# Patient Record
Sex: FEMALE | Race: WHITE | NOT HISPANIC OR LATINO | Employment: OTHER | URBAN - METROPOLITAN AREA
[De-identification: names, ages, dates, MRNs, and addresses within clinical notes are randomized per-mention and may not be internally consistent; named-entity substitution may affect disease eponyms.]

---

## 2017-01-02 ENCOUNTER — TELEPHONE (OUTPATIENT)
Dept: INTERNAL MEDICINE | Facility: CLINIC | Age: 82
End: 2017-01-02

## 2017-01-02 NOTE — TELEPHONE ENCOUNTER
Jamilah Rodriguez is a 85 year old female who calls with ongoing pain .     NURSING ASSESSMENT:  Description:  The pain in hip and back is worsening .   Onset/duration:  12/28/2016  Precip. factors:  none  Associated symptoms:  PT did have vomiting once she started Tramadol . She feels on edge today .  Improves/worsens symptoms:  Advil not resolving  Pain scale (0-10)   8/10  LMP/preg/breast feeding:  na  Last exam/Treatment:  12/28/2016  Allergies:   Allergies   Allergen Reactions     Atenolol Hives     Beta Adrenergic Blockers      Brimonidine      Cephalexin Hives     Germall Plus Itching     Imidazolidinyl Urea found in many topical creams and house hold products     Latex      Itching     Morphine GI Disturbance     Penicillins Hives     Sulfa Drugs GI Disturbance     Tylenol GI Disturbance     Vicodin [Hydrocodone-Acetaminophen]      Fentanyl Nausea       MEDICATIONS:   Taking medication(s) as prescribed? No  Taking over the counter medication(s?) Yes  Any medication side effects? GI  upset    Any barriers to taking medication(s) as prescribed?  No  Medication(s) improving/managing symptoms?  No  Medication reconciliation completed: Yes      NURSING PLAN: Routed to provider Yes    RECOMMENDED DISPOSITION:  To ED, another person to drive - if she cannot tolerate the pain that is what she wants to do   Will comply with recommendation: Yes  If further questions/concerns or if symptoms do not improve, worsen or new symptoms develop, call your PCP or [facility] as soon as possible.      Guideline used:  Telephone Triage Protocols for Nurses, Fifth Edition, Mine Wright RN

## 2017-01-02 NOTE — TELEPHONE ENCOUNTER
We should see her if her pain is worse, I should see her. I can see her today or tomorrow.    OK to place into any MD only or open spot.

## 2017-01-04 ENCOUNTER — TRANSFERRED RECORDS (OUTPATIENT)
Dept: HEALTH INFORMATION MANAGEMENT | Facility: CLINIC | Age: 82
End: 2017-01-04

## 2017-03-03 ENCOUNTER — TRANSFERRED RECORDS (OUTPATIENT)
Dept: HEALTH INFORMATION MANAGEMENT | Facility: CLINIC | Age: 82
End: 2017-03-03

## 2017-03-20 ENCOUNTER — TELEPHONE (OUTPATIENT)
Dept: OTHER | Facility: CLINIC | Age: 82
End: 2017-03-20

## 2017-03-20 NOTE — TELEPHONE ENCOUNTER
Pt called stating that she has right ankle edema for months and kenzie brown discoloration starting below right knee to the ankle for a while now. Pt denied pain with ambulation or numbness. Pt stated she has tingling sometimes. Pt stated that her orthopedic surgeon prescribed her compression stockings but she hasn't been using it. Pt was advised to go to her PCP. Pt agreed.     Inna Mustafa RN, BSN.

## 2017-05-05 DIAGNOSIS — J44.1 COPD EXACERBATION (H): ICD-10-CM

## 2017-05-05 NOTE — TELEPHONE ENCOUNTER
ventolin 108(90base)        Last Written Prescription Date: 12/19/16  Last Fill Quantity: 1inh, # refills: 0    Last Office Visit with [facility] or [facility] prescribing provider:  12/28/16   Future Office Visit:   0    Date of Last Asthma Action Plan Letter:   There are no preventive care reminders to display for this patient.   Asthma Control Test: No flowsheet data found.    Date of Last Spirometry Test:   No results found for this or any previous visit.

## 2017-05-08 RX ORDER — ALBUTEROL SULFATE 90 UG/1
2 AEROSOL, METERED RESPIRATORY (INHALATION) 4 TIMES DAILY PRN
Qty: 1 INHALER | Refills: 6 | Status: SHIPPED | OUTPATIENT
Start: 2017-05-08 | End: 2018-02-20

## 2017-05-18 ENCOUNTER — TRANSFERRED RECORDS (OUTPATIENT)
Dept: HEALTH INFORMATION MANAGEMENT | Facility: CLINIC | Age: 82
End: 2017-05-18

## 2017-05-23 ENCOUNTER — OFFICE VISIT (OUTPATIENT)
Dept: INTERNAL MEDICINE | Facility: CLINIC | Age: 82
End: 2017-05-23

## 2017-05-23 VITALS
DIASTOLIC BLOOD PRESSURE: 84 MMHG | WEIGHT: 132.6 LBS | TEMPERATURE: 98 F | SYSTOLIC BLOOD PRESSURE: 134 MMHG | HEART RATE: 82 BPM | BODY MASS INDEX: 23.5 KG/M2 | HEIGHT: 63 IN | OXYGEN SATURATION: 98 %

## 2017-05-23 DIAGNOSIS — Z53.9 ERRONEOUS ENCOUNTER--DISREGARD: Primary | ICD-10-CM

## 2017-05-23 NOTE — MR AVS SNAPSHOT
"              After Visit Summary   5/23/2017    Jamilah Rodriguez    MRN: [id number]           Patient Information     Date Of Birth          4/5/1931        Visit Information        Provider Department      5/23/2017 9:20 AM Bhupendra Chong MD [facility]        Today's Diagnoses     ERRONEOUS ENCOUNTER--DISREGARD    -  1       Follow-ups after your visit        Your next 10 appointments already scheduled     May 31, 2017  9:20 AM CDT   Office Visit with Bhupendra Chong MD   [facility] ([facility]    [address] [id number] [phone]           Bring a current list of meds and any records pertaining to this visit.  For Physicals, please bring immunization records and any forms needing to be filled out.  Please arrive 10 minutes early to complete paperwork.              Who to contact     If you have questions or need follow up information about today's clinic visit or your schedule please contact [facility] directly at [phone].  Normal or non-critical lab and imaging results will be communicated to you by MyChart, letter or phone within 4 business days after the clinic has received the results. If you do not hear from us within 7 days, please contact the clinic through [employer] or phone. If you have a critical or abnormal lab result, we will notify you by phone as soon as possible.  Submit refill requests through [employer] or call your pharmacy and they will forward the refill request to us. Please allow 3 business days for your refill to be completed.          Additional Information About Your Visit        MyChart Information     [employer] lets you send messages to your doctor, view your test results, renew your prescriptions, schedule appointments and more. To sign up, go to [email or web address] . Click on \"Log in\" on the left side of the screen, which will take you " "to the Welcome page. Then click on \"Sign up Now\" on the right side of the page.     You will be asked to enter the access code listed below, as well as some personal information. Please follow the directions to create your username and password.     Your access code is: [email or web address]  Expires: 2017  8:10 PM     Your access code will  in 90 days. If you need help or a new code, please call your [facility] clinic or [phone].        Care EveryWhere ID     This is your Care EveryWhere ID. This could be used by other organizations to access your [facility] medical records  [id number]        Your Vitals Were     Pulse Temperature Height Pulse Oximetry BMI (Body Mass Index)       82 98  F (36.7  C) (Oral) 5' 3\" (1.6 m) 98% 23.49 kg/m2        Blood Pressure from Last 3 Encounters:   17 134/84   16 134/76   16 100/60    Weight from Last 3 Encounters:   17 132 lb 9.6 oz (60.1 kg)   16 132 lb 14.4 oz (60.3 kg)   16 137 lb 11.2 oz (62.5 kg)              Today, you had the following     No orders found for display       Primary Care Provider Office Phone # Fax #    Bhupendra Chong [phone]       [facility] [address] [facility] [address]        Thank you!     Thank you for choosing [facility]  for your care. Our goal is always to provide you with excellent care. Hearing back from our patients is one way we can continue to improve our services. Please take a few minutes to complete the written survey that you may receive in the mail after your visit with us. Thank you!             Your Updated Medication List - Protect others around you: Learn how to safely use, store and throw away your medicines at www.disposemymeds.org.          This list is accurate as of: 17 11:59 PM.  Always use your most recent med list.                   Brand Name Dispense Instructions for use    albuterol 108 (90 BASE) MCG/ACT " Inhaler    albuterol    1 Inhaler    Inhale 2 puffs into the lungs 4 times daily as needed for shortness of breath / dyspnea or wheezing       ascorbic acid 500 MG tablet    VITAMIN C    100 tablet    Take 1 tablet by mouth daily.       bimatoprost 0.01 % Soln    LUMIGAN     Place 1 drop into both eyes At Bedtime.       calcium + D 600-200 MG-UNIT Tabs   Generic drug:  calcium carbonate-vitamin D     100 tablet    Take 1 tablet by mouth every 12 hours.       carboxymethylcellulose 0.5 % Soln ophthalmic solution    REFRESH PLUS     Place 1 drop into both eyes every 2 hours as needed.       dorzolamide 2 % ophthalmic solution    TRUSOPT     Place 1 drop into both eyes 2 times daily       FLAX SEEDS PO          fluticasone 50 MCG/ACT spray    FLONASE    49 mL    Spray 1-2 sprays into both nostrils daily as needed for rhinitis or allergies USE ONCE PER DAY DURING ALLERGY SEASON(S)       GLUCOSAMINE SULFATE PO      Take 500 mg by mouth daily       losartan 50 MG tablet    COZAAR    90 tablet    Take 1 tablet (50 mg) by mouth daily       traMADol 50 MG tablet    ULTRAM    30 tablet    Take 1 tablet (50 mg) by mouth every 6 hours as needed for moderate pain or severe pain maximum 4 tablet(s) per day       vitamin D 2000 UNITS tablet      Take 1 tablet by mouth daily.

## 2017-05-23 NOTE — PROGRESS NOTES
"  SUBJECTIVE:                                                    Jamilah Rodriguez is a 86 year old female who presents to clinic today for the following health issues:    States allergy to tramadol - vomiting    Interested in taking turmeric - would like to discuss    Concern - Edema     Onset: 2 months    Description:   Swelling in ankels    Intensity: moderate    Progression of Symptoms:  Intermittent, but constant since 5/1/17    Accompanying Signs & Symptoms:  Denies redness and warmth.   nut states foot is \"white\"       Previous history of similar problem:   none    Precipitating factors:   Worsened by:  In the morning when trying to put shoes on    Alleviating factors:  Improved by:        Therapies Tried and outcome: pt has tried compression stockings but cannot get them on        **patient left before I could see her.  i was running 45 minutes late and her ride (daughter) had to leave to go to work  "

## 2017-05-23 NOTE — NURSING NOTE
"Chief Complaint   Patient presents with     Edema     in ankles X 2 months       Initial /84  Pulse 82  Temp 98  F (36.7  C) (Oral)  Ht 5' 3\" (1.6 m)  Wt 132 lb 9.6 oz (60.1 kg)  SpO2 98%  BMI 23.49 kg/m2 Estimated body mass index is 23.49 kg/(m^2) as calculated from the following:    Height as of this encounter: 5' 3\" (1.6 m).    Weight as of this encounter: 132 lb 9.6 oz (60.1 kg).  Medication Reconciliation: complete   Nidia Urias CMA    Pt unable to wait any longer, pt left 10:05am. Nidia Urias CMA    "

## 2017-05-31 ENCOUNTER — OFFICE VISIT (OUTPATIENT)
Dept: INTERNAL MEDICINE | Facility: CLINIC | Age: 82
End: 2017-05-31
Payer: MEDICARE

## 2017-05-31 ENCOUNTER — OFFICE VISIT (OUTPATIENT)
Dept: URGENT CARE | Facility: URGENT CARE | Age: 82
End: 2017-05-31
Payer: MEDICARE

## 2017-05-31 VITALS
SYSTOLIC BLOOD PRESSURE: 125 MMHG | HEART RATE: 68 BPM | HEIGHT: 63 IN | TEMPERATURE: 98.3 F | WEIGHT: 131.2 LBS | OXYGEN SATURATION: 99 % | BODY MASS INDEX: 23.25 KG/M2 | DIASTOLIC BLOOD PRESSURE: 72 MMHG

## 2017-05-31 VITALS
BODY MASS INDEX: 23.21 KG/M2 | HEART RATE: 72 BPM | OXYGEN SATURATION: 99 % | WEIGHT: 131 LBS | SYSTOLIC BLOOD PRESSURE: 130 MMHG | TEMPERATURE: 97.7 F | DIASTOLIC BLOOD PRESSURE: 72 MMHG

## 2017-05-31 DIAGNOSIS — J44.9 CHRONIC OBSTRUCTIVE PULMONARY DISEASE, UNSPECIFIED COPD TYPE (H): ICD-10-CM

## 2017-05-31 DIAGNOSIS — R60.0 EDEMA OF RIGHT LOWER EXTREMITY: Primary | ICD-10-CM

## 2017-05-31 DIAGNOSIS — I10 BENIGN ESSENTIAL HYPERTENSION: ICD-10-CM

## 2017-05-31 DIAGNOSIS — I71.40 ABDOMINAL AORTIC ANEURYSM (AAA) WITHOUT RUPTURE (H): ICD-10-CM

## 2017-05-31 DIAGNOSIS — N94.89 VAGINAL BURNING: Primary | ICD-10-CM

## 2017-05-31 LAB
MICRO REPORT STATUS: NORMAL
SPECIMEN SOURCE: NORMAL
WET PREP SPEC: NORMAL

## 2017-05-31 PROCEDURE — 87210 SMEAR WET MOUNT SALINE/INK: CPT | Performed by: PHYSICIAN ASSISTANT

## 2017-05-31 PROCEDURE — 99213 OFFICE O/P EST LOW 20 MIN: CPT | Performed by: PHYSICIAN ASSISTANT

## 2017-05-31 PROCEDURE — 99214 OFFICE O/P EST MOD 30 MIN: CPT | Performed by: INTERNAL MEDICINE

## 2017-05-31 PROCEDURE — 87529 HSV DNA AMP PROBE: CPT | Mod: 91 | Performed by: PHYSICIAN ASSISTANT

## 2017-05-31 PROCEDURE — 87529 HSV DNA AMP PROBE: CPT | Performed by: PHYSICIAN ASSISTANT

## 2017-05-31 RX ORDER — VALACYCLOVIR HYDROCHLORIDE 1 G/1
TABLET, FILM COATED ORAL
Qty: 21 TABLET | Refills: 0 | Status: SHIPPED | OUTPATIENT
Start: 2017-05-31 | End: 2017-06-08

## 2017-05-31 RX ORDER — CALCIUM ACETATE MONOHYDRATE AND ALUMINUM SULFATE TETRADECAHYDRATE 952; 1347 MG/2299MG; MG/2299MG
POWDER, FOR SOLUTION TOPICAL
Qty: 14 EACH | Refills: 0 | Status: SHIPPED | OUTPATIENT
Start: 2017-05-31 | End: 2017-06-08

## 2017-05-31 NOTE — PROGRESS NOTES
SUBJECTIVE:                                                    Jamilah Rodriguez is a 86 year old female who presents to clinic today for the following health issues:      Concern - edema-R ankle     Onset: 2-3 months    Description:   Swelling in R ankle and top of R foot    Intensity: mild    Progression of Symptoms:  same    Accompanying Signs & Symptoms:  Numbness and tingling in the whole R leg-denies any pain.        Previous history of similar problem:   no    Precipitating factors:   Worsened by:     Alleviating factors:  Improved by:  Elevation and foot exercises      Therapies Tried and outcome: Elevation and foot exercises     2.    Blood presure remains well controlled at home  Readings outside clinic are within normal limits.  Reviewed last 6 BP readings in chart:  BP Readings from Last 6 Encounters:   06/08/17 124/80   05/31/17 130/72   05/31/17 125/72   05/23/17 134/84   12/28/16 134/76   12/21/16 100/60     He has not experienced any significant side effects from medicaiotns for hypertension.    NO active cardiac complaints or symptoms with exercise.     3.  COPD remains stable.  Has not had any recent breathing troubles beyond usual baseline.  Has not any acute respiratory events.  Remains with intermittent cough, mild shortness of breath with overexertion as per usual.  Using medication as directed with reported side effects.         Problem list and histories reviewed & adjusted, as indicated.  Additional history: as documented        Reviewed and updated as needed this visit by clinical staff  Tobacco  Allergies       Reviewed and updated as needed this visit by Provider           Past Medical History:  ---------------------------  Past Medical History:   Diagnosis Date     AAA (abdominal aortic aneurysm) (H)     Followed with yearly ultrasound     Cervical cancer (H) 1969     Chronic dermatitis     extensive allergy testing revealed allergy/sensitivity to carba mix ( rubber) and  Imidazolidinyl Urea (common in beauty products)     Glaucoma      Glaucoma      History of COPD     very mild abnormal spirometry in 2007 (in [place]), has not been active since quitting smoking in 2009     HTN (hypertension)      Macular degeneration      Osteoporosis 8/3/11    femoral T score -3.4 & -3.7     PAD (peripheral artery disease) (H)      Vertebral compression fracture (H) 5/17/11    L1 compression fracture, presumed osteoporotic compression fracture       Past Surgical History:  ---------------------------  Past Surgical History:   Procedure Laterality Date     CATARACT IOL, RT/LT  2/8/12    left eye cataract removal     HYSTERECTOMY TOTAL ABDOMINAL  1999    Fibroids     OPEN REDUCTION INTERNAL FIXATION HIP NAILING  7/2/2012    Procedure: OPEN REDUCTION INTERNAL FIXATION HIP NAILING;  Intramedullary Fixation Right Intertrochanteric Hip Fracture;  Surgeon: Chalino Covarrubias MD;  Location:  OR     OPEN REDUCTION INTERNAL FIXATION TIBIAL PLATEAU  3/31/2013    Procedure: OPEN REDUCTION INTERNAL FIXATION TIBIAL PLATEAU;  RIGHT LATERAL TIBIAL PLATEAU FRACTURE - SYNTHES Medial Meniscus Repair, Lateral Compartment Release;  Surgeon: Alfred Cardenas MD;  Location: [facility] OR     OPEN REDUCTION INTERNAL FIXATION WRIST  1988    left wrist ORIF, hardware removed onemonth later       Current Medications:  ---------------------------  Current Outpatient Prescriptions   Medication Sig Dispense Refill     albuterol (ALBUTEROL) 108 (90 BASE) MCG/ACT Inhaler Inhale 2 puffs into the lungs 4 times daily as needed for shortness of breath / dyspnea or wheezing 1 Inhaler 6     losartan (COZAAR) 50 MG tablet Take 1 tablet (50 mg) by mouth daily 90 tablet 3     GLUCOSAMINE SULFATE PO Take 500 mg by mouth daily       Flaxseed, Linseed, (FLAX SEEDS PO)        dorzolamide (TRUSOPT) 2 % ophthalmic solution Place 1 drop into both eyes 2 times daily       Cholecalciferol (VITAMIN D) 2000 UNITS tablet Take 1 tablet by mouth daily.        bimatoprost (LUMIGAN) 0.01 % SOLN Place 1 drop into both eyes At Bedtime.         carboxymethylcellulose (REFRESH PLUS) 0.5 % SOLN Place 1 drop into both eyes every 2 hours as needed.         Calcium Carbonate-Vitamin D (CALCIUM + D) 600-200 MG-UNIT per tablet Take 1 tablet by mouth every 12 hours. 100 tablet 12     ascorbic acid (VITAMIN C) 500 MG tablet Take 1 tablet by mouth daily. 100 tablet 12     traMADol (ULTRAM) 50 MG tablet Take 1 tablet (50 mg) by mouth every 6 hours as needed for moderate pain or severe pain maximum 4 tablet(s) per day (Patient not taking: Reported on 5/31/2017) 30 tablet 0     fluticasone (FLONASE) 50 MCG/ACT nasal spray Spray 1-2 sprays into both nostrils daily as needed for rhinitis or allergies USE ONCE PER DAY DURING ALLERGY SEASON(S) (Patient not taking: Reported on 5/31/2017) 49 mL 1       Allergies:  -------------  Allergies   Allergen Reactions     Atenolol Hives     Beta Adrenergic Blockers      Brimonidine      Cephalexin Hives     Germall Plus Itching     Imidazolidinyl Urea found in many topical creams and house hold products     Latex      Itching     Morphine GI Disturbance     Penicillins Hives     Sulfa Drugs GI Disturbance     Tylenol GI Disturbance     Vicodin [Hydrocodone-Acetaminophen]      Fentanyl Nausea       Social History:  -------------------  Social History     Social History     Marital status:      Spouse name: N/A     Number of children: 2     Years of education: N/A     Occupational History     Not on file.     Social History Main Topics     Smoking status: Former Smoker     Quit date: 7/1/2009     Smokeless tobacco: Never Used      Comment: former 1/2-1 ppd since age [age]     Alcohol use No     Drug use: No     Sexual activity: No     Other Topics Concern     Not on file     Social History Narrative       Family Medical History:  ------------------------------  Family History   Problem Relation Age of Onset     Circulatory Daughter 53     mengioma  "    ALIX.AMOY. Father      DIABETES Sister      Breast Cancer Maternal Aunt 60     Breast Cancer Other          ROS:  REVIEW OF SYSTEMS:    RESP: negative for cough, dyspnea, wheezing, hemoptysis  CV: negative for chest pain, palpitations, PND, MCCULLOUGH, orthopnea; reports no changes in their ability to perform physical activity (from cardiovascular standpoint)  GI: negative for dysphagia, N/V, pain, melena, diarrhea and constipation  NEURO: negative for numbness/tingling, paralysis, incoordination, or focal weakness     OBJECTIVE:                                                    BP 90/58  Pulse 68  Temp 98.3  F (36.8  C) (Oral)  Ht 5' 3\" (1.6 m)  Wt 131 lb 3.2 oz (59.5 kg)  SpO2 99%  BMI 23.24 kg/m2     GENERAL alert and no distress  EYES:  Normal sclera,conjunctiva, EOMI  HENT: oral and posterior pharynx without lesions or erythema, facies symmetric  NECK: Neck supple. No LAD, without thyroidmegaly or JVD., Carotids without bruits.  RESP: Clear to ausculation bilaterally without wheezes or crackles. Normal BS in all fields.  CV: RRR normal S1S2 without murmurs, rubs or gallops. PMI normal  LYMPH: no cervical lymph adenopathy appreciated  MS: extremities- no gross deformities of the visible extremities noted, no edema  PSYCH: Alert and oriented times 3; speech- coherent  SKIN:  No obvious significant skin lesions on visible portions of face   EXT:  1+ lower extremity edema in lower right leg and ankle/foot area with mild pitting. Homans sign negative.  Some mild stasis dermatitis changes present.   Pulses in DP and PT pulses slighlty diminished on R vs L lower leg.   Left loer leg has trace lower extremity edema around ankles.        ASSESSMENT/PLAN:                                                      (I10) Benign essential hypertension  Comment: This condition is currently controlled on the current medical regimen.  Continue current therapy.   Discussed hypertension in detail including JNC VIII guidelines for " blood pressure goals.  Discussed indication for treatment and treatment options.  Discussed the importance for aggressive management of HTN to prevent vascular complications later.  Recommended lower fat, lower carbohydrate, and lower sodium (<2000 mg)diet.  Discussed required intervals for follow up on HTN, lab studies, and the need to aggresive management of other cardiac disease risk factors.  Recommened pt. follow their blood pressures outside the clinic to ensure that BPs are remaining within guidelines, and to contact me if the readings are not within guidelines so we can adjust treatment as needed.   Plan: losartan (COZAAR) 50 MG tablet            (R60.0) Edema of right lower extremity  (primary encounter diagnosis)  Comment: probable combination of reasons including dietary intake of sodiu, less walking.    Plan:          (I71.4) Abdominal aortic aneurysm (AAA) without rupture (H)  Comment: followed by Vascular clinic, reviewed last note, due for follow up visit this summer.   Plan:     (J44.9) Chronic obstructive pulmonary disease, unspecified COPD type (H)  Comment: This condition is currently controlled on the current medical regimen.  Continue current therapy.   Discussed general issues of COPD, including pathophysiology, ways it will affect the pt., when to seek help, reviewed the typical medications (how they are taken, how they help)   Plan:        See Patient Instructions    BEBETO LANGSTON M.D., MD  [facility]

## 2017-05-31 NOTE — NURSING NOTE
"Chief Complaint   Patient presents with     Vaginal Problem     vaginal burning, soreness, oder, discharge 3xdays        Initial /72 (BP Location: Left arm, Patient Position: Chair, Cuff Size: Adult Regular)  Pulse 72  Temp 97.7  F (36.5  C) (Oral)  Wt 131 lb (59.4 kg)  SpO2 99%  BMI 23.21 kg/m2 Estimated body mass index is 23.21 kg/(m^2) as calculated from the following:    Height as of an earlier encounter on 5/31/17: 5' 3\" (1.6 m).    Weight as of this encounter: 131 lb (59.4 kg).  Medication Reconciliation: complete    "

## 2017-05-31 NOTE — PROGRESS NOTES
SUBJECTIVE:  Jamilah Rodriguez is a 86 year old female who presents with   Vaginal irritation, burning and pain on right side for the past 3 days.  No fevers.  No abdominal pain.  No injury.    Onset of symptoms as above.     Pain:as above.     No LMP recorded. Patient is postmenopausal.        Past Medical History:   Diagnosis Date     AAA (abdominal aortic aneurysm) (H)     Followed with yearly ultrasound     Cervical cancer (H) 1969     Chronic dermatitis     extensive allergy testing revealed allergy/sensitivity to carba mix ( rubber) and Imidazolidinyl Urea (common in beauty products)     Glaucoma      Glaucoma      History of COPD     very mild abnormal spirometry in 2007 (in [place]), has not been active since quitting smoking in 2009     HTN (hypertension)      Macular degeneration      Osteoporosis 8/3/11    femoral T score -3.4 & -3.7     PAD (peripheral artery disease) (H)      Vertebral compression fracture (H) 5/17/11    L1 compression fracture, presumed osteoporotic compression fracture     Current Outpatient Prescriptions   Medication Sig Dispense Refill     valACYclovir (VALTREX) 1000 mg tablet For Shingles: Take 1 tablet by mouth three times daily for 7 days 21 tablet 0     aluminum sulfate-calcium acetate (DOMEBORO) Packet Dissolve one packet in a pint of luke warm to cool water.  Apply as a compress to area for 20 minutes up to QID. 14 each 0     albuterol (ALBUTEROL) 108 (90 BASE) MCG/ACT Inhaler Inhale 2 puffs into the lungs 4 times daily as needed for shortness of breath / dyspnea or wheezing 1 Inhaler 6     losartan (COZAAR) 50 MG tablet Take 1 tablet (50 mg) by mouth daily 90 tablet 3     GLUCOSAMINE SULFATE PO Take 500 mg by mouth daily       Calcium Carbonate-Vitamin D (CALCIUM + D) 600-200 MG-UNIT per tablet Take 1 tablet by mouth every 12 hours. 100 tablet 12     ascorbic acid (VITAMIN C) 500 MG tablet Take 1 tablet by mouth daily. 100 tablet 12     traMADol (ULTRAM) 50 MG tablet Take 1 tablet  (50 mg) by mouth every 6 hours as needed for moderate pain or severe pain maximum 4 tablet(s) per day (Patient not taking: Reported on 5/31/2017) 30 tablet 0     fluticasone (FLONASE) 50 MCG/ACT nasal spray Spray 1-2 sprays into both nostrils daily as needed for rhinitis or allergies USE ONCE PER DAY DURING ALLERGY SEASON(S) (Patient not taking: Reported on 5/31/2017) 49 mL 1     Flaxseed, Linseed, (FLAX SEEDS PO)        dorzolamide (TRUSOPT) 2 % ophthalmic solution Place 1 drop into both eyes 2 times daily       Cholecalciferol (VITAMIN D) 2000 UNITS tablet Take 1 tablet by mouth daily.       bimatoprost (LUMIGAN) 0.01 % SOLN Place 1 drop into both eyes At Bedtime.         carboxymethylcellulose (REFRESH PLUS) 0.5 % SOLN Place 1 drop into both eyes every 2 hours as needed.         Social History     Social History     Marital status:      Spouse name: N/A     Number of children: 2     Years of education: N/A     Occupational History     Not on file.     Social History Main Topics     Smoking status: Former Smoker     Quit date: 7/1/2009     Smokeless tobacco: Never Used      Comment: former 1/2-1 ppd since age [age]     Alcohol use No     Drug use: No     Sexual activity: No     Other Topics Concern     Not on file     Social History Narrative       ROS:   CONSTITUTIONAL:NEGATIVE for fever, chills, change in weight  INTEGUMENTARY/SKIN: as per HPI  : as per HPI    OBJECTIVE:  /72 (BP Location: Left arm, Patient Position: Chair, Cuff Size: Adult Regular)  Pulse 72  Temp 97.7  F (36.5  C) (Oral)  Wt 131 lb (59.4 kg)  SpO2 99%  BMI 23.21 kg/m2  : shallow ulcers on right labia majora and minora.  Scant vaginal discharge, clear.    GENERAL APPEARANCE: healthy, alert and no distress  CV: regular rates and rhythm, normal S1 S2, no murmur noted  ABDOMEN:  soft, nontender, no HSM or masses and bowel sounds normal  SKIN: no other rashes    (N94.9) Vaginal burning  (primary encounter diagnosis)  Comment:  seems consistent with shingles vs hsv  Plan: Wet prep, HSV 1 and 2 DNA by PCR, valACYclovir         (VALTREX) 1000 mg tablet, aluminum         sulfate-calcium acetate (DOMEBORO) Packet        Tylenol prn.  Patient declines other pain medication.     F/U with PCP should symptoms persist or worsen.    Patient expresses understanding and agreement with the assessment and plan as above.

## 2017-05-31 NOTE — MR AVS SNAPSHOT
"              After Visit Summary   5/31/2017    Jamilah Rodriguez    MRN: [id number]           Patient Information     Date Of Birth          4/5/1931        Visit Information        Provider Department      5/31/2017 9:20 AM Bhupendra Chong MD [facility]        Today's Diagnoses     Edema of right lower extremity    -  1    Benign essential hypertension        Abdominal aortic aneurysm (AAA) without rupture (H)        Chronic obstructive pulmonary disease, unspecified COPD type (H)           Follow-ups after your visit        Who to contact     If you have questions or need follow up information about today's clinic visit or your schedule please contact [facility] directly at [phone].  Normal or non-critical lab and imaging results will be communicated to you by MyChart, letter or phone within 4 business days after the clinic has received the results. If you do not hear from us within 7 days, please contact the clinic through MyChart or phone. If you have a critical or abnormal lab result, we will notify you by phone as soon as possible.  Submit refill requests through Artabase or call your pharmacy and they will forward the refill request to us. Please allow 3 business days for your refill to be completed.          Additional Information About Your Visit        MyChart Information     Artabase lets you send messages to your doctor, view your test results, renew your prescriptions, schedule appointments and more. To sign up, go to [email or web address] . Click on \"Log in\" on the left side of the screen, which will take you to the Welcome page. Then click on \"Sign up Now\" on the right side of the page.     You will be asked to enter the access code listed below, as well as some personal information. Please follow the directions to create your username and password.     Your access code is: [email or web address]  Expires: 8/28/2017  8:10 PM     Your access code " "will  in 90 days. If you need help or a new code, please call your [facility] clinic or [phone].        Care EveryWhere ID     This is your Care EveryWhere ID. This could be used by other organizations to access your [facility] medical records  [id number]        Your Vitals Were     Pulse Temperature Height Pulse Oximetry BMI (Body Mass Index)       68 98.3  F (36.8  C) (Oral) 5' 3\" (1.6 m) 99% 23.24 kg/m2        Blood Pressure from Last 3 Encounters:   17 124/80   17 130/72   17 125/72    Weight from Last 3 Encounters:   17 130 lb (59 kg)   17 131 lb (59.4 kg)   17 131 lb 3.2 oz (59.5 kg)              Today, you had the following     No orders found for display         Today's Medication Changes          These changes are accurate as of: 17 11:59 PM.  If you have any questions, ask your nurse or doctor.               Start taking these medicines.        Dose/Directions    aluminum sulfate-calcium acetate Packet   Commonly known as:  DOMEBORO   Used for:  Vaginal burning   Started by:  Mine Rodriguez PA-C        Dissolve one packet in a pint of luke warm to cool water.  Apply as a compress to area for 20 minutes up to QID.   Quantity:  14 each   Refills:  0       valACYclovir 1000 mg tablet   Commonly known as:  VALTREX   Used for:  Vaginal burning   Started by:  Mine Rodriguez PA-C        For Shingles: Take 1 tablet by mouth three times daily for 7 days   Quantity:  21 tablet   Refills:  0            Where to get your medicines      These medications were sent to [facility] [address] [place] [address] - [address] AT [facility] [place]  [address], [facility] [address]     Phone:  [phone]     aluminum sulfate-calcium acetate Packet    losartan 50 MG tablet    valACYclovir 1000 mg tablet                Primary Care Provider Office Phone # Fax #    Bhpuendra Chong [phone] " [phone]       [facility] [address]  [facility] [address]        Thank you!     Thank you for choosing [facility]  for your care. Our goal is always to provide you with excellent care. Hearing back from our patients is one way we can continue to improve our services. Please take a few minutes to complete the written survey that you may receive in the mail after your visit with us. Thank you!             Your Updated Medication List - Protect others around you: Learn how to safely use, store and throw away your medicines at www.disposemymeds.org.          This list is accurate as of: 5/31/17 11:59 PM.  Always use your most recent med list.                   Brand Name Dispense Instructions for use    albuterol 108 (90 BASE) MCG/ACT Inhaler    albuterol    1 Inhaler    Inhale 2 puffs into the lungs 4 times daily as needed for shortness of breath / dyspnea or wheezing       aluminum sulfate-calcium acetate Packet    DOMEBORO    14 each    Dissolve one packet in a pint of luke warm to cool water.  Apply as a compress to area for 20 minutes up to QID.       ascorbic acid 500 MG tablet    VITAMIN C    100 tablet    Take 1 tablet by mouth daily.       bimatoprost 0.01 % Soln    LUMIGAN     Place 1 drop into both eyes At Bedtime.       calcium + D 600-200 MG-UNIT Tabs   Generic drug:  calcium carbonate-vitamin D     100 tablet    Take 1 tablet by mouth every 12 hours.       carboxymethylcellulose 0.5 % Soln ophthalmic solution    REFRESH PLUS     Place 1 drop into both eyes every 2 hours as needed.       dorzolamide 2 % ophthalmic solution    TRUSOPT     Place 1 drop into both eyes 2 times daily       FLAX SEEDS PO          GLUCOSAMINE SULFATE PO      Take 500 mg by mouth daily       losartan 50 MG tablet    COZAAR    90 tablet    Take 1 tablet (50 mg) by mouth daily       valACYclovir 1000 mg tablet    VALTREX    21 tablet    For Shingles: Take 1 tablet by mouth three times  daily for 7 days       vitamin D 2000 UNITS tablet      Take 1 tablet by mouth daily.

## 2017-05-31 NOTE — MR AVS SNAPSHOT
"              After Visit Summary   5/31/2017    Jamilah Rodriguez    MRN: [id number]           Patient Information     Date Of Birth          4/5/1931        Visit Information        Provider Department      5/31/2017 11:00 AM Mine Rodriguez PA-C [facility]        Today's Diagnoses     Vaginal burning    -  1       Follow-ups after your visit        Your next 10 appointments already scheduled     Jun 07, 2017 11:00 AM CDT   Office Visit with JAMIN Rolle CNM   [facility]    [address] [address] [phone]           Bring a current list of meds and any records pertaining to this visit.  For Physicals, please bring immunization records and any forms needing to be filled out.  Please arrive 10 minutes early to complete paperwork.              Who to contact     If you have questions or need follow up information about today's clinic visit or your schedule please contact [facility] directly at [phone].  Normal or non-critical lab and imaging results will be communicated to you by Tailored Republichart, letter or phone within 4 business days after the clinic has received the results. If you do not hear from us within 7 days, please contact the clinic through Dealisedt or phone. If you have a critical or abnormal lab result, we will notify you by phone as soon as possible.  Submit refill requests through [employer] or call your pharmacy and they will forward the refill request to us. Please allow 3 business days for your refill to be completed.          Additional Information About Your Visit        MyChart Information     [employer] lets you send messages to your doctor, view your test results, renew your prescriptions, schedule appointments and more. To sign up, go to [email or web address] . Click on \"Log in\" on the left side of the screen, which will take " "you to the Welcome page. Then click on \"Sign up Now\" on the right side of the page.     You will be asked to enter the access code listed below, as well as some personal information. Please follow the directions to create your username and password.     Your access code is: [email or web address]  Expires: 2017  8:10 PM     Your access code will  in 90 days. If you need help or a new code, please call your [facility] clinic or [phone].        Care EveryWhere ID     This is your Care EveryWhere ID. This could be used by other organizations to access your [facility] medical records  [id number]        Your Vitals Were     Pulse Temperature Pulse Oximetry BMI (Body Mass Index)          72 97.7  F (36.5  C) (Oral) 99% 23.21 kg/m2         Blood Pressure from Last 3 Encounters:   17 130/72   17 125/72   17 134/84    Weight from Last 3 Encounters:   17 131 lb (59.4 kg)   17 131 lb 3.2 oz (59.5 kg)   17 132 lb 9.6 oz (60.1 kg)              We Performed the Following     HSV 1 and 2 DNA by PCR     Wet prep          Today's Medication Changes          These changes are accurate as of: 17  3:58 PM.  If you have any questions, ask your nurse or doctor.               Start taking these medicines.        Dose/Directions    aluminum sulfate-calcium acetate Packet   Commonly known as:  DOMEBORO   Used for:  Vaginal burning   Started by:  Mine Rodriguez PA-C        Dissolve one packet in a pint of luke warm to cool water.  Apply as a compress to area for 20 minutes up to QID.   Quantity:  14 each   Refills:  0       valACYclovir 1000 mg tablet   Commonly known as:  VALTREX   Used for:  Vaginal burning   Started by:  Mine Rodriguez PA-C        For Shingles: Take 1 tablet by mouth three times daily for 7 days   Quantity:  21 tablet   Refills:  0            Where to get your medicines      These medications were sent to [facility] [address] [place] [address] - " [address] AT [facility] [place]  [address], [facility] [address]     Phone:  [phone]     aluminum sulfate-calcium acetate Packet    valACYclovir 1000 mg tablet                Primary Care Provider Office Phone # Fax #    Bhupendra Abdulaziz Chong [phone]       [facility] [address]  [facility] [address]        Thank you!     Thank you for choosing [facility]  for your care. Our goal is always to provide you with excellent care. Hearing back from our patients is one way we can continue to improve our services. Please take a few minutes to complete the written survey that you may receive in the mail after your visit with us. Thank you!             Your Updated Medication List - Protect others around you: Learn how to safely use, store and throw away your medicines at www.disposemymeds.org.          This list is accurate as of: 5/31/17  3:58 PM.  Always use your most recent med list.                   Brand Name Dispense Instructions for use    albuterol 108 (90 BASE) MCG/ACT Inhaler    albuterol    1 Inhaler    Inhale 2 puffs into the lungs 4 times daily as needed for shortness of breath / dyspnea or wheezing       aluminum sulfate-calcium acetate Packet    DOMEBORO    14 each    Dissolve one packet in a pint of luke warm to cool water.  Apply as a compress to area for 20 minutes up to QID.       ascorbic acid 500 MG tablet    VITAMIN C    100 tablet    Take 1 tablet by mouth daily.       bimatoprost 0.01 % Soln    LUMIGAN     Place 1 drop into both eyes At Bedtime.       calcium + D 600-200 MG-UNIT Tabs   Generic drug:  calcium carbonate-vitamin D     100 tablet    Take 1 tablet by mouth every 12 hours.       carboxymethylcellulose 0.5 % Soln ophthalmic solution    REFRESH PLUS     Place 1 drop into both eyes every 2 hours as needed.       dorzolamide 2 % ophthalmic solution    TRUSOPT     Place 1 drop into  both eyes 2 times daily       FLAX SEEDS PO          fluticasone 50 MCG/ACT spray    FLONASE    49 mL    Spray 1-2 sprays into both nostrils daily as needed for rhinitis or allergies USE ONCE PER DAY DURING ALLERGY SEASON(S)       GLUCOSAMINE SULFATE PO      Take 500 mg by mouth daily       losartan 50 MG tablet    COZAAR    90 tablet    Take 1 tablet (50 mg) by mouth daily       traMADol 50 MG tablet    ULTRAM    30 tablet    Take 1 tablet (50 mg) by mouth every 6 hours as needed for moderate pain or severe pain maximum 4 tablet(s) per day       valACYclovir 1000 mg tablet    VALTREX    21 tablet    For Shingles: Take 1 tablet by mouth three times daily for 7 days       vitamin D 2000 UNITS tablet      Take 1 tablet by mouth daily.

## 2017-05-31 NOTE — NURSING NOTE
"Chief Complaint   Patient presents with     Edema     R ankle       Initial BP 90/58  Pulse 68  Temp 98.3  F (36.8  C) (Oral)  Ht 5' 3\" (1.6 m)  Wt 131 lb 3.2 oz (59.5 kg)  SpO2 99%  BMI 23.24 kg/m2 Estimated body mass index is 23.24 kg/(m^2) as calculated from the following:    Height as of this encounter: 5' 3\" (1.6 m).    Weight as of this encounter: 131 lb 3.2 oz (59.5 kg).  Medication Reconciliation: complete   Cristel Lacey MA   "

## 2017-06-01 LAB
HSV1 DNA SPEC QL NAA+PROBE: NEGATIVE
HSV2 DNA SPEC QL NAA+PROBE: NORMAL
SPECIMEN SOURCE: NORMAL

## 2017-06-07 ENCOUNTER — TELEPHONE (OUTPATIENT)
Dept: OTHER | Facility: CLINIC | Age: 82
End: 2017-06-07

## 2017-06-07 NOTE — TELEPHONE ENCOUNTER
"Pt has history of 3.8cm AAA, LOV was 5/25/16 with .  Recommendation at that time was to f/u in 2 years.    Pt called [facility] triage line 3/20/17:  Pt called stating that she has right ankle edema for months and kenzie brown discoloration starting below right knee to the ankle for a while now. Pt denied pain with ambulation or numbness. Pt stated she has tingling sometimes. Pt stated that her orthopedic surgeon prescribed her compression stockings but she hasn't been using it. Pt was advised to go to her PCP. Pt agreed.     Pt saw PCP 5/31/17 and the note is note complete.      Pt called [facility]  today and states that her PCP advised her to have her AAA US in addition to \"some other ultrasound\" of her legs.  What is PCP recommendation?    Kyung Andrews, DARYAN, RN    "

## 2017-06-08 ENCOUNTER — OFFICE VISIT (OUTPATIENT)
Dept: OBGYN | Facility: CLINIC | Age: 82
End: 2017-06-08
Payer: MEDICARE

## 2017-06-08 VITALS
BODY MASS INDEX: 23.04 KG/M2 | SYSTOLIC BLOOD PRESSURE: 124 MMHG | WEIGHT: 130 LBS | DIASTOLIC BLOOD PRESSURE: 80 MMHG | HEIGHT: 63 IN

## 2017-06-08 DIAGNOSIS — N76.2 ACUTE VULVITIS: ICD-10-CM

## 2017-06-08 DIAGNOSIS — B02.9 HERPES ZOSTER WITHOUT COMPLICATION: Primary | ICD-10-CM

## 2017-06-08 PROCEDURE — 99202 OFFICE O/P NEW SF 15 MIN: CPT | Performed by: OBSTETRICS & GYNECOLOGY

## 2017-06-08 ASSESSMENT — ANXIETY QUESTIONNAIRES
IF YOU CHECKED OFF ANY PROBLEMS ON THIS QUESTIONNAIRE, HOW DIFFICULT HAVE THESE PROBLEMS MADE IT FOR YOU TO DO YOUR WORK, TAKE CARE OF THINGS AT HOME, OR GET ALONG WITH OTHER PEOPLE: NOT DIFFICULT AT ALL
7. FEELING AFRAID AS IF SOMETHING AWFUL MIGHT HAPPEN: NOT AT ALL
3. WORRYING TOO MUCH ABOUT DIFFERENT THINGS: NOT AT ALL
1. FEELING NERVOUS, ANXIOUS, OR ON EDGE: NOT AT ALL
2. NOT BEING ABLE TO STOP OR CONTROL WORRYING: NOT AT ALL
GAD7 TOTAL SCORE: 0
5. BEING SO RESTLESS THAT IT IS HARD TO SIT STILL: NOT AT ALL
6. BECOMING EASILY ANNOYED OR IRRITABLE: NOT AT ALL

## 2017-06-08 ASSESSMENT — PATIENT HEALTH QUESTIONNAIRE - PHQ9: 5. POOR APPETITE OR OVEREATING: NOT AT ALL

## 2017-06-08 NOTE — MR AVS SNAPSHOT
"              After Visit Summary   2017    Jamilah Rodriguez    MRN: [id number]           Patient Information     Date Of Birth          1931        Visit Information        Provider Department      2017 9:45 AM Wali Stack MD [facility]        Today's Diagnoses     Herpes zoster without complication    -  1    Acute vulvitis           Follow-ups after your visit        Who to contact     If you have questions or need follow up information about today's clinic visit or your schedule please contact [facility] directly at [phone].  Normal or non-critical lab and imaging results will be communicated to you by cWyzehart, letter or phone within 4 business days after the clinic has received the results. If you do not hear from us within 7 days, please contact the clinic through cWyzehart or phone. If you have a critical or abnormal lab result, we will notify you by phone as soon as possible.  Submit refill requests through RadiumOne or call your pharmacy and they will forward the refill request to us. Please allow 3 business days for your refill to be completed.          Additional Information About Your Visit        MyChart Information     Radium[employer] lets you send messages to your doctor, view your test results, renew your prescriptions, schedule appointments and more. To sign up, go to [email or web address] . Click on \"Log in\" on the left side of the screen, which will take you to the Welcome page. Then click on \"Sign up Now\" on the right side of the page.     You will be asked to enter the access code listed below, as well as some personal information. Please follow the directions to create your username and password.     Your access code is: [email or web address]  Expires: 2017  8:10 PM     Your access code will  in 90 days. If you need help or a new code, please call your [facility] or [phone].        Care EveryWhere ID     This is your Care " "EveryWhere ID. This could be used by other organizations to access your [facility] medical records  [id number]        Your Vitals Were     Height BMI (Body Mass Index)                5' 3\" (1.6 m) 23.03 kg/m2           Blood Pressure from Last 3 Encounters:   06/08/17 124/80   05/31/17 130/72   05/31/17 125/72    Weight from Last 3 Encounters:   06/08/17 130 lb (59 kg)   05/31/17 131 lb (59.4 kg)   05/31/17 131 lb 3.2 oz (59.5 kg)              Today, you had the following     No orders found for display       Primary Care Provider Office Phone # Fax #    Bhupendra Chong [phone]       [facility] [address] [facility] [address]        Thank you!     Thank you for choosing [facility]  for your care. Our goal is always to provide you with excellent care. Hearing back from our patients is one way we can continue to improve our services. Please take a few minutes to complete the written survey that you may receive in the mail after your visit with us. Thank you!             Your Updated Medication List - Protect others around you: Learn how to safely use, store and throw away your medicines at www.disposemymeds.org.          This list is accurate as of: 6/8/17 10:28 AM.  Always use your most recent med list.                   Brand Name Dispense Instructions for use    albuterol 108 (90 BASE) MCG/ACT Inhaler    albuterol    1 Inhaler    Inhale 2 puffs into the lungs 4 times daily as needed for shortness of breath / dyspnea or wheezing       ascorbic acid 500 MG tablet    VITAMIN C    100 tablet    Take 1 tablet by mouth daily.       bimatoprost 0.01 % Soln    LUMIGAN     Place 1 drop into both eyes At Bedtime.       calcium + D 600-200 MG-UNIT Tabs   Generic drug:  calcium carbonate-vitamin D     100 tablet    Take 1 tablet by mouth every 12 hours.       carboxymethylcellulose 0.5 % Soln ophthalmic solution    REFRESH PLUS     Place 1 drop into both eyes " every 2 hours as needed.       dorzolamide 2 % ophthalmic solution    TRUSOPT     Place 1 drop into both eyes 2 times daily       FLAX SEEDS PO          GLUCOSAMINE SULFATE PO      Take 500 mg by mouth daily       losartan 50 MG tablet    COZAAR    90 tablet    Take 1 tablet (50 mg) by mouth daily       vitamin D 2000 UNITS tablet      Take 1 tablet by mouth daily.

## 2017-06-08 NOTE — PROGRESS NOTES
SUBJECTIVE:                                                   Jamilah Rodriguez is a 86 year old female who presents to clinic today for the following health issue(s):  Patient presents with:  RECHECK: Possible vaginal shingles- referred to f/u on it      HPI:  Patient is referred by [facility] for consultation regarding a recent outbreak of herpes zoster in the vaginal vulvar area.  Approximately a week ago the patient was seen in urgent care.  She had an acute onset of pain with swallowing and ulcerations in the labial vaginal area.  Testing was negative for HSV.  The findings were consistent with shingles.  She was started on Valtrex and tub soaks.  Her symptoms have improved.  She is not having significant pain.  She is taking 1 Advil a day.      No LMP recorded. Patient is postmenopausal..   Patient is not sexually active, No obstetric history on file..  Using not sexually active for contraception.    reports that she quit smoking about 7 years ago. She has never used smokeless tobacco.    STD testing offered?  Declined    Health maintenance updated:  yes    Today's PHQ-2 Score:   PHQ-2 ( 1999 Pfizer) 5/31/2017   Q1: Little interest or pleasure in doing things 0   Q2: Feeling down, depressed or hopeless 0   PHQ-2 Score 0     Today's PHQ-9 Score:   PHQ-9 SCORE 6/8/2017   Total Score 0     Today's BISI-7 Score:   BISI-7 SCORE 6/8/2017   Total Score -   Total Score 0       Problem list and histories reviewed & adjusted, as indicated.  Additional history: as documented.    Patient Active Problem List   Diagnosis     AAA (abdominal aortic aneurysm) (H)     Macular degeneration     Advanced directives, counseling/discussion     Osteoporosis     Vertebral compression fracture (H)     CARDIOVASCULAR SCREENING; LDL GOAL LESS THAN 160     Benign essential hypertension     Hip fx: s/p IM- Kushal 7/4/12     S/P cataract surgery: Recently 2012.     Constipation     Edema     Knee fracture, right     Other postprocedural  status(V45.89)     Glaucoma     Chronic dermatitis     Seasonal allergic rhinitis, unspecified allergic rhinitis trigger     Chronic obstructive pulmonary disease, unspecified COPD type (H)     Past Surgical History:   Procedure Laterality Date     CATARACT IOL, RT/LT  2/8/12    left eye cataract removal     HYSTERECTOMY TOTAL ABDOMINAL  1999    Fibroids     OPEN REDUCTION INTERNAL FIXATION HIP NAILING  7/2/2012    Procedure: OPEN REDUCTION INTERNAL FIXATION HIP NAILING;  Intramedullary Fixation Right Intertrochanteric Hip Fracture;  Surgeon: Chalino Covarrubias MD;  Location: [facility] OR     OPEN REDUCTION INTERNAL FIXATION TIBIAL PLATEAU  3/31/2013    Procedure: OPEN REDUCTION INTERNAL FIXATION TIBIAL PLATEAU;  RIGHT LATERAL TIBIAL PLATEAU FRACTURE - SYNTHES Medial Meniscus Repair, Lateral Compartment Release;  Surgeon: Alfred Cardenas MD;  Location: [facility] OR     OPEN REDUCTION INTERNAL FIXATION WRIST  1988    left wrist ORIF, hardware removed onemonth later      Social History   Substance Use Topics     Smoking status: Former Smoker     Quit date: 7/1/2009     Smokeless tobacco: Never Used      Comment: former 1/2-1 ppd since age 20-22     Alcohol use No      Problem (# of Occurrences) Relation (Name,Age of Onset)    Breast Cancer (2) Other (paternal cousin), Maternal Aunt (60)    C.A.D. (1) Father    Circulatory (1) Daughter (53): mengioma    DIABETES (1) Sister            Current Outpatient Prescriptions   Medication Sig     losartan (COZAAR) 50 MG tablet Take 1 tablet (50 mg) by mouth daily     GLUCOSAMINE SULFATE PO Take 500 mg by mouth daily     Flaxseed, Linseed, (FLAX SEEDS PO)      dorzolamide (TRUSOPT) 2 % ophthalmic solution Place 1 drop into both eyes 2 times daily     Cholecalciferol (VITAMIN D) 2000 UNITS tablet Take 1 tablet by mouth daily.     bimatoprost (LUMIGAN) 0.01 % SOLN Place 1 drop into both eyes At Bedtime.       carboxymethylcellulose (REFRESH PLUS) 0.5 % SOLN Place 1 drop into both eyes every 2  "hours as needed.       Calcium Carbonate-Vitamin D (CALCIUM + D) 600-200 MG-UNIT per tablet Take 1 tablet by mouth every 12 hours.     ascorbic acid (VITAMIN C) 500 MG tablet Take 1 tablet by mouth daily.     albuterol (ALBUTEROL) 108 (90 BASE) MCG/ACT Inhaler Inhale 2 puffs into the lungs 4 times daily as needed for shortness of breath / dyspnea or wheezing (Patient not taking: Reported on 6/8/2017)     No current facility-administered medications for this visit.      Allergies   Allergen Reactions     Atenolol Hives     Beta Adrenergic Blockers      Brimonidine      Cephalexin Hives     Germall Plus Itching     Imidazolidinyl Urea found in many topical creams and house hold products     Latex      Itching     Morphine GI Disturbance     Penicillins Hives     Sulfa Drugs GI Disturbance     Tramadol Nausea and Vomiting     Tylenol GI Disturbance     Vicodin [Hydrocodone-Acetaminophen]      Fentanyl Nausea       ROS:  12 point review of systems negative other than symptoms noted below.  Respiratory: Shortness of Breath    OBJECTIVE:     /80  Ht 5' 3\" (1.6 m)  Wt 130 lb (59 kg)  BMI 23.03 kg/m2  Body mass index is 23.03 kg/(m^2).    Exam:  Constitutional:  Appearance: Well nourished, well developed alert, in no acute distress  Lymphatic: Lymph Nodes:  No other lymphadenopathy present  Skin:General Inspection:  Areas of discoloration, areas of dermatitis  Neurologic/Psychiatric:  Mental Status:  Oriented X3   Pelvic Exam:  External Genitalia:     Marked erythema and swelling of the left labia majora and clitoris.  No open ulceratins  Perineum:     Perineum within normal limits, no evidence of trauma, no rashes or skin lesions present  Anus:     Anus within normal limits, no hemorrhoids present  Inguinal Lymph Nodes:     No lymphadenopathy present  Pubic Hair:     Normal pubic hair distribution for age      In-Clinic Test Results:  No results found for this or any previous visit (from the past 24 " hour(s)).    ASSESSMENT/PLAN:                                                        ICD-10-CM    1. Herpes zoster without complication B02.9     vuvlar involvement   2. Acute vulvitis N76.2            Plan: At this point the patient's continuing to improve.  She has no open ulcerations.  She's completed a course of Valtrex.  Her symptoms should continue to improve over the next couple weeks.  If there is change recommended to return to clinic for follow-up.    Wali Stack MD  [facility] [place]

## 2017-06-09 ASSESSMENT — PATIENT HEALTH QUESTIONNAIRE - PHQ9: SUM OF ALL RESPONSES TO PHQ QUESTIONS 1-9: 0

## 2017-06-09 ASSESSMENT — ANXIETY QUESTIONNAIRES: GAD7 TOTAL SCORE: 0

## 2017-06-11 RX ORDER — LOSARTAN POTASSIUM 50 MG/1
50 TABLET ORAL DAILY
Qty: 90 TABLET | Refills: 1 | Status: SHIPPED | OUTPATIENT
Start: 2017-06-11 | End: 2017-11-02

## 2017-06-13 NOTE — TELEPHONE ENCOUNTER
If the vascular clinic says no follow up needed until next year, then go with that.    I must have misread something.

## 2017-06-13 NOTE — TELEPHONE ENCOUNTER
Kyung alexis, please advise of her note below as  recommended f/u in 2 years but most recent OV note says:    (I71.4) Abdominal aortic aneurysm (AAA) without rupture (H)  Comment: followed by Vascular clinic, reviewed last note, due for follow up visit this summer.     Please also see questions regarding tests below.

## 2017-06-20 ENCOUNTER — TELEPHONE (OUTPATIENT)
Dept: DERMATOLOGY | Facility: CLINIC | Age: 82
End: 2017-06-20

## 2017-06-20 NOTE — TELEPHONE ENCOUNTER
Pt has appt 7/11/17 w/ Lexi and would like to know if she can be worked in sooner.  States rash is flaring again.  Please advise.

## 2017-06-20 NOTE — TELEPHONE ENCOUNTER
Yes she can start NBUVB - it is likely the same thing. I'm sorry to hear shes flaring up again - it's likely NOTHING that she is doing wrong.     Can always offer a short, low dose of steroids but I know she prefers to avoid pills.

## 2017-06-20 NOTE — TELEPHONE ENCOUNTER
Call to pt. Pt states she is experiencing flare up. She said she still doing all skin care and tried to use fluocinonide cream as well for flare up but almost out. Pt rash has been under control until recently. I let pt know that per last note Lexi suggested to either cont light therapy or F/U with [facility]. Pt scheduled appt for light tx this Thursday but will see Lexi prior to going in just to make sure this is what needs to be done again.

## 2017-06-23 ENCOUNTER — OFFICE VISIT (OUTPATIENT)
Dept: DERMATOLOGY | Facility: CLINIC | Age: 82
End: 2017-06-23
Payer: MEDICARE

## 2017-06-23 DIAGNOSIS — L30.9 CHRONIC DERMATITIS: Primary | ICD-10-CM

## 2017-06-23 PROCEDURE — 96910 PHOTCHMTX TAR&UVB/PTRLTM&UVB: CPT | Performed by: PHYSICIAN ASSISTANT

## 2017-06-23 NOTE — MR AVS SNAPSHOT
"              After Visit Summary   6/23/2017    Jamilah Rodriguez    MRN: [id number]           Patient Information     Date Of Birth          4/5/1931        Visit Information        Provider Department      6/23/2017 9:00 AM Lexi Kwan PA-C [facility]        Today's Diagnoses     Chronic dermatitis    -  1       Follow-ups after your visit        Your next 10 appointments already scheduled     Jul 11, 2017  8:30 AM CDT   Return Visit with Lexi Kwan PA-C   [facility] ([facility])    [address] [id number] [phone]              Who to contact     If you have questions or need follow up information about today's clinic visit or your schedule please contact [facility] directly at [phone].  Normal or non-critical lab and imaging results will be communicated to you by MyChart, letter or phone within 4 business days after the clinic has received the results. If you do not hear from us within 7 days, please contact the clinic through MyChart or phone. If you have a critical or abnormal lab result, we will notify you by phone as soon as possible.  Submit refill requests through [employer] or call your pharmacy and they will forward the refill request to us. Please allow 3 business days for your refill to be completed.          Additional Information About Your Visit        MyChart Information     [employer] lets you send messages to your doctor, view your test results, renew your prescriptions, schedule appointments and more. To sign up, go to [email or web address] . Click on \"Log in\" on the left side of the screen, which will take you to the Welcome page. Then click on \"Sign up Now\" on the right side of the page.     You will be asked to enter the access code listed below, as well as some personal information. Please follow the directions to create your username and " password.     Your access code is: [email or web address]  Expires: 2017  8:10 PM     Your access code will  in 90 days. If you need help or a new code, please call your [place] clinic or [phone].        Care EveryWhere ID     This is your Care EveryWhere ID. This could be used by other organizations to access your [place] medical records  [id number]         Blood Pressure from Last 3 Encounters:   17 124/80   17 130/72   17 125/72    Weight from Last 3 Encounters:   17 59 kg (130 lb)   17 59.4 kg (131 lb)   17 59.5 kg (131 lb 3.2 oz)              We Performed the Following     PHOTOCHEMOTHERAPY WITH UV-B        Primary Care Provider Office Phone # Fax #    Bhupendra Chong [phone]       [facility] [address] [facility] [address]        Equal Access to Services     DRE FORREST AH: Hadii aad ku hadasho Soomaali, waaxda luqadaha, qaybta kaalmada adeegyada, waxay idiin hayaan zandereg kharash vidal . So [facility] [phone].    ATENCIÓN: Si habla español, tiene a smith disposición servicios gratuitos de asistencia lingüística. Llame al [phone].    We comply with applicable federal civil rights laws and [place] laws. We do not discriminate on the basis of race, color, national origin, age, disability sex, sexual orientation or gender identity.            Thank you!     Thank you for choosing [facility]  for your care. Our goal is always to provide you with excellent care. Hearing back from our patients is one way we can continue to improve our services. Please take a few minutes to complete the written survey that you may receive in the mail after your visit with us. Thank you!             Your Updated Medication List - Protect others around you: Learn how to safely use, store and throw away your medicines at www.disposemymeds.org.          This list is accurate as of: 17  9:22 AM.  Always use your most  recent med list.                   Brand Name Dispense Instructions for use Diagnosis    albuterol 108 (90 BASE) MCG/ACT Inhaler    albuterol    1 Inhaler    Inhale 2 puffs into the lungs 4 times daily as needed for shortness of breath / dyspnea or wheezing    COPD exacerbation (H)       ascorbic acid 500 MG tablet    VITAMIN C    100 tablet    Take 1 tablet by mouth daily.        bimatoprost 0.01 % Soln    LUMIGAN     Place 1 drop into both eyes At Bedtime.        calcium + D 600-200 MG-UNIT Tabs   Generic drug:  calcium carbonate-vitamin D     100 tablet    Take 1 tablet by mouth every 12 hours.        carboxymethylcellulose 0.5 % Soln ophthalmic solution    REFRESH PLUS     Place 1 drop into both eyes every 2 hours as needed.        dorzolamide 2 % ophthalmic solution    TRUSOPT     Place 1 drop into both eyes 2 times daily        FLAX SEEDS PO           GLUCOSAMINE SULFATE PO      Take 500 mg by mouth daily        losartan 50 MG tablet    COZAAR    90 tablet    Take 1 tablet (50 mg) by mouth daily    Benign essential hypertension       vitamin D 2000 UNITS tablet      Take 1 tablet by mouth daily.    Osteoporosis

## 2017-06-23 NOTE — PROGRESS NOTES
Pt here today for NBUVB for chronic dermatitis - was improved for about 9 months now flaring again     -Treatment # 1    -250 mJ  -31 seconds seconds  -Zinc oxide applied to lips, nipples  -Mineral oil to affected areas

## 2017-06-27 ENCOUNTER — OFFICE VISIT (OUTPATIENT)
Dept: DERMATOLOGY | Facility: CLINIC | Age: 82
End: 2017-06-27
Payer: MEDICARE

## 2017-06-27 DIAGNOSIS — L30.9 CHRONIC DERMATITIS: Primary | ICD-10-CM

## 2017-06-27 PROCEDURE — 96910 PHOTCHMTX TAR&UVB/PTRLTM&UVB: CPT | Performed by: PHYSICIAN ASSISTANT

## 2017-06-27 NOTE — MR AVS SNAPSHOT
"              After Visit Summary   6/27/2017    Jamilah Rodriguez    MRN: [id number]           Patient Information     Date Of Birth          4/5/1931        Visit Information        Provider Department      6/27/2017 9:00 AM Lexi Kwan PA-C [facility]        Today's Diagnoses     Chronic dermatitis    -  1       Follow-ups after your visit        Your next 10 appointments already scheduled     Jul 11, 2017  8:30 AM CDT   Return Visit with Lexi Kwan PA-C   [facility]    [address] [phone]              Who to contact     If you have questions or need follow up information about today's clinic visit or your schedule please contact [facility] directly at [phone].  Normal or non-critical lab and imaging results will be communicated to you by MyChart, letter or phone within 4 business days after the clinic has received the results. If you do not hear from us within 7 days, please contact the clinic through MyChart or phone. If you have a critical or abnormal lab result, we will notify you by phone as soon as possible.  Submit refill requests through "[employer]" or call your pharmacy and they will forward the refill request to us. Please allow 3 business days for your refill to be completed.          Additional Information About Your Visit        MyChart Information     "[employer]" lets you send messages to your doctor, view your test results, renew your prescriptions, schedule appointments and more. To sign up, go to [email or web address]/"[email or web address]" . Click on \"Log in\" on the left side of the screen, which will take you to the Welcome page. Then click on \"Sign up Now\" on the right side of the page.     You will be asked to enter the access code listed below, as well as some personal information. Please follow the directions to create your username and " password.     Your access code is: [email or web address]  Expires: 2017  8:10 PM     Your access code will  in 90 days. If you need help or a new code, please call your [place] clinic or [phone].        Care EveryWhere ID     This is your Care EveryWhere ID. This could be used by other organizations to access your [place] medical records  [id number]         Blood Pressure from Last 3 Encounters:   17 124/80   17 130/72   17 125/72    Weight from Last 3 Encounters:   17 59 kg (130 lb)   17 59.4 kg (131 lb)   17 59.5 kg (131 lb 3.2 oz)              We Performed the Following     PHOTOCHEMOTHERAPY WITH UV-B        Primary Care Provider Office Phone # Fax #    Bhupendra Chong [phone]       [facility] [address] [facility] [address]        Equal Access to Services     DRE FORREST AH: Hadii aad ku hadasho Soomaali, waaxda luqadaha, qaybta kaalmada adeegyada, waxay idiin hayaan zandereg kharash vidal . So [facility] [phone].    ATENCIÓN: Si habla español, tiene a smith disposición servicios gratuitos de asistencia lingüística. Llame al [phone].    We comply with applicable federal civil rights laws and [place] laws. We do not discriminate on the basis of race, color, national origin, age, disability sex, sexual orientation or gender identity.            Thank you!     Thank you for choosing [facility]  for your care. Our goal is always to provide you with excellent care. Hearing back from our patients is one way we can continue to improve our services. Please take a few minutes to complete the written survey that you may receive in the mail after your visit with us. Thank you!             Your Updated Medication List - Protect others around you: Learn how to safely use, store and throw away your medicines at www.disposemymeds.org.          This list is accurate as of: 17 10:11 AM.  Always use your most  recent med list.                   Brand Name Dispense Instructions for use Diagnosis    albuterol 108 (90 BASE) MCG/ACT Inhaler    albuterol    1 Inhaler    Inhale 2 puffs into the lungs 4 times daily as needed for shortness of breath / dyspnea or wheezing    COPD exacerbation (H)       ascorbic acid 500 MG tablet    VITAMIN C    100 tablet    Take 1 tablet by mouth daily.        bimatoprost 0.01 % Soln    LUMIGAN     Place 1 drop into both eyes At Bedtime.        calcium + D 600-200 MG-UNIT Tabs   Generic drug:  calcium carbonate-vitamin D     100 tablet    Take 1 tablet by mouth every 12 hours.        carboxymethylcellulose 0.5 % Soln ophthalmic solution    REFRESH PLUS     Place 1 drop into both eyes every 2 hours as needed.        dorzolamide 2 % ophthalmic solution    TRUSOPT     Place 1 drop into both eyes 2 times daily        FLAX SEEDS PO           GLUCOSAMINE SULFATE PO      Take 500 mg by mouth daily        losartan 50 MG tablet    COZAAR    90 tablet    Take 1 tablet (50 mg) by mouth daily    Benign essential hypertension       vitamin D 2000 UNITS tablet      Take 1 tablet by mouth daily.    Osteoporosis

## 2017-06-27 NOTE — PROGRESS NOTES
Pt here today for NBUVB for chronic dermatitis - was improved for about 9 months now flaring again     -Treatment # 2    -259 mJ  -32 seconds seconds  -Zinc oxide applied to lips, nipples  -Mineral oil to affected areas

## 2017-06-29 ENCOUNTER — TRANSFERRED RECORDS (OUTPATIENT)
Dept: HEALTH INFORMATION MANAGEMENT | Facility: CLINIC | Age: 82
End: 2017-06-29

## 2017-07-07 ENCOUNTER — OFFICE VISIT (OUTPATIENT)
Dept: DERMATOLOGY | Facility: CLINIC | Age: 82
End: 2017-07-07
Payer: MEDICARE

## 2017-07-07 DIAGNOSIS — L30.9 CHRONIC DERMATITIS: Primary | ICD-10-CM

## 2017-07-07 PROCEDURE — 96910 PHOTCHMTX TAR&UVB/PTRLTM&UVB: CPT | Performed by: PHYSICIAN ASSISTANT

## 2017-07-07 NOTE — MR AVS SNAPSHOT
"              After Visit Summary   7/7/2017    Jamilah Rodriguez    MRN: [id number]           Patient Information     Date Of Birth          4/5/1931        Visit Information        Provider Department      7/7/2017 9:30 AM Lexi Kwan PA-C [facility]        Today's Diagnoses     Chronic dermatitis    -  1       Follow-ups after your visit        Your next 10 appointments already scheduled     Jul 11, 2017  9:30 AM CDT   PROCEDURE with OX DERM PROC ROOM   [facility]    [address] [id number] [phone]            Jul 14, 2017  9:30 AM CDT   PROCEDURE with OX DERM PROC ROOM   [facility]    [address] [place] [id number] [phone]              Who to contact     If you have questions or need follow up information about today's clinic visit or your schedule please contact [facility] directly at [phone].  Normal or non-critical lab and imaging results will be communicated to you by [employer], letter or phone within 4 business days after the clinic has received the results. If you do not hear from us within 7 days, please contact the clinic through [employer] or phone. If you have a critical or abnormal lab result, we will notify you by phone as soon as possible.  Submit refill requests through [employer] or call your pharmacy and they will forward the refill request to us. Please allow 3 business days for your refill to be completed.          Additional Information About Your Visit        [employer] Information     [employer] lets you send messages to your doctor, view your test results, renew your prescriptions, schedule appointments and more. To sign up, go to [email or web address] . Click on \"Log in\" on the left side of the screen, which will take you to the Welcome page. Then click on \"Sign " "up Now\" on the right side of the page.     You will be asked to enter the access code listed below, as well as some personal information. Please follow the directions to create your username and password.     Your access code is: [email or web address]  Expires: 2017  8:10 PM     Your access code will  in 90 days. If you need help or a new code, please call your [place] clinic or [phone].        Care EveryWhere ID     This is your Care EveryWhere ID. This could be used by other organizations to access your [place] medical records  [id number]         Blood Pressure from Last 3 Encounters:   17 124/80   17 130/72   17 125/72    Weight from Last 3 Encounters:   17 59 kg (130 lb)   17 59.4 kg (131 lb)   17 59.5 kg (131 lb 3.2 oz)              We Performed the Following     PHOTOCHEMOTHERAPY WITH UV-B        Primary Care Provider Office Phone # Fax #    Bhupendra Chong [phone]       [facility] [address] [facility] [address]        Equal Access to Services     DRE FORREST : Hadii aad ku hadasho Soomaali, waaxda luqadaha, qaybta kaalmada adeegyada, kimberly elder. So [facility] [phone].    ATENCIÓN: Si habla español, tiene a smtih disposición servicios gratuitos de asistencia lingüística. Llame al [phone].    We comply with applicable federal civil rights laws and [place] laws. We do not discriminate on the basis of race, color, national origin, age, disability sex, sexual orientation or gender identity.            Thank you!     Thank you for choosing [facility]  for your care. Our goal is always to provide you with excellent care. Hearing back from our patients is one way we can continue to improve our services. Please take a few minutes to complete the written survey that you may receive in the mail after your visit with us. Thank you!             Your Updated Medication List " - Protect others around you: Learn how to safely use, store and throw away your medicines at www.disposemymeds.org.          This list is accurate as of: 7/7/17 10:03 AM.  Always use your most recent med list.                   Brand Name Dispense Instructions for use Diagnosis    albuterol 108 (90 BASE) MCG/ACT Inhaler    albuterol    1 Inhaler    Inhale 2 puffs into the lungs 4 times daily as needed for shortness of breath / dyspnea or wheezing    COPD exacerbation (H)       ascorbic acid 500 MG tablet    VITAMIN C    100 tablet    Take 1 tablet by mouth daily.        bimatoprost 0.01 % Soln    LUMIGAN     Place 1 drop into both eyes At Bedtime.        calcium + D 600-200 MG-UNIT Tabs   Generic drug:  calcium carbonate-vitamin D     100 tablet    Take 1 tablet by mouth every 12 hours.        carboxymethylcellulose 0.5 % Soln ophthalmic solution    REFRESH PLUS     Place 1 drop into both eyes every 2 hours as needed.        dorzolamide 2 % ophthalmic solution    TRUSOPT     Place 1 drop into both eyes 2 times daily        FLAX SEEDS PO           GLUCOSAMINE SULFATE PO      Take 500 mg by mouth daily        losartan 50 MG tablet    COZAAR    90 tablet    Take 1 tablet (50 mg) by mouth daily    Benign essential hypertension       vitamin D 2000 UNITS tablet      Take 1 tablet by mouth daily.    Osteoporosis

## 2017-07-07 NOTE — PROGRESS NOTES
Pt here today for NBUVB for chronic dermatitis - was improved for about 9 months now flaring again     -Treatment # 3    -267 mJ  -33 seconds seconds  -Zinc oxide applied to lips, nipples  -Mineral oil to affected areas

## 2017-07-14 ENCOUNTER — OFFICE VISIT (OUTPATIENT)
Dept: DERMATOLOGY | Facility: CLINIC | Age: 82
End: 2017-07-14
Payer: MEDICARE

## 2017-07-14 DIAGNOSIS — L30.9 ACUTE DERMATITIS: ICD-10-CM

## 2017-07-14 DIAGNOSIS — L30.9 CHRONIC DERMATITIS: Primary | ICD-10-CM

## 2017-07-14 PROCEDURE — 96910 PHOTCHMTX TAR&UVB/PTRLTM&UVB: CPT | Performed by: PHYSICIAN ASSISTANT

## 2017-07-14 RX ORDER — TRIAMCINOLONE ACETONIDE 1 MG/G
CREAM TOPICAL
Qty: 454 G | Refills: 11 | Status: SHIPPED | OUTPATIENT
Start: 2017-07-14 | End: 2018-04-15

## 2017-07-14 NOTE — PROGRESS NOTES
Pt here today for NBUVB for chronic dermatitis - was improved for about 9 months now flaring again       -Refill TAC    -Treatment # 4    -300 mJ  -37 seconds seconds  -Zinc oxide applied to lips, nipples  -Mineral oil to affected areas

## 2017-07-14 NOTE — MR AVS SNAPSHOT
"              After Visit Summary   7/14/2017    Jamilah Rodriguez    MRN: [id number]           Patient Information     Date Of Birth          4/5/1931        Visit Information        Provider Department      7/14/2017 9:30 AM Lexi Kwan PA-C [facility]        Today's Diagnoses     Chronic dermatitis    -  1    Acute dermatitis           Follow-ups after your visit        Your next 10 appointments already scheduled     Jul 18, 2017  9:30 AM CDT   PROCEDURE with OX DERM PROC ROOM   [facility] ([facility]    [address] [phone]              Who to contact     If you have questions or need follow up information about today's clinic visit or your schedule please contact [facility] directly at [phone].  Normal or non-critical lab and imaging results will be communicated to you by MyChart, letter or phone within 4 business days after the clinic has received the results. If you do not hear from us within 7 days, please contact the clinic through MyChart or phone. If you have a critical or abnormal lab result, we will notify you by phone as soon as possible.  Submit refill requests through [employer] or call your pharmacy and they will forward the refill request to us. Please allow 3 business days for your refill to be completed.          Additional Information About Your Visit        MyChart Information     [employer] lets you send messages to your doctor, view your test results, renew your prescriptions, schedule appointments and more. To sign up, go to [email or web address] . Click on \"Log in\" on the left side of the screen, which will take you to the Welcome page. Then click on \"Sign up Now\" on the right side of the page.     You will be asked to enter the access code listed below, as well as some personal information. Please follow the directions to create your " username and password.     Your access code is: [email or web address]  Expires: 2017  8:10 PM     Your access code will  in 90 days. If you need help or a new code, please call your [place] clinic or [phone].        Care EveryWhere ID     This is your Care EveryWhere ID. This could be used by other organizations to access your [place] medical records  [id number]         Blood Pressure from Last 3 Encounters:   17 124/80   17 130/72   17 125/72    Weight from Last 3 Encounters:   17 59 kg (130 lb)   17 59.4 kg (131 lb)   17 59.5 kg (131 lb 3.2 oz)              We Performed the Following     PHOTOCHEMOTHERAPY WITH UV-B          Today's Medication Changes          These changes are accurate as of: 17 11:48 AM.  If you have any questions, ask your nurse or doctor.               Start taking these medicines.        Dose/Directions    triamcinolone 0.1 % cream   Commonly known as:  KENALOG   Used for:  Acute dermatitis        Apply to AA QD-BID x 1-2 weeks then PRN Only   Quantity:  454 g   Refills:  11            Where to get your medicines      These medications were sent to [facility] [address] [place] [address] [address] AT [facility] [place]  [address], [facility] [address]     Phone:  [phone]     triamcinolone 0.1 % cream                Primary Care Provider Office Phone # Fax #    Bhupendra Chong [phone]       [facility] [address] [facility] [address]        Equal Access to Services     DRE FORREST AH: Hadii sherly Schumacher, waaxda luqadaha, qaybta kaalmada chidi, kimberly elder. So [facility] [phone].    ATENCIÓN: Si habla español, tiene a smith disposición servicios gratuitos de asistencia lingüística. Llame al [phone].    We comply with applicable federal civil rights laws and [place] laws. We do not discriminate on the  basis of race, color, national origin, age, disability sex, sexual orientation or gender identity.            Thank you!     Thank you for choosing [facility]  for your care. Our goal is always to provide you with excellent care. Hearing back from our patients is one way we can continue to improve our services. Please take a few minutes to complete the written survey that you may receive in the mail after your visit with us. Thank you!             Your Updated Medication List - Protect others around you: Learn how to safely use, store and throw away your medicines at www.disposemymeds.org.          This list is accurate as of: 7/14/17 11:48 AM.  Always use your most recent med list.                   Brand Name Dispense Instructions for use Diagnosis    albuterol 108 (90 BASE) MCG/ACT Inhaler    albuterol    1 Inhaler    Inhale 2 puffs into the lungs 4 times daily as needed for shortness of breath / dyspnea or wheezing    COPD exacerbation (H)       ascorbic acid 500 MG tablet    VITAMIN C    100 tablet    Take 1 tablet by mouth daily.        bimatoprost 0.01 % Soln    LUMIGAN     Place 1 drop into both eyes At Bedtime.        calcium + D 600-200 MG-UNIT Tabs   Generic drug:  calcium carbonate-vitamin D     100 tablet    Take 1 tablet by mouth every 12 hours.        carboxymethylcellulose 0.5 % Soln ophthalmic solution    REFRESH PLUS     Place 1 drop into both eyes every 2 hours as needed.        dorzolamide 2 % ophthalmic solution    TRUSOPT     Place 1 drop into both eyes 2 times daily        FLAX SEEDS PO           GLUCOSAMINE SULFATE PO      Take 500 mg by mouth daily        losartan 50 MG tablet    COZAAR    90 tablet    Take 1 tablet (50 mg) by mouth daily    Benign essential hypertension       triamcinolone 0.1 % cream    KENALOG    454 g    Apply to AA QD-BID x 1-2 weeks then PRN Only    Acute dermatitis       vitamin D 2000 UNITS tablet      Take 1 tablet by mouth daily.     Osteoporosis

## 2017-07-18 ENCOUNTER — OFFICE VISIT (OUTPATIENT)
Dept: DERMATOLOGY | Facility: CLINIC | Age: 82
End: 2017-07-18
Payer: MEDICARE

## 2017-07-18 DIAGNOSIS — L30.9 CHRONIC DERMATITIS: Primary | ICD-10-CM

## 2017-07-18 DIAGNOSIS — L29.9 ITCHING: ICD-10-CM

## 2017-07-18 PROCEDURE — 96910 PHOTCHMTX TAR&UVB/PTRLTM&UVB: CPT | Performed by: PHYSICIAN ASSISTANT

## 2017-07-18 NOTE — MR AVS SNAPSHOT
"              After Visit Summary   7/18/2017    Jamilah Rodriguez    MRN: [id number]           Patient Information     Date Of Birth          4/5/1931        Visit Information        Provider Department      7/18/2017 9:30 AM Lexi Kwan PA-C [facility]        Today's Diagnoses     Chronic dermatitis    -  1    Itching           Follow-ups after your visit        Your next 10 appointments already scheduled     Jul 25, 2017  9:30 AM CDT   PROCEDURE with OX DERM PROC ROOM   [facility] ([facility])    [address] [phone]              Who to contact     If you have questions or need follow up information about today's clinic visit or your schedule please contact [facility] directly at [phone].  Normal or non-critical lab and imaging results will be communicated to you by MyChart, letter or phone within 4 business days after the clinic has received the results. If you do not hear from us within 7 days, please contact the clinic through MyChart or phone. If you have a critical or abnormal lab result, we will notify you by phone as soon as possible.  Submit refill requests through [employer] or call your pharmacy and they will forward the refill request to us. Please allow 3 business days for your refill to be completed.          Additional Information About Your Visit        MyChart Information     [employer] lets you send messages to your doctor, view your test results, renew your prescriptions, schedule appointments and more. To sign up, go to [email or web address] . Click on \"Log in\" on the left side of the screen, which will take you to the Welcome page. Then click on \"Sign up Now\" on the right side of the page.     You will be asked to enter the access code listed below, as well as some personal information. Please follow the directions to create your username and " password.     Your access code is: [email or web address]  Expires: 2017  8:10 PM     Your access code will  in 90 days. If you need help or a new code, please call your [place] clinic or [phone].        Care EveryWhere ID     This is your Care EveryWhere ID. This could be used by other organizations to access your [place] medical records  [id number]         Blood Pressure from Last 3 Encounters:   17 124/80   17 130/72   17 125/72    Weight from Last 3 Encounters:   17 59 kg (130 lb)   17 59.4 kg (131 lb)   17 59.5 kg (131 lb 3.2 oz)              We Performed the Following     PHOTOCHEMOTHERAPY WITH UV-B        Primary Care Provider Office Phone # Fax #    Bhupendra Chong [phone]       [facility] [address] [facility] [address]        Equal Access to Services     DRE FORREST AH: Hadii aad ku hadasho Soomaali, waaxda luqadaha, qaybta kaalmada adeegyada, waxay idiin hayaan zandereg kharash vidal . So [facility] [phone].    ATENCIÓN: Si habla español, tiene a smith disposición servicios gratuitos de asistencia lingüística. Llame al [phone].    We comply with applicable federal civil rights laws and [place] laws. We do not discriminate on the basis of race, color, national origin, age, disability sex, sexual orientation or gender identity.            Thank you!     Thank you for choosing [facility]  for your care. Our goal is always to provide you with excellent care. Hearing back from our patients is one way we can continue to improve our services. Please take a few minutes to complete the written survey that you may receive in the mail after your visit with us. Thank you!             Your Updated Medication List - Protect others around you: Learn how to safely use, store and throw away your medicines at www.disposemymeds.org.          This list is accurate as of: 17 10:50 AM.  Always use your most  recent med list.                   Brand Name Dispense Instructions for use Diagnosis    albuterol 108 (90 BASE) MCG/ACT Inhaler    albuterol    1 Inhaler    Inhale 2 puffs into the lungs 4 times daily as needed for shortness of breath / dyspnea or wheezing    COPD exacerbation (H)       ascorbic acid 500 MG tablet    VITAMIN C    100 tablet    Take 1 tablet by mouth daily.        bimatoprost 0.01 % Soln    LUMIGAN     Place 1 drop into both eyes At Bedtime.        calcium + D 600-200 MG-UNIT Tabs   Generic drug:  calcium carbonate-vitamin D     100 tablet    Take 1 tablet by mouth every 12 hours.        carboxymethylcellulose 0.5 % Soln ophthalmic solution    REFRESH PLUS     Place 1 drop into both eyes every 2 hours as needed.        dorzolamide 2 % ophthalmic solution    TRUSOPT     Place 1 drop into both eyes 2 times daily        FLAX SEEDS PO           GLUCOSAMINE SULFATE PO      Take 500 mg by mouth daily        losartan 50 MG tablet    COZAAR    90 tablet    Take 1 tablet (50 mg) by mouth daily    Benign essential hypertension       triamcinolone 0.1 % cream    KENALOG    454 g    Apply to AA QD-BID x 1-2 weeks then PRN Only    Acute dermatitis       vitamin D 2000 UNITS tablet      Take 1 tablet by mouth daily.    Osteoporosis

## 2017-07-18 NOTE — PROGRESS NOTES
Pt here today for NBUVB for chronic dermatitis - was improved for about 9 months now flaring again. Is now starting to see some improvement.       -Had burning last time, will decrease mJ  -Treatment # 5    -250 mJ  -32 seconds seconds  -Zinc oxide applied to lips, nipples  -Mineral oil to affected areas

## 2017-07-25 ENCOUNTER — OFFICE VISIT (OUTPATIENT)
Dept: DERMATOLOGY | Facility: CLINIC | Age: 82
End: 2017-07-25
Payer: MEDICARE

## 2017-07-25 DIAGNOSIS — L30.9 CHRONIC DERMATITIS: Primary | ICD-10-CM

## 2017-07-25 PROCEDURE — 96910 PHOTCHMTX TAR&UVB/PTRLTM&UVB: CPT | Performed by: PHYSICIAN ASSISTANT

## 2017-07-25 NOTE — MR AVS SNAPSHOT
"              After Visit Summary   7/25/2017    Jamilah Rodriguez    MRN: [id number]           Patient Information     Date Of Birth          4/5/1931        Visit Information        Provider Department      7/25/2017 9:30 AM Lexi Kwan PA-C [facility]        Today's Diagnoses     Chronic dermatitis    -  1       Follow-ups after your visit        Your next 10 appointments already scheduled     Aug 01, 2017  9:30 AM CDT   PROCEDURE with OX DERM PROC ROOM   [facility]    [address] [phone]              Who to contact     If you have questions or need follow up information about today's clinic visit or your schedule please contact [facility] directly at [phone].  Normal or non-critical lab and imaging results will be communicated to you by MyChart, letter or phone within 4 business days after the clinic has received the results. If you do not hear from us within 7 days, please contact the clinic through MyChart or phone. If you have a critical or abnormal lab result, we will notify you by phone as soon as possible.  Submit refill requests through [employer] or call your pharmacy and they will forward the refill request to us. Please allow 3 business days for your refill to be completed.          Additional Information About Your Visit        MyChart Information     [employer] lets you send messages to your doctor, view your test results, renew your prescriptions, schedule appointments and more. To sign up, go to [email or web address] . Click on \"Log in\" on the left side of the screen, which will take you to the Welcome page. Then click on \"Sign up Now\" on the right side of the page.     You will be asked to enter the access code listed below, as well as some personal information. Please follow the directions to create your username and password.   "   Your access code is: [email or web address]  Expires: 2017  8:10 PM     Your access code will  in 90 days. If you need help or a new code, please call your [place] clinic or [phone].        Care EveryWhere ID     This is your Care EveryWhere ID. This could be used by other organizations to access your [place] medical records  [id number]         Blood Pressure from Last 3 Encounters:   17 124/80   17 130/72   17 125/72    Weight from Last 3 Encounters:   17 59 kg (130 lb)   17 59.4 kg (131 lb)   17 59.5 kg (131 lb 3.2 oz)              We Performed the Following     PHOTOCHEMOTHERAPY WITH UV-B        Primary Care Provider Office Phone # Fax #    Bhupendar Chong [phone]       [facility] [address] [facility] [address]        Equal Access to Services     DRE FORREST : Hadii aad ku hadasho Soomaali, waaxda luqadaha, qaybta kaalmada adeegyada, waxay idiin hayaan adeeg kwabenaaraashley laandreea . So [facility] [phone].    ATENCIÓN: Si habla español, tiene a smith disposición servicios gratuitos de asistencia lingüística. Llame al [phone].    We comply with applicable federal civil rights laws and [place] laws. We do not discriminate on the basis of race, color, national origin, age, disability sex, sexual orientation or gender identity.            Thank you!     Thank you for choosing [facility]  for your care. Our goal is always to provide you with excellent care. Hearing back from our patients is one way we can continue to improve our services. Please take a few minutes to complete the written survey that you may receive in the mail after your visit with us. Thank you!             Your Updated Medication List - Protect others around you: Learn how to safely use, store and throw away your medicines at www.disposemymeds.org.          This list is accurate as of: 17 12:19 PM.  Always use your most recent med  list.                   Brand Name Dispense Instructions for use Diagnosis    albuterol 108 (90 BASE) MCG/ACT Inhaler    albuterol    1 Inhaler    Inhale 2 puffs into the lungs 4 times daily as needed for shortness of breath / dyspnea or wheezing    COPD exacerbation (H)       ascorbic acid 500 MG tablet    VITAMIN C    100 tablet    Take 1 tablet by mouth daily.        bimatoprost 0.01 % Soln    LUMIGAN     Place 1 drop into both eyes At Bedtime.        calcium + D 600-200 MG-UNIT Tabs   Generic drug:  calcium carbonate-vitamin D     100 tablet    Take 1 tablet by mouth every 12 hours.        carboxymethylcellulose 0.5 % Soln ophthalmic solution    REFRESH PLUS     Place 1 drop into both eyes every 2 hours as needed.        dorzolamide 2 % ophthalmic solution    TRUSOPT     Place 1 drop into both eyes 2 times daily        FLAX SEEDS PO           GLUCOSAMINE SULFATE PO      Take 500 mg by mouth daily        losartan 50 MG tablet    COZAAR    90 tablet    Take 1 tablet (50 mg) by mouth daily    Benign essential hypertension       triamcinolone 0.1 % cream    KENALOG    454 g    Apply to AA QD-BID x 1-2 weeks then PRN Only    Acute dermatitis       vitamin D 2000 UNITS tablet      Take 1 tablet by mouth daily.    Osteoporosis

## 2017-07-25 NOTE — PROGRESS NOTES
Pt here today for NBUVB for chronic dermatitis - continues to improve     -Had burning last time, will decrease mJ  -Treatment # 7 -376 mJ  -34 seconds seconds  -Zinc oxide applied to lips, nipples  -Mineral oil to affected areas

## 2017-08-01 ENCOUNTER — OFFICE VISIT (OUTPATIENT)
Dept: DERMATOLOGY | Facility: CLINIC | Age: 82
End: 2017-08-01
Payer: MEDICARE

## 2017-08-01 DIAGNOSIS — L30.9 CHRONIC DERMATITIS: Primary | ICD-10-CM

## 2017-08-01 PROCEDURE — 96910 PHOTCHMTX TAR&UVB/PTRLTM&UVB: CPT | Performed by: PHYSICIAN ASSISTANT

## 2017-08-01 NOTE — MR AVS SNAPSHOT
"              After Visit Summary   8/1/2017    Jamilah Rodriguez    MRN: [id number]           Patient Information     Date Of Birth          4/5/1931        Visit Information        Provider Department      8/1/2017 9:30 AM Lexi Kwan PA-C [facility]        Today's Diagnoses     Chronic dermatitis    -  1       Follow-ups after your visit        Your next 10 appointments already scheduled     Aug 08, 2017  9:30 AM CDT   PROCEDURE with OX DERM PROC ROOM   [facility]    [address] [place] [phone]              Who to contact     If you have questions or need follow up information about today's clinic visit or your schedule please contact [facility] directly at [phone].  Normal or non-critical lab and imaging results will be communicated to you by MyChart, letter or phone within 4 business days after the clinic has received the results. If you do not hear from us within 7 days, please contact the clinic through MyChart or phone. If you have a critical or abnormal lab result, we will notify you by phone as soon as possible.  Submit refill requests through [employer] or call your pharmacy and they will forward the refill request to us. Please allow 3 business days for your refill to be completed.          Additional Information About Your Visit        MyChart Information     [employer] lets you send messages to your doctor, view your test results, renew your prescriptions, schedule appointments and more. To sign up, go to [email or web address] . Click on \"Log in\" on the left side of the screen, which will take you to the Welcome page. Then click on \"Sign up Now\" on the right side of the page.     You will be asked to enter the access code listed below, as well as some personal information. Please follow the directions to create your username and password.     Your " access code is: [email or web address]  Expires: 2017  8:10 PM     Your access code will  in 90 days. If you need help or a new code, please call your [place] clinic or [phone].        Care EveryWhere ID     This is your Care EveryWhere ID. This could be used by other organizations to access your [place] medical records  [id number]         Blood Pressure from Last 3 Encounters:   17 124/80   17 130/72   17 125/72    Weight from Last 3 Encounters:   17 59 kg (130 lb)   17 59.4 kg (131 lb)   17 59.5 kg (131 lb 3.2 oz)              We Performed the Following     PHOTOCHEMOTHERAPY WITH UV-B        Primary Care Provider Office Phone # Fax #    Bhupendra Chong [phone]       [facility] [address] [facility] [address]        Equal Access to Services     DRE FORREST : Hadii aad ku hadasho Soomaali, waaxda luqadaha, qaybta kaalmada adeegyada, waxay idiin hayaan adeeg kharaashley la'shamika . So [facility] [phone].    ATENCIÓN: Si habla español, tiene a smith disposición servicios gratuitos de asistencia lingüística. Llame al [phone].    We comply with applicable federal civil rights laws and [place] laws. We do not discriminate on the basis of race, color, national origin, age, disability sex, sexual orientation or gender identity.            Thank you!     Thank you for choosing [facility]  for your care. Our goal is always to provide you with excellent care. Hearing back from our patients is one way we can continue to improve our services. Please take a few minutes to complete the written survey that you may receive in the mail after your visit with us. Thank you!             Your Updated Medication List - Protect others around you: Learn how to safely use, store and throw away your medicines at www.disposemymeds.org.          This list is accurate as of: 17 12:46 PM.  Always use your most recent med list.                    Brand Name Dispense Instructions for use Diagnosis    albuterol 108 (90 BASE) MCG/ACT Inhaler    albuterol    1 Inhaler    Inhale 2 puffs into the lungs 4 times daily as needed for shortness of breath / dyspnea or wheezing    COPD exacerbation (H)       ascorbic acid 500 MG tablet    VITAMIN C    100 tablet    Take 1 tablet by mouth daily.        bimatoprost 0.01 % Soln    LUMIGAN     Place 1 drop into both eyes At Bedtime.        calcium + D 600-200 MG-UNIT Tabs   Generic drug:  calcium carbonate-vitamin D     100 tablet    Take 1 tablet by mouth every 12 hours.        carboxymethylcellulose 0.5 % Soln ophthalmic solution    REFRESH PLUS     Place 1 drop into both eyes every 2 hours as needed.        dorzolamide 2 % ophthalmic solution    TRUSOPT     Place 1 drop into both eyes 2 times daily        FLAX SEEDS PO           GLUCOSAMINE SULFATE PO      Take 500 mg by mouth daily        losartan 50 MG tablet    COZAAR    90 tablet    Take 1 tablet (50 mg) by mouth daily    Benign essential hypertension       triamcinolone 0.1 % cream    KENALOG    454 g    Apply to AA QD-BID x 1-2 weeks then PRN Only    Acute dermatitis       vitamin D 2000 UNITS tablet      Take 1 tablet by mouth daily.    Osteoporosis

## 2017-08-01 NOTE — PROGRESS NOTES
Pt here today for NBUVB for chronic dermatitis - continues to improve     -Treatment # 7    -715 mJ  -37 seconds seconds  -Zinc oxide applied to lips, nipples  -Mineral oil to affected areas

## 2017-08-08 ENCOUNTER — OFFICE VISIT (OUTPATIENT)
Dept: DERMATOLOGY | Facility: CLINIC | Age: 82
End: 2017-08-08
Payer: MEDICARE

## 2017-08-08 DIAGNOSIS — L30.9 CHRONIC DERMATITIS: Primary | ICD-10-CM

## 2017-08-08 PROCEDURE — 96910 PHOTCHMTX TAR&UVB/PTRLTM&UVB: CPT | Performed by: PHYSICIAN ASSISTANT

## 2017-08-08 NOTE — MR AVS SNAPSHOT
"              After Visit Summary   8/8/2017    Jamilah Rodriguez    MRN: [id number]           Patient Information     Date Of Birth          4/5/1931        Visit Information        Provider Department      8/8/2017 9:30 AM Lexi Kwan PA-C [facility]        Today's Diagnoses     Chronic dermatitis    -  1       Follow-ups after your visit        Your next 10 appointments already scheduled     Aug 17, 2017  9:30 AM CDT   PROCEDURE with OX DERM PROC ROOM   [facility] ([facility])    [address] [id number] [phone]              Who to contact     If you have questions or need follow up information about today's clinic visit or your schedule please contact [facility] directly at [phone].  Normal or non-critical lab and imaging results will be communicated to you by MyChart, letter or phone within 4 business days after the clinic has received the results. If you do not hear from us within 7 days, please contact the clinic through MyChart or phone. If you have a critical or abnormal lab result, we will notify you by phone as soon as possible.  Submit refill requests through Bouf or call your pharmacy and they will forward the refill request to us. Please allow 3 business days for your refill to be completed.          Additional Information About Your Visit        MyChart Information     Bouf lets you send messages to your doctor, view your test results, renew your prescriptions, schedule appointments and more. To sign up, go to [email or web address] . Click on \"Log in\" on the left side of the screen, which will take you to the Welcome page. Then click on \"Sign up Now\" on the right side of the page.     You will be asked to enter the access code listed below, as well as some personal information. Please follow the directions to create your username and password.     Your " access code is: [email or web address]  Expires: 2017  8:10 PM     Your access code will  in 90 days. If you need help or a new code, please call your [place] clinic or [phone].        Care EveryWhere ID     This is your Care EveryWhere ID. This could be used by other organizations to access your [place] medical records  [id number]         Blood Pressure from Last 3 Encounters:   17 124/80   17 130/72   17 125/72    Weight from Last 3 Encounters:   17 59 kg (130 lb)   17 59.4 kg (131 lb)   17 59.5 kg (131 lb 3.2 oz)              We Performed the Following     PHOTOCHEMOTHERAPY WITH UV-B        Primary Care Provider Office Phone # Fax #    Bhupendra Chong [phone]       [facility] [address] [facility] [address]        Equal Access to Services     DRE FORREST : Hadii aad ku hadasho Soomaali, waaxda luqadaha, qaybta kaalmada adeegyada, waxay idiin hayaan adeeg kharaashley la'shamika . So [facility] [phone].    ATENCIÓN: Si habla español, tiene a smith disposición servicios gratuitos de asistencia lingüística. Llame al [phone].    We comply with applicable federal civil rights laws and [place] laws. We do not discriminate on the basis of race, color, national origin, age, disability sex, sexual orientation or gender identity.            Thank you!     Thank you for choosing [facility]  for your care. Our goal is always to provide you with excellent care. Hearing back from our patients is one way we can continue to improve our services. Please take a few minutes to complete the written survey that you may receive in the mail after your visit with us. Thank you!             Your Updated Medication List - Protect others around you: Learn how to safely use, store and throw away your medicines at www.disposemymeds.org.          This list is accurate as of: 17 11:35 AM.  Always use your most recent med list.                    Brand Name Dispense Instructions for use Diagnosis    albuterol 108 (90 BASE) MCG/ACT Inhaler    albuterol    1 Inhaler    Inhale 2 puffs into the lungs 4 times daily as needed for shortness of breath / dyspnea or wheezing    COPD exacerbation (H)       ascorbic acid 500 MG tablet    VITAMIN C    100 tablet    Take 1 tablet by mouth daily.        bimatoprost 0.01 % Soln    LUMIGAN     Place 1 drop into both eyes At Bedtime.        calcium + D 600-200 MG-UNIT Tabs   Generic drug:  calcium carbonate-vitamin D     100 tablet    Take 1 tablet by mouth every 12 hours.        carboxymethylcellulose 0.5 % Soln ophthalmic solution    REFRESH PLUS     Place 1 drop into both eyes every 2 hours as needed.        dorzolamide 2 % ophthalmic solution    TRUSOPT     Place 1 drop into both eyes 2 times daily        FLAX SEEDS PO           GLUCOSAMINE SULFATE PO      Take 500 mg by mouth daily        losartan 50 MG tablet    COZAAR    90 tablet    Take 1 tablet (50 mg) by mouth daily    Benign essential hypertension       triamcinolone 0.1 % cream    KENALOG    454 g    Apply to AA QD-BID x 1-2 weeks then PRN Only    Acute dermatitis       vitamin D 2000 UNITS tablet      Take 1 tablet by mouth daily.    Osteoporosis

## 2017-08-08 NOTE — PROGRESS NOTES
Pt here today for NBUVB for chronic dermatitis - continues to improve     -Treatment # 8    -375 mJ  -45 seconds seconds  -Zinc oxide applied to lips, nipples  -Mineral oil to affected areas

## 2017-08-17 ENCOUNTER — OFFICE VISIT (OUTPATIENT)
Dept: DERMATOLOGY | Facility: CLINIC | Age: 82
End: 2017-08-17
Payer: MEDICARE

## 2017-08-17 DIAGNOSIS — L30.9 DERMATITIS: Primary | ICD-10-CM

## 2017-08-17 PROCEDURE — 96910 PHOTCHMTX TAR&UVB/PTRLTM&UVB: CPT | Performed by: DERMATOLOGY

## 2017-08-17 NOTE — PROGRESS NOTES
Pt here today for NBUVB for chronic dermatitis - continues to improve      -Treatment # 9     -375 mJ  -41 seconds seconds  -Zinc oxide applied to lips, nipples  -Mineral oil to affected areas

## 2017-08-17 NOTE — MR AVS SNAPSHOT
"              After Visit Summary   8/17/2017    Jamilah Rodriguez    MRN: [id number]           Patient Information     Date Of Birth          4/5/1931        Visit Information        Provider Department      8/17/2017 9:30 AM Dixon Silva MD [facility]        Today's Diagnoses     Dermatitis    -  1       Follow-ups after your visit        Your next 10 appointments already scheduled     Aug 22, 2017  9:30 AM CDT   PROCEDURE with OX DERM PROC ROOM   [facility] ([facility])    [address] [place] [id number] [phone]              Who to contact     If you have questions or need follow up information about today's clinic visit or your schedule please contact [facility] directly at [phone].  Normal or non-critical lab and imaging results will be communicated to you by MyChart, letter or phone within 4 business days after the clinic has received the results. If you do not hear from us within 7 days, please contact the clinic through MyChart or phone. If you have a critical or abnormal lab result, we will notify you by phone as soon as possible.  Submit refill requests through Astrostar or call your pharmacy and they will forward the refill request to us. Please allow 3 business days for your refill to be completed.          Additional Information About Your Visit        MyChart Information     Astrostar lets you send messages to your doctor, view your test results, renew your prescriptions, schedule appointments and more. To sign up, go to [email or web address] . Click on \"Log in\" on the left side of the screen, which will take you to the Welcome page. Then click on \"Sign up Now\" on the right side of the page.     You will be asked to enter the access code listed below, as well as some personal information. Please follow the directions to create your username and password.     Your " access code is: [email or web address]  Expires: 2017  8:10 PM     Your access code will  in 90 days. If you need help or a new code, please call your [place] clinic or [phone].        Care EveryWhere ID     This is your Care EveryWhere ID. This could be used by other organizations to access your [place] medical records  [id number]         Blood Pressure from Last 3 Encounters:   17 124/80   17 130/72   17 125/72    Weight from Last 3 Encounters:   17 59 kg (130 lb)   17 59.4 kg (131 lb)   17 59.5 kg (131 lb 3.2 oz)              We Performed the Following     PHOTOCHEMOTHERAPY WITH UV-B        Primary Care Provider Office Phone # Fax #    Bhupendra Chong [phone]       [address] [place] [address]        Equal Access to Services     JEANNETTE FORREST : Hadii aad ku hadasho Soomaali, waaxda luqadaha, qaybta kaalmada adeegyada, waxay idiin hayaan frieda drake . So [facility] [phone].    ATENCIÓN: Si habla español, tiene a smith disposición servicios gratuitos de asistencia lingüística. Llame al [phone].    We comply with applicable federal civil rights laws and [place] laws. We do not discriminate on the basis of race, color, national origin, age, disability sex, sexual orientation or gender identity.            Thank you!     Thank you for choosing [facility]  for your care. Our goal is always to provide you with excellent care. Hearing back from our patients is one way we can continue to improve our services. Please take a few minutes to complete the written survey that you may receive in the mail after your visit with us. Thank you!             Your Updated Medication List - Protect others around you: Learn how to safely use, store and throw away your medicines at www.disposemymeds.org.          This list is accurate as of: 17 10:08 AM.  Always use your most recent med list.                   Brand  Name Dispense Instructions for use Diagnosis    albuterol 108 (90 BASE) MCG/ACT Inhaler    albuterol    1 Inhaler    Inhale 2 puffs into the lungs 4 times daily as needed for shortness of breath / dyspnea or wheezing    COPD exacerbation (H)       ascorbic acid 500 MG tablet    VITAMIN C    100 tablet    Take 1 tablet by mouth daily.        bimatoprost 0.01 % Soln    LUMIGAN     Place 1 drop into both eyes At Bedtime.        calcium + D 600-200 MG-UNIT Tabs   Generic drug:  calcium carbonate-vitamin D     100 tablet    Take 1 tablet by mouth every 12 hours.        carboxymethylcellulose 0.5 % Soln ophthalmic solution    REFRESH PLUS     Place 1 drop into both eyes every 2 hours as needed.        dorzolamide 2 % ophthalmic solution    TRUSOPT     Place 1 drop into both eyes 2 times daily        FLAX SEEDS PO           GLUCOSAMINE SULFATE PO      Take 500 mg by mouth daily        losartan 50 MG tablet    COZAAR    90 tablet    Take 1 tablet (50 mg) by mouth daily    Benign essential hypertension       triamcinolone 0.1 % cream    KENALOG    454 g    Apply to AA QD-BID x 1-2 weeks then PRN Only    Acute dermatitis       vitamin D 2000 UNITS tablet      Take 1 tablet by mouth daily.    Osteoporosis

## 2017-08-21 ENCOUNTER — TELEPHONE (OUTPATIENT)
Dept: DERMATOLOGY | Facility: CLINIC | Age: 82
End: 2017-08-21

## 2017-08-21 DIAGNOSIS — L29.9 SCALP ITCH: ICD-10-CM

## 2017-08-22 ENCOUNTER — OFFICE VISIT (OUTPATIENT)
Dept: DERMATOLOGY | Facility: CLINIC | Age: 82
End: 2017-08-22
Payer: MEDICARE

## 2017-08-22 DIAGNOSIS — L30.9 CHRONIC DERMATITIS: Primary | ICD-10-CM

## 2017-08-22 PROCEDURE — 96910 PHOTCHMTX TAR&UVB/PTRLTM&UVB: CPT | Performed by: PHYSICIAN ASSISTANT

## 2017-08-22 RX ORDER — FLUOCINONIDE TOPICAL SOLUTION USP, 0.05% 0.5 MG/ML
SOLUTION TOPICAL
Qty: 60 ML | Refills: 3 | Status: SHIPPED | OUTPATIENT
Start: 2017-08-22 | End: 2018-04-15

## 2017-08-22 RX ORDER — FLUOCINONIDE TOPICAL SOLUTION USP, 0.05% 0.5 MG/ML
SOLUTION TOPICAL
Qty: 60 ML | Refills: 3 | Status: SHIPPED | OUTPATIENT
Start: 2017-08-22 | End: 2017-08-22

## 2017-08-22 NOTE — PROGRESS NOTES
Pt here today for NBUVB for chronic dermatitis - continues to improve      -Treatment # 10     -400 mJ  -45 seconds seconds  -Zinc oxide applied to lips, nipples  -Mineral oil to affected areas

## 2017-08-22 NOTE — MR AVS SNAPSHOT
"              After Visit Summary   2017    Jamilah Rodriguez    MRN: [id number]           Patient Information     Date Of Birth          1931        Visit Information        Provider Department      2017 9:30 AM Lexi Kwan PA-C [facility]        Today's Diagnoses     Chronic dermatitis    -  1       Follow-ups after your visit        Who to contact     If you have questions or need follow up information about today's clinic visit or your schedule please contact [facility] directly at [phone].  Normal or non-critical lab and imaging results will be communicated to you by AI Patentshart, letter or phone within 4 business days after the clinic has received the results. If you do not hear from us within 7 days, please contact the clinic through AI Patentshart or phone. If you have a critical or abnormal lab result, we will notify you by phone as soon as possible.  Submit refill requests through Performance Technology or call your pharmacy and they will forward the refill request to us. Please allow 3 business days for your refill to be completed.          Additional Information About Your Visit        MyChart Information     Performance Technology lets you send messages to your doctor, view your test results, renew your prescriptions, schedule appointments and more. To sign up, go to [email or web address] . Click on \"Log in\" on the left side of the screen, which will take you to the Welcome page. Then click on \"Sign up Now\" on the right side of the page.     You will be asked to enter the access code listed below, as well as some personal information. Please follow the directions to create your username and password.     Your access code is: [email or web address]  Expires: 2017  8:10 PM     Your access code will  in 90 days. If you need help or a new code, please call your [facility] or [phone].        Care EveryWhere ID     This is your Care EveryWhere ID. This could be " used by other organizations to access your [place] medical records  [id number]         Blood Pressure from Last 3 Encounters:   06/08/17 124/80   05/31/17 130/72   05/31/17 125/72    Weight from Last 3 Encounters:   06/08/17 59 kg (130 lb)   05/31/17 59.4 kg (131 lb)   05/31/17 59.5 kg (131 lb 3.2 oz)              We Performed the Following     PHOTOCHEMOTHERAPY WITH UV-B        Primary Care Provider Office Phone # Fax #    Bhupendra Chong [phone]       [address] [facility] [address]        Equal Access to Services     DRE FORREST : Hadii aad ku hadasho Somu, waaxda luqadaha, qaybta kaalmada adeerinyada, kimberly elder. So [facility] [place] [facility] [phone].    ATENCIÓN: Si habla español, tiene a smith disposición servicios gratuitos de asistencia lingüística. Llame al [phone].    We comply with applicable federal civil rights laws and [place] laws. We do not discriminate on the basis of race, color, national origin, age, disability sex, sexual orientation or gender identity.            Thank you!     Thank you for choosing [facility]  for your care. Our goal is always to provide you with excellent care. Hearing back from our patients is one way we can continue to improve our services. Please take a few minutes to complete the written survey that you may receive in the mail after your visit with us. Thank you!             Your Updated Medication List - Protect others around you: Learn how to safely use, store and throw away your medicines at www.disposemymeds.org.          This list is accurate as of: 8/22/17 10:15 AM.  Always use your most recent med list.                   Brand Name Dispense Instructions for use Diagnosis    albuterol 108 (90 BASE) MCG/ACT Inhaler    albuterol    1 Inhaler    Inhale 2 puffs into the lungs 4 times daily as needed for shortness of breath / dyspnea or wheezing    COPD exacerbation (H)       ascorbic acid  500 MG tablet    VITAMIN C    100 tablet    Take 1 tablet by mouth daily.        bimatoprost 0.01 % Soln    LUMIGAN     Place 1 drop into both eyes At Bedtime.        calcium + D 600-200 MG-UNIT Tabs   Generic drug:  calcium carbonate-vitamin D     100 tablet    Take 1 tablet by mouth every 12 hours.        carboxymethylcellulose 0.5 % Soln ophthalmic solution    REFRESH PLUS     Place 1 drop into both eyes every 2 hours as needed.        dorzolamide 2 % ophthalmic solution    TRUSOPT     Place 1 drop into both eyes 2 times daily        FLAX SEEDS PO           GLUCOSAMINE SULFATE PO      Take 500 mg by mouth daily        losartan 50 MG tablet    COZAAR    90 tablet    Take 1 tablet (50 mg) by mouth daily    Benign essential hypertension       triamcinolone 0.1 % cream    KENALOG    454 g    Apply to AA QD-BID x 1-2 weeks then PRN Only    Acute dermatitis       vitamin D 2000 UNITS tablet      Take 1 tablet by mouth daily.    Osteoporosis

## 2017-08-29 ENCOUNTER — OFFICE VISIT (OUTPATIENT)
Dept: DERMATOLOGY | Facility: CLINIC | Age: 82
End: 2017-08-29
Payer: MEDICARE

## 2017-08-29 DIAGNOSIS — L30.9 CHRONIC DERMATITIS: Primary | ICD-10-CM

## 2017-08-29 PROCEDURE — 96910 PHOTCHMTX TAR&UVB/PTRLTM&UVB: CPT | Performed by: PHYSICIAN ASSISTANT

## 2017-08-29 RX ORDER — HYDROXYZINE HYDROCHLORIDE 25 MG/1
TABLET, FILM COATED ORAL
Qty: 20 TABLET | Refills: 1 | Status: SHIPPED | OUTPATIENT
Start: 2017-08-29 | End: 2018-01-17

## 2017-08-29 NOTE — PROGRESS NOTES
Pt here today for NBUVB for chronic dermatitis - continues to improve. Gets worse with anxiety - discussed trial of hydroxyzine and she will think about this. Rx sent to the pharmacy.      -Treatment # 48 -043 mJ  -46 seconds seconds  -Zinc oxide applied to lips, nipples  -Mineral oil to affected areas

## 2017-08-29 NOTE — MR AVS SNAPSHOT
"              After Visit Summary   8/29/2017    Jamilah Rodriguez    MRN: [id number]           Patient Information     Date Of Birth          4/5/1931        Visit Information        Provider Department      8/29/2017 9:30 AM Lexi Kwan PA-C [facility]        Today's Diagnoses     Chronic dermatitis    -  1       Follow-ups after your visit        Your next 10 appointments already scheduled     Sep 05, 2017  9:30 AM CDT   PROCEDURE with OX DERM PROC ROOM   [facility] ([facility])    [address] [id number] [phone]              Who to contact     If you have questions or need follow up information about today's clinic visit or your schedule please contact [facility] directly at [phone].  Normal or non-critical lab and imaging results will be communicated to you by MyChart, letter or phone within 4 business days after the clinic has received the results. If you do not hear from us within 7 days, please contact the clinic through MyChart or phone. If you have a critical or abnormal lab result, we will notify you by phone as soon as possible.  Submit refill requests through ShopIgniter or call your pharmacy and they will forward the refill request to us. Please allow 3 business days for your refill to be completed.          Additional Information About Your Visit        MyChart Information     ShopIgniter lets you send messages to your doctor, view your test results, renew your prescriptions, schedule appointments and more. To sign up, go to [email or web address] . Click on \"Log in\" on the left side of the screen, which will take you to the Welcome page. Then click on \"Sign up Now\" on the right side of the page.     You will be asked to enter the access code listed below, as well as some personal information. Please follow the directions to create your username and password.   "   Your access code is: [email or web address]  Expires: 2017 12:45 PM     Your access code will  in 90 days. If you need help or a new code, please call your [place] clinic or [phone].        Care EveryWhere ID     This is your Care EveryWhere ID. This could be used by other organizations to access your [place] medical records  [id number]         Blood Pressure from Last 3 Encounters:   17 124/80   17 130/72   17 125/72    Weight from Last 3 Encounters:   17 59 kg (130 lb)   17 59.4 kg (131 lb)   17 59.5 kg (131 lb 3.2 oz)              We Performed the Following     PHOTOCHEMOTHERAPY WITH UV-B          Today's Medication Changes          These changes are accurate as of: 17 12:45 PM.  If you have any questions, ask your nurse or doctor.               Start taking these medicines.        Dose/Directions    hydrOXYzine 25 MG tablet   Commonly known as:  ATARAX   Used for:  Chronic dermatitis        1-2 tabs PO QHS for itching   Quantity:  20 tablet   Refills:  1            Where to get your medicines      These medications were sent to [facility] [address] [place] [address] [address] AT [facility] [address]  [address], [facility] [address]     Phone:  [phone]     hydrOXYzine 25 MG tablet                Primary Care Provider Office Phone # Fax #    Bhupendra Chong [phone]       [address] [facility] [address]        Equal Access to Services     JEANNETTE FORREST AH: Hadii sherly chaves hadasho Somu, waaxda luqadaha, qaybta kaalmada chidi, kimberly elder. So [facility] [phone].    ATENCIÓN: Si habla español, tiene a smith disposición servicios gratuitos de asistencia lingüística. Llame al [phone].    We comply with applicable federal civil rights laws and [place] laws. We do not discriminate on the basis of race, color, national origin, age, disability sex,  sexual orientation or gender identity.            Thank you!     Thank you for choosing [facility]  for your care. Our goal is always to provide you with excellent care. Hearing back from our patients is one way we can continue to improve our services. Please take a few minutes to complete the written survey that you may receive in the mail after your visit with us. Thank you!             Your Updated Medication List - Protect others around you: Learn how to safely use, store and throw away your medicines at www.disposemymeds.org.          This list is accurate as of: 8/29/17 12:45 PM.  Always use your most recent med list.                   Brand Name Dispense Instructions for use Diagnosis    albuterol 108 (90 BASE) MCG/ACT Inhaler    PROAIR HFA    1 Inhaler    Inhale 2 puffs into the lungs 4 times daily as needed for shortness of breath / dyspnea or wheezing    COPD exacerbation (H)       ascorbic acid 500 MG tablet    VITAMIN C    100 tablet    Take 1 tablet by mouth daily.        bimatoprost 0.01 % Soln    LUMIGAN     Place 1 drop into both eyes At Bedtime.        calcium + D 600-200 MG-UNIT Tabs   Generic drug:  calcium carbonate-vitamin D     100 tablet    Take 1 tablet by mouth every 12 hours.        carboxymethylcellulose 0.5 % Soln ophthalmic solution    REFRESH PLUS     Place 1 drop into both eyes every 2 hours as needed.        dorzolamide 2 % ophthalmic solution    TRUSOPT     Place 1 drop into both eyes 2 times daily        FLAX SEEDS PO           fluocinonide 0.05 % solution    LIDEX    60 mL    Apply to scalp BID PRN    Scalp itch       GLUCOSAMINE SULFATE PO      Take 500 mg by mouth daily        hydrOXYzine 25 MG tablet    ATARAX    20 tablet    1-2 tabs PO QHS for itching    Chronic dermatitis       losartan 50 MG tablet    COZAAR    90 tablet    Take 1 tablet (50 mg) by mouth daily    Benign essential hypertension       triamcinolone 0.1 % cream    KENALOG    454 g     Apply to AA QD-BID x 1-2 weeks then PRN Only    Acute dermatitis       vitamin D 2000 UNITS tablet      Take 1 tablet by mouth daily.    Osteoporosis

## 2017-09-01 ENCOUNTER — TELEPHONE (OUTPATIENT)
Dept: DERMATOLOGY | Facility: CLINIC | Age: 82
End: 2017-09-01

## 2017-09-01 DIAGNOSIS — L50.9 URTICARIA: ICD-10-CM

## 2017-09-01 DIAGNOSIS — Z51.81 THERAPEUTIC DRUG MONITORING: Primary | ICD-10-CM

## 2017-09-05 ENCOUNTER — OFFICE VISIT (OUTPATIENT)
Dept: DERMATOLOGY | Facility: CLINIC | Age: 82
End: 2017-09-05
Payer: MEDICARE

## 2017-09-05 DIAGNOSIS — L30.9 CHRONIC DERMATITIS: Primary | ICD-10-CM

## 2017-09-05 PROCEDURE — 96910 PHOTCHMTX TAR&UVB/PTRLTM&UVB: CPT | Performed by: PHYSICIAN ASSISTANT

## 2017-09-05 NOTE — MR AVS SNAPSHOT
"              After Visit Summary   9/5/2017    Jamilah Rodriguez    MRN: [id number]           Patient Information     Date Of Birth          4/5/1931        Visit Information        Provider Department      9/5/2017 9:30 AM Lexi Kwan PA-C [facility]        Today's Diagnoses     Chronic dermatitis    -  1       Follow-ups after your visit        Your next 10 appointments already scheduled     Sep 12, 2017  9:30 AM CDT   PROCEDURE with OX DERM PROC ROOM   [facility] ([facility])    [address] [id number] [phone]              Who to contact     If you have questions or need follow up information about today's clinic visit or your schedule please contact [facility] directly at [phone].  Normal or non-critical lab and imaging results will be communicated to you by MyChart, letter or phone within 4 business days after the clinic has received the results. If you do not hear from us within 7 days, please contact the clinic through MyChart or phone. If you have a critical or abnormal lab result, we will notify you by phone as soon as possible.  Submit refill requests through [employer] or call your pharmacy and they will forward the refill request to us. Please allow 3 business days for your refill to be completed.          Additional Information About Your Visit        MyChart Information     [employer] lets you send messages to your doctor, view your test results, renew your prescriptions, schedule appointments and more. To sign up, go to [email or web address] . Click on \"Log in\" on the left side of the screen, which will take you to the Welcome page. Then click on \"Sign up Now\" on the right side of the page.     You will be asked to enter the access code listed below, as well as some personal information. Please follow the directions to create your username and password.     Your " access code is: [email or web address]  Expires: 2017 12:45 PM     Your access code will  in 90 days. If you need help or a new code, please call your [place] clinic or [phone].        Care EveryWhere ID     This is your Care EveryWhere ID. This could be used by other organizations to access your [place] medical records  [id number]         Blood Pressure from Last 3 Encounters:   17 124/80   17 130/72   17 125/72    Weight from Last 3 Encounters:   17 59 kg (130 lb)   17 59.4 kg (131 lb)   17 59.5 kg (131 lb 3.2 oz)              We Performed the Following     PHOTOCHEMOTHERAPY WITH UV-B        Primary Care Provider Office Phone # Fax #    Bhupendra Chong [phone]       [address] [place] [address]        Equal Access to Services     JEANNETTE FORREST : Hadii aad ku hadasho Soomaali, waaxda luqadaha, qaybta kaalmada adeegyada, waxay idiin hayginon frieda drake . So [facility] [phone].    ATENCIÓN: Si habla español, tiene a smith disposición servicios gratuitos de asistencia lingüística. Llame al [phone].    We comply with applicable federal civil rights laws and [place] laws. We do not discriminate on the basis of race, color, national origin, age, disability sex, sexual orientation or gender identity.            Thank you!     Thank you for choosing [facility]  for your care. Our goal is always to provide you with excellent care. Hearing back from our patients is one way we can continue to improve our services. Please take a few minutes to complete the written survey that you may receive in the mail after your visit with us. Thank you!             Your Updated Medication List - Protect others around you: Learn how to safely use, store and throw away your medicines at www.disposemymeds.org.          This list is accurate as of: 17 11:58 AM.  Always use your most recent med list.                   Brand  Name Dispense Instructions for use Diagnosis    albuterol 108 (90 BASE) MCG/ACT Inhaler    PROAIR HFA    1 Inhaler    Inhale 2 puffs into the lungs 4 times daily as needed for shortness of breath / dyspnea or wheezing    COPD exacerbation (H)       ascorbic acid 500 MG tablet    VITAMIN C    100 tablet    Take 1 tablet by mouth daily.        bimatoprost 0.01 % Soln    LUMIGAN     Place 1 drop into both eyes At Bedtime.        calcium + D 600-200 MG-UNIT Tabs   Generic drug:  calcium carbonate-vitamin D     100 tablet    Take 1 tablet by mouth every 12 hours.        carboxymethylcellulose 0.5 % Soln ophthalmic solution    REFRESH PLUS     Place 1 drop into both eyes every 2 hours as needed.        dorzolamide 2 % ophthalmic solution    TRUSOPT     Place 1 drop into both eyes 2 times daily        FLAX SEEDS PO           fluocinonide 0.05 % solution    LIDEX    60 mL    Apply to scalp BID PRN    Scalp itch       GLUCOSAMINE SULFATE PO      Take 500 mg by mouth daily        hydrOXYzine 25 MG tablet    ATARAX    20 tablet    1-2 tabs PO QHS for itching    Chronic dermatitis       losartan 50 MG tablet    COZAAR    90 tablet    Take 1 tablet (50 mg) by mouth daily    Benign essential hypertension       triamcinolone 0.1 % cream    KENALOG    454 g    Apply to AA QD-BID x 1-2 weeks then PRN Only    Acute dermatitis       vitamin D 2000 UNITS tablet      Take 1 tablet by mouth daily.    Osteoporosis

## 2017-09-05 NOTE — PROGRESS NOTES
Pt here today for NBUVB for chronic dermatitis - continues to improve. Gets worse with anxiety - discussed trial of hydroxyzine and she will think about this. Rx sent to the pharmacy.      -Treatment # 24 -128 mJ  -51 seconds seconds  -Zinc oxide applied to lips, nipples  -Mineral oil to affected areas

## 2017-09-12 ENCOUNTER — OFFICE VISIT (OUTPATIENT)
Dept: DERMATOLOGY | Facility: CLINIC | Age: 82
End: 2017-09-12
Payer: MEDICARE

## 2017-09-12 DIAGNOSIS — L30.9 CHRONIC DERMATITIS: ICD-10-CM

## 2017-09-12 DIAGNOSIS — D48.5 NEOPLASM OF UNCERTAIN BEHAVIOR OF SKIN: Primary | ICD-10-CM

## 2017-09-12 PROCEDURE — 11100 HC BIOPSY SKIN/SUBQ/MUC MEM, SINGLE LESION: CPT | Mod: 51 | Performed by: PHYSICIAN ASSISTANT

## 2017-09-12 PROCEDURE — 88305 TISSUE EXAM BY PATHOLOGIST: CPT | Mod: 26 | Performed by: PHYSICIAN ASSISTANT

## 2017-09-12 PROCEDURE — 96910 PHOTCHMTX TAR&UVB/PTRLTM&UVB: CPT | Performed by: PHYSICIAN ASSISTANT

## 2017-09-12 PROCEDURE — 00000159 ZZHCL STATISTIC H-SEND OUTS PREP: Performed by: PHYSICIAN ASSISTANT

## 2017-09-12 PROCEDURE — 88305 TISSUE EXAM BY PATHOLOGIST: CPT | Performed by: PHYSICIAN ASSISTANT

## 2017-09-12 NOTE — PATIENT INSTRUCTIONS
Wound Care Instructions     FOR SUPERFICIAL WOUNDS     [facility] [phone]                 AFTER 24 HOURS YOU SHOULD REMOVE THE BANDAGE AND BEGIN DAILY DRESSING CHANGES AS FOLLOWS:     1) Remove Dressing.     2) Clean and dry the area with tap water using a Q-tip or sterile gauze pad.     3) Apply Vaseline, Aquaphor, Polysporin ointment or Bacitracin ointment over entire wound.  Do NOT use Neosporin ointment.     4) Cover the wound with a band-aid, or a sterile non-stick gauze pad and micropore paper tape      REPEAT THESE INSTRUCTIONS AT LEAST ONCE A DAY UNTIL THE WOUND HAS COMPLETELY HEALED.    It is an old wives tale that a wound heals better when it is exposed to air and allowed to dry out. The wound will heal faster with a better cosmetic result if it is kept moist with ointment and covered with a bandage.    **Do not let the wound dry out.**      Supplies Needed:      *Cotton tipped applicators (Q-tips)    *Polysporin Ointment or Bacitracin Ointment (NOT NEOSPORIN)    *Band-aids or non-stick gauze pads and micropore paper tape.      PATIENT INFORMATION:    During the healing process you will notice a number of changes. All wounds develop a small halo of redness surrounding the wound.  This means healing is occurring. Severe itching with extensive redness usually indicates sensitivity to the ointment or bandage tape used to dress the wound.  You should call our office if this develops.      Swelling  and/or discoloration around your surgical site is common, particularly when performed around the eye.    All wounds normally drain.  The larger the wound the more drainage there will be.  After 7-10 days, you will notice the wound beginning to shrink and new skin will begin to grow.  The wound is healed when you can see skin has formed over the entire area.  A healed wound has a healthy, shiny look to the surface and is red to dark pink in color to normalize.  Wounds may take approximately 4-6  weeks to heal.  Larger wounds may take 6-8 weeks.  After the wound is healed you may discontinue dressing changes.    You may experience a sensation of tightness as your wound heals. This is normal and will gradually subside.    Your healed wound may be sensitive to temperature changes. This sensitivity improves with time, but if you re having a lot of discomfort, try to avoid temperature extremes.    Patients frequently experience itching after their wound appears to have healed because of the continue healing under the skin.  Plain Vaseline will help relieve the itching.        POSSIBLE COMPLICATIONS    BLEEDIN. Leave the bandage in place.  2. Use tightly rolled up gauze or a cloth to apply direct pressure over the bandage for 30  minutes.  3. Reapply pressure for an additional 30 minutes if necessary  4. Use additional gauze and tape to maintain pressure once the bleeding has stopped.

## 2017-09-12 NOTE — MR AVS SNAPSHOT
After Visit Summary   9/12/2017    Jamilah Rodriguez    MRN: [id number]           Patient Information     Date Of Birth          4/5/1931        Visit Information        Provider Department      9/12/2017 9:30 AM Lexi Kwan PA-C [facility]        Today's Diagnoses     Neoplasm of uncertain behavior of skin    -  1      Care Instructions      Wound Care Instructions     FOR SUPERFICIAL WOUNDS     [facility] [phone]                 AFTER 24 HOURS YOU SHOULD REMOVE THE BANDAGE AND BEGIN DAILY DRESSING CHANGES AS FOLLOWS:     1) Remove Dressing.     2) Clean and dry the area with tap water using a Q-tip or sterile gauze pad.     3) Apply Vaseline, Aquaphor, Polysporin ointment or Bacitracin ointment over entire wound.  Do NOT use Neosporin ointment.     4) Cover the wound with a band-aid, or a sterile non-stick gauze pad and micropore paper tape      REPEAT THESE INSTRUCTIONS AT LEAST ONCE A DAY UNTIL THE WOUND HAS COMPLETELY HEALED.    It is an old wives tale that a wound heals better when it is exposed to air and allowed to dry out. The wound will heal faster with a better cosmetic result if it is kept moist with ointment and covered with a bandage.    **Do not let the wound dry out.**      Supplies Needed:      *Cotton tipped applicators (Q-tips)    *Polysporin Ointment or Bacitracin Ointment (NOT NEOSPORIN)    *Band-aids or non-stick gauze pads and micropore paper tape.      PATIENT INFORMATION:    During the healing process you will notice a number of changes. All wounds develop a small halo of redness surrounding the wound.  This means healing is occurring. Severe itching with extensive redness usually indicates sensitivity to the ointment or bandage tape used to dress the wound.  You should call our office if this develops.      Swelling  and/or discoloration around your surgical site is common, particularly when performed around the eye.    All  wounds normally drain.  The larger the wound the more drainage there will be.  After 7-10 days, you will notice the wound beginning to shrink and new skin will begin to grow.  The wound is healed when you can see skin has formed over the entire area.  A healed wound has a healthy, shiny look to the surface and is red to dark pink in color to normalize.  Wounds may take approximately 4-6 weeks to heal.  Larger wounds may take 6-8 weeks.  After the wound is healed you may discontinue dressing changes.    You may experience a sensation of tightness as your wound heals. This is normal and will gradually subside.    Your healed wound may be sensitive to temperature changes. This sensitivity improves with time, but if you re having a lot of discomfort, try to avoid temperature extremes.    Patients frequently experience itching after their wound appears to have healed because of the continue healing under the skin.  Plain Vaseline will help relieve the itching.        POSSIBLE COMPLICATIONS    BLEEDIN. Leave the bandage in place.  2. Use tightly rolled up gauze or a cloth to apply direct pressure over the bandage for 30  minutes.  3. Reapply pressure for an additional 30 minutes if necessary  4. Use additional gauze and tape to maintain pressure once the bleeding has stopped.            Follow-ups after your visit        Who to contact     If you have questions or need follow up information about today's clinic visit or your schedule please contact [facility] directly at [phone].  Normal or non-critical lab and imaging results will be communicated to you by MyChart, letter or phone within 4 business days after the clinic has received the results. If you do not hear from us within 7 days, please contact the clinic through MyChart or phone. If you have a critical or abnormal lab result, we will notify you by phone as soon as possible.  Submit refill requests through [employer] or call your  "pharmacy and they will forward the refill request to us. Please allow 3 business days for your refill to be completed.          Additional Information About Your Visit        MyChart Information     [employer] lets you send messages to your doctor, view your test results, renew your prescriptions, schedule appointments and more. To sign up, go to [email or web address] . Click on \"Log in\" on the left side of the screen, which will take you to the Welcome page. Then click on \"Sign up Now\" on the right side of the page.     You will be asked to enter the access code listed below, as well as some personal information. Please follow the directions to create your username and password.     Your access code is: [email or web address]  Expires: 2017 12:45 PM     Your access code will  in 90 days. If you need help or a new code, please call your [place] clinic or [phone].        Care EveryWhere ID     This is your Care EveryWhere ID. This could be used by other organizations to access your [place] medical records  [id number]         Blood Pressure from Last 3 Encounters:   17 124/80   17 130/72   17 125/72    Weight from Last 3 Encounters:   17 59 kg (130 lb)   17 59.4 kg (131 lb)   17 59.5 kg (131 lb 3.2 oz)              We Performed the Following     BIOPSY SKIN/SUBQ/MUC MEM, SINGLE LESION     Surgical pathology exam        Primary Care Provider Office Phone # Fax #    Bhupendra Chong [phone]       [address] [place] [address]        Equal Access to Services     DRE FORREST : Maty Schumacher, joann gray, qakimberly cowan. So [facility] [phone].    ATENCIÓN: Si habla español, tiene a smith disposición servicios gratuitos de asistencia lingüística. Jada al [phone].    We comply with applicable federal civil rights laws and [place] laws. We do not discriminate on the " basis of race, color, national origin, age, disability sex, sexual orientation or gender identity.            Thank you!     Thank you for choosing [facility]  for your care. Our goal is always to provide you with excellent care. Hearing back from our patients is one way we can continue to improve our services. Please take a few minutes to complete the written survey that you may receive in the mail after your visit with us. Thank you!             Your Updated Medication List - Protect others around you: Learn how to safely use, store and throw away your medicines at www.disposemymeds.org.          This list is accurate as of: 9/12/17  9:49 AM.  Always use your most recent med list.                   Brand Name Dispense Instructions for use Diagnosis    albuterol 108 (90 BASE) MCG/ACT Inhaler    PROAIR HFA    1 Inhaler    Inhale 2 puffs into the lungs 4 times daily as needed for shortness of breath / dyspnea or wheezing    COPD exacerbation (H)       ascorbic acid 500 MG tablet    VITAMIN C    100 tablet    Take 1 tablet by mouth daily.        bimatoprost 0.01 % Soln    LUMIGAN     Place 1 drop into both eyes At Bedtime.        calcium + D 600-200 MG-UNIT Tabs   Generic drug:  calcium carbonate-vitamin D     100 tablet    Take 1 tablet by mouth every 12 hours.        carboxymethylcellulose 0.5 % Soln ophthalmic solution    REFRESH PLUS     Place 1 drop into both eyes every 2 hours as needed.        dorzolamide 2 % ophthalmic solution    TRUSOPT     Place 1 drop into both eyes 2 times daily        FLAX SEEDS PO           fluocinonide 0.05 % solution    LIDEX    60 mL    Apply to scalp BID PRN    Scalp itch       GLUCOSAMINE SULFATE PO      Take 500 mg by mouth daily        hydrOXYzine 25 MG tablet    ATARAX    20 tablet    1-2 tabs PO QHS for itching    Chronic dermatitis       losartan 50 MG tablet    COZAAR    90 tablet    Take 1 tablet (50 mg) by mouth daily    Benign essential  hypertension       triamcinolone 0.1 % cream    KENALOG    454 g    Apply to AA QD-BID x 1-2 weeks then PRN Only    Acute dermatitis       vitamin D 2000 UNITS tablet      Take 1 tablet by mouth daily.    Osteoporosis

## 2017-09-12 NOTE — PROGRESS NOTES
Pt here today for NBUVB for chronic dermatitis -flaring.      -Treatment # 56 -756 mJ  -51 seconds seconds  -Zinc oxide applied to lips, nipples  -Mineral oil to affected areas       Refractory to treatment, will re-biopsy today to determine spong derm vs contact derm vs other and send to dermatopathology     --Shave bx in typical fashion from lower back.  Area cleaned with betadyne and anesthetized with 1% lidocaine with epi .  Dermablade used to remove the lesion and sent to pathology. Bleeding was cauterized. Pt tolerated procedure well.

## 2017-09-14 ENCOUNTER — HOSPITAL PATHOLOGY (OUTPATIENT)
Dept: OTHER | Facility: CLINIC | Age: 82
End: 2017-09-14

## 2017-09-20 LAB — COPATH REPORT: NORMAL

## 2017-09-21 LAB — COPATH REPORT: NORMAL

## 2017-09-22 RX ORDER — DAPSONE 25 MG/1
TABLET ORAL
Qty: 60 TABLET | Refills: 1 | Status: SHIPPED | OUTPATIENT
Start: 2017-09-22 | End: 2017-11-06

## 2017-09-22 NOTE — TELEPHONE ENCOUNTER
That is ok about the hydroxyzine.    Lab orders placed and order for Dapsone sent to pharmacy. Please notify pt.

## 2017-09-26 ENCOUNTER — OFFICE VISIT (OUTPATIENT)
Dept: DERMATOLOGY | Facility: CLINIC | Age: 82
End: 2017-09-26
Payer: MEDICARE

## 2017-09-26 DIAGNOSIS — Z12.83 SCREENING EXAM FOR SKIN CANCER: ICD-10-CM

## 2017-09-26 DIAGNOSIS — L50.9 URTICARIA: Primary | ICD-10-CM

## 2017-09-26 DIAGNOSIS — L30.9 CHRONIC DERMATITIS: ICD-10-CM

## 2017-09-26 DIAGNOSIS — L85.0 ACQUIRED ICHTHYOSIS: ICD-10-CM

## 2017-09-26 LAB
ALBUMIN SERPL-MCNC: 3.6 G/DL (ref 3.4–5)
ALP SERPL-CCNC: 114 U/L (ref 40–150)
ALT SERPL W P-5'-P-CCNC: 30 U/L (ref 0–50)
ANION GAP SERPL CALCULATED.3IONS-SCNC: 5 MMOL/L (ref 3–14)
AST SERPL W P-5'-P-CCNC: 20 U/L (ref 0–45)
BILIRUB SERPL-MCNC: 0.5 MG/DL (ref 0.2–1.3)
BUN SERPL-MCNC: 13 MG/DL (ref 7–30)
CALCIUM SERPL-MCNC: 9.1 MG/DL (ref 8.5–10.1)
CHLORIDE SERPL-SCNC: 101 MMOL/L (ref 94–109)
CO2 SERPL-SCNC: 28 MMOL/L (ref 20–32)
CREAT SERPL-MCNC: 0.89 MG/DL (ref 0.52–1.04)
ERYTHROCYTE [DISTWIDTH] IN BLOOD BY AUTOMATED COUNT: 12.5 % (ref 10–15)
GFR SERPL CREATININE-BSD FRML MDRD: 60 ML/MIN/1.7M2
GLUCOSE SERPL-MCNC: 93 MG/DL (ref 70–99)
HCT VFR BLD AUTO: 38.6 % (ref 35–47)
HGB BLD-MCNC: 12.4 G/DL (ref 11.7–15.7)
MCH RBC QN AUTO: 30.5 PG (ref 26.5–33)
MCHC RBC AUTO-ENTMCNC: 32.1 G/DL (ref 31.5–36.5)
MCV RBC AUTO: 95 FL (ref 78–100)
PLATELET # BLD AUTO: 304 10E9/L (ref 150–450)
POTASSIUM SERPL-SCNC: 4.6 MMOL/L (ref 3.4–5.3)
PROT SERPL-MCNC: 7.4 G/DL (ref 6.8–8.8)
RBC # BLD AUTO: 4.07 10E12/L (ref 3.8–5.2)
SODIUM SERPL-SCNC: 134 MMOL/L (ref 133–144)
TSH SERPL DL<=0.005 MIU/L-ACNC: 3.29 MU/L (ref 0.4–4)
WBC # BLD AUTO: 10.9 10E9/L (ref 4–11)

## 2017-09-26 PROCEDURE — 86235 NUCLEAR ANTIGEN ANTIBODY: CPT | Performed by: PHYSICIAN ASSISTANT

## 2017-09-26 PROCEDURE — 80053 COMPREHEN METABOLIC PANEL: CPT | Performed by: PHYSICIAN ASSISTANT

## 2017-09-26 PROCEDURE — 86038 ANTINUCLEAR ANTIBODIES: CPT | Performed by: PHYSICIAN ASSISTANT

## 2017-09-26 PROCEDURE — 84443 ASSAY THYROID STIM HORMONE: CPT | Performed by: PHYSICIAN ASSISTANT

## 2017-09-26 PROCEDURE — 99213 OFFICE O/P EST LOW 20 MIN: CPT | Performed by: PHYSICIAN ASSISTANT

## 2017-09-26 PROCEDURE — 36415 COLL VENOUS BLD VENIPUNCTURE: CPT | Performed by: PHYSICIAN ASSISTANT

## 2017-09-26 PROCEDURE — 86039 ANTINUCLEAR ANTIBODIES (ANA): CPT | Performed by: PHYSICIAN ASSISTANT

## 2017-09-26 PROCEDURE — 85027 COMPLETE CBC AUTOMATED: CPT | Performed by: PHYSICIAN ASSISTANT

## 2017-09-26 NOTE — MR AVS SNAPSHOT
"              After Visit Summary   2017    Jamilah Rodriguez    MRN: [id number]           Patient Information     Date Of Birth          1931        Visit Information        Provider Department      2017 9:30 AM Lexi Kwan PA-C [facility]        Today's Diagnoses     Urticaria    -  1    Chronic dermatitis        Screening exam for skin cancer        Acquired ichthyosis            Follow-ups after your visit        Who to contact     If you have questions or need follow up information about today's clinic visit or your schedule please contact [facility] directly at [phone].  Normal or non-critical lab and imaging results will be communicated to you by [email or web address], letter or phone within 4 business days after the clinic has received the results. If you do not hear from us within 7 days, please contact the clinic through [email or web address] or phone. If you have a critical or abnormal lab result, we will notify you by phone as soon as possible.  Submit refill requests through [employer] or call your pharmacy and they will forward the refill request to us. Please allow 3 business days for your refill to be completed.          Additional Information About Your Visit        MyChart Information     [employer] lets you send messages to your doctor, view your test results, renew your prescriptions, schedule appointments and more. To sign up, go to [email or web address] . Click on \"Log in\" on the left side of the screen, which will take you to the Welcome page. Then click on \"Sign up Now\" on the right side of the page.     You will be asked to enter the access code listed below, as well as some personal information. Please follow the directions to create your username and password.     Your access code is: [email or web address]  Expires: 2017 12:45 PM     Your access code will  in 90 days. If you need help or a new code, please call your [facility] or " [phone].        Care EveryWhere ID     This is your Care EveryWhere ID. This could be used by other organizations to access your [place] medical records  [id number]         Blood Pressure from Last 3 Encounters:   06/08/17 124/80   05/31/17 130/72   05/31/17 125/72    Weight from Last 3 Encounters:   06/08/17 59 kg (130 lb)   05/31/17 59.4 kg (131 lb)   05/31/17 59.5 kg (131 lb 3.2 oz)              We Performed the Following     Anti Nuclear Karon IgG by IFA with Reflex     CBC with platelets     Comprehensive metabolic panel     MARGO antibody panel     TSH with free T4 reflex        Primary Care Provider Office Phone # Fax #    Bhupendra Chong [phone]       [address] [place] [address]        Equal Access to Services     DRE FORREST : Hadii aad ku hadasho Soomaali, waaxda luqadaha, qaybta kaalmada adeegyada, kimberly drake . So [facility] [phone].    ATENCIÓN: Si habla español, tiene a smith disposición servicios gratuitos de asistencia lingüística. [facility] [phone].    We comply with applicable federal civil rights laws and [place] laws. We do not discriminate on the basis of race, color, national origin, age, disability sex, sexual orientation or gender identity.            Thank you!     Thank you for choosing [facility]  for your care. Our goal is always to provide you with excellent care. Hearing back from our patients is one way we can continue to improve our services. Please take a few minutes to complete the written survey that you may receive in the mail after your visit with us. Thank you!             Your Updated Medication List - Protect others around you: Learn how to safely use, store and throw away your medicines at www.disposemymeds.org.          This list is accurate as of: 9/26/17 12:25 PM.  Always use your most recent med list.                   Brand Name Dispense Instructions for use Diagnosis     albuterol 108 (90 BASE) MCG/ACT Inhaler    PROAIR HFA    1 Inhaler    Inhale 2 puffs into the lungs 4 times daily as needed for shortness of breath / dyspnea or wheezing    COPD exacerbation (H)       ascorbic acid 500 MG tablet    VITAMIN C    100 tablet    Take 1 tablet by mouth daily.        bimatoprost 0.01 % Soln    LUMIGAN     Place 1 drop into both eyes At Bedtime.        calcium + D 600-200 MG-UNIT Tabs   Generic drug:  calcium carbonate-vitamin D     100 tablet    Take 1 tablet by mouth every 12 hours.        carboxymethylcellulose 0.5 % Soln ophthalmic solution    REFRESH PLUS     Place 1 drop into both eyes every 2 hours as needed.        dapsone 25 MG tablet     60 tablet    1 tab PO daily    Urticaria       dorzolamide 2 % ophthalmic solution    TRUSOPT     Place 1 drop into both eyes 2 times daily        FLAX SEEDS PO           fluocinonide 0.05 % solution    LIDEX    60 mL    Apply to scalp BID PRN    Scalp itch       GLUCOSAMINE SULFATE PO      Take 500 mg by mouth daily        hydrOXYzine 25 MG tablet    ATARAX    20 tablet    1-2 tabs PO QHS for itching    Chronic dermatitis       losartan 50 MG tablet    COZAAR    90 tablet    Take 1 tablet (50 mg) by mouth daily    Benign essential hypertension       triamcinolone 0.1 % cream    KENALOG    454 g    Apply to AA QD-BID x 1-2 weeks then PRN Only    Acute dermatitis       vitamin D 2000 UNITS tablet      Take 1 tablet by mouth daily.    Osteoporosis

## 2017-09-26 NOTE — PROGRESS NOTES
HPI:   Jamilah Rodriguez is a 86 year old female who presents for recheck of persistent itchy rash; possible urticaria or urticarial phase of bullous pemphigoid   chief complaint  Location: back, arms, chest   Condition present for:  Months - had similar eruption last year.   Previous treatments include: NBUVB - worked well for her last year but has been flared for the past several months and NBUVB not helping    Review Of Systems  Eyes: negative  Ears/Nose/Throat: negative  Respiratory: No shortness of breath, dyspnea on exertion, cough, or hemoptysis  Cardiovascular: negative  Gastrointestinal: negative  Genitourinary: negative  Musculoskeletal: negative  Neurologic: negative  Psychiatric: negative        PHYSICAL EXAM:      Skin exam performed as follows: Type 2 skin. Mood appropriate  Alert and Oriented X 3. Well developed, well nourished in no distress.  General appearance: Normal  Head including face: Normal  Eyes: conjunctiva and lids: Normal  Mouth: Lips, teeth, gums: Normal  Neck: Normal  Chest-breast/axillae: Normal  Back: Normal  Spleen and liver: Normal  Cardiovascular: Exam of peripheral vascular system by observation for swelling, varicosities, edema: Normal  Genitalia: groin, buttocks: Normal  Extremities: digits/nails (clubbing): Normal  Eccrine and Apocrine glands: Normal  Right upper extremity: Normal  Left upper extremity: Normal  Right lower extremity: Normal  Left lower extremity: Normal  Skin: Scalp and body hair: See below    1. Excoriations on chest, back, arms    ASSESSMENT/PLAN:     1. Urticaria vs urticarial phase of bullous pemphigoid - advised. DIF was not performed at [facility]; offered this but she declines this today. She and daughter wondering about other possible auto-immune diseases and would like blood work done to check for this. Discussed with Dr Silva who recommends Dapsone - she is amenable to trying this.   --Start Dapsone 25 mg daily  --Check CBC, CMP, TSH, DONY, MARGO  panel  --Declines biopsy for DIF - can consider this in the future if refractory.         Follow-up: has appt in 1 week  CC:   Scribed By: Lexi Kwan MS, PADAVID

## 2017-09-27 LAB
ENA RNP IGG SER IA-ACNC: <0.2 AI (ref 0–0.9)
ENA SCL70 IGG SER IA-ACNC: <0.2 AI (ref 0–0.9)
ENA SM IGG SER-ACNC: <0.2 AI (ref 0–0.9)
ENA SS-A IGG SER IA-ACNC: <0.2 AI (ref 0–0.9)
ENA SS-B IGG SER IA-ACNC: <0.2 AI (ref 0–0.9)

## 2017-09-28 LAB
ANA PAT SER IF-IMP: ABNORMAL
ANA SER QL IF: POSITIVE
ANA TITR SER IF: >1280 {TITER}

## 2017-10-03 ENCOUNTER — OFFICE VISIT (OUTPATIENT)
Dept: DERMATOLOGY | Facility: CLINIC | Age: 82
End: 2017-10-03
Payer: MEDICARE

## 2017-10-03 VITALS — OXYGEN SATURATION: 98 % | SYSTOLIC BLOOD PRESSURE: 151 MMHG | DIASTOLIC BLOOD PRESSURE: 81 MMHG | HEART RATE: 82 BPM

## 2017-10-03 DIAGNOSIS — F41.9 ANXIETY: ICD-10-CM

## 2017-10-03 DIAGNOSIS — L12.0 BULLOUS PEMPHIGOID (H): Primary | ICD-10-CM

## 2017-10-03 PROCEDURE — 99213 OFFICE O/P EST LOW 20 MIN: CPT | Performed by: PHYSICIAN ASSISTANT

## 2017-10-03 RX ORDER — ALPRAZOLAM 0.25 MG
TABLET ORAL
Qty: 10 TABLET | Refills: 0 | Status: SHIPPED | OUTPATIENT
Start: 2017-10-03 | End: 2018-01-17

## 2017-10-03 RX ORDER — PREDNISONE 10 MG/1
TABLET ORAL
Qty: 60 TABLET | Refills: 0 | Status: SHIPPED | OUTPATIENT
Start: 2017-10-03 | End: 2017-11-14

## 2017-10-03 NOTE — PROGRESS NOTES
HPI:   Jamilah Rodriguez is a 86 year old female who presents for recheck of persistent itchy rash; possible urticaria or urticarial phase of bullous pemphigoid. Started Dapsone   chief complaint  Location: back, arms, chest   Condition present for:  Months - had similar eruption last year.   Previous treatments include: NBUVB - worked well for her last year but has been flared for the past several months and NBUVB not helping    Review Of Systems  Eyes: negative  Ears/Nose/Throat: negative  Respiratory: No shortness of breath, dyspnea on exertion, cough, or hemoptysis  Cardiovascular: negative  Gastrointestinal: negative  Genitourinary: negative  Musculoskeletal: negative  Neurologic: negative  Psychiatric: negative        PHYSICAL EXAM:      Skin exam performed as follows: Type 2 skin. Mood appropriate  Alert and Oriented X 3. Well developed, well nourished in no distress.  General appearance: Normal  Head including face: Normal  Eyes: conjunctiva and lids: Normal  Mouth: Lips, teeth, gums: Normal  Neck: Normal  Chest-breast/axillae: Normal  Back: Normal  Spleen and liver: Normal  Cardiovascular: Exam of peripheral vascular system by observation for swelling, varicosities, edema: Normal  Genitalia: groin, buttocks: Normal  Extremities: digits/nails (clubbing): Normal  Eccrine and Apocrine glands: Normal  Right upper extremity: Normal  Left upper extremity: Normal  Right lower extremity: Normal  Left lower extremity: Normal  Skin: Scalp and body hair: See below    1. Excoriations on chest, back, arms    ASSESSMENT/PLAN:     1. Urticaria vs urticarial phase of bullous pemphigoid - advised. Declines second biopsy for DIF. No better so far but has only been on Dapsone for 4 days. Still very itchy. Recommend adding on prednisone and she is amenable to this. Having significant anxiety associated with the rash. Discussed trial of xanax - she does not like to take pills but will consider it. Dicussed that benzodiazapine  are habit forming and to only take as needed. Advised that she will be drowsy from taking the pills and to only do so when she is planning on staying at home.    --Continue Dapsone 25 mg daily  --Start prednisone 30 mg x 10 days then 20 mg x 10 days then 10 mg x 10 days then stop  --0.25 mg of Xanax PRN anxiety  --Declines biopsy for DIF - can consider this in the future if refractory.   --Can stop daily Zyrtec - she does not think this has been helping  --Continue topicals PRN        Follow-up: recheck in 3-4 weeks - will consider raising dose of Dapsone and do blood work  CC:   Scribed By: Lexi Kwan, MS, PA-C

## 2017-10-03 NOTE — NURSING NOTE
"Chief Complaint   Patient presents with     Derm Problem     recheck rash       Initial /84  Pulse 85  SpO2 98% Estimated body mass index is 23.03 kg/(m^2) as calculated from the following:    Height as of 6/8/17: 1.6 m (5' 3\").    Weight as of 6/8/17: 59 kg (130 lb).  Medication Reconciliation: complete    "

## 2017-10-03 NOTE — MR AVS SNAPSHOT
"              After Visit Summary   10/3/2017    Jamilah Rodriguez    MRN: [id number]           Patient Information     Date Of Birth          4/5/1931        Visit Information        Provider Department      10/3/2017 9:30 AM Lexi Kwan PA-C [facility]        Today's Diagnoses     Bullous pemphigoid    -  1    Anxiety           Follow-ups after your visit        Your next 10 appointments already scheduled     Nov 02, 2017  8:45 AM CDT   PROCEDURE with Dixon Silva MD   [facility]    [address] [id number] [phone]              Who to contact     If you have questions or need follow up information about today's clinic visit or your schedule please contact [facility] directly at [phone].  Normal or non-critical lab and imaging results will be communicated to you by MyChart, letter or phone within 4 business days after the clinic has received the results. If you do not hear from us within 7 days, please contact the clinic through MyChart or phone. If you have a critical or abnormal lab result, we will notify you by phone as soon as possible.  Submit refill requests through [employer] or call your pharmacy and they will forward the refill request to us. Please allow 3 business days for your refill to be completed.          Additional Information About Your Visit        MyChart Information     [employer] lets you send messages to your doctor, view your test results, renew your prescriptions, schedule appointments and more. To sign up, go to [email or web address] . Click on \"Log in\" on the left side of the screen, which will take you to the Welcome page. Then click on \"Sign up Now\" on the right side of the page.     You will be asked to enter the access code listed below, as well as some personal information. Please follow the directions to create your " username and password.     Your access code is: [email or web address]  Expires: 2017 12:45 PM     Your access code will  in 90 days. If you need help or a new code, please call your [facility] or [phone].        Care EveryWhere ID     This is your Care EveryWhere ID. This could be used by other organizations to access your [facility] medical records  [id number]        Your Vitals Were     Pulse Pulse Oximetry                82 98%           Blood Pressure from Last 3 Encounters:   10/03/17 151/81   17 124/80   17 130/72    Weight from Last 3 Encounters:   17 59 kg (130 lb)   17 59.4 kg (131 lb)   17 59.5 kg (131 lb 3.2 oz)              Today, you had the following     No orders found for display         Today's Medication Changes          These changes are accurate as of: 10/3/17 12:46 PM.  If you have any questions, ask your nurse or doctor.               Start taking these medicines.        Dose/Directions    ALPRAZolam 0.25 MG tablet   Commonly known as:  XANAX   Used for:  Anxiety   Started by:  Lexi Kwan PA-C        1 tablet every 8 as needed for anxiety   Quantity:  10 tablet   Refills:  0       predniSONE 10 MG tablet   Commonly known as:  DELTASONE   Used for:  Bullous pemphigoid   Started by:  Lexi Kwan PA-C        3 tabs x 10 days then 2 tabs x 10 days then 1 tab x 10 days   Quantity:  60 tablet   Refills:  0            Where to get your medicines      These medications were sent to [facility] [id number] [place] [address] [address] AT [facility]  [address], [facility] [address]     Phone:  [phone]     predniSONE 10 MG tablet         Some of these will need a paper prescription and others can be bought over the counter.  Ask your nurse if you have questions.     Bring a paper prescription for each of these medications     ALPRAZolam 0.25 MG tablet                Primary Care Provider  Office Phone # Fax #    Bhupendra Abdulaziz Chong [phone]       [address] [facility] [address]        Equal Access to Services     DRE FORREST : Maty chaves juliette Schumacher, waaileenda luqadaha, qacolleenta kaalmada chidi, kimberly murphy ladeidreayaz elder. So [facility] [phone].    ATENCIÓN: Si habla español, tiene a smith disposición servicios gratuitos de asistencia lingüística. Llame al [phone].    We comply with applicable federal civil rights laws and [place] laws. We do not discriminate on the basis of race, color, national origin, age, disability, sex, sexual orientation, or gender identity.            Thank you!     Thank you for choosing [facility]  for your care. Our goal is always to provide you with excellent care. Hearing back from our patients is one way we can continue to improve our services. Please take a few minutes to complete the written survey that you may receive in the mail after your visit with us. Thank you!             Your Updated Medication List - Protect others around you: Learn how to safely use, store and throw away your medicines at www.disposemymeds.org.          This list is accurate as of: 10/3/17 12:46 PM.  Always use your most recent med list.                   Brand Name Dispense Instructions for use Diagnosis    albuterol 108 (90 BASE) MCG/ACT Inhaler    PROAIR HFA    1 Inhaler    Inhale 2 puffs into the lungs 4 times daily as needed for shortness of breath / dyspnea or wheezing    COPD exacerbation (H)       ALPRAZolam 0.25 MG tablet    XANAX    10 tablet    1 tablet every 8 as needed for anxiety    Anxiety       ascorbic acid 500 MG tablet    VITAMIN C    100 tablet    Take 1 tablet by mouth daily.        bimatoprost 0.01 % Soln    LUMIGAN     Place 1 drop into both eyes At Bedtime.        calcium + D 600-200 MG-UNIT Tabs   Generic drug:  calcium carbonate-vitamin D     100 tablet    Take 1 tablet by mouth every 12  hours.        carboxymethylcellulose 0.5 % Soln ophthalmic solution    REFRESH PLUS     Place 1 drop into both eyes every 2 hours as needed.        dapsone 25 MG tablet     60 tablet    1 tab PO daily    Urticaria       dorzolamide 2 % ophthalmic solution    TRUSOPT     Place 1 drop into both eyes 2 times daily        FLAX SEEDS PO           fluocinonide 0.05 % solution    LIDEX    60 mL    Apply to scalp BID PRN    Scalp itch       GLUCOSAMINE SULFATE PO      Take 500 mg by mouth daily        hydrOXYzine 25 MG tablet    ATARAX    20 tablet    1-2 tabs PO QHS for itching    Chronic dermatitis       losartan 50 MG tablet    COZAAR    90 tablet    Take 1 tablet (50 mg) by mouth daily    Benign essential hypertension       predniSONE 10 MG tablet    DELTASONE    60 tablet    3 tabs x 10 days then 2 tabs x 10 days then 1 tab x 10 days    Bullous pemphigoid       triamcinolone 0.1 % cream    KENALOG    454 g    Apply to AA QD-BID x 1-2 weeks then PRN Only    Acute dermatitis       vitamin D 2000 UNITS tablet      Take 1 tablet by mouth daily.    Osteoporosis

## 2017-10-05 ENCOUNTER — OFFICE VISIT (OUTPATIENT)
Dept: INTERNAL MEDICINE | Facility: CLINIC | Age: 82
End: 2017-10-05
Payer: MEDICARE

## 2017-10-05 ENCOUNTER — TELEPHONE (OUTPATIENT)
Dept: NURSING | Facility: CLINIC | Age: 82
End: 2017-10-05

## 2017-10-05 VITALS
OXYGEN SATURATION: 98 % | WEIGHT: 128.5 LBS | BODY MASS INDEX: 22.77 KG/M2 | SYSTOLIC BLOOD PRESSURE: 142 MMHG | HEART RATE: 81 BPM | HEIGHT: 63 IN | TEMPERATURE: 98.3 F | DIASTOLIC BLOOD PRESSURE: 78 MMHG

## 2017-10-05 DIAGNOSIS — M79.89 LEFT LEG SWELLING: ICD-10-CM

## 2017-10-05 DIAGNOSIS — I10 BENIGN ESSENTIAL HYPERTENSION: Primary | ICD-10-CM

## 2017-10-05 PROCEDURE — 99214 OFFICE O/P EST MOD 30 MIN: CPT | Performed by: INTERNAL MEDICINE

## 2017-10-05 RX ORDER — LOSARTAN POTASSIUM 100 MG/1
100 TABLET ORAL DAILY
Qty: 30 TABLET | Refills: 0 | Status: SHIPPED | OUTPATIENT
Start: 2017-10-05 | End: 2017-10-05

## 2017-10-05 NOTE — PROGRESS NOTES
"  SUBJECTIVE:                                                      HPI: Jamilah Rodriguez is a pleasant 86 year old female who presents with elevated blood pressure readings:    History of high blood pressure previously well controlled on losartan 50 mg daily.    Was recently started on dapsone by dermatology for urticaria or suspect urticarial phase of bullous pemphigoid.    Was also prescribed prednisone by dermatology, which she has not started yet.    Since starting the dapsone patient has noted elevated blood pressure readings (140s-150s/80s) and is concerned.    Asymptomatic from a blood pressure perspective:  - no headaches  - no double or blurry vision  - no chest pain or palpitations  - no shortness of breath  - no lightheadedness    Of note, she has also noticed left leg swelling over the last month:  - no known injury or trauma  - swelling is stable in size - does not diminish at night or increase over the course of the day  - no associated redness, warmth, or discomfort    PMH significant for peripheral arterial disease, but no known peripheral vascular disease.    Very recent labs (CMP and CBC) were within normal limits.     The medication, allergy, and problem lists have been reviewed and updated as appropriate.       OBJECTIVE:                                                      /78  Pulse 81  Temp 98.3  F (36.8  C) (Oral)  Ht 5' 3\" (1.6 m)  Wt 128 lb 8 oz (58.3 kg)  SpO2 98%  BMI 22.76 kg/m2  Constitutional: well-appearing  Respiratory: normal respiratory effort; clear to auscultation bilaterally  Cardiovascular: regular rate and rhythm; mild pitting edema from mid tibia through dorsum of left foot   Gastrointestinal: soft, non-tender, non-distended, and bowel sounds present; no organomegaly or masses   Psych: normal judgment and insight; normal mood and affect; recent and remote memory intact      ASSESSMENT/PLAN:                                                      (I10) Benign " essential hypertension  (primary encounter diagnosis)  Comment:    - borderline on losartan 50 mg daily.   - anticipate that the addition of oral steroids will further increase her blood pressure.   - benefits of dapsone and oral steroids are noted and should be continued if possible.  Plan:    - INCREASE losartan to 100 mg daily - new prescription sent to pharmacy.   - continue to monitor blood pressures.   - if blood pressures continue to be elevated, please contact M.D.   - may be able to decrease losartan back to 50 mg after completing dapsone and oral steroids.    (M79.89) Left leg swelling  Comment:    - etiology unclear - recent labs unrevealing.   - normal exam otherwise.  Plan:    - left lower extremity venous ultrasound ordered - patient to schedule.   - recommendations to follow.    The instructions on the AVS were discussed and explained to the patient. Patient expressed understanding of instructions.    (Chart documentation was completed, in part, with [employer]oter[employer] voice-recognition software. Even though reviewed, some grammatical, spelling, and word errors may remain.)    Sharon Hurtado MD   [address] [address]  T: [phone], F: [phone]

## 2017-10-05 NOTE — NURSING NOTE
"Chief Complaint   Patient presents with     Hypertension       Initial /78  Pulse 81  Temp 98.3  F (36.8  C) (Oral)  Ht 5' 3\" (1.6 m)  Wt 128 lb 8 oz (58.3 kg)  SpO2 98%  BMI 22.76 kg/m2 Estimated body mass index is 22.76 kg/(m^2) as calculated from the following:    Height as of this encounter: 5' 3\" (1.6 m).    Weight as of this encounter: 128 lb 8 oz (58.3 kg).  Medication Reconciliation: complete   Cristel Lacey MA   "

## 2017-10-05 NOTE — TELEPHONE ENCOUNTER
"Jamilah Rodriguez is a 86 year old female who calls with blood pressure issues.    NURSING ASSESSMENT:  Description:  Patient says she was prescribed dapsone for hives from dermatology. BP at clinic after 4 days of taking it was 150/80 something (looks like 150/84 per chart review). Says this is \"quite an increase from normal\" and is concerned.  Onset/duration:  Has not checked BP since last checked in clinic  Precip. factors:  New medication- dapsone  Associated symptoms:  Feels hyper in the afternoon. Says is supposed to start prednisone today but concerned it will affect BP so wants to see doctor first. Still itchy and uncomfortable in regards to her hives, wants to see new dermatologist. No trouble breathing besides \"maybe a little SOB\" but has COPD and does not feel it has worsened from her baseline. Denies chest pain. Was also prescribed Xanax but does not want to try that, does not really like taking medication or the idea of taking something that brings her up then something else to bring her down.  Last exam/Treatment:  10/3/17 in derm  Allergies:   Allergies   Allergen Reactions     Atenolol Hives     Beta Adrenergic Blockers      Brimonidine      Cephalexin Hives     Germall Plus Itching     Imidazolidinyl Urea found in many topical creams and house hold products     Latex      Itching     Morphine GI Disturbance     Penicillins Hives     Sulfa Drugs GI Disturbance     Tramadol Nausea and Vomiting     Tylenol GI Disturbance     Vicodin [Hydrocodone-Acetaminophen]      Fentanyl Nausea       MEDICATIONS:   Taking medication(s) as prescribed? Has not started prednisone yet. Says was supposed to start today but will check with Dr. Hurtado today.  Any medication side effects? Feels BP issues are from med  Any barriers to taking medication(s) as prescribed?  Concerns of SE    NURSING PLAN:patient already has appt. At 9:45 AM for this with Dr. Hurtado. Advised to keep appointment.    RECOMMENDED DISPOSITION:  See in " 24 hours - already has appointment for 9:45 AM this morning  Will comply with recommendation: Yes  If further questions/concerns or if symptoms do not improve, worsen or new symptoms develop, call your PCP or [facility] as soon as possible.      Guideline used:  Telephone Triage Protocols for Nurses, Fifth Edition, Mine Jorge RN

## 2017-10-05 NOTE — MR AVS SNAPSHOT
After Visit Summary   10/5/2017    Jamilah Rodriguez    MRN: [id number]           Patient Information     Date Of Birth          4/5/1931        Visit Information        Provider Department      10/5/2017 9:45 AM Sharon Hurtado MD [facility]        Today's Diagnoses     Left leg swelling    -  1    Benign essential hypertension          Care Instructions    Recommend continuing Dapsone and Prednisone as directed by dermatology.    Recommend increasing losartan to 100mg (from 50mg).    Once off of Dapsone and Prednisone, continue to monitor blood pressure to see if you can cope back down to losartan 50mg daily.    ---    Please schedule a left leg ultrasound on the way out.           Follow-ups after your visit        Your next 10 appointments already scheduled     Nov 02, 2017  8:45 AM CDT   PROCEDURE with Dixon Silva MD   [facility]    [address] [place] [address] [id number] [phone]              Future tests that were ordered for you today     Open Future Orders        Priority Expected Expires Ordered    US Lower Extremity Venous Duplex Left Routine  10/5/2018 10/5/2017            Who to contact     If you have questions or need follow up information about today's clinic visit or your schedule please contact [facility] directly at [phone].  Normal or non-critical lab and imaging results will be communicated to you by MyChart, letter or phone within 4 business days after the clinic has received the results. If you do not hear from us within 7 days, please contact the clinic through MyChart or phone. If you have a critical or abnormal lab result, we will notify you by phone as soon as possible.  Submit refill requests through [employer] or call your pharmacy and they will forward the refill request to us. Please allow 3 business days for your refill to be  "completed.          Additional Information About Your Visit        [employer] Information     [employer] lets you send messages to your doctor, view your test results, renew your prescriptions, schedule appointments and more. To sign up, go to [email or web address] . Click on \"Log in\" on the left side of the screen, which will take you to the Welcome page. Then click on \"Sign up Now\" on the right side of the page.     You will be asked to enter the access code listed below, as well as some personal information. Please follow the directions to create your username and password.     Your access code is: [email or web address]  Expires: 2017 12:45 PM     Your access code will  in 90 days. If you need help or a new code, please call your [facility] clinic or [phone].        Care EveryWhere ID     This is your Care EveryWhere ID. This could be used by other organizations to access your [facility] medical records  [id number]        Your Vitals Were     Pulse Temperature Height Pulse Oximetry BMI (Body Mass Index)       81 98.3  F (36.8  C) (Oral) 5' 3\" (1.6 m) 98% 22.76 kg/m2        Blood Pressure from Last 3 Encounters:   10/05/17 142/78   10/03/17 151/81   17 124/80    Weight from Last 3 Encounters:   10/05/17 128 lb 8 oz (58.3 kg)   17 130 lb (59 kg)   17 131 lb (59.4 kg)                 Today's Medication Changes          These changes are accurate as of: 10/5/17 10:12 AM.  If you have any questions, ask your nurse or doctor.               These medicines have changed or have updated prescriptions.        Dose/Directions    * losartan 50 MG tablet   Commonly known as:  COZAAR   This may have changed:  Another medication with the same name was added. Make sure you understand how and when to take each.   Used for:  Benign essential hypertension   Changed by:  Bhupendra Chong MD        Dose:  50 mg   Take 1 tablet (50 mg) by mouth daily   Quantity:  90 tablet   Refills:  1       * losartan 100 " MG tablet   Commonly known as:  COZAAR   This may have changed:  You were already taking a medication with the same name, and this prescription was added. Make sure you understand how and when to take each.   Used for:  Benign essential hypertension   Changed by:  Sharon Hurtado MD        Dose:  100 mg   Take 1 tablet (100 mg) by mouth daily   Quantity:  30 tablet   Refills:  0       * Notice:  This list has 2 medication(s) that are the same as other medications prescribed for you. Read the directions carefully, and ask your doctor or other care provider to review them with you.         Where to get your medicines      These medications were sent to [facility] [id number] [place] [address] [address] AT [facility]  [address], [facility] [address]     Phone:  [phone]     losartan 100 MG tablet                Primary Care Provider Office Phone # Fax #    Bhupendra Chong [phone]       [address] [facility] [address]        Equal Access to Services     JEANNETTE FORREST : Hadii aad ku hadasho Soomaali, waaxda luqadaha, qaybta kaalmada adeegyada, waxay idiin hayginon frieda drake . So [facility] [phone].    ATENCIÓN: Si habla español, tiene a smith disposición servicios gratuitos de asistencia lingüística. Llame al [phone].    We comply with applicable federal civil rights laws and [place] laws. We do not discriminate on the basis of race, color, national origin, age, disability, sex, sexual orientation, or gender identity.            Thank you!     Thank you for choosing [facility]  for your care. Our goal is always to provide you with excellent care. Hearing back from our patients is one way we can continue to improve our services. Please take a few minutes to complete the written survey that you may receive in the mail after your visit with us. Thank you!             Your Updated Medication  List - Protect others around you: Learn how to safely use, store and throw away your medicines at www.disposemymeds.org.          This list is accurate as of: 10/5/17 10:12 AM.  Always use your most recent med list.                   Brand Name Dispense Instructions for use Diagnosis    albuterol 108 (90 BASE) MCG/ACT Inhaler    PROAIR HFA    1 Inhaler    Inhale 2 puffs into the lungs 4 times daily as needed for shortness of breath / dyspnea or wheezing    COPD exacerbation (H)       ALPRAZolam 0.25 MG tablet    XANAX    10 tablet    1 tablet every 8 as needed for anxiety    Anxiety       ascorbic acid 500 MG tablet    VITAMIN C    100 tablet    Take 1 tablet by mouth daily.        bimatoprost 0.01 % Soln    LUMIGAN     Place 1 drop into both eyes At Bedtime.        calcium + D 600-200 MG-UNIT Tabs   Generic drug:  calcium carbonate-vitamin D     100 tablet    Take 1 tablet by mouth every 12 hours.        carboxymethylcellulose 0.5 % Soln ophthalmic solution    REFRESH PLUS     Place 1 drop into both eyes every 2 hours as needed.        dapsone 25 MG tablet     60 tablet    1 tab PO daily    Urticaria       dorzolamide 2 % ophthalmic solution    TRUSOPT     Place 1 drop into both eyes 2 times daily        FLAX SEEDS PO           fluocinonide 0.05 % solution    LIDEX    60 mL    Apply to scalp BID PRN    Scalp itch       GLUCOSAMINE SULFATE PO      Take 500 mg by mouth daily        hydrOXYzine 25 MG tablet    ATARAX    20 tablet    1-2 tabs PO QHS for itching    Chronic dermatitis       * losartan 50 MG tablet    COZAAR    90 tablet    Take 1 tablet (50 mg) by mouth daily    Benign essential hypertension       * losartan 100 MG tablet    COZAAR    30 tablet    Take 1 tablet (100 mg) by mouth daily    Benign essential hypertension       predniSONE 10 MG tablet    DELTASONE    60 tablet    3 tabs x 10 days then 2 tabs x 10 days then 1 tab x 10 days    Bullous pemphigoid       triamcinolone 0.1 % cream    KENALOG    454  g    Apply to AA QD-BID x 1-2 weeks then PRN Only    Acute dermatitis       vitamin D 2000 UNITS tablet      Take 1 tablet by mouth daily.    Osteoporosis       * Notice:  This list has 2 medication(s) that are the same as other medications prescribed for you. Read the directions carefully, and ask your doctor or other care provider to review them with you.

## 2017-10-05 NOTE — PATIENT INSTRUCTIONS
Recommend continuing Dapsone and Prednisone as directed by dermatology.    Recommend increasing losartan to 100mg (from 50mg).    Once off of Dapsone and Prednisone, continue to monitor blood pressure to see if you can cope back down to losartan 50mg daily.    ---    Please schedule a left leg ultrasound on the way out.

## 2017-10-06 ENCOUNTER — RADIANT APPOINTMENT (OUTPATIENT)
Dept: ULTRASOUND IMAGING | Facility: CLINIC | Age: 82
End: 2017-10-06
Attending: INTERNAL MEDICINE
Payer: MEDICARE

## 2017-10-06 DIAGNOSIS — M79.89 LEFT LEG SWELLING: ICD-10-CM

## 2017-10-06 PROCEDURE — 93971 EXTREMITY STUDY: CPT | Mod: LT

## 2017-10-18 ENCOUNTER — ALLIED HEALTH/NURSE VISIT (OUTPATIENT)
Dept: INTERNAL MEDICINE | Facility: CLINIC | Age: 82
End: 2017-10-18
Payer: MEDICARE

## 2017-10-18 VITALS — SYSTOLIC BLOOD PRESSURE: 138 MMHG | DIASTOLIC BLOOD PRESSURE: 70 MMHG

## 2017-10-18 DIAGNOSIS — I10 BENIGN ESSENTIAL HYPERTENSION: Primary | ICD-10-CM

## 2017-10-18 PROCEDURE — 99207 ZZC NO CHARGE NURSE ONLY: CPT | Performed by: INTERNAL MEDICINE

## 2017-10-18 NOTE — MR AVS SNAPSHOT
After Visit Summary   10/18/2017    Jamilah Rodriguez    MRN: [id number]           Patient Information     Date Of Birth          4/5/1931        Visit Information        Provider Department      10/18/2017 10:23 AM Bhupendra Chong MD [facility]        Today's Diagnoses     Benign essential hypertension    -  1       Follow-ups after your visit        Your next 10 appointments already scheduled     Nov 02, 2017  8:45 AM CDT   PROCEDURE with Dixon Silva MD   [facility] ([facility]    [address] [address] [phone]              Who to contact     If you have questions or need follow up information about today's clinic visit or your schedule please contact [facility] directly at [phone].  Normal or non-critical lab and imaging results will be communicated to you by MyChart, letter or phone within 4 business days after the clinic has received the results. If you do not hear from us within 7 days, please contact the clinic through MyChart or phone. If you have a critical or abnormal lab result, we will notify you by phone as soon as possible.  Submit refill requests through [employer] or call your pharmacy and they will forward the refill request to us. Please allow 3 business days for your refill to be completed.          Additional Information About Your Visit        MyChart Information     [employer] gives you secure access to your electronic health record. If you see a primary care provider, you can also send messages to your care team and make appointments. If you have questions, please call your primary care clinic.  If you do not have a primary care provider, please call [phone] and they will assist you.        Care EveryWhere ID     This is your Care EveryWhere ID. This could be used by other organizations to access your [facility]  records  [id number]         Blood Pressure from Last 3 Encounters:   10/18/17 138/70   10/05/17 142/78   10/03/17 151/81    Weight from Last 3 Encounters:   10/05/17 128 lb 8 oz (58.3 kg)   06/08/17 130 lb (59 kg)   05/31/17 131 lb (59.4 kg)              Today, you had the following     No orders found for display       Primary Care Provider Office Phone # Fax #    Bhupendra Chong [phone]       [address] [facility] [address]        Equal Access to Services     [facility]: Hadii aad ku hadasho Soomaali, waaxda luqadaha, qaybta kaalmada adeegyada, kimberly collins hayshamika drake . So [facility] [phone].    ATENCIÓN: Si habla español, tiene a smith disposición servicios gratuitos de asistencia lingüística. [facility] [phone].    We comply with applicable federal civil rights laws and [place] laws. We do not discriminate on the basis of race, color, national origin, age, disability, sex, sexual orientation, or gender identity.            Thank you!     Thank you for choosing [facility]  for your care. Our goal is always to provide you with excellent care. Hearing back from our patients is one way we can continue to improve our services. Please take a few minutes to complete the written survey that you may receive in the mail after your visit with us. Thank you!             Your Updated Medication List - Protect others around you: Learn how to safely use, store and throw away your medicines at www.disposemymeds.org.          This list is accurate as of: 10/18/17 10:25 AM.  Always use your most recent med list.                   Brand Name Dispense Instructions for use Diagnosis    albuterol 108 (90 BASE) MCG/ACT Inhaler    PROAIR HFA    1 Inhaler    Inhale 2 puffs into the lungs 4 times daily as needed for shortness of breath / dyspnea or wheezing    COPD exacerbation (H)       ALPRAZolam 0.25 MG tablet    XANAX    10 tablet    1 tablet every 8 as  needed for anxiety    Anxiety       ascorbic acid 500 MG tablet    VITAMIN C    100 tablet    Take 1 tablet by mouth daily.        bimatoprost 0.01 % Soln    LUMIGAN     Place 1 drop into both eyes At Bedtime.        calcium + D 600-200 MG-UNIT Tabs   Generic drug:  calcium carbonate-vitamin D     100 tablet    Take 1 tablet by mouth every 12 hours.        carboxymethylcellulose 0.5 % Soln ophthalmic solution    REFRESH PLUS     Place 1 drop into both eyes every 2 hours as needed.        dapsone 25 MG tablet     60 tablet    1 tab PO daily    Urticaria       dorzolamide 2 % ophthalmic solution    TRUSOPT     Place 1 drop into both eyes 2 times daily        FLAX SEEDS PO           fluocinonide 0.05 % solution    LIDEX    60 mL    Apply to scalp BID PRN    Scalp itch       GLUCOSAMINE SULFATE PO      Take 500 mg by mouth daily        hydrOXYzine 25 MG tablet    ATARAX    20 tablet    1-2 tabs PO QHS for itching    Chronic dermatitis       * losartan 50 MG tablet    COZAAR    90 tablet    Take 1 tablet (50 mg) by mouth daily    Benign essential hypertension       * losartan 100 MG tablet    COZAAR    90 tablet    Take 1 tablet (100 mg) by mouth daily    Benign essential hypertension       predniSONE 10 MG tablet    DELTASONE    60 tablet    3 tabs x 10 days then 2 tabs x 10 days then 1 tab x 10 days    Bullous pemphigoid       triamcinolone 0.1 % cream    KENALOG    454 g    Apply to AA QD-BID x 1-2 weeks then PRN Only    Acute dermatitis       vitamin D 2000 UNITS tablet      Take 1 tablet by mouth daily.    Osteoporosis       * Notice:  This list has 2 medication(s) that are the same as other medications prescribed for you. Read the directions carefully, and ask your doctor or other care provider to review them with you.

## 2017-10-18 NOTE — NURSING NOTE
Jamilah Rodriguez is enrolled/participating in the retail pharmacy Blood Pressure Goals Achievement Program (BPGAP).  Jamilah Rodriguez was evaluated at [facility] on October 18, 2017 at which time her blood pressure was:    BP Readings from Last 3 Encounters:   10/18/17 138/70   10/05/17 142/78   10/03/17 151/81     Reviewed lifestyle modifications for blood pressure control and reduction: including making healthy food choices, managing weight, getting regular exercise, smoking cessation, reducing alcohol consumption, monitoring blood pressure regularly.     Jamilah Rodriguez is not experiencing symptoms.    Follow-Up: BP is at goal of < 140/90mmHg (patient 18+ years of age with or without diabetes).  Recommended follow-up at pharmacy in 6 months.     Recommendation to Provider: none    Jamilah Rodriguez was evaluated for enrollment into the PGEN study today.    Patient eligible for enrollment:  Unknown  Patient interested in enrollment:  Unknown    Completed by:  Phuong Nava RPh   [facility]   [phone]

## 2017-11-02 ENCOUNTER — TELEPHONE (OUTPATIENT)
Dept: DERMATOLOGY | Facility: CLINIC | Age: 82
End: 2017-11-02

## 2017-11-02 ENCOUNTER — TELEPHONE (OUTPATIENT)
Dept: INTERNAL MEDICINE | Facility: CLINIC | Age: 82
End: 2017-11-02

## 2017-11-02 ENCOUNTER — OFFICE VISIT (OUTPATIENT)
Dept: DERMATOLOGY | Facility: CLINIC | Age: 82
End: 2017-11-02
Payer: MEDICARE

## 2017-11-02 VITALS — DIASTOLIC BLOOD PRESSURE: 61 MMHG | SYSTOLIC BLOOD PRESSURE: 106 MMHG | HEART RATE: 94 BPM | OXYGEN SATURATION: 96 %

## 2017-11-02 DIAGNOSIS — L50.9 URTICARIA: ICD-10-CM

## 2017-11-02 DIAGNOSIS — I10 BENIGN ESSENTIAL HYPERTENSION: ICD-10-CM

## 2017-11-02 DIAGNOSIS — L50.9 URTICARIA: Primary | ICD-10-CM

## 2017-11-02 LAB
ALBUMIN SERPL-MCNC: 3.5 G/DL (ref 3.4–5)
ALP SERPL-CCNC: 92 U/L (ref 40–150)
ALT SERPL W P-5'-P-CCNC: 24 U/L (ref 0–50)
ANION GAP SERPL CALCULATED.3IONS-SCNC: 9 MMOL/L (ref 3–14)
AST SERPL W P-5'-P-CCNC: 16 U/L (ref 0–45)
BILIRUB SERPL-MCNC: 1 MG/DL (ref 0.2–1.3)
BUN SERPL-MCNC: 19 MG/DL (ref 7–30)
CALCIUM SERPL-MCNC: 9.2 MG/DL (ref 8.5–10.1)
CHLORIDE SERPL-SCNC: 99 MMOL/L (ref 94–109)
CO2 SERPL-SCNC: 24 MMOL/L (ref 20–32)
CREAT SERPL-MCNC: 0.92 MG/DL (ref 0.52–1.04)
ERYTHROCYTE [DISTWIDTH] IN BLOOD BY AUTOMATED COUNT: 13.4 % (ref 10–15)
GFR SERPL CREATININE-BSD FRML MDRD: 57 ML/MIN/1.7M2
GLUCOSE SERPL-MCNC: 120 MG/DL (ref 70–99)
HCT VFR BLD AUTO: 35.8 % (ref 35–47)
HGB BLD-MCNC: 11.4 G/DL (ref 11.7–15.7)
MCH RBC QN AUTO: 30.9 PG (ref 26.5–33)
MCHC RBC AUTO-ENTMCNC: 31.8 G/DL (ref 31.5–36.5)
MCV RBC AUTO: 97 FL (ref 78–100)
PLATELET # BLD AUTO: 239 10E9/L (ref 150–450)
POTASSIUM SERPL-SCNC: 4.5 MMOL/L (ref 3.4–5.3)
PROT SERPL-MCNC: 6.8 G/DL (ref 6.8–8.8)
RBC # BLD AUTO: 3.69 10E12/L (ref 3.8–5.2)
SODIUM SERPL-SCNC: 132 MMOL/L (ref 133–144)
WBC # BLD AUTO: 9 10E9/L (ref 4–11)

## 2017-11-02 PROCEDURE — 36415 COLL VENOUS BLD VENIPUNCTURE: CPT | Performed by: DERMATOLOGY

## 2017-11-02 PROCEDURE — 80053 COMPREHEN METABOLIC PANEL: CPT | Performed by: DERMATOLOGY

## 2017-11-02 PROCEDURE — 85027 COMPLETE CBC AUTOMATED: CPT | Performed by: DERMATOLOGY

## 2017-11-02 PROCEDURE — 99213 OFFICE O/P EST LOW 20 MIN: CPT | Mod: 25 | Performed by: DERMATOLOGY

## 2017-11-02 PROCEDURE — 11100 HC BIOPSY SKIN/SUBQ/MUC MEM, SINGLE LESION: CPT | Performed by: DERMATOLOGY

## 2017-11-02 RX ORDER — LOSARTAN POTASSIUM 50 MG/1
50 TABLET ORAL DAILY
Qty: 90 TABLET | Refills: 1 | Status: SHIPPED | OUTPATIENT
Start: 2017-11-02 | End: 2018-05-20

## 2017-11-02 RX ORDER — FOLIC ACID 1 MG/1
1 TABLET ORAL DAILY
Qty: 30 TABLET | Refills: 3 | Status: SHIPPED | OUTPATIENT
Start: 2017-11-02 | End: 2017-11-03

## 2017-11-02 NOTE — NURSING NOTE
"Chief Complaint   Patient presents with     Hives     around neck line and med check        Initial There were no vitals taken for this visit. Estimated body mass index is 22.76 kg/(m^2) as calculated from the following:    Height as of 10/5/17: 1.6 m (5' 3\").    Weight as of 10/5/17: 58.3 kg (128 lb 8 oz).  Medication Reconciliation: complete  "

## 2017-11-02 NOTE — PROGRESS NOTES
Jamilah Rodriguez is a 86 year old year old female patient here today for f/u urticaria v urticarial BP.  No DIF.   . She was placed on pred taper which didn't help much, placed on dapsone which has not helped.  She notes itching and blistering on chest.  Associated symptoms: none.  Patient has no other skin complaints today.  Remainder of the HPI, Meds, PMH, Allergies, FH, and SH was reviewed in chart.      Past Medical History:   Diagnosis Date     AAA (abdominal aortic aneurysm) (H)     Followed with yearly ultrasound     Cervical cancer (H) 1969     Chronic dermatitis     extensive allergy testing revealed allergy/sensitivity to carba mix ( rubber) and Imidazolidinyl Urea (common in beauty products)     Glaucoma      Glaucoma      History of COPD     very mild abnormal spirometry in 2007 (in [place]), has not been active since quitting smoking in 2009     HTN (hypertension)      Macular degeneration      Osteoporosis 8/3/11    femoral T score -3.4 & -3.7     PAD (peripheral artery disease) (H)      Vertebral compression fracture (H) 5/17/11    L1 compression fracture, presumed osteoporotic compression fracture       Past Surgical History:   Procedure Laterality Date     CATARACT IOL, RT/LT  2/8/12    left eye cataract removal     HYSTERECTOMY TOTAL ABDOMINAL  1999    Fibroids     OPEN REDUCTION INTERNAL FIXATION HIP NAILING  7/2/2012    Procedure: OPEN REDUCTION INTERNAL FIXATION HIP NAILING;  Intramedullary Fixation Right Intertrochanteric Hip Fracture;  Surgeon: Chalino Covarrubias MD;  Location:  OR     OPEN REDUCTION INTERNAL FIXATION TIBIAL PLATEAU  3/31/2013    Procedure: OPEN REDUCTION INTERNAL FIXATION TIBIAL PLATEAU;  RIGHT LATERAL TIBIAL PLATEAU FRACTURE - SYNTHES Medial Meniscus Repair, Lateral Compartment Release;  Surgeon: Alfred Cardenas MD;  Location:  OR     OPEN REDUCTION INTERNAL FIXATION WRIST  1988    left wrist ORIF, hardware removed onemonth later        Family History   Problem Relation Age  of Onset     Breast Cancer Other      Circulatory Daughter 53     mengioma     C.A.D. Father      DIABETES Sister      Breast Cancer Maternal Aunt 60       Social History     Social History     Marital status:      Spouse name: N/A     Number of children: 2     Years of education: N/A     Occupational History     Not on file.     Social History Main Topics     Smoking status: Former Smoker     Quit date: 7/1/2009     Smokeless tobacco: Never Used      Comment: former 1/2-1 ppd since age [age]     Alcohol use No     Drug use: No     Sexual activity: No     Other Topics Concern     Not on file     Social History Narrative       Outpatient Encounter Prescriptions as of 11/2/2017   Medication Sig Dispense Refill     losartan (COZAAR) 100 MG tablet Take 1 tablet (100 mg) by mouth daily 90 tablet 0     predniSONE (DELTASONE) 10 MG tablet 3 tabs x 10 days then 2 tabs x 10 days then 1 tab x 10 days 60 tablet 0     dapsone 25 MG tablet 1 tab PO daily 60 tablet 1     hydrOXYzine (ATARAX) 25 MG tablet 1-2 tabs PO QHS for itching 20 tablet 1     fluocinonide (LIDEX) 0.05 % solution Apply to scalp BID PRN 60 mL 3     triamcinolone (KENALOG) 0.1 % cream Apply to AA QD-BID x 1-2 weeks then PRN Only 454 g 11     losartan (COZAAR) 50 MG tablet Take 1 tablet (50 mg) by mouth daily 90 tablet 1     albuterol (ALBUTEROL) 108 (90 BASE) MCG/ACT Inhaler Inhale 2 puffs into the lungs 4 times daily as needed for shortness of breath / dyspnea or wheezing 1 Inhaler 6     GLUCOSAMINE SULFATE PO Take 500 mg by mouth daily       Flaxseed, Linseed, (FLAX SEEDS PO)        dorzolamide (TRUSOPT) 2 % ophthalmic solution Place 1 drop into both eyes 2 times daily       Cholecalciferol (VITAMIN D) 2000 UNITS tablet Take 1 tablet by mouth daily.       bimatoprost (LUMIGAN) 0.01 % SOLN Place 1 drop into both eyes At Bedtime.         carboxymethylcellulose (REFRESH PLUS) 0.5 % SOLN Place 1 drop into both eyes every 2 hours as needed.         Calcium  Carbonate-Vitamin D (CALCIUM + D) 600-200 MG-UNIT per tablet Take 1 tablet by mouth every 12 hours. 100 tablet 12     ascorbic acid (VITAMIN C) 500 MG tablet Take 1 tablet by mouth daily. 100 tablet 12     ALPRAZolam (XANAX) 0.25 MG tablet 1 tablet every 8 as needed for anxiety (Patient not taking: Reported on 11/2/2017) 10 tablet 0     No facility-administered encounter medications on file as of 11/2/2017.              Review Of Systems  Skin: As above  Eyes: negative  Ears/Nose/Throat: negative  Respiratory: No shortness of breath, dyspnea on exertion, cough, or hemoptysis  Cardiovascular: negative  Gastrointestinal: negative  Genitourinary: negative  Musculoskeletal: negative  Neurologic: negative  Psychiatric: negative  Hematologic/Lymphatic/Immunologic: negative  Endocrine: negative      O:   NAD, WDWN, Alert & Oriented, Mood & Affect wnl, Vitals stable   Here today alone   /61 (BP Location: Right arm, Cuff Size: Adult Regular)  Pulse 94  SpO2 96%   General appearance normal   Vitals stable   Alert, oriented and in no acute distress     Urticarial plaques on trunka nd ext with excoriations        The remainder of expanded problem focused exam was unremarkable; the following areas were examined:  scalp/hair, conjunctiva/lids, face, neck, lips, ab, , chest, digits/nails, RUE, LUE.      Eyes: Conjunctivae/lids:Normal     ENT: Lips, buccal mucosa, tongue: normal    MSK:Normal    Cardiovascular: peripheral edema none    Pulm: Breathing Normal    Lymph Nodes: No Head and Neck Lymphadenopathy     Neuro/Psych: Orientation:Normal; Mood/Affect:Normal      A/P:  1/.urticaria v bp  TANGENTIAL BIOPSY SENT OUT:  After consent, anesthesia with LEC and prep, tangential excision performed and specimen sent out for permanent section histology.  No complications and routine wound care. Patient told to call our office in 1-2 weeks for result.      DIF today L arm   MTX discussed with patient   Stop dapsone  Methotrexate is  a medication used in low doses to treat inflammatory skin conditions such as psoriasis and eczema/dermatitis. It is also prescribed for rheumatoid arthritis, psoriatic arthritis, and increasingly, other inflammatory and autoimmune disorders (off-label). In much higher doses, it is used as a chemotherapy agent for leukemia and some other forms of cancer.    For responding skin diseases, methotrexate usually shows some benefit within 6 to 8 weeks. Maximum effects are generally achieved within 5 to 6 months, depending on dose escalation.    Side effects of methotrexate  Side effects can occur at any time during treatment with methotrexate, but are most common in the first few weeks. Folic acid supplements, is thought to reduce some of the side effects of methotrexate.     If the side effects described below or other problems trouble you, or should you develop any signs of infection or unusual bleeding, notify your doctor promptly and before your next dose of methotrexate is due.      The most common side effects of methotrexate are loss of appetite, nausea and diarrhea, and affect about one in 12 patients. These side effects are usually temporary, but changes in dose and/or supplemental folic acid tablets may be helpful.    An overdose of methotrexate or deficiency of the vitamin folic acid may result in anemia , reduced white cell count, risking serious infections, and low platelet count, resulting in bruising and bleeding.    Methotrexate is stored by the liver. Transaminase liver enzyme levels may rise for a few days after treatment but they quickly return to normal.     Long term therapy may be associated with scarring (fibrosis or cirrhosis) of the liver. This is more commonly due to other reasons such as fatty liver, diabetes, hyperlipidemia, and obesity (ie metabolic syndrome), but can also develop from viral hepatitis and alcohol.    Methotrexate can rarely cause a lung reaction similar to pneumonia called  acute pneumonitis or interstitial pneumonia.      Today  -  Some baseline laboratory tests will be checked  - A prescription for folic acid will be sent to pharmacy for you to start.  (This is a vitamin that can help reduce the side effects of methotrexate)  -  A test dose of the medication to the pharmacy   -  Take all of the pills in the methotrexate prescription on the same day  -  Recheck your labs at any [facility] 7 days from the date you take the test dose  -  Once your blood work is complete, our office we will call and let you know if you can continue methotrexate     Return to clinic 4 weeks

## 2017-11-02 NOTE — PATIENT INSTRUCTIONS
Stop both meds and start new med and vitamin. Labs today and 1 week after starting new med. Follow up in 4 weeks.        Wound Care Instructions     FOR SUPERFICIAL WOUNDS     [facility] [phone]    [facility] [phone]                       AFTER 24 HOURS YOU SHOULD REMOVE THE BANDAGE AND BEGIN DAILY DRESSING CHANGES AS FOLLOWS:     1) Remove Dressing.     2) Clean and dry the area with tap water using a Q-tip or sterile gauze pad.     3) Apply Vaseline, Aquaphor, Polysporin ointment or Bacitracin ointment over entire wound.  Do NOT use Neosporin ointment.     4) Cover the wound with a band-aid, or a sterile non-stick gauze pad and micropore paper tape      REPEAT THESE INSTRUCTIONS AT LEAST ONCE A DAY UNTIL THE WOUND HAS COMPLETELY HEALED.    It is an old wives tale that a wound heals better when it is exposed to air and allowed to dry out. The wound will heal faster with a better cosmetic result if it is kept moist with ointment and covered with a bandage.    **Do not let the wound dry out.**      Supplies Needed:      *Cotton tipped applicators (Q-tips)    *Polysporin Ointment or Bacitracin Ointment (NOT NEOSPORIN)    *Band-aids or non-stick gauze pads and micropore paper tape.      PATIENT INFORMATION:    During the healing process you will notice a number of changes. All wounds develop a small halo of redness surrounding the wound.  This means healing is occurring. Severe itching with extensive redness usually indicates sensitivity to the ointment or bandage tape used to dress the wound.  You should call our office if this develops.      Swelling  and/or discoloration around your surgical site is common, particularly when performed around the eye.    All wounds normally drain.  The larger the wound the more drainage there will be.  After 7-10 days, you will notice the wound beginning to shrink and new skin will begin to grow.  The wound is healed when you can see skin has  formed over the entire area.  A healed wound has a healthy, shiny look to the surface and is red to dark pink in color to normalize.  Wounds may take approximately 4-6 weeks to heal.  Larger wounds may take 6-8 weeks.  After the wound is healed you may discontinue dressing changes.    You may experience a sensation of tightness as your wound heals. This is normal and will gradually subside.    Your healed wound may be sensitive to temperature changes. This sensitivity improves with time, but if you re having a lot of discomfort, try to avoid temperature extremes.    Patients frequently experience itching after their wound appears to have healed because of the continue healing under the skin.  Plain Vaseline will help relieve the itching.        POSSIBLE COMPLICATIONS    BLEEDIN. Leave the bandage in place.  2. Use tightly rolled up gauze or a cloth to apply direct pressure over the bandage for 30  minutes.  3. Reapply pressure for an additional 30 minutes if necessary  4. Use additional gauze and tape to maintain pressure once the bleeding has stopped.

## 2017-11-02 NOTE — LETTER
11/2/2017         RE: Jamilah Rodriguez  [address]  [address]  [place] [facility] [place] [facility] [address]        Dear Colleague,    Thank you for referring your patient, Jamilah Rodriguez, to the [facility]. Please see a copy of my visit note below.    Jamilah Rodriguez is a 86 year old year old female patient here today for f/u urticaria v urticarial BP.  No DIF.   . She was placed on pred taper which didn't help much, placed on dapsone which has not helped.  She notes itching and blistering on chest.  Associated symptoms: none.  Patient has no other skin complaints today.  Remainder of the HPI, Meds, PMH, Allergies, FH, and SH was reviewed in chart.      Past Medical History:   Diagnosis Date     AAA (abdominal aortic aneurysm) (H)     Followed with yearly ultrasound     Cervical cancer (H) 1969     Chronic dermatitis     extensive allergy testing revealed allergy/sensitivity to carba mix ( rubber) and Imidazolidinyl Urea (common in beauty products)     Glaucoma      Glaucoma      History of COPD     very mild abnormal spirometry in 2007 (in [place]), has not been active since quitting smoking in 2009     HTN (hypertension)      Macular degeneration      Osteoporosis 8/3/11    femoral T score -3.4 & -3.7     PAD (peripheral artery disease) (H)      Vertebral compression fracture (H) 5/17/11    L1 compression fracture, presumed osteoporotic compression fracture       Past Surgical History:   Procedure Laterality Date     CATARACT IOL, RT/LT  2/8/12    left eye cataract removal     HYSTERECTOMY TOTAL ABDOMINAL  1999    Fibroids     OPEN REDUCTION INTERNAL FIXATION HIP NAILING  7/2/2012    Procedure: OPEN REDUCTION INTERNAL FIXATION HIP NAILING;  Intramedullary Fixation Right Intertrochanteric Hip Fracture;  Surgeon: Chalino Covarrubias MD;  Location:  OR     OPEN REDUCTION INTERNAL FIXATION TIBIAL PLATEAU  3/31/2013    Procedure: OPEN REDUCTION INTERNAL FIXATION TIBIAL PLATEAU;  RIGHT LATERAL TIBIAL  PLATEAU FRACTURE - SYNTHES Medial Meniscus Repair, Lateral Compartment Release;  Surgeon: Alfred Cardenas MD;  Location: [facility] OR     OPEN REDUCTION INTERNAL FIXATION WRIST  1988    left wrist ORIF, hardware removed onemonth later        Family History   Problem Relation Age of Onset     Breast Cancer Other      Circulatory Daughter 53     mengioma     C.A.D. Father      DIABETES Sister      Breast Cancer Maternal Aunt 60       Social History     Social History     Marital status:      Spouse name: N/A     Number of children: 2     Years of education: N/A     Occupational History     Not on file.     Social History Main Topics     Smoking status: Former Smoker     Quit date: 7/1/2009     Smokeless tobacco: Never Used      Comment: former 1/2-1 ppd since age [age]     Alcohol use No     Drug use: No     Sexual activity: No     Other Topics Concern     Not on file     Social History Narrative       Outpatient Encounter Prescriptions as of 11/2/2017   Medication Sig Dispense Refill     losartan (COZAAR) 100 MG tablet Take 1 tablet (100 mg) by mouth daily 90 tablet 0     predniSONE (DELTASONE) 10 MG tablet 3 tabs x 10 days then 2 tabs x 10 days then 1 tab x 10 days 60 tablet 0     dapsone 25 MG tablet 1 tab PO daily 60 tablet 1     hydrOXYzine (ATARAX) 25 MG tablet 1-2 tabs PO QHS for itching 20 tablet 1     fluocinonide (LIDEX) 0.05 % solution Apply to scalp BID PRN 60 mL 3     triamcinolone (KENALOG) 0.1 % cream Apply to AA QD-BID x 1-2 weeks then PRN Only 454 g 11     losartan (COZAAR) 50 MG tablet Take 1 tablet (50 mg) by mouth daily 90 tablet 1     albuterol (ALBUTEROL) 108 (90 BASE) MCG/ACT Inhaler Inhale 2 puffs into the lungs 4 times daily as needed for shortness of breath / dyspnea or wheezing 1 Inhaler 6     GLUCOSAMINE SULFATE PO Take 500 mg by mouth daily       Flaxseed, Linseed, (FLAX SEEDS PO)        dorzolamide (TRUSOPT) 2 % ophthalmic solution Place 1 drop into both eyes 2 times daily        Cholecalciferol (VITAMIN D) 2000 UNITS tablet Take 1 tablet by mouth daily.       bimatoprost (LUMIGAN) 0.01 % SOLN Place 1 drop into both eyes At Bedtime.         carboxymethylcellulose (REFRESH PLUS) 0.5 % SOLN Place 1 drop into both eyes every 2 hours as needed.         Calcium Carbonate-Vitamin D (CALCIUM + D) 600-200 MG-UNIT per tablet Take 1 tablet by mouth every 12 hours. 100 tablet 12     ascorbic acid (VITAMIN C) 500 MG tablet Take 1 tablet by mouth daily. 100 tablet 12     ALPRAZolam (XANAX) 0.25 MG tablet 1 tablet every 8 as needed for anxiety (Patient not taking: Reported on 11/2/2017) 10 tablet 0     No facility-administered encounter medications on file as of 11/2/2017.              Review Of Systems  Skin: As above  Eyes: negative  Ears/Nose/Throat: negative  Respiratory: No shortness of breath, dyspnea on exertion, cough, or hemoptysis  Cardiovascular: negative  Gastrointestinal: negative  Genitourinary: negative  Musculoskeletal: negative  Neurologic: negative  Psychiatric: negative  Hematologic/Lymphatic/Immunologic: negative  Endocrine: negative      O:   NAD, WDWN, Alert & Oriented, Mood & Affect wnl, Vitals stable   Here today alone   /61 (BP Location: Right arm, Cuff Size: Adult Regular)  Pulse 94  SpO2 96%   General appearance normal   Vitals stable   Alert, oriented and in no acute distress     Urticarial plaques on trunka nd ext with excoriations        The remainder of expanded problem focused exam was unremarkable; the following areas were examined:  scalp/hair, conjunctiva/lids, face, neck, lips, ab, , chest, digits/nails, RUE, LUE.      Eyes: Conjunctivae/lids:Normal     ENT: Lips, buccal mucosa, tongue: normal    MSK:Normal    Cardiovascular: peripheral edema none    Pulm: Breathing Normal    Lymph Nodes: No Head and Neck Lymphadenopathy     Neuro/Psych: Orientation:Normal; Mood/Affect:Normal      A/P:  1/.urticaria v bp  TANGENTIAL BIOPSY SENT OUT:  After consent, anesthesia  with LEC and prep, tangential excision performed and specimen sent out for permanent section histology.  No complications and routine wound care. Patient told to call our office in 1-2 weeks for result.      DIF today L arm   MTX discussed with patient   Stop dapsone  Methotrexate is a medication used in low doses to treat inflammatory skin conditions such as psoriasis and eczema/dermatitis. It is also prescribed for rheumatoid arthritis, psoriatic arthritis, and increasingly, other inflammatory and autoimmune disorders (off-label). In much higher doses, it is used as a chemotherapy agent for leukemia and some other forms of cancer.    For responding skin diseases, methotrexate usually shows some benefit within 6 to 8 weeks. Maximum effects are generally achieved within 5 to 6 months, depending on dose escalation.    Side effects of methotrexate  Side effects can occur at any time during treatment with methotrexate, but are most common in the first few weeks. Folic acid supplements, is thought to reduce some of the side effects of methotrexate.     If the side effects described below or other problems trouble you, or should you develop any signs of infection or unusual bleeding, notify your doctor promptly and before your next dose of methotrexate is due.      The most common side effects of methotrexate are loss of appetite, nausea and diarrhea, and affect about one in 12 patients. These side effects are usually temporary, but changes in dose and/or supplemental folic acid tablets may be helpful.    An overdose of methotrexate or deficiency of the vitamin folic acid may result in anemia , reduced white cell count, risking serious infections, and low platelet count, resulting in bruising and bleeding.    Methotrexate is stored by the liver. Transaminase liver enzyme levels may rise for a few days after treatment but they quickly return to normal.     Long term therapy may be associated with scarring (fibrosis or  cirrhosis) of the liver. This is more commonly due to other reasons such as fatty liver, diabetes, hyperlipidemia, and obesity (ie metabolic syndrome), but can also develop from viral hepatitis and alcohol.    Methotrexate can rarely cause a lung reaction similar to pneumonia called acute pneumonitis or interstitial pneumonia.      Today  -  Some baseline laboratory tests will be checked  - A prescription for folic acid will be sent to pharmacy for you to start.  (This is a vitamin that can help reduce the side effects of methotrexate)  -  A test dose of the medication to the pharmacy   -  Take all of the pills in the methotrexate prescription on the same day  -  Recheck your labs at any [facility] 7 days from the date you take the test dose  -  Once your blood work is complete, our office we will call and let you know if you can continue methotrexate     Return to clinic 4 weeks      Again, thank you for allowing me to participate in the care of your patient.        Sincerely,        Dixon Silva MD

## 2017-11-02 NOTE — MR AVS SNAPSHOT
After Visit Summary   11/2/2017    Jamilah Rodriguez    MRN: [id number]           Patient Information     Date Of Birth          4/5/1931        Visit Information        Provider Department      11/2/2017 8:45 AM Dixon Silva MD [facility]        Today's Diagnoses     Urticaria    -  1      Care Instructions    Stop both meds and start new med and vitamin. Labs today and 1 week after starting new med. Follow up in 4 weeks.        Wound Care Instructions     FOR SUPERFICIAL WOUNDS     [facility] [phone]    [facility] [phone]                       AFTER 24 HOURS YOU SHOULD REMOVE THE BANDAGE AND BEGIN DAILY DRESSING CHANGES AS FOLLOWS:     1) Remove Dressing.     2) Clean and dry the area with tap water using a Q-tip or sterile gauze pad.     3) Apply Vaseline, Aquaphor, Polysporin ointment or Bacitracin ointment over entire wound.  Do NOT use Neosporin ointment.     4) Cover the wound with a band-aid, or a sterile non-stick gauze pad and micropore paper tape      REPEAT THESE INSTRUCTIONS AT LEAST ONCE A DAY UNTIL THE WOUND HAS COMPLETELY HEALED.    It is an old wives tale that a wound heals better when it is exposed to air and allowed to dry out. The wound will heal faster with a better cosmetic result if it is kept moist with ointment and covered with a bandage.    **Do not let the wound dry out.**      Supplies Needed:      *Cotton tipped applicators (Q-tips)    *Polysporin Ointment or Bacitracin Ointment (NOT NEOSPORIN)    *Band-aids or non-stick gauze pads and micropore paper tape.      PATIENT INFORMATION:    During the healing process you will notice a number of changes. All wounds develop a small halo of redness surrounding the wound.  This means healing is occurring. Severe itching with extensive redness usually indicates sensitivity to the ointment or bandage tape used to dress the wound.  You should call our office if  this develops.      Swelling  and/or discoloration around your surgical site is common, particularly when performed around the eye.    All wounds normally drain.  The larger the wound the more drainage there will be.  After 7-10 days, you will notice the wound beginning to shrink and new skin will begin to grow.  The wound is healed when you can see skin has formed over the entire area.  A healed wound has a healthy, shiny look to the surface and is red to dark pink in color to normalize.  Wounds may take approximately 4-6 weeks to heal.  Larger wounds may take 6-8 weeks.  After the wound is healed you may discontinue dressing changes.    You may experience a sensation of tightness as your wound heals. This is normal and will gradually subside.    Your healed wound may be sensitive to temperature changes. This sensitivity improves with time, but if you re having a lot of discomfort, try to avoid temperature extremes.    Patients frequently experience itching after their wound appears to have healed because of the continue healing under the skin.  Plain Vaseline will help relieve the itching.        POSSIBLE COMPLICATIONS    BLEEDIN. Leave the bandage in place.  2. Use tightly rolled up gauze or a cloth to apply direct pressure over the bandage for 30  minutes.  3. Reapply pressure for an additional 30 minutes if necessary  4. Use additional gauze and tape to maintain pressure once the bleeding has stopped.            Follow-ups after your visit        Future tests that were ordered for you today     Open Future Orders        Priority Expected Expires Ordered    CBC with platelets Routine 2017    Comprehensive metabolic panel Routine 2017            Who to contact     If you have questions or need follow up information about today's clinic visit or your schedule please contact [facility] directly at [phone].  Normal or  non-critical lab and imaging results will be communicated to you by MyChart, letter or phone within 4 business days after the clinic has received the results. If you do not hear from us within 7 days, please contact the clinic through [facility]t or phone. If you have a critical or abnormal lab result, we will notify you by phone as soon as possible.  Submit refill requests through [employer] or call your pharmacy and they will forward the refill request to us. Please allow 3 business days for your refill to be completed.          Additional Information About Your Visit        [employer] Information     [employer] gives you secure access to your electronic health record. If you see a primary care provider, you can also send messages to your care team and make appointments. If you have questions, please call your primary care clinic.  If you do not have a primary care provider, please call [phone] and they will assist you.        Care EveryWhere ID     This is your Care EveryWhere ID. This could be used by other organizations to access your [facility] medical records  [id number]        Your Vitals Were     Pulse Pulse Oximetry                94 96%           Blood Pressure from Last 3 Encounters:   11/02/17 106/61   10/18/17 138/70   10/05/17 142/78    Weight from Last 3 Encounters:   10/05/17 58.3 kg (128 lb 8 oz)   06/08/17 59 kg (130 lb)   05/31/17 59.4 kg (131 lb)              We Performed the Following     BIOPSY SKIN/SUBQ/MUC MEM, SINGLE LESION     CBC with platelets     Comprehensive metabolic panel          Today's Medication Changes          These changes are accurate as of: 11/2/17  9:29 AM.  If you have any questions, ask your nurse or doctor.               Start taking these medicines.        Dose/Directions    folic acid 1 MG tablet   Commonly known as:  FOLVITE   Used for:  Urticaria   Started by:  Dixon Silva MD        Dose:  1 mg   Take 1 tablet (1 mg) by mouth daily   Quantity:  30 tablet    Refills:  3       methotrexate 2.5 MG tablet CHEMO   Used for:  Urticaria   Started by:  Dixon Silva MD        Dose:  10 mg   Take 4 tablets (10 mg) by mouth once a week   Quantity:  4 tablet   Refills:  0            Where to get your medicines      These medications were sent to [facility] [address] [place], [place] - [address] AT [facility] [address]  [address], [facility] [address]     Phone:  [phone]     folic acid 1 MG tablet    methotrexate 2.5 MG tablet CHEMO                Primary Care Provider Office Phone # Fax #    Bhupendra Chong [phone]       [address]  [facility] [address]        Equal Access to Services     DRE FORREST : Hadii sherly chaves hadmariuszo Somu, waaxda luqadaha, qaybta kaalmada adeegyada, kimberly elder. So [facility] [phone].    ATENCIÓN: Si habla español, tiene a smith disposición servicios gratuitos de asistencia lingüística. [facility] [phone].    We comply with applicable federal civil rights laws and [place] laws. We do not discriminate on the basis of race, color, national origin, age, disability, sex, sexual orientation, or gender identity.            Thank you!     Thank you for choosing [facility]  for your care. Our goal is always to provide you with excellent care. Hearing back from our patients is one way we can continue to improve our services. Please take a few minutes to complete the written survey that you may receive in the mail after your visit with us. Thank you!             Your Updated Medication List - Protect others around you: Learn how to safely use, store and throw away your medicines at www.disposemymeds.org.          This list is accurate as of: 11/2/17  9:29 AM.  Always use your most recent med list.                   Brand Name Dispense Instructions for use Diagnosis    albuterol 108 (90 BASE) MCG/ACT Inhaler     PROAIR HFA    1 Inhaler    Inhale 2 puffs into the lungs 4 times daily as needed for shortness of breath / dyspnea or wheezing    COPD exacerbation (H)       ALPRAZolam 0.25 MG tablet    XANAX    10 tablet    1 tablet every 8 as needed for anxiety    Anxiety       ascorbic acid 500 MG tablet    VITAMIN C    100 tablet    Take 1 tablet by mouth daily.        bimatoprost 0.01 % Soln    LUMIGAN     Place 1 drop into both eyes At Bedtime.        calcium + D 600-200 MG-UNIT Tabs   Generic drug:  calcium carbonate-vitamin D     100 tablet    Take 1 tablet by mouth every 12 hours.        carboxymethylcellulose 0.5 % Soln ophthalmic solution    REFRESH PLUS     Place 1 drop into both eyes every 2 hours as needed.        dapsone 25 MG tablet     60 tablet    1 tab PO daily    Urticaria       dorzolamide 2 % ophthalmic solution    TRUSOPT     Place 1 drop into both eyes 2 times daily        FLAX SEEDS PO           fluocinonide 0.05 % solution    LIDEX    60 mL    Apply to scalp BID PRN    Scalp itch       folic acid 1 MG tablet    FOLVITE    30 tablet    Take 1 tablet (1 mg) by mouth daily    Urticaria       GLUCOSAMINE SULFATE PO      Take 500 mg by mouth daily        hydrOXYzine 25 MG tablet    ATARAX    20 tablet    1-2 tabs PO QHS for itching    Chronic dermatitis       * losartan 50 MG tablet    COZAAR    90 tablet    Take 1 tablet (50 mg) by mouth daily    Benign essential hypertension       * losartan 100 MG tablet    COZAAR    90 tablet    Take 1 tablet (100 mg) by mouth daily    Benign essential hypertension       methotrexate 2.5 MG tablet CHEMO     4 tablet    Take 4 tablets (10 mg) by mouth once a week    Urticaria       predniSONE 10 MG tablet    DELTASONE    60 tablet    3 tabs x 10 days then 2 tabs x 10 days then 1 tab x 10 days    Bullous pemphigoid       triamcinolone 0.1 % cream    KENALOG    454 g    Apply to AA QD-BID x 1-2 weeks then PRN Only    Acute dermatitis       vitamin D 2000 UNITS tablet      Take  1 tablet by mouth daily.    Osteoporosis       * Notice:  This list has 2 medication(s) that are the same as other medications prescribed for you. Read the directions carefully, and ask your doctor or other care provider to review them with you.

## 2017-11-02 NOTE — TELEPHONE ENCOUNTER
Pt came to  after her Derm visit today for BP issues.    Was started on 10/6 on Dapsone and prednisone by derm and since her BP went up, Dr Hurtado increased her Losartan from 50 mg to 100 mg.    Today at derm her Dapsone and prednisone were completed and stopped, /61. Pt wants to know about what dose of Losartan to stay on.    Discussed with Dr Hurtado, pt can go back on to Losartan 50 mg daily.    Pt advised.

## 2017-11-03 ENCOUNTER — TELEPHONE (OUTPATIENT)
Dept: DERMATOLOGY | Facility: CLINIC | Age: 82
End: 2017-11-03

## 2017-11-03 DIAGNOSIS — L29.9 ITCHING: ICD-10-CM

## 2017-11-03 DIAGNOSIS — L50.9 URTICARIA: Primary | ICD-10-CM

## 2017-11-03 RX ORDER — FOLIC ACID 1 MG/1
1 TABLET ORAL DAILY
Qty: 90 TABLET | Refills: 0 | Status: SHIPPED | OUTPATIENT
Start: 2017-11-03 | End: 2018-01-17

## 2017-11-03 NOTE — TELEPHONE ENCOUNTER
Called patient-she is not happy with the side effects that are possible- shortness of breath, itching-  Advised that these may or may not happen   patient wants to discuss this with Dr. Silva- also the patient thought the  prescription was 1 tablet every week-    advised that the prescription states to take 4 tablets one time a week.  Patient says that cannot be correct because she only received 4 tablets with no refills and she doesn't have another appointment until November 30th     please discuss these issues with this patient      Ok to leave a detailed message if patient does not answer- she has an eye appointment on Monday- patient prefers to speak with Dr. Silva- advised that he is off until Monday and usually has a full schedule- advised that she may need another appointment to discuss concerns regarding medications- patient wants Dr. Silva to call her back      Awilda DUCKWORTH RN  [facility]  [phone]

## 2017-11-06 ENCOUNTER — TRANSFERRED RECORDS (OUTPATIENT)
Dept: HEALTH INFORMATION MANAGEMENT | Facility: CLINIC | Age: 82
End: 2017-11-06

## 2017-11-06 NOTE — TELEPHONE ENCOUNTER
I called the patient.     The DIF results are still pending.    She is very short of breath .  I discussed with patient going to Urgent Care / PCP to get this shortness of breath checked.      She would like another opinion regarding itching.  I placed referral to [facility] derm referral.     Please call and see if we can get her an appointment with [facility] derm.      She does not want to start Methotrexate.  Cont topical TAC daily.      If we cannot get appt please have her follow up with me

## 2017-11-06 NOTE — TELEPHONE ENCOUNTER
Called [facility] Derm- spoke with Amber- she will expedite the appointment .    Awilda DUCKWORTHRN  [facility]  [phone]

## 2017-11-08 ENCOUNTER — TELEPHONE (OUTPATIENT)
Dept: DERMATOLOGY | Facility: CLINIC | Age: 82
End: 2017-11-08

## 2017-11-14 ENCOUNTER — OFFICE VISIT (OUTPATIENT)
Dept: INTERNAL MEDICINE | Facility: CLINIC | Age: 82
End: 2017-11-14
Payer: MEDICARE

## 2017-11-14 VITALS
BODY MASS INDEX: 22.78 KG/M2 | SYSTOLIC BLOOD PRESSURE: 134 MMHG | WEIGHT: 128.6 LBS | DIASTOLIC BLOOD PRESSURE: 74 MMHG | OXYGEN SATURATION: 99 % | HEART RATE: 90 BPM | TEMPERATURE: 97.9 F

## 2017-11-14 DIAGNOSIS — J44.9 CHRONIC OBSTRUCTIVE PULMONARY DISEASE, UNSPECIFIED COPD TYPE (H): Primary | ICD-10-CM

## 2017-11-14 DIAGNOSIS — Z23 NEED FOR VACCINATION: ICD-10-CM

## 2017-11-14 PROCEDURE — 99214 OFFICE O/P EST MOD 30 MIN: CPT | Performed by: INTERNAL MEDICINE

## 2017-11-14 NOTE — PROGRESS NOTES
SUBJECTIVE:   Jamilah Rodriguez is a 86 year old female who presents to clinic today for the following health issues:      SOB      Duration: 1-2 months    Description (location/character/radiation): c/o increasing in SOB over the past 1-2 months, sx are worsening especially with exertion    Intensity:  moderate, severe    Accompanying signs and symptoms: denies any fever, chest pain, swelling, and fatigue    History (similar episodes/previous evaluation): hx of copd    Precipitating or alleviating factors: worse with exertion    Therapies tried and outcome: None    Takes albuterol MDI with near complete resolution.               Problem list and histories reviewed & adjusted, as indicated.  Additional history: as documented        Reviewed and updated as needed this visit by clinical staffAllergies       Reviewed and updated as needed this visit by Provider           Past Medical History:  ---------------------------  Past Medical History:   Diagnosis Date     AAA (abdominal aortic aneurysm) (H)     Followed with yearly ultrasound     Cervical cancer (H) 1969     Chronic dermatitis     extensive allergy testing revealed allergy/sensitivity to carba mix ( rubber) and Imidazolidinyl Urea (common in beauty products)     Glaucoma      Glaucoma      History of COPD     very mild abnormal spirometry in 2007 (in [place]), has not been active since quitting smoking in 2009     HTN (hypertension)      Macular degeneration      Osteoporosis 8/3/11    femoral T score -3.4 & -3.7     PAD (peripheral artery disease) (H)      Vertebral compression fracture (H) 5/17/11    L1 compression fracture, presumed osteoporotic compression fracture       Past Surgical History:  ---------------------------  Past Surgical History:   Procedure Laterality Date     CATARACT IOL, RT/LT  2/8/12    left eye cataract removal     HYSTERECTOMY TOTAL ABDOMINAL  1999    Fibroids     OPEN REDUCTION INTERNAL FIXATION HIP NAILING  7/2/2012    Procedure:  OPEN REDUCTION INTERNAL FIXATION HIP NAILING;  Intramedullary Fixation Right Intertrochanteric Hip Fracture;  Surgeon: Chalino Covarrubias MD;  Location: [facility] OR     OPEN REDUCTION INTERNAL FIXATION TIBIAL PLATEAU  3/31/2013    Procedure: OPEN REDUCTION INTERNAL FIXATION TIBIAL PLATEAU;  RIGHT LATERAL TIBIAL PLATEAU FRACTURE - SYNTHES Medial Meniscus Repair, Lateral Compartment Release;  Surgeon: Alfred Cardenas MD;  Location: [facility] OR     OPEN REDUCTION INTERNAL FIXATION WRIST  1988    left wrist ORIF, hardware removed onemonth later       Current Medications:  ---------------------------  Current Outpatient Prescriptions   Medication Sig Dispense Refill     losartan (COZAAR) 50 MG tablet Take 1 tablet (50 mg) by mouth daily 90 tablet 1     fluocinonide (LIDEX) 0.05 % solution Apply to scalp BID PRN 60 mL 3     triamcinolone (KENALOG) 0.1 % cream Apply to AA QD-BID x 1-2 weeks then PRN Only 454 g 11     albuterol (ALBUTEROL) 108 (90 BASE) MCG/ACT Inhaler Inhale 2 puffs into the lungs 4 times daily as needed for shortness of breath / dyspnea or wheezing 1 Inhaler 6     GLUCOSAMINE SULFATE PO Take 500 mg by mouth daily       Flaxseed, Linseed, (FLAX SEEDS PO)        dorzolamide (TRUSOPT) 2 % ophthalmic solution Place 1 drop into both eyes 2 times daily       Cholecalciferol (VITAMIN D) 2000 UNITS tablet Take 1 tablet by mouth daily.       bimatoprost (LUMIGAN) 0.01 % SOLN Place 1 drop into both eyes At Bedtime.         carboxymethylcellulose (REFRESH PLUS) 0.5 % SOLN Place 1 drop into both eyes every 2 hours as needed.         Calcium Carbonate-Vitamin D (CALCIUM + D) 600-200 MG-UNIT per tablet Take 1 tablet by mouth every 12 hours. 100 tablet 12     ascorbic acid (VITAMIN C) 500 MG tablet Take 1 tablet by mouth daily. 100 tablet 12     methotrexate 2.5 MG tablet CHEMO   0     folic acid (FOLVITE) 1 MG tablet Take 1 tablet (1 mg) by mouth daily (Patient not taking: Reported on 11/14/2017) 90 tablet 0     ALPRAZolam (XANAX)  0.25 MG tablet 1 tablet every 8 as needed for anxiety (Patient not taking: Reported on 11/2/2017) 10 tablet 0     hydrOXYzine (ATARAX) 25 MG tablet 1-2 tabs PO QHS for itching (Patient not taking: Reported on 11/14/2017) 20 tablet 1       Allergies:  -------------  Allergies   Allergen Reactions     Atenolol Hives     Beta Adrenergic Blockers      Brimonidine      Cephalexin Hives     Germall Plus Itching     Imidazolidinyl Urea found in many topical creams and house hold products     Latex      Itching     Morphine GI Disturbance     Penicillins Hives     Sulfa Drugs GI Disturbance     Tramadol Nausea and Vomiting     Tylenol GI Disturbance     Vicodin [Hydrocodone-Acetaminophen]      Fentanyl Nausea       Social History:  -------------------  Social History     Social History     Marital status:      Spouse name: N/A     Number of children: 2     Years of education: N/A     Occupational History     Not on file.     Social History Main Topics     Smoking status: Former Smoker     Quit date: 7/1/2009     Smokeless tobacco: Never Used      Comment: former 1/2-1 ppd since age 20-22     Alcohol use No     Drug use: No     Sexual activity: No     Other Topics Concern     Not on file     Social History Narrative       Family Medical History:  ------------------------------  Family History   Problem Relation Age of Onset     Breast Cancer Other      Circulatory Daughter 53     mengioma     C.A.D. Father      DIABETES Sister      Breast Cancer Maternal Aunt 60         ROS:  REVIEW OF SYSTEMS:    RESP: positive for cough, dyspnea, wheezing; negative for hemoptysis   CV: negative for chest pain, palpitations, PND, M[place], orthopnea  GI: negative for dysphagia, N/V, pain, melena, diarrhea and constipation  NEURO: negative for numbness/tingling, paralysis, incoordination, or focal weakness     OBJECTIVE:                                                    /74  Pulse 90  Temp 97.9  F (36.6  C) (Oral)  Wt 128 lb 9.6  oz (58.3 kg)  SpO2 99%  BMI 22.78 kg/m2     GENERAL alert and no distress  EYES:  Normal sclera,conjunctiva, EOMI  HENT: oral and posterior pharynx without lesions or erythema, facies symmetric  NECK: Neck supple. No LAD, without thyroidmegaly or JVD., Carotids without bruits.  RESP: breath sounds quiet but clear, diminished respiratory excursion, prolonged expiratory phase,  no rhonci, no wheezes, no rales   CV: RRR normal S1S2 without murmurs, rubs or gallops. PMI normal  LYMPH: no cervical lymph adenopathy appreciated  MS: extremities- no gross deformities of the visible extremities noted, no edema  PSYCH: Alert and oriented times 3; speech- coherent  SKIN:  No obvious significant skin lesions on visible portions of face          ASSESSMENT/PLAN:                                                      (J44.9) Chronic obstructive pulmonary disease, unspecified COPD type (H)  (primary encounter diagnosis)  Comment: advised combivene.t   Would cosnider once daily spiriva if her isnruance would cover.   Would ideally like to have done spirometry, but her daughter was pressed for time and they could not stay in the clinic.   She probably also has some restrictive component as well due to shrinking thoracic cavity from osteoporosis.   Discussed general issues of COPD, including pathophysiology, ways it will affect the pt., when to seek help, reviewed the typical medications (how they are taken, how they help)   Plan: Ipratropium-Albuterol (COMBIVENT RESPIMAT)          MCG/ACT inhaler            (Z23) Need for vaccination  Comment:   Plan: CANCELED: VACCINE ADMINISTRATION, INITIAL,         CANCELED: PNEUMOCOCCAL CONJ VACCINE 13 VALENT         IM               See Patient Instructions    BEBETO LANGSTON M.D., MD  [facility]

## 2017-11-14 NOTE — NURSING NOTE
"Chief Complaint   Patient presents with     Breathing Problem     increasign SOB X 1-2 months       Initial /72  Pulse 90  Temp 97.9  F (36.6  C) (Oral)  Wt 128 lb 9.6 oz (58.3 kg)  SpO2 99%  BMI 22.78 kg/m2 Estimated body mass index is 22.78 kg/(m^2) as calculated from the following:    Height as of 10/5/17: 5' 3\" (1.6 m).    Weight as of this encounter: 128 lb 9.6 oz (58.3 kg).  Medication Reconciliation: complete   Nidia Urias CMA    Screening Questionnaire for Adult Immunization    Are you sick today?   Don't Know   Do you have allergies to medications, food, a vaccine component or latex?   Yes   Have you ever had a serious reaction after receiving a vaccination?   No   Do you have a long-term health problem with heart disease, lung disease, asthma, kidney disease, metabolic disease (e.g. diabetes), anemia, or other blood disorder?   Yes   Do you have cancer, leukemia, HIV/AIDS, or any other immune system problem?   No   In the past 3 months, have you taken medications that affect  your immune system, such as prednisone, other steroids, or anticancer drugs; drugs for the treatment of rheumatoid arthritis, Crohn s disease, or psoriasis; or have you had radiation treatments?   Yes   Have you had a seizure, or a brain or other nervous system problem?   No   During the past year, have you received a transfusion of blood or blood     products, or been given immune (gamma) globulin or antiviral drug?   No   For women: Are you pregnant or is there a chance you could become        pregnant during the next month?   No   Have you received any vaccinations in the past 4 weeks?   Flu shot     Immunization questionnaire was positive for at least one answer.  Notified jorge luis.        Per orders of Dr. Chong, injection of prevnar given by Nidia Urias. Patient instructed to remain in clinic for 15 minutes afterwards, and to report any adverse reaction to me immediately.       Screening performed by Nidia JACOBO" Adonay on 11/14/2017 at 9:30 AM.

## 2017-11-14 NOTE — MR AVS SNAPSHOT
After Visit Summary   11/14/2017    Jamilah Rodriguez    MRN: [id number]           Patient Information     Date Of Birth          4/5/1931        Visit Information        Provider Department      11/14/2017 9:00 AM Bhupendra Chong MD [facility]        Today's Diagnoses     Chronic obstructive pulmonary disease, unspecified COPD type (H)    -  1    Need for vaccination          Care Instructions    Continue all medications at the same doses.  Contact your usual pharmacy if you need refills.     Return to see us if your breathing gest any worse, we will need to perform breathing tests (spirometry).           Follow-ups after your visit        Your next 10 appointments already scheduled     Nov 30, 2017  9:30 AM CST   Return Visit with Dixon Silva MD   [facility] ([facility])    [address] [place] [id number] [phone]              Who to contact     If you have questions or need follow up information about today's clinic visit or your schedule please contact [facility] directly at [phone].  Normal or non-critical lab and imaging results will be communicated to you by [employer], letter or phone within 4 business days after the clinic has received the results. If you do not hear from us within 7 days, please contact the clinic through [employer] or phone. If you have a critical or abnormal lab result, we will notify you by phone as soon as possible.  Submit refill requests through [employer] or call your pharmacy and they will forward the refill request to us. Please allow 3 business days for your refill to be completed.          Additional Information About Your Visit        MyChart Information     [employer] gives you secure access to your electronic health record. If you see a primary care provider, you can also send messages to your care team and make appointments. If  you have questions, please call your primary care clinic.  If you do not have a primary care provider, please call [phone] and they will assist you.        Care EveryWhere ID     This is your Care EveryWhere ID. This could be used by other organizations to access your [facility] medical records  [id number]        Your Vitals Were     Pulse Temperature Pulse Oximetry BMI (Body Mass Index)          90 97.9  F (36.6  C) (Oral) 99% 22.78 kg/m2         Blood Pressure from Last 3 Encounters:   11/14/17 134/74   11/02/17 106/61   10/18/17 138/70    Weight from Last 3 Encounters:   11/14/17 128 lb 9.6 oz (58.3 kg)   10/05/17 128 lb 8 oz (58.3 kg)   06/08/17 130 lb (59 kg)              Today, you had the following     No orders found for display         Today's Medication Changes          These changes are accurate as of: 11/14/17 11:59 PM.  If you have any questions, ask your nurse or doctor.               Start taking these medicines.        Dose/Directions    Ipratropium-Albuterol  MCG/ACT inhaler   Commonly known as:  COMBIVENT RESPIMAT   Used for:  Chronic obstructive pulmonary disease, unspecified COPD type (H)        Dose:  1 puff   Inhale 1 puff into the lungs every 4 hours as needed for shortness of breath / dyspnea or wheezing Not to exceed 6 doses per day.   Quantity:  1 Inhaler   Refills:  11            Where to get your medicines      These medications were sent to [facility] [address] [place] [address] [address] AT [facility] [place]  [address], [facility] [address]     Phone:  [phone]     Ipratropium-Albuterol  MCG/ACT inhaler                Primary Care Provider Office Phone # Fax #    Bhupendra Chong [phone]       [address] [facility] [address]        Equal Access to Services     DRE FORREST AH: Maty Schumacher, joann luqsangita, qaybkimberly davis  laandreea elder. So [facility] [phone].    ATENCIÓN: Si ivettla true, tiene a smith disposición servicios gratuitos de asistencia lingüística. Jada rice [phone].    We comply with applicable federal civil rights laws and [place] laws. We do not discriminate on the basis of race, color, national origin, age, disability, sex, sexual orientation, or gender identity.            Thank you!     Thank you for choosing [facility]  for your care. Our goal is always to provide you with excellent care. Hearing back from our patients is one way we can continue to improve our services. Please take a few minutes to complete the written survey that you may receive in the mail after your visit with us. Thank you!             Your Updated Medication List - Protect others around you: Learn how to safely use, store and throw away your medicines at www.disposemymeds.org.          This list is accurate as of: 11/14/17 11:59 PM.  Always use your most recent med list.                   Brand Name Dispense Instructions for use Diagnosis    albuterol 108 (90 BASE) MCG/ACT Inhaler    PROAIR HFA    1 Inhaler    Inhale 2 puffs into the lungs 4 times daily as needed for shortness of breath / dyspnea or wheezing    COPD exacerbation (H)       ALPRAZolam 0.25 MG tablet    XANAX    10 tablet    1 tablet every 8 as needed for anxiety    Anxiety       ascorbic acid 500 MG tablet    VITAMIN C    100 tablet    Take 1 tablet by mouth daily.        bimatoprost 0.01 % Soln    LUMIGAN     Place 1 drop into both eyes At Bedtime.        calcium + D 600-200 MG-UNIT Tabs   Generic drug:  calcium carbonate-vitamin D     100 tablet    Take 1 tablet by mouth every 12 hours.        carboxymethylcellulose 0.5 % Soln ophthalmic solution    REFRESH PLUS     Place 1 drop into both eyes every 2 hours as needed.        dorzolamide 2 % ophthalmic solution    TRUSOPT     Place 1 drop into both eyes 2 times daily        FLAX SEEDS PO            fluocinonide 0.05 % solution    LIDEX    60 mL    Apply to scalp BID PRN    Scalp itch       folic acid 1 MG tablet    FOLVITE    90 tablet    Take 1 tablet (1 mg) by mouth daily    Urticaria       GLUCOSAMINE SULFATE PO      Take 500 mg by mouth daily        hydrOXYzine 25 MG tablet    ATARAX    20 tablet    1-2 tabs PO QHS for itching    Chronic dermatitis       Ipratropium-Albuterol  MCG/ACT inhaler    COMBIVENT RESPIMAT    1 Inhaler    Inhale 1 puff into the lungs every 4 hours as needed for shortness of breath / dyspnea or wheezing Not to exceed 6 doses per day.    Chronic obstructive pulmonary disease, unspecified COPD type (H)       losartan 50 MG tablet    COZAAR    90 tablet    Take 1 tablet (50 mg) by mouth daily    Benign essential hypertension       methotrexate 2.5 MG tablet CHEMO           triamcinolone 0.1 % cream    KENALOG    454 g    Apply to AA QD-BID x 1-2 weeks then PRN Only    Acute dermatitis       vitamin D 2000 UNITS tablet      Take 1 tablet by mouth daily.    Osteoporosis

## 2017-11-27 NOTE — PATIENT INSTRUCTIONS
Continue all medications at the same doses.  Contact your usual pharmacy if you need refills.     Return to see us if your breathing gest any worse, we will need to perform breathing tests (spirometry).

## 2017-11-30 ENCOUNTER — OFFICE VISIT (OUTPATIENT)
Dept: DERMATOLOGY | Facility: CLINIC | Age: 82
End: 2017-11-30
Payer: MEDICARE

## 2017-11-30 VITALS — SYSTOLIC BLOOD PRESSURE: 129 MMHG | OXYGEN SATURATION: 99 % | HEART RATE: 81 BPM | DIASTOLIC BLOOD PRESSURE: 78 MMHG

## 2017-11-30 DIAGNOSIS — L29.9 ITCHING: Primary | ICD-10-CM

## 2017-11-30 PROCEDURE — 99213 OFFICE O/P EST LOW 20 MIN: CPT | Performed by: DERMATOLOGY

## 2017-11-30 NOTE — LETTER
11/30/2017         RE: Jamilah Rodriguez  [address]  [address]  [facility] [address]        Dear Colleague,    Thank you for referring your patient, Jamilah Rodriguez, to the [facility]. Please see a copy of my visit note below.    Jamilah Rodriguez is a 86 year old year old female patient here today for f/u itching. Was doing great.  [facility]  Didn't want to do light or mtx.  She was not itchy at all.  She has to get eye surgery and then got itchy again, but now things are better.  Associated symptoms: none.  Patient has no other skin complaints today.  Remainder of the HPI, Meds, PMH, Allergies, FH, and SH was reviewed in chart.      Past Medical History:   Diagnosis Date     AAA (abdominal aortic aneurysm) (H)     Followed with yearly ultrasound     Cervical cancer (H) 1969     Chronic dermatitis     extensive allergy testing revealed allergy/sensitivity to carba mix ( rubber) and Imidazolidinyl Urea (common in beauty products)     Glaucoma      Glaucoma      History of COPD     very mild abnormal spirometry in 2007 (in [place]), has not been active since quitting smoking in 2009     HTN (hypertension)      Macular degeneration      Osteoporosis 8/3/11    femoral T score -3.4 & -3.7     PAD (peripheral artery disease) (H)      Vertebral compression fracture (H) 5/17/11    L1 compression fracture, presumed osteoporotic compression fracture       Past Surgical History:   Procedure Laterality Date     CATARACT IOL, RT/LT  2/8/12    left eye cataract removal     HYSTERECTOMY TOTAL ABDOMINAL  1999    Fibroids     OPEN REDUCTION INTERNAL FIXATION HIP NAILING  7/2/2012    Procedure: OPEN REDUCTION INTERNAL FIXATION HIP NAILING;  Intramedullary Fixation Right Intertrochanteric Hip Fracture;  Surgeon: Chalino Covarrubias MD;  Location:  OR     OPEN REDUCTION INTERNAL FIXATION TIBIAL PLATEAU  3/31/2013    Procedure: OPEN REDUCTION INTERNAL FIXATION TIBIAL PLATEAU;   RIGHT LATERAL TIBIAL PLATEAU FRACTURE - SYNTHES Medial Meniscus Repair, Lateral Compartment Release;  Surgeon: Alfred Cardenas MD;  Location: [facility] OR     OPEN REDUCTION INTERNAL FIXATION WRIST  1988    left wrist ORIF, hardware removed onemonth later        Family History   Problem Relation Age of Onset     Breast Cancer Other      Circulatory Daughter 53     mengioma     C.A.D. Father      DIABETES Sister      Breast Cancer Maternal Aunt 60       Social History     Social History     Marital status:      Spouse name: N/A     Number of children: 2     Years of education: N/A     Occupational History     Not on file.     Social History Main Topics     Smoking status: Former Smoker     Quit date: 7/1/2009     Smokeless tobacco: Never Used      Comment: former 1/2-1 ppd since age [age]     Alcohol use No     Drug use: No     Sexual activity: No     Other Topics Concern     Not on file     Social History Narrative       Outpatient Encounter Prescriptions as of 11/30/2017   Medication Sig Dispense Refill     Ipratropium-Albuterol (COMBIVENT RESPIMAT)  MCG/ACT inhaler Inhale 1 puff into the lungs every 4 hours as needed for shortness of breath / dyspnea or wheezing Not to exceed 6 doses per day. 1 Inhaler 11     losartan (COZAAR) 50 MG tablet Take 1 tablet (50 mg) by mouth daily 90 tablet 1     fluocinonide (LIDEX) 0.05 % solution Apply to scalp BID PRN 60 mL 3     triamcinolone (KENALOG) 0.1 % cream Apply to AA QD-BID x 1-2 weeks then PRN Only 454 g 11     albuterol (ALBUTEROL) 108 (90 BASE) MCG/ACT Inhaler Inhale 2 puffs into the lungs 4 times daily as needed for shortness of breath / dyspnea or wheezing 1 Inhaler 6     GLUCOSAMINE SULFATE PO Take 500 mg by mouth daily       Flaxseed, Linseed, (FLAX SEEDS PO)        dorzolamide (TRUSOPT) 2 % ophthalmic solution Place 1 drop into both eyes 2 times daily       Cholecalciferol (VITAMIN D) 2000 UNITS tablet Take 1 tablet by mouth daily.       bimatoprost  (LUMIGAN) 0.01 % SOLN Place 1 drop into both eyes At Bedtime.         carboxymethylcellulose (REFRESH PLUS) 0.5 % SOLN Place 1 drop into both eyes every 2 hours as needed.         Calcium Carbonate-Vitamin D (CALCIUM + D) 600-200 MG-UNIT per tablet Take 1 tablet by mouth every 12 hours. 100 tablet 12     ascorbic acid (VITAMIN C) 500 MG tablet Take 1 tablet by mouth daily. 100 tablet 12     methotrexate 2.5 MG tablet CHEMO   0     folic acid (FOLVITE) 1 MG tablet Take 1 tablet (1 mg) by mouth daily (Patient not taking: Reported on 11/14/2017) 90 tablet 0     ALPRAZolam (XANAX) 0.25 MG tablet 1 tablet every 8 as needed for anxiety (Patient not taking: Reported on 11/2/2017) 10 tablet 0     hydrOXYzine (ATARAX) 25 MG tablet 1-2 tabs PO QHS for itching (Patient not taking: Reported on 11/14/2017) 20 tablet 1     No facility-administered encounter medications on file as of 11/30/2017.              Review Of Systems  Skin: As above  Eyes: negative  Ears/Nose/Throat: negative  Respiratory: No shortness of breath, dyspnea on exertion, cough, or hemoptysis  Cardiovascular: negative  Gastrointestinal: negative  Genitourinary: negative  Musculoskeletal: negative  Neurologic: negative  Psychiatric: negative  Hematologic/Lymphatic/Immunologic: negative  Endocrine: negative      O:   NAD, WDWN, Alert & Oriented, Mood & Affect wnl, Vitals stable   Here today with daughter    /78  Pulse 81  SpO2 99%   General appearance normal   Vitals stable   Alert, oriented and in no acute distress     Skin clear      The remainder of expanded problem focused exam was unremarkable; the following areas were examined:  scalp/hair, conjunctiva/lids, face, neck, lips      Eyes: Conjunctivae/lids:Normal     ENT: Lips, buccal mucosa, tongue: normal    MSK:Normal    Cardiovascular: peripheral edema none    Pulm: Breathing Normal    Neuro/Psych: Orientation:Normal; Mood/Affect:Normal      A/P:  1. Itching  No rash today  Elavil discussed with  patient   She declines  Skin care discussed with patient   Cont antihistamines  Return to clinic prn  Skin care regimen reviewed with patient: Eliminate harsh soaps, i.e. Dial, zest, irsih spring; Mild soaps such as Cetaphil or Dove sensitive skin, avoid hot or cold showers, aggressive use of emollients including vanicream, cetaphil or cerave discussed with patient.        Again, thank you for allowing me to participate in the care of your patient.        Sincerely,        Dixon Silva MD

## 2017-11-30 NOTE — PROGRESS NOTES
Jamilah Rodriguez is a 86 year old year old female patient here today for f/u itching. Was doing great.  Decided [facility] not [facility] to [facility]  Didn't want to do light or mtx.  She was not itchy at all.  She has to get eye surgery and then got itchy again, but now things are better.  Associated symptoms: none.  Patient has no other skin complaints today.  Remainder of the HPI, Meds, PMH, Allergies, FH, and SH was reviewed in chart.      Past Medical History:   Diagnosis Date     AAA (abdominal aortic aneurysm) (H)     Followed with yearly ultrasound     Cervical cancer (H) 1969     Chronic dermatitis     extensive allergy testing revealed allergy/sensitivity to carba mix ( rubber) and Imidazolidinyl Urea (common in beauty products)     Glaucoma      Glaucoma      History of COPD     very mild abnormal spirometry in 2007 (in [place]), has not been active since quitting smoking in 2009     HTN (hypertension)      Macular degeneration      Osteoporosis 8/3/11    femoral T score -3.4 & -3.7     PAD (peripheral artery disease) (H)      Vertebral compression fracture (H) 5/17/11    L1 compression fracture, presumed osteoporotic compression fracture       Past Surgical History:   Procedure Laterality Date     CATARACT IOL, RT/LT  2/8/12    left eye cataract removal     HYSTERECTOMY TOTAL ABDOMINAL  1999    Fibroids     OPEN REDUCTION INTERNAL FIXATION HIP NAILING  7/2/2012    Procedure: OPEN REDUCTION INTERNAL FIXATION HIP NAILING;  Intramedullary Fixation Right Intertrochanteric Hip Fracture;  Surgeon: Chalino Covarrubias MD;  Location:  OR     OPEN REDUCTION INTERNAL FIXATION TIBIAL PLATEAU  3/31/2013    Procedure: OPEN REDUCTION INTERNAL FIXATION TIBIAL PLATEAU;  RIGHT LATERAL TIBIAL PLATEAU FRACTURE - SYNTHES Medial Meniscus Repair, Lateral Compartment Release;  Surgeon: Alfred Cardenas MD;  Location:  OR     OPEN REDUCTION INTERNAL FIXATION WRIST  1988    left wrist ORIF, hardware removed onemonth later        Family History    Problem Relation Age of Onset     Breast Cancer Other      Circulatory Daughter 53     mengioma     C.A.D. Father      DIABETES Sister      Breast Cancer Maternal Aunt 60       Social History     Social History     Marital status:      Spouse name: N/A     Number of children: 2     Years of education: N/A     Occupational History     Not on file.     Social History Main Topics     Smoking status: Former Smoker     Quit date: 7/1/2009     Smokeless tobacco: Never Used      Comment: former 1/2-1 ppd since age [age]     Alcohol use No     Drug use: No     Sexual activity: No     Other Topics Concern     Not on file     Social History Narrative       Outpatient Encounter Prescriptions as of 11/30/2017   Medication Sig Dispense Refill     Ipratropium-Albuterol (COMBIVENT RESPIMAT)  MCG/ACT inhaler Inhale 1 puff into the lungs every 4 hours as needed for shortness of breath / dyspnea or wheezing Not to exceed 6 doses per day. 1 Inhaler 11     losartan (COZAAR) 50 MG tablet Take 1 tablet (50 mg) by mouth daily 90 tablet 1     fluocinonide (LIDEX) 0.05 % solution Apply to scalp BID PRN 60 mL 3     triamcinolone (KENALOG) 0.1 % cream Apply to AA QD-BID x 1-2 weeks then PRN Only 454 g 11     albuterol (ALBUTEROL) 108 (90 BASE) MCG/ACT Inhaler Inhale 2 puffs into the lungs 4 times daily as needed for shortness of breath / dyspnea or wheezing 1 Inhaler 6     GLUCOSAMINE SULFATE PO Take 500 mg by mouth daily       Flaxseed, Linseed, (FLAX SEEDS PO)        dorzolamide (TRUSOPT) 2 % ophthalmic solution Place 1 drop into both eyes 2 times daily       Cholecalciferol (VITAMIN D) 2000 UNITS tablet Take 1 tablet by mouth daily.       bimatoprost (LUMIGAN) 0.01 % SOLN Place 1 drop into both eyes At Bedtime.         carboxymethylcellulose (REFRESH PLUS) 0.5 % SOLN Place 1 drop into both eyes every 2 hours as needed.         Calcium Carbonate-Vitamin D (CALCIUM + D) 600-200 MG-UNIT per tablet Take 1 tablet by mouth every  12 hours. 100 tablet 12     ascorbic acid (VITAMIN C) 500 MG tablet Take 1 tablet by mouth daily. 100 tablet 12     methotrexate 2.5 MG tablet CHEMO   0     folic acid (FOLVITE) 1 MG tablet Take 1 tablet (1 mg) by mouth daily (Patient not taking: Reported on 11/14/2017) 90 tablet 0     ALPRAZolam (XANAX) 0.25 MG tablet 1 tablet every 8 as needed for anxiety (Patient not taking: Reported on 11/2/2017) 10 tablet 0     hydrOXYzine (ATARAX) 25 MG tablet 1-2 tabs PO QHS for itching (Patient not taking: Reported on 11/14/2017) 20 tablet 1     No facility-administered encounter medications on file as of 11/30/2017.              Review Of Systems  Skin: As above  Eyes: negative  Ears/Nose/Throat: negative  Respiratory: No shortness of breath, dyspnea on exertion, cough, or hemoptysis  Cardiovascular: negative  Gastrointestinal: negative  Genitourinary: negative  Musculoskeletal: negative  Neurologic: negative  Psychiatric: negative  Hematologic/Lymphatic/Immunologic: negative  Endocrine: negative      O:   NAD, WDWN, Alert & Oriented, Mood & Affect wnl, Vitals stable   Here today with daughter    /78  Pulse 81  SpO2 99%   General appearance normal   Vitals stable   Alert, oriented and in no acute distress     Skin clear      The remainder of expanded problem focused exam was unremarkable; the following areas were examined:  scalp/hair, conjunctiva/lids, face, neck, lips      Eyes: Conjunctivae/lids:Normal     ENT: Lips, buccal mucosa, tongue: normal    MSK:Normal    Cardiovascular: peripheral edema none    Pulm: Breathing Normal    Neuro/Psych: Orientation:Normal; Mood/Affect:Normal      A/P:  1. Itching  No rash today  Elavil discussed with patient   She declines  Skin care discussed with patient   Cont antihistamines  Return to clinic prn  Skin care regimen reviewed with patient: Eliminate harsh soaps, i.e. Dial, zest, irsih spring; Mild soaps such as Cetaphil or Dove sensitive skin, avoid hot or cold showers,  aggressive use of emollients including vanicream, cetaphil or cerave discussed with patient.

## 2017-11-30 NOTE — NURSING NOTE
"Chief Complaint   Patient presents with     Derm Problem     urticaria        Initial /78  Pulse 81  SpO2 99% Estimated body mass index is 22.78 kg/(m^2) as calculated from the following:    Height as of 10/5/17: 1.6 m (5' 3\").    Weight as of 11/14/17: 58.3 kg (128 lb 9.6 oz).  Medication Reconciliation: complete    "

## 2017-11-30 NOTE — MR AVS SNAPSHOT
After Visit Summary   11/30/2017    Jamilah Rodriguez    MRN: [id number]           Patient Information     Date Of Birth          4/5/1931        Visit Information        Provider Department      11/30/2017 9:30 AM Dixon Silva MD [facility]        Today's Diagnoses     Itching    -  1       Follow-ups after your visit        Who to contact     If you have questions or need follow up information about today's clinic visit or your schedule please contact [facility] directly at [phone].  Normal or non-critical lab and imaging results will be communicated to you by Discoverlyhart, letter or phone within 4 business days after the clinic has received the results. If you do not hear from us within 7 days, please contact the clinic through Buddyt or phone. If you have a critical or abnormal lab result, we will notify you by phone as soon as possible.  Submit refill requests through "[employer] or call your pharmacy and they will forward the refill request to us. Please allow 3 business days for your refill to be completed.          Additional Information About Your Visit        MyChart Information     "[employer] gives you secure access to your electronic health record. If you see a primary care provider, you can also send messages to your care team and make appointments. If you have questions, please call your primary care clinic.  If you do not have a primary care provider, please call [phone] and they will assist you.        Care EveryWhere ID     This is your Care EveryWhere ID. This could be used by other organizations to access your [place] medical records  [id number]        Your Vitals Were     Pulse Pulse Oximetry                81 99%           Blood Pressure from Last 3 Encounters:   11/30/17 129/78   11/14/17 134/74   11/02/17 106/61    Weight from Last 3 Encounters:   11/14/17 58.3 kg (128 lb 9.6 oz)   10/05/17 58.3 kg (128 lb 8 oz)    06/08/17 59 kg (130 lb)              Today, you had the following     No orders found for display       Primary Care Provider Office Phone # Fax #    Bhupendra Chong [phone]       [address] [facility] [address]        Equal Access to Services     DRE FORREST : Hadii aad ku hadasho Soomaali, waaxda luqadaha, qaybta kaalmada adeegyada, waxay idiin hayginon adeerin murphy laandreea . So [facility] [phone].    ATENCIÓN: Si habla español, tiene a smith disposición servicios gratuitos de asistencia lingüística. Shahidame al [phone].    We comply with applicable federal civil rights laws and [place] laws. We do not discriminate on the basis of race, color, national origin, age, disability, sex, sexual orientation, or gender identity.            Thank you!     Thank you for choosing [facility]  for your care. Our goal is always to provide you with excellent care. Hearing back from our patients is one way we can continue to improve our services. Please take a few minutes to complete the written survey that you may receive in the mail after your visit with us. Thank you!             Your Updated Medication List - Protect others around you: Learn how to safely use, store and throw away your medicines at www.disposemymeds.org.          This list is accurate as of: 11/30/17 10:34 AM.  Always use your most recent med list.                   Brand Name Dispense Instructions for use Diagnosis    albuterol 108 (90 BASE) MCG/ACT Inhaler    PROAIR HFA    1 Inhaler    Inhale 2 puffs into the lungs 4 times daily as needed for shortness of breath / dyspnea or wheezing    COPD exacerbation (H)       ALPRAZolam 0.25 MG tablet    XANAX    10 tablet    1 tablet every 8 as needed for anxiety    Anxiety       ascorbic acid 500 MG tablet    VITAMIN C    100 tablet    Take 1 tablet by mouth daily.        bimatoprost 0.01 % Soln    LUMIGAN     Place 1 drop into both eyes At Bedtime.         calcium + D 600-200 MG-UNIT Tabs   Generic drug:  calcium carbonate-vitamin D     100 tablet    Take 1 tablet by mouth every 12 hours.        carboxymethylcellulose 0.5 % Soln ophthalmic solution    REFRESH PLUS     Place 1 drop into both eyes every 2 hours as needed.        dorzolamide 2 % ophthalmic solution    TRUSOPT     Place 1 drop into both eyes 2 times daily        FLAX SEEDS PO           fluocinonide 0.05 % solution    LIDEX    60 mL    Apply to scalp BID PRN    Scalp itch       folic acid 1 MG tablet    FOLVITE    90 tablet    Take 1 tablet (1 mg) by mouth daily    Urticaria       GLUCOSAMINE SULFATE PO      Take 500 mg by mouth daily        hydrOXYzine 25 MG tablet    ATARAX    20 tablet    1-2 tabs PO QHS for itching    Chronic dermatitis       Ipratropium-Albuterol  MCG/ACT inhaler    COMBIVENT RESPIMAT    1 Inhaler    Inhale 1 puff into the lungs every 4 hours as needed for shortness of breath / dyspnea or wheezing Not to exceed 6 doses per day.    Chronic obstructive pulmonary disease, unspecified COPD type (H)       losartan 50 MG tablet    COZAAR    90 tablet    Take 1 tablet (50 mg) by mouth daily    Benign essential hypertension       methotrexate 2.5 MG tablet CHEMO           triamcinolone 0.1 % cream    KENALOG    454 g    Apply to AA QD-BID x 1-2 weeks then PRN Only    Acute dermatitis       vitamin D 2000 UNITS tablet      Take 1 tablet by mouth daily.    Osteoporosis

## 2017-12-18 ENCOUNTER — OFFICE VISIT (OUTPATIENT)
Dept: URGENT CARE | Facility: URGENT CARE | Age: 82
End: 2017-12-18
Payer: MEDICARE

## 2017-12-18 ENCOUNTER — RADIANT APPOINTMENT (OUTPATIENT)
Dept: GENERAL RADIOLOGY | Facility: CLINIC | Age: 82
End: 2017-12-18
Attending: PHYSICIAN ASSISTANT
Payer: MEDICARE

## 2017-12-18 VITALS
BODY MASS INDEX: 22.39 KG/M2 | HEART RATE: 89 BPM | OXYGEN SATURATION: 98 % | SYSTOLIC BLOOD PRESSURE: 106 MMHG | RESPIRATION RATE: 20 BRPM | TEMPERATURE: 97.8 F | WEIGHT: 126.4 LBS | DIASTOLIC BLOOD PRESSURE: 62 MMHG

## 2017-12-18 DIAGNOSIS — R09.81 NASAL CONGESTION: ICD-10-CM

## 2017-12-18 DIAGNOSIS — J06.9 UPPER RESPIRATORY TRACT INFECTION, UNSPECIFIED TYPE: ICD-10-CM

## 2017-12-18 DIAGNOSIS — R06.09 EXERTIONAL DYSPNEA: Primary | ICD-10-CM

## 2017-12-18 DIAGNOSIS — J44.1 COPD EXACERBATION (H): ICD-10-CM

## 2017-12-18 DIAGNOSIS — R06.09 EXERTIONAL DYSPNEA: ICD-10-CM

## 2017-12-18 LAB
ALBUMIN SERPL-MCNC: 3.3 G/DL (ref 3.4–5)
ALP SERPL-CCNC: 95 U/L (ref 40–150)
ALT SERPL W P-5'-P-CCNC: 17 U/L (ref 0–50)
ANION GAP SERPL CALCULATED.3IONS-SCNC: 7 MMOL/L (ref 3–14)
AST SERPL W P-5'-P-CCNC: 9 U/L (ref 0–45)
BASOPHILS # BLD AUTO: 0.1 10E9/L (ref 0–0.2)
BASOPHILS NFR BLD AUTO: 0.4 %
BILIRUB SERPL-MCNC: 0.6 MG/DL (ref 0.2–1.3)
BUN SERPL-MCNC: 17 MG/DL (ref 7–30)
CALCIUM SERPL-MCNC: 8.8 MG/DL (ref 8.5–10.1)
CHLORIDE SERPL-SCNC: 101 MMOL/L (ref 94–109)
CO2 SERPL-SCNC: 26 MMOL/L (ref 20–32)
CREAT SERPL-MCNC: 0.85 MG/DL (ref 0.52–1.04)
DIFFERENTIAL METHOD BLD: ABNORMAL
EOSINOPHIL # BLD AUTO: 0.7 10E9/L (ref 0–0.7)
EOSINOPHIL NFR BLD AUTO: 5.6 %
ERYTHROCYTE [DISTWIDTH] IN BLOOD BY AUTOMATED COUNT: 12.6 % (ref 10–15)
GFR SERPL CREATININE-BSD FRML MDRD: 63 ML/MIN/1.7M2
GLUCOSE SERPL-MCNC: 106 MG/DL (ref 70–99)
HCT VFR BLD AUTO: 36.3 % (ref 35–47)
HGB BLD-MCNC: 11.8 G/DL (ref 11.7–15.7)
LYMPHOCYTES # BLD AUTO: 0.6 10E9/L (ref 0.8–5.3)
LYMPHOCYTES NFR BLD AUTO: 4.7 %
MCH RBC QN AUTO: 31.1 PG (ref 26.5–33)
MCHC RBC AUTO-ENTMCNC: 32.5 G/DL (ref 31.5–36.5)
MCV RBC AUTO: 96 FL (ref 78–100)
MONOCYTES # BLD AUTO: 1 10E9/L (ref 0–1.3)
MONOCYTES NFR BLD AUTO: 8.7 %
NEUTROPHILS # BLD AUTO: 9.5 10E9/L (ref 1.6–8.3)
NEUTROPHILS NFR BLD AUTO: 80.6 %
PLATELET # BLD AUTO: 271 10E9/L (ref 150–450)
POTASSIUM SERPL-SCNC: 4.5 MMOL/L (ref 3.4–5.3)
PROT SERPL-MCNC: 6.8 G/DL (ref 6.8–8.8)
RBC # BLD AUTO: 3.8 10E12/L (ref 3.8–5.2)
SODIUM SERPL-SCNC: 134 MMOL/L (ref 133–144)
TROPONIN I SERPL-MCNC: <0.015 UG/L (ref 0–0.04)
WBC # BLD AUTO: 11.8 10E9/L (ref 4–11)

## 2017-12-18 PROCEDURE — 84484 ASSAY OF TROPONIN QUANT: CPT | Performed by: PHYSICIAN ASSISTANT

## 2017-12-18 PROCEDURE — 71020 XR CHEST 2 VW: CPT

## 2017-12-18 PROCEDURE — 36415 COLL VENOUS BLD VENIPUNCTURE: CPT | Performed by: PHYSICIAN ASSISTANT

## 2017-12-18 PROCEDURE — 85025 COMPLETE CBC W/AUTO DIFF WBC: CPT | Performed by: PHYSICIAN ASSISTANT

## 2017-12-18 PROCEDURE — 93000 ELECTROCARDIOGRAM COMPLETE: CPT | Performed by: PHYSICIAN ASSISTANT

## 2017-12-18 PROCEDURE — 99215 OFFICE O/P EST HI 40 MIN: CPT | Mod: 25 | Performed by: PHYSICIAN ASSISTANT

## 2017-12-18 PROCEDURE — 80053 COMPREHEN METABOLIC PANEL: CPT | Performed by: PHYSICIAN ASSISTANT

## 2017-12-18 PROCEDURE — 94640 AIRWAY INHALATION TREATMENT: CPT | Performed by: PHYSICIAN ASSISTANT

## 2017-12-18 RX ORDER — IPRATROPIUM BROMIDE AND ALBUTEROL SULFATE 2.5; .5 MG/3ML; MG/3ML
1 SOLUTION RESPIRATORY (INHALATION) EVERY 4 HOURS PRN
Qty: 90 VIAL | Refills: 3 | Status: SHIPPED | OUTPATIENT
Start: 2017-12-18 | End: 2017-12-19

## 2017-12-18 RX ORDER — METHYLPREDNISOLONE 4 MG
TABLET, DOSE PACK ORAL
Qty: 21 TABLET | Refills: 0 | Status: SHIPPED | OUTPATIENT
Start: 2017-12-18 | End: 2018-02-20

## 2017-12-18 RX ORDER — IPRATROPIUM BROMIDE AND ALBUTEROL SULFATE 2.5; .5 MG/3ML; MG/3ML
1 SOLUTION RESPIRATORY (INHALATION) EVERY 6 HOURS PRN
Qty: 3 ML | Refills: 0
Start: 2017-12-18 | End: 2018-04-15

## 2017-12-18 RX ORDER — AZITHROMYCIN 250 MG/1
TABLET, FILM COATED ORAL
Qty: 6 TABLET | Refills: 0 | Status: SHIPPED | OUTPATIENT
Start: 2017-12-18 | End: 2018-01-17

## 2017-12-18 NOTE — MR AVS SNAPSHOT
After Visit Summary   12/18/2017    Jamilah Rodriguez    MRN: [id number]           Patient Information     Date Of Birth          4/5/1931        Visit Information        Provider Department      12/18/2017 10:20 AM Norberto Curran PA-C [facility]        Today's Diagnoses     Exertional dyspnea    -  1    Upper respiratory tract infection, unspecified type        Nasal congestion        COPD exacerbation (H)           Follow-ups after your visit        Who to contact     If you have questions or need follow up information about today's clinic visit or your schedule please contact [facility] directly at [phone].  Normal or non-critical lab and imaging results will be communicated to you by MyChart, letter or phone within 4 business days after the clinic has received the results. If you do not hear from us within 7 days, please contact the clinic through YuMehart or phone. If you have a critical or abnormal lab result, we will notify you by phone as soon as possible.  Submit refill requests through [employer] or call your pharmacy and they will forward the refill request to us. Please allow 3 business days for your refill to be completed.          Additional Information About Your Visit        MyChart Information     [employer] gives you secure access to your electronic health record. If you see a primary care provider, you can also send messages to your care team and make appointments. If you have questions, please call your primary care clinic.  If you do not have a primary care provider, please call [phone] and they will assist you.        Care EveryWhere ID     This is your Care EveryWhere ID. This could be used by other organizations to access your [place] medical records  [id number]        Your Vitals Were     Pulse Temperature Respirations Pulse Oximetry BMI (Body Mass Index)       89 97.8  F (36.6  C) (Oral) 20 98% 22.39 kg/m2         Blood Pressure from Last 3 Encounters:   12/18/17 106/62   11/30/17 129/78   11/14/17 134/74    Weight from Last 3 Encounters:   12/18/17 126 lb 6.4 oz (57.3 kg)   11/14/17 128 lb 9.6 oz (58.3 kg)   10/05/17 128 lb 8 oz (58.3 kg)              We Performed the Following     ALBUTEROL/IPRATROPIUM 3ML NEB     CBC with platelets differential     Comprehensive metabolic panel     EKG 12-lead complete w/read - Clinics     INHALATION/NEBULIZER TREATMENT, INITIAL     INHALATION/NEBULIZER TREATMENT, INITIAL     Troponin I          Today's Medication Changes          These changes are accurate as of: 12/18/17  3:00 PM.  If you have any questions, ask your nurse or doctor.               Start taking these medicines.        Dose/Directions    azithromycin 250 MG tablet   Commonly known as:  ZITHROMAX   Used for:  Upper respiratory tract infection, unspecified type, COPD exacerbation (H)   Started by:  Norberto Curran PA-C        2 tabs po qd day 1, then 1 tab po qd days 2-5   Quantity:  6 tablet   Refills:  0       methylPREDNISolone 4 MG tablet   Commonly known as:  MEDROL DOSEPAK   Used for:  Exertional dyspnea, Upper respiratory tract infection, unspecified type, COPD exacerbation (H)   Started by:  Norberto Curran PA-C        Follow package instructions   Quantity:  21 tablet   Refills:  0         These medicines have changed or have updated prescriptions.        Dose/Directions    * Ipratropium-Albuterol  MCG/ACT inhaler   Commonly known as:  COMBIVENT RESPIMAT   This may have changed:  Another medication with the same name was added. Make sure you understand how and when to take each.   Used for:  Chronic obstructive pulmonary disease, unspecified COPD type (H)   Changed by:  Bhupendra Chong MD        Dose:  1 puff   Inhale 1 puff into the lungs every 4 hours as needed for shortness of breath / dyspnea or wheezing Not to exceed 6 doses per day.   Quantity:  1 Inhaler   Refills:  11       * ipratropium  - albuterol 0.5 mg/2.5 mg/3 mL 0.5-2.5 (3) MG/3ML neb solution   Commonly known as:  DUONEB   This may have changed:  You were already taking a medication with the same name, and this prescription was added. Make sure you understand how and when to take each.   Used for:  Upper respiratory tract infection, unspecified type, COPD exacerbation (H)   Changed by:  Norberto Curran PA-C        Dose:  1 vial   Take 1 vial (3 mLs) by nebulization every 6 hours as needed for shortness of breath / dyspnea or wheezing   Quantity:  3 mL   Refills:  0       * ipratropium - albuterol 0.5 mg/2.5 mg/3 mL 0.5-2.5 (3) MG/3ML neb solution   Commonly known as:  DUONEB   This may have changed:  You were already taking a medication with the same name, and this prescription was added. Make sure you understand how and when to take each.   Used for:  Upper respiratory tract infection, unspecified type, COPD exacerbation (H)   Changed by:  Norberto Curran PA-C        Dose:  1 vial   Take 1 vial (3 mLs) by nebulization every 4 hours as needed for shortness of breath / dyspnea or wheezing   Quantity:  90 vial   Refills:  3       * Notice:  This list has 3 medication(s) that are the same as other medications prescribed for you. Read the directions carefully, and ask your doctor or other care provider to review them with you.         Where to get your medicines      These medications were sent to [facility] [address] [place] [address] [address] AT [facility] [place]  [address], [facility] [address]     Phone:  [phone]     azithromycin 250 MG tablet    ipratropium - albuterol 0.5 mg/2.5 mg/3 mL 0.5-2.5 (3) MG/3ML neb solution    methylPREDNISolone 4 MG tablet         Some of these will need a paper prescription and others can be bought over the counter.  Ask your nurse if you have questions.     You don't need a prescription for these medications     ipratropium - albuterol 0.5 mg/2.5 mg/3  mL 0.5-2.5 (3) MG/3ML neb solution                Primary Care Provider Office Phone # Fax #    Bhupendra Chong [phone]       [address] [facility] [address]        Equal Access to Services     DRE FORREST : Hadii aad ku hadmariuszo Soomaali, waaxda luqadaha, qaybta kaalmada adeegyada, waxxochilt idiin hayginon adeerin murphy ladeidreayaz elder. So [place] [phone].    ATENCIÓN: Si habla español, tiene a smith disposición servicios gratuitos de asistencia lingüística. Llame al [phone].    We comply with applicable federal civil rights laws and [place] laws. We do not discriminate on the basis of race, color, national origin, age, disability, sex, sexual orientation, or gender identity.            Thank you!     Thank you for choosing [facility]  for your care. Our goal is always to provide you with excellent care. Hearing back from our patients is one way we can continue to improve our services. Please take a few minutes to complete the written survey that you may receive in the mail after your visit with us. Thank you!             Your Updated Medication List - Protect others around you: Learn how to safely use, store and throw away your medicines at www.disposemymeds.org.          This list is accurate as of: 12/18/17  3:00 PM.  Always use your most recent med list.                   Brand Name Dispense Instructions for use Diagnosis    albuterol 108 (90 BASE) MCG/ACT Inhaler    PROAIR HFA    1 Inhaler    Inhale 2 puffs into the lungs 4 times daily as needed for shortness of breath / dyspnea or wheezing    COPD exacerbation (H)       ALPRAZolam 0.25 MG tablet    XANAX    10 tablet    1 tablet every 8 as needed for anxiety    Anxiety       ascorbic acid 500 MG tablet    VITAMIN C    100 tablet    Take 1 tablet by mouth daily.        azithromycin 250 MG tablet    ZITHROMAX    6 tablet    2 tabs po qd day 1, then 1 tab po qd days 2-5    Upper respiratory tract infection,  unspecified type, COPD exacerbation (H)       bimatoprost 0.01 % Soln    LUMIGAN     Place 1 drop into both eyes At Bedtime.        calcium + D 600-200 MG-UNIT Tabs   Generic drug:  calcium carbonate-vitamin D     100 tablet    Take 1 tablet by mouth every 12 hours.        carboxymethylcellulose 0.5 % Soln ophthalmic solution    REFRESH PLUS     Place 1 drop into both eyes every 2 hours as needed.        dorzolamide 2 % ophthalmic solution    TRUSOPT     Place 1 drop into both eyes 2 times daily        FLAX SEEDS PO           fluocinonide 0.05 % solution    LIDEX    60 mL    Apply to scalp BID PRN    Scalp itch       folic acid 1 MG tablet    FOLVITE    90 tablet    Take 1 tablet (1 mg) by mouth daily    Urticaria       GLUCOSAMINE SULFATE PO      Take 500 mg by mouth daily        hydrOXYzine 25 MG tablet    ATARAX    20 tablet    1-2 tabs PO QHS for itching    Chronic dermatitis       * Ipratropium-Albuterol  MCG/ACT inhaler    COMBIVENT RESPIMAT    1 Inhaler    Inhale 1 puff into the lungs every 4 hours as needed for shortness of breath / dyspnea or wheezing Not to exceed 6 doses per day.    Chronic obstructive pulmonary disease, unspecified COPD type (H)       * ipratropium - albuterol 0.5 mg/2.5 mg/3 mL 0.5-2.5 (3) MG/3ML neb solution    DUONEB    3 mL    Take 1 vial (3 mLs) by nebulization every 6 hours as needed for shortness of breath / dyspnea or wheezing    Upper respiratory tract infection, unspecified type, COPD exacerbation (H)       * ipratropium - albuterol 0.5 mg/2.5 mg/3 mL 0.5-2.5 (3) MG/3ML neb solution    DUONEB    90 vial    Take 1 vial (3 mLs) by nebulization every 4 hours as needed for shortness of breath / dyspnea or wheezing    Upper respiratory tract infection, unspecified type, COPD exacerbation (H)       losartan 50 MG tablet    COZAAR    90 tablet    Take 1 tablet (50 mg) by mouth daily    Benign essential hypertension       methotrexate 2.5 MG tablet CHEMO            methylPREDNISolone 4 MG tablet    MEDROL DOSEPAK    21 tablet    Follow package instructions    Exertional dyspnea, Upper respiratory tract infection, unspecified type, COPD exacerbation (H)       triamcinolone 0.1 % cream    KENALOG    454 g    Apply to AA QD-BID x 1-2 weeks then PRN Only    Acute dermatitis       vitamin D 2000 UNITS tablet      Take 1 tablet by mouth daily.    Osteoporosis       * Notice:  This list has 3 medication(s) that are the same as other medications prescribed for you. Read the directions carefully, and ask your doctor or other care provider to review them with you.

## 2017-12-18 NOTE — NURSING NOTE
"Chief Complaint   Patient presents with     URI     larangitis x 4 days, cough, SOB, chest congestion        Initial /62  Pulse 89  Temp 97.8  F (36.6  C) (Oral)  Resp 20  Wt 126 lb 6.4 oz (57.3 kg)  SpO2 98%  BMI 22.39 kg/m2 Estimated body mass index is 22.39 kg/(m^2) as calculated from the following:    Height as of 10/5/17: 5' 3\" (1.6 m).    Weight as of this encounter: 126 lb 6.4 oz (57.3 kg).  Medication Reconciliation: complete    "

## 2017-12-18 NOTE — PROGRESS NOTES
SUBJECTIVE:   Jamilah Rodriguez is a 86 year old female presenting with a chief complaint of wheezing, SOB with exertion for a few wks.  Positive for coughing and hoarse voice, congestion for the last couple wks.  Onset of symptoms was 2 wks  ago.  Course of illness is worsening.    Severity moderate  Current and Associated symptoms: hoarse voice and congestion, coughing  Treatment measures tried include OTC medications.  Predisposing factors include recent illness, coughing, congestion.    Past Medical History:   Diagnosis Date     AAA (abdominal aortic aneurysm) (H)     Followed with yearly ultrasound     Cervical cancer (H) 1969     Chronic dermatitis     extensive allergy testing revealed allergy/sensitivity to carba mix ( rubber) and Imidazolidinyl Urea (common in beauty products)     Glaucoma      Glaucoma      History of COPD     very mild abnormal spirometry in 2007 (in [place]), has not been active since quitting smoking in 2009     HTN (hypertension)      Macular degeneration      Osteoporosis 8/3/11    femoral T score -3.4 & -3.7     PAD (peripheral artery disease) (H)      Vertebral compression fracture (H) 5/17/11    L1 compression fracture, presumed osteoporotic compression fracture     FAM HX  HTN  Diabetes  OA  COPD  CVD  Allergies    Allergies   Allergen Reactions     Atenolol Hives     Beta Adrenergic Blockers      Brimonidine      Cephalexin Hives     Germall Plus Itching     Imidazolidinyl Urea found in many topical creams and house hold products     Latex      Itching     Morphine GI Disturbance     No Clinical Screening - See Comments Itching     Carba Mix; IMIDAZOLIDINYL UREA - Allergic to this ingredient, which is found in many common topical moisturizers and household products.     Penicillins Hives     Sulfa Drugs GI Disturbance     Tramadol Nausea and Vomiting     Tylenol GI Disturbance     Vicodin [Hydrocodone-Acetaminophen]      Fentanyl Nausea         Social History   Substance Use Topics      Smoking status: Former Smoker     Quit date: 7/1/2009     Smokeless tobacco: Never Used      Comment: former 1/2-1 ppd since age 20-22     Alcohol use No       ROS:  CONSTITUTIONAL:NEGATIVE for fever, chills, change in weight  INTEGUMENTARY/SKIN: NEGATIVE for worrisome rashes, moles or lesions  EYES: NEGATIVE for vision changes or irritation  ENT/MOUTH: POSITIVE for throat pain, congestion, hoarse voice  RESP:postiive for chest congestion, coughing.  Positive for SOB with exertion  CV: NEGATIVE for chest pain, palpitations or peripheral edema  GI: NEGATIVE for nausea, abdominal pain, heartburn, or change in bowel habits  MUSCULOSKELETAL: NEGATIVE for significant arthralgias or myalgia  NEURO: NEGATIVE for weakness, dizziness or paresthesias    OBJECTIVE  :/62  Pulse 89  Temp 97.8  F (36.6  C) (Oral)  Resp 20  Wt 126 lb 6.4 oz (57.3 kg)  SpO2 98%  BMI 22.39 kg/m2  GENERAL APPEARANCE: healthy, alert and no distress  EYES: EOMI,  PERRL, conjunctiva clear  HENT: TM's normal bilaterally and nasal turbinates erythematous, swollen  NECK: supple, nontender, no lymphadenopathy  RESP: expiratory wheezes generalized   CV: regular rates and rhythm, normal S1 S2, no murmur noted  ABDOMEN:  soft, nontender, no HSM or masses and bowel sounds normal  Extremities: no peripheral edema or tenderness, peripheral pulses normal  MS: extremities normal- no gross deformities noted, no erythema, FROM noted in all extremities  NEURO: Normal strength and tone, sensory exam grossly normal,  normal speech and mentation  SKIN: no suspicious lesions or rashes    Chest xray Negative for acute findings, read by Norberto BAUGH at time of visit.    Results for orders placed or performed in visit on 12/18/17   Troponin I   Result Value Ref Range    Troponin I ES <0.015 0.000 - 0.045 ug/L   CBC with platelets differential   Result Value Ref Range    WBC 11.8 (H) 4.0 - 11.0 10e9/L    RBC Count 3.80 3.8 - 5.2 10e12/L    Hemoglobin  11.8 11.7 - 15.7 g/dL    Hematocrit 36.3 35.0 - 47.0 %    MCV 96 78 - 100 fl    MCH 31.1 26.5 - 33.0 pg    MCHC 32.5 31.5 - 36.5 g/dL    RDW 12.6 10.0 - 15.0 %    Platelet Count 271 150 - 450 10e9/L    Diff Method Automated Method     % Neutrophils 80.6 %    % Lymphocytes 4.7 %    % Monocytes 8.7 %    % Eosinophils 5.6 %    % Basophils 0.4 %    Absolute Neutrophil 9.5 (H) 1.6 - 8.3 10e9/L    Absolute Lymphocytes 0.6 (L) 0.8 - 5.3 10e9/L    Absolute Monocytes 1.0 0.0 - 1.3 10e9/L    Absolute Eosinophils 0.7 0.0 - 0.7 10e9/L    Absolute Basophils 0.1 0.0 - 0.2 10e9/L   Comprehensive metabolic panel   Result Value Ref Range    Sodium 134 133 - 144 mmol/L    Potassium 4.5 3.4 - 5.3 mmol/L    Chloride 101 94 - 109 mmol/L    Carbon Dioxide 26 20 - 32 mmol/L    Anion Gap 7 3 - 14 mmol/L    Glucose 106 (H) 70 - 99 mg/dL    Urea Nitrogen 17 7 - 30 mg/dL    Creatinine 0.85 0.52 - 1.04 mg/dL    GFR Estimate 63 >60 mL/min/1.7m2    GFR Estimate If Black 76 >60 mL/min/1.7m2    Calcium 8.8 8.5 - 10.1 mg/dL    Bilirubin Total 0.6 0.2 - 1.3 mg/dL    Albumin 3.3 (L) 3.4 - 5.0 g/dL    Protein Total 6.8 6.8 - 8.8 g/dL    Alkaline Phosphatase 95 40 - 150 U/L    ALT 17 0 - 50 U/L    AST 9 0 - 45 U/L       EKG Positive for occasional PACs, no st changes      Duo neb treatment given in clinic, improved symptoms      ASSESSMENT/PLAN      ICD-10-CM    1. Exertional dyspnea R06.09 Troponin I     EKG 12-lead complete w/read - Clinics     XR Chest 2 Views     methylPREDNISolone (MEDROL DOSEPAK) 4 MG tablet   2. Upper respiratory tract infection, unspecified type J06.9 XR Chest 2 Views     CBC with platelets differential     Comprehensive metabolic panel     ipratropium - albuterol 0.5 mg/2.5 mg/3 mL (DUONEB) 0.5-2.5 (3) MG/3ML neb solution     azithromycin (ZITHROMAX) 250 MG tablet     methylPREDNISolone (MEDROL DOSEPAK) 4 MG tablet     ipratropium - albuterol 0.5 mg/2.5 mg/3 mL (DUONEB) 0.5-2.5 (3) MG/3ML neb solution     INHALATION/NEBULIZER  TREATMENT, INITIAL     ALBUTEROL/IPRATROPIUM 3ML NEB   3. Nasal congestion R09.81 CBC with platelets differential     Comprehensive metabolic panel   4. COPD exacerbation (H) J44.1 INHALATION/NEBULIZER TREATMENT, INITIAL     ipratropium - albuterol 0.5 mg/2.5 mg/3 mL (DUONEB) 0.5-2.5 (3) MG/3ML neb solution     azithromycin (ZITHROMAX) 250 MG tablet     methylPREDNISolone (MEDROL DOSEPAK) 4 MG tablet     ipratropium - albuterol 0.5 mg/2.5 mg/3 mL (DUONEB) 0.5-2.5 (3) MG/3ML neb solution     Duoneb helped with chest tightness and wheezing  Continue nebs at home  Chest xray rule out cardiomegaly and pneumonia  Troponin to rule out MI / cardiac damage  CBC and CMP due to SOB with exertion  zpak for COPD exacerbation  Medrol for COPD exacerbation  Follow up with PCP for recheck of symptoms  Go to the ED if symptoms worsen   See orders in Epic

## 2017-12-19 RX ORDER — IPRATROPIUM BROMIDE AND ALBUTEROL SULFATE 2.5; .5 MG/3ML; MG/3ML
1 SOLUTION RESPIRATORY (INHALATION) EVERY 6 HOURS PRN
Qty: 360 VIAL | Refills: 1 | Status: SHIPPED | OUTPATIENT
Start: 2017-12-19 | End: 2018-02-20

## 2017-12-19 RX ORDER — IPRATROPIUM BROMIDE AND ALBUTEROL SULFATE 2.5; .5 MG/3ML; MG/3ML
SOLUTION RESPIRATORY (INHALATION)
Qty: 1620 ML | Refills: 3 | OUTPATIENT
Start: 2017-12-19

## 2018-01-01 ENCOUNTER — TELEPHONE (OUTPATIENT)
Dept: CARDIOLOGY | Facility: CLINIC | Age: 83
End: 2018-01-01

## 2018-01-01 ENCOUNTER — DOCUMENTATION ONLY (OUTPATIENT)
Dept: CARDIOLOGY | Facility: CLINIC | Age: 83
End: 2018-01-01

## 2018-01-01 ENCOUNTER — HOSPITAL ENCOUNTER (OUTPATIENT)
Dept: CARDIAC REHAB | Facility: CLINIC | Age: 83
End: 2018-07-05
Attending: INTERNAL MEDICINE
Payer: MEDICARE

## 2018-01-01 ENCOUNTER — TELEPHONE (OUTPATIENT)
Dept: INTERNAL MEDICINE | Facility: CLINIC | Age: 83
End: 2018-01-01

## 2018-01-01 ENCOUNTER — PRE VISIT (OUTPATIENT)
Dept: CARDIOLOGY | Facility: CLINIC | Age: 83
End: 2018-01-01

## 2018-01-01 ENCOUNTER — HOSPITAL ENCOUNTER (OUTPATIENT)
Dept: CARDIAC REHAB | Facility: CLINIC | Age: 83
End: 2018-07-16
Attending: INTERNAL MEDICINE
Payer: MEDICARE

## 2018-01-01 ENCOUNTER — APPOINTMENT (OUTPATIENT)
Dept: CARDIOLOGY | Facility: CLINIC | Age: 83
End: 2018-01-01
Attending: INTERNAL MEDICINE
Payer: MEDICARE

## 2018-01-01 ENCOUNTER — HOSPITAL ENCOUNTER (OUTPATIENT)
Facility: CLINIC | Age: 83
Discharge: HOME | End: 2018-10-12
Attending: INTERNAL MEDICINE | Admitting: INTERNAL MEDICINE
Payer: MEDICARE

## 2018-01-01 ENCOUNTER — HOSPITAL ENCOUNTER (OUTPATIENT)
Dept: CARDIAC REHAB | Facility: CLINIC | Age: 83
End: 2018-07-02
Attending: INTERNAL MEDICINE
Payer: MEDICARE

## 2018-01-01 ENCOUNTER — OFFICE VISIT (OUTPATIENT)
Dept: INTERNAL MEDICINE | Facility: CLINIC | Age: 83
End: 2018-01-01
Payer: MEDICARE

## 2018-01-01 ENCOUNTER — HOSPITAL ENCOUNTER (OUTPATIENT)
Dept: CARDIAC REHAB | Facility: CLINIC | Age: 83
End: 2018-07-13
Attending: INTERNAL MEDICINE
Payer: MEDICARE

## 2018-01-01 ENCOUNTER — HOSPITAL ENCOUNTER (OUTPATIENT)
Dept: CARDIAC REHAB | Facility: CLINIC | Age: 83
End: 2018-06-18
Attending: INTERNAL MEDICINE
Payer: MEDICARE

## 2018-01-01 ENCOUNTER — RADIANT APPOINTMENT (OUTPATIENT)
Dept: GENERAL RADIOLOGY | Facility: CLINIC | Age: 83
End: 2018-01-01
Attending: INTERNAL MEDICINE
Payer: MEDICARE

## 2018-01-01 ENCOUNTER — HOSPITAL ENCOUNTER (OUTPATIENT)
Dept: CARDIOLOGY | Facility: CLINIC | Age: 83
Discharge: HOME | End: 2018-10-03
Attending: INTERNAL MEDICINE | Admitting: INTERNAL MEDICINE
Payer: MEDICARE

## 2018-01-01 ENCOUNTER — TRANSFERRED RECORDS (OUTPATIENT)
Dept: HEALTH INFORMATION MANAGEMENT | Facility: CLINIC | Age: 83
End: 2018-01-01

## 2018-01-01 ENCOUNTER — OFFICE VISIT (OUTPATIENT)
Dept: CARDIOLOGY | Facility: CLINIC | Age: 83
End: 2018-01-01
Payer: MEDICARE

## 2018-01-01 ENCOUNTER — HOSPITAL ENCOUNTER (OUTPATIENT)
Dept: CARDIOLOGY | Facility: CLINIC | Age: 83
Discharge: HOME | End: 2018-10-10
Attending: INTERNAL MEDICINE | Admitting: INTERNAL MEDICINE
Payer: MEDICARE

## 2018-01-01 ENCOUNTER — HOSPITAL ENCOUNTER (OUTPATIENT)
Dept: CARDIAC REHAB | Facility: CLINIC | Age: 83
End: 2018-06-26
Attending: INTERNAL MEDICINE
Payer: MEDICARE

## 2018-01-01 VITALS
SYSTOLIC BLOOD PRESSURE: 95 MMHG | TEMPERATURE: 97.5 F | DIASTOLIC BLOOD PRESSURE: 55 MMHG | WEIGHT: 118.1 LBS | HEART RATE: 80 BPM | HEIGHT: 65 IN | OXYGEN SATURATION: 98 % | BODY MASS INDEX: 19.68 KG/M2 | RESPIRATION RATE: 16 BRPM

## 2018-01-01 VITALS
OXYGEN SATURATION: 98 % | SYSTOLIC BLOOD PRESSURE: 92 MMHG | HEART RATE: 66 BPM | RESPIRATION RATE: 18 BRPM | WEIGHT: 119.9 LBS | BODY MASS INDEX: 19.95 KG/M2 | TEMPERATURE: 97.5 F | DIASTOLIC BLOOD PRESSURE: 64 MMHG

## 2018-01-01 VITALS
TEMPERATURE: 97.7 F | DIASTOLIC BLOOD PRESSURE: 64 MMHG | WEIGHT: 121.9 LBS | RESPIRATION RATE: 16 BRPM | SYSTOLIC BLOOD PRESSURE: 100 MMHG | HEART RATE: 64 BPM | OXYGEN SATURATION: 92 % | BODY MASS INDEX: 20.29 KG/M2

## 2018-01-01 VITALS
RESPIRATION RATE: 24 BRPM | DIASTOLIC BLOOD PRESSURE: 74 MMHG | BODY MASS INDEX: 20.63 KG/M2 | WEIGHT: 124 LBS | HEART RATE: 86 BPM | TEMPERATURE: 98 F | SYSTOLIC BLOOD PRESSURE: 110 MMHG | OXYGEN SATURATION: 98 %

## 2018-01-01 VITALS
DIASTOLIC BLOOD PRESSURE: 86 MMHG | BODY MASS INDEX: 19.99 KG/M2 | OXYGEN SATURATION: 98 % | SYSTOLIC BLOOD PRESSURE: 132 MMHG | HEART RATE: 82 BPM | HEIGHT: 65 IN | WEIGHT: 120 LBS

## 2018-01-01 VITALS
HEIGHT: 65 IN | HEART RATE: 86 BPM | SYSTOLIC BLOOD PRESSURE: 121 MMHG | BODY MASS INDEX: 19.99 KG/M2 | DIASTOLIC BLOOD PRESSURE: 76 MMHG | WEIGHT: 120 LBS

## 2018-01-01 DIAGNOSIS — R07.89 CHEST HEAVINESS: ICD-10-CM

## 2018-01-01 DIAGNOSIS — R10.12 LUQ ABDOMINAL PAIN: ICD-10-CM

## 2018-01-01 DIAGNOSIS — Z23 NEED FOR PROPHYLACTIC VACCINATION AND INOCULATION AGAINST INFLUENZA: ICD-10-CM

## 2018-01-01 DIAGNOSIS — J44.9 CHRONIC OBSTRUCTIVE PULMONARY DISEASE, UNSPECIFIED COPD TYPE (H): ICD-10-CM

## 2018-01-01 DIAGNOSIS — I10 BENIGN ESSENTIAL HYPERTENSION: ICD-10-CM

## 2018-01-01 DIAGNOSIS — I42.9 CARDIOMYOPATHY, UNSPECIFIED TYPE (H): Primary | ICD-10-CM

## 2018-01-01 DIAGNOSIS — I42.9 SECONDARY CARDIOMYOPATHY (H): Primary | ICD-10-CM

## 2018-01-01 DIAGNOSIS — R10.12 ABDOMINAL DISCOMFORT IN LEFT UPPER QUADRANT: ICD-10-CM

## 2018-01-01 DIAGNOSIS — Z23 NEED FOR VACCINATION: ICD-10-CM

## 2018-01-01 DIAGNOSIS — I42.9 SECONDARY CARDIOMYOPATHY (H): ICD-10-CM

## 2018-01-01 DIAGNOSIS — R06.09 DYSPNEA ON EXERTION: Primary | ICD-10-CM

## 2018-01-01 DIAGNOSIS — I25.2 STATUS POST MYOCARDIAL INFARCTION OF INFERIOR WALL: ICD-10-CM

## 2018-01-01 DIAGNOSIS — R10.13 EPIGASTRIC DISCOMFORT: Primary | ICD-10-CM

## 2018-01-01 DIAGNOSIS — I44.7 LBBB (LEFT BUNDLE BRANCH BLOCK): ICD-10-CM

## 2018-01-01 DIAGNOSIS — R07.89 CHEST HEAVINESS: Primary | ICD-10-CM

## 2018-01-01 LAB
ALBUMIN SERPL-MCNC: 3.8 G/DL (ref 3.4–5)
ALP SERPL-CCNC: 131 U/L (ref 40–150)
ALT SERPL W P-5'-P-CCNC: 21 U/L (ref 0–50)
ANION GAP SERPL CALCULATED.3IONS-SCNC: 4 MMOL/L (ref 3–14)
ANION GAP SERPL CALCULATED.3IONS-SCNC: 4 MMOL/L (ref 3–14)
APTT PPP: 31 SEC (ref 22–37)
AST SERPL W P-5'-P-CCNC: 12 U/L (ref 0–45)
BILIRUB SERPL-MCNC: 0.6 MG/DL (ref 0.2–1.3)
BUN SERPL-MCNC: 13 MG/DL (ref 7–30)
BUN SERPL-MCNC: 14 MG/DL (ref 7–30)
CALCIUM SERPL-MCNC: 8.7 MG/DL (ref 8.5–10.1)
CALCIUM SERPL-MCNC: 8.8 MG/DL (ref 8.5–10.1)
CHLORIDE SERPL-SCNC: 100 MMOL/L (ref 94–109)
CHLORIDE SERPL-SCNC: 104 MMOL/L (ref 94–109)
CHOLEST SERPL-MCNC: 141 MG/DL
CO2 SERPL-SCNC: 26 MMOL/L (ref 20–32)
CO2 SERPL-SCNC: 28 MMOL/L (ref 20–32)
CREAT BLD-MCNC: 0.8 MG/DL (ref 0.52–1.04)
CREAT SERPL-MCNC: 0.74 MG/DL (ref 0.52–1.04)
CREAT SERPL-MCNC: 0.81 MG/DL (ref 0.52–1.04)
ERYTHROCYTE [DISTWIDTH] IN BLOOD BY AUTOMATED COUNT: 13.3 % (ref 10–15)
ERYTHROCYTE [DISTWIDTH] IN BLOOD BY AUTOMATED COUNT: 14.9 % (ref 10–15)
GFR SERPL CREATININE-BSD FRML MDRD: 66 ML/MIN/1.7M2
GFR SERPL CREATININE-BSD FRML MDRD: 68 ML/MIN/1.7M2
GFR SERPL CREATININE-BSD FRML MDRD: 74 ML/MIN/1.7M2
GLUCOSE SERPL-MCNC: 89 MG/DL (ref 70–99)
GLUCOSE SERPL-MCNC: 92 MG/DL (ref 70–99)
HCT VFR BLD AUTO: 38.5 % (ref 35–47)
HCT VFR BLD AUTO: 39.1 % (ref 35–47)
HDLC SERPL-MCNC: 70 MG/DL
HGB BLD-MCNC: 12.7 G/DL (ref 11.7–15.7)
HGB BLD-MCNC: 13 G/DL (ref 11.7–15.7)
INR PPP: 1.13 (ref 0.86–1.14)
INTERPRETATION ECG - MUSE: NORMAL
LDLC SERPL CALC-MCNC: 59 MG/DL
MCH RBC QN AUTO: 29.3 PG (ref 26.5–33)
MCH RBC QN AUTO: 29.3 PG (ref 26.5–33)
MCHC RBC AUTO-ENTMCNC: 32.5 G/DL (ref 31.5–36.5)
MCHC RBC AUTO-ENTMCNC: 33.8 G/DL (ref 31.5–36.5)
MCV RBC AUTO: 87 FL (ref 78–100)
MCV RBC AUTO: 90 FL (ref 78–100)
NONHDLC SERPL-MCNC: 71 MG/DL
PLATELET # BLD AUTO: 246 10E9/L (ref 150–450)
PLATELET # BLD AUTO: 275 10E9/L (ref 150–450)
POTASSIUM SERPL-SCNC: 4.1 MMOL/L (ref 3.4–5.3)
POTASSIUM SERPL-SCNC: 4.8 MMOL/L (ref 3.4–5.3)
PROT SERPL-MCNC: 7.4 G/DL (ref 6.8–8.8)
RBC # BLD AUTO: 4.33 10E12/L (ref 3.8–5.2)
RBC # BLD AUTO: 4.44 10E12/L (ref 3.8–5.2)
SODIUM SERPL-SCNC: 132 MMOL/L (ref 133–144)
SODIUM SERPL-SCNC: 134 MMOL/L (ref 133–144)
TRIGL SERPL-MCNC: 62 MG/DL
TROPONIN I SERPL-MCNC: 0.04 UG/L (ref 0–0.04)
WBC # BLD AUTO: 4.6 10E9/L (ref 4–11)
WBC # BLD AUTO: 8.2 10E9/L (ref 4–11)

## 2018-01-01 PROCEDURE — 93458 L HRT ARTERY/VENTRICLE ANGIO: CPT

## 2018-01-01 PROCEDURE — G0424 PULMONARY REHAB W EXER: HCPCS

## 2018-01-01 PROCEDURE — 75561 CARDIAC MRI FOR MORPH W/DYE: CPT | Mod: 26 | Performed by: INTERNAL MEDICINE

## 2018-01-01 PROCEDURE — 84484 ASSAY OF TROPONIN QUANT: CPT | Performed by: INTERNAL MEDICINE

## 2018-01-01 PROCEDURE — 85027 COMPLETE CBC AUTOMATED: CPT | Performed by: INTERNAL MEDICINE

## 2018-01-01 PROCEDURE — 85610 PROTHROMBIN TIME: CPT | Performed by: INTERNAL MEDICINE

## 2018-01-01 PROCEDURE — 25000128 H RX IP 250 OP 636: Performed by: INTERNAL MEDICINE

## 2018-01-01 PROCEDURE — 40000065 ZZH STATISTIC EKG NON-CHARGEABLE

## 2018-01-01 PROCEDURE — 40000244 ZZH STATISTIC VISIT PULM REHAB: Performed by: REHABILITATION PRACTITIONER

## 2018-01-01 PROCEDURE — 80048 BASIC METABOLIC PNL TOTAL CA: CPT | Performed by: INTERNAL MEDICINE

## 2018-01-01 PROCEDURE — 93306 TTE W/DOPPLER COMPLETE: CPT | Mod: 26 | Performed by: INTERNAL MEDICINE

## 2018-01-01 PROCEDURE — 99214 OFFICE O/P EST MOD 30 MIN: CPT | Mod: 25 | Performed by: INTERNAL MEDICINE

## 2018-01-01 PROCEDURE — 25500064 ZZH RX 255 OP 636: Performed by: INTERNAL MEDICINE

## 2018-01-01 PROCEDURE — 99213 OFFICE O/P EST LOW 20 MIN: CPT | Performed by: INTERNAL MEDICINE

## 2018-01-01 PROCEDURE — 40000244 ZZH STATISTIC VISIT PULM REHAB

## 2018-01-01 PROCEDURE — 27210856 ZZH ACCESS HEART CATH CR2

## 2018-01-01 PROCEDURE — G0009 ADMIN PNEUMOCOCCAL VACCINE: HCPCS | Performed by: INTERNAL MEDICINE

## 2018-01-01 PROCEDURE — 93458 L HRT ARTERY/VENTRICLE ANGIO: CPT | Mod: 26 | Performed by: INTERNAL MEDICINE

## 2018-01-01 PROCEDURE — 27210946 ZZH KIT HC TOTES DISP CR8

## 2018-01-01 PROCEDURE — 27210910 ZZH NEEDLE CR6

## 2018-01-01 PROCEDURE — 75561 CARDIAC MRI FOR MORPH W/DYE: CPT

## 2018-01-01 PROCEDURE — 36415 COLL VENOUS BLD VENIPUNCTURE: CPT | Performed by: INTERNAL MEDICINE

## 2018-01-01 PROCEDURE — 27210795 ZZH PAD DEFIB QUICK CR4

## 2018-01-01 PROCEDURE — 25000125 ZZHC RX 250: Performed by: INTERNAL MEDICINE

## 2018-01-01 PROCEDURE — 40000235 ZZH STATISTIC TELEMETRY

## 2018-01-01 PROCEDURE — 99153 MOD SED SAME PHYS/QHP EA: CPT

## 2018-01-01 PROCEDURE — 99152 MOD SED SAME PHYS/QHP 5/>YRS: CPT | Performed by: INTERNAL MEDICINE

## 2018-01-01 PROCEDURE — G0424 PULMONARY REHAB W EXER: HCPCS | Performed by: REHABILITATION PRACTITIONER

## 2018-01-01 PROCEDURE — 40000852 ZZH STATISTIC HEART CATH LAB OR EP LAB

## 2018-01-01 PROCEDURE — 93010 ELECTROCARDIOGRAM REPORT: CPT | Performed by: INTERNAL MEDICINE

## 2018-01-01 PROCEDURE — 93005 ELECTROCARDIOGRAM TRACING: CPT

## 2018-01-01 PROCEDURE — 90662 IIV NO PRSV INCREASED AG IM: CPT | Performed by: INTERNAL MEDICINE

## 2018-01-01 PROCEDURE — 71046 X-RAY EXAM CHEST 2 VIEWS: CPT | Mod: FY

## 2018-01-01 PROCEDURE — 99204 OFFICE O/P NEW MOD 45 MIN: CPT | Performed by: INTERNAL MEDICINE

## 2018-01-01 PROCEDURE — 36591 DRAW BLOOD OFF VENOUS DEVICE: CPT

## 2018-01-01 PROCEDURE — 80053 COMPREHEN METABOLIC PANEL: CPT | Performed by: INTERNAL MEDICINE

## 2018-01-01 PROCEDURE — 80061 LIPID PANEL: CPT | Performed by: INTERNAL MEDICINE

## 2018-01-01 PROCEDURE — 90670 PCV13 VACCINE IM: CPT | Performed by: INTERNAL MEDICINE

## 2018-01-01 PROCEDURE — 85730 THROMBOPLASTIN TIME PARTIAL: CPT | Performed by: INTERNAL MEDICINE

## 2018-01-01 PROCEDURE — 82565 ASSAY OF CREATININE: CPT

## 2018-01-01 PROCEDURE — A9585 GADOBUTROL INJECTION: HCPCS | Performed by: INTERNAL MEDICINE

## 2018-01-01 PROCEDURE — 93306 TTE W/DOPPLER COMPLETE: CPT

## 2018-01-01 PROCEDURE — C1893 INTRO/SHEATH, FIXED,NON-PEEL: HCPCS

## 2018-01-01 PROCEDURE — C1769 GUIDE WIRE: HCPCS

## 2018-01-01 PROCEDURE — 27210787 ZZH MANIFOLD CR2

## 2018-01-01 PROCEDURE — 74019 RADEX ABDOMEN 2 VIEWS: CPT | Mod: FY

## 2018-01-01 PROCEDURE — 99152 MOD SED SAME PHYS/QHP 5/>YRS: CPT

## 2018-01-01 PROCEDURE — 93000 ELECTROCARDIOGRAM COMPLETE: CPT | Performed by: INTERNAL MEDICINE

## 2018-01-01 PROCEDURE — 27211089 ZZH KIT ACIST INJECTOR CR3

## 2018-01-01 PROCEDURE — G0008 ADMIN INFLUENZA VIRUS VAC: HCPCS | Performed by: INTERNAL MEDICINE

## 2018-01-01 RX ORDER — FENTANYL CITRATE 50 UG/ML
25-50 INJECTION, SOLUTION INTRAMUSCULAR; INTRAVENOUS
Status: DISCONTINUED | OUTPATIENT
Start: 2018-01-01 | End: 2018-01-01 | Stop reason: HOSPADM

## 2018-01-01 RX ORDER — FLUMAZENIL 0.1 MG/ML
0.2 INJECTION, SOLUTION INTRAVENOUS
Status: DISCONTINUED | OUTPATIENT
Start: 2018-01-01 | End: 2018-01-01 | Stop reason: HOSPADM

## 2018-01-01 RX ORDER — DEXTROSE MONOHYDRATE 25 G/50ML
12.5-5 INJECTION, SOLUTION INTRAVENOUS EVERY 30 MIN PRN
Status: DISCONTINUED | OUTPATIENT
Start: 2018-01-01 | End: 2018-01-01 | Stop reason: HOSPADM

## 2018-01-01 RX ORDER — SPIRONOLACTONE 25 MG/1
12.5 TABLET ORAL DAILY
Qty: 90 TABLET | Refills: 1 | Status: ON HOLD | COMMUNITY
Start: 2018-01-01 | End: 2019-01-01

## 2018-01-01 RX ORDER — NALOXONE HYDROCHLORIDE 0.4 MG/ML
.1-.4 INJECTION, SOLUTION INTRAMUSCULAR; INTRAVENOUS; SUBCUTANEOUS
Status: DISCONTINUED | OUTPATIENT
Start: 2018-01-01 | End: 2018-01-01 | Stop reason: HOSPADM

## 2018-01-01 RX ORDER — LIDOCAINE 40 MG/G
CREAM TOPICAL
Status: DISCONTINUED | OUTPATIENT
Start: 2018-01-01 | End: 2018-01-01 | Stop reason: HOSPADM

## 2018-01-01 RX ORDER — ENALAPRILAT 1.25 MG/ML
1.25-2.5 INJECTION INTRAVENOUS
Status: DISCONTINUED | OUTPATIENT
Start: 2018-01-01 | End: 2018-01-01 | Stop reason: HOSPADM

## 2018-01-01 RX ORDER — ATROPINE SULFATE 0.1 MG/ML
.5-1 INJECTION INTRAVENOUS
Status: DISCONTINUED | OUTPATIENT
Start: 2018-01-01 | End: 2018-01-01 | Stop reason: HOSPADM

## 2018-01-01 RX ORDER — NALOXONE HYDROCHLORIDE 0.4 MG/ML
0.4 INJECTION, SOLUTION INTRAMUSCULAR; INTRAVENOUS; SUBCUTANEOUS EVERY 5 MIN PRN
Status: DISCONTINUED | OUTPATIENT
Start: 2018-01-01 | End: 2018-01-01 | Stop reason: HOSPADM

## 2018-01-01 RX ORDER — VERAPAMIL HYDROCHLORIDE 2.5 MG/ML
1-2.5 INJECTION, SOLUTION INTRAVENOUS
Status: COMPLETED | OUTPATIENT
Start: 2018-01-01 | End: 2018-01-01

## 2018-01-01 RX ORDER — ASPIRIN 81 MG/1
81 TABLET ORAL DAILY
Status: DISCONTINUED | OUTPATIENT
Start: 2018-01-01 | End: 2018-01-01 | Stop reason: HOSPADM

## 2018-01-01 RX ORDER — DIPHENHYDRAMINE HYDROCHLORIDE 50 MG/ML
25-50 INJECTION INTRAMUSCULAR; INTRAVENOUS
Status: DISCONTINUED | OUTPATIENT
Start: 2018-01-01 | End: 2018-01-01 | Stop reason: HOSPADM

## 2018-01-01 RX ORDER — ATORVASTATIN CALCIUM 40 MG/1
40 TABLET, FILM COATED ORAL DAILY
Qty: 90 TABLET | Refills: 3 | Status: ON HOLD | OUTPATIENT
Start: 2018-01-01 | End: 2018-01-01

## 2018-01-01 RX ORDER — ONDANSETRON 2 MG/ML
4 INJECTION INTRAMUSCULAR; INTRAVENOUS EVERY 4 HOURS PRN
Status: DISCONTINUED | OUTPATIENT
Start: 2018-01-01 | End: 2018-01-01 | Stop reason: HOSPADM

## 2018-01-01 RX ORDER — SODIUM CHLORIDE 9 MG/ML
INJECTION, SOLUTION INTRAVENOUS CONTINUOUS
Status: DISCONTINUED | OUTPATIENT
Start: 2018-01-01 | End: 2018-01-01 | Stop reason: HOSPADM

## 2018-01-01 RX ORDER — PHENYLEPHRINE HCL IN 0.9% NACL 1 MG/10 ML
20-100 SYRINGE (ML) INTRAVENOUS
Status: DISCONTINUED | OUTPATIENT
Start: 2018-01-01 | End: 2018-01-01 | Stop reason: HOSPADM

## 2018-01-01 RX ORDER — MORPHINE SULFATE 2 MG/ML
1-2 INJECTION, SOLUTION INTRAMUSCULAR; INTRAVENOUS EVERY 5 MIN PRN
Status: DISCONTINUED | OUTPATIENT
Start: 2018-01-01 | End: 2018-01-01 | Stop reason: HOSPADM

## 2018-01-01 RX ORDER — DOBUTAMINE HYDROCHLORIDE 200 MG/100ML
2-20 INJECTION INTRAVENOUS CONTINUOUS PRN
Status: DISCONTINUED | OUTPATIENT
Start: 2018-01-01 | End: 2018-01-01 | Stop reason: HOSPADM

## 2018-01-01 RX ORDER — ATROPINE SULFATE 0.1 MG/ML
0.5 INJECTION INTRAVENOUS EVERY 5 MIN PRN
Status: DISCONTINUED | OUTPATIENT
Start: 2018-01-01 | End: 2018-01-01 | Stop reason: HOSPADM

## 2018-01-01 RX ORDER — CLOPIDOGREL BISULFATE 75 MG/1
75 TABLET ORAL
Status: DISCONTINUED | OUTPATIENT
Start: 2018-01-01 | End: 2018-01-01 | Stop reason: HOSPADM

## 2018-01-01 RX ORDER — ADENOSINE 3 MG/ML
12-12000 INJECTION, SOLUTION INTRAVENOUS
Status: DISCONTINUED | OUTPATIENT
Start: 2018-01-01 | End: 2018-01-01 | Stop reason: HOSPADM

## 2018-01-01 RX ORDER — HEPARIN SODIUM 1000 [USP'U]/ML
1000-10000 INJECTION, SOLUTION INTRAVENOUS; SUBCUTANEOUS EVERY 5 MIN PRN
Status: DISCONTINUED | OUTPATIENT
Start: 2018-01-01 | End: 2018-01-01 | Stop reason: HOSPADM

## 2018-01-01 RX ORDER — SODIUM NITROPRUSSIDE 25 MG/ML
100-200 INJECTION INTRAVENOUS
Status: DISCONTINUED | OUTPATIENT
Start: 2018-01-01 | End: 2018-01-01 | Stop reason: HOSPADM

## 2018-01-01 RX ORDER — NITROGLYCERIN 5 MG/ML
100-500 VIAL (ML) INTRAVENOUS
Status: COMPLETED | OUTPATIENT
Start: 2018-01-01 | End: 2018-01-01

## 2018-01-01 RX ORDER — ASPIRIN 81 MG/1
81 TABLET ORAL DAILY
Status: CANCELLED | OUTPATIENT
Start: 2018-01-01

## 2018-01-01 RX ORDER — NITROGLYCERIN 5 MG/ML
100-200 VIAL (ML) INTRAVENOUS
Status: DISCONTINUED | OUTPATIENT
Start: 2018-01-01 | End: 2018-01-01 | Stop reason: HOSPADM

## 2018-01-01 RX ORDER — POTASSIUM CHLORIDE 29.8 MG/ML
20 INJECTION INTRAVENOUS
Status: DISCONTINUED | OUTPATIENT
Start: 2018-01-01 | End: 2018-01-01 | Stop reason: HOSPADM

## 2018-01-01 RX ORDER — PRASUGREL 10 MG/1
10-60 TABLET, FILM COATED ORAL
Status: DISCONTINUED | OUTPATIENT
Start: 2018-01-01 | End: 2018-01-01 | Stop reason: HOSPADM

## 2018-01-01 RX ORDER — ALBUTEROL SULFATE 90 UG/1
AEROSOL, METERED RESPIRATORY (INHALATION)
Qty: 18 G | Refills: 0 | Status: SHIPPED | OUTPATIENT
Start: 2018-01-01 | End: 2018-01-01

## 2018-01-01 RX ORDER — NICARDIPINE HYDROCHLORIDE 2.5 MG/ML
100 INJECTION INTRAVENOUS
Status: DISCONTINUED | OUTPATIENT
Start: 2018-01-01 | End: 2018-01-01 | Stop reason: HOSPADM

## 2018-01-01 RX ORDER — NITROGLYCERIN 20 MG/100ML
.07-2 INJECTION INTRAVENOUS CONTINUOUS PRN
Status: DISCONTINUED | OUTPATIENT
Start: 2018-01-01 | End: 2018-01-01 | Stop reason: HOSPADM

## 2018-01-01 RX ORDER — HYDRALAZINE HYDROCHLORIDE 20 MG/ML
10-20 INJECTION INTRAMUSCULAR; INTRAVENOUS
Status: DISCONTINUED | OUTPATIENT
Start: 2018-01-01 | End: 2018-01-01 | Stop reason: HOSPADM

## 2018-01-01 RX ORDER — HYDROCODONE BITARTRATE AND ACETAMINOPHEN 5; 325 MG/1; MG/1
1-2 TABLET ORAL EVERY 4 HOURS PRN
Status: DISCONTINUED | OUTPATIENT
Start: 2018-01-01 | End: 2018-01-01 | Stop reason: HOSPADM

## 2018-01-01 RX ORDER — POTASSIUM CHLORIDE 7.45 MG/ML
10 INJECTION INTRAVENOUS
Status: DISCONTINUED | OUTPATIENT
Start: 2018-01-01 | End: 2018-01-01 | Stop reason: HOSPADM

## 2018-01-01 RX ORDER — SODIUM CHLORIDE 9 MG/ML
INJECTION, SOLUTION INTRAVENOUS CONTINUOUS
Status: CANCELLED | OUTPATIENT
Start: 2018-01-01

## 2018-01-01 RX ORDER — POTASSIUM CHLORIDE 1500 MG/1
20 TABLET, EXTENDED RELEASE ORAL
Status: CANCELLED | OUTPATIENT
Start: 2018-01-01

## 2018-01-01 RX ORDER — EPINEPHRINE 1 MG/ML
0.3 INJECTION, SOLUTION, CONCENTRATE INTRAVENOUS
Status: DISCONTINUED | OUTPATIENT
Start: 2018-01-01 | End: 2018-01-01 | Stop reason: HOSPADM

## 2018-01-01 RX ORDER — BUPIVACAINE HYDROCHLORIDE 2.5 MG/ML
1-10 INJECTION, SOLUTION EPIDURAL; INFILTRATION; INTRACAUDAL
Status: DISCONTINUED | OUTPATIENT
Start: 2018-01-01 | End: 2018-01-01 | Stop reason: HOSPADM

## 2018-01-01 RX ORDER — LOSARTAN POTASSIUM 50 MG/1
50 TABLET ORAL DAILY
Qty: 90 TABLET | Refills: 3 | Status: SHIPPED | OUTPATIENT
Start: 2018-01-01 | End: 2019-01-01

## 2018-01-01 RX ORDER — ASPIRIN 81 MG/1
81-324 TABLET, CHEWABLE ORAL
Status: DISCONTINUED | OUTPATIENT
Start: 2018-01-01 | End: 2018-01-01 | Stop reason: HOSPADM

## 2018-01-01 RX ORDER — GADOBUTROL 604.72 MG/ML
5-65 INJECTION INTRAVENOUS ONCE
Status: COMPLETED | OUTPATIENT
Start: 2018-01-01 | End: 2018-01-01

## 2018-01-01 RX ORDER — ASPIRIN 325 MG
325 TABLET ORAL
Status: DISCONTINUED | OUTPATIENT
Start: 2018-01-01 | End: 2018-01-01 | Stop reason: HOSPADM

## 2018-01-01 RX ORDER — METHYLPREDNISOLONE SODIUM SUCCINATE 125 MG/2ML
125 INJECTION, POWDER, LYOPHILIZED, FOR SOLUTION INTRAMUSCULAR; INTRAVENOUS
Status: DISCONTINUED | OUTPATIENT
Start: 2018-01-01 | End: 2018-01-01 | Stop reason: HOSPADM

## 2018-01-01 RX ORDER — LORAZEPAM 2 MG/ML
.5-2 INJECTION INTRAMUSCULAR EVERY 4 HOURS PRN
Status: DISCONTINUED | OUTPATIENT
Start: 2018-01-01 | End: 2018-01-01 | Stop reason: HOSPADM

## 2018-01-01 RX ORDER — ACETAMINOPHEN 325 MG/1
325-650 TABLET ORAL EVERY 4 HOURS PRN
Status: DISCONTINUED | OUTPATIENT
Start: 2018-01-01 | End: 2018-01-01 | Stop reason: HOSPADM

## 2018-01-01 RX ORDER — PROTAMINE SULFATE 10 MG/ML
1-5 INJECTION, SOLUTION INTRAVENOUS
Status: DISCONTINUED | OUTPATIENT
Start: 2018-01-01 | End: 2018-01-01 | Stop reason: HOSPADM

## 2018-01-01 RX ORDER — LIDOCAINE 40 MG/G
CREAM TOPICAL
Status: CANCELLED | OUTPATIENT
Start: 2018-01-01

## 2018-01-01 RX ORDER — LIDOCAINE HYDROCHLORIDE 10 MG/ML
30 INJECTION, SOLUTION EPIDURAL; INFILTRATION; INTRACAUDAL; PERINEURAL
Status: DISCONTINUED | OUTPATIENT
Start: 2018-01-01 | End: 2018-01-01 | Stop reason: HOSPADM

## 2018-01-01 RX ORDER — DOPAMINE HYDROCHLORIDE 160 MG/100ML
2-20 INJECTION, SOLUTION INTRAVENOUS CONTINUOUS PRN
Status: DISCONTINUED | OUTPATIENT
Start: 2018-01-01 | End: 2018-01-01 | Stop reason: HOSPADM

## 2018-01-01 RX ORDER — POTASSIUM CHLORIDE 1500 MG/1
20 TABLET, EXTENDED RELEASE ORAL
Status: DISCONTINUED | OUTPATIENT
Start: 2018-01-01 | End: 2018-01-01 | Stop reason: HOSPADM

## 2018-01-01 RX ORDER — NALOXONE HYDROCHLORIDE 0.4 MG/ML
.2-.4 INJECTION, SOLUTION INTRAMUSCULAR; INTRAVENOUS; SUBCUTANEOUS
Status: DISCONTINUED | OUTPATIENT
Start: 2018-01-01 | End: 2018-01-01 | Stop reason: HOSPADM

## 2018-01-01 RX ORDER — NITROGLYCERIN 0.4 MG/1
0.4 TABLET SUBLINGUAL EVERY 5 MIN PRN
Status: DISCONTINUED | OUTPATIENT
Start: 2018-01-01 | End: 2018-01-01 | Stop reason: HOSPADM

## 2018-01-01 RX ORDER — FUROSEMIDE 10 MG/ML
20-100 INJECTION INTRAMUSCULAR; INTRAVENOUS
Status: DISCONTINUED | OUTPATIENT
Start: 2018-01-01 | End: 2018-01-01 | Stop reason: HOSPADM

## 2018-01-01 RX ORDER — SPIRONOLACTONE 25 MG/1
12.5 TABLET ORAL DAILY
Qty: 90 TABLET | Refills: 1 | Status: SHIPPED | OUTPATIENT
Start: 2018-01-01 | End: 2018-01-01

## 2018-01-01 RX ORDER — METOPROLOL TARTRATE 1 MG/ML
5 INJECTION, SOLUTION INTRAVENOUS EVERY 5 MIN PRN
Status: DISCONTINUED | OUTPATIENT
Start: 2018-01-01 | End: 2018-01-01 | Stop reason: HOSPADM

## 2018-01-01 RX ORDER — CLOPIDOGREL 300 MG/1
300-600 TABLET, FILM COATED ORAL
Status: DISCONTINUED | OUTPATIENT
Start: 2018-01-01 | End: 2018-01-01 | Stop reason: HOSPADM

## 2018-01-01 RX ORDER — LIDOCAINE HYDROCHLORIDE 10 MG/ML
1-10 INJECTION, SOLUTION EPIDURAL; INFILTRATION; INTRACAUDAL; PERINEURAL
Status: COMPLETED | OUTPATIENT
Start: 2018-01-01 | End: 2018-01-01

## 2018-01-01 RX ORDER — NIFEDIPINE 10 MG/1
10 CAPSULE ORAL
Status: DISCONTINUED | OUTPATIENT
Start: 2018-01-01 | End: 2018-01-01 | Stop reason: HOSPADM

## 2018-01-01 RX ORDER — IOPAMIDOL 755 MG/ML
50 INJECTION, SOLUTION INTRAVASCULAR ONCE
Status: COMPLETED | OUTPATIENT
Start: 2018-01-01 | End: 2018-01-01

## 2018-01-01 RX ORDER — PROTAMINE SULFATE 10 MG/ML
25-100 INJECTION, SOLUTION INTRAVENOUS EVERY 5 MIN PRN
Status: DISCONTINUED | OUTPATIENT
Start: 2018-01-01 | End: 2018-01-01 | Stop reason: HOSPADM

## 2018-01-01 RX ADMIN — NITROGLYCERIN 200 MCG: 5 INJECTION, SOLUTION INTRAVENOUS at 09:55

## 2018-01-01 RX ADMIN — MIDAZOLAM 1 MG: 1 INJECTION INTRAMUSCULAR; INTRAVENOUS at 10:00

## 2018-01-01 RX ADMIN — HEPARIN SODIUM 5000 UNITS: 1000 INJECTION, SOLUTION INTRAVENOUS; SUBCUTANEOUS at 10:03

## 2018-01-01 RX ADMIN — HUMAN ALBUMIN MICROSPHERES AND PERFLUTREN 5 ML: 10; .22 INJECTION, SOLUTION INTRAVENOUS at 10:45

## 2018-01-01 RX ADMIN — SODIUM CHLORIDE: 9 INJECTION, SOLUTION INTRAVENOUS at 09:13

## 2018-01-01 RX ADMIN — GADOBUTROL 7.5 ML: 604.72 INJECTION INTRAVENOUS at 14:13

## 2018-01-01 RX ADMIN — MIDAZOLAM 1 MG: 1 INJECTION INTRAMUSCULAR; INTRAVENOUS at 09:45

## 2018-01-01 RX ADMIN — IOPAMIDOL 50 ML: 755 INJECTION, SOLUTION INTRAVASCULAR at 10:30

## 2018-01-01 RX ADMIN — VERAPAMIL HYDROCHLORIDE 2.5 MG: 2.5 INJECTION, SOLUTION INTRAVENOUS at 09:55

## 2018-01-01 RX ADMIN — LIDOCAINE HYDROCHLORIDE 1 ML: 10 INJECTION, SOLUTION EPIDURAL; INFILTRATION; INTRACAUDAL; PERINEURAL at 09:52

## 2018-01-17 ENCOUNTER — OFFICE VISIT (OUTPATIENT)
Dept: INTERNAL MEDICINE | Facility: CLINIC | Age: 83
End: 2018-01-17
Payer: MEDICARE

## 2018-01-17 VITALS
OXYGEN SATURATION: 98 % | BODY MASS INDEX: 22.08 KG/M2 | HEIGHT: 63 IN | DIASTOLIC BLOOD PRESSURE: 80 MMHG | TEMPERATURE: 98 F | HEART RATE: 96 BPM | WEIGHT: 124.6 LBS | SYSTOLIC BLOOD PRESSURE: 120 MMHG

## 2018-01-17 DIAGNOSIS — J44.9 CHRONIC OBSTRUCTIVE PULMONARY DISEASE, UNSPECIFIED COPD TYPE (H): ICD-10-CM

## 2018-01-17 DIAGNOSIS — J06.9 UPPER RESPIRATORY TRACT INFECTION, UNSPECIFIED TYPE: Primary | ICD-10-CM

## 2018-01-17 PROCEDURE — 99213 OFFICE O/P EST LOW 20 MIN: CPT | Performed by: INTERNAL MEDICINE

## 2018-01-17 NOTE — PROGRESS NOTES
"  SUBJECTIVE:                                                      HPI: Jamilah Rodriguez is a pleasant 86 year old female who presents for  follow-up:    Accompanied by daughter.    --     Presented to  ~1 month ago with wheezing, SOB, coughing, and sinus congestion.    PMH significant for COPD - not on controller medications.     Prescribed course of azithromycin, Medrol dose pack, and Duonebs.     ---    Patient reports resolution of all symptoms.     No shortness of breath, wheezing, coughing, or sinus symptoms.    No fevers or chills.    Energy is back to baseline.     Daughter reports that mother is easily winded, but patient says that she is not bothered by this. Uses Duonebs ~2x/day.    Discussed starting a controller medication (like Spiriva, Advair, Symbicort, Breo, or Dulera), but patient declines.     Also discussed patient seeing a pulmonologist for further evaluation and treatment. She also declines.    She says she feels well.     ---    The medication, allergy, and problem lists have been reviewed and updated as appropriate.       OBJECTIVE:                                                      /80 (BP Location: Left arm, Patient Position: Chair, Cuff Size: Adult Regular)  Pulse 96  Temp 98  F (36.7  C) (Oral)  Ht 5' 3\" (1.6 m)  Wt 124 lb 9.6 oz (56.5 kg)  SpO2 98%  BMI 22.07 kg/m2  Constitutional: well-appearing  Head, Ears, Nose, and Throat: normocephalic, atraumatic; normal external auditory canal and pinna; tympanic membranes visualized and normal; nasal septum straight; nasal mucosa pink; no oropharyngeal lesions or ulcers; dentition normal; no tonsillar exudates  Respiratory: normal respiratory effort; clear to auscultation bilaterally - no wheezes, rhonchi, or crackles  Cardiovascular: regular rate and rhythm; no edema      ASSESSMENT/PLAN:                                                      (J06.9) Upper respiratory tract infection, unspecified type  (primary encounter " diagnosis)  Comment: symptoms resolved.  Plan: no intervention needed.     (J44.9) Chronic obstructive pulmonary disease, unspecified COPD type (H)  Comment: manages with bid Duonebs; declines controller medications and pulmonology referral.   Plan: continue to monitor - if symptoms become bothersome or Duoneb use increases, patient to contact MD.     The instructions on the AVS were discussed and explained to the patient. Patient expressed understanding of instructions.    (Chart documentation was completed, in part, with R[employer] voice-recognition software. Even though reviewed, some grammatical, spelling, and word errors may remain.)    Sharon Hurtado MD   [address] [address]  T: [phone], F: [phone]

## 2018-01-17 NOTE — MR AVS SNAPSHOT
"              After Visit Summary   1/17/2018    Jamilah Rodriguez    MRN: [id number]           Patient Information     Date Of Birth          4/5/1931        Visit Information        Provider Department      1/17/2018 9:30 AM Sharon Hurtado MD [facility]        Today's Diagnoses     Upper respiratory tract infection, unspecified type    -  1    Chronic obstructive pulmonary disease, unspecified COPD type (H)           Follow-ups after your visit        Who to contact     If you have questions or need follow up information about today's clinic visit or your schedule please contact [facility] directly at [phone].  Normal or non-critical lab and imaging results will be communicated to you by MyChart, letter or phone within 4 business days after the clinic has received the results. If you do not hear from us within 7 days, please contact the clinic through [employer] or phone. If you have a critical or abnormal lab result, we will notify you by phone as soon as possible.  Submit refill requests through [employer] or call your pharmacy and they will forward the refill request to us. Please allow 3 business days for your refill to be completed.          Additional Information About Your Visit        MyChart Information     HelpMeNow gives you secure access to your electronic health record. If you see a primary care provider, you can also send messages to your care team and make appointments. If you have questions, please call your primary care clinic.  If you do not have a primary care provider, please call [phone] and they will assist you.        Care EveryWhere ID     This is your Care EveryWhere ID. This could be used by other organizations to access your [place] medical records  [id number]        Your Vitals Were     Pulse Temperature Height Pulse Oximetry BMI (Body Mass Index)       96 98  F (36.7  C) (Oral) 5' 3\" (1.6 m) 98% 22.07 kg/m2        Blood " Pressure from Last 3 Encounters:   01/17/18 120/80   12/18/17 106/62   11/30/17 129/78    Weight from Last 3 Encounters:   01/17/18 124 lb 9.6 oz (56.5 kg)   12/18/17 126 lb 6.4 oz (57.3 kg)   11/14/17 128 lb 9.6 oz (58.3 kg)              Today, you had the following     No orders found for display       Primary Care Provider Office Phone # Fax #    Bhupendra Chong [phone]       [address] [facility] [address]        Equal Access to Services     [facility]: Hadii aad ku hadasho Soomaali, waaxda luqadaha, qaybta kaalmada adeegyada, kimberly drake . So [facility] [phone].    ATENCIÓN: Si habla español, tiene a smith disposición servicios gratuitos de asistencia lingüística. Llame al [phone].    We comply with applicable federal civil rights laws and [place] laws. We do not discriminate on the basis of race, color, national origin, age, disability, sex, sexual orientation, or gender identity.            Thank you!     Thank you for choosing [facility]  for your care. Our goal is always to provide you with excellent care. Hearing back from our patients is one way we can continue to improve our services. Please take a few minutes to complete the written survey that you may receive in the mail after your visit with us. Thank you!             Your Updated Medication List - Protect others around you: Learn how to safely use, store and throw away your medicines at www.disposemymeds.org.          This list is accurate as of: 1/17/18  6:43 PM.  Always use your most recent med list.                   Brand Name Dispense Instructions for use Diagnosis    albuterol 108 (90 BASE) MCG/ACT Inhaler    PROAIR HFA    1 Inhaler    Inhale 2 puffs into the lungs 4 times daily as needed for shortness of breath / dyspnea or wheezing    COPD exacerbation (H)       ascorbic acid 500 MG tablet    VITAMIN C    100 tablet    Take 1 tablet by mouth daily.         bimatoprost 0.01 % Soln    LUMIGAN     Place 1 drop into both eyes At Bedtime.        calcium + D 600-200 MG-UNIT Tabs   Generic drug:  calcium carbonate-vitamin D     100 tablet    Take 1 tablet by mouth every 12 hours.        carboxymethylcellulose 0.5 % Soln ophthalmic solution    REFRESH PLUS     Place 1 drop into both eyes every 2 hours as needed.        dorzolamide 2 % ophthalmic solution    TRUSOPT     Place 1 drop into both eyes 2 times daily        FLAX SEEDS PO           fluocinonide 0.05 % solution    LIDEX    60 mL    Apply to scalp BID PRN    Scalp itch       GLUCOSAMINE SULFATE PO      Take 500 mg by mouth daily        * Ipratropium-Albuterol  MCG/ACT inhaler    COMBIVENT RESPIMAT    1 Inhaler    Inhale 1 puff into the lungs every 4 hours as needed for shortness of breath / dyspnea or wheezing Not to exceed 6 doses per day.    Chronic obstructive pulmonary disease, unspecified COPD type (H)       * ipratropium - albuterol 0.5 mg/2.5 mg/3 mL 0.5-2.5 (3) MG/3ML neb solution    DUONEB    3 mL    Take 1 vial (3 mLs) by nebulization every 6 hours as needed for shortness of breath / dyspnea or wheezing    Upper respiratory tract infection, unspecified type, COPD exacerbation (H)       * ipratropium - albuterol 0.5 mg/2.5 mg/3 mL 0.5-2.5 (3) MG/3ML neb solution    DUONEB    360 vial    Take 1 vial (3 mLs) by nebulization every 6 hours as needed for shortness of breath / dyspnea or wheezing    Upper respiratory tract infection, unspecified type, COPD exacerbation (H)       losartan 50 MG tablet    COZAAR    90 tablet    Take 1 tablet (50 mg) by mouth daily    Benign essential hypertension       methylPREDNISolone 4 MG tablet    MEDROL DOSEPAK    21 tablet    Follow package instructions    Exertional dyspnea, Upper respiratory tract infection, unspecified type, COPD exacerbation (H)       triamcinolone 0.1 % cream    KENALOG    454 g    Apply to AA QD-BID x 1-2 weeks then PRN Only    Acute dermatitis        vitamin D 2000 UNITS tablet      Take 1 tablet by mouth daily.    Osteoporosis       * Notice:  This list has 3 medication(s) that are the same as other medications prescribed for you. Read the directions carefully, and ask your doctor or other care provider to review them with you.

## 2018-02-12 ENCOUNTER — TRANSFERRED RECORDS (OUTPATIENT)
Dept: HEALTH INFORMATION MANAGEMENT | Facility: CLINIC | Age: 83
End: 2018-02-12

## 2018-02-19 ENCOUNTER — TELEPHONE (OUTPATIENT)
Dept: INTERNAL MEDICINE | Facility: CLINIC | Age: 83
End: 2018-02-19

## 2018-02-19 NOTE — TELEPHONE ENCOUNTER
Jamilah Rodriguez is a 86 year old female who calls with symptoms of SOB and wheezing,.    NURSING ASSESSMENT:  Description:  Pt is calling with symptoms of SOB and wheezing. She was last seen in clinic by Dr. Hurtado on 1/17/18 for a f/u appt from Dec for an URI and COPD exacerbation.   Pt is calling now asking for a referral to see a pulmonologist, and med check to see if there is anything else she can take that will help her symptoms.  She says that at nighttime is when she gets SOB and has some wheezing. Has been going on 2-3 weeks  Looking for a referral to see a pulmonologist.  She uses her DuoNeb 3x a day. Once in the morning, then around 3pm, and before bed around 8:30pm.   She also has her albuterol inhaler, which she uses once during the day (at lunchtime), and then will use the inhaler when she gets up during the night time. She says at night she will wake up to go to the bathroom, and that is when she gets short of breath and needs to use her inhaler. Once she is back in bed she feels better.   She says that symptoms are pretty good during the day depending on activites, but the inhaler and neb treatments are effective  Sometimes will has chest tightness, at nighttime and early morning. No dizziness, no lightheadedness, no headaches, no coughing.  Pt says she is unable to come in today for appt. But her daughter is able to drive her in tomorrow.  Appt scheduled for tomorrow with Dr. Willoughby at 9am.   Pt does understand that if symptoms worsen, then she needs to go to the ED to be seen sooner.       RECOMMENDED DISPOSITION:  See in 4 hours, another person to drive. Advised pt that she should be seen today in clinic by provider, or to go to . Pt says she cannot make it in today, and she wants to schedule appt with provider for tomorrow. Per pt request, appt scheduled for tomorrow morning at 9am with Dr. Willoughby.  Will comply with recommendation: No- Barriers to comply with plan of care pt does not have  transportation to be seen today.  If further questions/concerns or if symptoms do not improve, worsen or new symptoms develop, call your PCP or [facility] as soon as possible.      Guideline used:  Telephone Triage Protocols for Nurses, Fifth Edition, Mine Garcia RN

## 2018-02-20 ENCOUNTER — OFFICE VISIT (OUTPATIENT)
Dept: INTERNAL MEDICINE | Facility: CLINIC | Age: 83
End: 2018-02-20
Payer: MEDICARE

## 2018-02-20 VITALS
WEIGHT: 123.4 LBS | SYSTOLIC BLOOD PRESSURE: 102 MMHG | TEMPERATURE: 98 F | BODY MASS INDEX: 21.86 KG/M2 | DIASTOLIC BLOOD PRESSURE: 64 MMHG | OXYGEN SATURATION: 98 % | HEART RATE: 87 BPM

## 2018-02-20 DIAGNOSIS — R06.02 SOB (SHORTNESS OF BREATH): ICD-10-CM

## 2018-02-20 DIAGNOSIS — J44.9 CHRONIC OBSTRUCTIVE PULMONARY DISEASE, UNSPECIFIED COPD TYPE (H): Primary | ICD-10-CM

## 2018-02-20 LAB
FEF 25/75: NORMAL
FEV-1: NORMAL
FEV1/FVC: NORMAL
FVC: NORMAL

## 2018-02-20 PROCEDURE — 99214 OFFICE O/P EST MOD 30 MIN: CPT | Mod: 25 | Performed by: INTERNAL MEDICINE

## 2018-02-20 PROCEDURE — 94010 BREATHING CAPACITY TEST: CPT | Performed by: INTERNAL MEDICINE

## 2018-02-20 RX ORDER — ALBUTEROL SULFATE 90 UG/1
2 AEROSOL, METERED RESPIRATORY (INHALATION) 4 TIMES DAILY PRN
Qty: 1 INHALER | Refills: 5 | Status: SHIPPED | OUTPATIENT
Start: 2018-02-20 | End: 2018-04-15

## 2018-02-20 RX ORDER — PREDNISONE 20 MG/1
40 TABLET ORAL DAILY
Qty: 10 TABLET | Refills: 0 | Status: SHIPPED | OUTPATIENT
Start: 2018-02-20 | End: 2019-01-01

## 2018-02-20 NOTE — PROGRESS NOTES
SUBJECTIVE:   Jamilah Rodriguez is a 86 year old female who presents to clinic today for the following health issues:    Additional concerns: Referral to pulmonary clinic    Patient is here with her daughter today after calling in yesterday complaining of chronic shortness of breath and wheezing which is felt related to COPD.  States her symptoms seem to have worsened since last summer and only minimal amounts of exertion causes significant dyspnea.  Her daughter wonders if something else is going other than COPD and wants her to be seen by pulmonologist.  In reviewing meds she seems to take duo nebs  Frequently throughout the day and also Ventolin on as needed basis.    She is prescribed other meds most recently Combivent Respimat by Dr. Chong but she did not pick this up due to the cost.  Does not appear she has been on any other medications for her COPD.  No prior PFTs are available for review    COPD Follow-Up    Symptoms are currently: much worse. Increased wheezing.    Current fatigue or dyspnea with ambulation: stable     Shortness of breath: slightly worsened    Increased or change in Cough/Sputum: No    Fever(s): No    Baseline ambulation without stopping to rest:  1-2 rooms. Able to walk up 0 flights of stairs without stopping to rest.    Any ER/UC or hospital admissions since your last visit? No     History   Smoking Status     Former Smoker     Quit date: 7/1/2009   Smokeless Tobacco     Never Used     Comment: former 1/2-1 ppd since age 20-22       Problem list and histories reviewed & adjusted, as indicated.  Additional history: as documented    Labs reviewed in EPIC    Reviewed and updated as needed this visit by clinical staff       Reviewed and updated as needed this visit by Provider         ROS:  Constitutional, HEENT, cardiovascular, pulmonary, gi and gu systems are negative, except as otherwise noted.    OBJECTIVE:                                                    /64 (BP Location:  Right arm, Patient Position: Sitting, Cuff Size: Adult Regular)  Pulse 87  Temp 98  F (36.7  C) (Tympanic)  Wt 123 lb 6.4 oz (56 kg)  SpO2 98%  BMI 21.86 kg/m2  Body mass index is 21.86 kg/(m^2).  GENERAL APPEARANCE: alert, no distress, fatigued and thin  HENT: nose and mouth without ulcers or lesions and normal cephalic/atraumatic  NECK: no adenopathy, no asymmetry, masses, or scars and thyroid normal to palpation  RESP: fair BS, diffuse wheezing noted bilat. No crackles  CV: regular rates and rhythm and normal S1 S2, no S3 or S4    Diagnostic test results:  PFTs mild obstruction     ASSESSMENT/PLAN:                                                    1. Chronic obstructive pulmonary disease, unspecified COPD type (H)  - PFTs confirm obstruction though symptoms seem out of proportion to results  She has no clue on which inhaler/neb to use. Seems to be under impression that her rescue inhalers are actually scheduled.  - recommend adding LABA/LAMA + inhaled steroid. She will do 1 only.  Given formulary add anoro ellipta. Ok to see pulm  F/u PCP. If sx do not improve consider cardiac eval     - albuterol (PROAIR HFA) 108 (90 BASE) MCG/ACT Inhaler; Inhale 2 puffs into the lungs 4 times daily as needed for shortness of breath / dyspnea or wheezing  Dispense: 1 Inhaler; Refill: 5  - PULMONARY MEDICINE REFERRAL  - umeclidinium-vilanterol (ANORO ELLIPTA) 62.5-25 MCG/INH oral inhaler; Inhale 1 puff into the lungs daily  Dispense: 1 Inhaler; Refill: 11  - predniSONE (DELTASONE) 20 MG tablet; Take 2 tablets (40 mg) by mouth daily for 5 days  Dispense: 10 tablet; Refill: 0    2. SOB (shortness of breath)  See above  - Spirometry, Breathing Capacity: Normal Order, Clinic Performed    Madi Willoughby MD  [facility]

## 2018-02-20 NOTE — NURSING NOTE
"Chief Complaint   Patient presents with     COPD     f/u       Initial /64 (BP Location: Right arm, Patient Position: Sitting, Cuff Size: Adult Regular)  Pulse 87  Temp 98  F (36.7  C) (Tympanic)  Wt 123 lb 6.4 oz (56 kg)  SpO2 98%  BMI 21.86 kg/m2 Estimated body mass index is 21.86 kg/(m^2) as calculated from the following:    Height as of 1/17/18: 5' 3\" (1.6 m).    Weight as of this encounter: 123 lb 6.4 oz (56 kg).  Medication Reconciliation: complete     Princess YARELI Casarez CMA      "

## 2018-02-20 NOTE — MR AVS SNAPSHOT
After Visit Summary   2/20/2018    Jamilah Rodriguez    MRN: [id number]           Patient Information     Date Of Birth          4/5/1931        Visit Information        Provider Department      2/20/2018 9:00 AM Madi Willoughby MD [facility]        Today's Diagnoses     Chronic obstructive pulmonary disease, unspecified COPD type (H)    -  1    SOB (shortness of breath)        Need for prophylactic vaccination against Streptococcus pneumoniae (pneumococcus)           Follow-ups after your visit        Additional Services     PULMONARY MEDICINE REFERRAL       Your provider has referred you to:   [facility]: Lung Disease [facility] Pulmonary [facility] - [place] [phone]   [email or web address]  N: [place] [facility] [phone]   [email or web address]    Please be aware that coverage of these services is subject to the terms and limitations of your health insurance plan.  Call member services at your health plan with any benefit or coverage questions.      Please bring the following with you to your appointment:    (1) Any X-Rays, CTs or MRIs which have been performed.  Contact the facility where they were done to arrange for  prior to your scheduled appointment.    (2) List of current medications   (3) This referral request   (4) Any documents/labs given to you for this referral                  Who to contact     If you have questions or need follow up information about today's clinic visit or your schedule please contact [facility] directly at [phone].  Normal or non-critical lab and imaging results will be communicated to you by MyChart, letter or phone within 4 business days after the clinic has received the results. If you do not hear from us within 7 days, please contact the clinic through MyChart or phone. If you have a critical or abnormal lab result, we will notify you by  phone as soon as possible.  Submit refill requests through [employer] or call your pharmacy and they will forward the refill request to us. Please allow 3 business days for your refill to be completed.          Additional Information About Your Visit        [employer] Information     [employer] gives you secure access to your electronic health record. If you see a primary care provider, you can also send messages to your care team and make appointments. If you have questions, please call your primary care clinic.  If you do not have a primary care provider, please call [phone] and they will assist you.        Care EveryWhere ID     This is your Care EveryWhere ID. This could be used by other organizations to access your [place] medical records  [id number]        Your Vitals Were     Pulse Temperature Pulse Oximetry BMI (Body Mass Index)          87 98  F (36.7  C) (Tympanic) 98% 21.86 kg/m2         Blood Pressure from Last 3 Encounters:   02/20/18 102/64   01/17/18 120/80   12/18/17 106/62    Weight from Last 3 Encounters:   02/20/18 123 lb 6.4 oz (56 kg)   01/17/18 124 lb 9.6 oz (56.5 kg)   12/18/17 126 lb 6.4 oz (57.3 kg)              We Performed the Following     PULMONARY MEDICINE REFERRAL     Spirometry, Breathing Capacity: Normal Order, Clinic Performed          Today's Medication Changes          These changes are accurate as of 2/20/18 10:14 AM.  If you have any questions, ask your nurse or doctor.               These medicines have changed or have updated prescriptions.        Dose/Directions    ipratropium - albuterol 0.5 mg/2.5 mg/3 mL 0.5-2.5 (3) MG/3ML neb solution   Commonly known as:  DUONEB   This may have changed:  Another medication with the same name was removed. Continue taking this medication, and follow the directions you see here.   Used for:  Upper respiratory tract infection, unspecified type, COPD exacerbation (H)   Changed by:  Madi Willoughby MD        Dose:  1 vial   Take 1 vial (3  mLs) by nebulization every 6 hours as needed for shortness of breath / dyspnea or wheezing   Quantity:  3 mL   Refills:  0         Stop taking these medicines if you haven't already. Please contact your care team if you have questions.     methylPREDNISolone 4 MG tablet   Commonly known as:  MEDROL DOSEPAK   Stopped by:  Madi Willoughby MD                Where to get your medicines      These medications [id number] [address] [facility]  [address] [address] [facility] [address]     Phone:  [phone]     albuterol 108 (90 BASE) MCG/ACT Inhaler                Primary Care Provider Office Phone # Fax #    Sharon Hurtado [phone]       [address] [facility] [address]        Equal Access to Services     [facility] : Hadii sherly chaves hadasho Soomaali, waaxda luqadaha, qaybta kaalmada adeegyada, kimberly drake . So [facility] [phone].    ATENCIÓN: Si habla español, tiene a smith disposición servicios gratuitos de asistencia lingüística. Llame al [phone].    We comply with applicable federal civil rights laws and [place] laws. We do not discriminate on the basis of race, color, national origin, age, disability, sex, sexual orientation, or gender identity.            Thank you!     Thank you for choosing [facility]  for your care. Our goal is always to provide you with excellent care. Hearing back from our patients is one way we can continue to improve our services. Please take a few minutes to complete the written survey that you may receive in the mail after your visit with us. Thank you!             Your Updated Medication List - Protect others around you: Learn how to safely use, store and throw away your medicines at www.disposemymeds.org.          This list is accurate as of 2/20/18 10:14 AM.  Always use your most recent med list.                   Brand Name Dispense Instructions for  use Diagnosis    albuterol 108 (90 BASE) MCG/ACT Inhaler    PROAIR HFA    1 Inhaler    Inhale 2 puffs into the lungs 4 times daily as needed for shortness of breath / dyspnea or wheezing    Need for prophylactic vaccination against Streptococcus pneumoniae (pneumococcus), Chronic obstructive pulmonary disease, unspecified COPD type (H)       ascorbic acid 500 MG tablet    VITAMIN C    100 tablet    Take 1 tablet by mouth daily.        bimatoprost 0.01 % Soln    LUMIGAN     Place 1 drop into both eyes At Bedtime.        calcium + D 600-200 MG-UNIT Tabs   Generic drug:  calcium carbonate-vitamin D     100 tablet    Take 1 tablet by mouth every 12 hours.        carboxymethylcellulose 0.5 % Soln ophthalmic solution    REFRESH PLUS     Place 1 drop into both eyes every 2 hours as needed.        dorzolamide 2 % ophthalmic solution    TRUSOPT     Place 1 drop into both eyes 2 times daily        FLAX SEEDS PO           fluocinonide 0.05 % solution    LIDEX    60 mL    Apply to scalp BID PRN    Scalp itch       GLUCOSAMINE SULFATE PO      Take 500 mg by mouth daily        ipratropium - albuterol 0.5 mg/2.5 mg/3 mL 0.5-2.5 (3) MG/3ML neb solution    DUONEB    3 mL    Take 1 vial (3 mLs) by nebulization every 6 hours as needed for shortness of breath / dyspnea or wheezing    Upper respiratory tract infection, unspecified type, COPD exacerbation (H)       losartan 50 MG tablet    COZAAR    90 tablet    Take 1 tablet (50 mg) by mouth daily    Benign essential hypertension       triamcinolone 0.1 % cream    KENALOG    454 g    Apply to AA QD-BID x 1-2 weeks then PRN Only    Acute dermatitis       vitamin D 2000 UNITS tablet      Take 1 tablet by mouth daily.    Osteoporosis

## 2018-02-21 ENCOUNTER — TELEPHONE (OUTPATIENT)
Dept: INTERNAL MEDICINE | Facility: CLINIC | Age: 83
End: 2018-02-21

## 2018-02-21 DIAGNOSIS — J44.9 CHRONIC OBSTRUCTIVE PULMONARY DISEASE, UNSPECIFIED COPD TYPE (H): ICD-10-CM

## 2018-02-21 NOTE — TELEPHONE ENCOUNTER
Patients daughter called and she stated that the mother is out of her albuterol (PROAIR HFA) 108 (90 BASE) MCG/ACT Inhaler and the pharmacy will not fill this until Saturday and she says that her mother cannot go that long without the medication so she is wanting the doctor to approve the early fill, if so please call the pharmacy @ [phone]

## 2018-02-22 RX ORDER — ALBUTEROL SULFATE 90 UG/1
2 AEROSOL, METERED RESPIRATORY (INHALATION) EVERY 6 HOURS PRN
Qty: 1 INHALER | Refills: 0 | Status: SHIPPED | OUTPATIENT
Start: 2018-02-22 | End: 2018-03-21

## 2018-02-22 NOTE — TELEPHONE ENCOUNTER
That request really makes no sense. Those are the same med.   When I saw this pt she had little insight into the differences in her different inhalers. The fact she is requesting both makes me think she thinks they are different.  We may want to just inform her that whether it is proair or ventolin they are both albuterol and interchangable.  Do not use both as if they are different types of inhalers.

## 2018-02-22 NOTE — TELEPHONE ENCOUNTER
Called the pharmacy and they stated that pt called and is wanting both the ProAir AND Ventolin. The ventolin was filled on 2/11 but she wants it early so they will probably need a PA. Can you rx the Ventolin?

## 2018-03-02 ENCOUNTER — TRANSFERRED RECORDS (OUTPATIENT)
Dept: HEALTH INFORMATION MANAGEMENT | Facility: CLINIC | Age: 83
End: 2018-03-02

## 2018-03-16 ENCOUNTER — OFFICE VISIT (OUTPATIENT)
Dept: URGENT CARE | Facility: URGENT CARE | Age: 83
End: 2018-03-16
Payer: MEDICARE

## 2018-03-16 VITALS
SYSTOLIC BLOOD PRESSURE: 132 MMHG | WEIGHT: 123.3 LBS | HEART RATE: 99 BPM | DIASTOLIC BLOOD PRESSURE: 82 MMHG | TEMPERATURE: 97.3 F | RESPIRATION RATE: 16 BRPM | BODY MASS INDEX: 21.84 KG/M2

## 2018-03-16 DIAGNOSIS — L03.116 CELLULITIS OF LEFT LOWER EXTREMITY: Primary | ICD-10-CM

## 2018-03-16 PROCEDURE — 99214 OFFICE O/P EST MOD 30 MIN: CPT | Performed by: FAMILY MEDICINE

## 2018-03-16 RX ORDER — MUPIROCIN CALCIUM 20 MG/G
CREAM TOPICAL 3 TIMES DAILY
Qty: 30 G | Refills: 1 | Status: SHIPPED | OUTPATIENT
Start: 2018-03-16 | End: 2018-04-06

## 2018-03-16 NOTE — MR AVS SNAPSHOT
After Visit Summary   3/16/2018    Jamilah Rodriguez    MRN: [id number]           Patient Information     Date Of Birth          4/5/1931        Visit Information        Provider Department      3/16/2018 1:10 PM Chris Tavarez MD [facility]        Today's Diagnoses     Cellulitis of left lower extremity    -  1       Follow-ups after your visit        Your next 10 appointments already scheduled     Apr 06, 2018 10:15 AM CDT   SHORT with Sharon Hurtado MD   [facility] ([facility])    [address] [id number] [phone]            Apr 09, 2018 10:00 AM CDT   Pulmonary Eval with  Pulmonary Rehab 2   [facility] ([facility])    [address] [facility] [address]  [facility] [id number] [phone]            May 02, 2018  9:15 AM CDT   (Arrive by 9:00 AM)   US ABDOMINAL AORTIC IMAGING with SHVUS2   [facility] ([facility])    [address]  [place] [address]   [phone]           Please bring a list of your medicines (including vitamins, minerals and over-the-counter drugs). Also, tell your doctor about any allergies you may have. Wear comfortable clothes and leave your valuables at home.  Adults: No eating or drinking for 8 hours before the exam. You may take medicine with a small sip of water.  Children: - Children 6+ years: No food or drink for 6 hours before exam. - Children 1-5 years: No food or drink for 4 hours before exam. - Infants, breast-fed: may have breast milk up to 2 hours before exam. - Infants, formula: may have bottle until 4 hours before exam.  Please call the Imaging Department at your exam site with any questions.            May 02, 2018 10:00 AM CDT   Return Visit with Lv Boogie MD   [facility] ([facility])    [address]  [place] [id number] [phone]              Who to contact     If you have questions or need follow up information about today's clinic visit or your schedule please contact [facility] directly at [phone].  Normal or non-critical lab and imaging results will be communicated to you by ServerEngineshart, letter or phone within 4 business days after the clinic has received the results. If you do not hear from us within 7 days, please contact the clinic through ServerEngineshart or phone. If you have a critical or abnormal lab result, we will notify you by phone as soon as possible.  Submit refill requests through [employer] or call your pharmacy and they will forward the refill request to us. Please allow 3 business days for your refill to be completed.          Additional Information About Your Visit        ServerEnginesharPicklify Information     [employer] gives you secure access to your electronic health record. If you see a primary care provider, you can also send messages to your care team and make appointments. If you have questions, please call your primary care clinic.  If you do not have a primary care provider, please call [phone] and they will assist you.        Care EveryWhere ID     This is your Care EveryWhere ID. This could be used by other organizations to access your [place] medical records  [id number]        Your Vitals Were     Pulse Temperature Respirations BMI (Body Mass Index)          99 97.3  F (36.3  C) (Oral) 16 21.84 kg/m2         Blood Pressure from Last 3 Encounters:   04/02/18 130/88   03/23/18 128/66   03/21/18 120/68    Weight from Last 3 Encounters:   04/02/18 124 lb (56.2 kg)   03/23/18 124 lb (56.2 kg)   03/21/18 122 lb 6.4 oz (55.5 kg)              Today, you had the following     No orders found for display         Today's Medication Changes          These changes are accurate as of 3/16/18 11:59 PM.  If you have any  questions, ask your nurse or doctor.               Start taking these medicines.        Dose/Directions    mupirocin 2 % cream   Commonly known as:  BACTROBAN   Used for:  Cellulitis of left lower extremity   Started by:  Chris Tavarez MD        Apply topically 3 times daily   Quantity:  30 g   Refills:  1            Where to get your medicines      Some of these will need a paper prescription and others can be bought over the counter.  Ask your nurse if you have questions.     Bring a paper prescription for each of these medications     mupirocin 2 % cream                Primary Care Provider Office Phone # Fax #    hSaron Hurtado [phone]       [address] [facility] [address]        Equal Access to Services     JEANNETTE FORREST : Hadii sherly chaves hadasho Soomaali, waaxda luqadaha, qaybta kaalmada adeegyada, kimberly drake . So [facility] [phone].    ATENCIÓN: Si habla español, tiene a smith disposición servicios gratuitos de asistencia lingüística. Llame al [phone].    We comply with applicable federal civil rights laws and [place] laws. We do not discriminate on the basis of race, color, national origin, age, disability, sex, sexual orientation, or gender identity.            Thank you!     Thank you for choosing [facility]  for your care. Our goal is always to provide you with excellent care. Hearing back from our patients is one way we can continue to improve our services. Please take a few minutes to complete the written survey that you may receive in the mail after your visit with us. Thank you!             Your Updated Medication List - Protect others around you: Learn how to safely use, store and throw away your medicines at www.disposemymeds.org.          This list is accurate as of 3/16/18 11:59 PM.  Always use your most recent med list.                   Brand Name Dispense Instructions for use Diagnosis    albuterol 108 (90  BASE) MCG/ACT Inhaler    PROAIR HFA    1 Inhaler    Inhale 2 puffs into the lungs 4 times daily as needed for shortness of breath / dyspnea or wheezing    Chronic obstructive pulmonary disease, unspecified COPD type (H)       ascorbic acid 500 MG tablet    VITAMIN C    100 tablet    Take 1 tablet by mouth daily.        bimatoprost 0.01 % Soln    LUMIGAN     Place 1 drop into both eyes At Bedtime.        calcium + D 600-200 MG-UNIT Tabs   Generic drug:  calcium carbonate-vitamin D     100 tablet    Take 1 tablet by mouth every 12 hours.        carboxymethylcellulose 0.5 % Soln ophthalmic solution    REFRESH PLUS     Place 1 drop into both eyes every 2 hours as needed.        dorzolamide 2 % ophthalmic solution    TRUSOPT     Place 1 drop into both eyes 2 times daily        FLAX SEEDS PO           fluocinonide 0.05 % solution    LIDEX    60 mL    Apply to scalp BID PRN    Scalp itch       GLUCOSAMINE SULFATE PO      Take 500 mg by mouth daily        ipratropium - albuterol 0.5 mg/2.5 mg/3 mL 0.5-2.5 (3) MG/3ML neb solution    DUONEB    3 mL    Take 1 vial (3 mLs) by nebulization every 6 hours as needed for shortness of breath / dyspnea or wheezing    Upper respiratory tract infection, unspecified type, COPD exacerbation (H)       losartan 50 MG tablet    COZAAR    90 tablet    Take 1 tablet (50 mg) by mouth daily    Benign essential hypertension       mupirocin 2 % cream    BACTROBAN    30 g    Apply topically 3 times daily    Cellulitis of left lower extremity       triamcinolone 0.1 % cream    KENALOG    454 g    Apply to AA QD-BID x 1-2 weeks then PRN Only    Acute dermatitis       umeclidinium-vilanterol 62.5-25 MCG/INH oral inhaler    ANORO ELLIPTA    1 Inhaler    Inhale 1 puff into the lungs daily    Chronic obstructive pulmonary disease, unspecified COPD type (H)       vitamin D 2000 UNITS tablet      Take 1 tablet by mouth daily.    Osteoporosis

## 2018-03-16 NOTE — PROGRESS NOTES
SUBJECTIVE:   Jamilah Rodriguez is a 86 year old female presenting with a chief complaint of   Chief Complaint   Patient presents with     Urgent Care     Pt states  sore on rt leg, burns, 1x month        She is an established patient of [facility].    Onset of rash was 1 week(s) ago.   Course of illness is worsening.  Severity moderate  Current and Associated symptoms: burning   Location of the rash: lower leg.  Previous history of a similar rash? Yes  Recent exposure history: none known  Denies exposure to: none known  Associated symptoms include: nothing.  Treatment measures tried include: none        Review of Systems   Constitutional: Positive for fatigue.   HENT: Negative.    Eyes: Negative.    Respiratory: Negative.    Cardiovascular: Negative.    Gastrointestinal: Negative.    Endocrine: Negative.    Genitourinary: Negative.    Musculoskeletal:        Strength appropriate for her age, complaining of lower extremities   Skin:        Redness , elevation some areas of weeping skin no bleeding.    Allergic/Immunologic: Negative.    Neurological: Negative.        Past Medical History:   Diagnosis Date     AAA (abdominal aortic aneurysm) (H)     Followed with yearly ultrasound     Cervical cancer (H) 1969     Chronic dermatitis     extensive allergy testing revealed allergy/sensitivity to carba mix ( rubber) and Imidazolidinyl Urea (common in beauty products)     Glaucoma      Glaucoma      History of COPD     very mild abnormal spirometry in 2007 (in [place]), has not been active since quitting smoking in 2009     HTN (hypertension)      Macular degeneration      Osteoporosis 8/3/11    femoral T score -3.4 & -3.7     PAD (peripheral artery disease) (H)      Vertebral compression fracture (H) 5/17/11    L1 compression fracture, presumed osteoporotic compression fracture     Family History   Problem Relation Age of Onset     Breast Cancer Other      Circulatory Daughter 53     mengioma     C.A.D. Father      DIABETES  Sister      Breast Cancer Maternal Aunt 60     Current Outpatient Prescriptions   Medication Sig Dispense Refill     mupirocin (BACTROBAN) 2 % cream Apply topically 3 times daily 30 g 1     albuterol (PROAIR HFA/PROVENTIL HFA/VENTOLIN HFA) 108 (90 BASE) MCG/ACT Inhaler Inhale 2 puffs into the lungs every 6 hours as needed for shortness of breath / dyspnea or wheezing 1 Inhaler 0     albuterol (PROAIR HFA) 108 (90 BASE) MCG/ACT Inhaler Inhale 2 puffs into the lungs 4 times daily as needed for shortness of breath / dyspnea or wheezing 1 Inhaler 5     umeclidinium-vilanterol (ANORO ELLIPTA) 62.5-25 MCG/INH oral inhaler Inhale 1 puff into the lungs daily 1 Inhaler 11     ipratropium - albuterol 0.5 mg/2.5 mg/3 mL (DUONEB) 0.5-2.5 (3) MG/3ML neb solution Take 1 vial (3 mLs) by nebulization every 6 hours as needed for shortness of breath / dyspnea or wheezing 3 mL 0     losartan (COZAAR) 50 MG tablet Take 1 tablet (50 mg) by mouth daily 90 tablet 1     fluocinonide (LIDEX) 0.05 % solution Apply to scalp BID PRN 60 mL 3     triamcinolone (KENALOG) 0.1 % cream Apply to AA QD-BID x 1-2 weeks then PRN Only 454 g 11     GLUCOSAMINE SULFATE PO Take 500 mg by mouth daily       Flaxseed, Linseed, (FLAX SEEDS PO)        dorzolamide (TRUSOPT) 2 % ophthalmic solution Place 1 drop into both eyes 2 times daily       Cholecalciferol (VITAMIN D) 2000 UNITS tablet Take 1 tablet by mouth daily.       bimatoprost (LUMIGAN) 0.01 % SOLN Place 1 drop into both eyes At Bedtime.         carboxymethylcellulose (REFRESH PLUS) 0.5 % SOLN Place 1 drop into both eyes every 2 hours as needed.         Calcium Carbonate-Vitamin D (CALCIUM + D) 600-200 MG-UNIT per tablet Take 1 tablet by mouth every 12 hours. 100 tablet 12     ascorbic acid (VITAMIN C) 500 MG tablet Take 1 tablet by mouth daily. 100 tablet 12     Social History   Substance Use Topics     Smoking status: Former Smoker     Quit date: 7/1/2009     Smokeless tobacco: Never Used       Comment: former 1/2-1 ppd since age 20-22     Alcohol use No       OBJECTIVE  /82  Pulse 99  Temp 97.3  F (36.3  C) (Oral)  Resp 16  Wt 123 lb 4.8 oz (55.9 kg)  BMI 21.84 kg/m2    Physical Exam    Labs:  No results found for this or any previous visit (from the past 24 hour(s)).    X-Ray was not done.    ASSESSMENT:      ICD-10-CM    1. Cellulitis of left lower extremity L03.116 mupirocin (BACTROBAN) 2 % cream   This appears to be most likely cellulitis and lower extremity.  In addition the patient does not believe that this is cellulitic in nature there is some pain but still there is areas of breakdown of the skin could have contributed to bacterial infection.    Very similar appearance to other etiologies however given the current circumstances I will treated as an infection .    2.  Elderly female having trouble doing her ADLs.    Discussion with daughter in regards to his current problem and the need for follow-up with her primary care.    Medical Decision Making:    Differential Diagnosis:  Atopic dermatitis  Candidiasis  Cellulitis  Contact dermatitis  Dermatitis  Drug eruption  Eczema  Histamine reaction  Inflammation  Purpura2  Rash  Stasis dermatitis    Serious Comorbid Conditions:  Adult:  Advanced age    PLAN:    Rash: The area is defined to the lower extremity I think we can certainly try a topical antibiotic cream such as Bactroban.    Instructions were given to the nurse on proper care.  Patient's family was present and also given instructions on home care for the wound    Followup:    If not improving or if condition worsens, follow up with your Primary Care Provider    There are no Patient Instructions on file for this visit.

## 2018-03-21 ENCOUNTER — OFFICE VISIT (OUTPATIENT)
Dept: INTERNAL MEDICINE | Facility: CLINIC | Age: 83
End: 2018-03-21
Payer: MEDICARE

## 2018-03-21 VITALS
RESPIRATION RATE: 16 BRPM | BODY MASS INDEX: 21.68 KG/M2 | WEIGHT: 122.4 LBS | TEMPERATURE: 98 F | HEART RATE: 80 BPM | SYSTOLIC BLOOD PRESSURE: 120 MMHG | DIASTOLIC BLOOD PRESSURE: 68 MMHG

## 2018-03-21 DIAGNOSIS — L98.491 SUPERFICIAL ULCER OF SKIN (H): Primary | ICD-10-CM

## 2018-03-21 PROCEDURE — 99213 OFFICE O/P EST LOW 20 MIN: CPT | Performed by: PHYSICIAN ASSISTANT

## 2018-03-21 ASSESSMENT — PAIN SCALES - GENERAL: PAINLEVEL: MODERATE PAIN (5)

## 2018-03-21 NOTE — PROGRESS NOTES
SUBJECTIVE:   Jamilah Rodriguez is a 86 year old female who presents to clinic today for the following health issues:      ED/UC Followup:    [place]  [facility]  Date of visit: 3/16/18  Reason for visit: Cellulitis of Left Lower Extremity  Current Status: Patient states that it smarts, burns and hurts sometimes and she would like to have it looked at to see how it's doing.        -patient was seen in urgent care on 3/16/2018 and diagnosed with cellulitis of lower extremity and given mupirocin topical ointment for treatment  - patient notes improvement since urgent care  - symptoms that prompted urgent care visit were open skin and a puslike substance  - patient has been doing dressing changes 3 times a day and washing the area 3 times a day  - pt notes pain periodically over the area of the open wound   - pt denies fever and drainage since being seen in UC        Problem list and histories reviewed & adjusted, as indicated.  Additional history: as documented    Reviewed and updated as needed this visit by clinical staff  Tobacco  Allergies  Meds  Problems       Reviewed and updated as needed this visit by Provider  Meds  Problems           OBJECTIVE:     /68  Pulse 80  Temp 98  F (36.7  C) (Oral)  Resp 16  Wt 122 lb 6.4 oz (55.5 kg)  SpO2 (P) 97%  Breastfeeding? No  BMI 21.68 kg/m2  Body mass index is 21.68 kg/(m^2).  GENERAL: healthy, alert and no distress  Right lower extremity:  On anterior shin there is a superficial ulcer that is about a 1 inch in size, the greater portion of her anterior calf foot has general discoloration- purple to reddish tint (non-erythematous)-, there is some mild bruising on the anterior shin as well, there is no swelling, warmth or discharge, tenderness noted by ulceration, but otherwise no tenderness to calf or with dorsal flexion, pulses are faint, but intact.    Diagnostic Test Results:  none     ASSESSMENT/PLAN:     1. Superficial ulcer of skin (H)  - general wound  care is the mainstay of treatment   - I do not suspect continued infection  - reviewed referral to wound care, will hold off for now and consider if wound is not healing properly   - reviewed needing to wash the area only once daily and reviewed dressing changes   - reviewed DC mupirocin at this time   - reviewed signs and symptoms that should prompt urgent follow up   - otherwise follow up if wound is not improving   - pt agrees to above plan and all questions are answered     Lucinda Cho PA-C  [facility]

## 2018-03-21 NOTE — MR AVS SNAPSHOT
After Visit Summary   3/21/2018    Jamilah Rodriguez    MRN: [id number]           Patient Information     Date Of Birth          4/5/1931        Visit Information        Provider Department      3/21/2018 10:00 AM Lucinda Cho PA-C [facility]        Care Instructions    Follow up if symptoms worsen or fail to respond     Signs to look for:   - increasing pain, swelling, redness, purulent drainage           Follow-ups after your visit        Your next 10 appointments already scheduled     Apr 09, 2018 10:00 AM CDT   Pulmonary Eval with Rh Pulmonary Rehab 2   [facility] ([facility])    [address] [facility] [address]  [facility] [address] [phone]              Who to contact     If you have questions or need follow up information about today's clinic visit or your schedule please contact [facility] directly at [phone].  Normal or non-critical lab and imaging results will be communicated to you by MyChart, letter or phone within 4 business days after the clinic has received the results. If you do not hear from us within 7 days, please contact the clinic through [employer] or phone. If you have a critical or abnormal lab result, we will notify you by phone as soon as possible.  Submit refill requests through [employer] or call your pharmacy and they will forward the refill request to us. Please allow 3 business days for your refill to be completed.          Additional Information About Your Visit        MyChart Information     [employer] gives you secure access to your electronic health record. If you see a primary care provider, you can also send messages to your care team and make appointments. If you have questions, please call your primary care clinic.  If you do not have a primary care provider, please call [phone] and they will assist you.        Care EveryWhere ID     This is your Care EveryWhere ID.  This could be used by other organizations to access your [place] medical records  [id number]        Your Vitals Were     Pulse Temperature Respirations Breastfeeding? BMI (Body Mass Index)       80 98  F (36.7  C) (Oral) 16 No 21.68 kg/m2        Blood Pressure from Last 3 Encounters:   03/21/18 120/68   03/16/18 132/82   02/20/18 102/64    Weight from Last 3 Encounters:   03/21/18 122 lb 6.4 oz (55.5 kg)   03/16/18 123 lb 4.8 oz (55.9 kg)   02/20/18 123 lb 6.4 oz (56 kg)              Today, you had the following     No orders found for display       Primary Care Provider Office Phone # Fax #    Sharon Hurtado [phone]       [address] [facility] [address]        Equal Access to Services     JEANNETTE UMMC Holmes CountyVLADIMIR : Hadii sherly chaves hadasho Somu, waaxda luqadaha, qaybta kaalmada adeegyada, kimberly collins hayshamika drake . So [facility] [phone].    ATENCIÓN: Si habla español, tiene a smith disposición servicios gratuitos de asistencia lingüística. Llame al [phone].    We comply with applicable federal civil rights laws and [place] laws. We do not discriminate on the basis of race, color, national origin, age, disability, sex, sexual orientation, or gender identity.            Thank you!     Thank you for choosing [facility]  for your care. Our goal is always to provide you with excellent care. Hearing back from our patients is one way we can continue to improve our services. Please take a few minutes to complete the written survey that you may receive in the mail after your visit with us. Thank you!             Your Updated Medication List - Protect others around you: Learn how to safely use, store and throw away your medicines at www.disposemymeds.org.          This list is accurate as of 3/21/18 10:37 AM.  Always use your most recent med list.                   Brand Name Dispense Instructions for use Diagnosis    albuterol 108 (90 BASE) MCG/ACT Inhaler     PROAIR HFA    1 Inhaler    Inhale 2 puffs into the lungs 4 times daily as needed for shortness of breath / dyspnea or wheezing    Chronic obstructive pulmonary disease, unspecified COPD type (H)       ascorbic acid 500 MG tablet    VITAMIN C    100 tablet    Take 1 tablet by mouth daily.        bimatoprost 0.01 % Soln    LUMIGAN     Place 1 drop into both eyes At Bedtime.        calcium + D 600-200 MG-UNIT Tabs   Generic drug:  calcium carbonate-vitamin D     100 tablet    Take 1 tablet by mouth every 12 hours.        carboxymethylcellulose 0.5 % Soln ophthalmic solution    REFRESH PLUS     Place 1 drop into both eyes every 2 hours as needed.        dorzolamide 2 % ophthalmic solution    TRUSOPT     Place 1 drop into both eyes 2 times daily        FLAX SEEDS PO           fluocinonide 0.05 % solution    LIDEX    60 mL    Apply to scalp BID PRN    Scalp itch       GLUCOSAMINE SULFATE PO      Take 500 mg by mouth daily        ipratropium - albuterol 0.5 mg/2.5 mg/3 mL 0.5-2.5 (3) MG/3ML neb solution    DUONEB    3 mL    Take 1 vial (3 mLs) by nebulization every 6 hours as needed for shortness of breath / dyspnea or wheezing    Upper respiratory tract infection, unspecified type, COPD exacerbation (H)       losartan 50 MG tablet    COZAAR    90 tablet    Take 1 tablet (50 mg) by mouth daily    Benign essential hypertension       mupirocin 2 % cream    BACTROBAN    30 g    Apply topically 3 times daily    Cellulitis of left lower extremity       triamcinolone 0.1 % cream    KENALOG    454 g    Apply to AA QD-BID x 1-2 weeks then PRN Only    Acute dermatitis       umeclidinium-vilanterol 62.5-25 MCG/INH oral inhaler    ANORO ELLIPTA    1 Inhaler    Inhale 1 puff into the lungs daily    Chronic obstructive pulmonary disease, unspecified COPD type (H)       vitamin D 2000 UNITS tablet      Take 1 tablet by mouth daily.    Osteoporosis

## 2018-03-21 NOTE — PATIENT INSTRUCTIONS
Follow up if symptoms worsen or fail to respond     Signs to look for:   - increasing pain, swelling, redness, purulent drainage

## 2018-03-23 ENCOUNTER — TELEPHONE (OUTPATIENT)
Dept: INTERNAL MEDICINE | Facility: CLINIC | Age: 83
End: 2018-03-23

## 2018-03-23 ENCOUNTER — OFFICE VISIT (OUTPATIENT)
Dept: INTERNAL MEDICINE | Facility: CLINIC | Age: 83
End: 2018-03-23
Payer: MEDICARE

## 2018-03-23 VITALS
RESPIRATION RATE: 16 BRPM | TEMPERATURE: 98.1 F | SYSTOLIC BLOOD PRESSURE: 128 MMHG | BODY MASS INDEX: 21.97 KG/M2 | HEART RATE: 87 BPM | OXYGEN SATURATION: 95 % | WEIGHT: 124 LBS | DIASTOLIC BLOOD PRESSURE: 66 MMHG

## 2018-03-23 DIAGNOSIS — S81.801D OPEN WOUND OF RIGHT LOWER EXTREMITY, SUBSEQUENT ENCOUNTER: ICD-10-CM

## 2018-03-23 DIAGNOSIS — L03.115 CELLULITIS OF RIGHT LOWER LEG: Primary | ICD-10-CM

## 2018-03-23 PROCEDURE — 99214 OFFICE O/P EST MOD 30 MIN: CPT | Performed by: INTERNAL MEDICINE

## 2018-03-23 RX ORDER — CLINDAMYCIN HCL 300 MG
300 CAPSULE ORAL 3 TIMES DAILY
Qty: 21 CAPSULE | Refills: 0 | Status: SHIPPED | OUTPATIENT
Start: 2018-03-23 | End: 2019-01-01

## 2018-03-23 RX ORDER — CEPHALEXIN 500 MG/1
500 CAPSULE ORAL 3 TIMES DAILY
Qty: 21 CAPSULE | Refills: 0 | Status: SHIPPED | OUTPATIENT
Start: 2018-03-23 | End: 2018-03-23

## 2018-03-23 NOTE — PROGRESS NOTES
SUBJECTIVE:   Jamilah Rodriguez is a 86 year old female who presents to clinic today for the following health issues:      Concern - open sores on leg  Onset: last week    Description:   Patient has a sore on her leg that has seemed to be getting worse    Intensity: mild, moderate    Progression of Symptoms:  worsening    Accompanying Signs & Symptoms:  pain    Previous history of similar problem:   Had a broken vessel a couple months ago and it just hasn't seemed to heal     Precipitating factors:   Worsened by: none    Alleviating factors:  Improved by: none    Therapies Tried and outcome: ointment      Problem list and histories reviewed & adjusted, as indicated.  Additional history: as documented    Labs reviewed in EPIC    Reviewed and updated as needed this visit by clinical staff  Allergies  Meds       Reviewed and updated as needed this visit by Provider         ROS:  Constitutional, HEENT, cardiovascular, pulmonary, gi and gu systems are negative, except as otherwise noted.    OBJECTIVE:                                                    /66  Pulse 87  Temp 98.1  F (36.7  C) (Oral)  Resp 16  Wt 124 lb (56.2 kg)  SpO2 95%  BMI 21.97 kg/m2  Body mass index is 21.97 kg/(m^2).  GENERAL APPEARANCE: thin, elderly female   MS: no significant edema bialt LE  SKIN: RLE wound with some mucus like d/c. Quite tender.  Surrounding area inferiorly down to ankle erythematous , slight warmth and tender.     Diagnostic test results:  none      ASSESSMENT/PLAN:                                                    1. Cellulitis of right lower leg  2. Open wound of right lower extremity, subsequent encounter  The wound itself doesn't look that bad but there is a significant amt of erythema inferiorly to it , quite pronounced nato compared to the other leg. Given the tenderness there I think she has some cellulitis.  Given her multiple allergies - clinda may be out best choice for empiric coverage  - clindamycin  (CLEOCIN) 300 MG capsule; Take 1 capsule (300 mg) by mouth 3 times daily for 7 days  Dispense: 21 capsule; Refill: 0  Continue using vasoline or bacitracin ointment on the wound w/nonadh dressing. Change daily    Madi Willoughby MD  [facility]

## 2018-03-23 NOTE — MR AVS SNAPSHOT
After Visit Summary   3/23/2018    Jamilah Rodriguez    MRN: [id number]           Patient Information     Date Of Birth          4/5/1931        Visit Information        Provider Department      3/23/2018 1:00 PM Madi Willoughby MD [facility]        Today's Diagnoses     Cellulitis of right lower leg    -  1    Open wound of right lower extremity, subsequent encounter           Follow-ups after your visit        Your next 10 appointments already scheduled     Apr 09, 2018 10:00 AM CDT   Pulmonary Eval with Rh Pulmonary Rehab 2   [facility]    [address]  [facility] [address] [phone]              Who to contact     If you have questions or need follow up information about today's clinic visit or your schedule please contact [facility] directly at [phone].  Normal or non-critical lab and imaging results will be communicated to you by MyChart, letter or phone within 4 business days after the clinic has received the results. If you do not hear from us within 7 days, please contact the clinic through MyChart or phone. If you have a critical or abnormal lab result, we will notify you by phone as soon as possible.  Submit refill requests through [employer] or call your pharmacy and they will forward the refill request to us. Please allow 3 business days for your refill to be completed.          Additional Information About Your Visit        MyChart Information     [employer] gives you secure access to your electronic health record. If you see a primary care provider, you can also send messages to your care team and make appointments. If you have questions, please call your primary care clinic.  If you do not have a primary care provider, please call [phone] and they will assist you.        Care EveryWhere ID     This is your Care EveryWhere ID. This could be used by other  organizations to access your [place] medical records  [id number]        Your Vitals Were     Pulse Temperature Respirations Pulse Oximetry BMI (Body Mass Index)       87 98.1  F (36.7  C) (Oral) 16 95% 21.97 kg/m2        Blood Pressure from Last 3 Encounters:   03/23/18 128/66   03/21/18 120/68   03/16/18 132/82    Weight from Last 3 Encounters:   03/23/18 124 lb (56.2 kg)   03/21/18 122 lb 6.4 oz (55.5 kg)   03/16/18 123 lb 4.8 oz (55.9 kg)              Today, you had the following     No orders found for display         Today's Medication Changes          These changes are accurate as of 3/23/18  1:36 PM.  If you have any questions, ask your nurse or doctor.               Start taking these medicines.        Dose/Directions    cephALEXin 500 MG capsule   Commonly known as:  KEFLEX   Used for:  Cellulitis of right lower leg, Open wound of right lower extremity, subsequent encounter   Started by:  Madi Willoughby MD        Dose:  500 mg   Take 1 capsule (500 mg) by mouth 3 times daily for 7 days   Quantity:  21 capsule   Refills:  0            Where to get your medicines      These medications were sent to [facility] [place] [id number]     Phone:  [phone]     cephALEXin 500 MG capsule                Primary Care Provider Office Phone # Fax #    Sharon Hurtado [phone]       [address] [place] [address]        Equal Access to Services     DRE FORREST AH: Hadii sherly ku hadasho Soomaali, waaxda luqadaha, qaybta kaalmada adeegyada, waxay karina elder. So [facility] [phone].    ATENCIÓN: Si habla español, tiene a smith disposición servicios gratuitos de asistencia lingüística. Llame al [phone].    We comply with applicable federal civil rights laws and [place] laws. We do not discriminate on the basis of race, color, national origin, age, disability, sex, sexual orientation, or  gender identity.            Thank you!     Thank you for choosing [facility]  for your care. Our goal is always to provide you with excellent care. Hearing back from our patients is one way we can continue to improve our services. Please take a few minutes to complete the written survey that you may receive in the mail after your visit with us. Thank you!             Your Updated Medication List - Protect others around you: Learn how to safely use, store and throw away your medicines at www.disposemymeds.org.          This list is accurate as of 3/23/18  1:36 PM.  Always use your most recent med list.                   Brand Name Dispense Instructions for use Diagnosis    albuterol 108 (90 BASE) MCG/ACT Inhaler    PROAIR HFA    1 Inhaler    Inhale 2 puffs into the lungs 4 times daily as needed for shortness of breath / dyspnea or wheezing    Chronic obstructive pulmonary disease, unspecified COPD type (H)       ascorbic acid 500 MG tablet    VITAMIN C    100 tablet    Take 1 tablet by mouth daily.        bimatoprost 0.01 % Soln    LUMIGAN     Place 1 drop into both eyes At Bedtime.        calcium + D 600-200 MG-UNIT Tabs   Generic drug:  calcium carbonate-vitamin D     100 tablet    Take 1 tablet by mouth every 12 hours.        carboxymethylcellulose 0.5 % Soln ophthalmic solution    REFRESH PLUS     Place 1 drop into both eyes every 2 hours as needed.        cephALEXin 500 MG capsule    KEFLEX    21 capsule    Take 1 capsule (500 mg) by mouth 3 times daily for 7 days    Cellulitis of right lower leg, Open wound of right lower extremity, subsequent encounter       dorzolamide 2 % ophthalmic solution    TRUSOPT     Place 1 drop into both eyes 2 times daily        FLAX SEEDS PO           fluocinonide 0.05 % solution    LIDEX    60 mL    Apply to scalp BID PRN    Scalp itch       GLUCOSAMINE SULFATE PO      Take 500 mg by mouth daily        ipratropium - albuterol 0.5 mg/2.5 mg/3 mL 0.5-2.5 (3)  MG/3ML neb solution    DUONEB    3 mL    Take 1 vial (3 mLs) by nebulization every 6 hours as needed for shortness of breath / dyspnea or wheezing    Upper respiratory tract infection, unspecified type, COPD exacerbation (H)       losartan 50 MG tablet    COZAAR    90 tablet    Take 1 tablet (50 mg) by mouth daily    Benign essential hypertension       mupirocin 2 % cream    BACTROBAN    30 g    Apply topically 3 times daily    Cellulitis of left lower extremity       triamcinolone 0.1 % cream    KENALOG    454 g    Apply to AA QD-BID x 1-2 weeks then PRN Only    Acute dermatitis       umeclidinium-vilanterol 62.5-25 MCG/INH oral inhaler    ANORO ELLIPTA    1 Inhaler    Inhale 1 puff into the lungs daily    Chronic obstructive pulmonary disease, unspecified COPD type (H)       vitamin D 2000 UNITS tablet      Take 1 tablet by mouth daily.    Osteoporosis

## 2018-04-02 ENCOUNTER — HOSPITAL ENCOUNTER (EMERGENCY)
Facility: CLINIC | Age: 83
Discharge: HOME | End: 2018-04-02
Attending: EMERGENCY MEDICINE | Admitting: EMERGENCY MEDICINE
Payer: MEDICARE

## 2018-04-02 ENCOUNTER — APPOINTMENT (OUTPATIENT)
Dept: ULTRASOUND IMAGING | Facility: CLINIC | Age: 83
End: 2018-04-02
Attending: EMERGENCY MEDICINE
Payer: MEDICARE

## 2018-04-02 VITALS
HEART RATE: 90 BPM | OXYGEN SATURATION: 94 % | BODY MASS INDEX: 21.97 KG/M2 | WEIGHT: 124 LBS | SYSTOLIC BLOOD PRESSURE: 130 MMHG | RESPIRATION RATE: 18 BRPM | TEMPERATURE: 98 F | DIASTOLIC BLOOD PRESSURE: 88 MMHG

## 2018-04-02 DIAGNOSIS — I83.009 CHRONIC CUTANEOUS VENOUS STASIS ULCER (H): ICD-10-CM

## 2018-04-02 DIAGNOSIS — L97.909 CHRONIC CUTANEOUS VENOUS STASIS ULCER (H): ICD-10-CM

## 2018-04-02 DIAGNOSIS — S81.801A OPEN WOUND OF RIGHT LOWER EXTREMITY, INITIAL ENCOUNTER: ICD-10-CM

## 2018-04-02 PROCEDURE — 93971 EXTREMITY STUDY: CPT | Mod: RT

## 2018-04-02 PROCEDURE — 99284 EMERGENCY DEPT VISIT MOD MDM: CPT | Mod: 25

## 2018-04-02 ASSESSMENT — ENCOUNTER SYMPTOMS
NAUSEA: 0
COUGH: 0
NUMBNESS: 0
CHILLS: 0
SHORTNESS OF BREATH: 0
WEAKNESS: 0
VOMITING: 0
FEVER: 0
WOUND: 1

## 2018-04-02 NOTE — ED AVS SNAPSHOT
Emergency Department    [address] [facility] [address]    Phone:  [phone]    Fax:  [phone]                                       Jamilah Rodriguez   MRN: [id number]    Department:   Emergency Department   Date of Visit:  4/2/2018           Patient Information     Date Of Birth          4/5/1931        Your diagnoses for this visit were:     Open wound of right lower extremity, initial encounter     Chronic cutaneous venous stasis ulcer (H)        You were seen by Medhat Ceballos MD.      Follow-up Information     Schedule an appointment as soon as possible for a visit with Sharon Hurtado MD.    Specialty:  Internal Medicine    Contact information:    [address] [facility] [address] [phone]          Follow up with  Emergency Department.    Specialty:  EMERGENCY MEDICINE    Why:  If symptoms worsen    Contact information:    [address] [address] [phone]      Discharge References/Attachments     VENOUS LEG ULCER (ENGLISH)    CHRONIC VENOUS INSUFFICIENCY: TREATING ULCERS (ENGLISH)      Your next 10 appointments already scheduled     Apr 09, 2018 10:00 AM CDT   Pulmonary Eval with Rh Pulmonary Rehab 2   [facility] ([facility])    [address]  [facility] [address] [phone]            May 02, 2018  9:15 AM CDT   (Arrive by 9:00 AM)   US ABDOMINAL AORTIC IMAGING with VUS2   [facility] ([facility])    [address]  [facility] [address]   [phone]           Please bring a list of your medicines (including vitamins, minerals and over-the-counter drugs). Also, tell your doctor about any allergies you may have. Wear comfortable clothes and leave your valuables at home.  Adults: No eating or drinking for 8 hours before the exam. You may take medicine with a small sip of water.  Children: - Children 6+  years: No food or drink for 6 hours before exam. - Children 1-5 years: No food or drink for 4 hours before exam. - Infants, breast-fed: may have breast milk up to 2 hours before exam. - Infants, formula: may have bottle until 4 hours before exam.  Please call the Imaging Department at your exam site with any questions.            May 02, 2018 10:00 AM CDT   Return Visit with Lv Boogie MD   [facility] ([facility])    [address]  [facility] [id number] [phone]              24 Hour Appointment Hotline       To make an appointment at any [place] clinic, call [phone] ([phone]). If you don't have a family doctor or clinic, we will help you find one. [place] clinics are conveniently located to serve the needs of you and your family.             Review of your medicines      Our records show that you are taking the medicines listed below. If these are incorrect, please call your family doctor or clinic.        Dose / Directions Last dose taken    albuterol 108 (90 BASE) MCG/ACT Inhaler   Commonly known as:  PROAIR HFA   Dose:  2 puff   Quantity:  1 Inhaler        Inhale 2 puffs into the lungs 4 times daily as needed for shortness of breath / dyspnea or wheezing   Refills:  5        ascorbic acid 500 MG tablet   Commonly known as:  VITAMIN C   Dose:  500 mg   Quantity:  100 tablet        Take 1 tablet by mouth daily.   Refills:  12        bimatoprost 0.01 % Soln   Commonly known as:  LUMIGAN   Dose:  1 drop        Place 1 drop into both eyes At Bedtime.   Refills:  0        calcium + D 600-200 MG-UNIT Tabs   Dose:  1 tablet   Quantity:  100 tablet   Generic drug:  calcium carbonate-vitamin D        Take 1 tablet by mouth every 12 hours.   Refills:  12        carboxymethylcellulose 0.5 % Soln ophthalmic solution   Commonly known as:  REFRESH PLUS   Dose:  1 drop        Place 1 drop into both eyes every 2 hours  as needed.   Refills:  0        dorzolamide 2 % ophthalmic solution   Commonly known as:  TRUSOPT   Dose:  1 drop        Place 1 drop into both eyes 2 times daily   Refills:  0        FLAX SEEDS PO        Refills:  0        fluocinonide 0.05 % solution   Commonly known as:  LIDEX   Quantity:  60 mL        Apply to scalp BID PRN   Refills:  3        GLUCOSAMINE SULFATE PO   Dose:  500 mg        Take 500 mg by mouth daily   Refills:  0        ipratropium - albuterol 0.5 mg/2.5 mg/3 mL 0.5-2.5 (3) MG/3ML neb solution   Commonly known as:  DUONEB   Dose:  1 vial   Quantity:  3 mL        Take 1 vial (3 mLs) by nebulization every 6 hours as needed for shortness of breath / dyspnea or wheezing   Refills:  0        losartan 50 MG tablet   Commonly known as:  COZAAR   Dose:  50 mg   Quantity:  90 tablet        Take 1 tablet (50 mg) by mouth daily   Refills:  1        mupirocin 2 % cream   Commonly known as:  BACTROBAN   Quantity:  30 g        Apply topically 3 times daily   Refills:  1        triamcinolone 0.1 % cream   Commonly known as:  KENALOG   Quantity:  454 g        Apply to AA QD-BID x 1-2 weeks then PRN Only   Refills:  11        umeclidinium-vilanterol 62.5-25 MCG/INH oral inhaler   Commonly known as:  ANORO ELLIPTA   Dose:  1 puff   Quantity:  1 Inhaler        Inhale 1 puff into the lungs daily   Refills:  11        vitamin D 2000 UNITS tablet   Dose:  1 tablet        Take 1 tablet by mouth daily.   Refills:  0                Procedures and tests performed during your visit     US Lower Extremity Venous Duplex Right      Orders Needing Specimen Collection     None      Pending Results     Date and Time Order Name Status Description    4/2/2018 1335 US Lower Extremity Venous Duplex Right Preliminary             Pending Culture Results     No orders found from 3/31/2018 to 4/3/2018.            Pending Results Instructions     If you had any lab results that were not finalized at the time of your Discharge, you can  call the ED Lab Result RN at [phone]. You will be contacted by this team for any positive Lab results or changes in treatment. The nurses are available 7 days a week from 10A to 6:30P.  You can leave a message 24 hours per day and they will return your call.        Test Results From Your Hospital Stay        4/2/2018  2:09 PM      Narrative     ULTRASOUND RIGHT LOWER EXTREMITY VENOUS DUPLEX April 2, 2018 2:01 PM    HISTORY: Swelling, deep vein thrombosis.     TECHNIQUE: Venous Doppler US.?Color flow and spectral Doppler with  waveform analysis performed.    FINDINGS: Calf edema. No evidence of lower extremity deep venous  thrombosis.         Impression     IMPRESSION: No deep vein thrombosis identified.                Clinical Quality Measure: Blood Pressure Screening     Your blood pressure was checked while you were in the emergency department today. The last reading we obtained was  BP: 131/82 . Please read the guidelines below about what these numbers mean and what you should do about them.  If your systolic blood pressure (the top number) is less than 120 and your diastolic blood pressure (the bottom number) is less than 80, then your blood pressure is normal. There is nothing more that you need to do about it.  If your systolic blood pressure (the top number) is 120-139 or your diastolic blood pressure (the bottom number) is 80-89, your blood pressure may be higher than it should be. You should have your blood pressure rechecked within a year by a primary care provider.  If your systolic blood pressure (the top number) is 140 or greater or your diastolic blood pressure (the bottom number) is 90 or greater, you may have high blood pressure. High blood pressure is treatable, but if left untreated over time it can put you at risk for heart attack, stroke, or kidney failure. You should have your blood pressure rechecked by a primary care provider within the next 4 weeks.  If your provider in the emergency  department today gave you specific instructions to follow-up with your doctor or provider even sooner than that, you should follow that instruction and not wait for up to 4 weeks for your follow-up visit.        Thank you for choosing [facility]       Thank you for choosing [facility] for your care. Our goal is always to provide you with excellent care. Hearing back from our patients is one way we can continue to improve our services. Please take a few minutes to complete the written survey that you may receive in the mail after you visit with us. Thank you!        [employer]) Information     [employer] gives you secure access to your electronic health record. If you see a primary care provider, you can also send messages to your care team and make appointments. If you have questions, please call your primary care clinic.  If you do not have a primary care provider, please call [phone] and they will assist you.        Care EveryWhere ID     This is your Care EveryWhere ID. This could be used by other organizations to access your [facility] medical records  [id number]        Equal Access to Services     DRE FORREST AH: Maty Schumacher, wajaz gray, qacolleenta kaalrosalie murillo, kimberly elder. So [facility] [phone].    ATENCIÓN: Si habla español, tiene a smith disposición servicios gratuitos de asistencia lingüística. Llame al [phone].    We comply with applicable federal civil rights laws and [place] laws. We do not discriminate on the basis of race, color, national origin, age, disability, sex, sexual orientation, or gender identity.            After Visit Summary       This is your record. Keep this with you and show to your community pharmacist(s) and doctor(s) at your next visit.

## 2018-04-02 NOTE — ED PROVIDER NOTES
History     Chief Complaint:  Wound Check    HPI   Jamilah Rodriguez is a 86 year old female with a history of peripheral vascular disease and COPD who presents for a wound check. The patient reports she first noticed a small wound and redness to her right lower extremity 2 weeks ago. She was seen in clinic on 3/16 and started on Bactroban cream. She returned to clinic on 3/23 with ongoing redness and was started on a 7 day course of Keflex for cellulitis. The patient completed her antibiotics on Saturday, 2 days ago. She reports she still has some redness and slight discomfort in her right leg and was concerned for ongoing cellulitis, prompting her visit to the ED for evaluation. The patient reports she is otherwise doing well. She denies any fevers, chills, nausea, vomiting, or other infectious symptoms. She has baseline shortness of breath from COPD but denies any new shortness of breath, or chest pain. She has not had an ultrasound of her right leg yet.     Allergies:  Atenolol  Beta adrenergic blockers  Brimonidine  Cephalexin  Germall plus  Latex  Morphine  Penicillins  Sulfa  Tramadol  Tylenol  Vicodin  Fentanyl      Medications:    albuterol (PROAIR HFA) 108 (90 BASE) MCG/ACT Inhaler  umeclidinium-vilanterol (ANORO ELLIPTA) 62.5-25 MCG/INH oral inhaler  ipratropium - albuterol 0.5 mg/2.5 mg/3 mL (DUONEB) 0.5-2.5 (3) MG/3ML neb solution  losartan (COZAAR) 50 MG tablet  GLUCOSAMINE SULFATE PO  Calcium Carbonate-Vitamin D (CALCIUM + D) 600-200 MG-UNIT per tablet  ascorbic acid (VITAMIN C) 500 MG tablet     Past Medical History:    COPD  Abdominal aortic aneurysm  Cervical cancer  Chronic dermatitis  Glaucoma  Hypertension  Macular degeneration  Peripheral artery disease  Osteoporosis  Vertebral compression fracture    Past Surgical History:    Cataract  Hysterectomy  ORIF right hip  ORIF wrist  ORIF tibial plateau, 2013    Family History:    Meningioma  CAD  Diabetes    Social History:  Smoking status: Former  smoker, quit 2009  Alcohol use: No  Marital Status:   [4]     Review of Systems   Constitutional: Negative for chills and fever.   Respiratory: Negative for cough and shortness of breath.    Cardiovascular: Positive for leg swelling.   Gastrointestinal: Negative for nausea and vomiting.   Skin: Positive for wound.   Neurological: Negative for weakness and numbness.   All other systems reviewed and are negative.      Physical Exam   Patient Vitals for the past 24 hrs:   BP Temp Temp src Pulse Heart Rate Resp SpO2 Weight   04/02/18 1431 130/88 - - 90 - - - -   04/02/18 1224 131/82 98  F (36.7  C) Oral 93 93 18 94 % 56.2 kg (124 lb)       Physical Exam  Nursing note and vitals reviewed.  Constitutional:  Oriented to person, place, and time. Cooperative.   HENT:   Nose:    Nose normal.   Mouth/Throat:   Mucous membranes are normal.   Eyes:    Conjunctivae normal and EOM are normal.      Pupils are equal, round, and reactive to light.   Neck:    Trachea normal.   Cardiovascular:  Normal rate, regular rhythm, normal heart sounds and normal pulses. No murmur heard. 2+ DP pulse on the right. 1+ PT pulse on the right.   Pulmonary/Chest:  Effort normal and breath sounds normal.   Abdominal:   Soft. Normal appearance and bowel sounds are normal.      There is no tenderness.      There is no rebound and no CVA tenderness.   Musculoskeletal:  Extremities atraumatic x 4.   Lymphadenopathy:  No cervical adenopathy.   Neurological:   Alert and oriented to person, place, and time. Normal strength.      No cranial nerve deficit or sensory deficit. GCS eye subscore is 4. GCS verbal subscore is 5. GCS motor subscore is 6.   Skin:    Small open wound to the right lower leg with some chronic venous stasis     changes to the right lower leg, but no warmth or significant swelling or    tenderness to palpation.   Psychiatric:   Normal mood and affect.    Emergency Department Course   Imaging:  Radiographic findings were communicated  with the patient who voiced understanding of the findings.    US Lower Extremity Venous Duplex right  No deep vein thrombosis identified.  As read by Radiology.    Emergency Department Course:  Past medical records, nursing notes, and vitals reviewed.  1329: I performed an exam of the patient and obtained history, as documented above.    I rechecked the patient.  Findings and plan explained to the Patient. Patient discharged home with instructions regarding supportive care, medications, and reasons to return. The importance of close follow-up was reviewed.     Impression & Plan      Medical Decision Making:  This is an 86-year-old female came in concerned about an ongoing cellulitis in her right lower leg.  She does not appear to have recurrent cellulitis though, but rather a chronic wound and venous stasis changes to the lower extremity.  I felt it was reasonable to obtain an ultrasound to rule out a DVT, and that is unremarkable.  She has good peripheral pulses, and therefore I think that it is unlikely that she would have any significant vascular compromise in the leg.  I reassured her that this does not appear to be currently infected.  I recommended continuing with Vaseline dressings.  I also recommended following up with her primary physician as soon as possible and discussing a referral to the wound clinic, and I am providing them the contact information for that clinic.  She should return with any concerns or worsening symptoms.    Diagnosis:    ICD-10-CM   1. Open wound of right lower extremity, initial encounter S81.801A   2. Chronic cutaneous venous stasis ulcer (H) I83.009     Disposition: Discharged to home    Yisel Sumner  4/2/2018    EMERGENCY DEPARTMENT    I, Yisel Sumner, am serving as a scribe at 1:29 PM on 4/2/2018 to document services personally performed by Medhat Ceballos MD based on my observations and the provider's statements to me.        Medhat Ceballos MD  04/02/18 4582

## 2018-04-02 NOTE — ED AVS SNAPSHOT
Emergency Department    [address] [facility] [address]    Phone:  [phone]    Fax:  [phone]                                       Jamilah Rodriguez   MRN: [id number]    Department:   Emergency Department   Date of Visit:  4/2/2018           After Visit Summary Signature Page     I have received my discharge instructions, and my questions have been answered. I have discussed any challenges I see with this plan with the nurse or doctor.    ..........................................................................................................................................  Patient/Patient Representative Signature      ..........................................................................................................................................  Patient Representative Print Name and Relationship to Patient    ..................................................               ................................................  Date                                            Time    ..........................................................................................................................................  Reviewed by Signature/Title    ...................................................              ..............................................  Date                                                            Time

## 2018-04-04 ASSESSMENT — ENCOUNTER SYMPTOMS
EYES NEGATIVE: 1
RESPIRATORY NEGATIVE: 1
CARDIOVASCULAR NEGATIVE: 1
ENDOCRINE NEGATIVE: 1
ALLERGIC/IMMUNOLOGIC NEGATIVE: 1
FATIGUE: 1
NEUROLOGICAL NEGATIVE: 1
GASTROINTESTINAL NEGATIVE: 1

## 2018-04-06 ENCOUNTER — OFFICE VISIT (OUTPATIENT)
Dept: INTERNAL MEDICINE | Facility: CLINIC | Age: 83
End: 2018-04-06
Payer: MEDICARE

## 2018-04-06 VITALS
HEART RATE: 72 BPM | WEIGHT: 121.5 LBS | DIASTOLIC BLOOD PRESSURE: 80 MMHG | TEMPERATURE: 97.8 F | BODY MASS INDEX: 21.53 KG/M2 | SYSTOLIC BLOOD PRESSURE: 130 MMHG | RESPIRATION RATE: 20 BRPM | HEIGHT: 63 IN

## 2018-04-06 DIAGNOSIS — S81.801D OPEN WOUND OF RIGHT LOWER EXTREMITY, SUBSEQUENT ENCOUNTER: Primary | ICD-10-CM

## 2018-04-06 PROCEDURE — 99213 OFFICE O/P EST LOW 20 MIN: CPT | Performed by: INTERNAL MEDICINE

## 2018-04-06 ASSESSMENT — PAIN SCALES - GENERAL: PAINLEVEL: NO PAIN (0)

## 2018-04-06 NOTE — PROGRESS NOTES
"  SUBJECTIVE:                                                      HPI: Jamilah Rodriguez is a pleasant 87 year old female who presents for ER follow-up:    Accompanied by daughter.    Patient was recently seen in the ER for an open wound on her right shin.  Prior to her ER visit she had been prescribed Bactroban cream and a course of Keflex for possible cellulitis.    Exam and ER generally unremarkable and notable for palpable DP pulses (patient with adequate perfusion to area). RLE venous duplex negative for DVT.    Patient instructed to keep wound covered with Vaseline and gauze until healed and to consider formal wound care evaluation and treatment.    Patient has been changing her dressings at home once daily without difficulty.    Feels well overall. No fevers or chills. Occasional burning sensation at site of open wound, but mild and brief.    The medication, allergy, and problem lists have been reviewed and updated as appropriate.       OBJECTIVE:                                                      /80 (BP Location: Left arm, Patient Position: Chair, Cuff Size: Adult Regular)  Pulse 72  Temp 97.8  F (36.6  C) (Oral)  Resp 20  Ht 5' 3\" (1.6 m)  Wt 121 lb 8 oz (55.1 kg)  BMI 21.52 kg/m2  Constitutional: well-appearing  Integumentary: open wound right shin with pink granulation tissue;  no surrounding redness; no bleeding or discharge; mildly tender to palpation around wound      ASSESSMENT/PLAN:                                                      (W99.632Q) Open wound of right lower extremity, subsequent encounter  (primary encounter diagnosis)  Comment: healing appropriately, though this will take time (likely 2-3 months) to fully heal.  Plan:    - continue daily cleaning, Vaseline application, followed by gauze application until wound has closed/healed over.   - if wound worsens or changes, patient to contact MD.   - patient declines referral to wound care at this time.    The instructions on " the AVS were discussed and explained to the patient. Patient expressed understanding of instructions.    (Chart documentation was completed, in part, with [employer] voice-recognition software. Even though reviewed, some grammatical, spelling, and word errors may remain.)    Sharon Hurtado MD   [address] [address]  T: [phone], F: [phone]

## 2018-04-06 NOTE — MR AVS SNAPSHOT
After Visit Summary   4/6/2018    Jamilah Rodriguez    MRN: [id number]           Patient Information     Date Of Birth          4/5/1931        Visit Information        Provider Department      4/6/2018 10:15 AM Sharon Hurtado MD [facility]        Today's Diagnoses     Open wound of right lower extremity, subsequent encounter    -  1       Follow-ups after your visit        Your next 10 appointments already scheduled     Apr 09, 2018 10:00 AM CDT   Pulmonary Eval with [facility]   [facility])    [address], [address]  [facility] [address]   [phone]            May 02, 2018  9:15 AM CDT   (Arrive by 9:00 AM)   US ABDOMINAL AORTIC IMAGING with SH[facility]2   [facility] ([facility])    [address]  [place] [address]   [phone]           Please bring a list of your medicines (including vitamins, minerals and over-the-counter drugs). Also, tell your doctor about any allergies you may have. Wear comfortable clothes and leave your valuables at home.  Adults: No eating or drinking for 8 hours before the exam. You may take medicine with a small sip of water.  Children: - Children 6+ years: No food or drink for 6 hours before exam. - Children 1-5 years: No food or drink for 4 hours before exam. - Infants, breast-fed: may have breast milk up to 2 hours before exam. - Infants, formula: may have bottle until 4 hours before exam.  Please call the Imaging Department at your exam site with any questions.            May 02, 2018 10:00 AM CDT   Return Visit with Lv Boogie MD   [facility] ([facility])    [address]  [facility] [id number] [phone]              Who to contact     If you have questions or need follow up information about today's  "clinic visit or your schedule please contact [facility] directly at [phone].  Normal or non-critical lab and imaging results will be communicated to you by MyChart, letter or phone within 4 business days after the clinic has received the results. If you do not hear from us within 7 days, please contact the clinic through [employer] or phone. If you have a critical or abnormal lab result, we will notify you by phone as soon as possible.  Submit refill requests through [employer]) or call your pharmacy and they will forward the refill request to us. Please allow 3 business days for your refill to be completed.          Additional Information About Your Visit        [employer] Information     [employer] gives you secure access to your electronic health record. If you see a primary care provider, you can also send messages to your care team and make appointments. If you have questions, please call your primary care clinic.  If you do not have a primary care provider, please call [phone] and they will assist you.        Care EveryWhere ID     This is your Care EveryWhere ID. This could be used by other organizations to access your [place] medical records  [id number]        Your Vitals Were     Pulse Temperature Respirations Height BMI (Body Mass Index)       72 97.8  F (36.6  C) (Oral) 20 5' 3\" (1.6 m) 21.52 kg/m2        Blood Pressure from Last 3 Encounters:   04/06/18 130/80   04/02/18 130/88   03/23/18 128/66    Weight from Last 3 Encounters:   04/06/18 121 lb 8 oz (55.1 kg)   04/02/18 124 lb (56.2 kg)   03/23/18 124 lb (56.2 kg)              Today, you had the following     No orders found for display       Primary Care Provider Office Phone # Fax #    Sharon Hurtado [phone]       [address] [facility] [address]        Equal Access to Services     DRE FORREST AH: Maty canoo Somu, waaxda luqadaha, qaybta kaalmabharathi murillo, kimberly malave " kwabenajesiashley healyAndrewshamika ah. So [facility] [phone].    ATENCIÓN: Si linda biswas, tiene a smith disposición servicios gratuitos de asistencia lingüística. Jada rice [phone].    We comply with applicable federal civil rights laws and [place] laws. We do not discriminate on the basis of race, color, national origin, age, disability, sex, sexual orientation, or gender identity.            Thank you!     Thank you for choosing [facility]  for your care. Our goal is always to provide you with excellent care. Hearing back from our patients is one way we can continue to improve our services. Please take a few minutes to complete the written survey that you may receive in the mail after your visit with us. Thank you!             Your Updated Medication List - Protect others around you: Learn how to safely use, store and throw away your medicines at www.disposemymeds.org.          This list is accurate as of 4/6/18 12:33 PM.  Always use your most recent med list.                   Brand Name Dispense Instructions for use Diagnosis    albuterol 108 (90 BASE) MCG/ACT Inhaler    PROAIR HFA    1 Inhaler    Inhale 2 puffs into the lungs 4 times daily as needed for shortness of breath / dyspnea or wheezing    Chronic obstructive pulmonary disease, unspecified COPD type (H)       ascorbic acid 500 MG tablet    VITAMIN C    100 tablet    Take 1 tablet by mouth daily.        bimatoprost 0.01 % Soln    LUMIGAN     Place 1 drop into both eyes At Bedtime.        calcium + D 600-200 MG-UNIT Tabs   Generic drug:  calcium carbonate-vitamin D     100 tablet    Take 1 tablet by mouth every 12 hours.        carboxymethylcellulose 0.5 % Soln ophthalmic solution    REFRESH PLUS     Place 1 drop into both eyes every 2 hours as needed.        dorzolamide 2 % ophthalmic solution    TRUSOPT     Place 1 drop into both eyes 2 times daily        FLAX SEEDS PO           fluocinonide 0.05 % solution    LIDEX    60 mL    Apply to scalp  BID PRN    Scalp itch       GLUCOSAMINE SULFATE PO      Take 500 mg by mouth daily        ipratropium - albuterol 0.5 mg/2.5 mg/3 mL 0.5-2.5 (3) MG/3ML neb solution    DUONEB    3 mL    Take 1 vial (3 mLs) by nebulization every 6 hours as needed for shortness of breath / dyspnea or wheezing    Upper respiratory tract infection, unspecified type, COPD exacerbation (H)       losartan 50 MG tablet    COZAAR    90 tablet    Take 1 tablet (50 mg) by mouth daily    Benign essential hypertension       triamcinolone 0.1 % cream    KENALOG    454 g    Apply to AA QD-BID x 1-2 weeks then PRN Only    Acute dermatitis       umeclidinium-vilanterol 62.5-25 MCG/INH oral inhaler    ANORO ELLIPTA    1 Inhaler    Inhale 1 puff into the lungs daily    Chronic obstructive pulmonary disease, unspecified COPD type (H)       vitamin D 2000 UNITS tablet      Take 1 tablet by mouth daily.    Osteoporosis

## 2018-04-09 ENCOUNTER — HOSPITAL ENCOUNTER (OUTPATIENT)
Dept: CARDIAC REHAB | Facility: CLINIC | Age: 83
End: 2018-04-09
Attending: INTERNAL MEDICINE
Payer: MEDICARE

## 2018-04-09 PROCEDURE — 40000244 ZZH STATISTIC VISIT PULM REHAB

## 2018-04-09 PROCEDURE — G0424 PULMONARY REHAB W EXER: HCPCS

## 2018-04-15 ENCOUNTER — ANESTHESIA EVENT (OUTPATIENT)
Dept: SURGERY | Facility: CLINIC | Age: 83
DRG: 357 | End: 2018-04-15
Payer: MEDICARE

## 2018-04-15 ENCOUNTER — HOSPITAL ENCOUNTER (INPATIENT)
Facility: CLINIC | Age: 83
LOS: 7 days | Discharge: NURSING, REHAB OR LONG-TERM CARE | DRG: 357 | End: 2018-04-22
Attending: EMERGENCY MEDICINE | Admitting: SURGERY
Payer: MEDICARE

## 2018-04-15 ENCOUNTER — ANESTHESIA (OUTPATIENT)
Dept: SURGERY | Facility: CLINIC | Age: 83
DRG: 357 | End: 2018-04-15
Payer: MEDICARE

## 2018-04-15 ENCOUNTER — APPOINTMENT (OUTPATIENT)
Dept: CT IMAGING | Facility: CLINIC | Age: 83
DRG: 357 | End: 2018-04-15
Attending: EMERGENCY MEDICINE
Payer: MEDICARE

## 2018-04-15 DIAGNOSIS — G89.18 ACUTE POST-OPERATIVE PAIN: Primary | ICD-10-CM

## 2018-04-15 DIAGNOSIS — K63.1 PERFORATED BOWEL (H): ICD-10-CM

## 2018-04-15 DIAGNOSIS — K56.609 SBO (SMALL BOWEL OBSTRUCTION) (H): ICD-10-CM

## 2018-04-15 PROBLEM — R10.9 ABDOMINAL PAIN: Status: ACTIVE | Noted: 2018-04-15

## 2018-04-15 LAB
ALBUMIN SERPL-MCNC: 4 G/DL (ref 3.4–5)
ALP SERPL-CCNC: 122 U/L (ref 40–150)
ALT SERPL W P-5'-P-CCNC: 17 U/L (ref 0–50)
ANION GAP SERPL CALCULATED.3IONS-SCNC: 12 MMOL/L (ref 3–14)
AST SERPL W P-5'-P-CCNC: 19 U/L (ref 0–45)
BASOPHILS # BLD AUTO: 0 10E9/L (ref 0–0.2)
BASOPHILS NFR BLD AUTO: 0.2 %
BILIRUB SERPL-MCNC: 0.9 MG/DL (ref 0.2–1.3)
BUN SERPL-MCNC: 22 MG/DL (ref 7–30)
CALCIUM SERPL-MCNC: 9.4 MG/DL (ref 8.5–10.1)
CHLORIDE SERPL-SCNC: 96 MMOL/L (ref 94–109)
CO2 SERPL-SCNC: 26 MMOL/L (ref 20–32)
CREAT SERPL-MCNC: 0.97 MG/DL (ref 0.52–1.04)
DIFFERENTIAL METHOD BLD: ABNORMAL
EOSINOPHIL # BLD AUTO: 0 10E9/L (ref 0–0.7)
EOSINOPHIL NFR BLD AUTO: 0.2 %
ERYTHROCYTE [DISTWIDTH] IN BLOOD BY AUTOMATED COUNT: 12.8 % (ref 10–15)
GFR SERPL CREATININE-BSD FRML MDRD: 54 ML/MIN/1.7M2
GLUCOSE SERPL-MCNC: 133 MG/DL (ref 70–99)
HCT VFR BLD AUTO: 40.4 % (ref 35–47)
HGB BLD-MCNC: 13.6 G/DL (ref 11.7–15.7)
IMM GRANULOCYTES # BLD: 0 10E9/L (ref 0–0.4)
IMM GRANULOCYTES NFR BLD: 0.2 %
LIPASE SERPL-CCNC: 289 U/L (ref 73–393)
LYMPHOCYTES # BLD AUTO: 0.8 10E9/L (ref 0.8–5.3)
LYMPHOCYTES NFR BLD AUTO: 6.2 %
MCH RBC QN AUTO: 29.2 PG (ref 26.5–33)
MCHC RBC AUTO-ENTMCNC: 33.7 G/DL (ref 31.5–36.5)
MCV RBC AUTO: 87 FL (ref 78–100)
MONOCYTES # BLD AUTO: 1.2 10E9/L (ref 0–1.3)
MONOCYTES NFR BLD AUTO: 9.3 %
NEUTROPHILS # BLD AUTO: 10.9 10E9/L (ref 1.6–8.3)
NEUTROPHILS NFR BLD AUTO: 83.9 %
NRBC # BLD AUTO: 0 10*3/UL
NRBC BLD AUTO-RTO: 0 /100
PLATELET # BLD AUTO: 377 10E9/L (ref 150–450)
POTASSIUM SERPL-SCNC: 3.6 MMOL/L (ref 3.4–5.3)
PROT SERPL-MCNC: 7.9 G/DL (ref 6.8–8.8)
RADIOLOGIST FLAGS: ABNORMAL
RBC # BLD AUTO: 4.65 10E12/L (ref 3.8–5.2)
SODIUM SERPL-SCNC: 134 MMOL/L (ref 133–144)
WBC # BLD AUTO: 13 10E9/L (ref 4–11)

## 2018-04-15 PROCEDURE — 83690 ASSAY OF LIPASE: CPT | Performed by: EMERGENCY MEDICINE

## 2018-04-15 PROCEDURE — 96361 HYDRATE IV INFUSION ADD-ON: CPT

## 2018-04-15 PROCEDURE — 25000128 H RX IP 250 OP 636: Performed by: EMERGENCY MEDICINE

## 2018-04-15 PROCEDURE — 25000125 ZZHC RX 250: Performed by: NURSE ANESTHETIST, CERTIFIED REGISTERED

## 2018-04-15 PROCEDURE — 36000093 ZZH SURGERY LEVEL 4 1ST 30 MIN: Performed by: SURGERY

## 2018-04-15 PROCEDURE — 71000012 ZZH RECOVERY PHASE 1 LEVEL 1 FIRST HR: Performed by: SURGERY

## 2018-04-15 PROCEDURE — 40000170 ZZH STATISTIC PRE-PROCEDURE ASSESSMENT II: Performed by: SURGERY

## 2018-04-15 PROCEDURE — 96365 THER/PROPH/DIAG IV INF INIT: CPT

## 2018-04-15 PROCEDURE — 37000009 ZZH ANESTHESIA TECHNICAL FEE, EACH ADDTL 15 MIN: Performed by: SURGERY

## 2018-04-15 PROCEDURE — 27210995 ZZH RX 272: Performed by: SURGERY

## 2018-04-15 PROCEDURE — 44180 LAP ENTEROLYSIS: CPT | Performed by: SURGERY

## 2018-04-15 PROCEDURE — 99221 1ST HOSP IP/OBS SF/LOW 40: CPT | Mod: 57 | Performed by: SURGERY

## 2018-04-15 PROCEDURE — 25000125 ZZHC RX 250: Performed by: EMERGENCY MEDICINE

## 2018-04-15 PROCEDURE — 0WJP4ZZ INSPECTION OF GASTROINTESTINAL TRACT, PERCUTANEOUS ENDOSCOPIC APPROACH: ICD-10-PCS | Performed by: SURGERY

## 2018-04-15 PROCEDURE — 25000128 H RX IP 250 OP 636: Performed by: NURSE ANESTHETIST, CERTIFIED REGISTERED

## 2018-04-15 PROCEDURE — 25000128 H RX IP 250 OP 636: Performed by: ANESTHESIOLOGY

## 2018-04-15 PROCEDURE — 25000125 ZZHC RX 250: Performed by: PHYSICIAN ASSISTANT

## 2018-04-15 PROCEDURE — A9270 NON-COVERED ITEM OR SERVICE: HCPCS | Mod: GY | Performed by: NURSE ANESTHETIST, CERTIFIED REGISTERED

## 2018-04-15 PROCEDURE — 96375 TX/PRO/DX INJ NEW DRUG ADDON: CPT

## 2018-04-15 PROCEDURE — P9041 ALBUMIN (HUMAN),5%, 50ML: HCPCS | Performed by: ANESTHESIOLOGY

## 2018-04-15 PROCEDURE — 80053 COMPREHEN METABOLIC PANEL: CPT | Performed by: EMERGENCY MEDICINE

## 2018-04-15 PROCEDURE — 25000132 ZZH RX MED GY IP 250 OP 250 PS 637: Mod: GY | Performed by: NURSE ANESTHETIST, CERTIFIED REGISTERED

## 2018-04-15 PROCEDURE — 25000566 ZZH SEVOFLURANE, EA 15 MIN: Performed by: SURGERY

## 2018-04-15 PROCEDURE — 0W9G00Z DRAINAGE OF PERITONEAL CAVITY WITH DRAINAGE DEVICE, OPEN APPROACH: ICD-10-PCS | Performed by: SURGERY

## 2018-04-15 PROCEDURE — 12000007 ZZH R&B INTERMEDIATE

## 2018-04-15 PROCEDURE — 25000125 ZZHC RX 250: Performed by: ANESTHESIOLOGY

## 2018-04-15 PROCEDURE — 37000008 ZZH ANESTHESIA TECHNICAL FEE, 1ST 30 MIN: Performed by: SURGERY

## 2018-04-15 PROCEDURE — 71000013 ZZH RECOVERY PHASE 1 LEVEL 1 EA ADDTL HR: Performed by: SURGERY

## 2018-04-15 PROCEDURE — 99285 EMERGENCY DEPT VISIT HI MDM: CPT | Mod: 25

## 2018-04-15 PROCEDURE — P9041 ALBUMIN (HUMAN),5%, 50ML: HCPCS | Performed by: NURSE ANESTHETIST, CERTIFIED REGISTERED

## 2018-04-15 PROCEDURE — 25000128 H RX IP 250 OP 636: Performed by: PHYSICIAN ASSISTANT

## 2018-04-15 PROCEDURE — 27210794 ZZH OR GENERAL SUPPLY STERILE: Performed by: SURGERY

## 2018-04-15 PROCEDURE — 85025 COMPLETE CBC W/AUTO DIFF WBC: CPT | Performed by: EMERGENCY MEDICINE

## 2018-04-15 PROCEDURE — 3E1M38Z IRRIGATION OF PERITONEAL CAVITY USING IRRIGATING SUBSTANCE, PERCUTANEOUS APPROACH: ICD-10-PCS | Performed by: SURGERY

## 2018-04-15 PROCEDURE — 44180 LAP ENTEROLYSIS: CPT | Mod: AS | Performed by: PHYSICIAN ASSISTANT

## 2018-04-15 PROCEDURE — 74177 CT ABD & PELVIS W/CONTRAST: CPT

## 2018-04-15 PROCEDURE — 25000125 ZZHC RX 250: Performed by: SURGERY

## 2018-04-15 PROCEDURE — 36000063 ZZH SURGERY LEVEL 4 EA 15 ADDTL MIN: Performed by: SURGERY

## 2018-04-15 PROCEDURE — 25800025 ZZH RX 258: Performed by: SURGERY

## 2018-04-15 RX ORDER — ONDANSETRON 2 MG/ML
INJECTION INTRAMUSCULAR; INTRAVENOUS PRN
Status: DISCONTINUED | OUTPATIENT
Start: 2018-04-15 | End: 2018-04-15

## 2018-04-15 RX ORDER — PROCHLORPERAZINE MALEATE 5 MG
5 TABLET ORAL EVERY 6 HOURS PRN
Status: DISCONTINUED | OUTPATIENT
Start: 2018-04-15 | End: 2018-04-22 | Stop reason: HOSPADM

## 2018-04-15 RX ORDER — IOPAMIDOL 755 MG/ML
61 INJECTION, SOLUTION INTRAVASCULAR ONCE
Status: COMPLETED | OUTPATIENT
Start: 2018-04-15 | End: 2018-04-15

## 2018-04-15 RX ORDER — MAGNESIUM HYDROXIDE 1200 MG/15ML
LIQUID ORAL PRN
Status: DISCONTINUED | OUTPATIENT
Start: 2018-04-15 | End: 2018-04-15 | Stop reason: HOSPADM

## 2018-04-15 RX ORDER — DEXAMETHASONE SODIUM PHOSPHATE 4 MG/ML
INJECTION, SOLUTION INTRA-ARTICULAR; INTRALESIONAL; INTRAMUSCULAR; INTRAVENOUS; SOFT TISSUE PRN
Status: DISCONTINUED | OUTPATIENT
Start: 2018-04-15 | End: 2018-04-15

## 2018-04-15 RX ORDER — LEVOFLOXACIN 5 MG/ML
500 INJECTION, SOLUTION INTRAVENOUS EVERY 24 HOURS
Status: DISCONTINUED | OUTPATIENT
Start: 2018-04-16 | End: 2018-04-16

## 2018-04-15 RX ORDER — CIPROFLOXACIN 2 MG/ML
400 INJECTION, SOLUTION INTRAVENOUS ONCE
Status: COMPLETED | OUTPATIENT
Start: 2018-04-15 | End: 2018-04-15

## 2018-04-15 RX ORDER — ALBUTEROL SULFATE 0.83 MG/ML
2.5 SOLUTION RESPIRATORY (INHALATION) EVERY 4 HOURS PRN
Status: DISCONTINUED | OUTPATIENT
Start: 2018-04-15 | End: 2018-04-15 | Stop reason: HOSPADM

## 2018-04-15 RX ORDER — DIPHENHYDRAMINE HYDROCHLORIDE 50 MG/ML
12.5 INJECTION INTRAMUSCULAR; INTRAVENOUS EVERY 6 HOURS PRN
Status: DISCONTINUED | OUTPATIENT
Start: 2018-04-15 | End: 2018-04-22 | Stop reason: HOSPADM

## 2018-04-15 RX ORDER — SODIUM CHLORIDE 9 MG/ML
INJECTION, SOLUTION INTRAVENOUS CONTINUOUS
Status: DISCONTINUED | OUTPATIENT
Start: 2018-04-15 | End: 2018-04-22 | Stop reason: HOSPADM

## 2018-04-15 RX ORDER — ALBUMIN, HUMAN INJ 5% 5 %
250 SOLUTION INTRAVENOUS ONCE
Status: COMPLETED | OUTPATIENT
Start: 2018-04-15 | End: 2018-04-15

## 2018-04-15 RX ORDER — NALOXONE HYDROCHLORIDE 0.4 MG/ML
.1-.4 INJECTION, SOLUTION INTRAMUSCULAR; INTRAVENOUS; SUBCUTANEOUS
Status: DISCONTINUED | OUTPATIENT
Start: 2018-04-15 | End: 2018-04-15

## 2018-04-15 RX ORDER — ONDANSETRON 4 MG/1
4 TABLET, ORALLY DISINTEGRATING ORAL EVERY 30 MIN PRN
Status: DISCONTINUED | OUTPATIENT
Start: 2018-04-15 | End: 2018-04-15 | Stop reason: HOSPADM

## 2018-04-15 RX ORDER — VECURONIUM BROMIDE 1 MG/ML
INJECTION, POWDER, LYOPHILIZED, FOR SOLUTION INTRAVENOUS PRN
Status: DISCONTINUED | OUTPATIENT
Start: 2018-04-15 | End: 2018-04-15

## 2018-04-15 RX ORDER — ALBUMIN, HUMAN INJ 5% 5 %
SOLUTION INTRAVENOUS CONTINUOUS PRN
Status: DISCONTINUED | OUTPATIENT
Start: 2018-04-15 | End: 2018-04-15

## 2018-04-15 RX ORDER — SODIUM CHLORIDE, SODIUM LACTATE, POTASSIUM CHLORIDE, CALCIUM CHLORIDE 600; 310; 30; 20 MG/100ML; MG/100ML; MG/100ML; MG/100ML
INJECTION, SOLUTION INTRAVENOUS CONTINUOUS
Status: DISCONTINUED | OUTPATIENT
Start: 2018-04-15 | End: 2018-04-15 | Stop reason: HOSPADM

## 2018-04-15 RX ORDER — PROPOFOL 10 MG/ML
INJECTION, EMULSION INTRAVENOUS PRN
Status: DISCONTINUED | OUTPATIENT
Start: 2018-04-15 | End: 2018-04-15

## 2018-04-15 RX ORDER — LIDOCAINE HYDROCHLORIDE 10 MG/ML
INJECTION, SOLUTION INFILTRATION; PERINEURAL PRN
Status: DISCONTINUED | OUTPATIENT
Start: 2018-04-15 | End: 2018-04-15 | Stop reason: HOSPADM

## 2018-04-15 RX ORDER — ONDANSETRON 2 MG/ML
4 INJECTION INTRAMUSCULAR; INTRAVENOUS EVERY 6 HOURS PRN
Status: DISCONTINUED | OUTPATIENT
Start: 2018-04-15 | End: 2018-04-22 | Stop reason: HOSPADM

## 2018-04-15 RX ORDER — LIDOCAINE 40 MG/G
CREAM TOPICAL
Status: DISCONTINUED | OUTPATIENT
Start: 2018-04-15 | End: 2018-04-22 | Stop reason: HOSPADM

## 2018-04-15 RX ORDER — SODIUM CHLORIDE 9 MG/ML
100 INJECTION, SOLUTION INTRAVENOUS ONCE
Status: DISCONTINUED | OUTPATIENT
Start: 2018-04-15 | End: 2018-04-15

## 2018-04-15 RX ORDER — DIPHENHYDRAMINE HYDROCHLORIDE 50 MG/ML
INJECTION INTRAMUSCULAR; INTRAVENOUS PRN
Status: DISCONTINUED | OUTPATIENT
Start: 2018-04-15 | End: 2018-04-15

## 2018-04-15 RX ORDER — ONDANSETRON 2 MG/ML
4 INJECTION INTRAMUSCULAR; INTRAVENOUS ONCE
Status: COMPLETED | OUTPATIENT
Start: 2018-04-15 | End: 2018-04-15

## 2018-04-15 RX ORDER — ALBUTEROL SULFATE 90 UG/1
AEROSOL, METERED RESPIRATORY (INHALATION) PRN
Status: DISCONTINUED | OUTPATIENT
Start: 2018-04-15 | End: 2018-04-15

## 2018-04-15 RX ORDER — LIDOCAINE HYDROCHLORIDE 20 MG/ML
INJECTION, SOLUTION INFILTRATION; PERINEURAL PRN
Status: DISCONTINUED | OUTPATIENT
Start: 2018-04-15 | End: 2018-04-15

## 2018-04-15 RX ORDER — HYDROMORPHONE HYDROCHLORIDE 1 MG/ML
0.2 INJECTION, SOLUTION INTRAMUSCULAR; INTRAVENOUS; SUBCUTANEOUS
Status: DISCONTINUED | OUTPATIENT
Start: 2018-04-15 | End: 2018-04-22 | Stop reason: HOSPADM

## 2018-04-15 RX ORDER — ONDANSETRON 2 MG/ML
4 INJECTION INTRAMUSCULAR; INTRAVENOUS EVERY 30 MIN PRN
Status: DISCONTINUED | OUTPATIENT
Start: 2018-04-15 | End: 2018-04-15 | Stop reason: HOSPADM

## 2018-04-15 RX ORDER — SODIUM CHLORIDE, SODIUM LACTATE, POTASSIUM CHLORIDE, CALCIUM CHLORIDE 600; 310; 30; 20 MG/100ML; MG/100ML; MG/100ML; MG/100ML
INJECTION, SOLUTION INTRAVENOUS CONTINUOUS PRN
Status: DISCONTINUED | OUTPATIENT
Start: 2018-04-15 | End: 2018-04-15

## 2018-04-15 RX ORDER — NEOSTIGMINE METHYLSULFATE 1 MG/ML
VIAL (ML) INJECTION PRN
Status: DISCONTINUED | OUTPATIENT
Start: 2018-04-15 | End: 2018-04-15

## 2018-04-15 RX ORDER — ONDANSETRON 4 MG/1
4 TABLET, ORALLY DISINTEGRATING ORAL EVERY 6 HOURS PRN
Status: DISCONTINUED | OUTPATIENT
Start: 2018-04-15 | End: 2018-04-22 | Stop reason: HOSPADM

## 2018-04-15 RX ORDER — SODIUM CHLORIDE 9 MG/ML
INJECTION, SOLUTION INTRAVENOUS CONTINUOUS PRN
Status: DISCONTINUED | OUTPATIENT
Start: 2018-04-15 | End: 2018-04-15

## 2018-04-15 RX ORDER — HYDRALAZINE HYDROCHLORIDE 20 MG/ML
2.5-5 INJECTION INTRAMUSCULAR; INTRAVENOUS EVERY 10 MIN PRN
Status: DISCONTINUED | OUTPATIENT
Start: 2018-04-15 | End: 2018-04-15 | Stop reason: HOSPADM

## 2018-04-15 RX ORDER — HYDROMORPHONE HYDROCHLORIDE 1 MG/ML
.3-.5 INJECTION, SOLUTION INTRAMUSCULAR; INTRAVENOUS; SUBCUTANEOUS EVERY 5 MIN PRN
Status: DISCONTINUED | OUTPATIENT
Start: 2018-04-15 | End: 2018-04-15 | Stop reason: HOSPADM

## 2018-04-15 RX ORDER — DIPHENHYDRAMINE HCL 12.5MG/5ML
12.5 LIQUID (ML) ORAL EVERY 6 HOURS PRN
Status: DISCONTINUED | OUTPATIENT
Start: 2018-04-15 | End: 2018-04-22 | Stop reason: HOSPADM

## 2018-04-15 RX ORDER — SODIUM CHLORIDE 9 MG/ML
INJECTION, SOLUTION INTRAVENOUS ONCE
Status: COMPLETED | OUTPATIENT
Start: 2018-04-15 | End: 2018-04-15

## 2018-04-15 RX ORDER — METOPROLOL TARTRATE 1 MG/ML
5 INJECTION, SOLUTION INTRAVENOUS EVERY 6 HOURS
Status: DISCONTINUED | OUTPATIENT
Start: 2018-04-16 | End: 2018-04-20

## 2018-04-15 RX ORDER — NALOXONE HYDROCHLORIDE 0.4 MG/ML
.1-.4 INJECTION, SOLUTION INTRAMUSCULAR; INTRAVENOUS; SUBCUTANEOUS
Status: DISCONTINUED | OUTPATIENT
Start: 2018-04-15 | End: 2018-04-22 | Stop reason: HOSPADM

## 2018-04-15 RX ORDER — GLYCOPYRROLATE 0.2 MG/ML
INJECTION, SOLUTION INTRAMUSCULAR; INTRAVENOUS PRN
Status: DISCONTINUED | OUTPATIENT
Start: 2018-04-15 | End: 2018-04-15

## 2018-04-15 RX ADMIN — PHENYLEPHRINE HYDROCHLORIDE 150 MCG: 10 INJECTION, SOLUTION INTRAMUSCULAR; INTRAVENOUS; SUBCUTANEOUS at 19:26

## 2018-04-15 RX ADMIN — PHENYLEPHRINE HYDROCHLORIDE 100 MCG: 10 INJECTION, SOLUTION INTRAMUSCULAR; INTRAVENOUS; SUBCUTANEOUS at 17:17

## 2018-04-15 RX ADMIN — PROPOFOL 50 MG: 10 INJECTION, EMULSION INTRAVENOUS at 17:07

## 2018-04-15 RX ADMIN — PHENYLEPHRINE HYDROCHLORIDE 100 MCG: 10 INJECTION, SOLUTION INTRAMUSCULAR; INTRAVENOUS; SUBCUTANEOUS at 19:42

## 2018-04-15 RX ADMIN — PHENYLEPHRINE HYDROCHLORIDE 150 MCG: 10 INJECTION, SOLUTION INTRAMUSCULAR; INTRAVENOUS; SUBCUTANEOUS at 18:52

## 2018-04-15 RX ADMIN — SODIUM CHLORIDE 1000 ML: 9 INJECTION, SOLUTION INTRAVENOUS at 14:02

## 2018-04-15 RX ADMIN — HYDROMORPHONE HYDROCHLORIDE 0.5 MG: 1 INJECTION, SOLUTION INTRAMUSCULAR; INTRAVENOUS; SUBCUTANEOUS at 17:49

## 2018-04-15 RX ADMIN — IOPAMIDOL 61 ML: 755 INJECTION, SOLUTION INTRAVENOUS at 14:58

## 2018-04-15 RX ADMIN — PHENYLEPHRINE HYDROCHLORIDE 50 MCG: 10 INJECTION, SOLUTION INTRAMUSCULAR; INTRAVENOUS; SUBCUTANEOUS at 17:08

## 2018-04-15 RX ADMIN — PHENYLEPHRINE HYDROCHLORIDE 150 MCG: 10 INJECTION, SOLUTION INTRAMUSCULAR; INTRAVENOUS; SUBCUTANEOUS at 19:12

## 2018-04-15 RX ADMIN — DIPHENHYDRAMINE HYDROCHLORIDE 12.5 MG: 50 INJECTION, SOLUTION INTRAMUSCULAR; INTRAVENOUS at 19:33

## 2018-04-15 RX ADMIN — ALBUMIN (HUMAN): 12.5 SOLUTION INTRAVENOUS at 19:25

## 2018-04-15 RX ADMIN — DEXAMETHASONE SODIUM PHOSPHATE 4 MG: 4 INJECTION, SOLUTION INTRA-ARTICULAR; INTRALESIONAL; INTRAMUSCULAR; INTRAVENOUS; SOFT TISSUE at 17:41

## 2018-04-15 RX ADMIN — ALBUTEROL SULFATE 4 PUFF: 90 AEROSOL, METERED RESPIRATORY (INHALATION) at 19:19

## 2018-04-15 RX ADMIN — GLYCOPYRROLATE 0.5 MG: 0.2 INJECTION, SOLUTION INTRAMUSCULAR; INTRAVENOUS at 19:36

## 2018-04-15 RX ADMIN — DEXMEDETOMIDINE HYDROCHLORIDE 12 MCG: 100 INJECTION, SOLUTION INTRAVENOUS at 19:07

## 2018-04-15 RX ADMIN — PHENYLEPHRINE HYDROCHLORIDE 50 MCG: 10 INJECTION, SOLUTION INTRAMUSCULAR; INTRAVENOUS; SUBCUTANEOUS at 18:50

## 2018-04-15 RX ADMIN — ROCURONIUM BROMIDE 30 MG: 10 INJECTION INTRAVENOUS at 17:20

## 2018-04-15 RX ADMIN — SODIUM CHLORIDE, POTASSIUM CHLORIDE, SODIUM LACTATE AND CALCIUM CHLORIDE: 600; 310; 30; 20 INJECTION, SOLUTION INTRAVENOUS at 16:50

## 2018-04-15 RX ADMIN — ONDANSETRON 4 MG: 2 INJECTION INTRAMUSCULAR; INTRAVENOUS at 14:05

## 2018-04-15 RX ADMIN — ROCURONIUM BROMIDE 5 MG: 10 INJECTION INTRAVENOUS at 17:07

## 2018-04-15 RX ADMIN — SODIUM CHLORIDE: 9 INJECTION, SOLUTION INTRAVENOUS at 23:07

## 2018-04-15 RX ADMIN — VECURONIUM BROMIDE 1 MG: 1 INJECTION, POWDER, LYOPHILIZED, FOR SOLUTION INTRAVENOUS at 18:33

## 2018-04-15 RX ADMIN — BUPIVACAINE HYDROCHLORIDE 30 ML GIVEN: 2.5 INJECTION, SOLUTION EPIDURAL; INFILTRATION; INTRACAUDAL at 19:58

## 2018-04-15 RX ADMIN — DEXMEDETOMIDINE HYDROCHLORIDE 20 MCG: 100 INJECTION, SOLUTION INTRAVENOUS at 17:04

## 2018-04-15 RX ADMIN — METRONIDAZOLE 500 MG: 500 INJECTION, SOLUTION INTRAVENOUS at 15:52

## 2018-04-15 RX ADMIN — LIDOCAINE HYDROCHLORIDE 60 MG: 20 INJECTION, SOLUTION INFILTRATION; PERINEURAL at 17:07

## 2018-04-15 RX ADMIN — ONDANSETRON 4 MG: 2 INJECTION INTRAMUSCULAR; INTRAVENOUS at 19:13

## 2018-04-15 RX ADMIN — DEXMEDETOMIDINE HYDROCHLORIDE 12 MCG: 100 INJECTION, SOLUTION INTRAVENOUS at 18:10

## 2018-04-15 RX ADMIN — SUCCINYLCHOLINE CHLORIDE 80 MG: 20 INJECTION, SOLUTION INTRAMUSCULAR; INTRAVENOUS; PARENTERAL at 17:08

## 2018-04-15 RX ADMIN — PHENYLEPHRINE HYDROCHLORIDE 100 MCG: 10 INJECTION, SOLUTION INTRAMUSCULAR; INTRAVENOUS; SUBCUTANEOUS at 20:02

## 2018-04-15 RX ADMIN — HYDROMORPHONE HYDROCHLORIDE 0.5 MG: 1 INJECTION, SOLUTION INTRAMUSCULAR; INTRAVENOUS; SUBCUTANEOUS at 17:02

## 2018-04-15 RX ADMIN — PROPOFOL 20 MG: 10 INJECTION, EMULSION INTRAVENOUS at 17:18

## 2018-04-15 RX ADMIN — PHENYLEPHRINE HYDROCHLORIDE 150 MCG: 10 INJECTION, SOLUTION INTRAMUSCULAR; INTRAVENOUS; SUBCUTANEOUS at 19:20

## 2018-04-15 RX ADMIN — CIPROFLOXACIN 400 MG: 2 INJECTION INTRAVENOUS at 17:15

## 2018-04-15 RX ADMIN — NEOSTIGMINE METHYLSULFATE 3.5 MG: 1 INJECTION, SOLUTION INTRAVENOUS at 19:36

## 2018-04-15 RX ADMIN — SODIUM CHLORIDE, POTASSIUM CHLORIDE, SODIUM LACTATE AND CALCIUM CHLORIDE: 600; 310; 30; 20 INJECTION, SOLUTION INTRAVENOUS at 19:52

## 2018-04-15 RX ADMIN — ALBUMIN HUMAN 250 ML: 0.05 INJECTION, SOLUTION INTRAVENOUS at 21:40

## 2018-04-15 RX ADMIN — PROPOFOL 50 MG: 10 INJECTION, EMULSION INTRAVENOUS at 17:08

## 2018-04-15 RX ADMIN — SODIUM CHLORIDE, POTASSIUM CHLORIDE, SODIUM LACTATE AND CALCIUM CHLORIDE: 600; 310; 30; 20 INJECTION, SOLUTION INTRAVENOUS at 18:17

## 2018-04-15 RX ADMIN — ROCURONIUM BROMIDE 15 MG: 10 INJECTION INTRAVENOUS at 17:45

## 2018-04-15 RX ADMIN — SODIUM CHLORIDE 60 ML: 9 INJECTION, SOLUTION INTRAVENOUS at 14:58

## 2018-04-15 RX ADMIN — METRONIDAZOLE 500 MG: 500 INJECTION, SOLUTION INTRAVENOUS at 23:42

## 2018-04-15 RX ADMIN — SODIUM CHLORIDE: 9 INJECTION, SOLUTION INTRAVENOUS at 17:17

## 2018-04-15 RX ADMIN — BUPIVACAINE HYDROCHLORIDE 30 ML GIVEN: 2.5 INJECTION, SOLUTION EPIDURAL; INFILTRATION; INTRACAUDAL at 20:03

## 2018-04-15 RX ADMIN — SODIUM CHLORIDE: 9 INJECTION, SOLUTION INTRAVENOUS at 15:24

## 2018-04-15 ASSESSMENT — COPD QUESTIONNAIRES
COPD: 1
CAT_SEVERITY: MODERATE

## 2018-04-15 ASSESSMENT — ENCOUNTER SYMPTOMS
ABDOMINAL DISTENTION: 1
ABDOMINAL PAIN: 1
WOUND: 1
CONSTIPATION: 1
FEVER: 0
DYSRHYTHMIAS: 0
SEIZURES: 0
CARDIOVASCULAR NEGATIVE: 1
NAUSEA: 1
RESPIRATORY NEGATIVE: 1
BACK PAIN: 1
VOMITING: 1
APPETITE CHANGE: 0
CHILLS: 0
DIARRHEA: 0

## 2018-04-15 ASSESSMENT — LIFESTYLE VARIABLES: TOBACCO_USE: 0

## 2018-04-15 NOTE — PHARMACY-ADMISSION MEDICATION HISTORY
Admission medication history interview status for the 4/15/2018  admission is complete. See EPIC admission navigator for prior to admission medications     Medication history source reliability:Good    Actions taken by pharmacist (provider contacted, etc):None     Additional medication history information not noted on PTA med list :None    Medication reconciliation/reorder completed by provider prior to medication history? No    Time spent in this activity: 20 minutes    I interviewed her as well as compared with the med list that she has.     Prior to Admission medications    Medication Sig Last Dose Taking? Auth Provider   Multiple Vitamins-Minerals (PRESERVISION AREDS 2 PO) Take 1 tablet by mouth 2 times daily 4/14/2018 at pm Yes Unknown, Entered By History   umeclidinium-vilanterol (ANORO ELLIPTA) 62.5-25 MCG/INH oral inhaler Inhale 1 puff into the lungs daily 4/15/2018 at am Yes Madi Willoughby MD   losartan (COZAAR) 50 MG tablet Take 1 tablet (50 mg) by mouth daily 4/15/2018 at am Yes Sharon Hurtado MD   GLUCOSAMINE SULFATE PO Take 500 mg by mouth daily 4/15/2018 at am Yes Reported, Patient   Flaxseed, Linseed, (FLAX SEEDS PO) Take 1 Tablespoonful by mouth daily (with breakfast) With grapefruit juice 4/15/2018 at am Yes Reported, Patient   dorzolamide (TRUSOPT) 2 % ophthalmic solution Place 1 drop into both eyes 2 times daily 4/15/2018 at am Yes Reported, Patient   Cholecalciferol (VITAMIN D) 2000 UNITS tablet Take 1 tablet by mouth daily. 4/14/2018 at am Yes Bhupendra Chong MD   bimatoprost (LUMIGAN) 0.01 % SOLN Place 1 drop into both eyes At Bedtime.   4/14/2018 at hs Yes Reported, Patient   Calcium Carbonate-Vitamin D (CALCIUM + D) 600-200 MG-UNIT per tablet Take 1 tablet by mouth every 12 hours. 4/14/2018 at pm Yes Bhupendra Chong MD   ascorbic acid (VITAMIN C) 500 MG tablet Take 1 tablet by mouth daily. 4/14/2018 at am Yes Sanjay Warren MD

## 2018-04-15 NOTE — IP AVS SNAPSHOT
"          [place] [address] [facility]IES: [phone]                                              INTERAGENCY TRANSFER FORM - LAB / IMAGING / EKG / EMG RESULTS   4/15/2018                    Hospital Admission Date: 4/15/2018  VANITA SHERIFF   : 1931  Sex: Female        Attending Provider: Jm Craig MD     Allergies:  Atenolol, Beta Adrenergic Blockers, Brimonidine, Cephalexin, Germall Plus, Latex, Morphine, No Clinical Screening - See Comments, Penicillins, Sulfa Drugs, Tramadol, Tylenol, Vicodin [Hydrocodone-acetaminophen], Fentanyl    Infection:  None   Service:  EMERGENCY ME    Ht:  1.651 m (5' 5\")   Wt:  60.5 kg (133 lb 4.8 oz)   Admission Wt:  54.9 kg (121 lb)    BMI:  22.18 kg/m 2   BSA:  1.67 m 2            Patient PCP Information     Provider PCP Type    Sharon Hurtado MD General         Lab Results - 3 Days      Platelet count [[id number]]  Resulted: 18 0747, Result status: Final result    Ordering provider: Zeb Byers PA-C  18 0000 Resulting lab: [facility]    Specimen Information    Type Source Collected On   Blood  18 0725          Components       Value Reference Range Flag Lab   Platelet Count 241 150 - 450 10e9/L  FrStHsLb            Potassium [[id number]]  Resulted: 18 0526, Result status: Final result    Ordering provider: Jm Craig MD  18 2302 Resulting lab: [facility]    Specimen Information    Type Source Collected On   Blood  18 0505          Components       Value Reference Range Flag Lab   Potassium 3.7 3.4 - 5.3 mmol/L  FrStHsLb            Potassium [[id number]] (Abnormal)  Resulted: 18 2241, Result status: Final result    Ordering provider: Jm Craig MD  18 1904 Resulting lab: [facility]    Specimen Information    Type Source Collected On   Blood  18 2145          Components       Value Reference Range Flag Lab   Potassium 3.0 3.4 - 5.3 " mmol/L L FrStHsLb            Basic metabolic panel [[id number]] (Abnormal)  Resulted: 04/19/18 0855, Result status: Final result    Ordering provider: Zeb Byers PA-C  04/19/18 0000 Resulting lab: [facility]    Specimen Information    Type Source Collected On   Blood  04/19/18 0805          Components       Value Reference Range Flag Lab   Sodium 140 133 - 144 mmol/L  FrStHsLb   Potassium 2.8 3.4 - 5.3 mmol/L L FrStHsLb   Chloride 106 94 - 109 mmol/L  FrStHsLb   Carbon Dioxide 25 20 - 32 mmol/L  FrStHsLb   Anion Gap 9 3 - 14 mmol/L  FrStHsLb   Glucose 73 70 - 99 mg/dL  FrStHsLb   Urea Nitrogen 12 7 - 30 mg/dL  FrStHsLb   Creatinine 0.68 0.52 - 1.04 mg/dL  FrStHsLb   GFR Estimate 82 >60 mL/min/1.7m2  FrStHsLb   Comment:  Non  GFR Calc   GFR Estimate If Black >90 >60 mL/min/1.7m2  FrStHsLb   Comment:  African American GFR Calc   Calcium 7.9 8.5 - 10.1 mg/dL L FrStHsLb            CBC with platelets differential [[id number]] (Abnormal)  Resulted: 04/19/18 0838, Result status: Final result    Ordering provider: Zeb Byers PA-C  04/19/18 0000 Resulting lab: [facility]    Specimen Information    Type Source Collected On   Blood  04/19/18 0805          Components       Value Reference Range Flag Lab   WBC 8.0 4.0 - 11.0 10e9/L  FrStHsLb   RBC Count 3.88 3.8 - 5.2 10e12/L  FrStHsLb   Hemoglobin 11.3 11.7 - 15.7 g/dL L FrStHsLb   Hematocrit 34.9 35.0 - 47.0 % L FrStHsLb   MCV 90 78 - 100 fl  FrStHsLb   MCH 29.1 26.5 - 33.0 pg  FrStHsLb   MCHC 32.4 31.5 - 36.5 g/dL  FrStHsLb   RDW 13.2 10.0 - 15.0 %  FrStHsLb   Platelet Count 257 150 - 450 10e9/L  FrStHsLb   Diff Method Automated Method   FrStHsLb   % Neutrophils 83.1 %  FrStHsLb   % Lymphocytes 7.5 %  FrStHsLb   % Monocytes 7.2 %  FrStHsLb   % Eosinophils 1.5 %  FrStHsLb   % Basophils 0.5 %  FrStHsLb   % Immature Granulocytes 0.2 %  FrStHsLb   Nucleated RBCs 0 0 /100  FrStHsLb   Absolute Neutrophil 6.7 1.6 - 8.3  "10e9/L  FrStHsLb   Absolute Lymphocytes 0.6 0.8 - 5.3 10e9/L L FrStHsLb   Absolute Monocytes 0.6 0.0 - 1.3 10e9/L  FrStHsLb   Absolute Eosinophils 0.1 0.0 - 0.7 10e9/L  FrStHsLb   Absolute Basophils 0.0 0.0 - 0.2 10e9/L  FrStHsLb   Abs Immature Granulocytes 0.0 0 - 0.4 10e9/L  FrStHsLb   Absolute Nucleated RBC 0.0   FrStHsLb            Testing Performed By     Lab - Abbreviation Name Director Address Valid Date Range    14 - FrStHsLb [facility] Unknown [address] [place] [address] 05/08/15 1057 - Present            Unresulted Labs (24h ago through future)    Start       Ordered    04/18/18 0600  Platelet count  (enoxaparin (LOVENOX) (Weight  kg with CrCl greater than 30 mL/min is prechecked))  EVERY THREE DAYS,   Routine     Comments:  Repeat every 3 days while on VTE prophylaxis. If no result is listed, this lab has not been done the past 365 days. LATEST LAB RESULT: Platelet Count (10e9/L)       Date                     Value                 04/15/2018               377              ----------      04/15/18 2244    Unscheduled  Potassium  (Potassium Replacement - \"Standard\" - For K levels less than 3.4 mmol/L - UU,UR,UA,RH,SH,PH,WY )  CONDITIONAL (SPECIFY),   Routine     Comments:  Obtain Potassium Level for these conditions:  *IF no potassium result within 24 hours before initiation of order set, draw potassium level with next lab collect.    *2 HOURS AFTER last IV potassium replacement dose and 4 hours after an oral replacement dose.  *Next morning after potassium dose.     Repeat Potassium Replacement if necessary.    04/19/18 0943         Imaging Results - 3 Days      US Lower Extremity Venous Duplex Left [[id number]]  Resulted: 04/20/18 1801, Result status: Final result    Ordering provider: Jm Craig MD  04/20/18 1046 Resulted by: Danial Silverman MD    Performed: 04/20/18 1107 - 04/20/18 1132 Resulting lab: RADIOLOGY RESULTS    Narrative:       VENOUS ULTRASOUND LEFT " LOWER EXTREMITY  4/20/2018 11:32 AM     HISTORY: 87-year-old with left leg swelling, request made for  evaluation of DVT.    COMPARISON: October 6, 2017.    TECHNIQUE: Color Doppler and spectral waveform analysis performed  throughout the deep veins of the left lower extremity.    FINDINGS: The left common femoral, proximal greater saphenous,  femoral, and popliteal veins demonstrate normal blood flow,  compression, and augmentation. Posterior tibial and peroneal veins are  compressible. Contralateral right common femoral vein is patent.      Impression:       IMPRESSION: Negative for deep venous thrombosis in the left lower  extremity.    SAMANTA MILLER MD      Testing Performed By     Lab - Abbreviation Name Director Address Valid Date Range    104 - Rad Rslts RADIOLOGY RESULTS Unknown Unknown 02/16/05 1553 - Present            Encounter-Level Documents:     There are no encounter-level documents.      Order-Level Documents:     There are no order-level documents.

## 2018-04-15 NOTE — IP AVS SNAPSHOT
Jamilah Rodriguez #[id number] (CSN: [id number])  (87 year old F)  (Adm: 04/15/18)     [id number]               [place] [address] [facility]: [phone]            Patient Demographics     Patient Name Sex          Age SSN Address Phone    Jamilah Rodriguez Female 1931 (87 year old) [id number] [address]  [address]  [facility] [address] [phone] (Home)      Emergency Contact(s)     Name Relation Home Work Mobile    Lulu Rodriguez Daughter [phone] none [phone]    Néstor Rodriguez  Son [phone] none none      Admission Information     Attending Provider Admitting Provider Admission Type Admission Date/Time    Jm Craig MD Laguna, Luis E, MD Emergency 04/15/18  1322    Discharge Date Hospital Service Auth/Cert Status Service Area     Emergency Medicine [facility]    Unit Room/Bed Admission Status       [facility] /333 Admission (Confirmed)       Admission     Complaint    Abdominal pain      Hospital Account     Name Acct ID Class Status Primary Coverage    Jamilah Rodriguez [id number] Inpatient Open MEDICARE - RR MEDICARE             Guarantor Account (for Hospital Account #[id number])     Name Relation to Pt Service Area Active? Acct Type    Jamilah Rodriguez Self FCS Yes Personal/Family    Address Phone          [address]  [address]  [place] [address] [phone](H)              Coverage Information (for Hospital Account #[id number])     1. MEDICARE/RR MEDICARE      F/O Payor/Plan Precert #    MEDICARE/RR MEDICARE      Subscriber Subscriber #    Jamilah Rodriguez [id number]    Address Phone    [address] [address]  [place] [address] [phone]          2. [place]     F/[place] Payor/Plan Precert #    [place]     Subscriber Subscriber #    Jamilah Rodriguez [id number]    Address Phone    [address] [address]  [place] [address] [phone]                                     "                  INTERAGENCY TRANSFER FORM - PHYSICIAN ORDERS   4/15/2018                       [place] [address] [facility]: [phone]            Attending Provider: Jm Craig MD     Allergies:  Atenolol, Beta Adrenergic Blockers, Brimonidine, Cephalexin, Germall Plus, Latex, Morphine, No Clinical Screening - See Comments, Penicillins, Sulfa Drugs, Tramadol, Tylenol, Vicodin [Hydrocodone-acetaminophen], Fentanyl    Infection:  None   Service:  EMERGENCY ME    Ht:  1.651 m (5' 5\")   Wt:  60.5 kg (133 lb 4.8 oz)   Admission Wt:  54.9 kg (121 lb)    BMI:  22.18 kg/m 2   BSA:  1.67 m 2            ED Clinical Impression     Diagnosis Description Comment Added By Time Added    Perforated bowel (H) [K63.1] Perforated bowel (H) [K63.1]  Tootie Redman 4/15/2018  3:39 PM    SBO (small bowel obstruction) [K56.609] SBO (small bowel obstruction) [K56.609]  Vitaliy Roca DO 4/15/2018  3:41 PM      Hospital Problems as of 4/22/2018              Priority Class Noted POA    Abdominal pain Medium  4/15/2018 Yes      Non-Hospital Problems as of 4/22/2018              Priority Class Noted    Chronic obstructive pulmonary disease, unspecified COPD type (H) Medium  1/21/2007    AAA (abdominal aortic aneurysm) (H) Medium  Unknown    Macular degeneration Medium  Unknown    Vertebral compression fracture (H) Medium  5/17/2011    Advanced directives, counseling/discussion High  7/28/2011    Osteoporosis Medium  8/3/2011    CARDIOVASCULAR SCREENING; LDL GOAL LESS THAN 160 Medium  10/10/2011    Benign essential hypertension Medium  10/10/2011    Hip fx: s/p IM- Kushal 7/4/12 Medium  7/2/2012    S/P cataract surgery: Recently 2012. Medium  7/5/2012    Constipation Medium  7/5/2012    Edema Medium  7/25/2012    Knee fracture, right Medium  3/30/2013    Other postprocedural status(V45.89) Medium  7/12/2013    Glaucoma Medium  Unknown    Chronic dermatitis Medium  Unknown    Seasonal allergic " rhinitis, unspecified allergic rhinitis trigger Medium  10/9/2016      Code Status History     Date Active Date Inactive Code Status Order ID Comments User Context    3/31/2013  6:16 PM 4/3/2013  6:33 PM Full Code [id number]  Rufino Kiser, SHELBIE Inpatient    3/30/2013  9:51 PM 3/31/2013  6:16 PM DNR [id number]  Zainab Quinn RN Inpatient    7/2/2012  1:48 PM 7/5/2012  3:25 PM DNR [id number]  Mayank Morales MD Inpatient    12/7/2011  5:26 PM 7/2/2012  1:48 PM DNR [id number] See POLST  OK to intubate for a limited time. Mayra Beth Outpatient    9/14/2011  1:57 PM 12/7/2011  5:26 PM DNR/DNI [id number]  Mayra Beth Outpatient      Current Code Status     Date Active Code Status Order ID Comments User Context       4/15/2018 10:44 PM Full Code [id number]  Zeb Byers, TASHIA Inpatient          Medication Review      START taking        Dose / Directions Comments    levofloxacin 500 MG tablet   Commonly known as:  LEVAQUIN   Indication:  Infection Within the Abdomen   Used for:  Perforated bowel (H)        Dose:  500 mg   Take 1 tablet (500 mg) by mouth every 24 hours   Quantity:  2 tablet   Refills:  0        metroNIDAZOLE 250 MG tablet   Commonly known as:  FLAGYL   Indication:  Infection Within the Abdomen   Used for:  Perforated bowel (H)        Dose:  250 mg   Take 1 tablet (250 mg) by mouth every 8 hours   Quantity:  6 tablet   Refills:  0        traMADol 50 MG tablet   Commonly known as:  ULTRAM   Used for:  Acute post-operative pain        Dose:  50 mg   Take 1 tablet (50 mg) by mouth every 6 hours as needed for moderate pain   Quantity:  20 tablet   Refills:  0          CONTINUE these medications which have NOT CHANGED        Dose / Directions Comments    albuterol 108 (90 Base) MCG/ACT Inhaler   Commonly known as:  PROAIR HFA/PROVENTIL HFA/VENTOLIN HFA        Inhale into the lungs as needed for shortness of breath / dyspnea or wheezing   Refills:  0        ascorbic acid 500 MG tablet   Commonly known  as:  VITAMIN C        Dose:  500 mg   Take 1 tablet by mouth daily.   Quantity:  100 tablet   Refills:  12        bimatoprost 0.01 % Soln   Commonly known as:  LUMIGAN        Dose:  1 drop   Place 1 drop into both eyes At Bedtime.   Refills:  0        BLINK TEARS OP        Apply to eye as needed   Refills:  0        calcium + D 600-200 MG-UNIT Tabs   Generic drug:  calcium carbonate-vitamin D        Dose:  1 tablet   Take 1 tablet by mouth every 12 hours.   Quantity:  100 tablet   Refills:  12        dorzolamide 2 % ophthalmic solution   Commonly known as:  TRUSOPT        Dose:  1 drop   Place 1 drop into both eyes 2 times daily   Refills:  0        losartan 50 MG tablet   Commonly known as:  COZAAR   Used for:  Benign essential hypertension        Dose:  50 mg   Take 1 tablet (50 mg) by mouth daily   Quantity:  90 tablet   Refills:  1        PRESERVISION AREDS 2 PO        Dose:  1 tablet   Take 1 tablet by mouth 2 times daily   Refills:  0        umeclidinium-vilanterol 62.5-25 MCG/INH oral inhaler   Commonly known as:  ANORO ELLIPTA   Used for:  Chronic obstructive pulmonary disease, unspecified COPD type (H)        Dose:  1 puff   Inhale 1 puff into the lungs daily   Quantity:  1 Inhaler   Refills:  11        vitamin D 2000 units tablet   Used for:  Osteoporosis        Dose:  1 tablet   Take 1 tablet by mouth daily.   Refills:  0          STOP taking     FLAX SEEDS PO           GLUCOSAMINE SULFATE PO                   After Care     Activity       Avoid heavy lifting (over 20 lbs) and strenuous physical activity for 3 weeks.  Walking is encouraged.       Diet       May resume regular balanced diet as tolerated.       Discharge Instructions       It is ok to shower, but avoid submersing the incision for 2 weeks from surgery.  You may wear the abdominal binder for comfort.  Walk several times a day.               Further instructions from your care team       [place]  [address] [place]  [address]  [phone]    Follow-Up Appointment Instructions     Follow-up and recommended labs and tests        Follow up with Dr. Craig at [facility] in about 2 weeks.  Call [phone] to schedule an appointment.             Your next 10 appointments already scheduled     May 02, 2018  9:15 AM CDT   (Arrive by 9:00 AM)   US ABDOMINAL AORTIC IMAGING with SHVUS2   [facility] Ul[facility]ound ([facility])    [address]  [place] [address]   [phone]           Please bring a list of your medicines (including vitamins, minerals and over-the-counter drugs). Also, tell your doctor about any allergies you may have. Wear comfortable clothes and leave your valuables at home.  Adults: No eating or drinking for 8 hours before the exam. You may take medicine with a small sip of water.  Children: - Children 6+ years: No food or drink for 6 hours before exam. - Children 1-5 years: No food or drink for 4 hours before exam. - Infants, breast-fed: may have breast milk up to 2 hours before exam. - Infants, formula: may have bottle until 4 hours before exam.  Please call the Imaging Department at your exam site with any questions.            May 02, 2018 10:00 AM CDT   Return Visit with Lv Boogie MD   [facility] ([facility])    [address]  [place] [id number] [phone]              Statement of Approval     Ordered          04/22/18 0735  I have reviewed and agree with all the recommendations and orders detailed in this document.  EFFECTIVE NOW     Approved and electronically signed by:  Yasmeen Ruelas PA-C           04/20/18 1005  I have reviewed and agree with all the recommendations and orders detailed in this document.  EFFECTIVE NOW     Approved and electronically signed by:  Zeb Byers PA-C                                                  "INTERAGENCY TRANSFER FORM - NURSING   4/15/2018                       [place] [address] [facility]: [phone]            Attending Provider: Jm Craig MD     Allergies:  Atenolol, Beta Adrenergic Blockers, Brimonidine, Cephalexin, Germall Plus, Latex, Morphine, No Clinical Screening - See Comments, Penicillins, Sulfa Drugs, Tramadol, Tylenol, Vicodin [Hydrocodone-acetaminophen], Fentanyl    Infection:  None   Service:  EMERGENCY ME    Ht:  1.651 m (5' 5\")   Wt:  60.5 kg (133 lb 4.8 oz)   Admission Wt:  54.9 kg (121 lb)    BMI:  22.18 kg/m 2   BSA:  1.67 m 2            Advance Directives        Scanned docmt in ACP Activity?           Yes, scanned ACP docmt        Immunizations     Name Date      Influenza (High Dose) 3 valent vaccine 10/23/17     Influenza (IIV3) PF 10/01/14     Influenza (IIV3) PF 09/23/13     Influenza (IIV3) PF 08/01/12     Influenza (IIV3) PF 10/04/11     Influenza (IIV3) PF 10/13/10     Pneumococcal 23 valent 01/01/06     TD (ADULT, 7+) 07/28/11       ASSESSMENT     Discharge Profile Flowsheet     EXPECTED DISCHARGE     Patient's communication style  spoken language (English or Bilingual) 04/15/18 1322    Expected Discharge Date  04/22/18 04/22/18 1206   FINAL RESOURCES      DISCHARGE NEEDS ASSESSMENT     Resources List  Assisted Living 04/22/18 1206    Concerns To Be Addressed  discharge planning concerns 04/01/13 1140   Other Resources  -- ([id number]) 04/22/18 1206    Patient/family verbalizes understanding of discharge plan recommendations?  Yes 04/20/18 1351   SKIN      Equipment Currently Used at Home  grab bar;walker, rolling;raised toilet 04/21/18 1511   Inspection of bony prominences  Procedural focused assessment (identify areas inspected) 04/22/18 0320    Transportation Available  family or friend will provide 04/22/18 1206   Inspection under devices  Full 04/19/18 2151    # of Referrals Placed by CTS  Post Acute Facilities 04/22/18 1206   Skin WDL  ex " "04/22/18 0847    Equipment Used at Home  bath bench;grab bar;raised toilet 04/01/13 1541   Skin Temperature  warm 04/21/18 0849    GASTROINTESTINAL (ADULT,PEDIATRIC,OB)     Skin Moisture  dry 04/21/18 0849    GI WDL  ex 04/22/18 0847   Skin Integrity  incision(s) 04/22/18 0847    Abdominal Appearance  distended (soft) 04/22/18 0847   Skin Color/Characteristics  pale 04/21/18 0849    All Quadrants Bowel Sounds  audible and normoactive 04/22/18 0318   Skin Elasticity  quick return to original state 04/21/18 0849    Last Bowel Movement  04/21/18 (she states) 04/22/18 0847   SAFETY      GI Signs/Symptoms  abdominal discomfort 04/21/18 0849   Safety WDL  WDL 04/22/18 0847    Passing flatus  yes 04/21/18 1205   Safety Factors  bed in low position 04/22/18 0847    COMMUNICATION ASSESSMENT     All Alarms  alarm(s) activated and audible 04/22/18 0847                 Assessment WDL (Within Defined Limits) Definitions           Safety WDL     Effective: 09/28/15    Row Information: <b>WDL Definition:</b> Bed in low position, wheels locked; call light in reach; upper side rails up x 2; ID band on<br> <font color=\"gray\"><i>Item=AS safety wdl>>List=AS safety wdl>>Version=F14</i></font>      Skin WDL     Effective: 09/28/15    Row Information: <b>WDL Definition:</b> Warm; dry; intact; elastic; without discoloration; pressure points without redness<br> <font color=\"gray\"><i>Item=AS skin wdl>>List=AS skin wdl>>Version=F14</i></font>      Vitals     Vital Signs Flowsheet     VITAL SIGNS     ANALGESIA SIDE EFFECTS MONITORING      Temp  97.4  F (36.3  C) 04/22/18 1134   Side Effects Monitoring: Respiratory Quality  R 04/22/18 0840    Temp src  Oral 04/22/18 1134   Side Effects Monitoring: Respiratory Depth  N 04/22/18 0840    Resp  18 04/22/18 1134   Side Effects Monitoring: Sedation Level  1 04/22/18 0840    Pulse  88 04/21/18 1124   HEIGHT AND WEIGHT      Heart Rate  94 04/22/18 1134   Height  1.651 m (5' 5\") 04/15/18 1327    " Pulse/Heart Rate Source  Monitor 04/21/18 2313   Height Method  Stated 04/15/18 1327    BP  130/77 04/22/18 1134   Weight  60.5 kg (133 lb 4.8 oz) 04/22/18 0800    BP Location  Left arm 04/21/18 2313   Weight Method  Standing scale 04/22/18 0800    Patient Position  Lying 04/15/18 2209   BSA (Calculated - sq m)  1.59 04/15/18 1327    OXYGEN THERAPY     BMI (Calculated)  20.18 04/15/18 1327    SpO2  96 % 04/22/18 1134   POSITIONING      O2 Device  None (Room air) 04/22/18 1134   Body Position  supine, head elevated 04/22/18 0800    Oxygen Delivery  2 LPM 04/19/18 0220   Head of Bed (HOB)  HOB at 30-45 degrees 04/22/18 0847    RESPIRATORY MONITORING     Positioning/Transfer Devices  pillows;in use 04/22/18 0800    Respiratory Monitoring (EtCO2)  30 mmHg 04/16/18 0451   ECG      Integrated Pulmonary Index (IPI)  7 04/16/18 0451   ECG Rhythm  Sinus rhythm;First degree AV block;Right bundle branch block 04/15/18 2209    PAIN/COMFORT     JOHN COMA SCALE      Patient Currently in Pain  denies 04/22/18 0840   Best Eye Response  4-->(E4) spontaneous 04/20/18 0010    Preferred Pain Scale  number (Numeric Rating Pain Scale) 04/19/18 2111   Best Motor Response  6-->(M6) obeys commands 04/20/18 0010    Patient's Stated Pain Goal  4 04/15/18 1327   Best Verbal Response  5-->(V5) oriented 04/20/18 0010    0-10 Pain Scale  2 04/21/18 0819   [place] Coma Scale Score  15 04/20/18 0010    Pain Location  Abdomen 04/21/18 0819   DAILY CARE      Pain Descriptors  Discomfort 04/21/18 0819   Activity Management  ambulated in aguilera 04/22/18 0938    Pain Intervention(s)  Declines 04/22/18 0840   Activity Assistance Provided  assistance, 1 person 04/22/18 0938    Response to Interventions  Absence of nonverbal indicators of pain 04/22/18 0730   Assistive Device Utilized  walker;gait belt 04/22/18 0800            Patient Lines/Drains/Airways Status    Active LINES/DRAINS/AIRWAYS     Name: Placement date: Placement time: Site: Days: Last  dressing change:    Wound 04/16/18 Anterior;Right Leg 04/16/18   0000   Leg   6     Wound 04/17/18 Right Elbow Skin tear 04/17/18   2215   Elbow   4     Incision/Surgical Site 04/15/18 Abdomen 04/15/18   1922    6             Patient Lines/Drains/Airways Status    Active PICC/CVC     None            Intake/Output Detail Report     Date Intake         Output       Net    Shift P.O. I.V. NG/GT IV Piggyback Colloid Total Urine Emesis/NG output Drains Blood Total       Noc 04/20/18 2300 - 04/21/18 0659 240 -- -- -- -- 240 300 -- -- -- 300 -60    Day 04/21/18 0700 - 04/21/18 1459 -- -- -- -- -- -- 350 -- -- -- 350 -350    Angie 04/21/18 1500 - 04/21/18 2259 -- -- -- -- -- -- 100 -- -- -- 100 -100    Noc 04/21/18 2300 - 04/22/18 0659 -- -- -- -- -- -- 125 -- -- -- 125 -125    Day 04/22/18 0700 - 04/22/18 1459 -- -- -- -- -- -- -- -- -- -- -- 0      Last Void/BM       Most Recent Value    Urine Occurrence 1 at 04/22/2018 0938    Stool Occurrence 1 at 04/22/2018 0938      Case Management/Discharge Planning     Case Management/Discharge Planning Flowsheet     REFERRAL INFORMATION     EXPECTED DISCHARGE      Did the Initial Social Work Assessment result in a Social Work Case?  Yes 04/20/18 1351   Expected Discharge Date  04/22/18 04/22/18 1206    Admission Type  inpatient 04/20/18 1351   ASSESSMENT/CONCERNS TO BE ADDRESSED      # of Referrals Placed by CTS  Post Acute Facilities 04/22/18 1206   Concerns To Be Addressed  discharge planning concerns 04/01/13 1140    Reason For Consult  discharge planning;facility placement 04/20/18 1351   DISCHARGE PLANNING      Record Reviewed  clinical discipline documentation;history and physical;medical record;patient profile;plan of care 04/20/18 1351   Patient/family verbalizes understanding of discharge plan recommendations?  Yes 04/20/18 1351    CTS Assigned to Case  AR Adams 04/20/18 1351   Transportation Available  family or friend will provide 04/22/18 1206    LIVING  ENVIRONMENT     Skilled Nursing Facility  [place] 04/03/13 1122    Lives With  alone 04/21/18 1511   Skilled Nursing Facility Phone Number  [phone] 04/03/13 1122    Living Arrangements  apartment 04/21/18 1511   Equipment Used at Home  bath bench;grab bar;raised toilet 04/01/13 1541    Quality Of Family Relationships  supportive;involved 04/20/18 1351   FINAL RESOURCES      Able to Return to Prior Living Arrangements  no (TCU prior to home) 04/20/18 1351   Equipment Currently Used at Home  grab bar;walker, rolling;raised toilet 04/21/18 1511    HOME SAFETY     Resources List  Assisted Living 04/22/18 1206    Patient Feels Safe Living in Home?  yes 04/20/18 1351   Other Resources  -- ([id number]) 04/22/18 1206    ASSESSMENT OF FAMILY/SOCIAL SUPPORT     ABUSE RISK SCREEN      Marital Status   04/20/18 1351   QUESTION TO PATIENT:  Has a member of your family or a partner(now or in the past) intimidated, hurt, manipulated, or controlled you in any way?  patient declined to answer or is unable to answer 04/15/18 1341    Who is your support system?  Children 04/20/18 1351   QUESTION TO PATIENT: Do you feel safe going back to the place where you are living?  patient declined to answer or is unable to answer 04/15/18 1341    Description of Support System  Supportive;Involved 04/20/18 1351   OBSERVATION: Is there reason to believe there has been maltreatment of a vulnerable adult (ie. Physical/Sexual/Emotional abuse, self neglect, lack of adequate food, shelter, medical care, or financial exploitation)?  no 04/15/18 1341    Support Assessment  Adequate family and caregiver support 04/20/18 1351   OTHER      COPING/STRESS     Are you depressed or being treated for depression?  No 04/15/18 2301    Major Change/Loss/Stressor  hospitalization 04/15/18 2302   HOMICIDE RISK      Patient Personal Strengths  expressive of needs;motivated;positive attitude;strong support system 04/01/13 1140   Feels Like Hurting  Others  no 04/15/18 1615                  [place] [address] SURGICAL [facility]ITIES: [phone]            Medication Administration Report for Jamilah Rodriguez as of 04/22/18 1208   Legend:    Given Hold Not Given Due Canceled Entry Other Actions    Time Time (Time) Time  Time-Action       Inactive    Active    Linked        Medications 04/16/18 04/17/18 04/18/18 04/19/18 04/20/18 04/21/18 04/22/18    albuterol (PROAIR HFA/PROVENTIL HFA/VENTOLIN HFA) Inhaler 1-2 puff  Dose: 1-2 puff  Freq: DAILY PRN Route: IN  PRN Reasons: wheezing,shortness of breath / dyspnea  Start: 04/16/18 1340   Admin Instructions: Pt own supply    Admin. Amount: 1-2 puff  Last Admin: 04/21/18 1418  Dispense Loc:  Main Pharmacy          1418 (1 puff)-Given            artificial saliva (BIOTENE MT) spray 1 spray  Dose: 1 spray  Freq: EVERY 6 HOURS PRN Route: MT  PRN Reason: dry mouth  Start: 04/16/18 1354   Admin. Amount: 1 spray  Last Admin: 04/16/18 1620  Dispense Loc:  Main Pharmacy  Volume: 44.3 mL     1620 (1 spray)-Given                 bacitracin ointment  Freq: 3 TIMES DAILY PRN Route: Top  PRN Reason: other  PRN Comment: topical dermatitis  Start: 04/18/18 1111   Admin Instructions: Apply to affected area    Last Admin: 04/18/18 1352  Dispense Loc: [facility]       1352 ( )-Given               benzocaine-menthol (CHLORASEPTIC) 6-10 MG lozenge 1 lozenge  Dose: 1 lozenge  Freq: EVERY 1 HOUR PRN Route: BU  PRN Reason: sore throat  PRN Comment: dry/sore throat without fever  Start: 04/16/18 1353   Admin. Amount: 1 lozenge  Last Admin: 04/18/18 1345  Dispense Loc: [facility]     1445 (1 lozenge)-Given        0852 (1 lozenge)-Given       1438 (1 lozenge)-Given        1345 (1 lozenge)-Given               bimatoprost (LUMIGAN) 0.01 % ophthalmic drops 1 drop  Dose: 1 drop  Freq: AT BEDTIME Route: Both Eyes  Start: 04/16/18 2200   Admin Instructions: Pt own supply    Admin. Amount: 1 drop  Last Admin: 04/21/18 2114  Dispense Loc: [facility]  Main Pharmacy  Volume: 2.5 mL     2108 (1 drop)-Given        2056 (1 drop)-Given               2246 (1 drop)-Given        2101 (1 drop)-Given        2120 (1 drop)-Given        2114 (1 drop)-Given        [ ] 2200           diphenhydrAMINE (BENADRYL) solution 12.5 mg  Dose: 12.5 mg  Freq: EVERY 6 HOURS PRN Route: PO  PRN Reason: itching  Start: 04/15/18 2244   Admin Instructions: Caution to be used when administering multiple CNS depressing meds within a short time frame.    Admin. Amount: 12.5 mg = 5 mL Conc: 2.5 mg/mL  Dispense Loc:  [facility]  Volume: 10 mL  POC: Post-procedure              Or  diphenhydrAMINE (BENADRYL) injection 12.5 mg  Dose: 12.5 mg  Freq: EVERY 6 HOURS PRN Route: IV  PRN Reason: itching  PRN Comment: Only give if patient unable to take PO.  Start: 04/15/18 2244   Admin Instructions: Caution to be used when administering multiple CNS depressing meds within a short time frame.  For ordered doses up to 50 mg, give IV Push undiluted. Give each 25mg over a minimum of 1 minute. Extend in non-emergency    Admin. Amount: 12.5 mg = 0.25 mL Conc: 50 mg/mL  Dispense Loc:  [facility]  Infused Over: 1-2 Minutes  Volume: 1 mL  POC: Post-procedure               dorzolamide (TRUSOPT) 2 % ophthalmic solution 1 drop  Dose: 1 drop  Freq: 2 TIMES DAILY Route: Both Eyes  Start: 04/16/18 2100   Admin Instructions: Pt own supply    Admin. Amount: 1 drop  Last Admin: 04/22/18 0833  Dispense Loc:  Main Pharmacy  Volume: 10 mL     2032 (1 drop)-Given        0832 (1 drop)-Given       2056 (1 drop)-Given        0810 (1 drop)-Given       2114 (1 drop)-Given        0809 (1 drop)-Given       2101 (1 drop)-Given        0819 (1 drop)-Given       2028 (1 drop)-Given        0812 (1 drop)-Given       2057 (1 drop)-Given        0833 (1 drop)-Given       [ ] 2100           enoxaparin (LOVENOX) injection 40 mg  Dose: 40 mg  Freq: EVERY 24 HOURS Route: SC  Start: 04/16/18 0900   Admin Instructions: Check to make sure start  "date/time is 12-24 hours post op unless documented complication, AND no sooner than 22 hours post op if spinal anesthesia used.   Continue until discharge to home. HOLD if platelet count falls below 50% of baseline or less than 100,000/ L and notify provider.    Admin. Amount: 40 mg = 0.4 mL Conc: 40 mg/0.4 mL  Last Admin: 04/22/18 0837  Dispense Loc:  [facility]  Volume: 0.4 mL  POC: Post-procedure     0918 (40 mg)-Given        0845 (40 mg)-Given        0803 (40 mg)-Given        0922 (40 mg)-Given        0822 (40 mg)-Given        0812 (40 mg)-Given        0837 (40 mg)-Given           HYDROmorphone (PF) (DILAUDID) injection 0.2 mg  Dose: 0.2 mg  Freq: EVERY 2 HOURS PRN Route: IV  PRN Reason: other  PRN Comment: pain control or improvement in physical function.  Hold dose for analgesic side effects.  Start: 04/15/18 2244   Admin Instructions: Notify provider to assess for uncontrolled pain or analgesic side effects. Hold while on IV PCA or with regular IV opioid dosing.  For ordered doses up to 4 mg give IV Push undiluted. Administer each 2mg over 2-5 minutes.    Admin. Amount: 0.2 mg  Dispense Loc:  ADS 33B  POC: Post-procedure               levofloxacin (LEVAQUIN) tablet 500 mg  Dose: 500 mg  Freq: EVERY 24 HOURS Route: PO  Indications of Use: INTRA-ABDOMINAL INFECTION  Start: 04/20/18 2100   Admin Instructions: Administer at least 2 hrs before or 4 hrs after aluminum, calcium, iron, zinc or magnesium containing products.    Admin. Amount: 1 tablet (1 × 500 mg tablet)  Last Admin: 04/21/18 2114  Dispense Loc:  ADS 33B         2037 (500 mg)-Given        2114 (500 mg)-Given        [ ] 2100           lidocaine (LMX4) cream  Freq: EVERY 1 HOUR PRN Route: Top  PRN Reason: pain  PRN Comment: with VAD insertion or accessing implanted port.  Start: 04/15/18 2244   Admin Instructions: Do NOT give if patient has a history of allergy to any local anesthetic or any \"lisseth\" product.   Apply 30 minutes prior to VAD insertion " "or port access.  MAX Dose:  2.5 g (  of 5 g tube)    Dispense Loc: [facility] 33 TOWER  POC: Post-procedure               lidocaine 1 % 1 mL  Dose: 1 mL  Freq: EVERY 1 HOUR PRN Route: OTHER  PRN Comment: mild pain with VAD insertion or accessing implanted port  Start: 04/15/18 2244   Admin Instructions: Do NOT give if patient has a history of allergy to any local anesthetic or any \"lisseth\" product. MAX dose 1 mL subcutaneous OR intradermal in divided doses.    Admin. Amount: 1 mL  Dispense Loc: [facility] 33B  Volume: 20 mL  POC: Post-procedure               losartan (COZAAR) tablet 50 mg  Dose: 50 mg  Freq: DAILY Route: PO  Start: 04/20/18 1045   Admin. Amount: 1 tablet (1 × 50 mg tablet)  Last Admin: 04/22/18 0836  Dispense Loc: [facility] 33B         1214 (50 mg)-Given        0812 (50 mg)-Given        0836 (50 mg)-Given           metroNIDAZOLE (FLAGYL) tablet 250 mg  Dose: 250 mg  Freq: EVERY 8 HOURS SCHEDULED Route: PO  Indications of Use: INTRA-ABDOMINAL INFECTION  Start: 04/20/18 1400   Admin. Amount: 1 tablet (1 × 250 mg tablet)  Last Admin: 04/22/18 0645  Dispense Loc: [facility] 33B         1440 (250 mg)-Given       2212 (250 mg)-Given        0620 (250 mg)-Given       1428 (250 mg)-Given       2304 (250 mg)-Given        0645 (250 mg)-Given       [ ] 1400       [ ] 2200           naloxone (NARCAN) injection 0.1-0.4 mg  Dose: 0.1-0.4 mg  Freq: EVERY 2 MIN PRN Route: IV  PRN Reason: opioid reversal  Start: 04/15/18 2244   Admin Instructions: For respiratory rate LESS than or EQUAL to 8.  Partial reversal dose:  0.1 mg titrated q 2 minutes for Analgesia Side Effects Monitoring Sedation Level of 3 (frequently drowsy, arousable, drifts to sleep during conversation).Full reversal dose:  0.4 mg bolus for Analgesia Side Effects Monitoring Sedation Level of 4 (somnolent, minimal or no response to stimulation).  For ordered doses up to 2mg give IVP. Give each 0.4mg over 15 seconds in emergency situations. For non-emergent situations " further dilute in 9mL of NS to facilitate titration of response.    Admin. Amount: 0.1-0.4 mg = 0.25-1 mL Conc: 0.4 mg/mL  Dispense Loc:  ADS 33B  Volume: 1 mL  POC: Post-procedure               ondansetron (ZOFRAN-ODT) ODT tab 4 mg  Dose: 4 mg  Freq: EVERY 6 HOURS PRN Route: PO  PRN Reasons: nausea,vomiting  Start: 04/15/18 2244   Admin Instructions: This is Step 1 of nausea and vomiting management.  If nausea not resolved in 15 minutes, go to Step 2 prochlorperazine (COMPAZINE). Do not push through foil backing. Peel back foil and gently remove. Place on tongue immediately. Administration with liquid unnecessary  With dry hands, peel back foil backing and gently remove tablet; do not push oral disintegrating tablet through foil backing; administer immediately on tongue and oral disintegrating tablet dissolves in seconds; then swallow with saliva; liquid not required.    Admin. Amount: 1 tablet (1 × 4 mg tablet)  Dispense Loc:  ADS 33B  POC: Post-procedure                     Or  ondansetron (ZOFRAN) injection 4 mg  Dose: 4 mg  Freq: EVERY 6 HOURS PRN Route: IV  PRN Reasons: nausea,vomiting  Start: 04/15/18 2244   Admin Instructions: This is Step 1 of nausea and vomiting management.  If nausea not resolved in 15 minutes, go to Step 2 prochlorperazine (COMPAZINE).  Irritant. For ordered doses up to 4 mg, give IV Push undiluted over 2-5 minutes.    Admin. Amount: 4 mg = 2 mL Conc: 4 mg/2 mL  Last Admin: 04/19/18 1026  Dispense Loc:  [facility] 33B  Infused Over: 2-5 Minutes  Volume: 2 mL  POC: Post-procedure        1026 (4 mg)-Given              phenol (CHLORASEPTIC) 1.4 % spray 1 mL  Dose: 1 spray  Freq: EVERY 1 HOUR PRN Route: MT  PRN Reason: sore throat  Start: 04/16/18 1551   Admin. Amount: 1 mL = 1 spray  Dispense Loc:  ADS 33 TOWER  Volume: 177 mL               polyethylene glycol 400 (BLINK TEARS) 0.25 % ophthalmic solution SOLN 1 drop  Dose: 1 drop  Freq: 3 TIMES DAILY PRN Route: OP  PRN Reason: dry  eyes  Start: 04/16/18 1341   Admin Instructions: Pt own supply    Last Admin: 04/18/18 0800  Dispense Loc:  Main Pharmacy  Volume: 15 mL       0800 (1 drop)-Given               potassium chloride (KLOR-CON) Packet 20-40 mEq  Dose: 20-40 mEq  Freq: EVERY 2 HOURS PRN Route: ORAL OR FEED  PRN Reason: potassium supplementation  Start: 04/19/18 0942   Admin Instructions: Use if unable to tolerate tablets.  If Serum K+ 3.0-3.3, dose = 60 mEq po total dose (40 mEq x1 followed in 2 hours by 20 mEq x1). Recheck K+ level 4 hours after dose and the next AM.  If Serum K+ 2.5-2.9, dose = 80 mEq po total dose (40 mEq Q2H x2). Recheck K+ level 4 hours after dose and the next AM.  If Serum K+ less than 2.5, See IV order.  Dissolve packet contents in 4-8 ounces of cold water or juice.    Admin. Amount: 20-40 mEq  Last Admin: 04/20/18 0100  Dispense Loc:  ADS 33B        2256 (20 mEq)-Given        0100 (20 mEq)-Given             potassium chloride 10 mEq in 100 mL intermittent infusion with 10 mg lidocaine  Dose: 10 mEq  Freq: EVERY 1 HOUR PRN Route: IV  PRN Reason: potassium supplementation  Start: 04/19/18 0942   Admin Instructions: Infuse via PERIPHERAL LINE. Use potassium with lidocaine for pain with peripheral administration.  If Serum K+ 3.0-3.3, dose = 10 mEq/hr x4 doses (40 mEq IV total dose). Recheck K+ level 2 hours after dose and the next AM.  If Serum K+ less than 3.0, dose = 10 mEq/hr x6 doses (60 mEq IV total dose). Recheck K+ level 2 hours after dose and the next AM.    Admin. Amount: 10 mEq = 100 mL Conc: 10 mEq/100 mL  Last Admin: 04/19/18 1813  Dispense Loc: Contact Rx for dose  Infused Over: 1 Hours  Volume: 100 mL        1153 (10 mEq)-New Bag [C]       1302 (10 mEq)-New Bag       1419 (10 mEq)-New Bag       1525 (10 mEq)-New Bag       1710 (10 mEq)-New Bag       1813 (10 mEq)-New Bag              potassium chloride 20 mEq in 50 mL intermittent infusion  Dose: 20 mEq  Freq: EVERY 1 HOUR PRN Route: IV  PRN  Reason: potassium supplementation  Start: 04/19/18 0942   Admin Instructions: Infuse via CENTRAL LINE Only. May need EKG if less than 65 kg or on TPN - Max rate is 0.3 mEq/kg/hr for patients not on EKG monitoring.   If Serum K+ 3.0-3.3, dose = 20 mEq/hr x2 doses (40 mEq IV total dose). Recheck K+ level 2 hours after dose and the next AM.  If Serum K+ less than 3.0, dose = 20 mEq/hr x3 doses (60 mEq IV total dose). Recheck K+ level 2 hours after dose and the next AM.    Admin. Amount: 20 mEq = 50 mL Conc: 20 mEq/50 mL  Dispense Loc:  ADS 33 TOWER  Infused Over: 90 Minutes  Volume: 50 mL               potassium chloride SA (K-DUR/KLOR-CON M) CR tablet 20-40 mEq  Dose: 20-40 mEq  Freq: EVERY 2 HOURS PRN Route: PO  PRN Reason: potassium supplementation  Start: 04/19/18 0942   Admin Instructions: Use if able to take PO.   If Serum K+ 3.0-3.3, dose = 60 mEq po total dose (40 mEq x1 followed in 2 hours by 20 mEq x1). Recheck K+ level 4 hours after dose and the next AM.  If Serum K+ 2.5-2.9, dose = 80 mEq po total dose (40 mEq Q2H x2). Recheck K+ level 4 hours after dose and the next AM.  If Serum K+ less than 2.5, See IV order.  DO NOT CRUSH    Admin. Amount: 1-2 tablet (1-2 × 20 mEq tablet)  Last Admin: 04/19/18 2258  Dispense Loc:  ADS 33B        2258 (20 mEq)-Given              prochlorperazine (COMPAZINE) injection 5 mg  Dose: 5 mg  Freq: EVERY 6 HOURS PRN Route: IV  PRN Reasons: nausea,vomiting  Start: 04/15/18 2244   Admin Instructions: This is Step 2 of nausea and vomiting management.   If nausea not resolved in 15 minutes, give metoclopramide (REGLAN) if ordered (step 3 of nausea and vomiting management)  For ordered doses up to 10 mg, give IV Push undiluted. Each 5mg over 1 minute.    Admin. Amount: 5 mg = 1 mL Conc: 5 mg/mL  Dispense Loc: [facility] 33B  Infused Over: 1-2 Minutes  Volume: 1 mL  POC: Post-procedure              Or  prochlorperazine (COMPAZINE) tablet 5 mg  Dose: 5 mg  Freq: EVERY 6 HOURS PRN  Route: PO  PRN Reasons: nausea,vomiting  Start: 04/15/18 2244   Admin Instructions: This is Step 2 of nausea and vomiting management.   If nausea not resolved in 15 minutes, give metoclopramide (REGLAN) if ordered (step 3 of nausea and vomiting management)    Admin. Amount: 1 tablet (1 × 5 mg tablet)  Dispense Loc:  ADS 33B  POC: Post-procedure               sodium chloride (PF) 0.9% PF flush 3 mL  Dose: 3 mL  Freq: EVERY 8 HOURS Route: IK  Start: 04/15/18 2245   Admin Instructions: And Q1H PRN, to lock peripheral IV dormant line.    Admin. Amount: 3 mL  Last Admin: 04/21/18 1430  Dispense Loc: [facility]  Volume: 3 mL  POC: Post-procedure     0552 (3 mL)-Given       1448 (3 mL)-Given       (2236)-Not Given        (0803)-Not Given       1421 (3 mL)-Given       (2224)-Not Given        (0610)-Not Given       1351 (3 mL)-Given       2247 (3 mL)-Given        (0711)-Not Given       (1351)-Not Given       (2226)-Not Given        (0610)-Not Given       1440 (3 mL)-Given       2120 (3 mL)-Given               0621 (3 mL)-Given       1430 (3 mL)-Given       (2304)-Not Given        (0640)-Not Given       [ ] 1445       [ ] 2245           sodium chloride (PF) 0.9% PF flush 3 mL  Dose: 3 mL  Freq: EVERY 1 HOUR PRN Route: IK  PRN Reason: line flush  PRN Comment: for peripheral IV flush post IV meds  Start: 04/15/18 2244   Admin. Amount: 3 mL  Dispense Loc: [facility]  Volume: 3 mL  POC: Post-procedure               sodium chloride 0.9% infusion  Rate: 50 mL/hr   Freq: CONTINUOUS Route: IV  Start: 04/15/18 2245   Last Admin: 04/19/18 2111  Dispense Loc: [facility]  Volume: 1,000 mL  POC: Post-procedure     1448 ( )-New Bag        0846 ( )-New Bag       2157 ( )-New Bag        1526 ( )-New Bag        0809 ( )-Rate/Dose Change       0904 ( )-New Bag       2111 ( )-Rate/Dose Verify              traMADol (ULTRAM) tablet 50 mg  Dose: 50 mg  Freq: EVERY 6 HOURS PRN Route: PO  PRN Reason: moderate pain  Start: 04/19/18  0819   Admin. Amount: 1 tablet (1 × 50 mg tablet)  Dispense Loc:  ADS 33B               umeclidinium-vilanterol (ANORO ELLIPTA) 62.5-25 MCG/INH oral inhaler 1 puff  Dose: 1 puff  Freq: DAILY Route: IN  Start: 04/16/18 0000   Admin Instructions: Pt own supply    Admin. Amount: 1 puff  Last Admin: 04/22/18 0832  Dispense Loc:  Main Pharmacy            0900 (1 puff)-Given [C]        0833 (1 puff)-Given        0802 (1 puff)-Given        0808 (1 puff)-Given        0820 (1 puff)-Given        0812 (1 puff)-Given        0832 (1 puff)-Given          Discontinued Medications  Medications 04/16/18 04/17/18 04/18/18 04/19/18 04/20/18 04/21/18 04/22/18         Dose: 250 mg  Freq: EVERY 24 HOURS Route: IV  Indications of Use: INTRA-ABDOMINAL INFECTION  Last Dose: 250 mg (04/20/18 0004)  Start: 04/17/18 0000   End: 04/20/18 0805   Admin Instructions: Dose adjusted per renal dosing policy.  Estimated CrCl = 30-50 mL/min.  Irritant. Administer at a rate of no greater than 100 mL/hr    Admin. Amount: 250 mg = 50 mL Conc: 250 mg/50 mL  Last Admin: 04/20/18 0004  Dispense Loc: [facility]  Infused Over: 60 Minutes      0112 (250 mg)-New Bag       2344 (250 mg)-New Bag         0038 (250 mg)-New Bag        0004 (250 mg)-New Bag       0805-Med Discontinued           Dose: 5 mg  Freq: EVERY 6 HOURS Route: IV  Start: 04/16/18 0800   End: 04/20/18 1038   Admin Instructions: HOLD IF Heart Rate less than 75 beats per minute or Systolic Blood Pressure less than 140 mmHg, bronchospasm, on inotropic continuous infusions or being paced .  Start POD 1.    ADMINISTRATION: By slow IV push over at least 2 minutes or give by piggyback over 15-20 minutes if patient not appropriate for IV push administration. ASSESSMENT: Blood pressure and heart rate BEFORE and 10 minutes AFTER each dose x first 2 doses.  For ordered doses up to 15 mg, give IV Push undiluted over 5-10 minutes.    Admin. Amount: 5 mg = 5 mL Conc: 1 mg/mL  Last Admin: 04/20/18  0823  Dispense Loc:  ADS 33B  Infused Over: 5-10 Minutes  Volume: 5 mL  POC: Post-procedure     (0749)-Not Given       (1513)-Not Given       2109 (5 mg)-Given [C]        (0349)-Not Given       0845 (5 mg)-Given       1421 (5 mg)-Given       2057 (5 mg)-Given        (0225)-Not Given       (0810)-Not Given       1346 (5 mg)-Given       (2048)-Not Given        0231 (5 mg)-Given [C]       0921 (5 mg)-Given       1557 (5 mg)-Given       2101 (5 mg)-Given        0328 (5 mg)-Given       0823 (5 mg)-Given       1038-Med Discontinued           Dose: 500 mg  Freq: EVERY 6 HOURS Route: IV  Indications of Use: INTRA-ABDOMINAL INFECTION  Last Dose: 500 mg (04/20/18 0105)  Start: 04/15/18 2300   End: 04/20/18 0805   Admin. Amount: 500 mg = 100 mL Conc: 5 mg/mL  Last Admin: 04/20/18 0617  Dispense Loc:  ADS 33 TOWER  Infused Over: 60 Minutes  Volume: 100 mL     0549 (500 mg)-New Bag       1204 (500 mg)-New Bag       1619 (500 mg)-New Bag        0000 (500 mg)-New Bag       0602 (500 mg)-New Bag       1218 (500 mg)-New Bag       1712 (500 mg)-New Bag       2238 (500 mg)-New Bag        0511 (500 mg)-New Bag       1216 (500 mg)-New Bag       1813 (500 mg)-New Bag        0231 (500 mg)-New Bag       0921 (500 mg)-New Bag       1906 (500 mg)-New Bag [C]        0105 (500 mg)-New Bag       0617 (500 mg)-New Bag       0805-Med Discontinued                  INTERAGENCY TRANSFER FORM - NOTES (H&P, Discharge Summary, Consults, Procedures, Therapies)   4/15/2018                       [place] [id number] SURGICAL SPECIALITIES: [phone]            History & Physicals     No notes of this type exist for this encounter.      Discharge Summaries     No notes of this type exist for this encounter.         Consult Notes      Consults by Jm Craig MD at 4/15/2018  8:06 PM     Author:  Jm Craig MD Service:  Surgery Author Type:  Physician    Filed:  4/15/2018  8:06 PM Date of Service:  4/15/2018  8:06 PM Creation Time:  4/15/2018   7:54 PM    Status:  Signed :  Jm Craig MD (Physician)         General Surgery Consultation     Jamilah Rodriguez MRN# [id number]   YOB: 1931 Age: 87 year old   Date of Admission: 4/15/2018     Reason for consult: I was asked by Dr. Roca to evaluate this patient for free intraabdominal air.           Assessment and Plan:   Free intraabdominal air and small bowel obstruction like clinical and radiographic picture.     To the Operating Room for abdominal exploration  Discussed with patient and daughter the rationale for the procedure; the risk, benefits, hopeful outcomes and possible complications were explained, they understand and she will like to proceed.  ABX and fluid has been provided.          Chief Complaint:   Abdominal pain and free intraabdominal air.    History is obtained from the patient, electronic health record and emergency department physician         History of Present Illness:   This patient is a 87 year old female who presents with nausea, vomiting and abdominal pain since yesterday afternoon.  Had BM yesterday and had been passing flatus, pain that began yesterday progressively got worse, upon ED evaluation she was found to have dilated small bowel and free intraabdominal air.            Past Medical History:[LL1.1]     Past Medical History:   Diagnosis Date     AAA (abdominal aortic aneurysm) (H)     Followed with yearly ultrasound     Cervical cancer (H) 1969     Chronic dermatitis     extensive allergy testing revealed allergy/sensitivity to carba mix ( rubber) and Imidazolidinyl Urea (common in beauty products)     Glaucoma      Glaucoma      History of COPD     very mild abnormal spirometry in 2007 (in [place]), has not been active since quitting smoking in 2009     HTN (hypertension)      Macular degeneration      Osteoporosis 8/3/11    femoral T score -3.4 & -3.7     PAD (peripheral artery disease) (H)      Vertebral compression fracture (H) 5/17/11    L1  compression fracture, presumed osteoporotic compression fracture[LL1.2]             Past Surgical History:[LL1.1]     Past Surgical History:   Procedure Laterality Date     CATARACT IOL, RT/LT  2/8/12    left eye cataract removal     HYSTERECTOMY TOTAL ABDOMINAL  1999    Fibroids     OPEN REDUCTION INTERNAL FIXATION HIP NAILING  7/2/2012    Procedure: OPEN REDUCTION INTERNAL FIXATION HIP NAILING;  Intramedullary Fixation Right Intertrochanteric Hip Fracture;  Surgeon: Chalino Covarrubias MD;  Location: [facility] OR     OPEN REDUCTION INTERNAL FIXATION TIBIAL PLATEAU  3/31/2013    Procedure: OPEN REDUCTION INTERNAL FIXATION TIBIAL PLATEAU;  RIGHT LATERAL TIBIAL PLATEAU FRACTURE - SYNTHES Medial Meniscus Repair, Lateral Compartment Release;  Surgeon: Alfred Cardenas MD;  Location: [facility] OR     OPEN REDUCTION INTERNAL FIXATION WRIST  1988    left wrist ORIF, hardware removed onemonth later[LL1.2]                Social History:   I have reviewed this patient's social history          Family History:   I have reviewed this patient's family history          Immunizations:[LL1.1]     Immunization History   Administered Date(s) Administered     Influenza (High Dose) 3 valent vaccine 10/23/2017     Influenza (IIV3) PF 10/13/2010, 10/04/2011, 08/01/2012, 09/23/2013, 10/01/2014     Pneumococcal 23 valent 01/01/2006     TD (ADULT, 7+) 07/28/2011[LL1.2]             Allergies:[LL1.1]     Allergies   Allergen Reactions     Atenolol Hives     Beta Adrenergic Blockers      Brimonidine      Cephalexin Hives     Germall Plus Itching     Imidazolidinyl Urea found in many topical creams and house hold products     Latex      Itching     Morphine GI Disturbance     No Clinical Screening - See Comments Itching     Carba Mix; IMIDAZOLIDINYL UREA - Allergic to this ingredient, which is found in many common topical moisturizers and household products.     Penicillins Hives     Sulfa Drugs GI Disturbance     Tramadol Nausea and Vomiting     Tylenol GI  Disturbance     Vicodin [Hydrocodone-Acetaminophen]      Fentanyl Nausea[LL1.2]             Medications:[LL1.1]     Prescriptions Prior to Admission   Medication Sig Dispense Refill Last Dose     Multiple Vitamins-Minerals (PRESERVISION AREDS 2 PO) Take 1 tablet by mouth 2 times daily   4/14/2018 at pm     umeclidinium-vilanterol (ANORO ELLIPTA) 62.5-25 MCG/INH oral inhaler Inhale 1 puff into the lungs daily 1 Inhaler 11 4/15/2018 at am     losartan (COZAAR) 50 MG tablet Take 1 tablet (50 mg) by mouth daily 90 tablet 1 4/15/2018 at am     GLUCOSAMINE SULFATE PO Take 500 mg by mouth daily   4/15/2018 at am     Flaxseed, Linseed, (FLAX SEEDS PO) Take 1 Tablespoonful by mouth daily (with breakfast) With grapefruit juice   4/15/2018 at am     dorzolamide (TRUSOPT) 2 % ophthalmic solution Place 1 drop into both eyes 2 times daily   4/15/2018 at am     Cholecalciferol (VITAMIN D) 2000 UNITS tablet Take 1 tablet by mouth daily.   4/14/2018 at am     bimatoprost (LUMIGAN) 0.01 % SOLN Place 1 drop into both eyes At Bedtime.     4/14/2018 at hs     Calcium Carbonate-Vitamin D (CALCIUM + D) 600-200 MG-UNIT per tablet Take 1 tablet by mouth every 12 hours. 100 tablet 12 4/14/2018 at pm     ascorbic acid (VITAMIN C) 500 MG tablet Take 1 tablet by mouth daily. 100 tablet 12 4/14/2018 at am[LL1.2]             Review of Systems:   The 5 point Review of Systems is negative other than noted in the HPI           Physical Exam:   Vitals were reviewed  Constitutional:   awake, alert, cooperative, moderate distress, appears stated age and thin     Eyes:   sclera clear     Neck:   supple, symmetrical, trachea midline     Lungs:   Mild respiratory distress, good air exchange and no retractions     Abdomen:   scars noted large midline scar, soft, distended, rebound tenderness present, involuntary guarding present and hernia present in the right groin reducible     Musculoskeletal:   tone is normal     Skin:   Thin but otherwise normal skin  "color, texture, turgor             Data:   All laboratory and imaging data in the past 24 hours reviewed[LL1.1]           Revision History        User Key Date/Time User Provider Type Action    > LL1.2 4/15/2018  8:06 PM Jm Craig MD Physician Sign     LL1.1 4/15/2018  7:54 PM Jm Craig MD Physician                      Progress Notes - Physician (Notes for yesterday and today)      Progress Notes by Yasmeen Ruelas PA-C at 4/22/2018  7:23 AM     Author:  Yasmeen Ruelas PA-C Service:  Surgery Author Type:  Physician Assistant - C    Filed:  4/22/2018  7:35 AM Date of Service:  4/22/2018  7:23 AM Creation Time:  4/22/2018  7:23 AM    Status:  Signed :  Yasmeen Ruelas PA-C (Physician Assistant - [facility])         General Surgery Progress Note    Admission Date: 4/15/2018  Today's Date: 4/22/2018         Assessment:      Jamilah Rodriguez is an 87 year old female s/p emergent laparoscopy and laparotomy on for abdominal pain and free air  POD[LF1.1] 7[LF1.2]         Plan:   - Discharge to TCU today[LF1.1] if bed available/confirmed[LF1.2]  - Discharge instructions reviewed with patient, questions answered  - Continue with leg elevation,  - Regular diet + Ensure shakes  - Potassium stable yesterday  - No further antibiotics        Interval History:   Afebrile overnight, VSS on room air.[LF1.1] Patient feels better today, thinks the swelling in her leg has improved. Continues to tolerate diet, minimal appetite but slightly improving. No shortness of breath, using IS. Some chest tightness at times when she is due for her inhaler. Rarely has a few seconds of nausea that passes spontaneously. Continues to have bowel function.[LF1.2]          Physical Exam:[LF1.1]   /84 (BP Location: Left arm)  Pulse 88  Temp 97.2  F (36.2  C) (Oral)  Resp 18  Ht 1.651 m (5' 5\")  Wt 59.5 kg (131 lb 2.8 oz)  SpO2 95%  BMI 21.83 kg/m2  I/O last 3 completed shifts:  In: -   Out: 575 " [Urine:575][LF1.3]  General: NAD, pleasant, alert and oriented[LF1.1]  Cardiovascular: regular rate and rhythm; S1 and S2 distinct without murmur   Respiratory: lungs clear to auscultation bilaterally without wheezes, rales or rhonchi[LF1.2]   Abdomen: soft,[LF1.1] minimally[LF1.2] tender,[LF1.1] somewhat distended and tympanic, + bowel sounds[LF1.2]  Incision: clean, dry, and intact[LF1.1]  Extremities: left LE edema improved, scant edema today; no calf tenderness[LF1.2]    LABS:[LF1.1]  Recent Labs   Lab Test  04/21/18   0725  04/19/18   0805  04/18/18   0755  04/16/18   0744  04/15/18   1402   WBC   --   8.0   --   11.6*  13.0*   HGB   --   11.3*   --   10.2*  13.6   MCV   --   90   --   88  87   PLT  241  257  220  225  377      Recent Labs   Lab Test  04/20/18   0505  04/19/18   2145  04/19/18   0805  04/16/18   0744  04/15/18   1402   POTASSIUM  3.7  3.0*  2.8*  3.9  3.6   CHLORIDE   --    --   106  105  96   CO2   --    --   25  24  26   BUN   --    --   12  15  22   CR   --    --   0.68  0.81  0.97   ANIONGAP   --    --   9  8  12[LF1.3]                 Yasmeen Ruelas PA-C  Surgical Consultants: [phone]  Pager: [phone] (7am-4pm)[LF1.1]           Revision History        User Key Date/Time User Provider Type Action    > LF1.2 4/22/2018  7:35 AM Yasmeen Ruelas PA-C Physician Assistant - [facility] Sign     LF1.3 4/22/2018  7:24 AM Yasmeen Ruelas PA-C Physician Assistant - C      LF1.1 4/22/2018  7:23 AM Yasmeen Ruelas PA-C Physician Assistant - C             Progress Notes by Yasmeen Ruelas PA-C at 4/21/2018  7:47 AM     Author:  Yasmeen Ruelas PA-C Service:  Surgery Author Type:  Physician Assistant Amisha THOMSON    Filed:  4/21/2018  8:03 AM Date of Service:  4/21/2018  7:47 AM Creation Time:  4/21/2018  7:47 AM    Status:  Signed :  Yasmeen Ruelas PA-C (Physician Assistant - [facility])         General Surgery Progress Note    Admission Date:  "4/15/2018  Today's Date: 4/21/2018         Assessment:      Jamilah Rodriguez is an 87 year old female s/p emergent laparoscopy and laparotomy on for abdominal pain and free air  POD 6          Plan:   -[LF1.1] Continue current cares. Likely ready for discharge tomorrow[LF1.2]  - Appreciate PT/OT assistance[LF1.1]  - Keep legs elevated, cannot use compression stockings due to latex allergy  - PCDs and lovenox for dvt ppx[LF1.2]  - Regular diet + Ensure shakes, encourage potassium-rich foods[LF1.1]  - Minimal pain, not taking anything  - On levo/flagyl for intra-abdominal infection, likely will not need any more at discharge    Dispo: likely to TCU tomorrow[LF1.2]        Interval History:   Afebrile overnight, VSS on room air[LF1.1] except for a couple readings of slight hypertension. Patient did have an episode of dyspnea on exertion this morning, O2 sats remained 96 on room air, she was provided O2 for comfort for a bit.[LF1.2] Patient[LF1.1] now[LF1.2] feeling well,[LF1.1] mainly bothered by left LE swelling[LF1.2].[LF1.1] Venous ultrasound yesterday negative for DVT.[LF1.2] Continues to pass gas and have BMs. Tolerating diet without nausea[LF1.1] or pain. Minimal appetite.[LF1.2]          Physical Exam:   BP (!) 154/95 (BP Location: Left arm)  Pulse 85  Temp 98.6  F (37  C) (Oral)  Resp 15  Ht 1.651 m (5' 5\")  Wt 59.5 kg (131 lb 2.8 oz)  SpO2 94%  BMI 21.83 kg/m2  I/O last 3 completed shifts:  In: 900 [P.O.:900]  Out: 875 [Urine:875]  General: NAD, pleasant, alert and oriented  Abdomen:[LF1.1] distended but soft, appropriately tender to palpation mainly on right side, otherwise nontender. + bowel sounds[LF1.2]  Incision[LF1.1]s[LF1.2]: clean, dry, and intact[LF1.1], healing well without erythema or drainage  Extremities: 1+ left LE edema, no calf tenderness[LF1.2]    LABS:    Recent Labs   Lab Test  12/21/16   1000   PROTEIN  Trace*         Yasmeen Ruelas PA-C  [facility]: [phone]  Pager: " [phone] (7am-4pm)[LF1.1]           Revision History        User Key Date/Time User Provider Type Action    > LF1.2 4/21/2018  8:03 AM Yasmeen Ruelas PA-C Physician Assistant - [facility] Sign     LF1.1 4/21/2018  7:47 AM Yasmeen Ruelas PA-C Physician Assistant - [facility]                   Procedure Notes     No notes of this type exist for this encounter.         Progress Notes - Therapies (Notes from 04/19/18 through 04/22/18)      Progress Notes by Selina Glynn OT at 4/21/2018  3:11 PM     Author:  Selina Glynn OT Service:  (none) Author Type:  Occupational Therapist    Filed:  4/21/2018  3:11 PM Date of Service:  4/21/2018  3:11 PM Creation Time:  4/21/2018  3:11 PM    Status:  Signed :  Selina Glynn OT (Occupational Therapist)          04/21/18 1507   Quick Adds   Type of Visit Initial Occupational Therapy Evaluation   Living Environment   Lives With alone   Living Arrangements apartment   Home Accessibility tub/shower is not walk in;grab bars present (bathtub);grab bars present (toilet)   Transportation Available car;family or friend will provide  (Pt no longer drives. Family provides as needed)   Self-Care   Usual Activity Tolerance good   Current Activity Tolerance moderate   Equipment Currently Used at Home grab bar;walker, rolling;raised toilet   Functional Level Prior   Ambulation 1-->assistive equipment   Transferring 1-->assistive equipment   Toileting 1-->assistive equipment   Bathing 1-->assistive equipment   Dressing 0-->independent   Eating 0-->independent   Communication 0-->understands/communicates without difficulty   Swallowing 0-->swallows foods/liquids without difficulty   Cognition 0 - no cognition issues reported   Fall history within last six months no   General Information   Onset of Illness/Injury or Date of Surgery - Date 04/15/18   Referring Physician Zeb Byers PA-C   Patient/Family Goals Statement Pt plans to discharge to TCU tomorrow or the  next day   Additional Occupational Profile Info/Pertinent History of Current Problem Seen POD #6 of laparotomy   Precautions/Limitations fall precautions;abdominal precautions   Cognitive Status Examination   Orientation orientation to person, place and time   Level of Consciousness alert   Able to Follow Commands WNL/WFL   Personal Safety (Cognitive) WNL/WFL   Memory intact   Visual Perception   Visual Perception Wears glasses  (for reading only)   Pain Assessment   Patient Currently in Pain Yes, see Vital Sign flowsheet  (minimal)   Range of Motion (ROM)   ROM Comment B UE WNL   Strength   Strength Comments B UE not tested due to abdominal precautions.    Mobility   Bed Mobility Bed mobility skill: Rolling/Turning;Bed mobility skill: Scooting/Bridging;Bed mobility skill: Sit to supine;Bed mobility skill: Supine to sit;Bed mobility analysis   Bed Mobility Skill: Rolling/Turning   Level of [place] - Bed Mobility Skill Rolling Turning stand-by assist   Bed Mobility Skill: Scooting/Bridging   Level of [place]: Scooting/Bridging stand-by assist   Bed Mobility Skill: Sit to Supine   Level of [place]: Sit/Supine stand-by assist   Bed Mobility Skill: Supine to Sit   Level of [place]: Supine/Sit stand-by assist   Bed Mobility Analysis   Impairments Contributing to Impaired Bed Mobility pain   Transfer Skills   Transfer Transfer Safety Analysis Bed/Chair;Transfer Skill: Stand to Sit;Transfer Safety Analysis Sit/Stand   Transfer Skill: Bed to Chair/Chair to Bed   Level of [place]: Bed to Chair stand-by assist   Assistive Device - Transfer Skill Bed to Chair Chair to Bed Rehab Eval standard walker   Transfer Safety Analysis Bed/Chair   Impairments Contributing to Impaired Transfers pain   Transfer Skill: Sit to Stand   Level of [place]: Sit/Stand stand-by assist   Assistive Device for Transfer: Sit/Stand standard walker   Transfer Safety Analysis Sit/Stand   Impaired Transfers: Sit/Stand pain  "  Transfer Skill: Toilet Transfer   Level of [place]: Toilet stand-by assist   Assistive Device grab bars;standard walker   Lower Body Dressing   Level of [place]: Dress Lower Body moderate assist (50% patients effort)   Toileting   Level of [place]: Toilet stand-by assist   Grooming   Level of [place]: Grooming stand-by assist   Eating/Self Feeding   Level of [place]: Eating independent   Instrumental Activities of Daily Living (IADL)   Previous Responsibilities meal prep;housekeeping;laundry;medication management;finances   Activities of Daily Living Analysis   Impairments Contributing to Impaired Activities of Daily Living pain;post surgical precautions   General Therapy Interventions   Planned Therapy Interventions ADL retraining;transfer training   Clinical Impression   Criteria for Skilled Therapeutic Interventions Met yes, treatment indicated   OT Diagnosis Decreased I with ADLs   Influenced by the following impairments Pain; Abdominal precautions; Decreased activity tolerance   Assessment of Occupational Performance 3-5 Performance Deficits   Identified Performance Deficits Dressing; Toileting; Bathing; IADLs   Clinical Decision Making (Complexity) Moderate complexity   Therapy Frequency 3 times/wk   Predicted Duration of Therapy Intervention (days/wks) 7 days   Anticipated Discharge Disposition Transitional Care Facility   Risks and Benefits of Treatment have been explained. Yes   Patient, Family & other staff in agreement with plan of care Yes   [facility] TM \"6 Clicks\"   2016, Trustees of [facility], under license to [employer].  All rights reserved.   6 Clicks Short Forms Daily Activity Inpatient Short Form   [facility]  \"6 Clicks\" Daily Activity Inpatient Short Form   1. Putting on and taking off regular lower body clothing? 2 - A Lot   2. Bathing (including washing, rinsing, drying)? 2 - A Lot   3. Toileting, which includes using toilet, " "bedpan or urinal? 3 - A Little   4. Putting on and taking off regular upper body clothing? 4 - None   5. Taking care of personal grooming such as brushing teeth? 4 - None   6. Eating meals? 4 - None   Daily Activity Raw Score (Score out of 24.Lower scores equate to lower levels of function) 19   Total Evaluation Time   Total Evaluation Time (Minutes) 10[MM1.1]        Revision History        User Key Date/Time User Provider Type Action    > MM1.1 4/21/2018  3:11 PM Selina Glynn OT Occupational Therapist Sign            Progress Notes by Stephanie Riggs PT at 4/20/2018  3:42 PM     Author:  Stephanie Riggs PT Service:  (none) Author Type:  Physical Therapist    Filed:  4/20/2018  3:43 PM Date of Service:  4/20/2018  3:42 PM Creation Time:  4/20/2018  3:42 PM    Status:  Signed :  Stephanie Riggs PT (Physical Therapist)          04/20/18 1501   Quick Adds   Type of Visit Initial PT Evaluation   Living Environment   Lives With alone   Living Arrangements apartment   Home Accessibility tub/shower is not walk in   Number of Stairs to Enter Home 0   Number of Stairs Within Home 0   Transportation Available family or friend will provide  (\"My daughter is my .\")   Living Environment Comment Reports she has an elevated toilet seat, grab bars at commode and tub and a seat in the tub.    Self-Care   Dominant Hand right   Usual Activity Tolerance fair   Current Activity Tolerance fair   Regular Exercise yes   Activity/Exercise/Self-Care Comment Walks the halls in her building a few days a week.    Functional Level Prior   Ambulation 1-->assistive equipment   Transferring 0-->independent   Toileting 0-->independent   Bathing 0-->independent   Dressing 0-->independent   Eating 0-->independent   Communication 0-->understands/communicates without difficulty   Swallowing 0-->swallows foods/liquids without difficulty   Cognition 0 - no cognition issues reported   Fall history within last six months no   Which " of the above functional risks had a recent onset or change? none   General Information   Onset of Illness/Injury or Date of Surgery - Date 04/15/18   Referring Physician Zeb Byers PA-C   Patient/Family Goals Statement TCU   Precautions/Limitations abdominal precautions   Weight-Bearing Status - LUE full weight-bearing   Weight-Bearing Status - RUE full weight-bearing   Weight-Bearing Status - LLE full weight-bearing   Weight-Bearing Status - RLE full weight-bearing   Cognitive Status Examination   Orientation orientation to person, place and time   Level of Consciousness alert   Follows Commands and Answers Questions 100% of the time   Personal Safety and Judgment intact   Memory intact   Pain Assessment   Patient Currently in Pain No  (Did report discomfort due to distension. )   Integumentary/Edema   Integumentary/Edema Comments Bandage on right shin due to infection per patient.    Posture    Posture Forward head position   Range of Motion (ROM)   ROM Comment No deficits noted.    Strength   Strength Comments Grossly at least 4/5 but generalized weakness due to illness/surgery.    Bed Mobility   Bed Mobility Comments Supervision with sup to/from sit but doesn't follow abdominal precautions.    Transfer Skills   Transfer Comments Sit to stand with supervision.    Gait   Gait Comments Gait training with ww in room 10 feet with supervision.    Balance   Balance Comments Good stanidng balance with support of ww.    Sensory Examination   Sensory Perception no deficits were identified   General Therapy Interventions   Planned Therapy Interventions gait training   Clinical Impression   Criteria for Skilled Therapeutic Intervention yes, treatment indicated   PT Diagnosis Impaired functional endurance.    Influenced by the following impairments Generalized weakness   Functional limitations due to impairments Needs assist for amb   Clinical Presentation Stable/Uncomplicated   Clinical Decision Making (Complexity) Low  "complexity   Therapy Frequency` 5 times/week   Predicted Duration of Therapy Intervention (days/wks) 3 days   Anticipated Equipment Needs at Discharge (Patient already has a walker )   Anticipated Discharge Disposition Transitional Care Facility   Risk & Benefits of therapy have been explained Yes   Patient, Family & other staff in agreement with plan of care Yes   [facility] TM \"6 Clicks\"   2016, Trustees of [facility], under license to [employer].  All rights reserved.   6 Clicks Short Forms Basic Mobility Inpatient Short Form   [facility]  \"6 Clicks\" V.2 Basic Mobility Inpatient Short Form   1. Turning from your back to your side while in a flat bed without using bedrails? 3 - A Little   2. Moving from lying on your back to sitting on the side of a flat bed without using bedrails? 3 - A Little   3. Moving to and from a bed to a chair (including a wheelchair)? 3 - A Little   4. Standing up from a chair using your arms (e.g., wheelchair, or bedside chair)? 3 - A Little   5. To walk in hospital room? 3 - A Little   6. Climbing 3-5 steps with a railing? 3 - A Little   Basic Mobility Raw Score (Score out of 24.Lower scores equate to lower levels of function) 18   Total Evaluation Time   Total Evaluation Time (Minutes) 10[SL1.1]        Revision History        User Key Date/Time User Provider Type Action    > SL1.1 4/20/2018  3:43 PM Stephanie Riggs, PT Physical Therapist Sign                                                      INTERAGENCY TRANSFER FORM - LAB / IMAGING / EKG / EMG RESULTS   4/15/2018                       [place] [address] SURGICAL SPECIALITIES: [phone]            Unresulted Labs (24h ago through future)    Start       Ordered    04/18/18 0600  Platelet count  (enoxaparin (LOVENOX) (Weight  kg with CrCl greater than 30 mL/min is prechecked))  EVERY THREE DAYS,   Routine     Comments:  Repeat every 3 days while on VTE prophylaxis. If no result is " "listed, this lab has not been done the past 365 days. LATEST LAB RESULT: Platelet Count (10e9/L)       Date                     Value                 04/15/2018               377              ----------      04/15/18 2244    Unscheduled  Potassium  (Potassium Replacement - \"Standard\" - For K levels less than 3.4 mmol/L - UU,UR,UA,RH,SH,PH,WY )  CONDITIONAL (SPECIFY),   Routine     Comments:  Obtain Potassium Level for these conditions:  *IF no potassium result within 24 hours before initiation of order set, draw potassium level with next lab collect.    *2 HOURS AFTER last IV potassium replacement dose and 4 hours after an oral replacement dose.  *Next morning after potassium dose.     Repeat Potassium Replacement if necessary.    04/19/18 0943         Lab Results - 3 Days      Platelet count [[id number]]  Resulted: 04/21/18 0747, Result status: Final result    Ordering provider: Zeb Byers PA-C  04/21/18 0000 Resulting lab: [facility]    Specimen Information    Type Source Collected On   Blood  04/21/18 0725          Components       Value Reference Range Flag Lab   Platelet Count 241 150 - 450 10e9/L  FrStHsLb            Potassium [[id number]]  Resulted: 04/20/18 0526, Result status: Final result    Ordering provider: Jm Craig MD  04/19/18 2302 Resulting lab: [facility]    Specimen Information    Type Source Collected On   Blood  04/20/18 0505          Components       Value Reference Range Flag Lab   Potassium 3.7 3.4 - 5.3 mmol/L  FrStHsLb            Potassium [[id number]] (Abnormal)  Resulted: 04/19/18 2241, Result status: Final result    Ordering provider: Jm Craig MD  04/19/18 1904 Resulting lab: [facility]    Specimen Information    Type Source Collected On   Blood  04/19/18 2145          Components       Value Reference Range Flag Lab   Potassium 3.0 3.4 - 5.3 mmol/L L FrStHsLb            Basic metabolic panel [[id number]] (Abnormal)  " Resulted: 04/19/18 0855, Result status: Final result    Ordering provider: Zeb Byers PA-C  04/19/18 0000 Resulting lab: [facility]    Specimen Information    Type Source Collected On   Blood  04/19/18 0805          Components       Value Reference Range Flag Lab   Sodium 140 133 - 144 mmol/L  FrStHsLb   Potassium 2.8 3.4 - 5.3 mmol/L L FrStHsLb   Chloride 106 94 - 109 mmol/L  FrStHsLb   Carbon Dioxide 25 20 - 32 mmol/L  FrStHsLb   Anion Gap 9 3 - 14 mmol/L  FrStHsLb   Glucose 73 70 - 99 mg/dL  FrStHsLb   Urea Nitrogen 12 7 - 30 mg/dL  FrStHsLb   Creatinine 0.68 0.52 - 1.04 mg/dL  FrStHsLb   GFR Estimate 82 >60 mL/min/1.7m2  FrStHsLb   Comment:  Non  GFR Calc   GFR Estimate If Black >90 >60 mL/min/1.7m2  FrStHsLb   Comment:  African American GFR Calc   Calcium 7.9 8.5 - 10.1 mg/dL L FrStHsLb            CBC with platelets differential [[id number]] (Abnormal)  Resulted: 04/19/18 0838, Result status: Final result    Ordering provider: Zbe Byers PA-C  04/19/18 0000 Resulting lab: [facility]    Specimen Information    Type Source Collected On   Blood  04/19/18 0805          Components       Value Reference Range Flag Lab   WBC 8.0 4.0 - 11.0 10e9/L  FrStHsLb   RBC Count 3.88 3.8 - 5.2 10e12/L  FrStHsLb   Hemoglobin 11.3 11.7 - 15.7 g/dL L FrStHsLb   Hematocrit 34.9 35.0 - 47.0 % L FrStHsLb   MCV 90 78 - 100 fl  FrStHsLb   MCH 29.1 26.5 - 33.0 pg  FrStHsLb   MCHC 32.4 31.5 - 36.5 g/dL  FrStHsLb   RDW 13.2 10.0 - 15.0 %  FrStHsLb   Platelet Count 257 150 - 450 10e9/L  FrStHsLb   Diff Method Automated Method   FrStHsLb   % Neutrophils 83.1 %  FrStHsLb   % Lymphocytes 7.5 %  FrStHsLb   % Monocytes 7.2 %  FrStHsLb   % Eosinophils 1.5 %  FrStHsLb   % Basophils 0.5 %  FrStHsLb   % Immature Granulocytes 0.2 %  FrStHsLb   Nucleated RBCs 0 0 /100  FrStHsLb   Absolute Neutrophil 6.7 1.6 - 8.3 10e9/L  FrStHsLb   Absolute Lymphocytes 0.6 0.8 - 5.3 10e9/L L FrStHsLb    Absolute Monocytes 0.6 0.0 - 1.3 10e9/L  FrStHsLb   Absolute Eosinophils 0.1 0.0 - 0.7 10e9/L  FrStHsLb   Absolute Basophils 0.0 0.0 - 0.2 10e9/L  FrStHsLb   Abs Immature Granulocytes 0.0 0 - 0.4 10e9/L  FrStHsLb   Absolute Nucleated RBC 0.0   FrStHsLb            Testing Performed By     Lab - Abbreviation Name Director Address Valid Date Range    14 - FrStHsLb [facility] Unknown [address] [place] [address] 05/08/15 1057 - Present               Imaging Results - 3 Days      US Lower Extremity Venous Duplex Left [[id number]]  Resulted: 04/20/18 1801, Result status: Final result    Ordering provider: Jm Craig MD  04/20/18 1046 Resulted by: Samanta Silverman MD    Performed: 04/20/18 1107 - 04/20/18 1132 Resulting lab: RADIOLOGY RESULTS    Narrative:       VENOUS ULTRASOUND LEFT LOWER EXTREMITY  4/20/2018 11:32 AM     HISTORY: 87-year-old with left leg swelling, request made for  evaluation of DVT.    COMPARISON: October 6, 2017.    TECHNIQUE: Color Doppler and spectral waveform analysis performed  throughout the deep veins of the left lower extremity.    FINDINGS: The left common femoral, proximal greater saphenous,  femoral, and popliteal veins demonstrate normal blood flow,  compression, and augmentation. Posterior tibial and peroneal veins are  compressible. Contralateral right common femoral vein is patent.      Impression:       IMPRESSION: Negative for deep venous thrombosis in the left lower  extremity.    SAMANTA SILVERMAN MD      Testing Performed By     Lab - Abbreviation Name Director Address Valid Date Range    104 - Rad Rslts RADIOLOGY RESULTS Unknown Unknown 02/16/05 1553 - Present            Encounter-Level Documents:     There are no encounter-level documents.      Order-Level Documents:     There are no order-level documents.

## 2018-04-15 NOTE — IP AVS SNAPSHOT
"[place] [address] [facility]: [phone]                                              INTERAGENCY TRANSFER FORM - PHYSICIAN ORDERS   4/15/2018                    Hospital Admission Date: 4/15/2018  VANITA SHERIFF   : 1931  Sex: Female        Attending Provider: Jm Craig MD     Allergies:  Atenolol, Beta Adrenergic Blockers, Brimonidine, Cephalexin, Germall Plus, Latex, Morphine, No Clinical Screening - See Comments, Penicillins, Sulfa Drugs, Tramadol, Tylenol, Vicodin [Hydrocodone-acetaminophen], Fentanyl    Infection:  None   Service:  EMERGENCY ME    Ht:  1.651 m (5' 5\")   Wt:  60.5 kg (133 lb 4.8 oz)   Admission Wt:  54.9 kg (121 lb)    BMI:  22.18 kg/m 2   BSA:  1.67 m 2            Patient PCP Information     Provider PCP Type    Sharon Hurtado MD General      ED Clinical Impression     Diagnosis Description Comment Added By Time Added    Perforated bowel (H) [K63.1] Perforated bowel (H) [K63.1]  Tootie Redman 4/15/2018  3:39 PM    SBO (small bowel obstruction) [K56.609] SBO (small bowel obstruction) [K56.609]  Vitaliy Roca DO 4/15/2018  3:41 PM      Hospital Problems as of 2018              Priority Class Noted POA    Abdominal pain Medium  4/15/2018 Yes      Non-Hospital Problems as of 2018              Priority Class Noted    Chronic obstructive pulmonary disease, unspecified COPD type (H) Medium  2007    AAA (abdominal aortic aneurysm) (H) Medium  Unknown    Macular degeneration Medium  Unknown    Vertebral compression fracture (H) Medium  2011    Advanced directives, counseling/discussion High  2011    Osteoporosis Medium  8/3/2011    CARDIOVASCULAR SCREENING; LDL GOAL LESS THAN 160 Medium  10/10/2011    Benign essential hypertension Medium  10/10/2011    Hip fx: s/p IM- Kushal 12 Medium  2012    S/P cataract surgery: Recently . Medium  2012    Constipation Medium  2012    Edema Medium  2012 "    Knee fracture, right Medium  3/30/2013    Other postprocedural status(V45.89) Medium  7/12/2013    Glaucoma Medium  Unknown    Chronic dermatitis Medium  Unknown    Seasonal allergic rhinitis, unspecified allergic rhinitis trigger Medium  10/9/2016      Code Status History     Date Active Date Inactive Code Status Order ID Comments User Context    3/31/2013  6:16 PM 4/3/2013  6:33 PM Full Code [id number]  Rufino Kiser, SHELBIE Inpatient    3/30/2013  9:51 PM 3/31/2013  6:16 PM DNR [id number]  Zainab Quinn, SHELBIE Inpatient    7/2/2012  1:48 PM 7/5/2012  3:25 PM DNR [id number]  Mayank Morales MD Inpatient    12/7/2011  5:26 PM 7/2/2012  1:48 PM DNR [id number] See POLST  OK to intubate for a limited time. [facility]    9/14/2011  1:57 PM 12/7/2011  5:26 PM DNR/DNI [id number]  [facility]         Medication Review      START taking        Dose / Directions Comments    levofloxacin 500 MG tablet   Commonly known as:  LEVAQUIN   Indication:  Infection Within the Abdomen   Used for:  Perforated bowel (H)        Dose:  500 mg   Take 1 tablet (500 mg) by mouth every 24 hours   Quantity:  2 tablet   Refills:  0        metroNIDAZOLE 250 MG tablet   Commonly known as:  FLAGYL   Indication:  Infection Within the Abdomen   Used for:  Perforated bowel (H)        Dose:  250 mg   Take 1 tablet (250 mg) by mouth every 8 hours   Quantity:  6 tablet   Refills:  0        traMADol 50 MG tablet   Commonly known as:  ULTRAM   Used for:  Acute post-operative pain        Dose:  50 mg   Take 1 tablet (50 mg) by mouth every 6 hours as needed for moderate pain   Quantity:  20 tablet   Refills:  0          CONTINUE these medications which have NOT CHANGED        Dose / Directions Comments    albuterol 108 (90 Base) MCG/ACT Inhaler   Commonly known as:  PROAIR HFA/PROVENTIL HFA/VENTOLIN HFA        Inhale into the lungs as needed for shortness of breath / dyspnea or wheezing   Refills:  0        ascorbic acid 500 MG  tablet   Commonly known as:  VITAMIN C        Dose:  500 mg   Take 1 tablet by mouth daily.   Quantity:  100 tablet   Refills:  12        bimatoprost 0.01 % Soln   Commonly known as:  LUMIGAN        Dose:  1 drop   Place 1 drop into both eyes At Bedtime.   Refills:  0        BLINK TEARS OP        Apply to eye as needed   Refills:  0        calcium + D 600-200 MG-UNIT Tabs   Generic drug:  calcium carbonate-vitamin D        Dose:  1 tablet   Take 1 tablet by mouth every 12 hours.   Quantity:  100 tablet   Refills:  12        dorzolamide 2 % ophthalmic solution   Commonly known as:  TRUSOPT        Dose:  1 drop   Place 1 drop into both eyes 2 times daily   Refills:  0        losartan 50 MG tablet   Commonly known as:  COZAAR   Used for:  Benign essential hypertension        Dose:  50 mg   Take 1 tablet (50 mg) by mouth daily   Quantity:  90 tablet   Refills:  1        PRESERVISION AREDS 2 PO        Dose:  1 tablet   Take 1 tablet by mouth 2 times daily   Refills:  0        umeclidinium-vilanterol 62.5-25 MCG/INH oral inhaler   Commonly known as:  ANORO ELLIPTA   Used for:  Chronic obstructive pulmonary disease, unspecified COPD type (H)        Dose:  1 puff   Inhale 1 puff into the lungs daily   Quantity:  1 Inhaler   Refills:  11        vitamin D 2000 units tablet   Used for:  Osteoporosis        Dose:  1 tablet   Take 1 tablet by mouth daily.   Refills:  0          STOP taking     FLAX SEEDS PO           GLUCOSAMINE SULFATE PO                     Further instructions from your care team       [place]  [address] [place], [place] [address] [phone]    After Care     Activity       Avoid heavy lifting (over 20 lbs) and strenuous physical activity for 3 weeks.  Walking is encouraged.       Diet       May resume regular balanced diet as tolerated.       Discharge Instructions       It is ok to shower, but avoid submersing the incision for 2 weeks from surgery.  You may wear the abdominal binder for  comfort.  Walk several times a day.             Your next 10 appointments already scheduled     May 02, 2018  9:15 AM CDT   (Arrive by 9:00 AM)   US ABDOMINAL AORTIC IMAGING with SHVUS2   [facility] ([facility])    [address]  [place] [address]   [phone]           Please bring a list of your medicines (including vitamins, minerals and over-the-counter drugs). Also, tell your doctor about any allergies you may have. Wear comfortable clothes and leave your valuables at home.  Adults: No eating or drinking for 8 hours before the exam. You may take medicine with a small sip of water.  Children: - Children 6+ years: No food or drink for 6 hours before exam. - Children 1-5 years: No food or drink for 4 hours before exam. - Infants, breast-fed: may have breast milk up to 2 hours before exam. - Infants, formula: may have bottle until 4 hours before exam.  Please call the Imaging Department at your exam site with any questions.            May 02, 2018 10:00 AM CDT   Return Visit with Lv Boogie MD   [facility] ([facility])    [address]  [place] [id number] [phone]              Follow-Up Appointment Instructions     Future Labs/Procedures    Follow-up and recommended labs and tests      Comments:    Follow up with Dr. Craig at [facility] in about 2 weeks.  Call [phone] to schedule an appointment.      Follow-Up Appointment Instructions     Follow-up and recommended labs and tests        Follow up with Dr. Craig at [facility] in about 2 weeks.  Call [phone] to schedule an appointment.             Statement of Approval     Ordered          04/22/18 0735  I have reviewed and agree with all the recommendations and orders detailed in this document.  EFFECTIVE NOW     Approved and electronically signed by:  Jessenia  Yasmeen Alves PA-C           04/20/18 1005  I have reviewed and agree with all the recommendations and orders detailed in this document.  EFFECTIVE NOW     Approved and electronically signed by:  Zeb Byers PA-C

## 2018-04-15 NOTE — ED PROVIDER NOTES
"  History     Chief Complaint:  abdominal pain     HPI   Jamilah Rodriguez is a 87 year old female with a history of AAA, cervical cancer many years s/p hysterectomy, and COPD who presents for evaluation of abdominal pain. The patient reports onset last night around 1845 (about 18 hours prior to evaluation) of intermittent subxiphoid chest/epigastric pain radiating through to her back which waxes and wanes in intensity, currently at 5/10. She initially attributed this to gas/reflux pains, so she drank some 7-Up. She has since has 4-5 episodes of nausea with \"brown\" emesis, which she attributed to drinking coffee this morning, as well as abdominal distension. No dark/coffee ground emesis or haroon hematemesis. She has been constipated with last about 3 days ago, but has been passing flatus. The patient denies any fevers, dyspnea, cough, urinary symptoms, or any other acute symptoms. She is due for her two year follow up abdominal ultrasound next month. No history of SBO or CHF.  Denies significant NSAID use.      Allergies:  Atenolol  Beta adrenergic blockers  Brimonidine  Cephalexin  Germall plus  Latex  Morphine  Penicillins  Sulfa  Tramadol  Tylenol  Vicodin  Fentanyl       Medications:    albuterol   umeclidinium-vilanterol inhaler  ipratropium - albuterol   losartan   Glucosamine     Past Medical History:    COPD  Peripheral artery disease  Abdominal aortic aneurysm, 3.8cm in 5/2016, monitored q2 years.  Cervical cancer  Chronic dermatitis  Glaucoma  Hypertension  Macular degeneration  Osteoporosis  Vertebral compression fracture  Uterine fibroids    Past Surgical History:    LAURA  ORIF right hip  ORIF wrist  ORIF tibial plateau, 2013     Family History:    Meningioma  CAD  Diabetes  Ca, breast    Social History:  Former smoker, approximately 50packyear history, quit 2009  Non-drinker.  Marital Status:   [4]  Here with daughter       Review of Systems   Constitutional: Negative for appetite change, chills " "and fever.   Respiratory: Negative.    Cardiovascular: Negative.    Gastrointestinal: Positive for abdominal distention, abdominal pain, constipation, nausea and vomiting. Negative for diarrhea.   Genitourinary: Negative.    Musculoskeletal: Positive for back pain (resolved).   Skin: Positive for wound (chronic).   All other systems reviewed and are negative.      Physical Exam     Patient Vitals for the past 24 hrs:   BP Temp Temp src Pulse Resp SpO2 Height Weight   04/15/18 1326 123/80 98  F (36.7  C) Oral 104 18 96 % 1.651 m (5' 5\") 54.9 kg (121 lb)        Physical Exam  General: Alert and cooperative with exam. Patient in moderate distress. Normal mentation. Frail appearing.  Head:  Scalp is NC/AT  Eyes:  No scleral icterus, PERRL  ENT:  The external nose and ears are normal. The oropharynx is normal and without erythema; mucus membranes are moist. Uvula midline, no evidence of deep space infection.  Neck:  Normal range of motion without rigidity.  CV:  Regular rate and rhythm on exam  Resp:  Breath sounds are clear bilaterally    Non-labored, no retractions or accessory muscle use  GI:  Abdomen is soft. Diffuse tenderness greatest in the epigastric area.  Distended and tympanitic abdomen. No peritoneal signs  MS:  No lower extremity edema.  Chronic lower extremity ulceration without evidence of infection  Skin:  Warm and dry, No rash or lesions noted.  Neuro: Oriented x 3. No gross motor deficits.        Emergency Department Course   Imaging:  Radiographic findings were communicated with the patient and family who voiced understanding of the findings.  CT Abdomen/Pelvis w/ contrast:    1. Free intraperitoneal air consistent with perforated viscus.  2. Diffuse mild dilatation of fluid and gas filled small bowel loops. Gas and stool remain in the colon and the findings are suspicious for early/partial distal small bowel obstruction.  3. Small amount of upper abdominal ascites.  4. Distal abdominal aortic " aneurysm, increased in size since ultrasound of 5/25/2016.  5. Small bilateral inguinal hernias.  6. Chronic appearing compression deformity L1 and L5.  [Critical Result: Free intraperitoneal air.] Finding was identified on 4/15/2018 3:25 PM.  Results per Radiology.     Laboratory:  Laboratory findings were communicated with the patient and family who voiced understanding of the findings.  CBC w/diff: WBC 13.0 (H), ANC 10.9 (H), o/w WNL (Hgb 13.6, Plt 377)  CMP: Glu 133 (H), eGFR 54 (L), o/w WNL (Cr 0.97)  Lipase: 289 (WNL)    Interventions:  1402: Normal Saline 0.9% 1L, IV   1405: ondansetron 4mg, IV   1524: Normal Saline 0.9% 1L, IV   1552: Metronidazole 500mg, IV   Ciprofloxacin 400mg, IV -- ordered and pending     Emergency Department Course:  Past medical records, nursing notes, and vitals reviewed.   1349: I performed an exam of the patient as documented above.    Peripheral IV access established. Blood drawn and sent. The above imaging study and labs were obtained. Results above.  The patient was given the above interventions with improvement.  1512: I rechecked patient.  Findings and plan explained to the Patient and daughter who consents to admission.     1529: Discussed the case with Dr. Raymond of Radiology. Verbal report of CT showing SBO with free intraperitoneal air and concern for perforated viscus.    1530: I rechecked patient. Updated on findings and plan. The patient kept NPO. NG tube not placed.    1533:  Discussed the case with Dr. Craig of General Surgery, who will take patient directly to the OR from the ED.       Impression & Plan    Medical Decision Making:   Jamilah Rodriguez is a 87 year old female who presents with vomiting and abdominal distension/pain. The patient's medical history and records were reviewed. Initial consideration for, but not limited to, SBO, biliary pathology, MSK pain, infectious process, bowel perforation, among others. Labs and imaging were obtained. Labs notable  for mild elevation of white count (13.0). The patient provided Zofran for emesis as well as IV fluids. CT of the abdomen/pelvis obtained and demonstrated evidence of developing small bowel obstruction as well as pneumoperitoneum concerning for perforated bowel. Dr. Craig of General Surgery was consulted and will take patient directly to the OR from the ED. She remained stable throughout ED course. She was provided Cipro and Flagyl for antibiotic prophylaxis secondary to multiple antibiotic allergies.       Diagnosis:    ICD-10-CM    1. Perforated bowel (H) K63.1    2. SBO (small bowel obstruction) K56.609        Disposition:   Admission    Scribe Disclosure:  Albin VANEGAS, am serving as a scribe at 1:38 PM on 4/15/2018 to document services personally performed by Vitaliy Roca DO based on my observations and the provider's statements to me.    Albin Jefferson  4/15/2018   EMERGENCY DEPARTMENT      Vitaliy Roca DO  04/15/18 7110

## 2018-04-15 NOTE — IP AVS SNAPSHOT
` [place] [id number] [facility]: [phone]                 INTERAGENCY TRANSFER FORM - NOTES (H&P, Discharge Summary, Consults, Procedures, Therapies)   4/15/2018                    Hospital Admission Date: 4/15/2018  VANITA SHERIFF   : 1931  Sex: Female        Patient PCP Information     Provider PCP Type    Sharon Hurtado MD General      History & Physicals     No notes of this type exist for this encounter.      Discharge Summaries     No notes of this type exist for this encounter.         Consult Notes      Consults by Jm Craig MD at 4/15/2018  8:06 PM     Author:  Jm Craig MD Service:  Surgery Author Type:  Physician    Filed:  4/15/2018  8:06 PM Date of Service:  4/15/2018  8:06 PM Creation Time:  4/15/2018  7:54 PM    Status:  Signed :  Jm Craig MD (Physician)         General Surgery Consultation     Vanita Sheriff MRN# [id number]   YOB: 1931 Age: 87 year old   Date of Admission: 4/15/2018     Reason for consult: I was asked by Dr. Roca to evaluate this patient for free intraabdominal air.           Assessment and Plan:   Free intraabdominal air and small bowel obstruction like clinical and radiographic picture.     To the Operating Room for abdominal exploration  Discussed with patient and daughter the rationale for the procedure; the risk, benefits, hopeful outcomes and possible complications were explained, they understand and she will like to proceed.  ABX and fluid has been provided.          Chief Complaint:   Abdominal pain and free intraabdominal air.    History is obtained from the patient, electronic health record and emergency department physician         History of Present Illness:   This patient is a 87 year old female who presents with nausea, vomiting and abdominal pain since yesterday afternoon.  Had BM yesterday and had been passing flatus, pain that began yesterday progressively got worse, upon ED evaluation  she was found to have dilated small bowel and free intraabdominal air.            Past Medical History:[LL1.1]     Past Medical History:   Diagnosis Date     AAA (abdominal aortic aneurysm) (H)     Followed with yearly ultrasound     Cervical cancer (H) 1969     Chronic dermatitis     extensive allergy testing revealed allergy/sensitivity to carba mix ( rubber) and Imidazolidinyl Urea (common in beauty products)     Glaucoma      Glaucoma      History of COPD     very mild abnormal spirometry in 2007 (in [place]), has not been active since quitting smoking in 2009     HTN (hypertension)      Macular degeneration      Osteoporosis 8/3/11    femoral T score -3.4 & -3.7     PAD (peripheral artery disease) (H)      Vertebral compression fracture (H) 5/17/11    L1 compression fracture, presumed osteoporotic compression fracture[LL1.2]             Past Surgical History:[LL1.1]     Past Surgical History:   Procedure Laterality Date     CATARACT IOL, RT/LT  2/8/12    left eye cataract removal     HYSTERECTOMY TOTAL ABDOMINAL  1999    Fibroids     OPEN REDUCTION INTERNAL FIXATION HIP NAILING  7/2/2012    Procedure: OPEN REDUCTION INTERNAL FIXATION HIP NAILING;  Intramedullary Fixation Right Intertrochanteric Hip Fracture;  Surgeon: Chalino Covarrubias MD;  Location:  OR     OPEN REDUCTION INTERNAL FIXATION TIBIAL PLATEAU  3/31/2013    Procedure: OPEN REDUCTION INTERNAL FIXATION TIBIAL PLATEAU;  RIGHT LATERAL TIBIAL PLATEAU FRACTURE - SYNTHES Medial Meniscus Repair, Lateral Compartment Release;  Surgeon: Alfred Cardenas MD;  Location:  OR     OPEN REDUCTION INTERNAL FIXATION WRIST  1988    left wrist ORIF, hardware removed onemonth later[LL1.2]                Social History:   I have reviewed this patient's social history          Family History:   I have reviewed this patient's family history          Immunizations:[LL1.1]     Immunization History   Administered Date(s) Administered     Influenza (High Dose) 3 valent vaccine  10/23/2017     Influenza (IIV3) PF 10/13/2010, 10/04/2011, 08/01/2012, 09/23/2013, 10/01/2014     Pneumococcal 23 valent 01/01/2006     TD (ADULT, 7+) 07/28/2011[LL1.2]             Allergies:[LL1.1]     Allergies   Allergen Reactions     Atenolol Hives     Beta Adrenergic Blockers      Brimonidine      Cephalexin Hives     Germall Plus Itching     Imidazolidinyl Urea found in many topical creams and house hold products     Latex      Itching     Morphine GI Disturbance     No Clinical Screening - See Comments Itching     Carba Mix; IMIDAZOLIDINYL UREA - Allergic to this ingredient, which is found in many common topical moisturizers and household products.     Penicillins Hives     Sulfa Drugs GI Disturbance     Tramadol Nausea and Vomiting     Tylenol GI Disturbance     Vicodin [Hydrocodone-Acetaminophen]      Fentanyl Nausea[LL1.2]             Medications:[LL1.1]     Prescriptions Prior to Admission   Medication Sig Dispense Refill Last Dose     Multiple Vitamins-Minerals (PRESERVISION AREDS 2 PO) Take 1 tablet by mouth 2 times daily   4/14/2018 at pm     umeclidinium-vilanterol (ANORO ELLIPTA) 62.5-25 MCG/INH oral inhaler Inhale 1 puff into the lungs daily 1 Inhaler 11 4/15/2018 at am     losartan (COZAAR) 50 MG tablet Take 1 tablet (50 mg) by mouth daily 90 tablet 1 4/15/2018 at am     GLUCOSAMINE SULFATE PO Take 500 mg by mouth daily   4/15/2018 at am     Flaxseed, Linseed, (FLAX SEEDS PO) Take 1 Tablespoonful by mouth daily (with breakfast) With grapefruit juice   4/15/2018 at am     dorzolamide (TRUSOPT) 2 % ophthalmic solution Place 1 drop into both eyes 2 times daily   4/15/2018 at am     Cholecalciferol (VITAMIN D) 2000 UNITS tablet Take 1 tablet by mouth daily.   4/14/2018 at am     bimatoprost (LUMIGAN) 0.01 % SOLN Place 1 drop into both eyes At Bedtime.     4/14/2018 at hs     Calcium Carbonate-Vitamin D (CALCIUM + D) 600-200 MG-UNIT per tablet Take 1 tablet by mouth every 12 hours. 100 tablet 12  4/14/2018 at pm     ascorbic acid (VITAMIN C) 500 MG tablet Take 1 tablet by mouth daily. 100 tablet 12 4/14/2018 at am[LL1.2]             Review of Systems:   The 5 point Review of Systems is negative other than noted in the HPI           Physical Exam:   Vitals were reviewed  Constitutional:   awake, alert, cooperative, moderate distress, appears stated age and thin     Eyes:   sclera clear     Neck:   supple, symmetrical, trachea midline     Lungs:   Mild respiratory distress, good air exchange and no retractions     Abdomen:   scars noted large midline scar, soft, distended, rebound tenderness present, involuntary guarding present and hernia present in the right groin reducible     Musculoskeletal:   tone is normal     Skin:   Thin but otherwise normal skin color, texture, turgor             Data:   All laboratory and imaging data in the past 24 hours reviewed[LL1.1]           Revision History        User Key Date/Time User Provider Type Action    > LL1.2 4/15/2018  8:06 PM Jm Craig MD Physician Sign     LL1.1 4/15/2018  7:54 PM Jm Craig MD Physician                      Progress Notes - Physician (Notes from 04/19/18 through 04/22/18)      Progress Notes by Yasmeen Ruelas PA-C at 4/22/2018  7:23 AM     Author:  Yasmeen Ruelas PA-C Service:  Surgery Author Type:  Physician Assistant - ALIX    Filed:  4/22/2018  7:35 AM Date of Service:  4/22/2018  7:23 AM Creation Time:  4/22/2018  7:23 AM    Status:  Signed :  Yasmeen Ruelas PA-C (Physician Assistant - ALIX)         General Surgery Progress Note    Admission Date: 4/15/2018  Today's Date: 4/22/2018         Assessment:      Jamilah Rodriguez is an 87 year old female s/p emergent laparoscopy and laparotomy on for abdominal pain and free air  POD[LF1.1] 7[LF1.2]         Plan:   - Discharge to TCU today[LF1.1] if bed available/confirmed[LF1.2]  - Discharge instructions reviewed with patient, questions answered  -  "Continue with leg elevation,  - Regular diet + Ensure shakes  - Potassium stable yesterday  - No further antibiotics        Interval History:   Afebrile overnight, VSS on room air.[LF1.1] Patient feels better today, thinks the swelling in her leg has improved. Continues to tolerate diet, minimal appetite but slightly improving. No shortness of breath, using IS. Some chest tightness at times when she is due for her inhaler. Rarely has a few seconds of nausea that passes spontaneously. Continues to have bowel function.[LF1.2]          Physical Exam:[LF1.1]   /84 (BP Location: Left arm)  Pulse 88  Temp 97.2  F (36.2  C) (Oral)  Resp 18  Ht 1.651 m (5' 5\")  Wt 59.5 kg (131 lb 2.8 oz)  SpO2 95%  BMI 21.83 kg/m2  I/O last 3 completed shifts:  In: -   Out: 575 [Urine:575][LF1.3]  General: NAD, pleasant, alert and oriented[LF1.1]  Cardiovascular: regular rate and rhythm; S1 and S2 distinct without murmur   Respiratory: lungs clear to auscultation bilaterally without wheezes, rales or rhonchi[LF1.2]   Abdomen: soft,[LF1.1] minimally[LF1.2] tender,[LF1.1] somewhat distended and tympanic, + bowel sounds[LF1.2]  Incision: clean, dry, and intact[LF1.1]  Extremities: left LE edema improved, scant edema today; no calf tenderness[LF1.2]    LABS:[LF1.1]  Recent Labs   Lab Test  04/21/18   0725  04/19/18   0805  04/18/18   0755  04/16/18   0744  04/15/18   1402   WBC   --   8.0   --   11.6*  13.0*   HGB   --   11.3*   --   10.2*  13.6   MCV   --   90   --   88  87   PLT  241  257  220  225  377      Recent Labs   Lab Test  04/20/18   0505  04/19/18   2145  04/19/18   0805  04/16/18   0744  04/15/18   1402   POTASSIUM  3.7  3.0*  2.8*  3.9  3.6   CHLORIDE   --    --   106  105  96   CO2   --    --   25  24  26   BUN   --    --   12  15  22   CR   --    --   0.68  0.81  0.97   ANIONGAP   --    --   9  8  12[LF1.3]                 Yasmeen Ruelas PA-C  [facility]: [phone]  Pager: [email or web address] " (7am-4pm)[LF1.1]           Revision History        User Key Date/Time User Provider Type Action    > LF1.2 4/22/2018  7:35 AM Yasmeen Ruelas PA-C Physician Assistant Amisha THOMSON Sign     LF1.3 4/22/2018  7:24 AM Yasmeen Ruelas PA-C Physician Assistant Amisha THOMSON      LF1.1 4/22/2018  7:23 AM Yasmeen Ruelas PA-C Physician Assistant Amisha THOMSON             Progress Notes by Selina Glynn OT at 4/21/2018  3:11 PM     Author:  Selina Glynn OT Service:  (none) Author Type:  Occupational Therapist    Filed:  4/21/2018  3:11 PM Date of Service:  4/21/2018  3:11 PM Creation Time:  4/21/2018  3:11 PM    Status:  Signed :  Selina Glynn OT (Occupational Therapist)          04/21/18 1507   Quick Adds   Type of Visit Initial Occupational Therapy Evaluation   Living Environment   Lives With alone   Living Arrangements apartment   Home Accessibility tub/shower is not walk in;grab bars present (bathtub);grab bars present (toilet)   Transportation Available car;family or friend will provide  (Pt no longer drives. Family provides as needed)   Self-Care   Usual Activity Tolerance good   Current Activity Tolerance moderate   Equipment Currently Used at Home grab bar;walker, rolling;raised toilet   Functional Level Prior   Ambulation 1-->assistive equipment   Transferring 1-->assistive equipment   Toileting 1-->assistive equipment   Bathing 1-->assistive equipment   Dressing 0-->independent   Eating 0-->independent   Communication 0-->understands/communicates without difficulty   Swallowing 0-->swallows foods/liquids without difficulty   Cognition 0 - no cognition issues reported   Fall history within last six months no   General Information   Onset of Illness/Injury or Date of Surgery - Date 04/15/18   Referring Physician Zeb Byers PA-C   Patient/Family Goals Statement Pt plans to discharge to TCU tomorrow or the next day   Additional Occupational Profile Info/Pertinent History of Current Problem  Seen POD #6 of laparotomy   Precautions/Limitations fall precautions;abdominal precautions   Cognitive Status Examination   Orientation orientation to person, place and time   Level of Consciousness alert   Able to Follow Commands WNL/WFL   Personal Safety (Cognitive) WNL/WFL   Memory intact   Visual Perception   Visual Perception Wears glasses  (for reading only)   Pain Assessment   Patient Currently in Pain Yes, see Vital Sign flowsheet  (minimal)   Range of Motion (ROM)   ROM Comment B UE WNL   Strength   Strength Comments B UE not tested due to abdominal precautions.    Mobility   Bed Mobility Bed mobility skill: Rolling/Turning;Bed mobility skill: Scooting/Bridging;Bed mobility skill: Sit to supine;Bed mobility skill: Supine to sit;Bed mobility analysis   Bed Mobility Skill: Rolling/Turning   Level of [place] - Bed Mobility Skill Rolling Turning stand-by assist   Bed Mobility Skill: Scooting/Bridging   Level of [place]: Scooting/Bridging stand-by assist   Bed Mobility Skill: Sit to Supine   Level of [place]: Sit/Supine stand-by assist   Bed Mobility Skill: Supine to Sit   Level of [place]: Supine/Sit stand-by assist   Bed Mobility Analysis   Impairments Contributing to Impaired Bed Mobility pain   Transfer Skills   Transfer Transfer Safety Analysis Bed/Chair;Transfer Skill: Stand to Sit;Transfer Safety Analysis Sit/Stand   Transfer Skill: Bed to Chair/Chair to Bed   Level of [place]: Bed to Chair stand-by assist   Assistive Device - Transfer Skill Bed to Chair Chair to Bed Rehab Eval standard walker   Transfer Safety Analysis Bed/Chair   Impairments Contributing to Impaired Transfers pain   Transfer Skill: Sit to Stand   Level of [place]: Sit/Stand stand-by assist   Assistive Device for Transfer: Sit/Stand standard walker   Transfer Safety Analysis Sit/Stand   Impaired Transfers: Sit/Stand pain   Transfer Skill: Toilet Transfer   Level of [place]: Toilet stand-by assist  "  Assistive Device grab bars;standard walker   Lower Body Dressing   Level of [place]: Dress Lower Body moderate assist (50% patients effort)   Toileting   Level of [place]: Toilet stand-by assist   Grooming   Level of [place]: Grooming stand-by assist   Eating/Self Feeding   Level of [place]: Eating independent   Instrumental Activities of Daily Living (IADL)   Previous Responsibilities meal prep;housekeeping;laundry;medication management;finances   Activities of Daily Living Analysis   Impairments Contributing to Impaired Activities of Daily Living pain;post surgical precautions   General Therapy Interventions   Planned Therapy Interventions ADL retraining;transfer training   Clinical Impression   Criteria for Skilled Therapeutic Interventions Met yes, treatment indicated   OT Diagnosis Decreased I with ADLs   Influenced by the following impairments Pain; Abdominal precautions; Decreased activity tolerance   Assessment of Occupational Performance 3-5 Performance Deficits   Identified Performance Deficits Dressing; Toileting; Bathing; IADLs   Clinical Decision Making (Complexity) Moderate complexity   Therapy Frequency 3 times/wk   Predicted Duration of Therapy Intervention (days/wks) 7 days   Anticipated Discharge Disposition Transitional Care Facility   Risks and Benefits of Treatment have been explained. Yes   Patient, Family & other staff in agreement with plan of care Yes   [facility] TM \"6 Clicks\"   2016, Trustees of [facility], under license to [employer].  All rights reserved.   6 Clicks Short Forms Daily Activity Inpatient Short Form   [facility]  \"6 Clicks\" Daily Activity Inpatient Short Form   1. Putting on and taking off regular lower body clothing? 2 - A Lot   2. Bathing (including washing, rinsing, drying)? 2 - A Lot   3. Toileting, which includes using toilet, bedpan or urinal? 3 - A Little   4. Putting on and taking off regular upper body " clothing? 4 - None   5. Taking care of personal grooming such as brushing teeth? 4 - None   6. Eating meals? 4 - None   Daily Activity Raw Score (Score out of 24.Lower scores equate to lower levels of function) 19   Total Evaluation Time   Total Evaluation Time (Minutes) 10[MM1.1]        Revision History        User Key Date/Time User Provider Type Action    > MM1.1 4/21/2018  3:11 PM Selina Glynn OT Occupational Therapist Sign            Progress Notes by Yasmeen Ruelas PA-C at 4/21/2018  7:47 AM     Author:  Yasmeen Ruelas PA-C Service:  Surgery Author Type:  Physician Assistant - C    Filed:  4/21/2018  8:03 AM Date of Service:  4/21/2018  7:47 AM Creation Time:  4/21/2018  7:47 AM    Status:  Signed :  Yasmeen Ruelas PA-C (Physician Assistant - C)         General Surgery Progress Note    Admission Date: 4/15/2018  Today's Date: 4/21/2018         Assessment:      Jamilah Rodriguez is an 87 year old female s/p emergent laparoscopy and laparotomy on for abdominal pain and free air  POD 6          Plan:   -[LF1.1] Continue current cares. Likely ready for discharge tomorrow[LF1.2]  - Appreciate PT/OT assistance[LF1.1]  - Keep legs elevated, cannot use compression stockings due to latex allergy  - PCDs and lovenox for dvt ppx[LF1.2]  - Regular diet + Ensure shakes, encourage potassium-rich foods[LF1.1]  - Minimal pain, not taking anything  - On levo/flagyl for intra-abdominal infection, likely will not need any more at discharge    Dispo: likely to TCU tomorrow[LF1.2]        Interval History:   Afebrile overnight, VSS on room air[LF1.1] except for a couple readings of slight hypertension. Patient did have an episode of dyspnea on exertion this morning, O2 sats remained 96 on room air, she was provided O2 for comfort for a bit.[LF1.2] Patient[LF1.1] now[LF1.2] feeling well,[LF1.1] mainly bothered by left LE swelling[LF1.2].[LF1.1] Venous ultrasound yesterday negative for  "DVT.[LF1.2] Continues to pass gas and have BMs. Tolerating diet without nausea[LF1.1] or pain. Minimal appetite.[LF1.2]          Physical Exam:   BP (!) 154/95 (BP Location: Left arm)  Pulse 85  Temp 98.6  F (37  C) (Oral)  Resp 15  Ht 1.651 m (5' 5\")  Wt 59.5 kg (131 lb 2.8 oz)  SpO2 94%  BMI 21.83 kg/m2  I/O last 3 completed shifts:  In: 900 [P.O.:900]  Out: 875 [Urine:875]  General: NAD, pleasant, alert and oriented  Abdomen:[LF1.1] distended but soft, appropriately tender to palpation mainly on right side, otherwise nontender. + bowel sounds[LF1.2]  Incision[LF1.1]s[LF1.2]: clean, dry, and intact[LF1.1], healing well without erythema or drainage  Extremities: 1+ left LE edema, no calf tenderness[LF1.2]    LABS:    Recent Labs   Lab Test  12/21/16   1000   PROTEIN  Trace*         Yasmeen Ruelas PA-C  [facility]: [phone]  Pager: [email or web address] (7am-4pm)[LF1.1]           Revision History        User Key Date/Time User Provider Type Action    > LF1.2 4/21/2018  8:03 AM Yasmeen Ruelas PA-C Physician Assistant - C Sign     LF1.1 4/21/2018  7:47 AM Yasmeen Ruelas PA-C Physician Assistant - C             Progress Notes by eBnny Lima at 4/20/2018  3:51 PM     Author:  Benny Lima Service:  Spiritual Health Author Type:      Filed:  4/20/2018  3:56 PM Date of Service:  4/20/2018  3:51 PM Creation Time:  4/20/2018  3:51 PM    Status:  Signed :  Benny Lima ()         SPIRITUAL HEALTH SERVICES Progress Note  [facility]    SH visited pt on LOS. Pt would like to go back to her independent living but is OK going to a TCU. Pt reflected on her \"good life\" and said that she takes \"one day at a time\". Pt reports that her daughter is \"the wind beneath [her] wings\". Pt says she has good friends at her complex and her [employer] ([employer] esther is very important to her. Pt reports that she prays thanks every day.  provided reflective listening and support. Pt " "welcomed prayer. No plans to follow but SH remains available by request.    Gurinder Dsouza Resident  [phone] Pager[LW1.1]     Revision History        User Key Date/Time User Provider Type Action    > LW1.1 4/20/2018  3:56 PM Benny Lima Sign            Progress Notes by Stephanie Riggs PT at 4/20/2018  3:42 PM     Author:  Stephanie Riggs PT Service:  (none) Author Type:  Physical Therapist    Filed:  4/20/2018  3:43 PM Date of Service:  4/20/2018  3:42 PM Creation Time:  4/20/2018  3:42 PM    Status:  Signed :  Stephanie Riggs PT (Physical Therapist)          04/20/18 1501   Quick Adds   Type of Visit Initial PT Evaluation   Living Environment   Lives With alone   Living Arrangements apartment   Home Accessibility tub/shower is not walk in   Number of Stairs to Enter Home 0   Number of Stairs Within Home 0   Transportation Available family or friend will provide  (\"My daughter is my kasia.\")   Living Environment Comment Reports she has an elevated toilet seat, grab bars at commode and tub and a seat in the tub.    Self-Care   Dominant Hand right   Usual Activity Tolerance fair   Current Activity Tolerance fair   Regular Exercise yes   Activity/Exercise/Self-Care Comment Walks the halls in her building a few days a week.    Functional Level Prior   Ambulation 1-->assistive equipment   Transferring 0-->independent   Toileting 0-->independent   Bathing 0-->independent   Dressing 0-->independent   Eating 0-->independent   Communication 0-->understands/communicates without difficulty   Swallowing 0-->swallows foods/liquids without difficulty   Cognition 0 - no cognition issues reported   Fall history within last six months no   Which of the above functional risks had a recent onset or change? none   General Information   Onset of Illness/Injury or Date of Surgery - Date 04/15/18   Referring Physician Zeb Byers PA-C   Patient/Family Goals Statement TCU "   Precautions/Limitations abdominal precautions   Weight-Bearing Status - LUE full weight-bearing   Weight-Bearing Status - RUE full weight-bearing   Weight-Bearing Status - LLE full weight-bearing   Weight-Bearing Status - RLE full weight-bearing   Cognitive Status Examination   Orientation orientation to person, place and time   Level of Consciousness alert   Follows Commands and Answers Questions 100% of the time   Personal Safety and Judgment intact   Memory intact   Pain Assessment   Patient Currently in Pain No  (Did report discomfort due to distension. )   Integumentary/Edema   Integumentary/Edema Comments Bandage on right shin due to infection per patient.    Posture    Posture Forward head position   Range of Motion (ROM)   ROM Comment No deficits noted.    Strength   Strength Comments Grossly at least 4/5 but generalized weakness due to illness/surgery.    Bed Mobility   Bed Mobility Comments Supervision with sup to/from sit but doesn't follow abdominal precautions.    Transfer Skills   Transfer Comments Sit to stand with supervision.    Gait   Gait Comments Gait training with ww in room 10 feet with supervision.    Balance   Balance Comments Good stanidng balance with support of ww.    Sensory Examination   Sensory Perception no deficits were identified   General Therapy Interventions   Planned Therapy Interventions gait training   Clinical Impression   Criteria for Skilled Therapeutic Intervention yes, treatment indicated   PT Diagnosis Impaired functional endurance.    Influenced by the following impairments Generalized weakness   Functional limitations due to impairments Needs assist for amb   Clinical Presentation Stable/Uncomplicated   Clinical Decision Making (Complexity) Low complexity   Therapy Frequency` 5 times/week   Predicted Duration of Therapy Intervention (days/wks) 3 days   Anticipated Equipment Needs at Discharge (Patient already has a walker )   Anticipated Discharge Disposition  "Transitional Care Facility   Risk & Benefits of therapy have been explained Yes   Patient, Family & other staff in agreement with plan of care Yes   [facility] AM-PAC TM \"6 Clicks\"   2016, Trustees of [facility], under license to [employer].  All rights reserved.   6 Clicks Short Forms Basic Mobility Inpatient Short Form   [facility] AM-[facility]AC  \"6 Clicks\" V.2 Basic Mobility Inpatient Short Form   1. Turning from your back to your side while in a flat bed without using bedrails? 3 - A Little   2. Moving from lying on your back to sitting on the side of a flat bed without using bedrails? 3 - A Little   3. Moving to and from a bed to a chair (including a wheelchair)? 3 - A Little   4. Standing up from a chair using your arms (e.g., wheelchair, or bedside chair)? 3 - A Little   5. To walk in hospital room? 3 - A Little   6. Climbing 3-5 steps with a railing? 3 - A Little   Basic Mobility Raw Score (Score out of 24.Lower scores equate to lower levels of function) 18   Total Evaluation Time   Total Evaluation Time (Minutes) 10[SL1.1]        Revision History        User Key Date/Time User Provider Type Action    > SL1.1 4/20/2018  3:43 PM Stephanie Riggs, PT Physical Therapist Sign            Progress Notes by Tere Clark LSW at 4/20/2018  1:51 PM     Author:  Tere Clark LSW Service:  (none) Author Type:      Filed:  4/20/2018  1:57 PM Date of Service:  4/20/2018  1:51 PM Creation Time:  4/20/2018  1:51 PM    Status:  Addendum :  Tere Clark LSW ()         Care Transition Initial Assessment -   Reason For Consult: discharge planning, facility placement  Met with: Patient  Active Problems:    Abdominal pain       DATA  Lives With: alone  Living Arrangements: apartment  Description of Support System: Supportive, Involved  Who is your support system?: Children  Support Assessment: Adequate family and caregiver support.   Identified issues/concerns " "regarding health management: Pt is a 87 year old female admitted due to  Abdominal pain. Pt lives in an apartment alone. SW discussed the possibility of TCU, pt agreed & thinks this would be the best for her prior to going home. Pt would like referrals sent to [facility].   Quality Of Family Relationships: supportive, involved       ASSESSMENT  Cognitive Status:  Appears alert & oriented.   Concerns to be addressed:Discharge planning & placement.     PLAN  Financial costs for the patient includes NA.  Patient given options and choices for discharge YES .  Patient/family is agreeable to the plan? YES  Patient Goals and Preferences: discharge to TCU.  Patient anticipates discharging to:  TCU placement.    AR Adams[BC1.1]      ADDENDUM    SW:  D: Discharge planning.  I: DOTTIE sent referrals per pt request to [facility] via [facility]. [facility] is pt's number one choice. Did not discuss transportation.   P: SW to follow for discharge.[BC1.2]      Revision History        User Key Date/Time User Provider Type Action    > BC1.2 4/20/2018  1:57 PM Tere Clark LSW  Addend     BC1.1 4/20/2018  1:54 PM Tere Clark LSW  Sign            Progress Notes by Zeb Byers PA-C at 4/20/2018 10:48 AM     Author:  Zeb Byers PA-C Service:  Surgery Author Type:  Physician Assistant    Filed:  4/20/2018 10:51 AM Date of Service:  4/20/2018 10:48 AM Creation Time:  4/20/2018 10:48 AM    Status:  Signed :  Zeb Byers PA-C (Physician Assistant)         Surgery    Not feeling very strong today.  Loose stools.    Gen:  Awake, Alert, NAD[GN1.1]  Blood pressure 142/80, pulse 88, temperature 98.7  F (37.1  C), temperature source Oral, resp. rate 18, height 1.651 m (5' 5\"), weight 59.1 kg (130 lb 3.2 oz), SpO2 92 %,[GN1.2]  Resp - non labored.    Abdomen - soft, appropriately tender, distended. Incisions healing appropriately without erythema or purulent " "drainage.  Extremities - trace bilateral lower extremity edema.    K+ corrected  Hgb 11.3  Wbc 8.0    Weight stable    A/P   - PT-OT eval for TCU placement.  - ADAT: foods for potassium supplementation.  - Hopeful dc in 1-2 days.    Zeb Byers PA-C  Office: [phone]  Pager: [phone][GN1.1]         Revision History        User Key Date/Time User Provider Type Action    > GN1.2 4/20/2018 10:51 AM Zeb Byers PA-C Physician Assistant Sign     GN1.1 4/20/2018 10:48 AM Zeb Byers PA-C Physician Assistant             Progress Notes by Zeb Byers PA-C at 4/19/2018  8:19 AM     Author:  Zeb Byers PA-C Service:  Surgery Author Type:  Physician Assistant    Filed:  4/19/2018  8:30 AM Date of Service:  4/19/2018  8:19 AM Creation Time:  4/19/2018  8:19 AM    Status:  Signed :  Zeb Byers PA-C (Physician Assistant)         Surgery    No new complaints  + bm yesterday  No nausea.   Minimal appetite.  Walking  Off o2 this morning.     Gen:  Awake, Alert, NAD, conversational.[GN1.1]  Blood pressure 164/90, pulse 90, temperature 98.2  F (36.8  C), resp. rate 18, height 1.651 m (5' 5\"), weight 59.1 kg (130 lb 6.4 oz), SpO2 95 %, not currently breastfeeding.[GN1.2]  Resp - non labored.    Abdomen - soft, appropriately tender, less distended. Incisions healing appropriately without erythema or purulent drainage.  Extremities - no lower extremity edema.    Drains - minimal clear output.  NG - removed this morning    Labs pending.    UOP 1400    Wt: 59kg (55kg on admission)    A/P 86 yo female s/p emergent laparoscopy and laparotomy on 4/15 for abdominal pain and free air.     - remove drains.  - full liquids  - tramadol for pain.  - Abx: levo/flagyl  - SL IV when adequate oral intake.  - Lasix today    Zeb Byers PA-C  Office: [phone]  Pager: [phone][GN1.1]       Revision History        User Key Date/Time User Provider Type Action    > GN1.2 4/19/2018  8:30 AM Zeb Byers, " TASHIA Physician Assistant Sign     GN1.1 4/19/2018  8:19 AM Zeb Byers PA-C Physician Assistant                   Procedure Notes     No notes of this type exist for this encounter.         Progress Notes - Therapies (Notes from 04/19/18 through 04/22/18)      Progress Notes by Selina Glynn OT at 4/21/2018  3:11 PM     Author:  Selina Glynn OT Service:  (none) Author Type:  Occupational Therapist    Filed:  4/21/2018  3:11 PM Date of Service:  4/21/2018  3:11 PM Creation Time:  4/21/2018  3:11 PM    Status:  Signed :  Selina Glynn OT (Occupational Therapist)          04/21/18 1507   Quick Adds   Type of Visit Initial Occupational Therapy Evaluation   Living Environment   Lives With alone   Living Arrangements apartment   Home Accessibility tub/shower is not walk in;grab bars present (bathtub);grab bars present (toilet)   Transportation Available car;family or friend will provide  (Pt no longer drives. Family provides as needed)   Self-Care   Usual Activity Tolerance good   Current Activity Tolerance moderate   Equipment Currently Used at Home grab bar;walker, rolling;raised toilet   Functional Level Prior   Ambulation 1-->assistive equipment   Transferring 1-->assistive equipment   Toileting 1-->assistive equipment   Bathing 1-->assistive equipment   Dressing 0-->independent   Eating 0-->independent   Communication 0-->understands/communicates without difficulty   Swallowing 0-->swallows foods/liquids without difficulty   Cognition 0 - no cognition issues reported   Fall history within last six months no   General Information   Onset of Illness/Injury or Date of Surgery - Date 04/15/18   Referring Physician Zeb Byers PA-C   Patient/Family Goals Statement Pt plans to discharge to TCU tomorrow or the next day   Additional Occupational Profile Info/Pertinent History of Current Problem Seen POD #6 of laparotomy   Precautions/Limitations fall precautions;abdominal precautions   Cognitive  Status Examination   Orientation orientation to person, place and time   Level of Consciousness alert   Able to Follow Commands WNL/WFL   Personal Safety (Cognitive) WNL/WFL   Memory intact   Visual Perception   Visual Perception Wears glasses  (for reading only)   Pain Assessment   Patient Currently in Pain Yes, see Vital Sign flowsheet  (minimal)   Range of Motion (ROM)   ROM Comment B UE WNL   Strength   Strength Comments B UE not tested due to abdominal precautions.    Mobility   Bed Mobility Bed mobility skill: Rolling/Turning;Bed mobility skill: Scooting/Bridging;Bed mobility skill: Sit to supine;Bed mobility skill: Supine to sit;Bed mobility analysis   Bed Mobility Skill: Rolling/Turning   Level of [place] - Bed Mobility Skill Rolling Turning stand-by assist   Bed Mobility Skill: Scooting/Bridging   Level of [place]: Scooting/Bridging stand-by assist   Bed Mobility Skill: Sit to Supine   Level of [place]: Sit/Supine stand-by assist   Bed Mobility Skill: Supine to Sit   Level of [place]: Supine/Sit stand-by assist   Bed Mobility Analysis   Impairments Contributing to Impaired Bed Mobility pain   Transfer Skills   Transfer Transfer Safety Analysis Bed/Chair;Transfer Skill: Stand to Sit;Transfer Safety Analysis Sit/Stand   Transfer Skill: Bed to Chair/Chair to Bed   Level of [place]: Bed to Chair stand-by assist   Assistive Device - Transfer Skill Bed to Chair Chair to Bed Rehab Eval standard walker   Transfer Safety Analysis Bed/Chair   Impairments Contributing to Impaired Transfers pain   Transfer Skill: Sit to Stand   Level of [place]: Sit/Stand stand-by assist   Assistive Device for Transfer: Sit/Stand standard walker   Transfer Safety Analysis Sit/Stand   Impaired Transfers: Sit/Stand pain   Transfer Skill: Toilet Transfer   Level of [place]: Toilet stand-by assist   Assistive Device grab bars;standard walker   Lower Body Dressing   Level of [place]: Dress Lower Body  "moderate assist (50% patients effort)   Toileting   Level of [place]: Toilet stand-by assist   Grooming   Level of [place]: Grooming stand-by assist   Eating/Self Feeding   Level of [place]: Eating independent   Instrumental Activities of Daily Living (IADL)   Previous Responsibilities meal prep;housekeeping;laundry;medication management;finances   Activities of Daily Living Analysis   Impairments Contributing to Impaired Activities of Daily Living pain;post surgical precautions   General Therapy Interventions   Planned Therapy Interventions ADL retraining;transfer training   Clinical Impression   Criteria for Skilled Therapeutic Interventions Met yes, treatment indicated   OT Diagnosis Decreased I with ADLs   Influenced by the following impairments Pain; Abdominal precautions; Decreased activity tolerance   Assessment of Occupational Performance 3-5 Performance Deficits   Identified Performance Deficits Dressing; Toileting; Bathing; IADLs   Clinical Decision Making (Complexity) Moderate complexity   Therapy Frequency 3 times/wk   Predicted Duration of Therapy Intervention (days/wks) 7 days   Anticipated Discharge Disposition Transitional Care Facility   Risks and Benefits of Treatment have been explained. Yes   Patient, Family & other staff in agreement with plan of care Yes   [facility] TM \"6 Clicks\"   2016, Trustees of [facility], under license to [employer].  All rights reserved.   6 Clicks Short Forms Daily Activity Inpatient Short Form   [facility]  \"6 Clicks\" Daily Activity Inpatient Short Form   1. Putting on and taking off regular lower body clothing? 2 - A Lot   2. Bathing (including washing, rinsing, drying)? 2 - A Lot   3. Toileting, which includes using toilet, bedpan or urinal? 3 - A Little   4. Putting on and taking off regular upper body clothing? 4 - None   5. Taking care of personal grooming such as brushing teeth? 4 - None   6. Eating meals? 4 - " "None   Daily Activity Raw Score (Score out of 24.Lower scores equate to lower levels of function) 19   Total Evaluation Time   Total Evaluation Time (Minutes) 10[MM1.1]        Revision History        User Key Date/Time User Provider Type Action    > MM1.1 4/21/2018  3:11 PM Selina Glynn OT Occupational Therapist Sign            Progress Notes by Stephanie Riggs, PT at 4/20/2018  3:42 PM     Author:  Stephanie Riggs PT Service:  (none) Author Type:  Physical Therapist    Filed:  4/20/2018  3:43 PM Date of Service:  4/20/2018  3:42 PM Creation Time:  4/20/2018  3:42 PM    Status:  Signed :  Stephanie Riggs PT (Physical Therapist)          04/20/18 1501   Quick Adds   Type of Visit Initial PT Evaluation   Living Environment   Lives With alone   Living Arrangements apartment   Home Accessibility tub/shower is not walk in   Number of Stairs to Enter Home 0   Number of Stairs Within Home 0   Transportation Available family or friend will provide  (\"My daughter is my kasia.\")   Living Environment Comment Reports she has an elevated toilet seat, grab bars at commode and tub and a seat in the tub.    Self-Care   Dominant Hand right   Usual Activity Tolerance fair   Current Activity Tolerance fair   Regular Exercise yes   Activity/Exercise/Self-Care Comment Walks the halls in her building a few days a week.    Functional Level Prior   Ambulation 1-->assistive equipment   Transferring 0-->independent   Toileting 0-->independent   Bathing 0-->independent   Dressing 0-->independent   Eating 0-->independent   Communication 0-->understands/communicates without difficulty   Swallowing 0-->swallows foods/liquids without difficulty   Cognition 0 - no cognition issues reported   Fall history within last six months no   Which of the above functional risks had a recent onset or change? none   General Information   Onset of Illness/Injury or Date of Surgery - Date 04/15/18   Referring Physician Zeb Byers PA-C "   Patient/Family Goals Statement TCU   Precautions/Limitations abdominal precautions   Weight-Bearing Status - LUE full weight-bearing   Weight-Bearing Status - RUE full weight-bearing   Weight-Bearing Status - LLE full weight-bearing   Weight-Bearing Status - RLE full weight-bearing   Cognitive Status Examination   Orientation orientation to person, place and time   Level of Consciousness alert   Follows Commands and Answers Questions 100% of the time   Personal Safety and Judgment intact   Memory intact   Pain Assessment   Patient Currently in Pain No  (Did report discomfort due to distension. )   Integumentary/Edema   Integumentary/Edema Comments Bandage on right shin due to infection per patient.    Posture    Posture Forward head position   Range of Motion (ROM)   ROM Comment No deficits noted.    Strength   Strength Comments Grossly at least 4/5 but generalized weakness due to illness/surgery.    Bed Mobility   Bed Mobility Comments Supervision with sup to/from sit but doesn't follow abdominal precautions.    Transfer Skills   Transfer Comments Sit to stand with supervision.    Gait   Gait Comments Gait training with ww in room 10 feet with supervision.    Balance   Balance Comments Good stanidng balance with support of ww.    Sensory Examination   Sensory Perception no deficits were identified   General Therapy Interventions   Planned Therapy Interventions gait training   Clinical Impression   Criteria for Skilled Therapeutic Intervention yes, treatment indicated   PT Diagnosis Impaired functional endurance.    Influenced by the following impairments Generalized weakness   Functional limitations due to impairments Needs assist for amb   Clinical Presentation Stable/Uncomplicated   Clinical Decision Making (Complexity) Low complexity   Therapy Frequency` 5 times/week   Predicted Duration of Therapy Intervention (days/wks) 3 days   Anticipated Equipment Needs at Discharge (Patient already has a walker )  "  Anticipated Discharge Disposition Transitional Care Facility   Risk & Benefits of therapy have been explained Yes   Patient, Family & other staff in agreement with plan of care Yes   [facility] TM \"6 Clicks\"   2016, Trustees of [facility], under license to [employer].  All rights reserved.   6 Clicks Short Forms Basic Mobility Inpatient Short Form   [facility]  \"6 Clicks\" V.2 Basic Mobility Inpatient Short Form   1. Turning from your back to your side while in a flat bed without using bedrails? 3 - A Little   2. Moving from lying on your back to sitting on the side of a flat bed without using bedrails? 3 - A Little   3. Moving to and from a bed to a chair (including a wheelchair)? 3 - A Little   4. Standing up from a chair using your arms (e.g., wheelchair, or bedside chair)? 3 - A Little   5. To walk in hospital room? 3 - A Little   6. Climbing 3-5 steps with a railing? 3 - A Little   Basic Mobility Raw Score (Score out of 24.Lower scores equate to lower levels of function) 18   Total Evaluation Time   Total Evaluation Time (Minutes) 10[SL1.1]        Revision History        User Key Date/Time User Provider Type Action    > SL1.1 4/20/2018  3:43 PM Stephanie Riggs, PT Physical Therapist Sign            "

## 2018-04-15 NOTE — IP AVS SNAPSHOT
[facility]    [address] [place] [address]    Phone:  [phone]                                       After Visit Summary   4/15/2018    Jamilah Rodriguez    MRN: [id number]           After Visit Summary Signature Page     I have received my discharge instructions, and my questions have been answered. I have discussed any challenges I see with this plan with the nurse or doctor.    ..........................................................................................................................................  Patient/Patient Representative Signature      ..........................................................................................................................................  Patient Representative Print Name and Relationship to Patient    ..................................................               ................................................  Date                                            Time    ..........................................................................................................................................  Reviewed by Signature/Title    ...................................................              ..............................................  Date                                                            Time

## 2018-04-15 NOTE — ANESTHESIA PREPROCEDURE EVALUATION
Procedure: Procedure(s):  LAPAROSCOPIC ASSISTED COLECTOMY  Preop diagnosis: Free Abdominal Air    Allergies   Allergen Reactions     Atenolol Hives     Beta Adrenergic Blockers      Brimonidine      Cephalexin Hives     Germall Plus Itching     Imidazolidinyl Urea found in many topical creams and house hold products     Latex      Itching     Morphine GI Disturbance     No Clinical Screening - See Comments Itching     Carba Mix; IMIDAZOLIDINYL UREA - Allergic to this ingredient, which is found in many common topical moisturizers and household products.     Penicillins Hives     Sulfa Drugs GI Disturbance     Tramadol Nausea and Vomiting     Tylenol GI Disturbance     Vicodin [Hydrocodone-Acetaminophen]      Fentanyl Nausea     Past Medical History:   Diagnosis Date     AAA (abdominal aortic aneurysm) (H)     Followed with yearly ultrasound     Cervical cancer (H) 1969     Chronic dermatitis     extensive allergy testing revealed allergy/sensitivity to carba mix ( rubber) and Imidazolidinyl Urea (common in beauty products)     Glaucoma      Glaucoma      History of COPD     very mild abnormal spirometry in 2007 (in [place]), has not been active since quitting smoking in 2009     HTN (hypertension)      Macular degeneration      Osteoporosis 8/3/11    femoral T score -3.4 & -3.7     PAD (peripheral artery disease) (H)      Vertebral compression fracture (H) 5/17/11    L1 compression fracture, presumed osteoporotic compression fracture     Past Surgical History:   Procedure Laterality Date     CATARACT IOL, RT/LT  2/8/12    left eye cataract removal     HYSTERECTOMY TOTAL ABDOMINAL  1999    Fibroids     OPEN REDUCTION INTERNAL FIXATION HIP NAILING  7/2/2012    Procedure: OPEN REDUCTION INTERNAL FIXATION HIP NAILING;  Intramedullary Fixation Right Intertrochanteric Hip Fracture;  Surgeon: Chalino Covarrubias MD;  Location:  OR     OPEN REDUCTION INTERNAL FIXATION TIBIAL PLATEAU  3/31/2013    Procedure: OPEN REDUCTION  INTERNAL FIXATION TIBIAL PLATEAU;  RIGHT LATERAL TIBIAL PLATEAU FRACTURE - SYNTHES Medial Meniscus Repair, Lateral Compartment Release;  Surgeon: Alfred Cardenas MD;  Location: [facility] OR     OPEN REDUCTION INTERNAL FIXATION WRIST  1988    left wrist ORIF, hardware removed onemonth later     Prior to Admission medications    Medication Sig Start Date End Date Taking? Authorizing Provider   albuterol (PROAIR HFA) 108 (90 BASE) MCG/ACT Inhaler Inhale 2 puffs into the lungs 4 times daily as needed for shortness of breath / dyspnea or wheezing 2/20/18   Madi Willoughby MD   umeclidinium-vilanterol (ANORO ELLIPTA) 62.5-25 MCG/INH oral inhaler Inhale 1 puff into the lungs daily 2/20/18   Madi Willoughby MD   ipratropium - albuterol 0.5 mg/2.5 mg/3 mL (DUONEB) 0.5-2.5 (3) MG/3ML neb solution Take 1 vial (3 mLs) by nebulization every 6 hours as needed for shortness of breath / dyspnea or wheezing 12/18/17   Norberto Curran PA-C   losartan (COZAAR) 50 MG tablet Take 1 tablet (50 mg) by mouth daily 11/2/17   Sharon Hurtado MD   fluocinonide (LIDEX) 0.05 % solution Apply to scalp BID PRN 8/22/17   Lexi Kwan PA-C   triamcinolone (KENALOG) 0.1 % cream Apply to AA QD-BID x 1-2 weeks then PRN Only 7/14/17   Lexi Kwan PA-C   GLUCOSAMINE SULFATE PO Take 500 mg by mouth daily    Reported, Patient   Flaxseed, Linseed, (FLAX SEEDS PO)     Reported, Patient   dorzolamide (TRUSOPT) 2 % ophthalmic solution Place 1 drop into both eyes 2 times daily    Reported, Patient   Cholecalciferol (VITAMIN D) 2000 UNITS tablet Take 1 tablet by mouth daily. 10/31/12   Bhupendra Chong MD   bimatoprost (LUMIGAN) 0.01 % SOLN Place 1 drop into both eyes At Bedtime.      Reported, Patient   carboxymethylcellulose (REFRESH PLUS) 0.5 % SOLN Place 1 drop into both eyes every 2 hours as needed.      Reported, Patient   Calcium Carbonate-Vitamin D (CALCIUM + D) 600-200 MG-UNIT per tablet Take 1 tablet by mouth every 12 hours.  10/10/11   Bhupendra Chong MD   ascorbic acid (VITAMIN C) 500 MG tablet Take 1 tablet by mouth daily. 10/29/10   Sanjay Warren MD     Current Facility-Administered Medications Ordered in Epic   Medication Dose Route Frequency Last Rate Last Dose     ciprofloxacin (CIPRO) infusion 400 mg  400 mg Intravenous Once         metroNIDAZOLE (FLAGYL) infusion 500 mg  500 mg Intravenous Once 100 mL/hr at 04/15/18 1552 500 mg at 04/15/18 1552     Current Outpatient Prescriptions Ordered in Epic   Medication     albuterol (PROAIR HFA) 108 (90 BASE) MCG/ACT Inhaler     umeclidinium-vilanterol (ANORO ELLIPTA) 62.5-25 MCG/INH oral inhaler     ipratropium - albuterol 0.5 mg/2.5 mg/3 mL (DUONEB) 0.5-2.5 (3) MG/3ML neb solution     losartan (COZAAR) 50 MG tablet     fluocinonide (LIDEX) 0.05 % solution     triamcinolone (KENALOG) 0.1 % cream     GLUCOSAMINE SULFATE PO     Flaxseed, Linseed, (FLAX SEEDS PO)     dorzolamide (TRUSOPT) 2 % ophthalmic solution     Cholecalciferol (VITAMIN D) 2000 UNITS tablet     bimatoprost (LUMIGAN) 0.01 % SOLN     carboxymethylcellulose (REFRESH PLUS) 0.5 % SOLN     Calcium Carbonate-Vitamin D (CALCIUM + D) 600-200 MG-UNIT per tablet     ascorbic acid (VITAMIN C) 500 MG tablet     Wt Readings from Last 1 Encounters:   04/15/18 54.9 kg (121 lb)     Temp Readings from Last 1 Encounters:   04/15/18 36.7  C (98  F) (Oral)     BP Readings from Last 6 Encounters:   04/15/18 123/80   04/06/18 130/80   04/02/18 130/88   03/23/18 128/66   03/21/18 120/68   03/16/18 132/82     Pulse Readings from Last 4 Encounters:   04/15/18 104   04/06/18 72   04/02/18 90   03/23/18 87     Resp Readings from Last 1 Encounters:   04/15/18 18     SpO2 Readings from Last 1 Encounters:   04/15/18 96%     Recent Labs   Lab Test  04/15/18   1402  12/18/17   1103   NA  134  134   POTASSIUM  3.6  4.5   CHLORIDE  96  101   CO2  26  26   ANIONGAP  12  7   GLC  133*  106*   BUN  22  17   CR  0.97  0.85   NAKIA  9.4  8.8      Recent Labs   Lab Test  04/15/18   1402  12/18/17   1103   WBC  13.0*  11.8*   HGB  13.6  11.8   PLT  377  271     Recent Labs   Lab Test  04/03/13   0716  04/02/13   0610   INR  1.47*  1.21*      ECG: Sinus  Tachycardia   -Nonspecific QRS widening and anterior fascicular block.    -Anteroseptal infarct -age undetermined.    -Nonspecific ST depression  -Nondiagnostic.    CT abdomen:  Radiographic findings were communicated with the patient and family who voiced understanding of the findings.  CT Abdomen/Pelvis w/ contrast:    1. Free intraperitoneal air consistent with perforated viscus.  2. Diffuse mild dilatation of fluid and gas filled small bowel loops. Gas and stool remain in the colon and the findings are suspicious for early/partial distal small bowel obstruction.  3. Small amount of upper abdominal ascites.  4. Distal abdominal aortic aneurysm, increased in size since ultrasound of 5/25/2016.  5. Small bilateral inguinal hernias.  6. Chronic appearing compression deformity L1 and L5.  [Critical Result: Free intraperitoneal air.] Finding was identified on 4/15/2018 3:25 PM.  Results per Radiology.       Anesthesia Evaluation     .             ROS/MED HX    ENT/Pulmonary:     (+)moderate COPD, , . .   (-) tobacco use, asthma and sleep apnea   Neurologic:      (-) seizures, CVA and migraines   Cardiovascular: Comment: AAA - evaluated annually - last measurement was 3.8 cm    (+) hypertension-Peripheral Vascular Disease---. : . . . :. .      (-) CAD, [place], arrhythmias, valvular problems/murmurs and dyslipidemia   METS/Exercise Tolerance:  >4 METS   Hematologic:        (-) history of blood clots, anemia and other hematologic disorder   Musculoskeletal:   (+) , , other musculoskeletal- osteoporosis     (-) arthritis   GI/Hepatic:     (+) Other GI/Hepatic abdominal free air, possible obstruction. Nausea and vom iting     (-) GERD   Renal/Genitourinary:      (-) renal disease and nephrolithiasis   Endo:      (-)  Type I DM, Type II DM, thyroid disease and obesity   Psychiatric:  - neg psychiatric ROS       Infectious Disease:        (-) Recent Fever   Malignancy:   (+) Malignancy History of Other  Other CA cervical Remission status post Surgery         Other: Comment: Chronic dermatitis, difficult for patient to delineate between her chronic skin condition and allergies to medications                     Physical Exam  Normal systems: cardiovascular    Airway   Mallampati: II  TM distance: >3 FB  Neck ROM: limited    Dental   (+) upper dentures and partials    Cardiovascular   Rhythm and rate: regular and normal      Pulmonary (+) decreased breath sounds                       Anesthesia Plan      History & Physical Review      ASA Status:  3 .        Plan for General, ETT and RSI with Propofol induction. Maintenance will be Balanced.    PONV prophylaxis:  Ondansetron (or other 5HT-3) and Dexamethasone or Solumedrol  Phenylephrine infusion ready  Albumin in room  Albuterol prior to emergence  Hydromorphone rather than fentanyl  Patient consented to post-op TAP blocks or epidural if pain is uncontrolled.       Postoperative Care  Postoperative pain management:  Multi-modal analgesia.      Consents  Anesthetic plan, risks, benefits and alternatives discussed with:  Patient..                          .

## 2018-04-15 NOTE — IP AVS SNAPSHOT
` [place] [address] [facility]: [phone]            Medication Administration Report for Jamilah Rodriguez as of 04/22/18 1333   Legend:    Given Hold Not Given Due Canceled Entry Other Actions    Time Time (Time) Time  Time-Action       Inactive    Active    Linked        Medications 04/16/18 04/17/18 04/18/18 04/19/18 04/20/18 04/21/18 04/22/18    albuterol (PROAIR HFA/PROVENTIL HFA/VENTOLIN HFA) Inhaler 1-2 puff  Dose: 1-2 puff  Freq: DAILY PRN Route: IN  PRN Reasons: wheezing,shortness of breath / dyspnea  Start: 04/16/18 1340   Admin Instructions: Pt own supply    Admin. Amount: 1-2 puff  Last Admin: 04/21/18 1418  Dispense Loc:  Main Pharmacy          1418 (1 puff)-Given            artificial saliva (BIOTENE MT) spray 1 spray  Dose: 1 spray  Freq: EVERY 6 HOURS PRN Route: MT  PRN Reason: dry mouth  Start: 04/16/18 1354   Admin. Amount: 1 spray  Last Admin: 04/16/18 1620  Dispense Loc:  Main Pharmacy  Volume: 44.3 mL     1620 (1 spray)-Given                 bacitracin ointment  Freq: 3 TIMES DAILY PRN Route: Top  PRN Reason: other  PRN Comment: topical dermatitis  Start: 04/18/18 1111   Admin Instructions: Apply to affected area    Last Admin: 04/18/18 1352  Dispense Loc:  [facility]       1352 ( )-Given               benzocaine-menthol (CHLORASEPTIC) 6-10 MG lozenge 1 lozenge  Dose: 1 lozenge  Freq: EVERY 1 HOUR PRN Route: BU  PRN Reason: sore throat  PRN Comment: dry/sore throat without fever  Start: 04/16/18 1353   Admin. Amount: 1 lozenge  Last Admin: 04/18/18 1345  Dispense Loc:  [facility]     1445 (1 lozenge)-Given        0852 (1 lozenge)-Given       1438 (1 lozenge)-Given        1345 (1 lozenge)-Given               bimatoprost (LUMIGAN) 0.01 % ophthalmic drops 1 drop  Dose: 1 drop  Freq: AT BEDTIME Route: Both Eyes  Start: 04/16/18 2200   Admin Instructions: Pt own supply    Admin. Amount: 1 drop  Last Admin: 04/21/18 2114  Dispense Loc:  Main Pharmacy  Volume: 2.5 mL      2108 (1 drop)-Given        2056 (1 drop)-Given               2246 (1 drop)-Given        2101 (1 drop)-Given        2120 (1 drop)-Given        2114 (1 drop)-Given        [ ] 2200           diphenhydrAMINE (BENADRYL) solution 12.5 mg  Dose: 12.5 mg  Freq: EVERY 6 HOURS PRN Route: PO  PRN Reason: itching  Start: 04/15/18 2244   Admin Instructions: Caution to be used when administering multiple CNS depressing meds within a short time frame.    Admin. Amount: 12.5 mg = 5 mL Conc: 2.5 mg/mL  Dispense Loc:  [facility]  Volume: 10 mL  POC: Post-procedure              Or  diphenhydrAMINE (BENADRYL) injection 12.5 mg  Dose: 12.5 mg  Freq: EVERY 6 HOURS PRN Route: IV  PRN Reason: itching  PRN Comment: Only give if patient unable to take PO.  Start: 04/15/18 2244   Admin Instructions: Caution to be used when administering multiple CNS depressing meds within a short time frame.  For ordered doses up to 50 mg, give IV Push undiluted. Give each 25mg over a minimum of 1 minute. Extend in non-emergency    Admin. Amount: 12.5 mg = 0.25 mL Conc: 50 mg/mL  Dispense Loc:  [facility]  Infused Over: 1-2 Minutes  Volume: 1 mL  POC: Post-procedure               dorzolamide (TRUSOPT) 2 % ophthalmic solution 1 drop  Dose: 1 drop  Freq: 2 TIMES DAILY Route: Both Eyes  Start: 04/16/18 2100   Admin Instructions: Pt own supply    Admin. Amount: 1 drop  Last Admin: 04/22/18 0833  Dispense Loc:  Main Pharmacy  Volume: 10 mL     2032 (1 drop)-Given        0832 (1 drop)-Given       2056 (1 drop)-Given        0810 (1 drop)-Given       2114 (1 drop)-Given        0809 (1 drop)-Given       2101 (1 drop)-Given        0819 (1 drop)-Given       2028 (1 drop)-Given        0812 (1 drop)-Given       2057 (1 drop)-Given        0833 (1 drop)-Given       [ ] 2100           enoxaparin (LOVENOX) injection 40 mg  Dose: 40 mg  Freq: EVERY 24 HOURS Route: SC  Start: 04/16/18 0900   Admin Instructions: Check to make sure start date/time is 12-24 hours post op unless  "documented complication, AND no sooner than 22 hours post op if spinal anesthesia used.   Continue until discharge to home. HOLD if platelet count falls below 50% of baseline or less than 100,000/ L and notify provider.    Admin. Amount: 40 mg = 0.4 mL Conc: 40 mg/0.4 mL  Last Admin: 04/22/18 0837  Dispense Loc:  [facility]  Volume: 0.4 mL  POC: Post-procedure     0918 (40 mg)-Given        0845 (40 mg)-Given        0803 (40 mg)-Given        0922 (40 mg)-Given        0822 (40 mg)-Given        0812 (40 mg)-Given        0837 (40 mg)-Given           HYDROmorphone (PF) (DILAUDID) injection 0.2 mg  Dose: 0.2 mg  Freq: EVERY 2 HOURS PRN Route: IV  PRN Reason: other  PRN Comment: pain control or improvement in physical function.  Hold dose for analgesic side effects.  Start: 04/15/18 2244   Admin Instructions: Notify provider to assess for uncontrolled pain or analgesic side effects. Hold while on IV PCA or with regular IV opioid dosing.  For ordered doses up to 4 mg give IV Push undiluted. Administer each 2mg over 2-5 minutes.    Admin. Amount: 0.2 mg  Dispense Loc:  [facility]  POC: Post-procedure               levofloxacin (LEVAQUIN) tablet 500 mg  Dose: 500 mg  Freq: EVERY 24 HOURS Route: PO  Indications of Use: INTRA-ABDOMINAL INFECTION  Start: 04/20/18 2100   Admin Instructions: Administer at least 2 hrs before or 4 hrs after aluminum, calcium, iron, zinc or magnesium containing products.    Admin. Amount: 1 tablet (1 × 500 mg tablet)  Last Admin: 04/21/18 2114  Dispense Loc:  [facility]         2037 (500 mg)-Given        2114 (500 mg)-Given        [ ] 2100           lidocaine (LMX4) cream  Freq: EVERY 1 HOUR PRN Route: Top  PRN Reason: pain  PRN Comment: with VAD insertion or accessing implanted port.  Start: 04/15/18 2244   Admin Instructions: Do NOT give if patient has a history of allergy to any local anesthetic or any \"lisseth\" product.   Apply 30 minutes prior to VAD insertion or port access.  MAX Dose:  2.5 g (  of " "5 g tube)    Dispense Loc: [facility] TOWER  POC: Post-procedure               lidocaine 1 % 1 mL  Dose: 1 mL  Freq: EVERY 1 HOUR PRN Route: OTHER  PRN Comment: mild pain with VAD insertion or accessing implanted port  Start: 04/15/18 2244   Admin Instructions: Do NOT give if patient has a history of allergy to any local anesthetic or any \"lisseth\" product. MAX dose 1 mL subcutaneous OR intradermal in divided doses.    Admin. Amount: 1 mL  Dispense Loc: [facility] 33B  Volume: 20 mL  POC: Post-procedure               losartan (COZAAR) tablet 50 mg  Dose: 50 mg  Freq: DAILY Route: PO  Start: 04/20/18 1045   Admin. Amount: 1 tablet (1 × 50 mg tablet)  Last Admin: 04/22/18 0836  Dispense Loc: [facility] 33B         1214 (50 mg)-Given        0812 (50 mg)-Given        0836 (50 mg)-Given           metroNIDAZOLE (FLAGYL) tablet 250 mg  Dose: 250 mg  Freq: EVERY 8 HOURS SCHEDULED Route: PO  Indications of Use: INTRA-ABDOMINAL INFECTION  Start: 04/20/18 1400   Admin. Amount: 1 tablet (1 × 250 mg tablet)  Last Admin: 04/22/18 1326  Dispense Loc: [facility] 33B         1440 (250 mg)-Given       2212 (250 mg)-Given        0620 (250 mg)-Given       1428 (250 mg)-Given       2304 (250 mg)-Given        0645 (250 mg)-Given       1326 (250 mg)-Given       [ ] 2200           naloxone (NARCAN) injection 0.1-0.4 mg  Dose: 0.1-0.4 mg  Freq: EVERY 2 MIN PRN Route: IV  PRN Reason: opioid reversal  Start: 04/15/18 2244   Admin Instructions: For respiratory rate LESS than or EQUAL to 8.  Partial reversal dose:  0.1 mg titrated q 2 minutes for Analgesia Side Effects Monitoring Sedation Level of 3 (frequently drowsy, arousable, drifts to sleep during conversation).Full reversal dose:  0.4 mg bolus for Analgesia Side Effects Monitoring Sedation Level of 4 (somnolent, minimal or no response to stimulation).  For ordered doses up to 2mg give IVP. Give each 0.4mg over 15 seconds in emergency situations. For non-emergent situations further dilute in 9mL of NS to " facilitate titration of response.    Admin. Amount: 0.1-0.4 mg = 0.25-1 mL Conc: 0.4 mg/mL  Dispense Loc:  [facility]  Volume: 1 mL  POC: Post-procedure               ondansetron (ZOFRAN-ODT) ODT tab 4 mg  Dose: 4 mg  Freq: EVERY 6 HOURS PRN Route: PO  PRN Reasons: nausea,vomiting  Start: 04/15/18 2244   Admin Instructions: This is Step 1 of nausea and vomiting management.  If nausea not resolved in 15 minutes, go to Step 2 prochlorperazine (COMPAZINE). Do not push through foil backing. Peel back foil and gently remove. Place on tongue immediately. Administration with liquid unnecessary  With dry hands, peel back foil backing and gently remove tablet; do not push oral disintegrating tablet through foil backing; administer immediately on tongue and oral disintegrating tablet dissolves in seconds; then swallow with saliva; liquid not required.    Admin. Amount: 1 tablet (1 × 4 mg tablet)  Dispense Loc:  [facility]  POC: Post-procedure                     Or  ondansetron (ZOFRAN) injection 4 mg  Dose: 4 mg  Freq: EVERY 6 HOURS PRN Route: IV  PRN Reasons: nausea,vomiting  Start: 04/15/18 2244   Admin Instructions: This is Step 1 of nausea and vomiting management.  If nausea not resolved in 15 minutes, go to Step 2 prochlorperazine (COMPAZINE).  Irritant. For ordered doses up to 4 mg, give IV Push undiluted over 2-5 minutes.    Admin. Amount: 4 mg = 2 mL Conc: 4 mg/2 mL  Last Admin: 04/19/18 1026  Dispense Loc:  [facility]  Infused Over: 2-5 Minutes  Volume: 2 mL  POC: Post-procedure        1026 (4 mg)-Given              phenol (CHLORASEPTIC) 1.4 % spray 1 mL  Dose: 1 spray  Freq: EVERY 1 HOUR PRN Route: MT  PRN Reason: sore throat  Start: 04/16/18 1551   Admin. Amount: 1 mL = 1 spray  Dispense Loc:  [facility] TOWER  Volume: 177 mL               polyethylene glycol 400 (BLINK TEARS) 0.25 % ophthalmic solution SOLN 1 drop  Dose: 1 drop  Freq: 3 TIMES DAILY PRN Route: OP  PRN Reason: dry eyes  Start: 04/16/18 1341   Admin  Instructions: Pt own supply    Last Admin: 04/18/18 0800  Dispense Loc:  Main Pharmacy  Volume: 15 mL       0800 (1 drop)-Given               potassium chloride (KLOR-CON) Packet 20-40 mEq  Dose: 20-40 mEq  Freq: EVERY 2 HOURS PRN Route: ORAL OR FEED  PRN Reason: potassium supplementation  Start: 04/19/18 0942   Admin Instructions: Use if unable to tolerate tablets.  If Serum K+ 3.0-3.3, dose = 60 mEq po total dose (40 mEq x1 followed in 2 hours by 20 mEq x1). Recheck K+ level 4 hours after dose and the next AM.  If Serum K+ 2.5-2.9, dose = 80 mEq po total dose (40 mEq Q2H x2). Recheck K+ level 4 hours after dose and the next AM.  If Serum K+ less than 2.5, See IV order.  Dissolve packet contents in 4-8 ounces of cold water or juice.    Admin. Amount: 20-40 mEq  Last Admin: 04/20/18 0100  Dispense Loc:  [facility]        2256 (20 mEq)-Given        0100 (20 mEq)-Given             potassium chloride 10 mEq in 100 mL intermittent infusion with 10 mg lidocaine  Dose: 10 mEq  Freq: EVERY 1 HOUR PRN Route: IV  PRN Reason: potassium supplementation  Start: 04/19/18 0942   Admin Instructions: Infuse via PERIPHERAL LINE. Use potassium with lidocaine for pain with peripheral administration.  If Serum K+ 3.0-3.3, dose = 10 mEq/hr x4 doses (40 mEq IV total dose). Recheck K+ level 2 hours after dose and the next AM.  If Serum K+ less than 3.0, dose = 10 mEq/hr x6 doses (60 mEq IV total dose). Recheck K+ level 2 hours after dose and the next AM.    Admin. Amount: 10 mEq = 100 mL Conc: 10 mEq/100 mL  Last Admin: 04/19/18 1813  Dispense Loc: Contact Rx for dose  Infused Over: 1 Hours  Volume: 100 mL        1153 (10 mEq)-New Bag [C]       1302 (10 mEq)-New Bag       1419 (10 mEq)-New Bag       1525 (10 mEq)-New Bag       1710 (10 mEq)-New Bag       1813 (10 mEq)-New Bag              potassium chloride 20 mEq in 50 mL intermittent infusion  Dose: 20 mEq  Freq: EVERY 1 HOUR PRN Route: IV  PRN Reason: potassium supplementation  Start:  04/19/18 0942   Admin Instructions: Infuse via CENTRAL LINE Only. May need EKG if less than 65 kg or on TPN - Max rate is 0.3 mEq/kg/hr for patients not on EKG monitoring.   If Serum K+ 3.0-3.3, dose = 20 mEq/hr x2 doses (40 mEq IV total dose). Recheck K+ level 2 hours after dose and the next AM.  If Serum K+ less than 3.0, dose = 20 mEq/hr x3 doses (60 mEq IV total dose). Recheck K+ level 2 hours after dose and the next AM.    Admin. Amount: 20 mEq = 50 mL Conc: 20 mEq/50 mL  Dispense Loc:  [facility] TOWER  Infused Over: 90 Minutes  Volume: 50 mL               potassium chloride SA (K-DUR/KLOR-CON M) CR tablet 20-40 mEq  Dose: 20-40 mEq  Freq: EVERY 2 HOURS PRN Route: PO  PRN Reason: potassium supplementation  Start: 04/19/18 0942   Admin Instructions: Use if able to take PO.   If Serum K+ 3.0-3.3, dose = 60 mEq po total dose (40 mEq x1 followed in 2 hours by 20 mEq x1). Recheck K+ level 4 hours after dose and the next AM.  If Serum K+ 2.5-2.9, dose = 80 mEq po total dose (40 mEq Q2H x2). Recheck K+ level 4 hours after dose and the next AM.  If Serum K+ less than 2.5, See IV order.  DO NOT CRUSH    Admin. Amount: 1-2 tablet (1-2 × 20 mEq tablet)  Last Admin: 04/19/18 2258  Dispense Loc:  [facility]        2258 (20 mEq)-Given              prochlorperazine (COMPAZINE) injection 5 mg  Dose: 5 mg  Freq: EVERY 6 HOURS PRN Route: IV  PRN Reasons: nausea,vomiting  Start: 04/15/18 2244   Admin Instructions: This is Step 2 of nausea and vomiting management.   If nausea not resolved in 15 minutes, give metoclopramide (REGLAN) if ordered (step 3 of nausea and vomiting management)  For ordered doses up to 10 mg, give IV Push undiluted. Each 5mg over 1 minute.    Admin. Amount: 5 mg = 1 mL Conc: 5 mg/mL  Dispense Loc:  [facility]  Infused Over: 1-2 Minutes  Volume: 1 mL  POC: Post-procedure              Or  prochlorperazine (COMPAZINE) tablet 5 mg  Dose: 5 mg  Freq: EVERY 6 HOURS PRN Route: PO  PRN Reasons: nausea,vomiting  Start:  04/15/18 2244   Admin Instructions: This is Step 2 of nausea and vomiting management.   If nausea not resolved in 15 minutes, give metoclopramide (REGLAN) if ordered (step 3 of nausea and vomiting management)    Admin. Amount: 1 tablet (1 × 5 mg tablet)  Dispense Loc:  [facility]  POC: Post-procedure               sodium chloride (PF) 0.9% PF flush 3 mL  Dose: 3 mL  Freq: EVERY 8 HOURS Route: IK  Start: 04/15/18 2245   Admin Instructions: And Q1H PRN, to lock peripheral IV dormant line.    Admin. Amount: 3 mL  Last Admin: 04/21/18 1430  Dispense Loc: [facility]  Volume: 3 mL  POC: Post-procedure     0552 (3 mL)-Given       1448 (3 mL)-Given       (2236)-Not Given        (0803)-Not Given       1421 (3 mL)-Given       (2224)-Not Given        (0610)-Not Given       1351 (3 mL)-Given       2247 (3 mL)-Given        (0711)-Not Given       (1351)-Not Given       (2226)-Not Given        (0610)-Not Given       1440 (3 mL)-Given       2120 (3 mL)-Given               0621 (3 mL)-Given       1430 (3 mL)-Given       (2304)-Not Given        (0640)-Not Given       [ ] 1445       [ ] 2245           sodium chloride (PF) 0.9% PF flush 3 mL  Dose: 3 mL  Freq: EVERY 1 HOUR PRN Route: IK  PRN Reason: line flush  PRN Comment: for peripheral IV flush post IV meds  Start: 04/15/18 2244   Admin. Amount: 3 mL  Dispense Loc: [facility]  Volume: 3 mL  POC: Post-procedure               sodium chloride 0.9% infusion  Rate: 50 mL/hr   Freq: CONTINUOUS Route: IV  Start: 04/15/18 2245   Last Admin: 04/19/18 2111  Dispense Loc: [facility]  Volume: 1,000 mL  POC: Post-procedure     1448 ( )-New Bag        0846 ( )-New Bag       2157 ( )-New Bag        1526 ( )-New Bag        0809 ( )-Rate/Dose Change       0904 ( )-New Bag       2111 ( )-Rate/Dose Verify              traMADol (ULTRAM) tablet 50 mg  Dose: 50 mg  Freq: EVERY 6 HOURS PRN Route: PO  PRN Reason: moderate pain  Start: 04/19/18 0819   Admin. Amount: 1 tablet (1 × 50 mg  tablet)  Dispense Loc:  [facility]               umeclidinium-vilanterol (ANORO ELLIPTA) 62.5-25 MCG/INH oral inhaler 1 puff  Dose: 1 puff  Freq: DAILY Route: IN  Start: 04/16/18 0000   Admin Instructions: Pt own supply    Admin. Amount: 1 puff  Last Admin: 04/22/18 0832  Dispense Loc:  Main Pharmacy            0900 (1 puff)-Given [C]        0833 (1 puff)-Given        0802 (1 puff)-Given        0808 (1 puff)-Given        0820 (1 puff)-Given        0812 (1 puff)-Given        0832 (1 puff)-Given          Discontinued Medications  Medications 04/16/18 04/17/18 04/18/18 04/19/18 04/20/18 04/21/18 04/22/18         Dose: 250 mg  Freq: EVERY 24 HOURS Route: IV  Indications of Use: INTRA-ABDOMINAL INFECTION  Last Dose: 250 mg (04/20/18 0004)  Start: 04/17/18 0000   End: 04/20/18 0805   Admin Instructions: Dose adjusted per renal dosing policy.  Estimated CrCl = 30-50 mL/min.  Irritant. Administer at a rate of no greater than 100 mL/hr    Admin. Amount: 250 mg = 50 mL Conc: 250 mg/50 mL  Last Admin: 04/20/18 0004  Dispense Loc: [facility]  Infused Over: 60 Minutes      0112 (250 mg)-New Bag       2344 (250 mg)-New Bag         0038 (250 mg)-New Bag        0004 (250 mg)-New Bag       0805-Med Discontinued           Dose: 5 mg  Freq: EVERY 6 HOURS Route: IV  Start: 04/16/18 0800   End: 04/20/18 1038   Admin Instructions: HOLD IF Heart Rate less than 75 beats per minute or Systolic Blood Pressure less than 140 mmHg, bronchospasm, on inotropic continuous infusions or being paced .  Start POD 1.    ADMINISTRATION: By slow IV push over at least 2 minutes or give by piggyback over 15-20 minutes if patient not appropriate for IV push administration. ASSESSMENT: Blood pressure and heart rate BEFORE and 10 minutes AFTER each dose x first 2 doses.  For ordered doses up to 15 mg, give IV Push undiluted over 5-10 minutes.    Admin. Amount: 5 mg = 5 mL Conc: 1 mg/mL  Last Admin: 04/20/18 0823  Dispense Loc: [facility] 33B  Infused Over:  5-10 Minutes  Volume: 5 mL  POC: Post-procedure     (0749)-Not Given       (1513)-Not Given       2109 (5 mg)-Given [C]        (0349)-Not Given       0845 (5 mg)-Given       1421 (5 mg)-Given       2057 (5 mg)-Given        (0225)-Not Given       (0810)-Not Given       1346 (5 mg)-Given       (2048)-Not Given        0231 (5 mg)-Given [C]       0921 (5 mg)-Given       1557 (5 mg)-Given       2101 (5 mg)-Given        0328 (5 mg)-Given       0823 (5 mg)-Given       1038-Med Discontinued           Dose: 500 mg  Freq: EVERY 6 HOURS Route: IV  Indications of Use: INTRA-ABDOMINAL INFECTION  Last Dose: 500 mg (04/20/18 0105)  Start: 04/15/18 2300   End: 04/20/18 0805   Admin. Amount: 500 mg = 100 mL Conc: 5 mg/mL  Last Admin: 04/20/18 0617  Dispense Loc: [place] [address] TOWER  Infused Over: 60 Minutes  Volume: 100 mL     0549 (500 mg)-New Bag       1204 (500 mg)-New Bag       1619 (500 mg)-New Bag        0000 (500 mg)-New Bag       0602 (500 mg)-New Bag       1218 (500 mg)-New Bag       1712 (500 mg)-New Bag       2238 (500 mg)-New Bag        0511 (500 mg)-New Bag       1216 (500 mg)-New Bag       1813 (500 mg)-New Bag        0231 (500 mg)-New Bag       0921 (500 mg)-New Bag       1906 (500 mg)-New Bag [C]        0105 (500 mg)-New Bag       0617 (500 mg)-New Bag       0805-Med Discontinued

## 2018-04-15 NOTE — IP AVS SNAPSHOT
MRN:[id number]                      After Visit Summary   4/15/2018    Jamilah Rodriguez    MRN: [id number]           Thank you!     Thank you for choosing [facility] for your care. Our goal is always to provide you with excellent care. Hearing back from our patients is one way we can continue to improve our services. Please take a few minutes to complete the written survey that you may receive in the mail after you visit with us. Thank you!        Patient Information     Date Of Birth          4/5/1931        Designated Caregiver       Most Recent Value    Caregiver    Will someone help with your care after discharge? yes    Name of designated caregiver Lulu    Phone number of caregiver [phone]    Caregiver address [address]. [facility]      About your hospital stay     You were admitted on:  April 15, 2018 You last received care in the:  [facility] [address] [facility]    You were discharged on:  April 22, 2018       Who to Call     For medical emergencies, please call 911.  For non-urgent questions about your medical care, please call your primary care provider or clinic, [phone]  For questions related to your surgery, please call your surgery clinic        Attending Provider     Provider Specialty    Vitaliy Roca DO Emergency Medicine    Jm Craig MD Surgery       Primary Care Provider Office Phone # Fax #    Sharon Hurtado [phone]      After Care Instructions     Activity       Avoid heavy lifting (over 20 lbs) and strenuous physical activity for 3 weeks.  Walking is encouraged.            Diet       May resume regular balanced diet as tolerated.            Discharge Instructions       It is ok to shower, but avoid submersing the incision for 2 weeks from surgery.  You may wear the abdominal binder for comfort.  Walk several times a day.                  Follow-up Appointments     Follow-up and recommended  labs and tests        Follow up with Dr. [facility] at [facility] in about 2 weeks.  Call [phone] to schedule an appointment.                  Your next 10 appointments already scheduled     May 02, 2018  9:15 AM CDT   (Arrive by 9:00 AM)   US ABDOMINAL AORTIC IMAGING with SHVUS2   [facility] ([facility])    [address]  [place] [address]   [phone]           Please bring a list of your medicines (including vitamins, minerals and over-the-counter drugs). Also, tell your doctor about any allergies you may have. Wear comfortable clothes and leave your valuables at home.  Adults: No eating or drinking for 8 hours before the exam. You may take medicine with a small sip of water.  Children: - Children 6+ years: No food or drink for 6 hours before exam. - Children 1-5 years: No food or drink for 4 hours before exam. - Infants, breast-fed: may have breast milk up to 2 hours before exam. - Infants, formula: may have bottle until 4 hours before exam.  Please call the Imaging Department at your exam site with any questions.            May 02, 2018 10:00 AM CDT   Return Visit with Lv Boogie MD   [facility] ([facility])    [address]  [place] [id number] [phone]              Further instructions from your care team       [address] [address]  [phone]    Pending Results     No orders found from 4/13/2018 to 4/16/2018.            Statement of Approval     Ordered          04/22/18 0735  I have reviewed and agree with all the recommendations and orders detailed in this document.  EFFECTIVE NOW     Approved and electronically signed by:  Yasmeen Ruelas PA-C           04/20/18 1130  I have reviewed and agree with all the recommendations and orders detailed in this document.   "EFFECTIVE NOW     Approved and electronically signed by:  Zeb Byers PA-C             Admission Information     Date & Time Provider Department Dept. Phone    4/15/2018 Jm Craig MD [place] [address] [facility] [phone]      Your Vitals Were     Blood Pressure Pulse Temperature Respirations Height Weight    130/77 88 97.4  F (36.3  C) (Oral) 18 1.651 m (5' 5\") 60.5 kg (133 lb 4.8 oz)    Pulse Oximetry BMI (Body Mass Index)                96% 22.18 kg/m2          MyChart Information     [employer] gives you secure access to your electronic health record. If you see a primary care provider, you can also send messages to your care team and make appointments. If you have questions, please call your primary care clinic.  If you do not have a primary care provider, please call [phone] and they will assist you.        Care EveryWhere ID     This is your Care EveryWhere ID. This could be used by other organizations to access your [facility] medical records  [id number]        Equal Access to Services     DRE FORREST : Hadii sherly canoo Somu, waaxda luqadaha, qaybta kaalmada adeegyabharathi, kimberly drake . So [facility] [phone].    ATENCIÓN: Si habla español, tiene a smith disposición servicios gratuitos de asistencia lingüística. Llame al [phone].    We comply with applicable federal civil rights laws and [place] laws. We do not discriminate on the basis of race, color, national origin, age, disability, sex, sexual orientation, or gender identity.               Review of your medicines      START taking        Dose / Directions    levofloxacin 500 MG tablet   Commonly known as:  LEVAQUIN   Indication:  Infection Within the Abdomen   Used for:  Perforated bowel (H)        Dose:  500 mg   Take 1 tablet (500 mg) by mouth every 24 hours   Quantity:  2 tablet   Refills:  0       metroNIDAZOLE 250 MG tablet   Commonly known as:  FLAGYL   Indication:  Infection " Within the Abdomen   Used for:  Perforated bowel (H)        Dose:  250 mg   Take 1 tablet (250 mg) by mouth every 8 hours   Quantity:  6 tablet   Refills:  0       traMADol 50 MG tablet   Commonly known as:  ULTRAM   Used for:  Acute post-operative pain        Dose:  50 mg   Take 1 tablet (50 mg) by mouth every 6 hours as needed for moderate pain   Quantity:  20 tablet   Refills:  0         CONTINUE these medicines which have NOT CHANGED        Dose / Directions    albuterol 108 (90 Base) MCG/ACT Inhaler   Commonly known as:  PROAIR HFA/PROVENTIL HFA/VENTOLIN HFA        Inhale into the lungs as needed for shortness of breath / dyspnea or wheezing   Refills:  0       ascorbic acid 500 MG tablet   Commonly known as:  VITAMIN C        Dose:  500 mg   Take 1 tablet by mouth daily.   Quantity:  100 tablet   Refills:  12       bimatoprost 0.01 % Soln   Commonly known as:  LUMIGAN        Dose:  1 drop   Place 1 drop into both eyes At Bedtime.   Refills:  0       BLINK TEARS OP        Apply to eye as needed   Refills:  0       calcium + D 600-200 MG-UNIT Tabs   Generic drug:  calcium carbonate-vitamin D        Dose:  1 tablet   Take 1 tablet by mouth every 12 hours.   Quantity:  100 tablet   Refills:  12       dorzolamide 2 % ophthalmic solution   Commonly known as:  TRUSOPT        Dose:  1 drop   Place 1 drop into both eyes 2 times daily   Refills:  0       losartan 50 MG tablet   Commonly known as:  COZAAR   Used for:  Benign essential hypertension        Dose:  50 mg   Take 1 tablet (50 mg) by mouth daily   Quantity:  90 tablet   Refills:  1       PRESERVISION AREDS 2 PO        Dose:  1 tablet   Take 1 tablet by mouth 2 times daily   Refills:  0       umeclidinium-vilanterol 62.5-25 MCG/INH oral inhaler   Commonly known as:  ANORO ELLIPTA   Used for:  Chronic obstructive pulmonary disease, unspecified COPD type (H)        Dose:  1 puff   Inhale 1 puff into the lungs daily   Quantity:  1 Inhaler   Refills:  11        vitamin D 2000 units tablet   Used for:  Osteoporosis        Dose:  1 tablet   Take 1 tablet by mouth daily.   Refills:  0         STOP taking     FLAX SEEDS PO           GLUCOSAMINE SULFATE PO                Where to get your medicines      These medications were sent to [id number] - [place], [place] - [address]  [address] [facility] [address]     Phone:  [phone]     levofloxacin 500 MG tablet    metroNIDAZOLE 250 MG tablet         Some of these will need a paper prescription and others can be bought over the counter. Ask your nurse if you have questions.     Bring a paper prescription for each of these medications     traMADol 50 MG tablet                Protect others around you: Learn how to safely use, store and throw away your medicines at www.disposemymeds.org.        ANTIBIOTIC INSTRUCTION     You've Been Prescribed an Antibiotic - Now What?  Your healthcare team thinks that you or your loved one might have an infection. Some infections can be treated with antibiotics, which are powerful, life-saving drugs. Like all medications, antibiotics have side effects and should only be used when necessary. There are some important things you should know about your antibiotic treatment.      Your healthcare team may run tests before you start taking an antibiotic.    Your team may take samples (e.g., from your blood, urine or other areas) to run tests to look for bacteria. These test can be important to determine if you need an antibiotic at all and, if you do, which antibiotic will work best.      Within a few days, your healthcare team might change or even stop your antibiotic.    Your team may start you on an antibiotic while they are working to find out what is making you sick.    Your team might change your antibiotic because test results show that a different antibiotic would be better to treat your infection.    In some cases, once your team has more information, they  learn that you do not need an antibiotic at all. They may find out that you don't have an infection, or that the antibiotic you're taking won't work against your infection. For example, an infection caused by a virus can't be treated with antibiotics. Staying on an antibiotic when you don't need it is more likely to be harmful than helpful.      You may experience side effects from your antibiotic.    Like all medications, antibiotics have side effects. Some of these can be serious.    Let you healthcare team know if you have any known allergies when you are admitted to the hospital.    One significant side effect of nearly all antibiotics is the risk of severe and sometimes deadly diarrhea caused by Clostridium difficile (C. Difficile). This occurs when a person takes antibiotics because some good germs are destroyed. Antibiotic use allows C. diificile to take over, putting patients at high risk for this serious infection.    As a patient or caregiver, it is important to understand your or your loved one's antibiotic treatment. It is especially important for caregivers to speak up when patients can't speak for themselves. Here are some important questions to ask your healthcare team.    What infection is this antibiotic treating and how do you know I have that infection?    What side effects might occur from this antibiotic?    How long will I need to take this antibiotic?    Is it safe to take this antibiotic with other medications or supplements (e.g., vitamins) that I am taking?     Are there any special directions I need to know about taking this antibiotic? For example, should I take it with food?    How will I be monitored to know whether my infection is responding to the antibiotic?    What tests may help to make sure the right antibiotic is prescribed for me?      Information provided by:  www.cdc.gov/getsmart  U.S. Department of Health and Human Services  Centers for disease Control and  Prevention  National Center for Emerging and Zoonotic Infectious Diseases  Division of Healthcare Quality Promotion        Information about OPIOIDS     PRESCRIPTION OPIOIDS: WHAT YOU NEED TO KNOW   You have a prescription for an opioid (narcotic) pain medicine. Opioids can cause addiction. If you have a history of chemical dependency of any type, you are at a higher risk of becoming addicted to opioids. Only take this medicine after all other options have been tried. Take it for as short a time and as few doses as possible.     Do not:    Drive. If you drive while taking these medicines, you could be arrested for driving under the influence (DUI).    Operate heavy machinery    Do any other dangerous activities while taking these medicines.     Drink any alcohol while taking these medicines.      Take with any other medicines that contain acetaminophen. Read all labels carefully. Look for the word  acetaminophen  or  Tylenol.  Ask your pharmacist if you have questions or are unsure.    Store your pills in a secure place, locked if possible. We will not replace any lost or stolen medicine. If you don t finish your medicine, please throw away (dispose) as directed by your pharmacist. The [place] Pollution Control Agency has more information about safe disposal: https://www.pca.[facility].[place].us/living-green/managing-unwanted-medications    All opioids tend to cause constipation. Drink plenty of water and eat foods that have a lot of fiber, such as fruits, vegetables, prune juice, apple juice and high-fiber cereal. Take a laxative (Miralax, milk of magnesia, Colace, Senna) if you don t move your bowels at least every other day.              Medication List: This is a list of all your medications and when to take them. Check marks below indicate your daily home schedule. Keep this list as a reference.      Medications           Morning Afternoon Evening Bedtime As Needed    albuterol 108 (90 Base) MCG/ACT Inhaler   Commonly  known as:  PROAIR HFA/PROVENTIL HFA/VENTOLIN HFA   Inhale into the lungs as needed for shortness of breath / dyspnea or wheezing   Last time this was given:  1 puff on 4/21/2018  2:18 PM                                ascorbic acid 500 MG tablet   Commonly known as:  VITAMIN C   Take 1 tablet by mouth daily.                                bimatoprost 0.01 % Soln   Commonly known as:  LUMIGAN   Place 1 drop into both eyes At Bedtime.   Last time this was given:  1 drop on 4/21/2018  9:14 PM                                BLINK TEARS OP   Apply to eye as needed   Last time this was given:  1 drop on 4/18/2018  8:00 AM                                calcium + D 600-200 MG-UNIT Tabs   Take 1 tablet by mouth every 12 hours.   Generic drug:  calcium carbonate-vitamin D                                dorzolamide 2 % ophthalmic solution   Commonly known as:  TRUSOPT   Place 1 drop into both eyes 2 times daily   Last time this was given:  1 drop on 4/22/2018  8:33 AM                                levofloxacin 500 MG tablet   Commonly known as:  LEVAQUIN   Take 1 tablet (500 mg) by mouth every 24 hours   Last time this was given:  500 mg on 4/21/2018  9:14 PM                                losartan 50 MG tablet   Commonly known as:  COZAAR   Take 1 tablet (50 mg) by mouth daily   Last time this was given:  50 mg on 4/22/2018  8:36 AM                                metroNIDAZOLE 250 MG tablet   Commonly known as:  FLAGYL   Take 1 tablet (250 mg) by mouth every 8 hours   Last time this was given:  250 mg on 4/22/2018  6:45 AM                                PRESERVISION AREDS 2 PO   Take 1 tablet by mouth 2 times daily                                traMADol 50 MG tablet   Commonly known as:  ULTRAM   Take 1 tablet (50 mg) by mouth every 6 hours as needed for moderate pain                                umeclidinium-vilanterol 62.5-25 MCG/INH oral inhaler   Commonly known as:  ANORO ELLIPTA   Inhale 1 puff into the lungs daily    Last time this was given:  1 puff on 4/22/2018  8:32 AM                                vitamin D 2000 units tablet   Take 1 tablet by mouth daily.

## 2018-04-15 NOTE — ED NOTES
NG tube was in process of being inserted when MD cancelled procedure. Pt has small amount of blood in R nostril after procedure.

## 2018-04-15 NOTE — ED NOTES
"[facility]  ED Nurse Handoff Report    ED Chief complaint: Abdominal Pain (since last night, vomitting last night, but not this am , some nausea )      ED Diagnosis:   Final diagnoses:   Perforated bowel (H)   SBO (small bowel obstruction)       Code Status: DNR / DNI    Allergies:   Allergies   Allergen Reactions     Atenolol Hives     Beta Adrenergic Blockers      Brimonidine      Cephalexin Hives     Germall Plus Itching     Imidazolidinyl Urea found in many topical creams and house hold products     Latex      Itching     Morphine GI Disturbance     No Clinical Screening - See Comments Itching     Carba Mix; IMIDAZOLIDINYL UREA - Allergic to this ingredient, which is found in many common topical moisturizers and household products.     Penicillins Hives     Sulfa Drugs GI Disturbance     Tramadol Nausea and Vomiting     Tylenol GI Disturbance     Vicodin [Hydrocodone-Acetaminophen]      Fentanyl Nausea       Activity level - Baseline/Home:  Independent, with walker    Activity Level - Current:   Stand with Assist     Needed?: No    Isolation: No  Infection: Not Applicable    Bariatric?: No    Vital Signs:   Vitals:    04/15/18 1326   BP: 123/80   Pulse: 104   Resp: 18   Temp: 98  F (36.7  C)   TempSrc: Oral   SpO2: 96%   Weight: 54.9 kg (121 lb)   Height: 1.651 m (5' 5\")       Cardiac Rhythm: ,        Pain level:      Is this patient confused?: No     Patient Report: Initial Complaint: Jamilah Rodriguez is a 87 year old female with a history of AAA, cervical cancer many years s/p hysterectomy, and COPD who presents for evaluation of abdominal pain. Pt had an onset of intermittent epigastric pain starting last night around 1845. Pt states she has had 4-5 episodes of N/V. Pt also notes abdominal distention. Pt states no BM for at least 3 days.   Focused Assessment: Cardiac: WDL, denies CP  Resp: WDL, denies SOB  Neuro: A&Ox4  Gastro: Abdominal pain, abdominal distention, N/V, unable to " pass flatus today.    Tests Performed: Labs, CT  Abnormal Results:   CBC with platelets + differential Resulted Abnormal Result -   WBC: 13.0 10e9/L [Ref Range: 4.0 - 11.0]  Absolute Neutrophil: 10.9 10e9/L [Ref Range: 1.6 - 8.3]  Collected: 4/15/2018 14:02  Last updated: 4/15/2018 14:14     Comprehensive metabolic panel Resulted Abnormal Result -   Glucose: 133 mg/dL [Ref Range: 70 - 99]  GFR Estimate: 54 mL/min/1.7m2 [Ref Range: >60]  Collected: 4/15/2018 14:02  Last updated: 4/15/2018 14:30     CT Abdomen Pelvis w Contrast Resulted Abnormal Result -   Radiologist flags: Free intraperitoneal air.  Last updated: 4/15/2018 15:44         Treatments provided: Zofran, NS, Flagyl, cipro to follow    Family Comments: Daughter at bedside.     OBS brochure/video discussed/provided to patient: No    ED Medications:   Medications   ciprofloxacin (CIPRO) infusion 400 mg (not administered)   metroNIDAZOLE (FLAGYL) infusion 500 mg (500 mg Intravenous New Bag 4/15/18 1552)   0.9% sodium chloride BOLUS (0 mLs Intravenous Stopped 4/15/18 1514)   ondansetron (ZOFRAN) injection 4 mg (4 mg Intravenous Given 4/15/18 1405)   iopamidol (ISOVUE-370) solution 61 mL (61 mLs Intravenous Given 4/15/18 1458)   CT scan flush (60 mLs Intravenous Given 4/15/18 1458)   sodium chloride 0.9% infusion ( Intravenous New Bag 4/15/18 1524)       Drips infusing?:  Yes, NS, flagyl    For the majority of the shift this patient was Green.   Interventions performed were Monitor.    Severe Sepsis OR Septic Shock Diagnosis Present: No      ED NURSE PHONE NUMBER: *[phone]

## 2018-04-16 LAB
ANION GAP SERPL CALCULATED.3IONS-SCNC: 8 MMOL/L (ref 3–14)
BUN SERPL-MCNC: 15 MG/DL (ref 7–30)
CALCIUM SERPL-MCNC: 7.7 MG/DL (ref 8.5–10.1)
CHLORIDE SERPL-SCNC: 105 MMOL/L (ref 94–109)
CO2 SERPL-SCNC: 24 MMOL/L (ref 20–32)
CREAT SERPL-MCNC: 0.81 MG/DL (ref 0.52–1.04)
ERYTHROCYTE [DISTWIDTH] IN BLOOD BY AUTOMATED COUNT: 13 % (ref 10–15)
GFR SERPL CREATININE-BSD FRML MDRD: 67 ML/MIN/1.7M2
GLUCOSE SERPL-MCNC: 89 MG/DL (ref 70–99)
HCT VFR BLD AUTO: 30.5 % (ref 35–47)
HGB BLD-MCNC: 10.2 G/DL (ref 11.7–15.7)
MCH RBC QN AUTO: 29.6 PG (ref 26.5–33)
MCHC RBC AUTO-ENTMCNC: 33.4 G/DL (ref 31.5–36.5)
MCV RBC AUTO: 88 FL (ref 78–100)
PLATELET # BLD AUTO: 225 10E9/L (ref 150–450)
POTASSIUM SERPL-SCNC: 3.9 MMOL/L (ref 3.4–5.3)
RBC # BLD AUTO: 3.45 10E12/L (ref 3.8–5.2)
SODIUM SERPL-SCNC: 137 MMOL/L (ref 133–144)
WBC # BLD AUTO: 11.6 10E9/L (ref 4–11)

## 2018-04-16 PROCEDURE — 85027 COMPLETE CBC AUTOMATED: CPT | Performed by: PHYSICIAN ASSISTANT

## 2018-04-16 PROCEDURE — 25000132 ZZH RX MED GY IP 250 OP 250 PS 637: Mod: GY | Performed by: SURGERY

## 2018-04-16 PROCEDURE — 25000128 H RX IP 250 OP 636: Performed by: PHYSICIAN ASSISTANT

## 2018-04-16 PROCEDURE — A9270 NON-COVERED ITEM OR SERVICE: HCPCS | Mod: GY | Performed by: SURGERY

## 2018-04-16 PROCEDURE — 25000125 ZZHC RX 250: Performed by: PHYSICIAN ASSISTANT

## 2018-04-16 PROCEDURE — 12000007 ZZH R&B INTERMEDIATE

## 2018-04-16 PROCEDURE — 36415 COLL VENOUS BLD VENIPUNCTURE: CPT | Performed by: PHYSICIAN ASSISTANT

## 2018-04-16 PROCEDURE — 80048 BASIC METABOLIC PNL TOTAL CA: CPT | Performed by: PHYSICIAN ASSISTANT

## 2018-04-16 RX ORDER — LEVOFLOXACIN 5 MG/ML
250 INJECTION, SOLUTION INTRAVENOUS EVERY 24 HOURS
Status: DISCONTINUED | OUTPATIENT
Start: 2018-04-17 | End: 2018-04-20

## 2018-04-16 RX ORDER — ALBUTEROL SULFATE 90 UG/1
1-2 AEROSOL, METERED RESPIRATORY (INHALATION) DAILY PRN
Status: DISCONTINUED | OUTPATIENT
Start: 2018-04-16 | End: 2018-04-22 | Stop reason: HOSPADM

## 2018-04-16 RX ORDER — DORZOLAMIDE HCL 20 MG/ML
1 SOLUTION/ DROPS OPHTHALMIC 2 TIMES DAILY
Status: DISCONTINUED | OUTPATIENT
Start: 2018-04-16 | End: 2018-04-22 | Stop reason: HOSPADM

## 2018-04-16 RX ORDER — ALBUTEROL SULFATE 90 UG/1
AEROSOL, METERED RESPIRATORY (INHALATION) PRN
COMMUNITY
End: 2018-04-22

## 2018-04-16 RX ADMIN — Medication 1 LOZENGE: at 14:45

## 2018-04-16 RX ADMIN — METOPROLOL TARTRATE 5 MG: 5 INJECTION, SOLUTION INTRAVENOUS at 21:09

## 2018-04-16 RX ADMIN — METRONIDAZOLE 500 MG: 500 INJECTION, SOLUTION INTRAVENOUS at 16:19

## 2018-04-16 RX ADMIN — METRONIDAZOLE 500 MG: 500 INJECTION, SOLUTION INTRAVENOUS at 05:49

## 2018-04-16 RX ADMIN — LEVOFLOXACIN 500 MG: 5 INJECTION, SOLUTION INTRAVENOUS at 00:53

## 2018-04-16 RX ADMIN — METRONIDAZOLE 500 MG: 500 INJECTION, SOLUTION INTRAVENOUS at 12:04

## 2018-04-16 RX ADMIN — DORZOLAMIDE HCL 1 DROP: 20 SOLUTION/ DROPS OPHTHALMIC at 20:32

## 2018-04-16 RX ADMIN — Medication 1 SPRAY: at 16:20

## 2018-04-16 RX ADMIN — SODIUM CHLORIDE: 9 INJECTION, SOLUTION INTRAVENOUS at 14:48

## 2018-04-16 RX ADMIN — ENOXAPARIN SODIUM 40 MG: 40 INJECTION SUBCUTANEOUS at 09:18

## 2018-04-16 NOTE — ANESTHESIA POSTPROCEDURE EVALUATION
Patient: Jamilah Rodriguez    Procedure(s):   - Wound Class: II-Clean Contaminated   - Wound Class: II-Clean Contaminated    Diagnosis:Free Abdominal Air  Diagnosis Additional Information: No value filed.    Anesthesia Type:  General, ETT, RSI    Note:  Anesthesia Post Evaluation    Patient location during evaluation: PACU  Patient participation: Able to fully participate in evaluation  Level of consciousness: awake and alert  Pain management: adequate  Airway patency: patent  Cardiovascular status: acceptable, hemodynamically stable and blood pressure returned to baseline  Respiratory status: acceptable and nasal cannula  Hydration status: acceptable  PONV: none     Anesthetic complications: None    Comments: Received TAP blocks - no pain in PACU, no narcotic given in PACU. Did receive 250 ml albumin for relative hypovolemia. No complications        Last vitals:  Vitals:    04/15/18 2200 04/15/18 2205 04/15/18 2210   BP: 100/66 102/65 108/64   Pulse:      Resp: 11 13 10   Temp:      SpO2: 93% 94% 93%         Electronically Signed By: Jackie Daniel MD  April 15, 2018  10:22 PM

## 2018-04-16 NOTE — OR NURSING
Telephone report was given to Station 33 RN.  Patient will be transported to Room. 32 via cart accompanied by RN.  Belongings: 1 hospital bag. Family : at bedside

## 2018-04-16 NOTE — PLAN OF CARE
Problem: Patient Care Overview  Goal: Plan of Care/Patient Progress Review  Outcome: Improving  VSS Nasal canula at 2L. A/Ox4. Incisions midline scant drainage edges marked. Denied pain during shift. Up with assist of 1. NPO ice chips diet. BS are diminished and hypoactive. Due to void, bladder scanner reported 0ml. LS diminished and clear bilaterally.

## 2018-04-16 NOTE — OR NURSING
Patient Jamilah Rodriguez started to wake up and following command. ET was discontinued by MDA with  Oral airway was discontinued by RN and procedure was uneventful.  Oxygen delivery via simple face mask was continued.

## 2018-04-16 NOTE — OR NURSING
Telephone message left to patient's daughter Lulu, updating patient's condition and possible time of transfer.

## 2018-04-16 NOTE — PLAN OF CARE
Problem: Patient Care Overview  Goal: Plan of Care/Patient Progress Review  Outcome: Improving  A+Ox4. VSS on 3L NC, capno. Midline dressing scant ss drainage, marked. ELIE x2 CDI. NG to LIS, brown output. BS hypo. -Gas. Denies pain. Right leg wound dressing CDI, pt stated it is from 2 wk ago. CMS intact. Morrow with adequate output, D/C'd, DTV. Dangled at bedside. NPO ex ice chips/meds.

## 2018-04-16 NOTE — OR NURSING
Patient Jamilah Rodriguez arrived from OR via cart sedated, accompanied by CRNA with a ET and oral airway in placed.

## 2018-04-16 NOTE — OR NURSING
Patient Jamilah Rodriguez is in stable condition and has met criteria for PACU discharge.  MDA informed and a sign out was given for Unit/Station transfer.

## 2018-04-16 NOTE — PROGRESS NOTES
"Surgery    Patient seen and examined.   Good progress.  Agree with student note below.    Zeb Byers PA-C    Surgery Progress Note    Admission Date: 4/15/2018  2018    Jamilah Rodriguez is a 87 year old female S/P Procedure(s):  LAPAROSCOPY DIAGNOSTIC (GENERAL)  LAPAROTOMY EXPLORATORY, 1 Day Post-Op.    S:  Jamilah is resting in bed comfortably, she denies any pain, from her surgery.  She was able to stand with staff assistance, but has not ambulated.   Morrow was removed last night.  Denies any nausea, bowel movements, or flatus.  She is waiting for her daughter to come with her inhaler, denies any shortness of breath or difficulty breathing.      O:  Blood pressure 116/63, pulse 88, temperature 98.6  F (37  C), temperature source Oral, resp. rate 16, height 1.651 m (5' 5\"), weight 58.8 kg (129 lb 10.1 oz), SpO2 92 %, not currently breastfeeding.  Temperature Temp  Av.3  F (36.8  C)  Min: 97  F (36.1  C)  Max: 99.1  F (37.3  C)   I/O last 3 completed shifts:  In: 4166 [I.V.:3666]  Out: 805 [Urine:600; Emesis/NG output:50; Drains:80; Blood:75]    Constitutional: Resting comfortably, A&O x3, not in acute distress  Lungs: CTA bilaterally, no wheezes, rales, or crackles  CV: RRR, normal S1/S2, no murmurs, clicks, or rubs  Abdomen: soft, mildly distended with right sided crepitus, incision site appropriately tender to palpation  Wounds:  Drains: ELIE drain intact with serosanguinous fluid  NG: Low intermittent suction with gastric and bilious fluids  Extremeities: no peripheral edema, CMS intact, bandage from previous wound present with no signs of erythema or purulent drainage    Data:    Labs reviewed  WBC: 11.6 trending down    No results for input(s): CULT in the last 168 hours.       Assessment and Plan:    - Pain under control, use IV pain medications prn  - Diet NPO, except ice chips til NG removed and bowel function returns  - IVF  - UO adequate yesterday, no voiding yet today  - DVT prophylax, " continue enoxaparin and SCDs  - Wound, Change dressing tomorrow  - Drains: NG to AZEEM, ELIE drains in place  - Encourage ambulation and IS use today  - Disposition, D/c to home in 1-2 days pending bowel function and pain control      Flip WOODS  [facility]

## 2018-04-16 NOTE — CONSULTS
General Surgery Consultation     Jamilah Rodriguez MRN# [id number]   YOB: 1931 Age: 87 year old   Date of Admission: 4/15/2018     Reason for consult: I was asked by Dr. Roca to evaluate this patient for free intraabdominal air.           Assessment and Plan:   Free intraabdominal air and small bowel obstruction like clinical and radiographic picture.     To the Operating Room for abdominal exploration  Discussed with patient and daughter the rationale for the procedure; the risk, benefits, hopeful outcomes and possible complications were explained, they understand and she will like to proceed.  ABX and fluid has been provided.          Chief Complaint:   Abdominal pain and free intraabdominal air.    History is obtained from the patient, electronic health record and emergency department physician         History of Present Illness:   This patient is a 87 year old female who presents with nausea, vomiting and abdominal pain since yesterday afternoon.  Had BM yesterday and had been passing flatus, pain that began yesterday progressively got worse, upon ED evaluation she was found to have dilated small bowel and free intraabdominal air.            Past Medical History:     Past Medical History:   Diagnosis Date     AAA (abdominal aortic aneurysm) (H)     Followed with yearly ultrasound     Cervical cancer (H) 1969     Chronic dermatitis     extensive allergy testing revealed allergy/sensitivity to carba mix ( rubber) and Imidazolidinyl Urea (common in beauty products)     Glaucoma      Glaucoma      History of COPD     very mild abnormal spirometry in 2007 (in [place]), has not been active since quitting smoking in 2009     HTN (hypertension)      Macular degeneration      Osteoporosis 8/3/11    femoral T score -3.4 & -3.7     PAD (peripheral artery disease) (H)      Vertebral compression fracture (H) 5/17/11    L1 compression fracture, presumed osteoporotic compression fracture             Past Surgical  History:     Past Surgical History:   Procedure Laterality Date     CATARACT IOL, RT/LT  2/8/12    left eye cataract removal     HYSTERECTOMY TOTAL ABDOMINAL  1999    Fibroids     OPEN REDUCTION INTERNAL FIXATION HIP NAILING  7/2/2012    Procedure: OPEN REDUCTION INTERNAL FIXATION HIP NAILING;  Intramedullary Fixation Right Intertrochanteric Hip Fracture;  Surgeon: Chalino Covarrubias MD;  Location: [facility] OR     OPEN REDUCTION INTERNAL FIXATION TIBIAL PLATEAU  3/31/2013    Procedure: OPEN REDUCTION INTERNAL FIXATION TIBIAL PLATEAU;  RIGHT LATERAL TIBIAL PLATEAU FRACTURE - SYNTHES Medial Meniscus Repair, Lateral Compartment Release;  Surgeon: Alfred Cardenas MD;  Location: [facility] OR     OPEN REDUCTION INTERNAL FIXATION WRIST  1988    left wrist ORIF, hardware removed onemonth later                Social History:   I have reviewed this patient's social history          Family History:   I have reviewed this patient's family history          Immunizations:     Immunization History   Administered Date(s) Administered     Influenza (High Dose) 3 valent vaccine 10/23/2017     Influenza (IIV3) PF 10/13/2010, 10/04/2011, 08/01/2012, 09/23/2013, 10/01/2014     Pneumococcal 23 valent 01/01/2006     TD (ADULT, 7+) 07/28/2011             Allergies:     Allergies   Allergen Reactions     Atenolol Hives     Beta Adrenergic Blockers      Brimonidine      Cephalexin Hives     Germall Plus Itching     Imidazolidinyl Urea found in many topical creams and house hold products     Latex      Itching     Morphine GI Disturbance     No Clinical Screening - See Comments Itching     Carba Mix; IMIDAZOLIDINYL UREA - Allergic to this ingredient, which is found in many common topical moisturizers and household products.     Penicillins Hives     Sulfa Drugs GI Disturbance     Tramadol Nausea and Vomiting     Tylenol GI Disturbance     Vicodin [Hydrocodone-Acetaminophen]      Fentanyl Nausea             Medications:     Prescriptions Prior to Admission    Medication Sig Dispense Refill Last Dose     Multiple Vitamins-Minerals (PRESERVISION AREDS 2 PO) Take 1 tablet by mouth 2 times daily   4/14/2018 at pm     umeclidinium-vilanterol (ANORO ELLIPTA) 62.5-25 MCG/INH oral inhaler Inhale 1 puff into the lungs daily 1 Inhaler 11 4/15/2018 at am     losartan (COZAAR) 50 MG tablet Take 1 tablet (50 mg) by mouth daily 90 tablet 1 4/15/2018 at am     GLUCOSAMINE SULFATE PO Take 500 mg by mouth daily   4/15/2018 at am     Flaxseed, Linseed, (FLAX SEEDS PO) Take 1 Tablespoonful by mouth daily (with breakfast) With grapefruit juice   4/15/2018 at am     dorzolamide (TRUSOPT) 2 % ophthalmic solution Place 1 drop into both eyes 2 times daily   4/15/2018 at am     Cholecalciferol (VITAMIN D) 2000 UNITS tablet Take 1 tablet by mouth daily.   4/14/2018 at am     bimatoprost (LUMIGAN) 0.01 % SOLN Place 1 drop into both eyes At Bedtime.     4/14/2018 at hs     Calcium Carbonate-Vitamin D (CALCIUM + D) 600-200 MG-UNIT per tablet Take 1 tablet by mouth every 12 hours. 100 tablet 12 4/14/2018 at pm     ascorbic acid (VITAMIN C) 500 MG tablet Take 1 tablet by mouth daily. 100 tablet 12 4/14/2018 at am             Review of Systems:   The 5 point Review of Systems is negative other than noted in the HPI           Physical Exam:   Vitals were reviewed  Constitutional:   awake, alert, cooperative, moderate distress, appears stated age and thin     Eyes:   sclera clear     Neck:   supple, symmetrical, trachea midline     Lungs:   Mild respiratory distress, good air exchange and no retractions     Abdomen:   scars noted large midline scar, soft, distended, rebound tenderness present, involuntary guarding present and hernia present in the right groin reducible     Musculoskeletal:   tone is normal     Skin:   Thin but otherwise normal skin color, texture, turgor             Data:   All laboratory and imaging data in the past 24 hours reviewed

## 2018-04-16 NOTE — PLAN OF CARE
Problem: Patient Care Overview  Goal: Plan of Care/Patient Progress Review  Outcome: Improving  Pt arrived @ floor 2253. Paged Dr Craig regarding pt/family complain for not having any breathing medications. New order received and informed pt.

## 2018-04-16 NOTE — ANESTHESIA PROCEDURE NOTES
Peripheral nerve/Neuraxial procedure note : TAP  Pre-Procedure  Performed by IJEOMA RODRIGUEZ  Location: pre-op      Pre-Anesthestic Checklist: patient identified, IV checked, site marked, risks and benefits discussed, informed consent, monitors and equipment checked, pre-op evaluation, at physician/surgeon's request and post-op pain management    Timeout  Correct Patient: Yes   Correct Procedure: Yes   Correct Site: Yes   Correct Laterality: Yes   Correct Position: Yes   Site Marked: Yes   .   Procedure Documentation    .    Procedure:  bilateral  TAP.  Local skin infiltrated with 1 mL of 1% lidocaine.     Ultrasound used to identify targeted nerve, plexus, or vascular marker and placed a needle adjacent to it., Ultrasound was used to visualize the spread of the anesthetic in close proximity to the above stated nerve. A permanent image is entered into the patient's record.  Patient Prep;povidone-iodine 7.5% surgical scrub.  .  Needle: insulated Needle Gauge: 21.    Needle Length (Inches) 3.13  Insertion Method: Single Shot.       Assessment/Narrative  Paresthesias: No.  .  The placement was negative for: blood aspirated, painful injection and site bleeding.  Bolus given via needle. No blood aspirated via catheter.   Secured via.   Complications:. Comments:  Bolus via needle, 60 ml of 0.25% Bupivacaine with 1:200,000 epinephrine (30 ml on each side).  Patient tolerated well, was mildly sedated but communicative throughout the procedure.   The surgeon has given a verbal order transferring care of this patient to me for the performance of regional analgesia block for post op pain control. It is requested of me because I am uniquely trained and qualified to perform this block and the surgeon is neither trained nor qualified to perform this procedure.

## 2018-04-16 NOTE — PLAN OF CARE
Problem: Patient Care Overview  Goal: Plan of Care/Patient Progress Review  Outcome: No Change  VSS, on 1.5 L O2. Incision dressing marked. 2 JPs intact. NS running at 75. BS hypo, not passing gas. NG to LIS. NPO with ice. Ambulated in halls assist of 1/walker.

## 2018-04-16 NOTE — ANESTHESIA CARE TRANSFER NOTE
Patient: Jamilah Rodriguez    Procedure(s):   - Wound Class: II-Clean Contaminated   - Wound Class: II-Clean Contaminated    Diagnosis: Free Abdominal Air  Diagnosis Additional Information: No value filed.    Anesthesia Type:   General, ETT, RSI     Note:  Airway :T-piece  Patient transferred to:PACU  Comments: VSS on arrival to PACU, on 10L 02 via T-piece. Report given to RN before transfer of patient care.Handoff Report: Identifed the Patient, Identified the Reponsible Provider, Reviewed the pertinent medical history, Discussed the surgical course, Reviewed Intra-OP anesthesia mangement and issues during anesthesia, Set expectations for post-procedure period and Allowed opportunity for questions and acknowledgement of understanding      Vitals: (Last set prior to Anesthesia Care Transfer)    CRNA VITALS  4/15/2018 1936 - 4/15/2018 2012      4/15/2018             NIBP: 111/55    Pulse: 80    NIBP Mean: 74    SpO2: 100 %    Resp Rate (observed): 8    Resp Rate (set): 10                Electronically Signed By: JAMIN Quiroz CRNA  April 15, 2018  8:12 PM

## 2018-04-17 LAB — GLUCOSE BLDC GLUCOMTR-MCNC: 83 MG/DL (ref 70–99)

## 2018-04-17 PROCEDURE — 00000146 ZZHCL STATISTIC GLUCOSE BY METER IP

## 2018-04-17 PROCEDURE — 25000128 H RX IP 250 OP 636: Performed by: SURGERY

## 2018-04-17 PROCEDURE — 12000007 ZZH R&B INTERMEDIATE

## 2018-04-17 PROCEDURE — 25000128 H RX IP 250 OP 636: Performed by: PHYSICIAN ASSISTANT

## 2018-04-17 PROCEDURE — 25000125 ZZHC RX 250: Performed by: PHYSICIAN ASSISTANT

## 2018-04-17 PROCEDURE — 25000132 ZZH RX MED GY IP 250 OP 250 PS 637: Mod: GY | Performed by: SURGERY

## 2018-04-17 RX ADMIN — METRONIDAZOLE 500 MG: 500 INJECTION, SOLUTION INTRAVENOUS at 06:02

## 2018-04-17 RX ADMIN — METOPROLOL TARTRATE 5 MG: 5 INJECTION, SOLUTION INTRAVENOUS at 14:21

## 2018-04-17 RX ADMIN — DORZOLAMIDE HCL 1 DROP: 20 SOLUTION/ DROPS OPHTHALMIC at 08:32

## 2018-04-17 RX ADMIN — Medication 1 LOZENGE: at 08:52

## 2018-04-17 RX ADMIN — METRONIDAZOLE 500 MG: 500 INJECTION, SOLUTION INTRAVENOUS at 22:38

## 2018-04-17 RX ADMIN — DORZOLAMIDE HCL 1 DROP: 20 SOLUTION/ DROPS OPHTHALMIC at 20:56

## 2018-04-17 RX ADMIN — ENOXAPARIN SODIUM 40 MG: 40 INJECTION SUBCUTANEOUS at 08:45

## 2018-04-17 RX ADMIN — METOPROLOL TARTRATE 5 MG: 5 INJECTION, SOLUTION INTRAVENOUS at 20:57

## 2018-04-17 RX ADMIN — METOPROLOL TARTRATE 5 MG: 5 INJECTION, SOLUTION INTRAVENOUS at 08:45

## 2018-04-17 RX ADMIN — Medication 1 LOZENGE: at 14:38

## 2018-04-17 RX ADMIN — LEVOFLOXACIN 250 MG: 5 INJECTION, SOLUTION INTRAVENOUS at 23:44

## 2018-04-17 RX ADMIN — METRONIDAZOLE 500 MG: 500 INJECTION, SOLUTION INTRAVENOUS at 12:18

## 2018-04-17 RX ADMIN — METRONIDAZOLE 500 MG: 500 INJECTION, SOLUTION INTRAVENOUS at 00:00

## 2018-04-17 RX ADMIN — SODIUM CHLORIDE: 9 INJECTION, SOLUTION INTRAVENOUS at 21:57

## 2018-04-17 RX ADMIN — METRONIDAZOLE 500 MG: 500 INJECTION, SOLUTION INTRAVENOUS at 17:12

## 2018-04-17 RX ADMIN — SODIUM CHLORIDE: 9 INJECTION, SOLUTION INTRAVENOUS at 08:46

## 2018-04-17 RX ADMIN — LEVOFLOXACIN 250 MG: 5 INJECTION, SOLUTION INTRAVENOUS at 01:12

## 2018-04-17 ASSESSMENT — ACTIVITIES OF DAILY LIVING (ADL)
RETIRED_COMMUNICATION: 0-->UNDERSTANDS/COMMUNICATES WITHOUT DIFFICULTY
TRANSFERRING: 0-->INDEPENDENT
TOILETING: 0-->INDEPENDENT
FALL_HISTORY_WITHIN_LAST_SIX_MONTHS: NO
SWALLOWING: 0-->SWALLOWS FOODS/LIQUIDS WITHOUT DIFFICULTY
DRESS: 0-->INDEPENDENT
RETIRED_EATING: 0-->INDEPENDENT
AMBULATION: 1-->ASSISTIVE EQUIPMENT
COGNITION: 0 - NO COGNITION ISSUES REPORTED
BATHING: 0-->INDEPENDENT

## 2018-04-17 NOTE — PLAN OF CARE
Problem: Patient Care Overview  Goal: Plan of Care/Patient Progress Review  Outcome: Improving  A&O. 1.5L O2. Clears. Denies nausea. Abd binder in place. ELIE x2. NG clamped for 6 hours, 50 returned at 1800, re-clamped. Sore throat managed with lozenges and ice chips. +flatus

## 2018-04-17 NOTE — PLAN OF CARE
Problem: Patient Care Overview  Goal: Plan of Care/Patient Progress Review  Outcome: Improving  A&Ox4. VSS on RA. NPO with ice chips. Assist x 1 and walker. Midline incision is c/d/i with small drainage that is marked. NG tube clamped. No complaints of nausea. Complains of mild sore throat, given lozenge. Continue to monitor.     ELIE drain dressing changed. Now clear liquid diet.

## 2018-04-17 NOTE — OP NOTE
Preoperative diagnosis: Free intraabdominal air and abdominal pain.  Postoperative diagnosis:  Same, intra-abdominal adhesions.    Operative procedure:   1.  Laparoscopic abdominal exploration converted to open abdominal exploration  2.  Lysis of adhesions  3.  Abdominal cavity washout and drain placement.    Surgeon: Jm Craig M.D.   Assistant:Zeb Byers PA-C, The physicians assistant was medically necessary for their expertise in camera management, suctioning, suturing, and retraction.    Anesthesia: GETA   EBL: Less than 10 mL.   Events:   After induction of general endotracheal anesthesia Jamilah Rodriguez abdomen was prepped and draped in the usual sterile fashion. With the direct visualization trocar in the left upper quadrant the abdomen was entered and pneumoperitoneum was established.  Immediately we noted some green tinge fluid, not particularly foul-smelling and a small amount.  Additional trochars were placed to allow manipulation of the intestines, we inspected the bowel starting at the EG junction, stomach, duodenum, small bowel, and colon.  Along the way we encountered some adhesions to the right pelvis but there did not appear to be causing any damage.  Given the normal source of bowel perforation could be noted, we elected to proceed with laparotomy.  This was done through a secondary lower midline incision.  The adhesions that we noted earlier were taken down sharply without evidence of injury.  Once again we inspected the entire length of the intra-abdominal bowel and no injuries noted.  There were some diverticuli of the sigmoid colon with no evidence of inflammatory process nor perforation.  At this point we verify the location of the nasogastric tube, irrigated the abdomen with multiple liters of saline solution, placed 1 drain in the upper abdomen and one drain in the pelvis and secured into the skin.  We then closed the midline incision with multiple interrupted 0 Vicryl sutures.   Subcutaneous tissues were approximated with 4-0 Vicryl.  Steri-Strips and dressing applied. No immediate complications. Counts reported correct.

## 2018-04-17 NOTE — PLAN OF CARE
Problem: Patient Care Overview  Goal: Plan of Care/Patient Progress Review  Outcome: Improving  AVSS. Denies pain. Midline incison marked drainage; unchanged. 2x ELIE with small amount of SS drainage present. NG tube to LIS with moderate amount brown drainage. Denies nausea and vomiting. LS dim; nonproductive cough. Voiding well; up to toilet with AOx1, walker, and gait belt.

## 2018-04-17 NOTE — PROGRESS NOTES
"Surgery    Walked this morning.   No dyspnea.   Some RUQ abd pain  Ng in place    Gen:  Awake, Alert, NAD  Blood pressure 143/75, pulse 86, temperature 98.5  F (36.9  C), temperature source Oral, resp. rate 16, height 1.651 m (5' 5\"), weight 58.8 kg (129 lb 10.1 oz), SpO2 93 %, not currently breastfeeding.  Resp - clear to ascultation.    Cardiac - Regular rate & rhythm without murmur  Abdomen - soft, appropriately tender, distended. Incisions healing appropriately without erythema or purulent drainage.  Extremities - no lower extremity edema.    ELIE drains 20/70cc clear  No new labs.    A/P 86 yo female s/p emergent laparoscopy and laparotomy on 4/15 for abdominal pain and free air.     - clamp NG tube.  - respiratory cares.   - am labs.   - ambulate with assist.   - continue drains today.   - clears soon.    Zeb Byers PA-C  Office: [phone]  Pager: [phone]      "

## 2018-04-18 LAB — PLATELET # BLD AUTO: 220 10E9/L (ref 150–450)

## 2018-04-18 PROCEDURE — 36415 COLL VENOUS BLD VENIPUNCTURE: CPT | Performed by: PHYSICIAN ASSISTANT

## 2018-04-18 PROCEDURE — 25000125 ZZHC RX 250: Performed by: SURGERY

## 2018-04-18 PROCEDURE — 85049 AUTOMATED PLATELET COUNT: CPT | Performed by: PHYSICIAN ASSISTANT

## 2018-04-18 PROCEDURE — 12000007 ZZH R&B INTERMEDIATE

## 2018-04-18 PROCEDURE — 25000132 ZZH RX MED GY IP 250 OP 250 PS 637: Mod: GY | Performed by: SURGERY

## 2018-04-18 PROCEDURE — 25000125 ZZHC RX 250: Performed by: PHYSICIAN ASSISTANT

## 2018-04-18 PROCEDURE — 25000128 H RX IP 250 OP 636: Performed by: PHYSICIAN ASSISTANT

## 2018-04-18 RX ORDER — GINSENG 100 MG
CAPSULE ORAL 3 TIMES DAILY PRN
Status: DISCONTINUED | OUTPATIENT
Start: 2018-04-18 | End: 2018-04-22 | Stop reason: HOSPADM

## 2018-04-18 RX ADMIN — BACITRACIN: 500 OINTMENT TOPICAL at 13:52

## 2018-04-18 RX ADMIN — METOPROLOL TARTRATE 5 MG: 5 INJECTION, SOLUTION INTRAVENOUS at 13:46

## 2018-04-18 RX ADMIN — Medication 1 LOZENGE: at 13:45

## 2018-04-18 RX ADMIN — SODIUM CHLORIDE: 9 INJECTION, SOLUTION INTRAVENOUS at 15:26

## 2018-04-18 RX ADMIN — DORZOLAMIDE HCL 1 DROP: 20 SOLUTION/ DROPS OPHTHALMIC at 08:10

## 2018-04-18 RX ADMIN — METRONIDAZOLE 500 MG: 500 INJECTION, SOLUTION INTRAVENOUS at 18:13

## 2018-04-18 RX ADMIN — METRONIDAZOLE 500 MG: 500 INJECTION, SOLUTION INTRAVENOUS at 12:16

## 2018-04-18 RX ADMIN — ENOXAPARIN SODIUM 40 MG: 40 INJECTION SUBCUTANEOUS at 08:03

## 2018-04-18 RX ADMIN — METRONIDAZOLE 500 MG: 500 INJECTION, SOLUTION INTRAVENOUS at 05:11

## 2018-04-18 RX ADMIN — DORZOLAMIDE HCL 1 DROP: 20 SOLUTION/ DROPS OPHTHALMIC at 21:14

## 2018-04-18 NOTE — PROGRESS NOTES
Pt A&O x4. VSS, on 1L O2. Denies pain. NG clamped. IVF infusing. Dessings CDI. Will continue to monitor.

## 2018-04-18 NOTE — PLAN OF CARE
Problem: Patient Care Overview  Goal: Plan of Care/Patient Progress Review  Outcome: Improving  Pt A&Ox4. VSS on 2 liters NC. Up assist x1 with walker. Ambulated aguilera x1. Denies pain. NG tube clamped for 6hrs, LIS with 30ml return, reclamped. Denies nausea. Abd incision CDI. Lap site x1 CDI. ELIE x2. Abd binder in place. Tolerating ice ships with sips of clear. +BS. +flatus since this AM. Voiding adequately.

## 2018-04-18 NOTE — PLAN OF CARE
Problem: Patient Care Overview  Goal: Plan of Care/Patient Progress Review  Outcome: No Change  [id number]: VSS. 1L NC, unable to wean. A&Ox4. C/o throat pain managed with lozenges. 2 ELIE drains, little output. BS active, passing flatus and one medium BM this evening. NG clamped, residuals with continuous trials were 0 and 50cc. Up Ax1 walker, ambulating in halls. Tolerated sips of clears. Continue to monitor.

## 2018-04-18 NOTE — PROGRESS NOTES
"Surgery    + flatus.  NG tube clamped.   On 2L O2.  Sats 92%  Denies dyspnea, breathing is \"pretty good\" today.  Walking some.   Persistent LUQ pain.    Gen:  Awake, Alert, NAD, pleasant, conversational.  Blood pressure 132/72, pulse 84, temperature 98.5  F (36.9  C), temperature source Oral, resp. rate 18, height 1.651 m (5' 5\"), weight 58.2 kg (128 lb 4.9 oz), SpO2 94 %  Resp - clear to ascultation.    Cardiac - Regular rate & rhythm without murmur  Abdomen - soft, appropriately tender, distended. Incisions healing appropriately without erythema or purulent drainage.  Extremities - no lower extremity edema.     Minimal clear drain output.   out/24 hours. Dark.  UOP 1390cc/24 hrs.    A/P 88 yo female s/p emergent laparoscopy and laparotomy on 4/15 for abdominal pain and free air.    - Continue serial clamping of NG.   - Clears.  - Continue inhalers. Wean o2 as able.  - Continue levo/flagyl.  - Await bowel function   - Home in 2-3 days.    Zeb Byers PA-C  Office: [phone]  Pager: [phone]    "

## 2018-04-19 LAB
ANION GAP SERPL CALCULATED.3IONS-SCNC: 9 MMOL/L (ref 3–14)
BASOPHILS # BLD AUTO: 0 10E9/L (ref 0–0.2)
BASOPHILS NFR BLD AUTO: 0.5 %
BUN SERPL-MCNC: 12 MG/DL (ref 7–30)
CALCIUM SERPL-MCNC: 7.9 MG/DL (ref 8.5–10.1)
CHLORIDE SERPL-SCNC: 106 MMOL/L (ref 94–109)
CO2 SERPL-SCNC: 25 MMOL/L (ref 20–32)
CREAT SERPL-MCNC: 0.68 MG/DL (ref 0.52–1.04)
DIFFERENTIAL METHOD BLD: ABNORMAL
EOSINOPHIL # BLD AUTO: 0.1 10E9/L (ref 0–0.7)
EOSINOPHIL NFR BLD AUTO: 1.5 %
ERYTHROCYTE [DISTWIDTH] IN BLOOD BY AUTOMATED COUNT: 13.2 % (ref 10–15)
GFR SERPL CREATININE-BSD FRML MDRD: 82 ML/MIN/1.7M2
GLUCOSE SERPL-MCNC: 73 MG/DL (ref 70–99)
HCT VFR BLD AUTO: 34.9 % (ref 35–47)
HGB BLD-MCNC: 11.3 G/DL (ref 11.7–15.7)
IMM GRANULOCYTES # BLD: 0 10E9/L (ref 0–0.4)
IMM GRANULOCYTES NFR BLD: 0.2 %
LYMPHOCYTES # BLD AUTO: 0.6 10E9/L (ref 0.8–5.3)
LYMPHOCYTES NFR BLD AUTO: 7.5 %
MCH RBC QN AUTO: 29.1 PG (ref 26.5–33)
MCHC RBC AUTO-ENTMCNC: 32.4 G/DL (ref 31.5–36.5)
MCV RBC AUTO: 90 FL (ref 78–100)
MONOCYTES # BLD AUTO: 0.6 10E9/L (ref 0–1.3)
MONOCYTES NFR BLD AUTO: 7.2 %
NEUTROPHILS # BLD AUTO: 6.7 10E9/L (ref 1.6–8.3)
NEUTROPHILS NFR BLD AUTO: 83.1 %
NRBC # BLD AUTO: 0 10*3/UL
NRBC BLD AUTO-RTO: 0 /100
PLATELET # BLD AUTO: 257 10E9/L (ref 150–450)
POTASSIUM SERPL-SCNC: 2.8 MMOL/L (ref 3.4–5.3)
POTASSIUM SERPL-SCNC: 3 MMOL/L (ref 3.4–5.3)
RBC # BLD AUTO: 3.88 10E12/L (ref 3.8–5.2)
SODIUM SERPL-SCNC: 140 MMOL/L (ref 133–144)
WBC # BLD AUTO: 8 10E9/L (ref 4–11)

## 2018-04-19 PROCEDURE — 25000132 ZZH RX MED GY IP 250 OP 250 PS 637: Mod: GY | Performed by: PHYSICIAN ASSISTANT

## 2018-04-19 PROCEDURE — A9270 NON-COVERED ITEM OR SERVICE: HCPCS | Mod: GY | Performed by: PHYSICIAN ASSISTANT

## 2018-04-19 PROCEDURE — 25000128 H RX IP 250 OP 636: Performed by: SURGERY

## 2018-04-19 PROCEDURE — 84132 ASSAY OF SERUM POTASSIUM: CPT | Performed by: SURGERY

## 2018-04-19 PROCEDURE — 25000128 H RX IP 250 OP 636: Performed by: PHYSICIAN ASSISTANT

## 2018-04-19 PROCEDURE — 85025 COMPLETE CBC W/AUTO DIFF WBC: CPT | Performed by: PHYSICIAN ASSISTANT

## 2018-04-19 PROCEDURE — 36415 COLL VENOUS BLD VENIPUNCTURE: CPT | Performed by: PHYSICIAN ASSISTANT

## 2018-04-19 PROCEDURE — 25000125 ZZHC RX 250: Performed by: PHYSICIAN ASSISTANT

## 2018-04-19 PROCEDURE — 36415 COLL VENOUS BLD VENIPUNCTURE: CPT | Performed by: SURGERY

## 2018-04-19 PROCEDURE — 80048 BASIC METABOLIC PNL TOTAL CA: CPT | Performed by: PHYSICIAN ASSISTANT

## 2018-04-19 PROCEDURE — 12000007 ZZH R&B INTERMEDIATE

## 2018-04-19 RX ORDER — TRAMADOL HYDROCHLORIDE 50 MG/1
50 TABLET ORAL EVERY 6 HOURS PRN
Status: DISCONTINUED | OUTPATIENT
Start: 2018-04-19 | End: 2018-04-22 | Stop reason: HOSPADM

## 2018-04-19 RX ORDER — FUROSEMIDE 20 MG
20 TABLET ORAL ONCE
Status: COMPLETED | OUTPATIENT
Start: 2018-04-19 | End: 2018-04-19

## 2018-04-19 RX ORDER — POTASSIUM CL/LIDO/0.9 % NACL 10MEQ/0.1L
10 INTRAVENOUS SOLUTION, PIGGYBACK (ML) INTRAVENOUS
Status: DISCONTINUED | OUTPATIENT
Start: 2018-04-19 | End: 2018-04-22 | Stop reason: HOSPADM

## 2018-04-19 RX ORDER — POTASSIUM CHLORIDE 1.5 G/1.58G
20-40 POWDER, FOR SOLUTION ORAL
Status: DISCONTINUED | OUTPATIENT
Start: 2018-04-19 | End: 2018-04-22 | Stop reason: HOSPADM

## 2018-04-19 RX ORDER — POTASSIUM CHLORIDE 29.8 MG/ML
20 INJECTION INTRAVENOUS
Status: DISCONTINUED | OUTPATIENT
Start: 2018-04-19 | End: 2018-04-22 | Stop reason: HOSPADM

## 2018-04-19 RX ORDER — POTASSIUM CHLORIDE 1500 MG/1
20-40 TABLET, EXTENDED RELEASE ORAL
Status: DISCONTINUED | OUTPATIENT
Start: 2018-04-19 | End: 2018-04-22 | Stop reason: HOSPADM

## 2018-04-19 RX ORDER — POTASSIUM CHLORIDE 7.45 MG/ML
10 INJECTION INTRAVENOUS
Status: DISCONTINUED | OUTPATIENT
Start: 2018-04-19 | End: 2018-04-19 | Stop reason: RX

## 2018-04-19 RX ADMIN — FUROSEMIDE 20 MG: 20 TABLET ORAL at 10:53

## 2018-04-19 RX ADMIN — METRONIDAZOLE 500 MG: 500 INJECTION, SOLUTION INTRAVENOUS at 19:06

## 2018-04-19 RX ADMIN — POTASSIUM CHLORIDE 20 MEQ: 1500 TABLET, EXTENDED RELEASE ORAL at 22:58

## 2018-04-19 RX ADMIN — METRONIDAZOLE 500 MG: 500 INJECTION, SOLUTION INTRAVENOUS at 09:21

## 2018-04-19 RX ADMIN — DORZOLAMIDE HCL 1 DROP: 20 SOLUTION/ DROPS OPHTHALMIC at 08:09

## 2018-04-19 RX ADMIN — METRONIDAZOLE 500 MG: 500 INJECTION, SOLUTION INTRAVENOUS at 02:31

## 2018-04-19 RX ADMIN — Medication 10 MEQ: at 15:25

## 2018-04-19 RX ADMIN — METOPROLOL TARTRATE 5 MG: 5 INJECTION, SOLUTION INTRAVENOUS at 02:31

## 2018-04-19 RX ADMIN — METOPROLOL TARTRATE 5 MG: 5 INJECTION, SOLUTION INTRAVENOUS at 09:21

## 2018-04-19 RX ADMIN — METOPROLOL TARTRATE 5 MG: 5 INJECTION, SOLUTION INTRAVENOUS at 15:57

## 2018-04-19 RX ADMIN — POTASSIUM CHLORIDE 20 MEQ: 1.5 POWDER, FOR SOLUTION ORAL at 22:56

## 2018-04-19 RX ADMIN — Medication 10 MEQ: at 11:53

## 2018-04-19 RX ADMIN — ONDANSETRON 4 MG: 2 INJECTION INTRAMUSCULAR; INTRAVENOUS at 10:26

## 2018-04-19 RX ADMIN — ENOXAPARIN SODIUM 40 MG: 40 INJECTION SUBCUTANEOUS at 09:22

## 2018-04-19 RX ADMIN — SODIUM CHLORIDE: 9 INJECTION, SOLUTION INTRAVENOUS at 09:04

## 2018-04-19 RX ADMIN — Medication 10 MEQ: at 13:02

## 2018-04-19 RX ADMIN — Medication 10 MEQ: at 18:13

## 2018-04-19 RX ADMIN — LEVOFLOXACIN 250 MG: 5 INJECTION, SOLUTION INTRAVENOUS at 00:38

## 2018-04-19 RX ADMIN — Medication 10 MEQ: at 17:10

## 2018-04-19 RX ADMIN — METOPROLOL TARTRATE 5 MG: 5 INJECTION, SOLUTION INTRAVENOUS at 21:01

## 2018-04-19 RX ADMIN — Medication 10 MEQ: at 14:19

## 2018-04-19 RX ADMIN — DORZOLAMIDE HCL 1 DROP: 20 SOLUTION/ DROPS OPHTHALMIC at 21:01

## 2018-04-19 NOTE — PLAN OF CARE
"Problem: Patient Care Overview  Goal: Plan of Care/Patient Progress Review  Outcome: No Change  A&OX4, VSS, denies pain. Up with SBA.  Incision CD&I with steristrips. Abd distended/rounded, positive BS, positive flatus, states \"had BM yesterday.\"  Adequate I&Os, taking full liquids slowly. 2 ELIE drains DC'd today per order.       "

## 2018-04-19 NOTE — PLAN OF CARE
Problem: Patient Care Overview  Goal: Plan of Care/Patient Progress Review  Outcome: Improving  A&0. VSS. 02 95% on 1L NC. some MCCULLOUGH noted when transfering from the bathroom. pt c/o pain at right IV site. both right and left IVs were leaking. Removed both and new place on upper right arm.  NG clamped with 85cc risidual. 5ml from JP1, no output from JP2

## 2018-04-19 NOTE — PROGRESS NOTES
"Surgery    No new complaints  + bm yesterday  No nausea.   Minimal appetite.  Walking  Off o2 this morning.     Gen:  Awake, Alert, NAD, conversational.  Blood pressure 164/90, pulse 90, temperature 98.2  F (36.8  C), resp. rate 18, height 1.651 m (5' 5\"), weight 59.1 kg (130 lb 6.4 oz), SpO2 95 %, not currently breastfeeding.  Resp - non labored.    Abdomen - soft, appropriately tender, less distended. Incisions healing appropriately without erythema or purulent drainage.  Extremities - no lower extremity edema.    Drains - minimal clear output.  NG - removed this morning    Labs pending.    UOP 1400    Wt: 59kg (55kg on admission)    A/P 88 yo female s/p emergent laparoscopy and laparotomy on 4/15 for abdominal pain and free air.     - remove drains.  - full liquids  - tramadol for pain.  - Abx: levo/flagyl  - SL IV when adequate oral intake.  - Lasix today    Zeb Byers PA-C  Office: [phone]  Pager: [phone]    "

## 2018-04-19 NOTE — PLAN OF CARE
Problem: Patient Care Overview  Goal: Plan of Care/Patient Progress Review  Outcome: No Change  A&O.  AVSS.  1 L NC.  Denies pain.  Abdominal binder on.  JPx2 intact. Abdominal incison with steri-strips CDI.  NGT clamping routine.  IVF.

## 2018-04-20 ENCOUNTER — APPOINTMENT (OUTPATIENT)
Dept: ULTRASOUND IMAGING | Facility: CLINIC | Age: 83
DRG: 357 | End: 2018-04-20
Attending: SURGERY
Payer: MEDICARE

## 2018-04-20 ENCOUNTER — APPOINTMENT (OUTPATIENT)
Dept: PHYSICAL THERAPY | Facility: CLINIC | Age: 83
DRG: 357 | End: 2018-04-20
Attending: PHYSICIAN ASSISTANT
Payer: MEDICARE

## 2018-04-20 LAB — POTASSIUM SERPL-SCNC: 3.7 MMOL/L (ref 3.4–5.3)

## 2018-04-20 PROCEDURE — 93971 EXTREMITY STUDY: CPT | Mod: LT

## 2018-04-20 PROCEDURE — 40000193 ZZH STATISTIC PT WARD VISIT: Performed by: PHYSICAL THERAPIST

## 2018-04-20 PROCEDURE — A9270 NON-COVERED ITEM OR SERVICE: HCPCS | Mod: GY | Performed by: PHYSICIAN ASSISTANT

## 2018-04-20 PROCEDURE — 25000128 H RX IP 250 OP 636: Performed by: SURGERY

## 2018-04-20 PROCEDURE — 25000132 ZZH RX MED GY IP 250 OP 250 PS 637: Mod: GY | Performed by: PHYSICIAN ASSISTANT

## 2018-04-20 PROCEDURE — 25000125 ZZHC RX 250: Performed by: PHYSICIAN ASSISTANT

## 2018-04-20 PROCEDURE — A9270 NON-COVERED ITEM OR SERVICE: HCPCS | Mod: GY | Performed by: SURGERY

## 2018-04-20 PROCEDURE — 25000128 H RX IP 250 OP 636: Performed by: PHYSICIAN ASSISTANT

## 2018-04-20 PROCEDURE — 97161 PT EVAL LOW COMPLEX 20 MIN: CPT | Mod: GP | Performed by: PHYSICAL THERAPIST

## 2018-04-20 PROCEDURE — 36415 COLL VENOUS BLD VENIPUNCTURE: CPT | Performed by: SURGERY

## 2018-04-20 PROCEDURE — 84132 ASSAY OF SERUM POTASSIUM: CPT | Performed by: SURGERY

## 2018-04-20 PROCEDURE — 12000007 ZZH R&B INTERMEDIATE

## 2018-04-20 PROCEDURE — 97116 GAIT TRAINING THERAPY: CPT | Mod: GP | Performed by: PHYSICAL THERAPIST

## 2018-04-20 PROCEDURE — 25000132 ZZH RX MED GY IP 250 OP 250 PS 637: Mod: GY | Performed by: SURGERY

## 2018-04-20 RX ORDER — LEVOFLOXACIN 500 MG/1
500 TABLET, FILM COATED ORAL EVERY 24 HOURS
Status: DISCONTINUED | OUTPATIENT
Start: 2018-04-20 | End: 2018-04-22 | Stop reason: HOSPADM

## 2018-04-20 RX ORDER — LOSARTAN POTASSIUM 50 MG/1
50 TABLET ORAL DAILY
Status: DISCONTINUED | OUTPATIENT
Start: 2018-04-20 | End: 2018-04-22 | Stop reason: HOSPADM

## 2018-04-20 RX ORDER — TRAMADOL HYDROCHLORIDE 50 MG/1
50 TABLET ORAL EVERY 6 HOURS PRN
Qty: 20 TABLET | Refills: 0 | Status: SHIPPED | OUTPATIENT
Start: 2018-04-20 | End: 2018-05-02

## 2018-04-20 RX ORDER — METRONIDAZOLE 250 MG/1
250 TABLET ORAL EVERY 8 HOURS SCHEDULED
Status: DISCONTINUED | OUTPATIENT
Start: 2018-04-20 | End: 2018-04-22 | Stop reason: HOSPADM

## 2018-04-20 RX ORDER — LEVOFLOXACIN 500 MG/1
500 TABLET, FILM COATED ORAL EVERY 24 HOURS
Qty: 2 TABLET | Refills: 0 | Status: SHIPPED | OUTPATIENT
Start: 2018-04-20 | End: 2018-05-02

## 2018-04-20 RX ORDER — METRONIDAZOLE 250 MG/1
250 TABLET ORAL EVERY 8 HOURS
Qty: 6 TABLET | Refills: 0 | Status: SHIPPED | OUTPATIENT
Start: 2018-04-20 | End: 2018-05-02

## 2018-04-20 RX ADMIN — METRONIDAZOLE 250 MG: 250 TABLET ORAL at 22:12

## 2018-04-20 RX ADMIN — DORZOLAMIDE HCL 1 DROP: 20 SOLUTION/ DROPS OPHTHALMIC at 20:28

## 2018-04-20 RX ADMIN — METRONIDAZOLE 500 MG: 500 INJECTION, SOLUTION INTRAVENOUS at 01:05

## 2018-04-20 RX ADMIN — LOSARTAN POTASSIUM 50 MG: 50 TABLET ORAL at 12:14

## 2018-04-20 RX ADMIN — METOPROLOL TARTRATE 5 MG: 5 INJECTION, SOLUTION INTRAVENOUS at 08:23

## 2018-04-20 RX ADMIN — LEVOFLOXACIN 500 MG: 500 TABLET, FILM COATED ORAL at 20:37

## 2018-04-20 RX ADMIN — METRONIDAZOLE 500 MG: 500 INJECTION, SOLUTION INTRAVENOUS at 06:17

## 2018-04-20 RX ADMIN — DORZOLAMIDE HCL 1 DROP: 20 SOLUTION/ DROPS OPHTHALMIC at 08:19

## 2018-04-20 RX ADMIN — METRONIDAZOLE 250 MG: 250 TABLET ORAL at 14:40

## 2018-04-20 RX ADMIN — POTASSIUM CHLORIDE 20 MEQ: 1.5 POWDER, FOR SOLUTION ORAL at 01:00

## 2018-04-20 RX ADMIN — ENOXAPARIN SODIUM 40 MG: 40 INJECTION SUBCUTANEOUS at 08:22

## 2018-04-20 RX ADMIN — METOPROLOL TARTRATE 5 MG: 5 INJECTION, SOLUTION INTRAVENOUS at 03:28

## 2018-04-20 RX ADMIN — LEVOFLOXACIN 250 MG: 5 INJECTION, SOLUTION INTRAVENOUS at 00:04

## 2018-04-20 NOTE — PROGRESS NOTES
" 04/20/18 1501   Quick Adds   Type of Visit Initial PT Evaluation   Living Environment   Lives With alone   Living Arrangements apartment   Home Accessibility tub/shower is not walk in   Number of Stairs to Enter Home 0   Number of Stairs Within Home 0   Transportation Available family or friend will provide  (\"My daughter is my kasia.\")   Living Environment Comment Reports she has an elevated toilet seat, grab bars at commode and tub and a seat in the tub.    Self-Care   Dominant Hand right   Usual Activity Tolerance fair   Current Activity Tolerance fair   Regular Exercise yes   Activity/Exercise/Self-Care Comment Walks the halls in her building a few days a week.    Functional Level Prior   Ambulation 1-->assistive equipment   Transferring 0-->independent   Toileting 0-->independent   Bathing 0-->independent   Dressing 0-->independent   Eating 0-->independent   Communication 0-->understands/communicates without difficulty   Swallowing 0-->swallows foods/liquids without difficulty   Cognition 0 - no cognition issues reported   Fall history within last six months no   Which of the above functional risks had a recent onset or change? none   General Information   Onset of Illness/Injury or Date of Surgery - Date 04/15/18   Referring Physician Zeb Byers PA-C   Patient/Family Goals Statement TCU   Precautions/Limitations abdominal precautions   Weight-Bearing Status - LUE full weight-bearing   Weight-Bearing Status - RUE full weight-bearing   Weight-Bearing Status - LLE full weight-bearing   Weight-Bearing Status - RLE full weight-bearing   Cognitive Status Examination   Orientation orientation to person, place and time   Level of Consciousness alert   Follows Commands and Answers Questions 100% of the time   Personal Safety and Judgment intact   Memory intact   Pain Assessment   Patient Currently in Pain No  (Did report discomfort due to distension. )   Integumentary/Edema   Integumentary/Edema Comments " "Bandage on right shin due to infection per patient.    Posture    Posture Forward head position   Range of Motion (ROM)   ROM Comment No deficits noted.    Strength   Strength Comments Grossly at least 4/5 but generalized weakness due to illness/surgery.    Bed Mobility   Bed Mobility Comments Supervision with sup to/from sit but doesn't follow abdominal precautions.    Transfer Skills   Transfer Comments Sit to stand with supervision.    Gait   Gait Comments Gait training with ww in room 10 feet with supervision.    Balance   Balance Comments Good stanidng balance with support of ww.    Sensory Examination   Sensory Perception no deficits were identified   General Therapy Interventions   Planned Therapy Interventions gait training   Clinical Impression   Criteria for Skilled Therapeutic Intervention yes, treatment indicated   PT Diagnosis Impaired functional endurance.    Influenced by the following impairments Generalized weakness   Functional limitations due to impairments Needs assist for amb   Clinical Presentation Stable/Uncomplicated   Clinical Decision Making (Complexity) Low complexity   Therapy Frequency` 5 times/week   Predicted Duration of Therapy Intervention (days/wks) 3 days   Anticipated Equipment Needs at Discharge (Patient already has a walker )   Anticipated Discharge Disposition Transitional Care Facility   Risk & Benefits of therapy have been explained Yes   Patient, Family & other staff in agreement with plan of care Yes   [facility] \"6 Clicks\"   2016, Trustees of [facility], under license to [employer].  All rights reserved.   6 Clicks Short Forms Basic Mobility Inpatient Short Form   [facility]  \"6 Clicks\" V.2 Basic Mobility Inpatient Short Form   1. Turning from your back to your side while in a flat bed without using bedrails? 3 - A Little   2. Moving from lying on your back to sitting on the side of a flat bed without using bedrails? 3 - A Little "   3. Moving to and from a bed to a chair (including a wheelchair)? 3 - A Little   4. Standing up from a chair using your arms (e.g., wheelchair, or bedside chair)? 3 - A Little   5. To walk in hospital room? 3 - A Little   6. Climbing 3-5 steps with a railing? 3 - A Little   Basic Mobility Raw Score (Score out of 24.Lower scores equate to lower levels of function) 18   Total Evaluation Time   Total Evaluation Time (Minutes) 10

## 2018-04-20 NOTE — PLAN OF CARE
Problem: Patient Care Overview  Goal: Plan of Care/Patient Progress Review  Discharge Planner PT   Patient plan for discharge: TCU  Current status: Patient able to transfer sup to/from sit with supervision but needs cues for abdominal precautions. Sit to stand with supervision. Gait with ww 225 feet with CGA.   Barriers to return to prior living situation: Lives alone and manages all household tasks such as laundry and vacuuming. Current limited endurance.   Recommendations for discharge: TCU  Rationale for recommendations: Patient would benefit from TCU to allow increased time for healing and increased functional endurance and independence with amb prior to discharging to home and managing all household tasks.        Entered by: Stephanie Riggs 04/20/2018 3:43 PM

## 2018-04-20 NOTE — PLAN OF CARE
Problem: Patient Care Overview  Goal: Plan of Care/Patient Progress Review  Outcome: No Change  Vs stable, tolerating PO, voiding, ambulating with 1 and walker, +small amts of flatus, c/o slight discomfort but declined anything for it, distended, denies nausea, incision intact with steri strips.

## 2018-04-20 NOTE — PROGRESS NOTES
Care Transition Initial Assessment - DOTTIE  Reason For Consult: discharge planning, facility placement  Met with: Patient  Active Problems:    Abdominal pain       DATA  Lives With: alone  Living Arrangements: apartment  Description of Support System: Supportive, Involved  Who is your support system?: Children  Support Assessment: Adequate family and caregiver support.   Identified issues/concerns regarding health management: Pt is a 87 year old female admitted due to  Abdominal pain. Pt lives in an apartment alone. SW discussed the possibility of TCU, pt agreed & thinks this would be the best for her prior to going home. Pt would like referrals sent to [facility].   Quality Of Family Relationships: supportive, involved       ASSESSMENT  Cognitive Status:  Appears alert & oriented.   Concerns to be addressed:Discharge planning & placement.     PLAN  Financial costs for the patient includes NA.  Patient given options and choices for discharge YES .  Patient/family is agreeable to the plan? YES  Patient Goals and Preferences: discharge to TCU.  Patient anticipates discharging to:  TCU placement.    AR Adams      ADDENDUM    SW:  D: Discharge planning.  I: DOTTIE sent referrals per pt request to [facility] via [facility]. [facility] is pt's number one choice. Did not discuss transportation.   P: SW to follow for discharge.

## 2018-04-20 NOTE — PLAN OF CARE
Problem: Patient Care Overview  Goal: Plan of Care/Patient Progress Review  Outcome: No Change  Pt A&Ox4. VSS on RA. Denies pain. +BS. +flatus. Had small soft BM x2. Abd incision CDI. Abdomen distended/round. Denies N&V. Tolerating full liquid. Recheck K level 3.0. Replaced orally with tablet, pill too big to swallow after taking one pill. Switched to powder to replace. Recheck K level 3.7.  Up assist x1 with walker. Voiding adequately. Left pedal pulse +1 with doppler. PIV infusing. D/c pending progress.

## 2018-04-20 NOTE — PROGRESS NOTES
"Surgery    Not feeling very strong today.  Loose stools.    Gen:  Awake, Alert, NAD  Blood pressure 142/80, pulse 88, temperature 98.7  F (37.1  C), temperature source Oral, resp. rate 18, height 1.651 m (5' 5\"), weight 59.1 kg (130 lb 3.2 oz), SpO2 92 %,  Resp - non labored.    Abdomen - soft, appropriately tender, distended. Incisions healing appropriately without erythema or purulent drainage.  Extremities - trace bilateral lower extremity edema.    K+ corrected  Hgb 11.3  Wbc 8.0    Weight stable    A/P   - PT-OT eval for TCU placement.  - ADAT: foods for potassium supplementation.  - Hopeful dc in 1-2 days.    Zeb Byers PA-C  Office: [phone]  Pager: [phone]      "

## 2018-04-20 NOTE — PROGRESS NOTES
"SPIRITUAL HEALTH SERVICES Progress Note  [facility]     visited pt on LOS. Pt would like to go back to her independent living but is OK going to a TCU. Pt reflected on her \"good life\" and said that she takes \"one day at a time\". Pt reports that her daughter is \"the wind beneath [her] wings\". Pt says she has good friends at her complex and her Yazdanism (Voodoo) esther is very important to her. Pt reports that she prays thanks every day. SH provided reflective listening and support. Pt welcomed prayer. No plans to follow but  remains available by request.    Benny Lima M.Div.  Chaplain Resident  [phone] Pager  "

## 2018-04-21 ENCOUNTER — APPOINTMENT (OUTPATIENT)
Dept: OCCUPATIONAL THERAPY | Facility: CLINIC | Age: 83
DRG: 357 | End: 2018-04-21
Attending: PHYSICIAN ASSISTANT
Payer: MEDICARE

## 2018-04-21 LAB — PLATELET # BLD AUTO: 241 10E9/L (ref 150–450)

## 2018-04-21 PROCEDURE — 85049 AUTOMATED PLATELET COUNT: CPT | Performed by: PHYSICIAN ASSISTANT

## 2018-04-21 PROCEDURE — A9270 NON-COVERED ITEM OR SERVICE: HCPCS | Mod: GY | Performed by: PHYSICIAN ASSISTANT

## 2018-04-21 PROCEDURE — 36415 COLL VENOUS BLD VENIPUNCTURE: CPT | Performed by: PHYSICIAN ASSISTANT

## 2018-04-21 PROCEDURE — 97535 SELF CARE MNGMENT TRAINING: CPT | Mod: GO

## 2018-04-21 PROCEDURE — 25000132 ZZH RX MED GY IP 250 OP 250 PS 637: Mod: GY | Performed by: SURGERY

## 2018-04-21 PROCEDURE — 40000133 ZZH STATISTIC OT WARD VISIT

## 2018-04-21 PROCEDURE — 25000132 ZZH RX MED GY IP 250 OP 250 PS 637: Mod: GY | Performed by: PHYSICIAN ASSISTANT

## 2018-04-21 PROCEDURE — 97166 OT EVAL MOD COMPLEX 45 MIN: CPT | Mod: GO

## 2018-04-21 PROCEDURE — 25000128 H RX IP 250 OP 636: Performed by: PHYSICIAN ASSISTANT

## 2018-04-21 PROCEDURE — A9270 NON-COVERED ITEM OR SERVICE: HCPCS | Mod: GY | Performed by: SURGERY

## 2018-04-21 PROCEDURE — 12000007 ZZH R&B INTERMEDIATE

## 2018-04-21 RX ADMIN — DORZOLAMIDE HCL 1 DROP: 20 SOLUTION/ DROPS OPHTHALMIC at 08:12

## 2018-04-21 RX ADMIN — LOSARTAN POTASSIUM 50 MG: 50 TABLET ORAL at 08:12

## 2018-04-21 RX ADMIN — METRONIDAZOLE 250 MG: 250 TABLET ORAL at 14:28

## 2018-04-21 RX ADMIN — METRONIDAZOLE 250 MG: 250 TABLET ORAL at 06:20

## 2018-04-21 RX ADMIN — METRONIDAZOLE 250 MG: 250 TABLET ORAL at 23:04

## 2018-04-21 RX ADMIN — ENOXAPARIN SODIUM 40 MG: 40 INJECTION SUBCUTANEOUS at 08:12

## 2018-04-21 RX ADMIN — DORZOLAMIDE HCL 1 DROP: 20 SOLUTION/ DROPS OPHTHALMIC at 20:57

## 2018-04-21 RX ADMIN — LEVOFLOXACIN 500 MG: 500 TABLET, FILM COATED ORAL at 21:14

## 2018-04-21 RX ADMIN — ALBUTEROL SULFATE 1 PUFF: 90 INHALANT RESPIRATORY (INHALATION) at 14:18

## 2018-04-21 ASSESSMENT — ACTIVITIES OF DAILY LIVING (ADL): PREVIOUS_RESPONSIBILITIES: MEAL PREP;HOUSEKEEPING;LAUNDRY;MEDICATION MANAGEMENT;FINANCES

## 2018-04-21 NOTE — PROGRESS NOTES
"General Surgery Progress Note    Admission Date: 4/15/2018  Today's Date: 4/21/2018         Assessment:      Jamilah Rodriguez is an 87 year old female s/p emergent laparoscopy and laparotomy on for abdominal pain and free air  POD 6          Plan:   - Continue current cares. Likely ready for discharge tomorrow  - Appreciate PT/OT assistance  - Keep legs elevated, cannot use compression stockings due to latex allergy  - PCDs and lovenox for dvt ppx  - Regular diet + Ensure shakes, encourage potassium-rich foods  - Minimal pain, not taking anything  - On levo/flagyl for intra-abdominal infection, likely will not need any more at discharge    Dispo: likely to TCU tomorrow        Interval History:   Afebrile overnight, VSS on room air except for a couple readings of slight hypertension. Patient did have an episode of dyspnea on exertion this morning, O2 sats remained 96 on room air, she was provided O2 for comfort for a bit. Patient now feeling well, mainly bothered by left LE swelling. Venous ultrasound yesterday negative for DVT. Continues to pass gas and have BMs. Tolerating diet without nausea or pain. Minimal appetite.          Physical Exam:   BP (!) 154/95 (BP Location: Left arm)  Pulse 85  Temp 98.6  F (37  C) (Oral)  Resp 15  Ht 1.651 m (5' 5\")  Wt 59.5 kg (131 lb 2.8 oz)  SpO2 94%  BMI 21.83 kg/m2  I/O last 3 completed shifts:  In: 900 [P.O.:900]  Out: 875 [Urine:875]  General: NAD, pleasant, alert and oriented  Abdomen: distended but soft, appropriately tender to palpation mainly on right side, otherwise nontender. + bowel sounds  Incisions: clean, dry, and intact, healing well without erythema or drainage  Extremities: 1+ left LE edema, no calf tenderness    LABS:    Recent Labs   Lab Test  12/21/16   1000   PROTEIN  Trace*         Yasmeen Ruelas PA-C  [facility]: [phone]  Pager: [phone] (7am-4pm)        "

## 2018-04-21 NOTE — PLAN OF CARE
Problem: Patient Care Overview  Goal: Plan of Care/Patient Progress Review  Outcome: No Change  Patient A/O x4. VSS on RA. Incision WDL, covered with steri-strips. BS audible and active, abdomen distended, c/o slight discomfort, controlled with heat. Voiding adequately in BR. Up with A1 and walker. Plan on discharge to TCU pending placement.     Patient had episode of dyspnea on exertion this AM. Sats maintained at 96% after rest in bed on RA. Nursing to continue to monitor.

## 2018-04-21 NOTE — PLAN OF CARE
Problem: Patient Care Overview  Goal: Plan of Care/Patient Progress Review  OT: Eval complete and Tx initiated. Seen POD #6 of laparotomy. Prior to surgery, pt lives alone in a senior apt and reports mod I with ADL/IADLs.     Discharge Planner OT   Patient plan for discharge: TCU tomorrow or the next day    Current status: Pt required SBA with FWW for toilet transfer and mod A for LE dressing.    Barriers to return to prior living situation: Lives alone; Current level of A required; Bathtub    Recommendations for discharge: TCU    Rationale for recommendations: Pt limited by pain, abdominal precautions, and impaired activity tolerance; thus, OT will continue with intervention to ensure safety with ADLs. Pt agreeable to TCU at discharge.        Entered by: Selina Glynn 04/21/2018 3:04 PM

## 2018-04-21 NOTE — PROGRESS NOTES
04/21/18 1507   Quick Adds   Type of Visit Initial Occupational Therapy Evaluation   Living Environment   Lives With alone   Living Arrangements apartment   Home Accessibility tub/shower is not walk in;grab bars present (bathtub);grab bars present (toilet)   Transportation Available car;family or friend will provide  (Pt no longer drives. Family provides as needed)   Self-Care   Usual Activity Tolerance good   Current Activity Tolerance moderate   Equipment Currently Used at Home grab bar;walker, rolling;raised toilet   Functional Level Prior   Ambulation 1-->assistive equipment   Transferring 1-->assistive equipment   Toileting 1-->assistive equipment   Bathing 1-->assistive equipment   Dressing 0-->independent   Eating 0-->independent   Communication 0-->understands/communicates without difficulty   Swallowing 0-->swallows foods/liquids without difficulty   Cognition 0 - no cognition issues reported   Fall history within last six months no   General Information   Onset of Illness/Injury or Date of Surgery - Date 04/15/18   Referring Physician Zeb Byers PA-C   Patient/Family Goals Statement Pt plans to discharge to TCU tomorrow or the next day   Additional Occupational Profile Info/Pertinent History of Current Problem Seen POD #6 of laparotomy   Precautions/Limitations fall precautions;abdominal precautions   Cognitive Status Examination   Orientation orientation to person, place and time   Level of Consciousness alert   Able to Follow Commands WNL/WFL   Personal Safety (Cognitive) WNL/WFL   Memory intact   Visual Perception   Visual Perception Wears glasses  (for reading only)   Pain Assessment   Patient Currently in Pain Yes, see Vital Sign flowsheet  (minimal)   Range of Motion (ROM)   ROM Comment B UE WNL   Strength   Strength Comments B UE not tested due to abdominal precautions.    Mobility   Bed Mobility Bed mobility skill: Rolling/Turning;Bed mobility skill: Scooting/Bridging;Bed mobility skill: Sit to  supine;Bed mobility skill: Supine to sit;Bed mobility analysis   Bed Mobility Skill: Rolling/Turning   Level of [place] - Bed Mobility Skill Rolling Turning stand-by assist   Bed Mobility Skill: Scooting/Bridging   Level of [place]: Scooting/Bridging stand-by assist   Bed Mobility Skill: Sit to Supine   Level of [place]: Sit/Supine stand-by assist   Bed Mobility Skill: Supine to Sit   Level of [place]: Supine/Sit stand-by assist   Bed Mobility Analysis   Impairments Contributing to Impaired Bed Mobility pain   Transfer Skills   Transfer Transfer Safety Analysis Bed/Chair;Transfer Skill: Stand to Sit;Transfer Safety Analysis Sit/Stand   Transfer Skill: Bed to Chair/Chair to Bed   Level of [place]: Bed to Chair stand-by assist   Assistive Device - Transfer Skill Bed to Chair Chair to Bed Rehab Eval standard walker   Transfer Safety Analysis Bed/Chair   Impairments Contributing to Impaired Transfers pain   Transfer Skill: Sit to Stand   Level of [place]: Sit/Stand stand-by assist   Assistive Device for Transfer: Sit/Stand standard walker   Transfer Safety Analysis Sit/Stand   Impaired Transfers: Sit/Stand pain   Transfer Skill: Toilet Transfer   Level of [place]: Toilet stand-by assist   Assistive Device grab bars;standard walker   Lower Body Dressing   Level of [place]: Dress Lower Body moderate assist (50% patients effort)   Toileting   Level of [place]: Toilet stand-by assist   Grooming   Level of [place]: Grooming stand-by assist   Eating/Self Feeding   Level of [place]: Eating independent   Instrumental Activities of Daily Living (IADL)   Previous Responsibilities meal prep;housekeeping;laundry;medication management;finances   Activities of Daily Living Analysis   Impairments Contributing to Impaired Activities of Daily Living pain;post surgical precautions   General Therapy Interventions   Planned Therapy Interventions ADL retraining;transfer training  "  Clinical Impression   Criteria for Skilled Therapeutic Interventions Met yes, treatment indicated   OT Diagnosis Decreased I with ADLs   Influenced by the following impairments Pain; Abdominal precautions; Decreased activity tolerance   Assessment of Occupational Performance 3-5 Performance Deficits   Identified Performance Deficits Dressing; Toileting; Bathing; IADLs   Clinical Decision Making (Complexity) Moderate complexity   Therapy Frequency 3 times/wk   Predicted Duration of Therapy Intervention (days/wks) 7 days   Anticipated Discharge Disposition Transitional Care Facility   Risks and Benefits of Treatment have been explained. Yes   Patient, Family & other staff in agreement with plan of care Yes   [facility] TM \"6 Clicks\"   2016, Trustees of [facility], under license to [employer].  All rights reserved.   6 Clicks Short Forms Daily Activity Inpatient Short Form   [facility]  \"6 Clicks\" Daily Activity Inpatient Short Form   1. Putting on and taking off regular lower body clothing? 2 - A Lot   2. Bathing (including washing, rinsing, drying)? 2 - A Lot   3. Toileting, which includes using toilet, bedpan or urinal? 3 - A Little   4. Putting on and taking off regular upper body clothing? 4 - None   5. Taking care of personal grooming such as brushing teeth? 4 - None   6. Eating meals? 4 - None   Daily Activity Raw Score (Score out of 24.Lower scores equate to lower levels of function) 19   Total Evaluation Time   Total Evaluation Time (Minutes) 10     "

## 2018-04-22 VITALS
HEIGHT: 65 IN | OXYGEN SATURATION: 96 % | DIASTOLIC BLOOD PRESSURE: 77 MMHG | HEART RATE: 88 BPM | SYSTOLIC BLOOD PRESSURE: 130 MMHG | RESPIRATION RATE: 18 BRPM | WEIGHT: 133.3 LBS | BODY MASS INDEX: 22.21 KG/M2 | TEMPERATURE: 97.4 F

## 2018-04-22 VITALS
TEMPERATURE: 97.6 F | SYSTOLIC BLOOD PRESSURE: 99 MMHG | WEIGHT: 137.3 LBS | BODY MASS INDEX: 22.85 KG/M2 | DIASTOLIC BLOOD PRESSURE: 60 MMHG | RESPIRATION RATE: 16 BRPM | HEART RATE: 88 BPM | OXYGEN SATURATION: 94 %

## 2018-04-22 PROCEDURE — A9270 NON-COVERED ITEM OR SERVICE: HCPCS | Mod: GY | Performed by: PHYSICIAN ASSISTANT

## 2018-04-22 PROCEDURE — A9270 NON-COVERED ITEM OR SERVICE: HCPCS | Mod: GY | Performed by: SURGERY

## 2018-04-22 PROCEDURE — 25000128 H RX IP 250 OP 636: Performed by: PHYSICIAN ASSISTANT

## 2018-04-22 PROCEDURE — 25000132 ZZH RX MED GY IP 250 OP 250 PS 637: Mod: GY | Performed by: PHYSICIAN ASSISTANT

## 2018-04-22 PROCEDURE — 25000132 ZZH RX MED GY IP 250 OP 250 PS 637: Mod: GY | Performed by: SURGERY

## 2018-04-22 RX ADMIN — ENOXAPARIN SODIUM 40 MG: 40 INJECTION SUBCUTANEOUS at 08:37

## 2018-04-22 RX ADMIN — DORZOLAMIDE HCL 1 DROP: 20 SOLUTION/ DROPS OPHTHALMIC at 08:33

## 2018-04-22 RX ADMIN — METRONIDAZOLE 250 MG: 250 TABLET ORAL at 13:26

## 2018-04-22 RX ADMIN — METRONIDAZOLE 250 MG: 250 TABLET ORAL at 06:45

## 2018-04-22 RX ADMIN — LOSARTAN POTASSIUM 50 MG: 50 TABLET ORAL at 08:36

## 2018-04-22 NOTE — PLAN OF CARE
Problem: Patient Care Overview  Goal: Plan of Care/Patient Progress Review  PT: Pt discharged to TCU prior to being seen by PT 4/22. PT goals not met.

## 2018-04-22 NOTE — PROGRESS NOTES
Pt up and ambulating with assist of one and walker, tolerates diet, denies pain, voiding well, passing flatus, did have BM this shift, discharging to TCU today. Will transport with family, discharge packet given to pt, discharge instructions given to pt all questions and concern addressed, pt knows that follow up care is needed and who to call with any questions or concerns, pt ready for discharge

## 2018-04-22 NOTE — PLAN OF CARE
Problem: Patient Care Overview  Goal: Plan of Care/Patient Progress Review  Outcome: Improving  Pt A/O x4, VSS on RA.  Denies pain.  Abd incision with steri-strips, MELISA, WDL. Fine crackles in LLL, IS encouraged.  Dyspneic upon exertion, inhaler available but pt declines.  Abd distended, +BS, +flatus, voiding adequately.  Up w/asst x1, walker.  Anticipate d/c to TCU later today if bed available.

## 2018-04-22 NOTE — PLAN OF CARE
Problem: Patient Care Overview  Goal: Plan of Care/Patient Progress Review  Vs stable, tolerating PO, voiding, ambulating, abdomen slightly distended but denies nausea, +flatus, minimal pain and declined anything for it, pt MCCULLOUGH but declined PRN albuterol inhaler.

## 2018-04-22 NOTE — PROGRESS NOTES
SW:    D: SW following for discharge planning needs. SW received discharge orders. SW spoke with Renu in Admissions at [facility]. SW faxed orders and PAS  to [facility]. Charge nurse reports patient's daughter plans to transport her to [facility].     P: No other needs identified. SW will be available to assist as needed.     PAS-RR    D: Per DHS regulation, SW completed and submitted PAS-RR to [place] [employer] Direct Connect via the Senior [employer] Line.  PAS-RR confirmation # is : [id number]    I: SW spoke with patient and they are aware a PAS-RR has been submitted.  SW reviewed with patient that they may be contacted for a follow up appointment within 10 days of hospital discharge if their SNF stay is < 30 days.  Contact information for Senior [employer] Line was also provided.    A: Patient verbalized understanding.    P: Further questions may be directed to Senior [employer] Line at #[phone], option #4 for PAS-RR staff.

## 2018-04-22 NOTE — PROGRESS NOTES
"General Surgery Progress Note    Admission Date: 4/15/2018  Today's Date: 4/22/2018         Assessment:      Jamilah Rodriguez is an 87 year old female s/p emergent laparoscopy and laparotomy on for abdominal pain and free air  POD 7         Plan:   - Discharge to TCU today if bed available/confirmed  - Discharge instructions reviewed with patient, questions answered  - Continue with leg elevation,  - Regular diet + Ensure shakes  - Potassium stable yesterday  - No further antibiotics        Interval History:   Afebrile overnight, VSS on room air. Patient feels better today, thinks the swelling in her leg has improved. Continues to tolerate diet, minimal appetite but slightly improving. No shortness of breath, using IS. Some chest tightness at times when she is due for her inhaler. Rarely has a few seconds of nausea that passes spontaneously. Continues to have bowel function.          Physical Exam:   /84 (BP Location: Left arm)  Pulse 88  Temp 97.2  F (36.2  C) (Oral)  Resp 18  Ht 1.651 m (5' 5\")  Wt 59.5 kg (131 lb 2.8 oz)  SpO2 95%  BMI 21.83 kg/m2  I/O last 3 completed shifts:  In: -   Out: 575 [Urine:575]  General: NAD, pleasant, alert and oriented  Cardiovascular: regular rate and rhythm; S1 and S2 distinct without murmur   Respiratory: lungs clear to auscultation bilaterally without wheezes, rales or rhonchi   Abdomen: soft, minimally tender, somewhat distended and tympanic, + bowel sounds  Incision: clean, dry, and intact  Extremities: left LE edema improved, scant edema today; no calf tenderness    LABS:  Recent Labs   Lab Test  04/21/18   0725  04/19/18   0805  04/18/18   0755  04/16/18   0744  04/15/18   1402   WBC   --   8.0   --   11.6*  13.0*   HGB   --   11.3*   --   10.2*  13.6   MCV   --   90   --   88  87   PLT  241  257  220  225  377      Recent Labs   Lab Test  04/20/18   0505  04/19/18   2145  04/19/18   0805  04/16/18   0744  04/15/18   1402   POTASSIUM  3.7  3.0*  2.8*  3.9  3.6 "   CHLORIDE   --    --   106  105  96   CO2   --    --   25  24  26   BUN   --    --   12  15  22   CR   --    --   0.68  0.81  0.97   ANIONGAP   --    --   9  8  12                 Yasmeen Ruelas PA-C  [facility]: [phone]  Pager: [phone] (7am-4pm)

## 2018-04-23 ENCOUNTER — TELEPHONE (OUTPATIENT)
Dept: OTHER | Facility: CLINIC | Age: 83
End: 2018-04-23

## 2018-04-23 ENCOUNTER — NURSING HOME VISIT (OUTPATIENT)
Dept: GERIATRICS | Facility: CLINIC | Age: 83
End: 2018-04-23
Payer: MEDICARE

## 2018-04-23 ENCOUNTER — PATIENT OUTREACH (OUTPATIENT)
Dept: INTERNAL MEDICINE | Facility: CLINIC | Age: 83
End: 2018-04-23

## 2018-04-23 DIAGNOSIS — J44.9 CHRONIC OBSTRUCTIVE PULMONARY DISEASE, UNSPECIFIED COPD TYPE (H): ICD-10-CM

## 2018-04-23 DIAGNOSIS — D62 ANEMIA DUE TO BLOOD LOSS, ACUTE: ICD-10-CM

## 2018-04-23 DIAGNOSIS — K63.1 PERFORATED BOWEL (H): Primary | ICD-10-CM

## 2018-04-23 DIAGNOSIS — R53.81 PHYSICAL DECONDITIONING: ICD-10-CM

## 2018-04-23 DIAGNOSIS — I10 BENIGN ESSENTIAL HYPERTENSION: ICD-10-CM

## 2018-04-23 DIAGNOSIS — K56.609 SBO (SMALL BOWEL OBSTRUCTION) (H): ICD-10-CM

## 2018-04-23 DIAGNOSIS — K56.609 SBO (SMALL BOWEL OBSTRUCTION) (H): Primary | ICD-10-CM

## 2018-04-23 PROCEDURE — 99310 SBSQ NF CARE HIGH MDM 45: CPT | Performed by: NURSE PRACTITIONER

## 2018-04-23 NOTE — PROGRESS NOTES
Clinic Care Coordination Contact  Care Coordination Transition Communication    Referral Source: SNF/TCU    Clinical Data: Patient was hospitalized at [facility] from 4/15 to 4/22/2018 with diagnosis of :  Perforated bowel/ SOB      Transition to Facility:              [facility]ility Name: [facility]               Contact name and phone number/fax: Sandra Maurer  [phone]    Plan: RN/SW Care Coordinator will await notification from facility staff informing RN/SW Care Coordinator of patient's discharge plans/needs. RN/SW Care Coordinator will review chart and outreach to facility staff every 4 weeks and as needed.     Dariela Mclean RN / Clinical Care Coordinator     [address] [address]  [email or web address]  Office :  [phone] / Fax :  [phone]

## 2018-04-23 NOTE — PLAN OF CARE
Problem: Patient Care Overview  Goal: Plan of Care/Patient Progress Review  Occupational Therapy Discharge Summary    Reason for therapy discharge:    Discharged to transitional care facility.    Progress towards therapy goal(s). See goals on Care Plan in [facility] electronic health record for goal details.  Goals not met.  Barriers to achieving goals:   discharge from facility.    Therapy recommendation(s):    Continued therapy is recommended.  Rationale/Recommendations:  To maximize I in ADL/IADL.

## 2018-04-23 NOTE — PROGRESS NOTES
[facility]  PRIMARY CARE PROVIDER AND CLINIC:  Sharon Hurtado [address]  / [place] [address]  Chief Complaint   Patient presents with     Hospital F/U       HPI:    Jamilah Rodriguez is a 87 year old  (4/5/1931), history of AAA, cervical cancer many years s/p hysterectomy, and COPD who presents for evaluation of abdominal pain admitted to the [facility] from [facility].  Hospital stay 4/15/18 through 4/22/1.   SBO s/p laparoscopic abdominal exploration converted to Open adbominal exploration with lysis of adhesions and abd cavity washout. On Abx for abdominal infection   Anemia: no need for transfusion post op.   LE edema: doppler study negative for DVT. Admitted to this facility for  rehab, medical management and nursing care.  HPI information obtained from: facility chart records, facility staff, patient report and [facility] chart review.  Current issues are: On exam today patient is sitting up in WC, denies pain or discomfort, states her bowels are a little loose, denies fever, chills, SOB, cough, congestion, CP, palpitations, states she is feeling well still has swelling in ankles no other concerns       Last 3 BPs: 99/60, 104/66, 108/64 mmHg  HR Ranges: 88 bpm  Admission Weight: 137.3 lbs    CODE STATUS/ADVANCE DIRECTIVES DISCUSSION:   CPR/Full code   Patient's living condition: lives alone    ALLERGIES:Atenolol; Beta adrenergic blockers; Brimonidine; Cephalexin; Germall plus; Latex; Morphine; No clinical screening - see comments; Penicillins; Sulfa drugs; Tramadol; Tylenol; Vicodin [hydrocodone-acetaminophen]; and Fentanyl  PAST MEDICAL HISTORY:  has a past medical history of AAA (abdominal aortic aneurysm) (H); Cervical cancer (H) (1969); Chronic dermatitis; Glaucoma; Glaucoma; History of COPD; HTN (hypertension); Macular degeneration; Osteoporosis (8/3/11); PAD (peripheral artery disease) (H); and Vertebral compression fracture (H) (5/17/11). She  also has no past medical history of Basal cell carcinoma; Malignant melanoma (H); or Squamous cell carcinoma.  PAST SURGICAL HISTORY:  has a past surgical history that includes Hysterectomy total abdominal (1999); cataract iol, rt/lt (2/8/12); Open reduction internal fixation wrist (1988); Open reduction internal fixation hip nailing (7/2/2012); Open reduction internal fixation tibial plateau (3/31/2013); Laparoscopy diagnostic (general) (N/A, 4/15/2018); and Laparotomy exploratory (N/A, 4/15/2018).  FAMILY HISTORY: family history includes Breast Cancer in an other family member; Breast Cancer (age of onset: 60) in her maternal aunt; C.A.D. in her father; Circulatory (age of onset: 53) in her daughter; DIABETES in her sister.  SOCIAL HISTORY:  reports that she quit smoking about 8 years ago. She has never used smokeless tobacco. She reports that she does not drink alcohol or use illicit drugs.    Post Discharge Medication Reconciliation Status: discharge medications reconciled, continue medications without change.  Current Outpatient Prescriptions   Medication Sig Dispense Refill     ascorbic acid (VITAMIN C) 500 MG tablet Take 1 tablet by mouth daily. 100 tablet 12     bimatoprost (LUMIGAN) 0.01 % SOLN Place 1 drop into both eyes At Bedtime.         Calcium Carbonate-Vitamin D (CALCIUM + D) 600-200 MG-UNIT per tablet Take 1 tablet by mouth every 12 hours. 100 tablet 12     Cholecalciferol (VITAMIN D) 2000 UNITS tablet Take 1 tablet by mouth daily.       dorzolamide (TRUSOPT) 2 % ophthalmic solution Place 1 drop into both eyes 2 times daily       levofloxacin (LEVAQUIN) 500 MG tablet Take 1 tablet (500 mg) by mouth every 24 hours 2 tablet 0     losartan (COZAAR) 50 MG tablet Take 1 tablet (50 mg) by mouth daily 90 tablet 1     metroNIDAZOLE (FLAGYL) 250 MG tablet Take 1 tablet (250 mg) by mouth every 8 hours 6 tablet 0     Multiple Vitamins-Minerals (PRESERVISION AREDS 2 PO) Take 1 tablet by mouth 2 times daily        traMADol (ULTRAM) 50 MG tablet Take 1 tablet (50 mg) by mouth every 6 hours as needed for moderate pain 20 tablet 0     umeclidinium-vilanterol (ANORO ELLIPTA) 62.5-25 MCG/INH oral inhaler Inhale 1 puff into the lungs daily 1 Inhaler 11       ROS:  10 point ROS of systems including Constitutional, Eyes, Respiratory, Cardiovascular, Gastroenterology, Genitourinary, Integumentary, Muscularskeletal, Psychiatric were all negative except for pertinent positives noted in my HPI.    Exam:  BP 99/60  Pulse 88  Temp 97.6  F (36.4  C)  Resp 16  Wt 137 lb 4.8 oz (62.3 kg)  SpO2 94%  BMI 22.85 kg/m2  GENERAL APPEARANCE:  Alert, in no distress  ENT:  Mouth and posterior oropharynx normal, moist mucous membranes, [place]  EYES:  EOM, conjunctivae, lids, pupils and irises normal, PERRL  RESP:  respiratory effort and palpation of chest normal, lungs clear to auscultation , no respiratory distress  CV:  Palpation and auscultation of heart done , regular rate and rhythm, no murmur, rub, or gallop, peripheral edema 1+ in LLE  ABDOMEN:  normal bowel sounds, soft, nontender, no hepatosplenomegaly or other masses  M/S:   Examination of:   right upper extremity, left upper extremity, right lower extremity and left lower extremity  Inspection, ROM, stability and muscle strength normal  SKIN:  Inspection of skin and subcutaneous tissue baseline, mid abdominal incision is clean dry and intact with steri strips intact no signs of infection  NEURO:   Cranial nerves 2-12 are normal tested and grossly at patient's baseline, speech WNL  PSYCH:  affect and mood normal    Lab/Diagnostic data:  CBC RESULTS:   Recent Labs   Lab Test  04/21/18   0725  04/19/18   0805   04/16/18   0744   WBC   --   8.0   --   11.6*   RBC   --   3.88   --   3.45*   HGB   --   11.3*   --   10.2*   HCT   --   34.9*   --   30.5*   MCV   --   90   --   88   MCH   --   29.1   --   29.6   MCHC   --   32.4   --   33.4   RDW   --   13.2   --   13.0   PLT  241  257   < >   225    < > = values in this interval not displayed.       Last Basic Metabolic Panel:  Recent Labs   Lab Test  04/20/18   0505  04/19/18   2145  04/19/18   0805  04/16/18   0744   NA   --    --   140  137   POTASSIUM  3.7  3.0*  2.8*  3.9   CHLORIDE   --    --   106  105   NAKIA   --    --   7.9*  7.7*   CO2   --    --   25  24   BUN   --    --   12  15   CR   --    --   0.68  0.81   GLC   --    --   73  89       Liver Function Studies -   Recent Labs   Lab Test  04/15/18   1402  12/18/17   1103   PROTTOTAL  7.9  6.8   ALBUMIN  4.0  3.3*   BILITOTAL  0.9  0.6   ALKPHOS  122  95   AST  19  9   ALT  17  17       TSH   Date Value Ref Range Status   09/26/2017 3.29 0.40 - 4.00 mU/L Final   07/14/2016 3.24 0.40 - 4.00 mU/L Final       ASSESSMENT/PLAN:  SBO (small bowel obstruction)  Physical deconditioning  Acute/ongoing: PT and OT for strengthening, levaquin 500mg QD for 2 days and flagyl 250mg TID for 2 days,   DC tramadol due to allergy, start ES tylenol 1000mg q 6 hours prn for pain,     Anemia due to blood loss, acute  Acute/ongoing: CBC weekly, follow    Benign essential hypertension  Ongoing: vitals daily and prn, BMP follow, continue losartan 50mg QD    Chronic obstructive pulmonary disease, unspecified COPD type (H)  Ongoing: monitor SAO2 at rest and with activity, continue anoro ellipta 1 puff daily and albuterol MDI 2 puffs q 4 hours prn        Orders:  BMP and CBC weekly  DC tramadol  Tylenol 1000mg q 6 hours prn for pain  OK for PT and OT to evaluate    Total time with patient visit: 25 minutes including discussions about the POC and care coordination with the patient. Greater than 50% of total time spent with counseling and coordinating care due to chart review and order clarifications.          Electronically signed by:  Tonya Lynn Haase, APRN CNP

## 2018-04-23 NOTE — TELEPHONE ENCOUNTER
Reason for Call:  Other - questions about upcoming appointment.     Detailed comments: Patient called stating that she recently had surgery with Dr. Craig and after surgery Dr. Craig told her that she no longer needed to follow up with Dr. Boogie for her AAA. She is wondering if that is true or not.     Phone Number Patient can be reached at: Cell number on file:    Telephone Information:   Mobile [phone]       Best Time: Any     Can we leave a detailed message on this number? YES    Call taken on 4/23/2018 at 2:59 PM by Martha Fernandez

## 2018-04-24 ENCOUNTER — TRANSFERRED RECORDS (OUTPATIENT)
Dept: HEALTH INFORMATION MANAGEMENT | Facility: CLINIC | Age: 83
End: 2018-04-24

## 2018-04-24 LAB
BUN SERPL-MCNC: 11 MG/DL (ref 9–26)
CALCIUM SERPL-MCNC: 8.6 MG/DL (ref 8.4–10.4)
CHLORIDE SERPLBLD-SCNC: 107 MMOL/L (ref 98–109)
CO2 SERPL-SCNC: 26 MMOL/L (ref 22–31)
CREAT SERPL-MCNC: 0.77 MG/DL (ref 0.55–1.02)
GFR SERPL CREATININE-BSD FRML MDRD: >60 ML/MIN/1.73M2
GLUCOSE SERPL-MCNC: 96 MG/DL (ref 70–100)
POTASSIUM SERPL-SCNC: 3.8 MMOL/L (ref 3.5–5.2)
SODIUM SERPL-SCNC: 138 MMOL/L (ref 136–145)

## 2018-04-25 NOTE — TELEPHONE ENCOUNTER
Discussed with Dr. Boogie re: pt need for AAA US.  Pt recently completed a CT Abdomen Pelvis on 4/15/18 while inpatient at [facility].  Per Dr. Boogie pt does not need to have AAA US with her follow-up appointment with Dr. Boogie that is scheduled for 5/2/18.  Pt verbalized understanding that she needs to have an OV only with Dr. Boogie at that time.      Becca Antonio, DARYAN, RN

## 2018-04-28 ENCOUNTER — NURSING HOME VISIT (OUTPATIENT)
Dept: GERIATRICS | Facility: CLINIC | Age: 83
End: 2018-04-28
Payer: MEDICARE

## 2018-04-28 DIAGNOSIS — I10 BENIGN ESSENTIAL HYPERTENSION: ICD-10-CM

## 2018-04-28 DIAGNOSIS — J44.9 CHRONIC OBSTRUCTIVE PULMONARY DISEASE, UNSPECIFIED COPD TYPE (H): ICD-10-CM

## 2018-04-28 DIAGNOSIS — K56.609 SBO (SMALL BOWEL OBSTRUCTION) (H): Primary | ICD-10-CM

## 2018-04-28 PROCEDURE — 99305 1ST NF CARE MODERATE MDM 35: CPT | Performed by: INTERNAL MEDICINE

## 2018-04-29 NOTE — PROGRESS NOTES
[facility]  PRIMARY CARE PROVIDER AND CLINIC:  Sharon Hurtado [address] [place] [address] / [facility] [address]      Pt was seen by Dr Yo on 4/28/18 for an initial TCU visit    HPI:      Jamilah Rodriguez is a 87 year old  (4/5/1931), history of AAA, COPD, who was hospitalized at [facility] from 4/15/18-4/22/18 for the management of abdominal pain    Hospital course reviewed by me, is as per the hospital d/c summary and NP note    Pt presented with abd pain, nausea and vomiting, was found to have dilated small bowel and free intra abdominal air. On 4/16/18, she underwent a laparotomy, with the finding of multiple loops of distended small bowel, no evidence of perforation, turbid fluid in the RUQ and pelvis  Post op course was remarkable for anemia, LE edema (neg doppler), mild MCCULLOUGH    Admitted to this facility for  rehab, medical management and nursing care     Pt denies abd pain, nausea or emesis. Appetite is fair. She is having regular BM. She notes B LE edema is gradually improving, though still present. She notes mild dyspnea at rest and with activity, states this is close to baseline.  She denies frequent cough, chest pain    CODE STATUS/ADVANCE DIRECTIVES DISCUSSION:   CPR/Full code   Patient's living condition: lives alone    ALLERGIES:Atenolol; Beta adrenergic blockers; Brimonidine; Cephalexin; Germall plus; Latex; Morphine; No clinical screening - see comments; Penicillins; Sulfa drugs; Tramadol; Tylenol; Vicodin [hydrocodone-acetaminophen]; and Fentanyl  PAST MEDICAL HISTORY:  has a past medical history of AAA (abdominal aortic aneurysm) (H); Cervical cancer (H) (1969); Chronic dermatitis; Glaucoma; Glaucoma; History of COPD; HTN (hypertension); Macular degeneration; Osteoporosis (8/3/11); PAD (peripheral artery disease) (H); and Vertebral compression fracture (H) (5/17/11). She also has no past medical history of Basal cell carcinoma; Malignant melanoma (H); or Squamous cell carcinoma.  PAST SURGICAL  HISTORY:  has a past surgical history that includes Hysterectomy total abdominal (1999); cataract iol, rt/lt (2/8/12); Open reduction internal fixation wrist (1988); Open reduction internal fixation hip nailing (7/2/2012); Open reduction internal fixation tibial plateau (3/31/2013); Laparoscopy diagnostic (general) (N/A, 4/15/2018); and Laparotomy exploratory (N/A, 4/15/2018).  FAMILY HISTORY: family history includes Breast Cancer in an other family member; Breast Cancer (age of onset: 60) in her maternal aunt; C.A.D. in her father; Circulatory (age of onset: 53) in her daughter; DIABETES in her sister.  SOCIAL HISTORY:  reports that she quit smoking about 8 years ago. She has never used smokeless tobacco. She reports that she does not drink alcohol or use illicit drugs.        Post Discharge Medication Reconciliation Status:   .  Current Outpatient Prescriptions   Medication Sig Dispense Refill     ascorbic acid (VITAMIN C) 500 MG tablet Take 1 tablet by mouth daily. 100 tablet 12     bimatoprost (LUMIGAN) 0.01 % SOLN Place 1 drop into both eyes At Bedtime.         Calcium Carbonate-Vitamin D (CALCIUM + D) 600-200 MG-UNIT per tablet Take 1 tablet by mouth every 12 hours. 100 tablet 12     Cholecalciferol (VITAMIN D) 2000 UNITS tablet Take 1 tablet by mouth daily.       dorzolamide (TRUSOPT) 2 % ophthalmic solution Place 1 drop into both eyes 2 times daily       levofloxacin (LEVAQUIN) 500 MG tablet Take 1 tablet (500 mg) by mouth every 24 hours 2 tablet 0     losartan (COZAAR) 50 MG tablet Take 1 tablet (50 mg) by mouth daily 90 tablet 1     metroNIDAZOLE (FLAGYL) 250 MG tablet Take 1 tablet (250 mg) by mouth every 8 hours 6 tablet 0     Multiple Vitamins-Minerals (PRESERVISION AREDS 2 PO) Take 1 tablet by mouth 2 times daily       traMADol (ULTRAM) 50 MG tablet Take 1 tablet (50 mg) by mouth every 6 hours as needed for moderate pain 20 tablet 0     umeclidinium-vilanterol (ANORO ELLIPTA) 62.5-25 MCG/INH oral  inhaler Inhale 1 puff into the lungs daily 1 Inhaler 11       ROS:  10 point ROS neg except as noted above.      Exam:    GENERAL APPEARANCE:  Alert, frail appearing, lying in bed, in NAD  ENT:  Mouth and posterior oropharynx normal, moist mucous membranes  EYES:  EOM, conjunctivae, lids, pupils and irises normal, PERRL  RESP: RR 16, E:I ratio prolonged, mildly dyspneic with conversation  CV:  RRR no M  ABDOMEN:  Soft, non-tender, non-distened  M/S:   Trace pedal and ankle edema B. Venous stasis changes B  SKIN: Abd incision intact without drainge  NEURO:   Cranial nerves 2-12 are normal tested and grossly at patient's baseline, speech WNL  PSYCH:  affect and mood normal    Lab/Diagnostic data:  CBC RESULTS:   Recent Labs   Lab Test  04/21/18   0725  04/19/18   0805   04/16/18   0744   WBC   --   8.0   --   11.6*   RBC   --   3.88   --   3.45*   HGB   --   11.3*   --   10.2*   HCT   --   34.9*   --   30.5*   MCV   --   90   --   88   MCH   --   29.1   --   29.6   MCHC   --   32.4   --   33.4   RDW   --   13.2   --   13.0   PLT  241  257   < >  225    < > = values in this interval not displayed.       Last Basic Metabolic Panel:  Recent Labs   Lab Test  04/20/18   0505  04/19/18   2145  04/19/18   0805  04/16/18   0744   NA   --    --   140  137   POTASSIUM  3.7  3.0*  2.8*  3.9   CHLORIDE   --    --   106  105   NAKIA   --    --   7.9*  7.7*   CO2   --    --   25  24   BUN   --    --   12  15   CR   --    --   0.68  0.81   GLC   --    --   73  89       Liver Function Studies -   Recent Labs   Lab Test  04/15/18   1402  12/18/17   1103   PROTTOTAL  7.9  6.8   ALBUMIN  4.0  3.3*   BILITOTAL  0.9  0.6   ALKPHOS  122  95   AST  19  9   ALT  17  17       TSH   Date Value Ref Range Status   09/26/2017 3.29 0.40 - 4.00 mU/L Final   07/14/2016 3.24 0.40 - 4.00 mU/L Final       ASSESSMENT/PLAN:      S/p emergent laparotomy for abd pain, free air with finding of dilated small bowel, no perforation discovered  Pt is doing  well  Has completed course of antibiotics  Pain controlled, incision intact, bowel function nl  LE edema likely secondary to fluid resuscitation  Plan therapies, monitor GI status, surgery f/u, monitor LE edema, if persistent, consider short course of low dose diuretics    Benign essential hypertension  Stable  Plan monitor BP on current medications    Chronic obstructive pulmonary disease, unspecified COPD type (H)  Mild increased SOB today, overall has been stable post op  Plan monitor resp status, continue current inhalers, monitor resp status. Obtain CXR if worsening symptoms        Dontae Yo MD

## 2018-05-01 ENCOUNTER — TRANSFERRED RECORDS (OUTPATIENT)
Dept: HEALTH INFORMATION MANAGEMENT | Facility: CLINIC | Age: 83
End: 2018-05-01

## 2018-05-01 LAB
BUN SERPL-MCNC: 11 MG/DL (ref 9–26)
CALCIUM SERPL-MCNC: 9 MG/DL (ref 8.4–10.4)
CHLORIDE SERPLBLD-SCNC: 109 MMOL/L (ref 98–109)
CO2 SERPL-SCNC: 23 MMOL/L (ref 22–31)
CREAT SERPL-MCNC: 0.77 MG/DL (ref 0.55–1.02)
DIFFERENTIAL: ABNORMAL
ERYTHROCYTE [DISTWIDTH] IN BLOOD BY AUTOMATED COUNT: 14 % (ref 11–15)
GFR SERPL CREATININE-BSD FRML MDRD: >60 ML/MIN/1.73M2
GLUCOSE SERPL-MCNC: 78 MG/DL (ref 70–100)
HCT VFR BLD AUTO: 32.7 % (ref 35–46)
HEMOGLOBIN: 10.4 G/DL (ref 11.8–15.5)
MCV RBC AUTO: 91.1 FL (ref 80–100)
PLATELET # BLD AUTO: 319 10^9/L (ref 140–450)
POTASSIUM SERPL-SCNC: 4.2 MMOL/L (ref 3.5–5.2)
RBC # BLD AUTO: 3.59 M/CMM (ref 3.7–5.2)
SODIUM SERPL-SCNC: 138 MMOL/L (ref 136–145)
WBC # BLD AUTO: 6.1 K/CMM (ref 3.8–11)

## 2018-05-01 NOTE — PROGRESS NOTES
Pulmonary Rehab Discharge Summary    Reason for discharge:    Patient/family request discontinuation of services.    Progress towards goals:  Goals not met.  Barriers to achieving goals:   discharge on same date as initial evaluation.    Recommendation(s):    Continued therapy is recommended.  Rationale/Recommendations:  Would benefit from Pulmonary Rehab.      Anneliese Chester, RT on 5/1/2018 at 5:18 PM

## 2018-05-02 ENCOUNTER — OFFICE VISIT (OUTPATIENT)
Dept: OTHER | Facility: CLINIC | Age: 83
End: 2018-05-02
Attending: SURGERY
Payer: MEDICARE

## 2018-05-02 VITALS — SYSTOLIC BLOOD PRESSURE: 126 MMHG | DIASTOLIC BLOOD PRESSURE: 75 MMHG | HEART RATE: 101 BPM

## 2018-05-02 VITALS
TEMPERATURE: 99.6 F | HEART RATE: 96 BPM | OXYGEN SATURATION: 93 % | DIASTOLIC BLOOD PRESSURE: 63 MMHG | SYSTOLIC BLOOD PRESSURE: 116 MMHG | WEIGHT: 133.7 LBS | BODY MASS INDEX: 22.25 KG/M2 | RESPIRATION RATE: 14 BRPM

## 2018-05-02 DIAGNOSIS — I71.40 ABDOMINAL AORTIC ANEURYSM GREATER THAN 39 MM IN DIAMETER (H): Primary | ICD-10-CM

## 2018-05-02 PROCEDURE — G0463 HOSPITAL OUTPT CLINIC VISIT: HCPCS

## 2018-05-02 PROCEDURE — 99213 OFFICE O/P EST LOW 20 MIN: CPT | Mod: ZP | Performed by: SURGERY

## 2018-05-02 RX ORDER — ASCORBIC ACID 500 MG
500 TABLET ORAL DAILY
COMMUNITY
End: 2019-01-01

## 2018-05-02 RX ORDER — ALBUTEROL SULFATE 90 UG/1
2 AEROSOL, METERED RESPIRATORY (INHALATION) EVERY 4 HOURS PRN
COMMUNITY

## 2018-05-02 NOTE — MR AVS SNAPSHOT
After Visit Summary   5/2/2018    Jamilah Rodriguez    MRN: [id number]           Patient Information     Date Of Birth          4/5/1931        Visit Information        Provider Department      5/2/2018 10:00 AM Lv Boogie MD [facility] [place]      Today's Diagnoses     Abdominal aortic aneurysm greater than 39 mm in diameter (H)    -  1       Follow-ups after your visit        Follow-up notes from your care team     Return in about 1 year (around 5/2/2019) for AAA ultrasound.      Your next 10 appointments already scheduled     May 07, 2018 12:30 PM CDT   CONSULT with Jm Craig MD   [facility]    [address]  [facility] [address] [phone]              Who to contact     If you have questions or need follow up information about today's clinic visit or your schedule please contact [facility] directly at [phone].  Normal or non-critical lab and imaging results will be communicated to you by Arimazhart, letter or phone within 4 business days after the clinic has received the results. If you do not hear from us within 7 days, please contact the clinic through [employer] or phone. If you have a critical or abnormal lab result, we will notify you by phone as soon as possible.  Submit refill requests through HerBabyShower or call your pharmacy and they will forward the refill request to us. Please allow 3 business days for your refill to be completed.          Additional Information About Your Visit        Arimazhart Information     HerBabyShower gives you secure access to your electronic health record. If you see a primary care provider, you can also send messages to your care team and make appointments. If you have questions, please call your primary care clinic.  If you do not have a primary care provider, please call [phone] and they will assist  you.        Care EveryWhere ID     This is your Care EveryWhere ID. This could be used by other organizations to access your [facility] medical records  [id number]        Your Vitals Were     Pulse                   101            Blood Pressure from Last 3 Encounters:   05/02/18 126/75   04/22/18 99/60   04/22/18 130/77    Weight from Last 3 Encounters:   04/22/18 137 lb 4.8 oz (62.3 kg)   04/22/18 133 lb 4.8 oz (60.5 kg)   04/06/18 121 lb 8 oz (55.1 kg)              Today, you had the following     No orders found for display       Primary Care Provider Office Phone # Fax #    Sharon Hurtado [phone]       [address] [place] [address]        Equal Access to Services     DRE FORREST : Hadii aad ku hadasho Somu, waaxda luqadaha, qaybta kaalmada adeegyada, kimberly drake . So [facility] [phone].    ATENCIÓN: Si habla español, tiene a smith disposición servicios gratuitos de asistencia lingüística. Llame al [phone].    We comply with applicable federal civil rights laws and [place] laws. We do not discriminate on the basis of race, color, national origin, age, disability, sex, sexual orientation, or gender identity.            Thank you!     Thank you for choosing [facility] [place]  for your care. Our goal is always to provide you with excellent care. Hearing back from our patients is one way we can continue to improve our services. Please take a few minutes to complete the written survey that you may receive in the mail after your visit with us. Thank you!             Your Updated Medication List - Protect others around you: Learn how to safely use, store and throw away your medicines at www.disposemymeds.org.          This list is accurate as of 5/2/18  5:27 PM.  Always use your most recent med list.                   Brand Name Dispense Instructions for use Diagnosis    ascorbic acid 500 MG tablet    VITAMIN C    100 tablet    Take 1  tablet by mouth daily.        bimatoprost 0.01 % Soln    LUMIGAN     Place 1 drop into both eyes At Bedtime.        calcium + D 600-200 MG-UNIT Tabs   Generic drug:  calcium carbonate-vitamin D     100 tablet    Take 1 tablet by mouth every 12 hours.        dorzolamide 2 % ophthalmic solution    TRUSOPT     Place 1 drop into both eyes 2 times daily        levofloxacin 500 MG tablet    LEVAQUIN    2 tablet    Take 1 tablet (500 mg) by mouth every 24 hours    Perforated bowel (H)       losartan 50 MG tablet    COZAAR    90 tablet    Take 1 tablet (50 mg) by mouth daily    Benign essential hypertension       metroNIDAZOLE 250 MG tablet    FLAGYL    6 tablet    Take 1 tablet (250 mg) by mouth every 8 hours    Perforated bowel (H)       PRESERVISION AREDS 2 PO      Take 1 tablet by mouth 2 times daily        traMADol 50 MG tablet    ULTRAM    20 tablet    Take 1 tablet (50 mg) by mouth every 6 hours as needed for moderate pain    Acute post-operative pain       umeclidinium-vilanterol 62.5-25 MCG/INH oral inhaler    ANORO ELLIPTA    1 Inhaler    Inhale 1 puff into the lungs daily    Chronic obstructive pulmonary disease, unspecified COPD type (H)       vitamin D 2000 units tablet      Take 1 tablet by mouth daily.    Osteoporosis

## 2018-05-02 NOTE — NURSING NOTE
"Chief Complaint   Patient presents with     RECHECK     AAA (9:15 [facility]; 10:00 [facility]) History of 3.8cm AAA; 2 year follow up to 5/25/16 appointment with Dr. Boogie       Initial /75 (BP Location: Right arm, Patient Position: Sitting, Cuff Size: Adult Regular)  Pulse 101 Estimated body mass index is 22.85 kg/(m^2) as calculated from the following:    Height as of 4/15/18: 5' 5\" (1.651 m).    Weight as of 4/22/18: 137 lb 4.8 oz (62.3 kg).  Medication Reconciliation: complete    Joselyn Mckinnon CMA on 5/2/2018 at 10:04 AM    "

## 2018-05-02 NOTE — PROGRESS NOTES
Jamilah Rodriguez is an 87-year-old female who I have followed for a slowly enlarging AAA.  In 2016 maximal diameter was 3.8 cm relatively unchanged.  She was admitted to [facility] on 4/15/2018 with acute abdominal pain and free air.  She underwent exploratory laparotomy with Dr. Craig who found dilated loops of small bowel but no clear-cut perforation.  She was washed out and an abdominal drain was placed.  Postoperatively she had some issues with bilateral lower extremity edema and dyspnea on exertion.  Lower extremity venous ultrasound was negative for DVT.  She was discharged to the [facility] and remains a resident there.  She is scheduled for discharge to her own home this Saturday.  She presents today for annual follow-up of her AAA.    Exam:  Frail-appearing female alert and oriented x3.  She arrives in a wheelchair.  Her abdominal exam is notable for a nicely healing lower midline incision.  No significant lower extremity edema today.  I did not recheck pedal pulses.    CT scan from 4/15/2018 shows a AAA with maximal transverse diameter of 3.9 cm.  Maximal AP diameter is 4.6 cm but this is on axial cuts and does not represent a true orthogonal view.  I therefore feel this is a bit of an over call.    IMPRESSION:  Frail 87-year-old female with stable infrarenal AAA likely around 4 cm in maximal diameter.    PLAN:  I reviewed all the above with Mrs. Rodriguez and her daughter.  She will continue her medical regimen.  I will see her back again in 1 year for a repeat AAA ultrasound.  They understand that we will consider repair if maximal aneurysmal diameter exceeds 5 cm.    Total face-to-face time was 15 minutes, greater than 50% spent providing counseling and education.    Espinoza Boogie MD

## 2018-05-02 NOTE — LETTER
[facility]  [address]  [place] [address]  Phone: [phone]  Fax: [phone]      May 2, 2018    Re: Jamilah Rodriguez - 1931    Jamilah Rodriguez is an 87-year-old female who I have followed for a slowly enlarging AAA.  In  maximal diameter was 3.8 cm relatively unchanged.  She was admitted to [facility] on 4/15/2018 with acute abdominal pain and free air.  She underwent exploratory laparotomy with Dr. Craig who found dilated loops of small bowel but no clear-cut perforation. She was washed out and an abdominal drain was placed.  Postoperatively she had some issues with bilateral lower extremity edema and dyspnea on exertion.  Lower extremity venous ultrasound was negative for DVT.  She was discharged to the [facility] and remains a resident there.  She is scheduled for discharge to her own home this Saturday.  She presents today for annual follow-up of her AAA.     Exam:  Frail-appearing female alert and oriented x3.  She arrives in a wheelchair.  Her abdominal exam is notable for a nicely healing lower midline incision.  No significant lower extremity edema today.  I did not recheck pedal pulses.     CT scan from 4/15/2018 shows a AAA with maximal transverse diameter of 3.9 cm.  Maximal AP diameter is 4.6 cm but this is on axial cuts and does not represent a true orthogonal view.  I therefore feel this is a bit of an over call.     IMPRESSION:  Frail 87-year-old female with stable infrarenal AAA likely around 4 cm in maximal diameter.     PLAN:  I reviewed all the above with Mrs. Rodriguez and her daughter.  She will continue her medical regimen.  I will see her back again in 1 year for a repeat AAA ultrasound.  They understand that we will consider repair if maximal aneurysmal diameter exceeds 5 cm.       Espinoza Boogie MD

## 2018-05-03 ENCOUNTER — NURSING HOME VISIT (OUTPATIENT)
Dept: GERIATRICS | Facility: CLINIC | Age: 83
End: 2018-05-03
Payer: MEDICARE

## 2018-05-03 DIAGNOSIS — D62 ANEMIA DUE TO BLOOD LOSS, ACUTE: ICD-10-CM

## 2018-05-03 DIAGNOSIS — R53.81 PHYSICAL DECONDITIONING: ICD-10-CM

## 2018-05-03 DIAGNOSIS — I10 BENIGN ESSENTIAL HYPERTENSION: ICD-10-CM

## 2018-05-03 DIAGNOSIS — K56.609 SBO (SMALL BOWEL OBSTRUCTION) (H): Primary | ICD-10-CM

## 2018-05-03 DIAGNOSIS — J44.9 CHRONIC OBSTRUCTIVE PULMONARY DISEASE, UNSPECIFIED COPD TYPE (H): ICD-10-CM

## 2018-05-03 PROCEDURE — 99316 NF DSCHRG MGMT 30 MIN+: CPT | Performed by: NURSE PRACTITIONER

## 2018-05-03 NOTE — PROGRESS NOTES
[facility] DISCHARGE SUMMARY    PATIENT'S NAME: Jamilah Rodriguez  YOB: 1931  MEDICAL RECORD NUMBER:  [id number]    PRIMARY CARE PROVIDER AND CLINIC RESPONSIBLE AFTER TRANSFER: Sharon Hurtado [address] [place] [id number]     CODE STATUS/ADVANCE DIRECTIVES DISCUSSION:   DNR / DNI       Allergies   Allergen Reactions     Atenolol Hives     Beta Adrenergic Blockers      Brimonidine      Cephalexin Hives     Germall Plus Itching     Imidazolidinyl Urea found in many topical creams and house hold products     Latex      Itching     Morphine GI Disturbance     No Clinical Screening - See Comments Itching     Carba Mix; IMIDAZOLIDINYL UREA - Allergic to this ingredient, which is found in many common topical moisturizers and household products.     Penicillins Hives     Sulfa Drugs GI Disturbance     Tramadol Nausea and Vomiting     Tylenol GI Disturbance     Vicodin [Hydrocodone-Acetaminophen]      Fentanyl Nausea       TRANSFERRING PROVIDERS: Tonya Lynn Haase, APRN CNP, Dr. Sanaz MD  DATE OF SNF ADMISSION:  April / 22 / 2018  DATE OF SNF (anticipated) DISCHARGE: May / 05 / 2018  DISCHARGE DISPOSITION: [facility] Provider   Nursing Facility: [facility] stay 4/15/18 to 4/22/18.     Condition on Discharge:  Stable.  Function:  Walking up to 200 feet using RW indep  Cognitive Scores: BIMS: 15/15, SBT: 4/28    Equipment: FWW    DISCHARGE DIAGNOSIS:   1. SBO (small bowel obstruction)    2. Physical deconditioning    3. Anemia due to blood loss, acute    4. Benign essential hypertension    5. Chronic obstructive pulmonary disease, unspecified COPD type (H)          PAST MEDICAL HISTORY:  has a past medical history of AAA (abdominal aortic aneurysm) (H); Cervical cancer (H) (1969); Chronic dermatitis; Glaucoma; Glaucoma; History of COPD; HTN (hypertension); Macular degeneration; Osteoporosis (8/3/11); PAD (peripheral artery disease) (H); and  Vertebral compression fracture (H) (5/17/11). She also has no past medical history of Basal cell carcinoma; Malignant melanoma (H); or Squamous cell carcinoma.    DISCHARGE MEDICATIONS:  Current Outpatient Prescriptions   Medication Sig Dispense Refill     Acetaminophen (TYLENOL PO) Take 1,000 mg by mouth every 6 hours as needed for mild pain or fever       albuterol (PROAIR HFA/PROVENTIL HFA/VENTOLIN HFA) 108 (90 Base) MCG/ACT Inhaler Inhale 2 puffs into the lungs every 4 hours as needed for shortness of breath / dyspnea or wheezing       ascorbic acid 500 MG TABS Take 500 mg by mouth daily       bimatoprost (LUMIGAN) 0.01 % SOLN Place 1 drop into both eyes At Bedtime.         Calcium Carbonate-Vitamin D (CALCIUM + D) 600-200 MG-UNIT per tablet Take 1 tablet by mouth every 12 hours. 100 tablet 12     Cholecalciferol (VITAMIN D) 2000 UNITS tablet Take 1 tablet by mouth daily.       dorzolamide (TRUSOPT) 2 % ophthalmic solution Place 1 drop into both eyes 2 times daily       losartan (COZAAR) 50 MG tablet Take 1 tablet (50 mg) by mouth daily 90 tablet 1     Multiple Vitamins-Minerals (PRESERVISION AREDS 2 PO) Take 1 tablet by mouth 2 times daily       umeclidinium-vilanterol (ANORO ELLIPTA) 62.5-25 MCG/INH oral inhaler Inhale 1 puff into the lungs daily 1 Inhaler 11       MEDICATION CHANGES/RATIONALE:   No new orders during TCU stay  Last 3 BPs: 116/63, 141/84, 102/70 mmHg  HR Ranges:  bpm  Admission Weight: 137.3 lbs  Current Weight: 133.7 lbs  Controlled medications sent with patient:   not applicable/none     ROS:    10 point ROS of systems including Constitutional, Eyes, Respiratory, Cardiovascular, Gastroenterology, Genitourinary, Integumentary, Muscularskeletal, Psychiatric were all negative except for pertinent positives noted in my HPI.    Physical Exam:   Vitals: /63  Pulse 96  Temp 99.6  F (37.6  C)  Resp 14  Wt 133 lb 11.2 oz (60.6 kg)  SpO2 93%  BMI 22.25 kg/m2  BMI= Body mass index is  22.25 kg/(m^2).  GENERAL APPEARANCE:  Alert, in no distress  ENT:  Mouth and posterior oropharynx normal, moist mucous membranes, [place]  EYES:  EOM, conjunctivae, lids, pupils and irises normal, PERRL  RESP:  respiratory effort and palpation of chest normal, lungs clear to auscultation , no respiratory distress  CV:  Palpation and auscultation of heart done , regular rate and rhythm, no murmur, rub, or gallop, no edema noted  ABDOMEN:  normal bowel sounds, soft, nontender, no hepatosplenomegaly or other masses  M/S:   Examination of:   right upper extremity, left upper extremity, right lower extremity and left lower extremity  Inspection, ROM, stability and muscle strength normal  SKIN:  Inspection of skin and subcutaneous tissue baseline, mid abdominal incision is clean dry and intact with steri strips intact no signs of infection  NEURO:   Cranial nerves 2-12 are normal tested and grossly at patient's baseline, speech WNL  PSYCH:  affect and mood normal    HPI Nursing Facility Course: Patient progressed in therapy to walking up to 200 feet using a RW indep, indep with ADL's will DC to home indep living with no services, daughter is involved and will help with bathing and tasks at home.   HPI information obtained from: facility chart records, facility staff, patient report and [facility] chart review.      SBO (small bowel obstruction)  Physical deconditioning  Acute/ongoing: DC home with no services, daughter will help with tasks at home, levaquin and flagyl course complete    ES tylenol 1000mg q 6 hours prn for pain,      Anemia due to blood loss, acute  Acute/ongoing: Hgb stable in TCU     Benign essential hypertension  Ongoing: continue losartan 50mg QD     Chronic obstructive pulmonary disease, unspecified COPD type (H)  Ongoing: continue anoro ellipta 1 puff daily and albuterol MDI 2 puffs q 4 hours prn           DISCHARGE PLAN:  No Home Services  Patient instructed to follow-up with:  PCP in 7-10 days        Current [place] scheduled appointments:  Future Appointments  Date Time Provider Department Center   5/7/2018 12:30 PM Jm Craig MD [facility]       [facility] referral needed and placed by this provider: No    Pending labs: none  SNF labs   CBC RESULTS:   Recent Labs   Lab Test 05/01/18 04/21/18   0725  04/19/18   0805   04/16/18   0744   WBC  6.1*   --   8.0   --   11.6*   RBC  3.59*   --   3.88   --   3.45*   HGB  10.4*   --   11.3*   --   10.2*   HCT  32.7*   --   34.9*   --   30.5*   MCV  91.1   --   90   --   88   MCH   --    --   29.1   --   29.6   MCHC   --    --   32.4   --   33.4   RDW  14.0   --   13.2   --   13.0   PLT  319  241  257   < >  225    < > = values in this interval not displayed.       Last Basic Metabolic Panel:  Recent Labs   Lab Test 05/01/18 04/24/18   NA  138  138   POTASSIUM  4.2  3.8   CHLORIDE  109  107   NAKIA  9.0  8.6   CO2  23  26   BUN  11  11   CR  0.77  0.77   GLC  78  96       Liver Function Studies -   Recent Labs   Lab Test  04/15/18   1402  12/18/17   1103   PROTTOTAL  7.9  6.8   ALBUMIN  4.0  3.3*   BILITOTAL  0.9  0.6   ALKPHOS  122  95   AST  19  9   ALT  17  17       TSH   Date Value Ref Range Status   09/26/2017 3.29 0.40 - 4.00 mU/L Final   07/14/2016 3.24 0.40 - 4.00 mU/L Final       Discharge Treatments:none    TOTAL DISCHARGE TIME:   Greater than 30 minutes  Electronically signed by:  Tonya Lynn Haase, APRN CNP

## 2018-05-04 ENCOUNTER — TELEPHONE (OUTPATIENT)
Dept: GERIATRICS | Facility: CLINIC | Age: 83
End: 2018-05-04

## 2018-05-05 NOTE — TELEPHONE ENCOUNTER
[facility] who was doing well after exploratory laparotomy for SBO until this evening.     She ate just a couple of bites of dinner, then had emesis and a BM.   This was preceded by abdominal pain similar to what she had at presentation of her SBO.     Still some abdominal discomfort now.   Would prefer not to go back to hospital unless she has to.     PLAN: clear liquids only  Abd x-ray 2 view.   Follow up after results known.    If worse before then will need to send to ER.   Olga Lidia Castañeda MD

## 2018-05-06 ENCOUNTER — TELEPHONE (OUTPATIENT)
Dept: GERIATRICS | Facility: CLINIC | Age: 83
End: 2018-05-06

## 2018-05-06 NOTE — TELEPHONE ENCOUNTER
Nursing called to get discharge orders today for patient to return home.  Was to return home yesterday but due to emesis and nausea, stayed another night.    This morning she is feeling well enough to return home with no more nausea.    Gave OK to return home today.  Has an appointment tomorrow with surgeon    Electronically signed by Jordana Pastor RN, CNP

## 2018-05-07 ENCOUNTER — OFFICE VISIT (OUTPATIENT)
Dept: SURGERY | Facility: CLINIC | Age: 83
End: 2018-05-07
Payer: MEDICARE

## 2018-05-07 DIAGNOSIS — Z09 SURGERY FOLLOW-UP EXAMINATION: Primary | ICD-10-CM

## 2018-05-07 PROCEDURE — 99024 POSTOP FOLLOW-UP VISIT: CPT | Performed by: SURGERY

## 2018-05-07 NOTE — MR AVS SNAPSHOT
After Visit Summary   5/7/2018    Jamilah Rodriguez    MRN: [id number]           Patient Information     Date Of Birth          4/5/1931        Visit Information        Provider Department      5/7/2018 12:30 PM Jm Craig MD [facility] General Surgery      Today's Diagnoses     Surgery follow-up examination    -  1       Follow-ups after your visit        Your next 10 appointments already scheduled     May 14, 2018  1:00 PM CDT   Office Visit with Sharon Hurtado MD   [facility]    [address] [address] [phone]           Bring a current list of meds and any records pertaining to this visit. For Physicals, please bring immunization records and any forms needing to be filled out. Please arrive 10 minutes early to complete paperwork.              Who to contact     If you have questions or need follow up information about today's clinic visit or your schedule please contact [facility] directly at [phone].  Normal or non-critical lab and imaging results will be communicated to you by "[employer]hart, letter or phone within 4 business days after the clinic has received the results. If you do not hear from us within 7 days, please contact the clinic through [employer] or phone. If you have a critical or abnormal lab result, we will notify you by phone as soon as possible.  Submit refill requests through [employer] or call your pharmacy and they will forward the refill request to us. Please allow 3 business days for your refill to be completed.          Additional Information About Your Visit        MyChart Information     [employer] gives you secure access to your electronic health record. If you see a primary care provider, you can also send messages to your care team and make appointments. If you have questions, please call your primary care clinic.  If  you do not have a primary care provider, please call [phone] and they will assist you.        Care EveryWhere ID     This is your Care EveryWhere ID. This could be used by other organizations to access your [facility] medical records  [id number]         Blood Pressure from Last 3 Encounters:   05/02/18 116/63   05/02/18 126/75   04/22/18 99/60    Weight from Last 3 Encounters:   05/02/18 133 lb 11.2 oz (60.6 kg)   04/22/18 137 lb 4.8 oz (62.3 kg)   04/22/18 133 lb 4.8 oz (60.5 kg)              Today, you had the following     No orders found for display       Primary Care Provider Office Phone # Fax #    Sharon Hurtado [phone]       [address] [facility] [address]        Equal Access to Services     [facility]: Hadii aad ku hadasho Soomaali, waaxda luqadaha, qaybta kaalmada adeegyada, kimberly collins hayshamika drake . So [facility] [place] [phone].    ATENCIÓN: Si habla español, tiene a smith disposición servicios gratuitos de asistencia lingüística. Shahidame al [phone].    We comply with applicable federal civil rights laws and [place] laws. We do not discriminate on the basis of race, color, national origin, age, disability, sex, sexual orientation, or gender identity.            Thank you!     Thank you for choosing [facility]  for your care. Our goal is always to provide you with excellent care. Hearing back from our patients is one way we can continue to improve our services. Please take a few minutes to complete the written survey that you may receive in the mail after your visit with us. Thank you!             Your Updated Medication List - Protect others around you: Learn how to safely use, store and throw away your medicines at www.disposemymeds.org.          This list is accurate as of 5/7/18 12:54 PM.  Always use your most recent med list.                   Brand Name Dispense Instructions for use Diagnosis    albuterol 108 (90 Base) MCG/ACT Inhaler     PROAIR HFA/PROVENTIL HFA/VENTOLIN HFA     Inhale 2 puffs into the lungs every 4 hours as needed for shortness of breath / dyspnea or wheezing        ascorbic acid 500 MG Tabs      Take 500 mg by mouth daily        bimatoprost 0.01 % Soln    LUMIGAN     Place 1 drop into both eyes At Bedtime.        calcium + D 600-200 MG-UNIT Tabs   Generic drug:  calcium carbonate-vitamin D     100 tablet    Take 1 tablet by mouth every 12 hours.        dorzolamide 2 % ophthalmic solution    TRUSOPT     Place 1 drop into both eyes 2 times daily        losartan 50 MG tablet    COZAAR    90 tablet    Take 1 tablet (50 mg) by mouth daily    Benign essential hypertension       PRESERVISION AREDS 2 PO      Take 1 tablet by mouth 2 times daily        TYLENOL PO      Take 1,000 mg by mouth every 6 hours as needed for mild pain or fever        umeclidinium-vilanterol 62.5-25 MCG/INH oral inhaler    ANORO ELLIPTA    1 Inhaler    Inhale 1 puff into the lungs daily    Chronic obstructive pulmonary disease, unspecified COPD type (H)       vitamin D 2000 units tablet      Take 1 tablet by mouth daily.    Osteoporosis

## 2018-05-07 NOTE — LETTER
May 7, 2018    RE: Jamilah Rodriguez, : 1931      First postoperative visit for Mrs. Rodriguez after abdominal exploration for free air but no findings of perforated bowel.  His only complaint is that of pain down her left flank and back, this is improved with rest.  Her bowel habits are returning to normal.  Her appetite is improving.     On exam her incisions have healed nicely, there is no evidence of hernia no infection.     We discussed that her flank and back pain are likely muscular in nature related to the incisions on the left flank, this should resolve over the next 2-3 weeks.  In the meantime we recommend ice and/or heat.  It is okay for her to take ibuprofen as well.     She is pleased with this plan and will return to see us as needed.  If questions please call us.     Best regards.    Jm Craig MD

## 2018-05-10 NOTE — PROGRESS NOTES
First postoperative visit for Mrs. Rodriguez after abdominal exploration for free air but no findings of perforated bowel.  His only complaint is that of pain down her left flank and back, this is improved with rest.  Her bowel habits are returning to normal.  Her appetite is improving.    On exam her incisions have healed nicely, there is no evidence of hernia no infection.    We discussed that her flank and back pain are likely muscular in nature related to the incisions on the left flank, this should resolve over the next 2-3 weeks.  In the meantime we recommend ice and/or heat.  It is okay for her to take ibuprofen as well.    She is pleased with this plan and will return to see us as needed.  If questions please call us.    Best regards.    Please route or send letter to:  Primary Care Provider (PCP), Referring Provider, Include Op Note and Include Progress Note

## 2018-05-19 NOTE — DISCHARGE SUMMARY
"Discharge Summary    Jamilah Rodriguez MRN# [id number]   YOB: 1931 Age: 87 year old     Date of Admission:  4/15/2018  Date of Discharge:  4/22/2018  2:34 PM  Admitting Physician:  Jm Craig MD  Discharge Physician:  Jm Craig MD  Discharging Service:  General Surgery     Primary Provider: Sharon Hurtado          Admission Diagnoses:   SBO (small bowel obstruction) [K56.609]  Perforated bowel (H) [K63.1]          Discharge Diagnosis:   Active Problems:    Abdominal pain               Discharge Disposition:   Discharged to TCU           Condition on Discharge:   Discharge condition: Stable   Discharge vitals: Blood pressure 130/77, pulse 88, temperature 97.4  F (36.3  C), temperature source Oral, resp. rate 18, height 5' 5\" (1.651 m), weight 133 lb 4.8 oz (60.5 kg), SpO2 96 %, not currently breastfeeding.                 Procedures / Labs / Imaging:   Abdominal exploration, lysis of adhesions and drain placement.          Medications Prior to Admission:     No prescriptions prior to admission.             Discharge Medications:     Discharge Medication List as of 4/22/2018  1:33 PM      START taking these medications    Details   levofloxacin (LEVAQUIN) 500 MG tablet Take 1 tablet (500 mg) by mouth every 24 hours, Disp-2 tablet, R-0, E-Prescribe      metroNIDAZOLE (FLAGYL) 250 MG tablet Take 1 tablet (250 mg) by mouth every 8 hours, Disp-6 tablet, R-0, E-Prescribe      traMADol (ULTRAM) 50 MG tablet Take 1 tablet (50 mg) by mouth every 6 hours as needed for moderate pain, Disp-20 tablet, R-0, Local Print         CONTINUE these medications which have NOT CHANGED    Details   bimatoprost (LUMIGAN) 0.01 % SOLN Place 1 drop into both eyes At Bedtime.  , Historical      Calcium Carbonate-Vitamin D (CALCIUM + D) 600-200 MG-UNIT per tablet Take 1 tablet by mouth every 12 hours., Disp-100 tablet, R-12, Historical      Cholecalciferol (VITAMIN D) 2000 UNITS tablet Take 1 tablet by mouth daily., " Historical      dorzolamide (TRUSOPT) 2 % ophthalmic solution Place 1 drop into both eyes 2 times daily, Historical      losartan (COZAAR) 50 MG tablet Take 1 tablet (50 mg) by mouth daily, Disp-90 tablet, R-1, E-Prescribe      Multiple Vitamins-Minerals (PRESERVISION AREDS 2 PO) Take 1 tablet by mouth 2 times daily, Historical      umeclidinium-vilanterol (ANORO ELLIPTA) 62.5-25 MCG/INH oral inhaler Inhale 1 puff into the lungs daily, Disp-1 Inhaler, R-11, E-Prescribe      albuterol (PROAIR HFA/PROVENTIL HFA/VENTOLIN HFA) 108 (90 Base) MCG/ACT Inhaler Inhale into the lungs as needed for shortness of breath / dyspnea or wheezing, Historical      ascorbic acid (VITAMIN C) 500 MG tablet Take 1 tablet by mouth daily., Disp-100 tablet, R-12, Historical      Polyethylene Glycol 400 (BLINK TEARS OP) Apply to eye as needed, Historical         STOP taking these medications       Flaxseed, Linseed, (FLAX SEEDS PO) Comments:   Reason for Stopping:         GLUCOSAMINE SULFATE PO Comments:   Reason for Stopping:                     Consultations:   No consultations were requested during this admission             Brief History of Illness:   Jamilah Rodriguez is a 87 year old female who was admitted for free intrabdominal pain and abdominal pain.          Hospital Course:   Due to the presentation and CT findings patient was taken to the operating room from the ED.  Greenish ascites found, adhesions were lysed but no perforation identified anywhere.  The cavity was washed and a drain was left in place.  Her hospital stay was pretty uneventful, her bowel function returned and she was able to nourish by mouth.  Pain was well controlled and she did not developed complications.               Significant Results:   LE ultrasound no clots founds             Pending Results:   None           Discharge Instructions and Follow-Up:   Discharge diet: Low residue   Discharge activity: Activity as tolerated   Discharge follow-up: Follow up  with me in 2-3 weeks   Outpatient therapy: TCU   Home Care agency: None    Supplies and equipment: None   Lines and drains: None    Wound care: None   Other instructions: None

## 2018-05-20 DIAGNOSIS — I10 BENIGN ESSENTIAL HYPERTENSION: ICD-10-CM

## 2018-05-21 RX ORDER — LOSARTAN POTASSIUM 50 MG/1
TABLET ORAL
Qty: 90 TABLET | Refills: 0 | Status: SHIPPED | OUTPATIENT
Start: 2018-05-21 | End: 2018-01-01

## 2018-05-21 NOTE — TELEPHONE ENCOUNTER
"Requested Prescriptions   Pending Prescriptions Disp Refills     losartan (COZAAR) 50 MG tablet [Pharmacy Med Name: Losartan Potassium Oral Tablet 50 MG]  Last Written Prescription Date:  11/2/2017  Last Fill Quantity: 90 tablet,  # refills: 1   Last Office Visit: 4/6/2018   Future Office Visit:    Next 5 appointments (look out 90 days)     May 30, 2018  9:30 AM CDT   Office Visit with Sharon Hurtado MD   [facility]    [address] [address] [phone]                  90 tablet 0     Sig: TAKE ONE TABLET BY MOUTH ONE TIME DAILY    Angiotensin-II Receptors Passed    5/20/2018  4:21 PM       Passed - Blood pressure under 140/90 in past 12 months    BP Readings from Last 3 Encounters:   05/02/18 116/63   05/02/18 126/75   04/22/18 99/60                Passed - Recent (12 mo) or future (30 days) visit within the authorizing provider's specialty    Patient had office visit in the last 12 months or has a visit in the next 30 days with authorizing provider or within the authorizing provider's specialty.  See \"Patient Info\" tab in inbasket, or \"Choose Columns\" in Meds & Orders section of the refill encounter.           Passed - Patient is age 18 or older       Passed - No active pregnancy on record       Passed - Normal serum creatinine on file in past 12 months    Recent Labs   Lab Test 05/01/18   CR  0.77            Passed - Normal serum potassium on file in past 12 months    Recent Labs   Lab Test 05/01/18   POTASSIUM  4.2                   Passed - No positive pregnancy test in past 12 months          "

## 2018-05-24 ENCOUNTER — HOSPITAL ENCOUNTER (OUTPATIENT)
Dept: CARDIAC REHAB | Facility: CLINIC | Age: 83
End: 2018-05-24
Attending: INTERNAL MEDICINE
Payer: MEDICARE

## 2018-05-24 PROCEDURE — G0424 PULMONARY REHAB W EXER: HCPCS

## 2018-05-24 PROCEDURE — 40000244 ZZH STATISTIC VISIT PULM REHAB

## 2018-05-30 ENCOUNTER — OFFICE VISIT (OUTPATIENT)
Dept: INTERNAL MEDICINE | Facility: CLINIC | Age: 83
End: 2018-05-30
Payer: MEDICARE

## 2018-05-30 VITALS
SYSTOLIC BLOOD PRESSURE: 90 MMHG | RESPIRATION RATE: 22 BRPM | OXYGEN SATURATION: 98 % | BODY MASS INDEX: 20.29 KG/M2 | WEIGHT: 121.9 LBS | DIASTOLIC BLOOD PRESSURE: 60 MMHG | TEMPERATURE: 97.9 F | HEART RATE: 80 BPM

## 2018-05-30 DIAGNOSIS — Z09 HOSPITAL DISCHARGE FOLLOW-UP: Primary | ICD-10-CM

## 2018-05-30 DIAGNOSIS — R26.81 GAIT INSTABILITY: ICD-10-CM

## 2018-05-30 DIAGNOSIS — K66.8 FREE INTRAPERITONEAL AIR: ICD-10-CM

## 2018-05-30 DIAGNOSIS — J44.9 CHRONIC OBSTRUCTIVE PULMONARY DISEASE, UNSPECIFIED COPD TYPE (H): ICD-10-CM

## 2018-05-30 PROCEDURE — 99214 OFFICE O/P EST MOD 30 MIN: CPT | Performed by: INTERNAL MEDICINE

## 2018-05-30 NOTE — PROGRESS NOTES
SUBJECTIVE:      Jamilah Rodriguez is a pleasant 87 year old female who presents for hospital follow-up:    Accompanied by daughter.    Hospital: [facility]  Date of Admission: 4/15/18  Date of Discharge: 4/22/18  Reason(s) for Admission: abdominal pain and free intraperitoneal air    Diagnostic tests, treatments/interventions, and discharge summary reviewed.    Summary of hospitalization:  - presented with abdominal pain  - CT demonstrated free intraperitoneal air and early/partial SBO  - general surgery consulted and patient underwent exploratory laparoscopy and laparotomy   - no obvious source of free air identified  - post-op course unremarkable - diet was advanced as tolerated; pain controlled on PO pain medications  - discharged to TCU on POD#7  - discharged home 5/5/18    Medication changes since discharge: none  Adherent to discharge medications: yes  Problems taking discharge medications: no    Follow-up: has followed up with both [facility] and [facility]    Update since discharge:   - overall doing well  - eating well - no nausea, vomiting  - no diarrhea or constipation  - no abdominal pain  - no fevers or chills  - does complain about shortness of breath    - has COPD and is currently enrolled in pulmonary rehab    - not able to attend sessions due to transportation difficulties     - cannot drive or use public transporation due to:      - gait instability requiring a walker      - COPD - shortness of breath with minimal exertion      - macular degeneration and glaucoma  - no cough or wheezing  - no fevers or chills  - no chest pain or palpitations    OBJECTIVE:       BP 90/60  Pulse 80  Temp 97.9  F (36.6  C) (Oral)  Resp 22  Wt 121 lb 14.4 oz (55.3 kg)  SpO2 98%  BMI 20.29 kg/m2  Constitutional: well-appearing  Respiratory: normal respiratory effort; clear to auscultation bilaterally  Cardiovascular: regular rate and rhythm; pedal pulses palpable; no edema  Gastrointestinal: soft, non-tender, non-distended, and  bowel sounds present; no organomegaly or masses   Musculoskeletal: walks with walker  Psych: normal judgment and insight; normal mood and affect; recent and remote memory intact    ASSESSMENT/PLAN:       (Z09) Hospital discharge follow-up  (primary encounter diagnosis)  (K66.8) Free intraperitoneal air  Comment:   - no source of free air identified.   - recovering well from recent surgery.    (J44.9) Chronic obstructive pulmonary disease, unspecified COPD type (H)  (R26.81) Gait instability  Plan: completed physician portion of [employer] application - hopefully this will allow her to attend pulmonary rehab regularly (as recommended).     The instructions on the AVS were discussed and explained to the patient. Patient expressed understanding of instructions.    A total of 25 minutes were spent face-to-face with this patient during this encounter and over half of that time was spent on counseling and coordination of care re: above diagnoses and plans of care.     (Chart documentation was completed, in part, with [employer] voice-recognition software. Even though reviewed, some grammatical, spelling, and word errors may remain.)    Sharon Hurtado MD   [address] [address]  T: [phone], F: [phone]

## 2018-07-17 NOTE — PROGRESS NOTES
Pulmonary Rehab Early Discharge Summary    Reason for therapy discharge:    Patient/family request discontinuation of services.    Progress towards therapy goal(s). See goals on Care Plan in [facility] electronic health record for goal details.  PT has limited transportation and has requested that 7/16/18 be her last day    Therapy recommendation(s):    Continue home exercise program.  Gave patient a limited home program. Encouraged 3 - 10 min walks

## 2018-07-27 NOTE — PATIENT INSTRUCTIONS
Labs and xrays downstairs today.    Try using Gas-x during the day (once in the morning, once again around lunch).

## 2018-07-27 NOTE — PROGRESS NOTES
SUBJECTIVE:                                                      HPI: Jamilah Rodriguez is a pleasant 87 year old female who presents abdominal discomfort:    Accompanied by daughter.    PMH significant for free intraperitoneal air and early/partial SBO s/p exploratory laparoscopy and laparotomy with no obvious source of free air identified in April of this year.    - abdominal pain started shortly after surgery  - involves epigastric and left upper quadrant areas  - usually dull and mild, but occasionally sharp and severe  - usually occurs when she is been on standing or walking for a while  - mild improvement with Advil    - no nausea or vomiting  - no diarrhea, constipation, melena, hematochezian  - no obstipation  - no fevers or chills  - no anorexia or early satiety  - no unintentional weight loss or night sweats    - uses Gas-X nightly for gas (reports no symptoms at night because of this)    Patient is DUE for the pneumonia series.    The medication, allergy, and problem lists have been reviewed and updated as appropriate.       OBJECTIVE:                                                      /64 (BP Location: Left arm, Patient Position: Chair, Cuff Size: Adult Regular)  Pulse 64  Temp 97.7  F (36.5  C) (Oral)  Resp 16  Wt 121 lb 14.4 oz (55.3 kg)  SpO2 92%  BMI 20.29 kg/m2  Constitutional: well-appearing  Respiratory: normal respiratory effort; clear to auscultation bilaterally  Cardiovascular: regular rate and rhythm; no edema  Gastrointestinal: soft, mild tenderness to palpation LLQ, non-distended, and bowel sounds present; no organomegaly or masses   Musculoskeletal: normal gait and station  Psych: normal judgment and insight; normal mood and affect; recent and remote memory intact      ASSESSMENT/PLAN:                                                      (R10.13) Epigastric discomfort  (primary encounter diagnosis)  (R10.12) Abdominal discomfort in left upper quadrant  Comment: etiology  unclear.  Plan:    - CXR and abdominal series today.   - CBC and CMP today.   - TRIAL of Gas-X during the day   - additional recommendations to follow.    (Z23) Need for vaccination  Plan: Prevnar today; Pneumovax in 6-12 months.    The instructions on the AVS were discussed and explained to the patient. Patient expressed understanding of instructions.    (Chart documentation was completed, in part, with [employer] voice-recognition software. Even though reviewed, some grammatical, spelling, and word errors may remain.)    Sharon Hurtado MD   [address] [address]  T: [phone], F: [phone]

## 2018-07-27 NOTE — MR AVS SNAPSHOT
After Visit Summary   7/27/2018    Jamilah Rodriguez    MRN: [id number]           Patient Information     Date Of Birth          4/5/1931        Visit Information        Provider Department      7/27/2018 9:15 AM Sharon Hurtado MD [facility]        Today's Diagnoses     Need for vaccination    -  1    LUQ abdominal pain          Care Instructions    Labs and xrays downstairs today.    Try using Gas-x during the day (once in the morning, once again around lunch).           Follow-ups after your visit        Future tests that were ordered for you today     Open Future Orders        Priority Expected Expires Ordered    XR Chest 2 Views Routine 7/27/2018 7/27/2019 7/27/2018    XR Abdomen 3 Views Routine 7/27/2018 7/27/2019 7/27/2018            Who to contact     If you have questions or need follow up information about today's clinic visit or your schedule please contact [facility] directly at [phone].  Normal or non-critical lab and imaging results will be communicated to you by Petsyhart, letter or phone within 4 business days after the clinic has received the results. If you do not hear from us within 7 days, please contact the clinic through Malwarebytest or phone. If you have a critical or abnormal lab result, we will notify you by phone as soon as possible.  Submit refill requests through Namely or call your pharmacy and they will forward the refill request to us. Please allow 3 business days for your refill to be completed.          Additional Information About Your Visit        MyChart Information     Namely gives you secure access to your electronic health record. If you see a primary care provider, you can also send messages to your care team and make appointments. If you have questions, please call your primary care clinic.  If you do not have a primary care provider, please call [phone] and they will assist you.        Care EveryWhere ID      This is your Care EveryWhere ID. This could be used by other organizations to access your [place] medical records  [id number]        Your Vitals Were     Pulse Temperature Respirations Pulse Oximetry BMI (Body Mass Index)       64 97.7  F (36.5  C) (Oral) 16 92% 20.29 kg/m2        Blood Pressure from Last 3 Encounters:   07/27/18 100/64   05/30/18 90/60   05/02/18 116/63    Weight from Last 3 Encounters:   07/27/18 121 lb 14.4 oz (55.3 kg)   05/30/18 121 lb 14.4 oz (55.3 kg)   05/02/18 133 lb 11.2 oz (60.6 kg)              We Performed the Following     1st  Administration  [16257]     CBC with platelets     Comprehensive metabolic panel     Pneumococcal vaccine 13 valent PCV13 IM (Prevnar) [39922]        Primary Care Provider Office Phone # Fax #    Sharon Hurtado [phone]       [address] [place] [address]        Equal Access to Services     JEANNETTE FORREST : Hadii aad ku hadasho Soomaali, waaxda luqadaha, qaybta kaalmada adeegyada, kimberly poein hayshamika drake . So [facility] [phone].    ATENCIÓN: Si habla español, tiene a smith disposición servicios gratuitos de asistencia lingüística. Llame al [phone].    We comply with applicable federal civil rights laws and [place] laws. We do not discriminate on the basis of race, color, national origin, age, disability, sex, sexual orientation, or gender identity.            Thank you!     Thank you for choosing [facility]  for your care. Our goal is always to provide you with excellent care. Hearing back from our patients is one way we can continue to improve our services. Please take a few minutes to complete the written survey that you may receive in the mail after your visit with us. Thank you!             Your Updated Medication List - Protect others around you: Learn how to safely use, store and throw away your medicines at www.disposemymeds.org.          This list is accurate as of 7/27/18   9:42 AM.  Always use your most recent med list.                   Brand Name Dispense Instructions for use Diagnosis    ADVIL PO           albuterol 108 (90 Base) MCG/ACT Inhaler    PROAIR HFA/PROVENTIL HFA/VENTOLIN HFA     Inhale 2 puffs into the lungs every 4 hours as needed for shortness of breath / dyspnea or wheezing        ascorbic acid 500 MG Tabs      Take 500 mg by mouth daily        bimatoprost 0.01 % Soln    LUMIGAN     Place 1 drop into both eyes At Bedtime.        calcium + D 600-200 MG-UNIT Tabs   Generic drug:  calcium carbonate-vitamin D     100 tablet    Take 1 tablet by mouth every 12 hours.        dorzolamide 2 % ophthalmic solution    TRUSOPT     Place 1 drop into both eyes 2 times daily        GLUCOSAMINE SULFATE PO           losartan 50 MG tablet    COZAAR    90 tablet    TAKE ONE TABLET BY MOUTH ONE TIME DAILY    Benign essential hypertension       PRESERVISION AREDS 2 PO      Take 1 tablet by mouth 2 times daily        TYLENOL PO      Take 1,000 mg by mouth every 6 hours as needed for mild pain or fever        umeclidinium-vilanterol 62.5-25 MCG/INH oral inhaler    ANORO ELLIPTA    1 Inhaler    Inhale 1 puff into the lungs daily    Chronic obstructive pulmonary disease, unspecified COPD type (H)       vitamin D 2000 units tablet      Take 1 tablet by mouth every 48 hours    Osteoporosis

## 2018-08-14 NOTE — TELEPHONE ENCOUNTER
(It appears that patient viewed results of Xrays and labs around 10am on 7/29/18).    Xrays and labs were generally unremarkable.    Trial of Gas-X was recommended.     Hopefully she is feeling better.

## 2018-08-14 NOTE — TELEPHONE ENCOUNTER
Reason for Call:  Request for results:lab and x-ray    Name of test or procedure: XR Chest and Abdomen    Date of test of procedure: 07/27/18    Location of the test or procedure:  Clinics    OK to leave the result message on voice mail or with a family member? NO    Phone number Patient can be reached at:  Home number on file [phone] (home)    Additional comments:     Call taken on 8/14/2018 at 3:57 PM by Hayley Nascimento

## 2018-08-15 NOTE — TELEPHONE ENCOUNTER
Pt returned call.  Results given.  She states that the acid reflux and gas is still happening at night, even with taking the Gas X.  Advise if needed.

## 2018-08-16 NOTE — TELEPHONE ENCOUNTER
Patient is going to decline at this time as she was doing the chewable gas tablets and has switched to the pills and had a great night last night.  She will call if she changes her mind.

## 2018-08-16 NOTE — TELEPHONE ENCOUNTER
Hmmm. Interesting.    When we met, patient reported that symptoms occurred when she had been standing or walking for a while, but NOT at night.     Since symptoms are now at night, I would recommend a trial of an acid reflux medication at night (like Zantac).    Would she like to try this?

## 2018-08-23 NOTE — TELEPHONE ENCOUNTER
"Requested Prescriptions   Pending Prescriptions Disp Refills     losartan (COZAAR) 50 MG tablet 90 tablet 0     Sig: Take 1 tablet (50 mg) by mouth daily    Angiotensin-II Receptors Passed    8/22/2018 10:05 AM       Passed - Blood pressure under 140/90 in past 12 months    BP Readings from Last 3 Encounters:   07/27/18 100/64   05/30/18 90/60   05/02/18 116/63                Passed - Recent (12 mo) or future (30 days) visit within the authorizing provider's specialty    Patient had office visit in the last 12 months or has a visit in the next 30 days with authorizing provider or within the authorizing provider's specialty.  See \"Patient Info\" tab in inbasket, or \"Choose Columns\" in Meds & Orders section of the refill encounter.           Passed - Patient is age 18 or older       Passed - No active pregnancy on record       Passed - Normal serum creatinine on file in past 12 months    Recent Labs   Lab Test  07/27/18   1004   CR  0.74            Passed - Normal serum potassium on file in past 12 months    Recent Labs   Lab Test  07/27/18   1004   POTASSIUM  4.1                   Passed - No positive pregnancy test in past 12 months          "

## 2018-09-28 NOTE — PROGRESS NOTES
SUBJECTIVE:                                                      HPI: Jamilah Rodriguez is a pleasant 87 year old female who presents with shortness of breath and chest heaviness:    Accompanied by son.    I saw patient back in July for epigastric and LUQ abdominal discomfort (during the day, not the night). Pain started shortly after exploratory laparoscopy and laparotomy in April for free intraperitoneal air (no source identified). Work-up in July for epigastic and LUQ abdominal pain, including CXR and abdominal XR series and basic labs, was unrevealing. TRIAL of Gas-X throughout the day recommended (had only been taking at night).     Patient telephoned me in August with complaint of nighttime symptoms. Trial of H2B recommended but declined.     Patient is here today with complaint of worsening shortness of breath and chest heaviness. Ongoing for the last several weeks or more. Describes shortness of breath not at rest but with minimal exertion - like getting dressed. Also describes chest heaviness off and on throughout the day. Often but not always associated with the shortness of breath but also starts or continues after shortness of breath has resolved.     Patient has also noticed increased bloating and burping after drinking cold liquids only. Does not occur with food or warm drinks.    No nausea or vomiting. No sweating.    PMH significant for COPD on daily Anoro and albuterol inhaler as needed. She was seen by pulmonology in March and referred to pulmonary rehab which she attended but did not complete due to transportation difficulties. She was due to follow-up with pulm in July but has not followed up.    PMH also significant for PAD and HTN.     ---    Unrelated to above, patient requests flu shot today.    ---    The medication, allergy, and problem lists have been reviewed and updated as appropriate.       OBJECTIVE:                                                      /74  Pulse 86  Temp 98   F (36.7  C) (Oral)  Resp 24  Wt 124 lb (56.2 kg)  SpO2 98%  BMI 20.63 kg/m2  Constitutional: frail appearing  Respiratory: normal respiratory effort; clear to auscultation bilaterally - no wheezing or diminished breath sounds  Cardiovascular: regular rate and rhythm - no S3, S4; no edema  Gastrointestinal: soft, non-tender, non-distended, and bowel sounds present; no organomegaly or masses   Musculoskeletal: walks with walker; significant kyphosis  Psych: normal judgment and insight; normal mood and affect; recent and remote memory intact    EKG (today): new LBBB and inferior infarct - age undetermined (these were not present on EKG from December, 2017)    Troponin (today): 0.41 (normal <0.5).      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   - START daily atorvastatin 40mg.   - already on losartan.   - reported allergy to beta blockers.   - referred to cardiology - patient to schedule ASAP.   - echo ordered - patient to schedule ASAP.     (Z23) Need for prophylactic vaccination and inoculation against influenza  Plan: flu shot given today.    The instructions on the AVS were discussed and explained to the patient. Patient expressed understanding of instructions.    (Chart documentation was completed, in part, with [employer] voice-recognition software. Even though reviewed, some grammatical, spelling, and word errors may remain.)    Sharon Hurtado MD   [address] [address]  T: [phone], F: [phone]

## 2018-09-28 NOTE — MR AVS SNAPSHOT
After Visit Summary   9/28/2018    Jamilah Rodriguez    MRN: [id number]           Patient Information     Date Of Birth          4/5/1931        Visit Information        Provider Department      9/28/2018 2:15 PM Sharon Hurtado MD [facility]        Today's Diagnoses     Need for prophylactic vaccination and inoculation against influenza    -  1    Chest heaviness          Care Instructions    Blood test today.    ---    Please schedule heart echo today before leaving.           Follow-ups after your visit        Future tests that were ordered for you today     Open Future Orders        Priority Expected Expires Ordered    Echocardiogram Complete Routine  9/28/2019 9/28/2018            Who to contact     If you have questions or need follow up information about today's clinic visit or your schedule please contact [facility] directly at [phone].  Normal or non-critical lab and imaging results will be communicated to you by Neocoretechhart, letter or phone within 4 business days after the clinic has received the results. If you do not hear from us within 7 days, please contact the clinic through Neocoretechhart or phone. If you have a critical or abnormal lab result, we will notify you by phone as soon as possible.  Submit refill requests through Browster or call your pharmacy and they will forward the refill request to us. Please allow 3 business days for your refill to be completed.          Additional Information About Your Visit        MyChart Information     Browster gives you secure access to your electronic health record. If you see a primary care provider, you can also send messages to your care team and make appointments. If you have questions, please call your primary care clinic.  If you do not have a primary care provider, please call [phone] and they will assist you.        Care EveryWhere ID     This is your Care EveryWhere ID. This could be used  by other organizations to access your [place] medical records  [id number]        Your Vitals Were     Pulse Temperature Respirations Pulse Oximetry BMI (Body Mass Index)       86 98  F (36.7  C) (Oral) 24 98% 20.63 kg/m2        Blood Pressure from Last 3 Encounters:   09/28/18 110/74   07/27/18 100/64   05/30/18 90/60    Weight from Last 3 Encounters:   09/28/18 124 lb (56.2 kg)   07/27/18 121 lb 14.4 oz (55.3 kg)   05/30/18 121 lb 14.4 oz (55.3 kg)              We Performed the Following          ADMIN VACCINE, FIRST [68358]     ADMIN INFLUENZA  (For MEDICARE Patients ONLY) []     EKG 12-lead complete w/read - Clinics     FLU VACCINE, INCREASED ANTIGEN, PRESV FREE, AGE 65+ [08827]     Troponin I        Primary Care Provider Office Phone # Fax #    Sharon Hurtado [phone]       [address] [place] [address]        Equal Access to Services     JEANNETTE FORREST : Hadii aad ku hadasho Soomaali, waaxda luqadaha, qaybta kaalmada adeegyada, waxay idiin hayaan frieda drake . So [facility] [phone].    ATENCIÓN: Si habla español, tiene a smith disposición servicios gratuitos de asistencia lingüística. Llame al [phone].    We comply with applicable federal civil rights laws and [place] laws. We do not discriminate on the basis of race, color, national origin, age, disability, sex, sexual orientation, or gender identity.            Thank you!     Thank you for choosing [facility]  for your care. Our goal is always to provide you with excellent care. Hearing back from our patients is one way we can continue to improve our services. Please take a few minutes to complete the written survey that you may receive in the mail after your visit with us. Thank you!             Your Updated Medication List - Protect others around you: Learn how to safely use, store and throw away your medicines at www.disposemymeds.org.          This list is accurate as of 9/28/18   3:11 PM.  Always use your most recent med list.                   Brand Name Dispense Instructions for use Diagnosis    ADVIL PO           albuterol 108 (90 Base) MCG/ACT inhaler    PROAIR HFA/PROVENTIL HFA/VENTOLIN HFA     Inhale 2 puffs into the lungs every 4 hours as needed for shortness of breath / dyspnea or wheezing        ascorbic acid 500 MG Tabs      Take 500 mg by mouth daily        bimatoprost 0.01 % Soln    LUMIGAN     Place 1 drop into both eyes At Bedtime.        calcium + D 600-200 MG-UNIT Tabs   Generic drug:  calcium carbonate-vitamin D     100 tablet    Take 1 tablet by mouth every 12 hours.        dorzolamide 2 % ophthalmic solution    TRUSOPT     Place 1 drop into both eyes 2 times daily        GLUCOSAMINE SULFATE PO           losartan 50 MG tablet    COZAAR    90 tablet    Take 1 tablet (50 mg) by mouth daily    Benign essential hypertension       PRESERVISION AREDS 2 PO      Take 1 tablet by mouth 2 times daily        TYLENOL PO      Take 1,000 mg by mouth every 6 hours as needed for mild pain or fever        umeclidinium-vilanterol 62.5-25 MCG/INH oral inhaler    ANORO ELLIPTA    1 Inhaler    Inhale 1 puff into the lungs daily    Chronic obstructive pulmonary disease, unspecified COPD type (H)       vitamin D 2000 units tablet      Take 1 tablet by mouth every 48 hours    Osteoporosis

## 2018-10-01 NOTE — TELEPHONE ENCOUNTER
Reason for call:  Other   Patient called regarding (reason for call): medication questions  Additional comments: Patient would not give me medication names she would like someone to call her back    Phone number to reach patient:  Home number on file [phone] (home)    Best Time:  anytime    Can we leave a detailed message on this number?  NO

## 2018-10-01 NOTE — TELEPHONE ENCOUNTER
Patient calling concerned about side effects of newly prescribed aspirin and atorvastatin.  Shortness of breath, chest tightness and ringing in ears are all listed as possible side effects.  Patient states she already experiences these symptoms and wonders how she will know if her condition is worsening or if symptoms are related to medications.  She is also concerned about taking aspirin because she read in the [employer] [place] that baby aspirin poses no benefit and may increase risk of internal bleeding.  Patient also had the following questions related to medications;    1)  Should she still take Advil as needed when on Aspirin?    2)  Should she continue her calcium?    3)  Should she continue her flaxseed?      Patient also wanted PCP to be aware that Echocardiogram has been scheduled for this Wednesday.  Cardiologist appointment was made for November 28th (soonest patient could get in).  Routing to PCP.

## 2018-10-01 NOTE — TELEPHONE ENCOUNTER
Patient can HOLD OFF on starting her atorvastatin for now (cardiology may recommend otherwise).    Her cholesterol came back AFTER I spoke to her on Saturday and is actually excellent without medication (was sub optimal in 2016).    ---    She should definitely take a daily baby aspirin.    Her EKG tells us that she had a heart attack between December, 2017 and now. A daily baby aspirin is recommended for all those who have had a heart attack (to help prevent future heart attacks, which she is at increased risk for).     While I agree that there are patients in whom the risks of a daily baby aspirin outweigh the benefits, she is not one of these patients.    ---    She may still take Advil as needed (in addition to her daily baby aspirin).  She may also continue her calcium and flaxseed.     ---    Patient should be seen sooner than November 28th - may we contact cardiology to get her in sooner (perhaps at a different location)? If so, what days/times work for her in the next 2 weeks?    ---

## 2018-10-02 NOTE — TELEPHONE ENCOUNTER
Please call her when you get the results.  She also has concerns about taking baby ASA with her abdominal aortic aneurysm.  Also after reading recent article saying there are no benefits.

## 2018-10-02 NOTE — TELEPHONE ENCOUNTER
Left message for patient to call back. Was able to get her cardiology appt moved up to 10/18 at 11:15 AM with Dr. Ruggiero at [place] location. Per cardiology , this is the soonest they had and was only open d/t a cancellation. They also have a [place] location but they are scheduled out until November.     Dr. Hurtado- will 10/18 appt be soon enough? Still need to inform patient.

## 2018-10-02 NOTE — TELEPHONE ENCOUNTER
Yes, read your message to her below.  She was still concerned about taking the baby ASA and wanted to make sure you saw the recent articles out.

## 2018-10-02 NOTE — TELEPHONE ENCOUNTER
Pt is unable to make the 10/18 appointment due to transportation.  Is it okay to wait until you get the results back from echocardiogram to decide on cardiology appt?   She is scheduled for that tomorrow.  Ok to leave detailed message.      Looks like next soonest appts were in November.

## 2018-10-03 NOTE — TELEPHONE ENCOUNTER
Contacted Dr. Bond who will contact cardiology scheduling to see if patient can be seen earlier.    Updated patient.

## 2018-10-03 NOTE — TELEPHONE ENCOUNTER
Discussed with patient and daughter.    Patient is SOB with minimal exertion (getting dressed) but not at rest.    Trying to get patient in to be seen in near future.

## 2018-10-03 NOTE — TELEPHONE ENCOUNTER
Received call from [facility] regarding Echocardiogram results.     Would like PCP to know that EF less than 20%,. Overall heart function worse then 2012.    Would like patient to see Cardiology ASAP. If patient is SOB, should be seen in ER.    Anabelle DOWNS RN, BSN, PHN

## 2018-10-03 NOTE — TELEPHONE ENCOUNTER
Patient stated she missed another call from PCP. Triage unsure what to relay or if it was regarding a sooner Cardiology appointment then the 19th.    PCP please call back patient if needed at: [phone], ok to leave a detailed message.    Anabelle DOWNS RN, BSN, PHN

## 2018-10-05 NOTE — LETTER
10/5/2018      Sharon Hurtado MD  [address] [facility] [address]      RE: Jamilah Rodriguez       Dear Colleague,    I had the pleasure of seeing Jamilah Rodriguez in the [facility].    Service Date: 10/05/2018      REASON FOR CONSULTATION:  Cardiomyopathy.      HISTORY OF PRESENT ILLNESS:  I had the pleasure of seeing Ms. Rodriguez in consultation at the [facility] today.  She is a very pleasant, 87-year-old female who has previously been seen by Dr. Colin in our clinic in 08/2012.  She was seen at that time due to a history of possible atrial fibrillation as well as a left bundle branch block.  At that time an echocardiogram demonstrated normal left ventricular systolic function, and a nuclear stress test demonstrated no evidence of ischemia.  She also underwent a Lexiscan nuclear perfusion study in 2015, which was also within normal limits.  She is known to have an abdominal aortic aneurysm, which is followed by Dr. Boogie.      She was admitted to [facility] earlier this year with abdominal discomfort and actually underwent an exploratory laparotomy due to concerns regarding possible small bowel perforation.  Ultimately, she underwent lysis of intraabdominal adhesions and an abdominal cavity washout and drain placement.      Since then, she states that she has been experiencing exertional dyspnea and chest discomfort that is relieved by rest.  The shortness of breath appears to have worsened over the past few months and is not relieved by her inhalers.      To work up her complaints further, she underwent an echocardiogram that I reviewed personally.  It demonstrates severe global hypokinesis of the left ventricle with an estimated ejection fraction of less than 20%.  This is a significant deterioration compared to her prior study from 2012, and her nuclear stress test in 2015 also demonstrated normal left ventricular systolic function.       Regarding symptoms, the patient still admits to chest discomfort and dyspnea with exertion.  She denies any PND or orthopnea.  She did notice some lower extremity edema last week, but this appears to have resolved spontaneously.      ALLERGIES:  Of note, she has a history of hives with atenolol and has a listed allergy to all beta blockers as a result.      Her past medical history, family history, social history and medications are reviewed in Epic.        Her physical exam is dictated below.      IMPRESSION:   1.  Severely reduced left ventricular systolic function on a recent echocardiogram associated with dyspnea and chest discomfort with exertion.   2.  Recent admission to [facility] for abdominal discomfort, status post ex lap at the time.   3.  Left bundle branch block on EKG.   4.  History of intolerance to multiple medications, including beta blockers due to hives.      Ms. Rodriguez presents for an evaluation regarding chest discomfort and dyspnea on exertion with an echocardiogram that demonstrates a significant deterioration in left ventricular systolic function from 2015.  I had a detailed discussion with the patient and her family regarding the potential etiologies for her left ventricular dysfunction.  As I explained to them in the context of chest discomfort, the most important issue to address would be underlying coronary artery disease, and I have recommended a coronary angiogram for this purpose.  In addition, I have ordered a cardiovascular MRI to assess for scar patterns that may also help us in establishing a diagnosis.      From a therapeutic standpoint, I am reluctant to prescribe carvedilol or metoprolol given her listed allergy to beta blockers.  Instead, I have started her on Aldactone 12.5 mg daily.  She is already taking losartan appropriately.  Given the left bundle branch block on her EKG, I think it may be reasonable to consider CRT plus/minus D therapy in the short term after  the above-mentioned investigations are completed.      It was a pleasure seeing her in consultation.         NGHIA FLEMING MD             D: 10/05/2018   T: 10/05/2018   MT: rick      Name:     VANITA SHERIFF   MRN:      [id number]        Account:      [id number]   :      1931           Service Date: 10/05/2018      Document: [id number]         Outpatient Encounter Prescriptions as of 10/5/2018   Medication Sig Dispense Refill     Acetaminophen (TYLENOL PO) Take 1,000 mg by mouth every 6 hours as needed for mild pain or fever       albuterol (PROAIR HFA/PROVENTIL HFA/VENTOLIN HFA) 108 (90 Base) MCG/ACT Inhaler Inhale 2 puffs into the lungs every 4 hours as needed for shortness of breath / dyspnea or wheezing       ascorbic acid 500 MG TABS Take 500 mg by mouth daily       aspirin 81 MG tablet Take 1 tablet (81 mg) by mouth daily 90 tablet 3     atorvastatin (LIPITOR) 40 MG tablet Take 1 tablet (40 mg) by mouth daily 90 tablet 3     bimatoprost (LUMIGAN) 0.01 % SOLN Place 1 drop into both eyes At Bedtime.         Calcium Carbonate-Vitamin D (CALCIUM + D) 600-200 MG-UNIT per tablet Take 1 tablet by mouth every 12 hours. 100 tablet 12     Cholecalciferol (VITAMIN D) 2000 UNITS tablet Take 1 tablet by mouth every 48 hours        dorzolamide (TRUSOPT) 2 % ophthalmic solution Place 1 drop into both eyes 2 times daily       GLUCOSAMINE SULFATE PO        Ibuprofen (ADVIL PO)        losartan (COZAAR) 50 MG tablet Take 1 tablet (50 mg) by mouth daily 90 tablet 3     Multiple Vitamins-Minerals (PRESERVISION AREDS 2 PO) Take 1 tablet by mouth 2 times daily       spironolactone (ALDACTONE) 25 MG tablet Take 0.5 tablets (12.5 mg) by mouth daily 90 tablet 1     umeclidinium-vilanterol (ANORO ELLIPTA) 62.5-25 MCG/INH oral inhaler Inhale 1 puff into the lungs daily 1 Inhaler 11     No facility-administered encounter medications on file as of 10/5/2018.        Again, thank you for allowing me to participate in the  care of your patient.      Sincerely,    Lizbeth Bond MD     [facility]

## 2018-10-05 NOTE — MR AVS SNAPSHOT
After Visit Summary   10/5/2018    Jamilah Rodriguez    MRN: [id number]           Patient Information     Date Of Birth          4/5/1931        Visit Information        Provider Department      10/5/2018 8:15 AM Lizbeth Bond MD [facility]        Today's Diagnoses     Chest heaviness    -  1       Follow-ups after your visit        Your next 10 appointments already scheduled     Oct 10, 2018  1:00 PM CDT   MR MYOCARDIUM W CONTRAST with [id number]   [facility] (Cardiovascular Imaging at [facility])    [address]  [address]  [place] [address]   [phone]           How do I prepare for my exam? (Food and drink instructions) **If you will be receiving sedation or general anesthesia, please see special notes below.**  How do I prepare for my exam? (Other instructions) Take your medicines as usual, unless your doctor tells you not to. You may or may not receive intravenous (IV) contrast for this exam pending the discretion of the Radiologist.  You do not need to do anything special to prepare.  **If you will be receiving sedation or general anesthesia, please see special notes below.**  What should I wear: The MRI machine uses a strong magnet. Please wear clothes without metal (snaps, zippers). A sweatsuit works well, or we may give you a hospital gown. Please remove any body piercings and hair extensions before you arrive. You will also remove watches, jewelry, hairpins, wallets, dentures, partial dental plates and hearing aids. You may wear contact lenses, and you may be able to wear your rings. We have a safe place to keep your personal items, but it is safer to leave them at home.  How long does the exam take: Most tests take 30 to 60 minutes.  HOWEVER, IF YOUR DOCTOR PRESCRIBES ANESTHESIA please plan on spending four to five hours in the recovery room.  What should I bring:  Bring a list of your current  medicines to your exam (including vitamins, minerals and over-the-counter drugs).  Do I need a :  **If you will be receiving sedation or general anesthesia, please see special notes below.**  What should I do after the exam: No Restrictions, You may resume normal activities.  What is this test: MRI (magnetic resonance imaging) uses a strong magnet and radio waves to look inside the body. An MRA (magnetic resonance angiogram) does the same thing, but it lets us look at your blood vessels. A computer turns the radio waves into pictures showing cross sections of the body, much like slices of bread. This helps us see any problems more clearly. You may receive fluid (called  contrast ) before or during your scan. The fluid helps us see the pictures better. We give the fluid through an IV (small needle in your arm).  Who should I call with questions:  Please call the Imaging Department at your exam site with any questions. Directions, parking instructions, and other information is available on our website, [employer]/imaging.  How do I prepare if I m having sedation or anesthesia? **IMPORTANT** THE INSTRUCTIONS BELOW ARE ONLY FOR THOSE PATIENTS WHO HAVE BEEN TOLD THEY WILL RECEIVE SEDATION OR GENERAL ANESTHESIA DURING THEIR MRI PROCEDURE:  IF YOU WILL RECEIVE SEDATION (take medicine to help you relax during your exam): You must get the medicine from your doctor before you arrive. Bring the medicine to the exam. Do not take it at home. Arrive one hour early. Bring someone who can take you home after the test. Your medicine will make you sleepy. After the exam, you may not drive, take a bus or take a taxi by yourself. No eating 8 hours before your exam. You may have clear liquids up until 4 hours before your exam. (Clear liquids include water, clear tea, black coffee and fruit juice without pulp.)  IF YOU WILL RECEIVE ANESTHESIA (be asleep for your exam): Arrive 1 1/2 hours early. Bring someone who can take you home  after the test. You may not drive, take a bus or take a taxi by yourself. No eating 8 hours before your exam. You may have clear liquids up until 4 hours before your exam. (Clear liquids include water, clear tea, black coffee and fruit juice without pulp.)            Oct 12, 2018 10:00 AM CDT   Cath 90 Minute with SHCVR1   [facility] ([facility])    [address]  [place] [address] [phone]            Oct 22, 2018  8:30 AM CDT   Return Visit with JAMIN Canales CNP   [facility] ([facility])    [address]  [place] [address]   [phone] OPT 2              Future tests that were ordered for you today     Open Future Orders        Priority Expected Expires Ordered    Basic metabolic panel Routine 10/12/2018 10/5/2019 10/5/2018    MRI Cardiac w/contrast Routine 10/6/2018 10/5/2019 10/5/2018    Cardiac Cath: Coronary Angiography Adult Order Routine 10/12/2018 10/5/2019 10/5/2018            Who to contact     If you have questions or need follow up information about today's clinic visit or your schedule please contact [facility] directly at [phone].  Normal or non-critical lab and imaging results will be communicated to you by Captricityhart, letter or phone within 4 business days after the clinic has received the results. If you do not hear from us within 7 days, please contact the clinic through ITT EXIMt or phone. If you have a critical or abnormal lab result, we will notify you by phone as soon as possible.  Submit refill requests through [employer] or call your pharmacy and they will forward the refill request to us. Please allow 3 business days for your refill to be completed.          Additional Information About Your Visit        [employer] Information     [employer] gives you secure access to your electronic health record. If you see a primary  "care provider, you can also send messages to your care team and make appointments. If you have questions, please call your primary care clinic.  If you do not have a primary care provider, please call [phone] and they will assist you.        Care EveryWhere ID     This is your Care EveryWhere ID. This could be used by other organizations to access your [place] medical records  [id number]        Your Vitals Were     Pulse Height BMI (Body Mass Index)             86 1.651 m (5' 5\") 19.97 kg/m2          Blood Pressure from Last 3 Encounters:   10/05/18 121/76   09/28/18 110/74   07/27/18 100/64    Weight from Last 3 Encounters:   10/05/18 54.4 kg (120 lb)   09/28/18 56.2 kg (124 lb)   07/27/18 55.3 kg (121 lb 14.4 oz)                 Today's Medication Changes          These changes are accurate as of 10/5/18  9:19 AM.  If you have any questions, ask your nurse or doctor.               Start taking these medicines.        Dose/Directions    spironolactone 25 MG tablet   Commonly known as:  ALDACTONE   Used for:  Chest heaviness   Started by:  Lizbeth Bond MD        Dose:  12.5 mg   Take 0.5 tablets (12.5 mg) by mouth daily   Quantity:  90 tablet   Refills:  1            Where to get your medicines      These medications were sent to [id number] [address] [place] [address]     Phone:  [phone]     spironolactone 25 MG tablet                Primary Care Provider Office Phone # Fax #    Sharon Hurtado [phone]       [address] [facility] [address]        Equal Access to Services     [facility] LON AH: Maty Schumacher, wajaz gray, qacolleenta kaalmabharathi murillo, kimberly elder. [facility] [phone].    ATENCIÓN: Si habla español, tiene a smith disposición servicios gratuitos de asistencia lingüística. Llame al [phone].    We comply with applicable federal civil rights " laws and [place] laws. We do not discriminate on the basis of race, color, national origin, age, disability, sex, sexual orientation, or gender identity.            Thank you!     Thank you for choosing [facility]  for your care. Our goal is always to provide you with excellent care. Hearing back from our patients is one way we can continue to improve our services. Please take a few minutes to complete the written survey that you may receive in the mail after your visit with us. Thank you!             Your Updated Medication List - Protect others around you: Learn how to safely use, store and throw away your medicines at www.disposemymeds.org.          This list is accurate as of 10/5/18  9:19 AM.  Always use your most recent med list.                   Brand Name Dispense Instructions for use Diagnosis    ADVIL PO           albuterol 108 (90 Base) MCG/ACT inhaler    PROAIR HFA/PROVENTIL HFA/VENTOLIN HFA     Inhale 2 puffs into the lungs every 4 hours as needed for shortness of breath / dyspnea or wheezing        ascorbic acid 500 MG Tabs      Take 500 mg by mouth daily        aspirin 81 MG tablet     90 tablet    Take 1 tablet (81 mg) by mouth daily    LBBB (left bundle branch block), Status post myocardial infarction of inferior wall       atorvastatin 40 MG tablet    LIPITOR    90 tablet    Take 1 tablet (40 mg) by mouth daily    Status post myocardial infarction of inferior wall, LBBB (left bundle branch block)       bimatoprost 0.01 % Soln    LUMIGAN     Place 1 drop into both eyes At Bedtime.        calcium + D 600-200 MG-UNIT Tabs   Generic drug:  calcium carbonate-vitamin D     100 tablet    Take 1 tablet by mouth every 12 hours.        dorzolamide 2 % ophthalmic solution    TRUSOPT     Place 1 drop into both eyes 2 times daily        GLUCOSAMINE SULFATE PO           losartan 50 MG tablet    COZAAR    90 tablet    Take 1 tablet (50 mg) by mouth daily    Benign  essential hypertension       PRESERVISION AREDS 2 PO      Take 1 tablet by mouth 2 times daily        spironolactone 25 MG tablet    ALDACTONE    90 tablet    Take 0.5 tablets (12.5 mg) by mouth daily    Chest heaviness       TYLENOL PO      Take 1,000 mg by mouth every 6 hours as needed for mild pain or fever        umeclidinium-vilanterol 62.5-25 MCG/INH oral inhaler    ANORO ELLIPTA    1 Inhaler    Inhale 1 puff into the lungs daily    Chronic obstructive pulmonary disease, unspecified COPD type (H)       vitamin D 2000 units tablet      Take 1 tablet by mouth every 48 hours    Osteoporosis

## 2018-10-05 NOTE — PROGRESS NOTES
HPI and Plan:   See dictation    Orders Placed This Encounter   Procedures     MRI Cardiac w/contrast     Basic metabolic panel       Orders Placed This Encounter   Medications     spironolactone (ALDACTONE) 25 MG tablet     Sig: Take 0.5 tablets (12.5 mg) by mouth daily     Dispense:  90 tablet     Refill:  1       There are no discontinued medications.      Encounter Diagnosis   Name Primary?     Chest heaviness Yes       CURRENT MEDICATIONS:  Current Outpatient Prescriptions   Medication Sig Dispense Refill     Acetaminophen (TYLENOL PO) Take 1,000 mg by mouth every 6 hours as needed for mild pain or fever       albuterol (PROAIR HFA/PROVENTIL HFA/VENTOLIN HFA) 108 (90 Base) MCG/ACT Inhaler Inhale 2 puffs into the lungs every 4 hours as needed for shortness of breath / dyspnea or wheezing       ascorbic acid 500 MG TABS Take 500 mg by mouth daily       aspirin 81 MG tablet Take 1 tablet (81 mg) by mouth daily 90 tablet 3     atorvastatin (LIPITOR) 40 MG tablet Take 1 tablet (40 mg) by mouth daily 90 tablet 3     bimatoprost (LUMIGAN) 0.01 % SOLN Place 1 drop into both eyes At Bedtime.         Calcium Carbonate-Vitamin D (CALCIUM + D) 600-200 MG-UNIT per tablet Take 1 tablet by mouth every 12 hours. 100 tablet 12     Cholecalciferol (VITAMIN D) 2000 UNITS tablet Take 1 tablet by mouth every 48 hours        dorzolamide (TRUSOPT) 2 % ophthalmic solution Place 1 drop into both eyes 2 times daily       GLUCOSAMINE SULFATE PO        Ibuprofen (ADVIL PO)        losartan (COZAAR) 50 MG tablet Take 1 tablet (50 mg) by mouth daily 90 tablet 3     Multiple Vitamins-Minerals (PRESERVISION AREDS 2 PO) Take 1 tablet by mouth 2 times daily       spironolactone (ALDACTONE) 25 MG tablet Take 0.5 tablets (12.5 mg) by mouth daily 90 tablet 1     umeclidinium-vilanterol (ANORO ELLIPTA) 62.5-25 MCG/INH oral inhaler Inhale 1 puff into the lungs daily 1 Inhaler 11       ALLERGIES     Allergies   Allergen Reactions     Atenolol Hives      Beta Adrenergic Blockers      Brimonidine      Cephalexin Hives     Germall Plus Itching     Imidazolidinyl Urea found in many topical creams and house hold products     Latex      Itching     Morphine GI Disturbance     No Clinical Screening - See Comments Itching     Carba Mix; IMIDAZOLIDINYL UREA - Allergic to this ingredient, which is found in many common topical moisturizers and household products.     Penicillins Hives     Sulfa Drugs GI Disturbance     Tramadol Nausea and Vomiting     Tylenol GI Disturbance     Vicodin [Hydrocodone-Acetaminophen]      Fentanyl Nausea       PAST MEDICAL HISTORY:  Past Medical History:   Diagnosis Date     AAA (abdominal aortic aneurysm) (H)     Followed with yearly ultrasound     Cervical cancer (H) 1969     Chronic dermatitis     extensive allergy testing revealed allergy/sensitivity to carba mix ( rubber) and Imidazolidinyl Urea (common in beauty products)     Glaucoma      Glaucoma      History of COPD     very mild abnormal spirometry in 2007 (in [place]), has not been active since quitting smoking in 2009     HTN (hypertension)      Macular degeneration      Osteoporosis 8/3/11    femoral T score -3.4 & -3.7     PAD (peripheral artery disease) (H)      Vertebral compression fracture (H) 5/17/11    L1 compression fracture, presumed osteoporotic compression fracture       PAST SURGICAL HISTORY:  Past Surgical History:   Procedure Laterality Date     CATARACT IOL, RT/LT  2/8/12    left eye cataract removal     HYSTERECTOMY TOTAL ABDOMINAL  1999    Fibroids     LAPAROSCOPY DIAGNOSTIC (GENERAL) N/A 4/15/2018    Procedure: LAPAROSCOPY DIAGNOSTIC (GENERAL);;  Surgeon: Jm Craig MD;  Location:  OR     LAPAROTOMY EXPLORATORY N/A 4/15/2018    Procedure: LAPAROTOMY EXPLORATORY;;  Surgeon: Jm Craig MD;  Location:  OR     OPEN REDUCTION INTERNAL FIXATION HIP NAILING  7/2/2012    Procedure: OPEN REDUCTION INTERNAL FIXATION HIP NAILING;  Intramedullary Fixation Right  "Intertrochanteric Hip Fracture;  Surgeon: Chalino Covarrubias MD;  Location: [facility] OR     OPEN REDUCTION INTERNAL FIXATION TIBIAL PLATEAU  3/31/2013    Procedure: OPEN REDUCTION INTERNAL FIXATION TIBIAL PLATEAU;  RIGHT LATERAL TIBIAL PLATEAU FRACTURE - SYNTHES Medial Meniscus Repair, Lateral Compartment Release;  Surgeon: Alfred Cardenas MD;  Location: [facility] OR     OPEN REDUCTION INTERNAL FIXATION WRIST  1988    left wrist ORIF, hardware removed onemonth later       FAMILY HISTORY:  Family History   Problem Relation Age of Onset     Breast Cancer Other      Circulatory Daughter 53     mengioma     C.A.D. Father      Diabetes Sister      Breast Cancer Maternal Aunt 60       SOCIAL HISTORY:  Social History     Social History     Marital status:      Spouse name: N/A     Number of children: 2     Years of education: N/A     Social History Main Topics     Smoking status: Former Smoker     Quit date: 7/1/2009     Smokeless tobacco: Never Used      Comment: former 1/2-1 ppd since age [age]     Alcohol use No     Drug use: No     Sexual activity: No     Other Topics Concern     None     Social History Narrative       Review of Systems:  Skin:  Negative       Eyes:  Negative      ENT:  Negative      Respiratory:  Negative       Cardiovascular:  Negative      Gastroenterology: Negative      Genitourinary:  Negative      Musculoskeletal:  Negative      Neurologic:  Negative      Psychiatric:  Negative      Heme/Lymph/Imm:  Negative      Endocrine:  Negative        Physical Exam:  Vitals: /76  Pulse 86  Ht 1.651 m (5' 5\")  Wt 54.4 kg (120 lb)  BMI 19.97 kg/m2    Constitutional:  cooperative        Skin:  warm and dry to the touch          Head:  normocephalic, no masses or lesions        Eyes:  pupils equal and round        Lymph:No Cervical lymphadenopathy present     ENT:  no pallor or cyanosis        Neck:  JVP normal        Respiratory:  clear to auscultation         Cardiac: regular rhythm                       "                                   GI:           Extremities and Muscular Skeletal:  no edema              Neurological:  no gross motor deficits        Psych:  Alert and Oriented x 3        CC  No referring provider defined for this encounter.

## 2018-10-05 NOTE — PROGRESS NOTES
Service Date: 10/05/2018      REASON FOR CONSULTATION:  Cardiomyopathy.      HISTORY OF PRESENT ILLNESS:  I had the pleasure of seeing Ms. Rodriguez in consultation at the [facility] today.  She is a very pleasant, 87-year-old female who has previously been seen by Dr. Colin in our clinic in 08/2012.  She was seen at that time due to a history of possible atrial fibrillation as well as a left bundle branch block.  At that time an echocardiogram demonstrated normal left ventricular systolic function, and a nuclear stress test demonstrated no evidence of ischemia.  She also underwent a Lexiscan nuclear perfusion study in 2015, which was also within normal limits.  She is known to have an abdominal aortic aneurysm, which is followed by Dr. Boogie.      She was admitted to [facility] earlier this year with abdominal discomfort and actually underwent an exploratory laparotomy due to concerns regarding possible small bowel perforation.  Ultimately, she underwent lysis of intraabdominal adhesions and an abdominal cavity washout and drain placement.      Since then, she states that she has been experiencing exertional dyspnea and chest discomfort that is relieved by rest.  The shortness of breath appears to have worsened over the past few months and is not relieved by her inhalers.      To work up her complaints further, she underwent an echocardiogram that I reviewed personally.  It demonstrates severe global hypokinesis of the left ventricle with an estimated ejection fraction of less than 20%.  This is a significant deterioration compared to her prior study from 2012, and her nuclear stress test in 2015 also demonstrated normal left ventricular systolic function.      Regarding symptoms, the patient still admits to chest discomfort and dyspnea with exertion.  She denies any PND or orthopnea.  She did notice some lower extremity edema last week, but this appears to have resolved  spontaneously.      ALLERGIES:  Of note, she has a history of hives with atenolol and has a listed allergy to all beta blockers as a result.      Her past medical history, family history, social history and medications are reviewed in Epic.        Her physical exam is dictated below.      IMPRESSION:   1.  Severely reduced left ventricular systolic function on a recent echocardiogram associated with dyspnea and chest discomfort with exertion.   2.  Recent admission to [facility] for abdominal discomfort, status post ex lap at the time.   3.  Left bundle branch block on EKG.   4.  History of intolerance to multiple medications, including beta blockers due to hives.      Ms. Sheriff presents for an evaluation regarding chest discomfort and dyspnea on exertion with an echocardiogram that demonstrates a significant deterioration in left ventricular systolic function from 2015.  I had a detailed discussion with the patient and her family regarding the potential etiologies for her left ventricular dysfunction.  As I explained to them in the context of chest discomfort, the most important issue to address would be underlying coronary artery disease, and I have recommended a coronary angiogram for this purpose.  In addition, I have ordered a cardiovascular MRI to assess for scar patterns that may also help us in establishing a diagnosis.      From a therapeutic standpoint, I am reluctant to prescribe carvedilol or metoprolol given her listed allergy to beta blockers.  Instead, I have started her on Aldactone 12.5 mg daily.  She is already taking losartan appropriately.  Given the left bundle branch block on her EKG, I think it may be reasonable to consider CRT plus/minus D therapy in the short term after the above-mentioned investigations are completed.      It was a pleasure seeing her in consultation.         NGHIA FLEMING MD             D: 10/05/2018   T: 10/05/2018   MT: rick      Name:     VANITA SHERIFF    MRN:      [id number]        Account:      [id number]   :      1931           Service Date: 10/05/2018      Document: [id number]

## 2018-10-05 NOTE — LETTER
10/5/2018    Sharon Hurtado MD  [address] [address]    RE: Jamilah Rodriguez       Dear Colleague,    I had the pleasure of seeing Jamilah Rodriguez in the [facility].    HPI and Plan:   See dictation    Orders Placed This Encounter   Procedures     MRI Cardiac w/contrast     Basic metabolic panel       Orders Placed This Encounter   Medications     spironolactone (ALDACTONE) 25 MG tablet     Sig: Take 0.5 tablets (12.5 mg) by mouth daily     Dispense:  90 tablet     Refill:  1       There are no discontinued medications.      Encounter Diagnosis   Name Primary?     Chest heaviness Yes       CURRENT MEDICATIONS:  Current Outpatient Prescriptions   Medication Sig Dispense Refill     Acetaminophen (TYLENOL PO) Take 1,000 mg by mouth every 6 hours as needed for mild pain or fever       albuterol (PROAIR HFA/PROVENTIL HFA/VENTOLIN HFA) 108 (90 Base) MCG/ACT Inhaler Inhale 2 puffs into the lungs every 4 hours as needed for shortness of breath / dyspnea or wheezing       ascorbic acid 500 MG TABS Take 500 mg by mouth daily       aspirin 81 MG tablet Take 1 tablet (81 mg) by mouth daily 90 tablet 3     atorvastatin (LIPITOR) 40 MG tablet Take 1 tablet (40 mg) by mouth daily 90 tablet 3     bimatoprost (LUMIGAN) 0.01 % SOLN Place 1 drop into both eyes At Bedtime.         Calcium Carbonate-Vitamin D (CALCIUM + D) 600-200 MG-UNIT per tablet Take 1 tablet by mouth every 12 hours. 100 tablet 12     Cholecalciferol (VITAMIN D) 2000 UNITS tablet Take 1 tablet by mouth every 48 hours        dorzolamide (TRUSOPT) 2 % ophthalmic solution Place 1 drop into both eyes 2 times daily       GLUCOSAMINE SULFATE PO        Ibuprofen (ADVIL PO)        losartan (COZAAR) 50 MG tablet Take 1 tablet (50 mg) by mouth daily 90 tablet 3     Multiple Vitamins-Minerals (PRESERVISION AREDS 2 PO) Take 1 tablet by mouth 2 times daily       spironolactone (ALDACTONE) 25 MG tablet Take 0.5 tablets (12.5  mg) by mouth daily 90 tablet 1     umeclidinium-vilanterol (ANORO ELLIPTA) 62.5-25 MCG/INH oral inhaler Inhale 1 puff into the lungs daily 1 Inhaler 11       ALLERGIES     Allergies   Allergen Reactions     Atenolol Hives     Beta Adrenergic Blockers      Brimonidine      Cephalexin Hives     Germall Plus Itching     Imidazolidinyl Urea found in many topical creams and house hold products     Latex      Itching     Morphine GI Disturbance     No Clinical Screening - See Comments Itching     Carba Mix; IMIDAZOLIDINYL UREA - Allergic to this ingredient, which is found in many common topical moisturizers and household products.     Penicillins Hives     Sulfa Drugs GI Disturbance     Tramadol Nausea and Vomiting     Tylenol GI Disturbance     Vicodin [Hydrocodone-Acetaminophen]      Fentanyl Nausea       PAST MEDICAL HISTORY:  Past Medical History:   Diagnosis Date     AAA (abdominal aortic aneurysm) (H)     Followed with yearly ultrasound     Cervical cancer (H) 1969     Chronic dermatitis     extensive allergy testing revealed allergy/sensitivity to carba mix ( rubber) and Imidazolidinyl Urea (common in beauty products)     Glaucoma      Glaucoma      History of COPD     very mild abnormal spirometry in 2007 (in [place]), has not been active since quitting smoking in 2009     HTN (hypertension)      Macular degeneration      Osteoporosis 8/3/11    femoral T score -3.4 & -3.7     PAD (peripheral artery disease) (H)      Vertebral compression fracture (H) 5/17/11    L1 compression fracture, presumed osteoporotic compression fracture       PAST SURGICAL HISTORY:  Past Surgical History:   Procedure Laterality Date     CATARACT IOL, RT/LT  2/8/12    left eye cataract removal     HYSTERECTOMY TOTAL ABDOMINAL  1999    Fibroids     LAPAROSCOPY DIAGNOSTIC (GENERAL) N/A 4/15/2018    Procedure: LAPAROSCOPY DIAGNOSTIC (GENERAL);;  Surgeon: Jm Craig MD;  [facility]     LAPAROTOMY EXPLORATORY N/A 4/15/2018    Procedure:  "LAPAROTOMY EXPLORATORY;;  Surgeon: Jm Craig MD;  [facility]: [facility]     OPEN REDUCTION INTERNAL FIXATION HIP NAILING  7/2/2012    Procedure: OPEN REDUCTION INTERNAL FIXATION HIP NAILING;  Intramedullary Fixation Right Intertrochanteric Hip Fracture;  Surgeon: Chalino Covarrubias MD;  Location[facility]     OPEN REDUCTION INTERNAL FIXATION TIBIAL PLATEAU  3/31/2013    Procedure: OPEN REDUCTION INTERNAL FIXATION TIBIAL PLATEAU;  RIGHT LATERAL TIBIAL PLATEAU FRACTURE - SYNTHES Medial Meniscus Repair, Lateral Compartment Release;  Surgeon: Alfred Cardenas MD;  Location: [facility] OR     OPEN REDUCTION INTERNAL FIXATION WRIST  1988    left wrist ORIF, hardware removed onemonth later       FAMILY HISTORY:  Family History   Problem Relation Age of Onset     Breast Cancer Other      Circulatory Daughter 53     mengioma     C.A.D. Father      Diabetes Sister      Breast Cancer Maternal Aunt 60       SOCIAL HISTORY:  Social History     Social History     Marital status:      Spouse name: N/A     Number of children: 2     Years of education: N/A     Social History Main Topics     Smoking status: Former Smoker     Quit date: 7/1/2009     Smokeless tobacco: Never Used      Comment: former 1/2-1 ppd since age [age]     Alcohol use No     Drug use: No     Sexual activity: No     Other Topics Concern     None     Social History Narrative       Review of Systems:  Skin:  Negative       Eyes:  Negative      ENT:  Negative      Respiratory:  Negative       Cardiovascular:  Negative      Gastroenterology: Negative      Genitourinary:  Negative      Musculoskeletal:  Negative      Neurologic:  Negative      Psychiatric:  Negative      Heme/Lymph/Imm:  Negative      Endocrine:  Negative        Physical Exam:  Vitals: /76  Pulse 86  Ht 1.651 m (5' 5\")  Wt 54.4 kg (120 lb)  BMI 19.97 kg/m2    Constitutional:  cooperative        Skin:  warm and dry to the touch          Head:  normocephalic, no masses or lesions        Eyes:  " pupils equal and round        Lymph:No Cervical lymphadenopathy present     ENT:  no pallor or cyanosis        Neck:  JVP normal        Respiratory:  clear to auscultation         Cardiac: regular rhythm                                                         GI:           Extremities and Muscular Skeletal:  no edema              Neurological:  no gross motor deficits        Psych:  Alert and Oriented x 3        CC  No referring provider defined for this encounter.                Thank you for allowing me to participate in the care of your patient.      Sincerely,     Lizbeth Bond MD     [facility]    cc:   No referring provider defined for this encounter.

## 2018-10-05 NOTE — TELEPHONE ENCOUNTER
Spoke with patient, she wanted to check that she should start her aspirin 81mg today and not wait until after angiogram. Reveiwed with patient to start ASA 81mg today.

## 2018-10-09 NOTE — TELEPHONE ENCOUNTER
Contacted patient to review pre-cath procedures: patient is scheduled for coronary angiogram (left)  on 10/12/18 . Patient's arrival time is 0800 and procedure time is 1000. Patient is aware to be NPO except for medications. Patient will hold the medication spironolactone . Patient has arranged for transportation and 24 hour f/u care. Patient has no known contract dye allergy. Patient is not diabetic. Patient is not taking anti-coagulation medication. Patient's renal function is WNL for procedure. Patient will continue daily aspirin dose 81. Order for procedure entered.

## 2018-10-11 NOTE — PROGRESS NOTES
Patient called, Reviewed MRI results were reviewed by Dr. Bond. EF remain low. Reminded of angiogram tomorrow and plan of care may be reviewed at upcoming [facility] OV.

## 2018-10-11 NOTE — PROGRESS NOTES
Patient called, left message asking what to do with her inhalers, including anora.   Attempted to contact Patient, message left that she may take her AM medications including ASA. And to tell the Nurses at check in tomorrow.

## 2018-10-11 NOTE — PROGRESS NOTES
MRI 10/10/18 noted, patient scheduled for angiogram 10/12/18.  MRI:  Moderate left ventricular dilatation with severely reduced systolic function. Late gadolinium  enhancement imagingdemonstrates a small area of midwall enhancement involving the mid to basal inferoseptal  wall. This is a non-specific pattern that has been described in idiopathic nonischemic cardiomyopathies. EF 19%  Will message Dr. Bond to review

## 2018-10-12 NOTE — PROGRESS NOTES
1025 - pt returned from cath lab. Fingers noted to be cyanotic, MD aware. Bruising noted at left radial site, pulse weak, pt reported some decreased sensation. VS stable on room air.    1030 - 2cc air released from left TR band.    1100 - pt tolerated oral intake. TR band in place, site WDL. Tele SR with 1st degree AVB and BBB.    1215 - TR band removed, bandaid applied. AVS given to patient. Discharge instructions and follow up appointments reviewed with patient and daughter. All questions answered, patient and daughter verbalized understanding.

## 2018-10-12 NOTE — DISCHARGE INSTRUCTIONS
Cardiac Angiogram Discharge Instructions - Radial    After you go home:      Have an adult stay with you until tomorrow.    Drink extra fluids for 2 days.    You may resume your normal diet.    No smoking       For 24 hours - due to the sedation you received:    Relax and take it easy.    Do NOT make any important or legal decisions.    Do NOT drive or operate machines at home or at work.    Do NOT drink alcohol.    Care of Wrist Puncture Site:      For the first 24 hrs - check the puncture site every 1-2 hours while awake.    It is normal to have soreness at the puncture site and mild tingling in your hand for up to 3 days.    Remove the bandaid after 24 hours. If there is minor oozing, apply another bandaid and remove it after 12 hours.    You may shower tomorrow.  Do NOT take a bath, or use a hot tub or pool for at least 3 days. Do NOT scrub the site. Do not use lotion or powder near the puncture site.           Activity:        For 2 days:     do not use your hand or arm to support your weight (such as rising from a chair)     do not bend your wrist (such as lifting a garage door).    do not lift more than 5 pounds or exercise your arm (such as tennis, golf or bowling).    Do NOT do any heavy activity such as exercise, lifting, or straining.     Bleeding:      If you start bleeding from the site in your wrist, sit down and press firmly on/above the site for 10 minutes.     Once bleeding stops, keep arm still for 2 hours.     Call [facility] as soon as you can.       Call 911 right away if you have heavy bleeding or bleeding that does not stop.      Medicines:      If you are taking an antiplatelet medication such as Plavix, Brilinta or Effient, do not stop taking it until you talk to your cardiologist.        If you are on Metformin (Glucophage), do not restart it until you have blood tests (within 2 to 3 days after discharge).  After you have your blood drawn, you may restart the Metformin.     Take your  medications, including blood thinners, unless your provider tells you not to.  If you take Coumadin (Warfarin), have your INR checked by your provider in  3-5 days. Call your clinic to schedule this.    If you have stopped any medicines, check with your provider about when to restart them.    Follow Up Appointments:      Follow up with [facility] Nurse Practitioner at [facility] of patient preference in 7-10 days.    Call the clinic if:      You have a large or growing hard lump around the site.    The site is red, swollen, hot or tender.    Blood or fluid is draining from the site.    You have chills or a fever greater than 101 F (38 C).    Your arm feels numb, cool or changes color.    You have hives, a rash or unusual itching.    Any questions or concerns.          [facility] [place]:    [phone] [facility] (7 days a week)

## 2018-10-12 NOTE — IP AVS SNAPSHOT
[place] [address]    [place] [address]    Phone:  [phone]                                       After Visit Summary   10/12/2018    Jamilah Rodriguez    MRN: [id number]           After Visit Summary Signature Page     I have received my discharge instructions, and my questions have been answered. I have discussed any challenges I see with this plan with the nurse or doctor.    ..........................................................................................................................................  Patient/Patient Representative Signature      ..........................................................................................................................................  Patient Representative Print Name and Relationship to Patient    ..................................................               ................................................  Date                                   Time    ..........................................................................................................................................  Reviewed by Signature/Title    ...................................................              ..............................................  Date                                               Time          22EPIC Rev 08/18

## 2018-10-12 NOTE — PROGRESS NOTES
1215 Report received from Patricia Toth RN. Bandaid CDI to left wrist. No oozing or hematoma noted. Area bruised distally, but soft & flat . Pt's daughter at bedside.    1225 OOB - slightly unsteady on feet. Ambulated in halls to bathroom with walker with good jazlyn.   1245 Pt discharged per w/c to private vehicle. All personal belongings taken w/ pt.

## 2018-10-12 NOTE — PROGRESS NOTES
"Cardiac cath 1012/18 showed non-occlusive disease, to see MINA Lakisha Dykes 10/22/18 for follow up. Will message Dr. Bond to review per dictation to consider CRT discussion.    Per Dr. Bond's recommendation \"Lets place an EP consult for this thanks\"  Order placed for EP consult - to be discussed at OV 10/22/18 with MINA Lakisha Dykes  "

## 2018-10-12 NOTE — IP AVS SNAPSHOT
MRN:[id number]                      After Visit Summary   10/12/2018    Jamilah Rodriguez    MRN: [id number]           Visit Information        Department      10/12/2018  8:09 AM [facility]          Review of your medicines      UNREVIEWED medicines. Ask your doctor about these medicines        Dose / Directions    ADVIL PO        Refills:  0       albuterol 108 (90 Base) MCG/ACT inhaler   Commonly known as:  PROAIR HFA/PROVENTIL HFA/VENTOLIN HFA        Dose:  2 puff   Inhale 2 puffs into the lungs every 4 hours as needed for shortness of breath / dyspnea or wheezing   Refills:  0       ascorbic acid 500 MG Tabs        Dose:  500 mg   Take 500 mg by mouth daily   Refills:  0       aspirin 81 MG tablet   Used for:  LBBB (left bundle branch block), Status post myocardial infarction of inferior wall        Dose:  81 mg   Take 1 tablet (81 mg) by mouth daily   Quantity:  90 tablet   Refills:  3       bimatoprost 0.01 % Soln   Commonly known as:  LUMIGAN        Dose:  1 drop   Place 1 drop into both eyes At Bedtime.   Refills:  0       calcium + D 600-200 MG-UNIT Tabs   Generic drug:  calcium carbonate-vitamin D        Dose:  1 tablet   Take 1 tablet by mouth every 12 hours.   Quantity:  100 tablet   Refills:  12       dorzolamide 2 % ophthalmic solution   Commonly known as:  TRUSOPT        Dose:  1 drop   Place 1 drop into both eyes 2 times daily   Refills:  0       GLUCOSAMINE SULFATE PO        Refills:  0       losartan 50 MG tablet   Commonly known as:  COZAAR   Used for:  Benign essential hypertension        Dose:  50 mg   Take 1 tablet (50 mg) by mouth daily   Quantity:  90 tablet   Refills:  3       PRESERVISION AREDS 2 PO        Dose:  1 tablet   Take 1 tablet by mouth 2 times daily   Refills:  0       spironolactone 25 MG tablet   Commonly known as:  ALDACTONE   Used for:  Chest heaviness        Dose:  12.5 mg   Take 0.5 tablets (12.5 mg) by mouth daily   Quantity:  90  tablet   Refills:  1       umeclidinium-vilanterol 62.5-25 MCG/INH oral inhaler   Commonly known as:  ANORO ELLIPTA   Used for:  Chronic obstructive pulmonary disease, unspecified COPD type (H)        Dose:  1 puff   Inhale 1 puff into the lungs daily   Quantity:  1 Inhaler   Refills:  11       vitamin D 2000 units tablet   Used for:  Osteoporosis        Dose:  1 tablet   Take 1 tablet by mouth every 48 hours   Refills:  0                Protect others around you: Learn how to safely use, store and throw away your medicines at www.disposemymeds.org.         Follow-ups after your visit        Your next 10 appointments already scheduled     Oct 22, 2018  8:30 AM CDT   Return Visit with JAMIN Canales CNP   [facility] ([facility])    [address] [place] [id number] [phone] OPT 2               Care Instructions        After Care Instructions     Discharge Instructions - Follow up with [facility] Nurse Practitioner        Follow up with [facility] Nurse Practitioner at [facility] of patient preference in 7-10 days.            Discharge Instructions - IF on Metformin (Glucophage or Glucovance) or Metformin containing medications       IF on Metformin (Glucophage or Glucovance) or Metformin containing medications , schedule a Basic Metabolic Panel at [facility] or Primary Clinic in 48 - 72 hours post procedure and PRIOR TO resuming the Metformin or Metformin containing medications.  Hold Metformin (Glucophage or Glucovance) or Metformin containing medications until after the Basic Metabolic Panel on the 2nd or 3rd day following the procedure.  May resume after blood draw is complete.                  Further instructions from your care team       Cardiac Angiogram Discharge Instructions - Radial    After you go home:      Have an adult stay with you until tomorrow.    Drink extra fluids for 2 days.    You may resume your normal diet.    No  smoking       For 24 hours - due to the sedation you received:    Relax and take it easy.    Do NOT make any important or legal decisions.    Do NOT drive or operate machines at home or at work.    Do NOT drink alcohol.    Care of Wrist Puncture Site:      For the first 24 hrs - check the puncture site every 1-2 hours while awake.    It is normal to have soreness at the puncture site and mild tingling in your hand for up to 3 days.    Remove the bandaid after 24 hours. If there is minor oozing, apply another bandaid and remove it after 12 hours.    You may shower tomorrow.  Do NOT take a bath, or use a hot tub or pool for at least 3 days. Do NOT scrub the site. Do not use lotion or powder near the puncture site.           Activity:        For 2 days:     do not use your hand or arm to support your weight (such as rising from a chair)     do not bend your wrist (such as lifting a garage door).    do not lift more than 5 pounds or exercise your arm (such as tennis, golf or bowling).    Do NOT do any heavy activity such as exercise, lifting, or straining.     Bleeding:      If you start bleeding from the site in your wrist, sit down and press firmly on/above the site for 10 minutes.     Once bleeding stops, keep arm still for 2 hours.     Call [facility] as soon as you can.       Call 911 right away if you have heavy bleeding or bleeding that does not stop.      Medicines:      If you are taking an antiplatelet medication such as Plavix, Brilinta or Effient, do not stop taking it until you talk to your cardiologist.        If you are on Metformin (Glucophage), do not restart it until you have blood tests (within 2 to 3 days after discharge).  After you have your blood drawn, you may restart the Metformin.     Take your medications, including blood thinners, unless your provider tells you not to.  If you take Coumadin (Warfarin), have your INR checked by your provider in  3-5 days. Call your clinic to schedule  "this.    If you have stopped any medicines, check with your provider about when to restart them.    Follow Up Appointments:      Follow up with [facility] Nurse Practitioner at [facility] of patient preference in 7-10 days.    Call the clinic if:      You have a large or growing hard lump around the site.    The site is red, swollen, hot or tender.    Blood or fluid is draining from the site.    You have chills or a fever greater than 101 F (38 C).    Your arm feels numb, cool or changes color.    You have hives, a rash or unusual itching.    Any questions or concerns.          [facility] [place]:    [phone] [facility] (7 days a week)               Additional Information About Your Visit        [employer] Information     [employer] gives you secure access to your electronic health record. If you see a primary care provider, you can also send messages to your care team and make appointments. If you have questions, please call your primary care clinic.  If you do not have a primary care provider, please call [phone] and they will assist you.        Care EveryWhere ID     This is your Care EveryWhere ID. This could be used by other organizations to access your [place] medical records  [id number]        Your Vitals Were     Blood Pressure Pulse Temperature Respirations Height Weight    116/75 74 97.5  F (36.4  C) (Oral) 16 1.651 m (5' 5\") 53.6 kg (118 lb 1.6 oz)    Pulse Oximetry BMI (Body Mass Index)                95% 19.65 kg/m2           Primary Care Provider Office Phone # Fax #    Sharon Hurtado [phone]      Equal Access to Services     [facility]: Hadii sherly chaves hadasho Soomaali, waaxda luqadaha, qaybta kaalmada chidi, kimberly drake . So [facility] [phone].    ATENCIÓN: Si habla español, tiene a smith disposición servicios gratuitos de asistencia lingüística. Llame al [phone].    We comply with applicable federal civil " rights laws and [place] laws. We do not discriminate on the basis of race, color, national origin, age, disability, sex, sexual orientation, or gender identity.            Thank you!     Thank you for choosing [facility] for your care. Our goal is always to provide you with excellent care. Hearing back from our patients is one way we can continue to improve our services. Please take a few minutes to complete the written survey that you may receive in the mail after you visit with us. Thank you!             Medication List: This is a list of all your medications and when to take them. Check marks below indicate your daily home schedule. Keep this list as a reference.      Medications           Morning Afternoon Evening Bedtime As Needed    ADVIL PO                                albuterol 108 (90 Base) MCG/ACT inhaler   Commonly known as:  PROAIR HFA/PROVENTIL HFA/VENTOLIN HFA   Inhale 2 puffs into the lungs every 4 hours as needed for shortness of breath / dyspnea or wheezing                                ascorbic acid 500 MG Tabs   Take 500 mg by mouth daily                                aspirin 81 MG tablet   Take 1 tablet (81 mg) by mouth daily                                bimatoprost 0.01 % Soln   Commonly known as:  LUMIGAN   Place 1 drop into both eyes At Bedtime.                                calcium + D 600-200 MG-UNIT Tabs   Take 1 tablet by mouth every 12 hours.   Generic drug:  calcium carbonate-vitamin D                                dorzolamide 2 % ophthalmic solution   Commonly known as:  TRUSOPT   Place 1 drop into both eyes 2 times daily                                GLUCOSAMINE SULFATE PO                                losartan 50 MG tablet   Commonly known as:  COZAAR   Take 1 tablet (50 mg) by mouth daily                                PRESERVISION AREDS 2 PO   Take 1 tablet by mouth 2 times daily                                spironolactone 25 MG tablet   Commonly known as:  ALDACTONE    Take 0.5 tablets (12.5 mg) by mouth daily                                umeclidinium-vilanterol 62.5-25 MCG/INH oral inhaler   Commonly known as:  ANORO ELLIPTA   Inhale 1 puff into the lungs daily                                vitamin D 2000 units tablet   Take 1 tablet by mouth every 48 hours

## 2018-10-13 NOTE — TELEPHONE ENCOUNTER
Patient's daughter calls regarding the patient's left wrist.  S/P angiogram yesterday.  Wrist a bit puffy today.  No pain in wrist or fingers.  Normal color hand.  No external bleeding.  Told to rest wrist and hand today.  Avoid aggressive use of her walker until tomorrow.  Call for bleeding, pain or worsening swelling.  No need to continue to use the wrist board today.  All questions answered to their satisfaction.  Speaker phone with both daughter and patient on the phone.  Freeman Hong MD, FACC  October 13, 2018 10:23 AM

## 2018-10-15 NOTE — TELEPHONE ENCOUNTER
Call from patient's daughter and patient, they are watching insertion site closely as this some swelling on one side of the wrist and bruising that extends about 3 inches up the arm. Patient states her hand had normal color and she can move it normally. She denies any pain or numbness in her hand or arm. She states the insertion site has healed. Patient states she is trying not to use her wrist but uses a walker to ambulate. She will keep elevating her wrist and try to keep it in active as much as possible. Reviewed with patient to call if any changes or if she is concerned. Reminded patient of MINA follow up visit 10/22/18.

## 2018-10-19 NOTE — TELEPHONE ENCOUNTER
Reason for Call:  Other call back    Detailed comments: Pt has questions about pain medication and potassium count.    Phone Number Patient can be reached at: Home number on file [phone] (home)    Best Time: before 12:30pm or after 3pm    Can we leave a detailed message on this number? YES    Call taken on 10/19/2018 at 8:49 AM by REYES VELIZ

## 2018-10-19 NOTE — TELEPHONE ENCOUNTER
Spoke with patient who had question regarding her spironolactone.  She states that cardiologist advised patient NOT to drink V8 juice while she is on this medication.  Patient is wondering if she could have a little bit of the juice without any problems.  Writer advised patient contact cardiologist being that instructions came from that provider.  Patient understood and stated she would do so.

## 2018-10-23 NOTE — TELEPHONE ENCOUNTER
Reason for Call:  Other call back    Detailed comments: Pt has questions about the calcium pills that she bought concerning D3.    Phone Number Patient can be reached at: Home number on file [phone] (home)    Best Time: after 3pm    Can we leave a detailed message on this number? NO    Call taken on 10/23/2018 at 9:55 AM by REYES VELIZ

## 2018-10-23 NOTE — TELEPHONE ENCOUNTER
Pt takes an OTC 600mg CA and 800 units of Vit D--takes 2 pills daily.   She is wondering if she should continue taking her other supplement for Vit D-2,000 units every other day.  Since pt is getting 1,600 units of Vit D daily, she will stop taking other Vit D for awhile.

## 2018-10-26 NOTE — TELEPHONE ENCOUNTER
Call from patient, she was wondering if she could use V8 juice and advil. Reviewed with patient that she is taking losartan and spironolactone, so daily use of V8 is discouraged to help her her potassium at a normal level. Reviewed with patient that daily use of NSAIDs is discouraged. She states she just used an occasional dose for her lumbar stenosis but will try to get by without. She does not tolerate tylenol. Patient notes she mostly eats frozen dinners. Reviewed with patient to be careful of sodium content. Patient verbalized understanding and agreed with plan.

## 2018-10-31 NOTE — PROGRESS NOTES
"    SUBJECTIVE:                                                      HPI: Jamilah Rodriguez is a pleasant 87 year old female who presents with for follow-up:    Accompanied by daughter.    Since last visit she has been seen by cardiology. She was started on Aldactone 12.5 mg daily and continued on losartan. Cardiac resynchronization therapy +/-defibrillator is being considered for her. 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 She will be following up with cardiology again next week to discuss results and next steps.    Reviewed above results with patient and daughter. Answered questions to the best of my ability, but deferred treatment recommendations to cardiologist (but assured patient that PCI and CABG are not on the table - though a defibrillator/cardiac procedure might be).    Patient feels \"about the same.\" No particular complaints or concerns other than discussing the results above.    Patient had several other questions regarding her calcium and vitamin D supplementation and drinking V8. She is also upset by the cost of all of her medications. We discussed transferring her medications to [place] pharmacy, where our pharmacists can help get her the best prices and assistance available. She will consider this going forward.    The medication, allergy, and problem lists have been reviewed and updated as appropriate.       OBJECTIVE:                                                      BP 92/64  Pulse 66  Temp 97.5  F (36.4  C) (Oral)  Resp 18  Wt 119 lb 14.4 oz (54.4 kg)  SpO2 98%  BMI 19.95 kg/m2  Constitutional: well-appearing  Psych: normal judgment and insight; normal mood and affect; recent and remote memory intact      ASSESSMENT/PLAN:                                                      (I42.9) Cardiomyopathy, unspecified type (H)  (primary encounter diagnosis)  Comment: Discussed results " above.  Plan: patient will be following up with cardiology next week to discuss next steps.    The instructions on the AVS were discussed and explained to the patient. Patient expressed understanding of instructions.    A total of 15 minutes were spent face-to-face with this patient during this encounter and over half of that time was spent on counseling and coordination of care re: above diagnoses and plans of care.     (Chart documentation was completed, in part, with [employer] voice-recognition software. Even though reviewed, some grammatical, spelling, and word errors may remain.)    Sharon Hurtado MD   [address] [address]  T: [phone], F: [phone]

## 2018-10-31 NOTE — MR AVS SNAPSHOT
After Visit Summary   10/31/2018    Jamilah Rodriguez    MRN: [id number]           Patient Information     Date Of Birth          4/5/1931        Visit Information        Provider Department      10/31/2018 9:45 AM Sharon Hurtado MD [facility]        Today's Diagnoses     Cardiomyopathy, unspecified type (H)    -  1       Follow-ups after your visit        Follow-up notes from your care team     Return in about 3 months (around 1/31/2019).      Your next 10 appointments already scheduled     Nov 05, 2018  2:45 PM CST   EP NEW with Mejia Arana MD   [facility] ([facility])    [address] [address] [phone] OPT 2            Nov 07, 2018  8:30 AM CST   Return Visit with JAMIN Canales CNP   [facility] ([facility])    [address] [id number] [phone] OPT 2              Who to contact     If you have questions or need follow up information about today's clinic visit or your schedule please contact [facility] directly at [phone].  Normal or non-critical lab and imaging results will be communicated to you by MyChart, letter or phone within 4 business days after the clinic has received the results. If you do not hear from us within 7 days, please contact the clinic through MyChart or phone. If you have a critical or abnormal lab result, we will notify you by phone as soon as possible.  Submit refill requests through [employer] or call your pharmacy and they will forward the refill request to us. Please allow 3 business days for your refill to be completed.          Additional Information About Your Visit        MyChart Information     [employer] gives you secure access to your electronic health record. If you see a primary care provider, you can also send messages to your care team and  make appointments. If you have questions, please call your primary care clinic.  If you do not have a primary care provider, please call [phone] and they will assist you.        Care EveryWhere ID     This is your Care EveryWhere ID. This could be used by other organizations to access your [place] medical records  [id number]        Your Vitals Were     Pulse Temperature Respirations Pulse Oximetry BMI (Body Mass Index)       66 97.5  F (36.4  C) (Oral) 18 98% 19.95 kg/m2        Blood Pressure from Last 3 Encounters:   10/31/18 92/64   10/12/18 95/55   10/05/18 121/76    Weight from Last 3 Encounters:   10/31/18 119 lb 14.4 oz (54.4 kg)   10/12/18 118 lb 1.6 oz (53.6 kg)   10/05/18 120 lb (54.4 kg)              Today, you had the following     No orders found for display       Primary Care Provider Office Phone # Fax #    Sharon Hurtado [phone]       [address] [facility] [address]        Equal Access to Services     [facility] : Hadii aad ku hadasho Soomaali, waaxda luqadaha, qaybta kaalmada adeerinyabharathi, kimberly drake . So [facility] [phone].    ATENCIÓN: Si habla español, tiene a smith disposición servicios gratuitos de asistencia lingüística. [facility] [phone].    We comply with applicable federal civil rights laws and [place] laws. We do not discriminate on the basis of race, color, national origin, age, disability, sex, sexual orientation, or gender identity.            Thank you!     Thank you for choosing [facility]  for your care. Our goal is always to provide you with excellent care. Hearing back from our patients is one way we can continue to improve our services. Please take a few minutes to complete the written survey that you may receive in the mail after your visit with us. Thank you!             Your Updated Medication List - Protect others around you: Learn how to safely use, store and throw away your medicines  at www.disposemymeds.org.          This list is accurate as of 10/31/18  1:07 PM.  Always use your most recent med list.                   Brand Name Dispense Instructions for use Diagnosis    ADVIL PO           albuterol 108 (90 Base) MCG/ACT inhaler    PROAIR HFA/PROVENTIL HFA/VENTOLIN HFA     Inhale 2 puffs into the lungs every 4 hours as needed for shortness of breath / dyspnea or wheezing        ascorbic acid 500 MG Tabs      Take 500 mg by mouth daily        aspirin 81 MG tablet     90 tablet    Take 1 tablet (81 mg) by mouth daily    LBBB (left bundle branch block), Status post myocardial infarction of inferior wall       bimatoprost 0.01 % Soln    LUMIGAN     Place 1 drop into both eyes At Bedtime.        calcium + D 600-200 MG-UNIT Tabs   Generic drug:  calcium carbonate-vitamin D     100 tablet    Take 1 tablet by mouth every 12 hours.        dorzolamide 2 % ophthalmic solution    TRUSOPT     Place 1 drop into both eyes 2 times daily        GLUCOSAMINE SULFATE PO           losartan 50 MG tablet    COZAAR    90 tablet    Take 1 tablet (50 mg) by mouth daily    Benign essential hypertension       PRESERVISION AREDS 2 PO      Take 1 tablet by mouth 2 times daily        spironolactone 25 MG tablet    ALDACTONE    90 tablet    Take 0.5 tablets (12.5 mg) by mouth daily    Chest heaviness       umeclidinium-vilanterol 62.5-25 MCG/INH oral inhaler    ANORO ELLIPTA    1 Inhaler    Inhale 1 puff into the lungs daily    Chronic obstructive pulmonary disease, unspecified COPD type (H)       vitamin D 2000 units tablet      Take 1 tablet by mouth every 48 hours    Osteoporosis

## 2018-11-02 PROBLEM — I42.9 CARDIOMYOPATHY, IDIOPATHIC (H): Status: ACTIVE | Noted: 2018-01-01

## 2018-11-02 NOTE — TELEPHONE ENCOUNTER
"Listed as historical and differently on current med list. Says rescue inhaler she has right now is listed to take 2 puffs every 6 hours as needed. She uses it once or twice a day. Used to use more often but feels she is getting better. Patient only has 6 puffs left and wants to make sure she gets refill today. Please call patient when done.    Routing refill request to provider for review/approval because:  Medication is reported/historical  Allergy warning  Assess frequency of use  See above          Requested Prescriptions   Pending Prescriptions Disp Refills     VENTOLIN  (90 Base) MCG/ACT inhaler [Pharmacy Med Name: Ventolin HFA Inhalation Aerosol Solution 108 (90 Base) MCG/ACT] 18 g 0     Sig: Inhale 2 puffs into the lungs every 6 hours as needed for shortness of breath / dyspnea or wheezing    Asthma Maintenance Inhalers - Anticholinergics Passed    11/2/2018  2:54 PM       Passed - Patient is age 12 years or older       Passed - Recent (12 mo) or future (30 days) visit within the authorizing provider's specialty    Patient had office visit in the last 12 months or has a visit in the next 30 days with authorizing provider or within the authorizing provider's specialty.  See \"Patient Info\" tab in inbasket, or \"Choose Columns\" in Meds & Orders section of the refill encounter.                "

## 2018-11-02 NOTE — TELEPHONE ENCOUNTER
albuterol (PROAIR HFA/PROVENTIL HFA/VENTOLIN HFA) 108 (90 Base) MCG/ACT Inhaler        Last Written Prescription Date:    Last Fill Quantity: ,   # refills:   Last Office Visit: 10/31/2018  Future Office visit:    Next 5 appointments (look out 90 days)     Nov 07, 2018  8:30 AM CST   Return Visit with JAMIN Canales CNP   [facility] ([facility])    [address] [place] [address] [id number] [phone] OPT 2                   Routing refill request to provider for review/approval because:  Historical

## 2018-11-05 NOTE — LETTER
11/5/2018    Sharon Hurtado MD  [address] [facility] [address]    RE: Jamilah Rodriguez       Dear Colleague,    I had the pleasure of seeing Jmailah Rodriguez in the [facility].    HPI and Plan:   See dictation    No orders of the defined types were placed in this encounter.      No orders of the defined types were placed in this encounter.      Medications Discontinued During This Encounter   Medication Reason     Cholecalciferol (VITAMIN D) 2000 UNITS tablet Medication Reconciliation Clean Up     VENTOLIN  (90 Base) MCG/ACT inhaler Medication Reconciliation Clean Up         Encounter Diagnosis   Name Primary?     Secondary cardiomyopathy (H)        CURRENT MEDICATIONS:  Current Outpatient Prescriptions   Medication Sig Dispense Refill     albuterol (PROAIR HFA/PROVENTIL HFA/VENTOLIN HFA) 108 (90 Base) MCG/ACT Inhaler Inhale 2 puffs into the lungs every 4 hours as needed for shortness of breath / dyspnea or wheezing       ascorbic acid 500 MG TABS Take 500 mg by mouth daily       aspirin 81 MG tablet Take 1 tablet (81 mg) by mouth daily 90 tablet 3     bimatoprost (LUMIGAN) 0.01 % SOLN Place 1 drop into both eyes At Bedtime.         Calcium Carbonate-Vitamin D (CALCIUM + D) 600-200 MG-UNIT per tablet Take 1 tablet by mouth every 12 hours Calcium 600 mg with Vitamin d 800 mg 100 tablet 12     dorzolamide (TRUSOPT) 2 % ophthalmic solution Place 1 drop into both eyes 2 times daily       GLUCOSAMINE SULFATE PO        losartan (COZAAR) 50 MG tablet Take 1 tablet (50 mg) by mouth daily 90 tablet 3     Multiple Vitamins-Minerals (PRESERVISION AREDS 2 PO) Take 1 tablet by mouth 2 times daily       spironolactone (ALDACTONE) 25 MG tablet Take 0.5 tablets (12.5 mg) by mouth daily 90 tablet 1     umeclidinium-vilanterol (ANORO ELLIPTA) 62.5-25 MCG/INH oral inhaler Inhale 1 puff into the lungs daily 1 Inhaler 11     Ibuprofen (ADVIL PO)        [DISCONTINUED] VENTOLIN  (90  Base) MCG/ACT inhaler Inhale 2 puffs into the lungs every 6 hours as needed for shortness of breath / dyspnea or wheezing 18 g 0       ALLERGIES     Allergies   Allergen Reactions     Atenolol Hives     Beta Adrenergic Blockers      Brimonidine      Cephalexin Hives     Germall Plus Itching     Imidazolidinyl Urea found in many topical creams and house hold products     Latex      Itching     Morphine GI Disturbance     No Clinical Screening - See Comments Itching     Carba Mix; IMIDAZOLIDINYL UREA - Allergic to this ingredient, which is found in many common topical moisturizers and household products.     Penicillins Hives     Sulfa Drugs GI Disturbance     Tramadol Nausea and Vomiting     Tylenol GI Disturbance     Vicodin [Hydrocodone-Acetaminophen]      Fentanyl Nausea       PAST MEDICAL HISTORY:  Past Medical History:   Diagnosis Date     AAA (abdominal aortic aneurysm) (H)     Followed with yearly ultrasound     Cardiomyopathy, idiopathic (H) 10/2018     Cervical cancer (H) 1969     Chronic dermatitis     extensive allergy testing revealed allergy/sensitivity to carba mix ( rubber) and Imidazolidinyl Urea (common in beauty products)     Glaucoma      Glaucoma      History of COPD     very mild abnormal spirometry in 2007 (in [place]), has not been active since quitting smoking in 2009     HTN (hypertension)      LBBB (left bundle branch block) 10/2018     Macular degeneration      Osteoporosis 8/3/11    femoral T score -3.4 & -3.7     PAD (peripheral artery disease) (H)      Vertebral compression fracture (H) 5/17/11    L1 compression fracture, presumed osteoporotic compression fracture       PAST SURGICAL HISTORY:  Past Surgical History:   Procedure Laterality Date     CATARACT IOL, RT/LT  2/8/12    left eye cataract removal     HEART CATH CORONARY ANGIOGRAM W/LV GRAM  10/2018    moderate nonocclusive CAD     HYSTERECTOMY TOTAL ABDOMINAL  1999    Fibroids     LAPAROSCOPY DIAGNOSTIC (GENERAL) N/A 4/15/2018     "Procedure: LAPAROSCOPY DIAGNOSTIC (GENERAL);;  Surgeon: Jm Craig MD;  Location: [facility] OR     LAPAROTOMY EXPLORATORY N/A 4/15/2018    Procedure: LAPAROTOMY EXPLORATORY;;  Surgeon: Jm Craig MD;  Location: [facility] OR     OPEN REDUCTION INTERNAL FIXATION HIP NAILING  7/2/2012    Procedure: OPEN REDUCTION INTERNAL FIXATION HIP NAILING;  Intramedullary Fixation Right Intertrochanteric Hip Fracture;  Surgeon: Chalino Covarrubias MD;  Location: [facility] OR     OPEN REDUCTION INTERNAL FIXATION TIBIAL PLATEAU  3/31/2013    Procedure: OPEN REDUCTION INTERNAL FIXATION TIBIAL PLATEAU;  RIGHT LATERAL TIBIAL PLATEAU FRACTURE - SYNTHES Medial Meniscus Repair, Lateral Compartment Release;  Surgeon: Alfred Cardenas MD;  Location: [facility] OR     OPEN REDUCTION INTERNAL FIXATION WRIST  1988    left wrist ORIF, hardware removed onemonth later       FAMILY HISTORY:  Family History   Problem Relation Age of Onset     Breast Cancer Other      Circulatory Daughter 53     mengioma     C.A.D. Father      Diabetes Sister      Breast Cancer Maternal Aunt 60       SOCIAL HISTORY:  Social History     Social History     Marital status:      Spouse name: N/A     Number of children: 2     Years of education: N/A     Social History Main Topics     Smoking status: Former Smoker     Quit date: 7/1/2009     Smokeless tobacco: Never Used      Comment: former 1/2-1 ppd since age 20-22     Alcohol use No     Drug use: No     Sexual activity: No     Other Topics Concern     None     Social History Narrative       Review of Systems:  Skin:  Negative       Eyes:  Negative      ENT:  Negative      Respiratory:  Positive for shortness of breath;dyspnea on exertion     Cardiovascular:  Negative      Gastroenterology: Negative      Genitourinary:  Negative      Musculoskeletal:  Negative      Neurologic:  Negative      Psychiatric:  Negative      Heme/Lymph/Imm:  Negative      Endocrine:  Negative        Physical Exam:  Vitals: Ht 1.651 m (5' 5\")  Wt 54.4 kg " (120 lb)  BMI 19.97 kg/m2    Constitutional:  cooperative        Skin:  warm and dry to the touch          Head:  normocephalic, no masses or lesions        Eyes:  pupils equal and round        Lymph:No Cervical lymphadenopathy present     ENT:  no pallor or cyanosis        Neck:  JVP normal        Respiratory:  clear to auscultation         Cardiac: regular rhythm                                                         GI:           Extremities and Muscular Skeletal:  no edema              Neurological:  no gross motor deficits        Psych:  Alert and Oriented x 3        CC  Lizbeth Bond MD  [address]  [place] [address]                Service Date: 11/05/2018      HISTORY OF PRESENT ILLNESS:  I saw Ms. Rodriguez for evaluation of cardiomyopathy.  She is an 87-year-old white female who was recently found to have left bundle branch block.  The echocardiography showed severe LV dysfunction with EF less than 20% on 10/03/2018.  She underwent coronary angiography on 10/12/2018 that showed mild to moderate coronary stenosis.  Symptomatically, she has some shortness of breath with exertion.  There is no orthopnea or PND.  She denies fatigue.  She has no chest pain, palpitations or syncope.      PAST MEDICAL HISTORY:  Remarkable for peripheral artery disease, abdominal aortic aneurysm, cervical cancer, hypertension, COPD and macular degeneration.      PHYSICAL EXAMINATION:   VITAL SIGNS:  Blood pressure was 132/86, heart rate 82 beats per minute, body weight 120 pounds.   GENERAL:  She uses a walker and comes to the clinic with her son.   HEENT:  Eyes and ENT were unremarkable.   LUNGS:  Clear.   CARDIAC:  Rhythm was regular and the heart sounds were reduced.  There was no murmur.   ABDOMEN:  Examination showed no hepatomegaly.   EXTREMITIES:  There was no pedal edema.      The EKG showed a sinus rhythm with left bundle branch block.      ASSESSMENT AND RECOMMENDATIONS:  Ms. Rodriguez is an 87-year-old white  female with nonischemic cardiomyopathy and severe LV dysfunction.  She is currently NYHA Class II.  Her main complaint is shortness of breath with moderate exertion.  There is no recent hospitalization for CHF decompensation.  The patient does not have orthopnea or PND.  During the clinic visit, the patient expressed that she is not very interested in having invasive procedures.  I informed the patient and her son that the BiV pacing may have a chance to improve her functional status, but that is not a guarantee.  In the end of the clinic visit, we have agreed not to pursue a BiV ICD or pacemaker implantation.  She will continue medical therapy.  BiV pacing will be reconsidered  if she has CHF decompensation that affects her quality of life significantly or makes her admitted to the hospital frequently.  Because of her advanced age, I see little benefit from a BiV ICD implantation.      cc:      Sharon Hurtado MD    [facility]   [address] [place] [address]         IVIS ARANA MD             D: 2018   T: 2018   MT: NICK      Name:     VANITA SHERIFF   MRN:      [id number]        Account:      [id number]   :      1931           Service Date: 2018      Document: [id number]        Thank you for allowing me to participate in the care of your patient.      Sincerely,     Ivis Arana MD     [facility]    cc:   Lizbeth Bond MD  [address] [address]

## 2018-11-05 NOTE — MR AVS SNAPSHOT
"              After Visit Summary   11/5/2018    Jamilah Rodriguez    MRN: [id number]           Patient Information     Date Of Birth          4/5/1931        Visit Information        Provider Department      11/5/2018 2:45 PM Mejia Arana MD [facility]        Today's Diagnoses     Secondary cardiomyopathy (H)           Follow-ups after your visit        Who to contact     If you have questions or need follow up information about today's clinic visit or your schedule please contact [facility] directly at [phone].  Normal or non-critical lab and imaging results will be communicated to you by EPIShart, letter or phone within 4 business days after the clinic has received the results. If you do not hear from us within 7 days, please contact the clinic through [employer] or phone. If you have a critical or abnormal lab result, we will notify you by phone as soon as possible.  Submit refill requests through [employer] or call your pharmacy and they will forward the refill request to us. Please allow 3 business days for your refill to be completed.          Additional Information About Your Visit        MyChart Information     [employer] gives you secure access to your electronic health record. If you see a primary care provider, you can also send messages to your care team and make appointments. If you have questions, please call your primary care clinic.  If you do not have a primary care provider, please call [phone] and they will assist you.        Care EveryWhere ID     This is your Care EveryWhere ID. This could be used by other organizations to access your [facility] medical records  [id number]        Your Vitals Were     Pulse Height Pulse Oximetry BMI (Body Mass Index)          82 1.651 m (5' 5\") 98% 19.97 kg/m2         Blood Pressure from Last 3 Encounters:   11/05/18 132/86   10/31/18 92/64   10/12/18 95/55    Weight from Last 3 " Encounters:   11/05/18 54.4 kg (120 lb)   10/31/18 54.4 kg (119 lb 14.4 oz)   10/12/18 53.6 kg (118 lb 1.6 oz)              We Performed the Following     Follow-Up with Electrophysiologist        Primary Care Provider Office Phone # Fax #    Sharon Hurtado [phone]       [address] [facility] [address]        Equal Access to Services     DRE FORREST : Hadii aad ku hadasho Soomaali, waaxda luqadaha, qaybta kaalmada adeegyada, waxay idiin hayaan adeeg kharash la'aan . So [facility] [phone].    ATENCIÓN: Si habla español, tiene a smith disposición servicios gratuitos de asistencia lingüística. Ll[facility] [phone].    We comply with applicable federal civil rights laws and [place] laws. We do not discriminate on the basis of race, color, national origin, age, disability, sex, sexual orientation, or gender identity.            Thank you!     Thank you for choosing [facility]  for your care. Our goal is always to provide you with excellent care. Hearing back from our patients is one way we can continue to improve our services. Please take a few minutes to complete the written survey that you may receive in the mail after your visit with us. Thank you!             Your Updated Medication List - Protect others around you: Learn how to safely use, store and throw away your medicines at www.disposemymeds.org.          This list is accurate as of 11/5/18 11:59 PM.  Always use your most recent med list.                   Brand Name Dispense Instructions for use Diagnosis    ADVIL PO           albuterol 108 (90 Base) MCG/ACT inhaler    PROAIR HFA/PROVENTIL HFA/VENTOLIN HFA     Inhale 2 puffs into the lungs every 4 hours as needed for shortness of breath / dyspnea or wheezing        ascorbic acid 500 MG Tabs      Take 500 mg by mouth daily        aspirin 81 MG tablet     90 tablet    Take 1 tablet (81 mg) by mouth daily    LBBB (left bundle branch block), Status  post myocardial infarction of inferior wall       bimatoprost 0.01 % Soln    LUMIGAN     Place 1 drop into both eyes At Bedtime.        calcium + D 600-200 MG-UNIT Tabs   Generic drug:  calcium carbonate-vitamin D     100 tablet    Take 1 tablet by mouth every 12 hours Calcium 600 mg with Vitamin d 800 mg        dorzolamide 2 % ophthalmic solution    TRUSOPT     Place 1 drop into both eyes 2 times daily        GLUCOSAMINE SULFATE PO           losartan 50 MG tablet    COZAAR    90 tablet    Take 1 tablet (50 mg) by mouth daily    Benign essential hypertension       PRESERVISION AREDS 2 PO      Take 1 tablet by mouth 2 times daily        spironolactone 25 MG tablet    ALDACTONE    90 tablet    Take 0.5 tablets (12.5 mg) by mouth daily    Chest heaviness       umeclidinium-vilanterol 62.5-25 MCG/INH oral inhaler    ANORO ELLIPTA    1 Inhaler    Inhale 1 puff into the lungs daily    Chronic obstructive pulmonary disease, unspecified COPD type (H)

## 2018-11-05 NOTE — LETTER
11/5/2018      Sharon Hurtado MD  [address] [facility] [address]      RE: Jamilah Rodriguez       Dear Colleague,    I had the pleasure of seeing Jamilah Rodriguez in the [facility].    Service Date: 11/05/2018      HISTORY OF PRESENT ILLNESS:  I saw Ms. Rodriguez for evaluation of cardiomyopathy.  She is an 87-year-old white female who was recently found to have left bundle branch block.  The echocardiography showed severe LV dysfunction with EF less than 20% on 10/03/2018.  She underwent coronary angiography on 10/12/2018 that showed mild to moderate coronary stenosis.  Symptomatically, she has some shortness of breath with exertion.  There is no orthopnea or PND.  She denies fatigue.  She has no chest pain, palpitations or syncope.      PAST MEDICAL HISTORY:  Remarkable for peripheral artery disease, abdominal aortic aneurysm, cervical cancer, hypertension, COPD and macular degeneration.      PHYSICAL EXAMINATION:   VITAL SIGNS:  Blood pressure was 132/86, heart rate 82 beats per minute, body weight 120 pounds.   GENERAL:  She uses a walker and comes to the clinic with her son.   HEENT:  Eyes and ENT were unremarkable.   LUNGS:  Clear.   CARDIAC:  Rhythm was regular and the heart sounds were reduced.  There was no murmur.   ABDOMEN:  Examination showed no hepatomegaly.   EXTREMITIES:  There was no pedal edema.      The EKG showed a sinus rhythm with left bundle branch block.      ASSESSMENT AND RECOMMENDATIONS:  Ms. Rodriguez is an 87-year-old white female with nonischemic cardiomyopathy and severe LV dysfunction.  She is currently NYHA Class II.  Her main complaint is shortness of breath with moderate exertion.  There is no recent hospitalization for CHF decompensation.  The patient does not have orthopnea or PND.  During the clinic visit, the patient expressed that she is not very interested in having invasive procedures.  I informed the patient and her son that the BiV pacing  may have a chance to improve her functional status, but that is not a guarantee.  In the end of the clinic visit, we have agreed not to pursue a BiV ICD or pacemaker implantation.  She will continue medical therapy.  BiV pacing will be reconsidered  if she has CHF decompensation that affects her quality of life significantly or makes her admitted to the hospital frequently.  Because of her advanced age, I see little benefit from a BiV ICD implantation.      cc:      Sharon Hurtado MD    [facility]   [address] [place] [address]         IVIS MENDEZ MD             D: 2018   T: 2018   MT: NICK      Name:     VANITA SHERIFF   MRN:      [id number]        Account:      [id number]   :      1931           Service Date: 2018      Document: [id number]           Outpatient Encounter Prescriptions as of 2018   Medication Sig Dispense Refill     albuterol (PROAIR HFA/PROVENTIL HFA/VENTOLIN HFA) 108 (90 Base) MCG/ACT Inhaler Inhale 2 puffs into the lungs every 4 hours as needed for shortness of breath / dyspnea or wheezing       ascorbic acid 500 MG TABS Take 500 mg by mouth daily       aspirin 81 MG tablet Take 1 tablet (81 mg) by mouth daily 90 tablet 3     bimatoprost (LUMIGAN) 0.01 % SOLN Place 1 drop into both eyes At Bedtime.         Calcium Carbonate-Vitamin D (CALCIUM + D) 600-200 MG-UNIT per tablet Take 1 tablet by mouth every 12 hours Calcium 600 mg with Vitamin d 800 mg 100 tablet 12     dorzolamide (TRUSOPT) 2 % ophthalmic solution Place 1 drop into both eyes 2 times daily       GLUCOSAMINE SULFATE PO        losartan (COZAAR) 50 MG tablet Take 1 tablet (50 mg) by mouth daily 90 tablet 3     Multiple Vitamins-Minerals (PRESERVISION AREDS 2 PO) Take 1 tablet by mouth 2 times daily       spironolactone (ALDACTONE) 25 MG tablet Take 0.5 tablets (12.5 mg) by mouth daily 90 tablet 1     umeclidinium-vilanterol (ANORO ELLIPTA) 62.5-25 MCG/INH oral inhaler Inhale 1 puff  into the lungs daily 1 Inhaler 11     Ibuprofen (ADVIL PO)        [DISCONTINUED] Cholecalciferol (VITAMIN D) 2000 UNITS tablet Take 1 tablet by mouth every 48 hours        [DISCONTINUED] VENTOLIN  (90 Base) MCG/ACT inhaler Inhale 2 puffs into the lungs every 6 hours as needed for shortness of breath / dyspnea or wheezing 18 g 0     No facility-administered encounter medications on file as of 11/5/2018.        Again, thank you for allowing me to participate in the care of your patient.      Sincerely,    Mejia Arana MD     [facility]

## 2018-11-05 NOTE — PROGRESS NOTES
Service Date: 11/05/2018      HISTORY OF PRESENT ILLNESS:  I saw Ms. Rodriguez for evaluation of cardiomyopathy.  She is an 87-year-old white female who was recently found to have left bundle branch block.  The echocardiography showed severe LV dysfunction with EF less than 20% on 10/03/2018.  She underwent coronary angiography on 10/12/2018 that showed mild to moderate coronary stenosis.  Symptomatically, she has some shortness of breath with exertion.  There is no orthopnea or PND.  She denies fatigue.  She has no chest pain, palpitations or syncope.      PAST MEDICAL HISTORY:  Remarkable for peripheral artery disease, abdominal aortic aneurysm, cervical cancer, hypertension, COPD and macular degeneration.      PHYSICAL EXAMINATION:   VITAL SIGNS:  Blood pressure was 132/86, heart rate 82 beats per minute, body weight 120 pounds.   GENERAL:  She uses a walker and comes to the clinic with her son.   HEENT:  Eyes and ENT were unremarkable.   LUNGS:  Clear.   CARDIAC:  Rhythm was regular and the heart sounds were reduced.  There was no murmur.   ABDOMEN:  Examination showed no hepatomegaly.   EXTREMITIES:  There was no pedal edema.      The EKG showed a sinus rhythm with left bundle branch block.      ASSESSMENT AND RECOMMENDATIONS:  Ms. Rodriguez is an 87-year-old white female with nonischemic cardiomyopathy and severe LV dysfunction.  She is currently NYHA Class II.  Her main complaint is shortness of breath with moderate exertion.  There is no recent hospitalization for CHF decompensation.  The patient does not have orthopnea or PND.  During the clinic visit, the patient expressed that she is not very interested in having invasive procedures.  I informed the patient and her son that the BiV pacing may have a chance to improve her functional status, but that is not a guarantee.  In the end of the clinic visit, we have agreed not to pursue a BiV ICD or pacemaker implantation.  She will continue medical therapy.  BiV  pacing will be reconsidered  if she has CHF decompensation that affects her quality of life significantly or makes her admitted to the hospital frequently.  Because of her advanced age, I see little benefit from a BiV ICD implantation.      cc:      Sharon Hurtado MD    [facility]   [address] [place] [id number]         IVIS MENDEZ MD             D: 2018   T: 2018   MT: NICK      Name:     VANITA SHERIFF   MRN:      [id number]        Account:      [id number]   :      1931           Service Date: 2018      Document: [id number]

## 2018-11-05 NOTE — PROGRESS NOTES
HPI and Plan:   See dictation    No orders of the defined types were placed in this encounter.      No orders of the defined types were placed in this encounter.      Medications Discontinued During This Encounter   Medication Reason     Cholecalciferol (VITAMIN D) 2000 UNITS tablet Medication Reconciliation Clean Up     VENTOLIN  (90 Base) MCG/ACT inhaler Medication Reconciliation Clean Up         Encounter Diagnosis   Name Primary?     Secondary cardiomyopathy (H)        CURRENT MEDICATIONS:  Current Outpatient Prescriptions   Medication Sig Dispense Refill     albuterol (PROAIR HFA/PROVENTIL HFA/VENTOLIN HFA) 108 (90 Base) MCG/ACT Inhaler Inhale 2 puffs into the lungs every 4 hours as needed for shortness of breath / dyspnea or wheezing       ascorbic acid 500 MG TABS Take 500 mg by mouth daily       aspirin 81 MG tablet Take 1 tablet (81 mg) by mouth daily 90 tablet 3     bimatoprost (LUMIGAN) 0.01 % SOLN Place 1 drop into both eyes At Bedtime.         Calcium Carbonate-Vitamin D (CALCIUM + D) 600-200 MG-UNIT per tablet Take 1 tablet by mouth every 12 hours Calcium 600 mg with Vitamin d 800 mg 100 tablet 12     dorzolamide (TRUSOPT) 2 % ophthalmic solution Place 1 drop into both eyes 2 times daily       GLUCOSAMINE SULFATE PO        losartan (COZAAR) 50 MG tablet Take 1 tablet (50 mg) by mouth daily 90 tablet 3     Multiple Vitamins-Minerals (PRESERVISION AREDS 2 PO) Take 1 tablet by mouth 2 times daily       spironolactone (ALDACTONE) 25 MG tablet Take 0.5 tablets (12.5 mg) by mouth daily 90 tablet 1     umeclidinium-vilanterol (ANORO ELLIPTA) 62.5-25 MCG/INH oral inhaler Inhale 1 puff into the lungs daily 1 Inhaler 11     Ibuprofen (ADVIL PO)        [DISCONTINUED] VENTOLIN  (90 Base) MCG/ACT inhaler Inhale 2 puffs into the lungs every 6 hours as needed for shortness of breath / dyspnea or wheezing 18 g 0       ALLERGIES     Allergies   Allergen Reactions     Atenolol Hives     Beta Adrenergic  Blockers      Brimonidine      Cephalexin Hives     Germall Plus Itching     Imidazolidinyl Urea found in many topical creams and house hold products     Latex      Itching     Morphine GI Disturbance     No Clinical Screening - See Comments Itching     Carba Mix; IMIDAZOLIDINYL UREA - Allergic to this ingredient, which is found in many common topical moisturizers and household products.     Penicillins Hives     Sulfa Drugs GI Disturbance     Tramadol Nausea and Vomiting     Tylenol GI Disturbance     Vicodin [Hydrocodone-Acetaminophen]      Fentanyl Nausea       PAST MEDICAL HISTORY:  Past Medical History:   Diagnosis Date     AAA (abdominal aortic aneurysm) (H)     Followed with yearly ultrasound     Cardiomyopathy, idiopathic (H) 10/2018     Cervical cancer (H) 1969     Chronic dermatitis     extensive allergy testing revealed allergy/sensitivity to carba mix ( rubber) and Imidazolidinyl Urea (common in beauty products)     Glaucoma      Glaucoma      History of COPD     very mild abnormal spirometry in 2007 (in [place]), has not been active since quitting smoking in 2009     HTN (hypertension)      LBBB (left bundle branch block) 10/2018     Macular degeneration      Osteoporosis 8/3/11    femoral T score -3.4 & -3.7     PAD (peripheral artery disease) (H)      Vertebral compression fracture (H) 5/17/11    L1 compression fracture, presumed osteoporotic compression fracture       PAST SURGICAL HISTORY:  Past Surgical History:   Procedure Laterality Date     CATARACT IOL, RT/LT  2/8/12    left eye cataract removal     HEART CATH CORONARY ANGIOGRAM W/LV GRAM  10/2018    moderate nonocclusive CAD     HYSTERECTOMY TOTAL ABDOMINAL  1999    Fibroids     LAPAROSCOPY DIAGNOSTIC (GENERAL) N/A 4/15/2018    Procedure: LAPAROSCOPY DIAGNOSTIC (GENERAL);;  Surgeon: Jm Craig MD;  Location:  OR     LAPAROTOMY EXPLORATORY N/A 4/15/2018    Procedure: LAPAROTOMY EXPLORATORY;;  Surgeon: Jm Craig MD;  Location:  OR      "OPEN REDUCTION INTERNAL FIXATION HIP NAILING  7/2/2012    Procedure: OPEN REDUCTION INTERNAL FIXATION HIP NAILING;  Intramedullary Fixation Right Intertrochanteric Hip Fracture;  Surgeon: Chalino Covarrubias MD;  Location: [facility] OR     OPEN REDUCTION INTERNAL FIXATION TIBIAL PLATEAU  3/31/2013    Procedure: OPEN REDUCTION INTERNAL FIXATION TIBIAL PLATEAU;  RIGHT LATERAL TIBIAL PLATEAU FRACTURE - SYNTHES Medial Meniscus Repair, Lateral Compartment Release;  Surgeon: Alfred Cardenas MD;  Location: [facility] OR     OPEN REDUCTION INTERNAL FIXATION WRIST  1988    left wrist ORIF, hardware removed onemonth later       FAMILY HISTORY:  Family History   Problem Relation Age of Onset     Breast Cancer Other      Circulatory Daughter 53     mengioma     C.A.D. Father      Diabetes Sister      Breast Cancer Maternal Aunt 60       SOCIAL HISTORY:  Social History     Social History     Marital status:      Spouse name: N/A     Number of children: 2     Years of education: N/A     Social History Main Topics     Smoking status: Former Smoker     Quit date: 7/1/2009     Smokeless tobacco: Never Used      Comment: former 1/2-1 ppd since age 20-22     Alcohol use No     Drug use: No     Sexual activity: No     Other Topics Concern     None     Social History Narrative       Review of Systems:  Skin:  Negative       Eyes:  Negative      ENT:  Negative      Respiratory:  Positive for shortness of breath;dyspnea on exertion     Cardiovascular:  Negative      Gastroenterology: Negative      Genitourinary:  Negative      Musculoskeletal:  Negative      Neurologic:  Negative      Psychiatric:  Negative      Heme/Lymph/Imm:  Negative      Endocrine:  Negative        Physical Exam:  Vitals: Ht 1.651 m (5' 5\")  Wt 54.4 kg (120 lb)  BMI 19.97 kg/m2    Constitutional:  cooperative        Skin:  warm and dry to the touch          Head:  normocephalic, no masses or lesions        Eyes:  pupils equal and round        Lymph:No Cervical " lymphadenopathy present     ENT:  no pallor or cyanosis        Neck:  JVP normal        Respiratory:  clear to auscultation         Cardiac: regular rhythm                                                         GI:           Extremities and Muscular Skeletal:  no edema              Neurological:  no gross motor deficits        Psych:  Alert and Oriented x 3        CC  Lizbeth Bond MD  [address]  [place] [id number]

## 2018-11-06 NOTE — TELEPHONE ENCOUNTER
Patient saw Dr. Arana on 11/5/18. Apt with Lakisha Ekbubba was rescheduled from previously missed apt. RN called patient and left VM for patient to call clinic as 11/7/18 apt with [facility] Lakisha Ekman is not needed.

## 2018-11-07 NOTE — TELEPHONE ENCOUNTER
Patient returned call. Patient acknowledged that no apt needed with Lakisha. RN answered patient's questions regarding her current medications. All patient's questions answered. Patient will call clinic with any further questions or concerns.

## 2018-11-21 NOTE — TELEPHONE ENCOUNTER
Call received from patient wondering if she may be allergic to her aldactone. Pt was prescribed aldactone after OV 10/5/18.     Returned patient's call, states that she has an itchy rash on her arms and legs that started about three weeks ago. She is wondering if the rash could be from the aldactone. Pt developed a rash on beta blockers in the past. Pt states that she has very sensitive skin and has seen a dermatologist in the past for it.  Pt denies any trouble swallowing. Pt endorses some shortness of breath but not much worse than normal given her history of COPD. Informed patient that an allergy to aldactone is less likely given she had already been on it a month by the time the rash developed. Informed patient that we would route conversation to Dr Bond but in the meantime to reach out to her pharmacist for their input and to go to urgent care if the rash spreads or she has any trouble breathing or swallowing. Pt verbalized understanding.

## 2018-11-26 NOTE — TELEPHONE ENCOUNTER
Lets have her hold the aldactone for 2 weeks and see if the rash resolves. If it does we can try eplenerone. Thanks.

## 2018-11-26 NOTE — TELEPHONE ENCOUNTER
Attempted to contact patient to review Dr. Bond's recommendations to stop aldactone for 2 weeks. Left message for patient to call back - will clarify level of rash and if she has stopped the medication    1125 spoke with patient, she will stop aldactone starting tomorrow. She has a special cream she uses for itching and rashes as she has many allergies. She notes the rash has progressed to hives at this point. She is aware to see ER assessment if she progresses to breathing issues or facial edema. Patient will call back in two weeks with an update.

## 2018-12-06 NOTE — TELEPHONE ENCOUNTER
Pt calling regarding Losartan being recalled and her cardiologist told her before hers has potassium in it . Please advise on medication to change to .Ladan Wright RN

## 2018-12-10 NOTE — TELEPHONE ENCOUNTER
Attempted to contact patient to get an update on her rash issue. Patient has been holding aldactone. Per Dr. Bond -if cannot restart aldactone then consider eplenerone. Left a message for patient to call back with update.    1500 spoke with patient who states she still has some localized intermittent itching. She wants to try the aldactone again and will call right away if her symptoms increase. Otherwise she will check in at the end of the month with an update.

## 2019-01-01 ENCOUNTER — TELEPHONE (OUTPATIENT)
Dept: CARDIOLOGY | Facility: CLINIC | Age: 84
End: 2019-01-01

## 2019-01-01 ENCOUNTER — CARE COORDINATION (OUTPATIENT)
Dept: CARDIOLOGY | Facility: CLINIC | Age: 84
End: 2019-01-01

## 2019-01-01 ENCOUNTER — HOSPITAL ENCOUNTER (INPATIENT)
Facility: CLINIC | Age: 84
LOS: 7 days | Discharge: NURSING, REHAB OR LONG-TERM CARE | DRG: 193 | End: 2019-02-10
Attending: EMERGENCY MEDICINE | Admitting: INTERNAL MEDICINE
Payer: MEDICARE

## 2019-01-01 ENCOUNTER — ANCILLARY PROCEDURE (OUTPATIENT)
Dept: GENERAL RADIOLOGY | Facility: CLINIC | Age: 84
End: 2019-01-01
Attending: INTERNAL MEDICINE
Payer: MEDICARE

## 2019-01-01 ENCOUNTER — OFFICE VISIT (OUTPATIENT)
Dept: INTERNAL MEDICINE | Facility: CLINIC | Age: 84
End: 2019-01-01
Payer: MEDICARE

## 2019-01-01 ENCOUNTER — TRANSFERRED RECORDS (OUTPATIENT)
Dept: HEALTH INFORMATION MANAGEMENT | Facility: CLINIC | Age: 84
End: 2019-01-01

## 2019-01-01 ENCOUNTER — TELEPHONE (OUTPATIENT)
Dept: INTERNAL MEDICINE | Facility: CLINIC | Age: 84
End: 2019-01-01

## 2019-01-01 ENCOUNTER — MEDICAL CORRESPONDENCE (OUTPATIENT)
Dept: HEALTH INFORMATION MANAGEMENT | Facility: CLINIC | Age: 84
End: 2019-01-01

## 2019-01-01 ENCOUNTER — NURSING HOME VISIT (OUTPATIENT)
Dept: GERIATRICS | Facility: CLINIC | Age: 84
End: 2019-01-01
Payer: MEDICARE

## 2019-01-01 ENCOUNTER — NURSE TRIAGE (OUTPATIENT)
Dept: NURSING | Facility: CLINIC | Age: 84
End: 2019-01-01

## 2019-01-01 ENCOUNTER — PATIENT OUTREACH (OUTPATIENT)
Dept: CARE COORDINATION | Facility: CLINIC | Age: 84
End: 2019-01-01

## 2019-01-01 ENCOUNTER — OFFICE VISIT - HEALTHEAST (OUTPATIENT)
Dept: GERIATRICS | Facility: CLINIC | Age: 84
End: 2019-01-01

## 2019-01-01 ENCOUNTER — APPOINTMENT (OUTPATIENT)
Dept: GENERAL RADIOLOGY | Facility: CLINIC | Age: 84
DRG: 193 | End: 2019-01-01
Attending: INTERNAL MEDICINE
Payer: MEDICARE

## 2019-01-01 ENCOUNTER — OFFICE VISIT (OUTPATIENT)
Dept: CARDIOLOGY | Facility: CLINIC | Age: 84
End: 2019-01-01
Attending: NURSE PRACTITIONER
Payer: MEDICARE

## 2019-01-01 ENCOUNTER — DISCHARGE SUMMARY NURSING HOME (OUTPATIENT)
Dept: GERIATRICS | Facility: CLINIC | Age: 84
End: 2019-01-01
Payer: MEDICARE

## 2019-01-01 ENCOUNTER — TELEPHONE (OUTPATIENT)
Dept: GERIATRICS | Facility: CLINIC | Age: 84
End: 2019-01-01

## 2019-01-01 ENCOUNTER — CARE COORDINATION (OUTPATIENT)
Dept: INTERNAL MEDICINE | Facility: CLINIC | Age: 84
End: 2019-01-01

## 2019-01-01 ENCOUNTER — APPOINTMENT (OUTPATIENT)
Dept: CARDIOLOGY | Facility: CLINIC | Age: 84
DRG: 193 | End: 2019-01-01
Attending: INTERNAL MEDICINE
Payer: MEDICARE

## 2019-01-01 ENCOUNTER — APPOINTMENT (OUTPATIENT)
Dept: GENERAL RADIOLOGY | Facility: CLINIC | Age: 84
DRG: 193 | End: 2019-01-01
Payer: MEDICARE

## 2019-01-01 ENCOUNTER — DOCUMENTATION ONLY (OUTPATIENT)
Dept: OTHER | Facility: CLINIC | Age: 84
End: 2019-01-01

## 2019-01-01 ENCOUNTER — APPOINTMENT (OUTPATIENT)
Dept: GENERAL RADIOLOGY | Facility: CLINIC | Age: 84
DRG: 291 | End: 2019-01-01
Attending: EMERGENCY MEDICINE
Payer: MEDICARE

## 2019-01-01 ENCOUNTER — OFFICE VISIT (OUTPATIENT)
Dept: CARDIOLOGY | Facility: CLINIC | Age: 84
End: 2019-01-01
Payer: MEDICARE

## 2019-01-01 ENCOUNTER — AMBULATORY - HEALTHEAST (OUTPATIENT)
Dept: OTHER | Facility: CLINIC | Age: 84
End: 2019-01-01

## 2019-01-01 ENCOUNTER — APPOINTMENT (OUTPATIENT)
Dept: PHYSICAL THERAPY | Facility: CLINIC | Age: 84
DRG: 291 | End: 2019-01-01
Attending: INTERNAL MEDICINE
Payer: MEDICARE

## 2019-01-01 ENCOUNTER — HOSPITAL ENCOUNTER (INPATIENT)
Facility: CLINIC | Age: 84
LOS: 3 days | Discharge: NURSING, REHAB OR LONG-TERM CARE | DRG: 291 | End: 2019-03-20
Attending: EMERGENCY MEDICINE | Admitting: INTERNAL MEDICINE
Payer: MEDICARE

## 2019-01-01 ENCOUNTER — APPOINTMENT (OUTPATIENT)
Dept: ULTRASOUND IMAGING | Facility: CLINIC | Age: 84
DRG: 291 | End: 2019-01-01
Attending: HOSPITALIST
Payer: MEDICARE

## 2019-01-01 ENCOUNTER — APPOINTMENT (OUTPATIENT)
Dept: CT IMAGING | Facility: CLINIC | Age: 84
DRG: 291 | End: 2019-01-01
Attending: HOSPITALIST
Payer: MEDICARE

## 2019-01-01 ENCOUNTER — DOCUMENTATION ONLY (OUTPATIENT)
Dept: CARDIOLOGY | Facility: CLINIC | Age: 84
End: 2019-01-01

## 2019-01-01 ENCOUNTER — HOSPITAL ENCOUNTER (INPATIENT)
Facility: CLINIC | Age: 84
LOS: 2 days | Discharge: NURSING, REHAB OR LONG-TERM CARE | DRG: 291 | End: 2019-04-25
Attending: EMERGENCY MEDICINE | Admitting: INTERNAL MEDICINE
Payer: MEDICARE

## 2019-01-01 ENCOUNTER — AMBULATORY - HEALTHEAST (OUTPATIENT)
Dept: GERIATRICS | Facility: CLINIC | Age: 84
End: 2019-01-01

## 2019-01-01 ENCOUNTER — APPOINTMENT (OUTPATIENT)
Dept: GENERAL RADIOLOGY | Facility: CLINIC | Age: 84
DRG: 291 | End: 2019-01-01
Attending: INTERNAL MEDICINE
Payer: MEDICARE

## 2019-01-01 ENCOUNTER — RESULTS ONLY (OUTPATIENT)
Dept: ENDOSCOPY | Facility: CLINIC | Age: 84
End: 2019-01-01

## 2019-01-01 ENCOUNTER — APPOINTMENT (OUTPATIENT)
Dept: PHYSICAL THERAPY | Facility: CLINIC | Age: 84
DRG: 193 | End: 2019-01-01
Attending: INTERNAL MEDICINE
Payer: MEDICARE

## 2019-01-01 VITALS
OXYGEN SATURATION: 99 % | BODY MASS INDEX: 20.48 KG/M2 | RESPIRATION RATE: 18 BRPM | TEMPERATURE: 97 F | DIASTOLIC BLOOD PRESSURE: 68 MMHG | WEIGHT: 123.1 LBS | HEART RATE: 91 BPM | SYSTOLIC BLOOD PRESSURE: 113 MMHG

## 2019-01-01 VITALS
HEART RATE: 92 BPM | WEIGHT: 124.2 LBS | DIASTOLIC BLOOD PRESSURE: 60 MMHG | OXYGEN SATURATION: 98 % | BODY MASS INDEX: 20.67 KG/M2 | RESPIRATION RATE: 20 BRPM | TEMPERATURE: 98 F | SYSTOLIC BLOOD PRESSURE: 98 MMHG

## 2019-01-01 VITALS
DIASTOLIC BLOOD PRESSURE: 73 MMHG | TEMPERATURE: 98.2 F | SYSTOLIC BLOOD PRESSURE: 151 MMHG | RESPIRATION RATE: 20 BRPM | HEIGHT: 65 IN | WEIGHT: 121.1 LBS | OXYGEN SATURATION: 95 % | HEART RATE: 77 BPM | BODY MASS INDEX: 20.18 KG/M2

## 2019-01-01 VITALS
BODY MASS INDEX: 19.79 KG/M2 | WEIGHT: 118.8 LBS | HEART RATE: 92 BPM | OXYGEN SATURATION: 98 % | DIASTOLIC BLOOD PRESSURE: 62 MMHG | SYSTOLIC BLOOD PRESSURE: 108 MMHG | HEIGHT: 65 IN

## 2019-01-01 VITALS
TEMPERATURE: 97 F | SYSTOLIC BLOOD PRESSURE: 114 MMHG | DIASTOLIC BLOOD PRESSURE: 72 MMHG | WEIGHT: 115.3 LBS | BODY MASS INDEX: 19.19 KG/M2 | HEART RATE: 94 BPM | OXYGEN SATURATION: 93 % | RESPIRATION RATE: 18 BRPM

## 2019-01-01 VITALS
SYSTOLIC BLOOD PRESSURE: 92 MMHG | HEART RATE: 78 BPM | DIASTOLIC BLOOD PRESSURE: 62 MMHG | HEIGHT: 65 IN | WEIGHT: 115.4 LBS | BODY MASS INDEX: 19.22 KG/M2

## 2019-01-01 VITALS
SYSTOLIC BLOOD PRESSURE: 100 MMHG | RESPIRATION RATE: 22 BRPM | OXYGEN SATURATION: 97 % | TEMPERATURE: 97.9 F | BODY MASS INDEX: 20.85 KG/M2 | DIASTOLIC BLOOD PRESSURE: 66 MMHG | HEART RATE: 96 BPM | WEIGHT: 125.3 LBS

## 2019-01-01 VITALS
DIASTOLIC BLOOD PRESSURE: 77 MMHG | HEART RATE: 94 BPM | HEIGHT: 65 IN | WEIGHT: 118.5 LBS | SYSTOLIC BLOOD PRESSURE: 116 MMHG | OXYGEN SATURATION: 90 % | BODY MASS INDEX: 19.74 KG/M2

## 2019-01-01 VITALS
DIASTOLIC BLOOD PRESSURE: 62 MMHG | HEART RATE: 72 BPM | WEIGHT: 114.2 LBS | SYSTOLIC BLOOD PRESSURE: 96 MMHG | BODY MASS INDEX: 19.03 KG/M2 | HEIGHT: 65 IN

## 2019-01-01 VITALS
OXYGEN SATURATION: 99 % | TEMPERATURE: 97.2 F | SYSTOLIC BLOOD PRESSURE: 118 MMHG | WEIGHT: 113.4 LBS | RESPIRATION RATE: 20 BRPM | HEART RATE: 74 BPM | BODY MASS INDEX: 18.87 KG/M2 | DIASTOLIC BLOOD PRESSURE: 79 MMHG

## 2019-01-01 VITALS
HEART RATE: 98 BPM | OXYGEN SATURATION: 98 % | BODY MASS INDEX: 18.68 KG/M2 | SYSTOLIC BLOOD PRESSURE: 116 MMHG | TEMPERATURE: 98.8 F | HEIGHT: 65 IN | WEIGHT: 112.1 LBS | DIASTOLIC BLOOD PRESSURE: 75 MMHG | RESPIRATION RATE: 20 BRPM

## 2019-01-01 VITALS
BODY MASS INDEX: 19.3 KG/M2 | SYSTOLIC BLOOD PRESSURE: 120 MMHG | OXYGEN SATURATION: 96 % | WEIGHT: 116 LBS | DIASTOLIC BLOOD PRESSURE: 78 MMHG | RESPIRATION RATE: 20 BRPM | TEMPERATURE: 98.4 F | HEART RATE: 88 BPM

## 2019-01-01 VITALS
HEART RATE: 95 BPM | WEIGHT: 123.9 LBS | TEMPERATURE: 98 F | RESPIRATION RATE: 20 BRPM | SYSTOLIC BLOOD PRESSURE: 100 MMHG | DIASTOLIC BLOOD PRESSURE: 62 MMHG | BODY MASS INDEX: 20.62 KG/M2

## 2019-01-01 VITALS
TEMPERATURE: 96.9 F | WEIGHT: 125.9 LBS | SYSTOLIC BLOOD PRESSURE: 112 MMHG | RESPIRATION RATE: 18 BRPM | HEART RATE: 97 BPM | OXYGEN SATURATION: 97 % | BODY MASS INDEX: 20.95 KG/M2 | DIASTOLIC BLOOD PRESSURE: 69 MMHG

## 2019-01-01 VITALS
OXYGEN SATURATION: 98 % | HEART RATE: 90 BPM | DIASTOLIC BLOOD PRESSURE: 60 MMHG | RESPIRATION RATE: 22 BRPM | SYSTOLIC BLOOD PRESSURE: 108 MMHG | WEIGHT: 118.1 LBS | BODY MASS INDEX: 19.65 KG/M2

## 2019-01-01 VITALS
OXYGEN SATURATION: 93 % | DIASTOLIC BLOOD PRESSURE: 70 MMHG | SYSTOLIC BLOOD PRESSURE: 115 MMHG | TEMPERATURE: 97.1 F | HEART RATE: 105 BPM | WEIGHT: 116 LBS | BODY MASS INDEX: 19.3 KG/M2 | RESPIRATION RATE: 18 BRPM

## 2019-01-01 VITALS
BODY MASS INDEX: 19.3 KG/M2 | HEART RATE: 80 BPM | DIASTOLIC BLOOD PRESSURE: 72 MMHG | OXYGEN SATURATION: 97 % | WEIGHT: 116 LBS | TEMPERATURE: 97.6 F | SYSTOLIC BLOOD PRESSURE: 111 MMHG | RESPIRATION RATE: 20 BRPM

## 2019-01-01 VITALS
TEMPERATURE: 97.4 F | DIASTOLIC BLOOD PRESSURE: 68 MMHG | OXYGEN SATURATION: 94 % | SYSTOLIC BLOOD PRESSURE: 120 MMHG | HEART RATE: 88 BPM | RESPIRATION RATE: 20 BRPM | WEIGHT: 109.3 LBS | BODY MASS INDEX: 18.76 KG/M2

## 2019-01-01 VITALS
DIASTOLIC BLOOD PRESSURE: 60 MMHG | OXYGEN SATURATION: 94 % | RESPIRATION RATE: 20 BRPM | HEART RATE: 100 BPM | TEMPERATURE: 98 F | BODY MASS INDEX: 19.1 KG/M2 | WEIGHT: 111.3 LBS | SYSTOLIC BLOOD PRESSURE: 110 MMHG

## 2019-01-01 VITALS
RESPIRATION RATE: 16 BRPM | DIASTOLIC BLOOD PRESSURE: 62 MMHG | HEART RATE: 89 BPM | SYSTOLIC BLOOD PRESSURE: 111 MMHG | TEMPERATURE: 98.5 F | WEIGHT: 138.6 LBS | OXYGEN SATURATION: 99 % | BODY MASS INDEX: 23.06 KG/M2

## 2019-01-01 VITALS
DIASTOLIC BLOOD PRESSURE: 83 MMHG | BODY MASS INDEX: 19.83 KG/M2 | HEIGHT: 65 IN | HEART RATE: 99 BPM | SYSTOLIC BLOOD PRESSURE: 126 MMHG | WEIGHT: 119 LBS | OXYGEN SATURATION: 99 %

## 2019-01-01 VITALS
OXYGEN SATURATION: 100 % | DIASTOLIC BLOOD PRESSURE: 60 MMHG | HEIGHT: 65 IN | WEIGHT: 120.1 LBS | BODY MASS INDEX: 20.01 KG/M2 | SYSTOLIC BLOOD PRESSURE: 98 MMHG | HEART RATE: 76 BPM

## 2019-01-01 VITALS
HEART RATE: 106 BPM | BODY MASS INDEX: 18.56 KG/M2 | WEIGHT: 108.7 LBS | OXYGEN SATURATION: 95 % | RESPIRATION RATE: 18 BRPM | DIASTOLIC BLOOD PRESSURE: 57 MMHG | SYSTOLIC BLOOD PRESSURE: 108 MMHG | TEMPERATURE: 97.8 F | HEIGHT: 64 IN

## 2019-01-01 VITALS
OXYGEN SATURATION: 98 % | RESPIRATION RATE: 22 BRPM | BODY MASS INDEX: 19.22 KG/M2 | HEART RATE: 81 BPM | SYSTOLIC BLOOD PRESSURE: 125 MMHG | DIASTOLIC BLOOD PRESSURE: 70 MMHG | WEIGHT: 112 LBS | TEMPERATURE: 97 F

## 2019-01-01 DIAGNOSIS — B37.0 THRUSH: ICD-10-CM

## 2019-01-01 DIAGNOSIS — Z79.899 MEDICATION MANAGEMENT: ICD-10-CM

## 2019-01-01 DIAGNOSIS — I48.91 ATRIAL FIBRILLATION WITH RVR (H): ICD-10-CM

## 2019-01-01 DIAGNOSIS — I48.0 PAROXYSMAL ATRIAL FIBRILLATION (H): ICD-10-CM

## 2019-01-01 DIAGNOSIS — I10 ESSENTIAL HYPERTENSION: ICD-10-CM

## 2019-01-01 DIAGNOSIS — I50.22 CHRONIC SYSTOLIC CONGESTIVE HEART FAILURE (H): ICD-10-CM

## 2019-01-01 DIAGNOSIS — Z51.5 HOSPICE CARE: ICD-10-CM

## 2019-01-01 DIAGNOSIS — I42.9 CARDIOMYOPATHY, IDIOPATHIC (H): ICD-10-CM

## 2019-01-01 DIAGNOSIS — I50.22 CHRONIC SYSTOLIC CONGESTIVE HEART FAILURE (H): Primary | ICD-10-CM

## 2019-01-01 DIAGNOSIS — L50.9 URTICARIA: Primary | ICD-10-CM

## 2019-01-01 DIAGNOSIS — D62 ANEMIA DUE TO BLOOD LOSS, ACUTE: ICD-10-CM

## 2019-01-01 DIAGNOSIS — N17.9 ACUTE KIDNEY INJURY (H): ICD-10-CM

## 2019-01-01 DIAGNOSIS — L30.9 CHRONIC DERMATITIS: ICD-10-CM

## 2019-01-01 DIAGNOSIS — J44.9 CHRONIC OBSTRUCTIVE PULMONARY DISEASE, UNSPECIFIED COPD TYPE (H): ICD-10-CM

## 2019-01-01 DIAGNOSIS — R06.02 SHORTNESS OF BREATH: ICD-10-CM

## 2019-01-01 DIAGNOSIS — N18.30 CKD (CHRONIC KIDNEY DISEASE) STAGE 3, GFR 30-59 ML/MIN (H): ICD-10-CM

## 2019-01-01 DIAGNOSIS — J10.1 INFLUENZA A: ICD-10-CM

## 2019-01-01 DIAGNOSIS — D64.9 ANEMIA, UNSPECIFIED TYPE: ICD-10-CM

## 2019-01-01 DIAGNOSIS — J44.1 COPD EXACERBATION (H): Primary | ICD-10-CM

## 2019-01-01 DIAGNOSIS — I50.23 ACUTE ON CHRONIC SYSTOLIC HEART FAILURE (H): Primary | ICD-10-CM

## 2019-01-01 DIAGNOSIS — R53.81 PHYSICAL DECONDITIONING: ICD-10-CM

## 2019-01-01 DIAGNOSIS — I51.7 IDIOPATHIC CARDIOMEGALY: ICD-10-CM

## 2019-01-01 DIAGNOSIS — R21 RASH: ICD-10-CM

## 2019-01-01 DIAGNOSIS — R60.0 BILATERAL LOWER EXTREMITY EDEMA: Primary | ICD-10-CM

## 2019-01-01 DIAGNOSIS — I50.22 CHRONIC SYSTOLIC HEART FAILURE (H): ICD-10-CM

## 2019-01-01 DIAGNOSIS — H35.30 MACULAR DEGENERATION OF BOTH EYES, UNSPECIFIED TYPE: ICD-10-CM

## 2019-01-01 DIAGNOSIS — E87.6 HYPOKALEMIA: ICD-10-CM

## 2019-01-01 DIAGNOSIS — R23.8 BULLAE: ICD-10-CM

## 2019-01-01 DIAGNOSIS — J44.9 COPD (CHRONIC OBSTRUCTIVE PULMONARY DISEASE) (H): Primary | ICD-10-CM

## 2019-01-01 DIAGNOSIS — J44.1 COPD EXACERBATION (H): ICD-10-CM

## 2019-01-01 DIAGNOSIS — I50.23 ACUTE ON CHRONIC SYSTOLIC HEART FAILURE (H): ICD-10-CM

## 2019-01-01 DIAGNOSIS — I71.40 ABDOMINAL AORTIC ANEURYSM (AAA) WITHOUT RUPTURE (H): ICD-10-CM

## 2019-01-01 DIAGNOSIS — I25.5 ISCHEMIC CARDIOMYOPATHY: Primary | ICD-10-CM

## 2019-01-01 DIAGNOSIS — B37.0 ORAL THRUSH: ICD-10-CM

## 2019-01-01 DIAGNOSIS — I48.0 PAROXYSMAL ATRIAL FIBRILLATION (H): Primary | ICD-10-CM

## 2019-01-01 DIAGNOSIS — M81.0 AGE-RELATED OSTEOPOROSIS WITHOUT CURRENT PATHOLOGICAL FRACTURE: ICD-10-CM

## 2019-01-01 DIAGNOSIS — B37.81 CANDIDA ESOPHAGITIS (H): ICD-10-CM

## 2019-01-01 DIAGNOSIS — I42.9 CARDIOMYOPATHY, IDIOPATHIC (H): Primary | ICD-10-CM

## 2019-01-01 DIAGNOSIS — Z53.9 DIAGNOSIS NOT YET DEFINED: Primary | ICD-10-CM

## 2019-01-01 DIAGNOSIS — J96.01 ACUTE RESPIRATORY FAILURE WITH HYPOXIA (H): ICD-10-CM

## 2019-01-01 DIAGNOSIS — I10 BENIGN ESSENTIAL HYPERTENSION: ICD-10-CM

## 2019-01-01 DIAGNOSIS — I50.20 HFREF (HEART FAILURE WITH REDUCED EJECTION FRACTION) (H): Primary | ICD-10-CM

## 2019-01-01 DIAGNOSIS — I50.9 CONGESTIVE HEART FAILURE, UNSPECIFIED HF CHRONICITY, UNSPECIFIED HEART FAILURE TYPE (H): ICD-10-CM

## 2019-01-01 DIAGNOSIS — D72.829 LEUKOCYTOSIS, UNSPECIFIED TYPE: Primary | ICD-10-CM

## 2019-01-01 DIAGNOSIS — R62.7 ADULT FAILURE TO THRIVE: ICD-10-CM

## 2019-01-01 DIAGNOSIS — I50.23 ACUTE ON CHRONIC SYSTOLIC CONGESTIVE HEART FAILURE (H): ICD-10-CM

## 2019-01-01 DIAGNOSIS — R06.02 SHORTNESS OF BREATH: Primary | ICD-10-CM

## 2019-01-01 DIAGNOSIS — I71.40 ABDOMINAL AORTIC ANEURYSM GREATER THAN 39 MM IN DIAMETER (H): ICD-10-CM

## 2019-01-01 DIAGNOSIS — I50.22 CHRONIC SYSTOLIC HEART FAILURE (H): Primary | ICD-10-CM

## 2019-01-01 DIAGNOSIS — I50.9 CHRONIC CONGESTIVE HEART FAILURE, UNSPECIFIED HEART FAILURE TYPE (H): ICD-10-CM

## 2019-01-01 DIAGNOSIS — Z09 HOSPITAL DISCHARGE FOLLOW-UP: Primary | ICD-10-CM

## 2019-01-01 DIAGNOSIS — N17.9 ACUTE RENAL FAILURE, UNSPECIFIED ACUTE RENAL FAILURE TYPE (H): ICD-10-CM

## 2019-01-01 DIAGNOSIS — R09.02 HYPOXEMIA: ICD-10-CM

## 2019-01-01 DIAGNOSIS — I50.9 CHF (CONGESTIVE HEART FAILURE) (H): Primary | ICD-10-CM

## 2019-01-01 DIAGNOSIS — I48.91 ATRIAL FIBRILLATION, UNSPECIFIED TYPE (H): ICD-10-CM

## 2019-01-01 DIAGNOSIS — Z51.5 HOSPICE CARE PATIENT: Primary | ICD-10-CM

## 2019-01-01 DIAGNOSIS — F41.9 ANXIETY: ICD-10-CM

## 2019-01-01 DIAGNOSIS — I50.9 CHF (CONGESTIVE HEART FAILURE) (H): ICD-10-CM

## 2019-01-01 DIAGNOSIS — I71.40 ABDOMINAL AORTIC ANEURYSM (AAA) WITHOUT RUPTURE (H): Primary | ICD-10-CM

## 2019-01-01 DIAGNOSIS — M48.50XA VERTEBRAL COMPRESSION FRACTURE (H): ICD-10-CM

## 2019-01-01 DIAGNOSIS — E87.1 HYPONATREMIA: ICD-10-CM

## 2019-01-01 DIAGNOSIS — D62 ACUTE BLOOD LOSS ANEMIA: ICD-10-CM

## 2019-01-01 DIAGNOSIS — N28.9 RENAL INSUFFICIENCY: ICD-10-CM

## 2019-01-01 DIAGNOSIS — Z71.89 ADVANCED DIRECTIVES, COUNSELING/DISCUSSION: Primary | ICD-10-CM

## 2019-01-01 DIAGNOSIS — I73.9 PAD (PERIPHERAL ARTERY DISEASE) (H): ICD-10-CM

## 2019-01-01 DIAGNOSIS — D50.9 IRON DEFICIENCY ANEMIA, UNSPECIFIED IRON DEFICIENCY ANEMIA TYPE: ICD-10-CM

## 2019-01-01 LAB
ABO + RH BLD: NORMAL
ABO + RH BLD: NORMAL
ALBUMIN SERPL-MCNC: 2.9 G/DL (ref 3.4–5)
ALBUMIN SERPL-MCNC: 3.2 G/DL (ref 3.5–5)
ALBUMIN SERPL-MCNC: 3.3 G/DL (ref 3.4–5)
ALBUMIN UR-MCNC: 10 MG/DL
ALBUMIN UR-MCNC: 30 MG/DL
ALP SERPL-CCNC: 111 U/L (ref 40–150)
ALP SERPL-CCNC: 118 U/L (ref 40–150)
ALP SERPL-CCNC: 127 U/L (ref 40–150)
ALP SERPL-CCNC: 130 U/L (ref 40–150)
ALT SERPL W P-5'-P-CCNC: 16 U/L (ref 0–50)
ALT SERPL W P-5'-P-CCNC: 23 U/L (ref 0–50)
ALT SERPL-CCNC: 14 U/L (ref 0–55)
ALT SERPL-CCNC: 15 U/L (ref 9–55)
ANION GAP SERPL CALCULATED.3IONS-SCNC: 10 MMOL/L (ref 3–14)
ANION GAP SERPL CALCULATED.3IONS-SCNC: 10 MMOL/L (ref 7–16)
ANION GAP SERPL CALCULATED.3IONS-SCNC: 11 MMOL/L (ref 7–16)
ANION GAP SERPL CALCULATED.3IONS-SCNC: 11.1 MMOL/L (ref 6–17)
ANION GAP SERPL CALCULATED.3IONS-SCNC: 11.6 MMOL/L (ref 6–17)
ANION GAP SERPL CALCULATED.3IONS-SCNC: 13 MMOL/L (ref 7–16)
ANION GAP SERPL CALCULATED.3IONS-SCNC: 13 MMOL/L (ref 7–16)
ANION GAP SERPL CALCULATED.3IONS-SCNC: 5 MMOL/L (ref 3–14)
ANION GAP SERPL CALCULATED.3IONS-SCNC: 6 MMOL/L (ref 3–14)
ANION GAP SERPL CALCULATED.3IONS-SCNC: 6 MMOL/L (ref 3–14)
ANION GAP SERPL CALCULATED.3IONS-SCNC: 7 MMOL/L (ref 3–14)
ANION GAP SERPL CALCULATED.3IONS-SCNC: 8 MMOL/L (ref 3–14)
ANION GAP SERPL CALCULATED.3IONS-SCNC: 8 MMOL/L (ref 3–14)
ANION GAP SERPL CALCULATED.3IONS-SCNC: 8 MMOL/L (ref 7–16)
ANION GAP SERPL CALCULATED.3IONS-SCNC: 9 MMOL/L (ref 3–14)
ANION GAP SERPL CALCULATED.3IONS-SCNC: 9 MMOL/L (ref 7–16)
APPEARANCE UR: CLEAR
APPEARANCE UR: CLEAR
AST SERPL W P-5'-P-CCNC: 13 U/L (ref 0–45)
AST SERPL W P-5'-P-CCNC: 15 U/L (ref 0–45)
AST SERPL-CCNC: 15 U/L (ref 10–40)
AST SERPL-CCNC: 15 U/L (ref 10–40)
BACTERIA SPEC CULT: ABNORMAL
BACTERIA SPEC CULT: ABNORMAL
BACTERIA SPEC CULT: NO GROWTH
BACTERIA SPEC CULT: NO GROWTH
BACTERIA SPEC CULT: NORMAL
BASE DEFICIT BLDV-SCNC: 1 MMOL/L
BASOPHILS # BLD AUTO: 0 10E9/L (ref 0–0.2)
BASOPHILS # BLD AUTO: 0.1 10E9/L (ref 0–0.2)
BASOPHILS NFR BLD AUTO: 0.1 %
BASOPHILS NFR BLD AUTO: 0.4 %
BASOPHILS NFR BLD AUTO: 1 %
BASOPHILS NFR BLD AUTO: 1.6 %
BILIRUB SERPL-MCNC: 0.5 MG/DL (ref 0.2–1.2)
BILIRUB SERPL-MCNC: 0.5 MG/DL (ref 0.2–1.3)
BILIRUB SERPL-MCNC: 0.5 MG/DL (ref 0.2–1.3)
BILIRUB UR QL STRIP: NEGATIVE
BILIRUB UR QL STRIP: NEGATIVE
BLD GP AB SCN SERPL QL: NORMAL
BLOOD BANK CMNT PATIENT-IMP: NORMAL
BUN SERPL-MCNC: 14 MG/DL (ref 7–30)
BUN SERPL-MCNC: 20 MG/DL (ref 7–30)
BUN SERPL-MCNC: 21 MG/DL (ref 7–26)
BUN SERPL-MCNC: 22 MG/DL (ref 7–30)
BUN SERPL-MCNC: 24 MG/DL (ref 7–30)
BUN SERPL-MCNC: 25 MG/DL (ref 7–26)
BUN SERPL-MCNC: 25 MG/DL (ref 7–30)
BUN SERPL-MCNC: 25 MG/DL (ref 9–26)
BUN SERPL-MCNC: 25 MG/DL (ref 9–28)
BUN SERPL-MCNC: 26 MG/DL (ref 7–26)
BUN SERPL-MCNC: 26 MG/DL (ref 7–26)
BUN SERPL-MCNC: 27 MG/DL (ref 7–30)
BUN SERPL-MCNC: 28 MG/DL (ref 9–26)
BUN SERPL-MCNC: 32 MG/DL (ref 9–26)
BUN SERPL-MCNC: 33 MG/DL (ref 7–30)
BUN SERPL-MCNC: 35 MG/DL (ref 7–26)
BUN SERPL-MCNC: 35 MG/DL (ref 7–26)
BUN SERPL-MCNC: 35 MG/DL (ref 7–30)
BUN SERPL-MCNC: 35 MG/DL (ref 7–30)
BUN SERPL-MCNC: 40 MG/DL (ref 7–30)
BUN SERPL-MCNC: 40 MG/DL (ref 7–30)
BUN SERPL-MCNC: 42 MG/DL (ref 7–30)
BUN SERPL-MCNC: 46 MG/DL (ref 7–30)
CALCIUM SERPL-MCNC: 7.5 MG/DL (ref 8.5–10.1)
CALCIUM SERPL-MCNC: 7.8 MG/DL (ref 8.4–10.4)
CALCIUM SERPL-MCNC: 7.8 MG/DL (ref 8.5–10.1)
CALCIUM SERPL-MCNC: 8.2 MG/DL (ref 8.4–10.4)
CALCIUM SERPL-MCNC: 8.2 MG/DL (ref 8.5–10.1)
CALCIUM SERPL-MCNC: 8.3 MG/DL (ref 8.5–10.1)
CALCIUM SERPL-MCNC: 8.3 MG/DL (ref 8.5–10.1)
CALCIUM SERPL-MCNC: 8.4 MG/DL (ref 8.4–10.4)
CALCIUM SERPL-MCNC: 8.4 MG/DL (ref 8.5–10.1)
CALCIUM SERPL-MCNC: 8.4 MG/DL (ref 8.5–10.1)
CALCIUM SERPL-MCNC: 8.5 MG/DL (ref 8.5–10.1)
CALCIUM SERPL-MCNC: 8.5 MG/DL (ref 8.5–10.5)
CALCIUM SERPL-MCNC: 8.7 MG/DL (ref 8.4–10.4)
CALCIUM SERPL-MCNC: 8.7 MG/DL (ref 8.5–10.1)
CALCIUM SERPL-MCNC: 8.7 MG/DL (ref 8.5–10.1)
CALCIUM SERPL-MCNC: 8.8 MG/DL (ref 8.5–10.1)
CALCIUM SERPL-MCNC: 8.8 MG/DL (ref 8.5–10.1)
CALCIUM SERPL-MCNC: 9 MG/DL (ref 8.4–10.4)
CALCIUM SERPL-MCNC: 9 MG/DL (ref 8.4–10.4)
CALCIUM SERPL-MCNC: 9.1 MG/DL (ref 8.5–10.1)
CALCIUM SERPL-MCNC: 9.1 MG/DL (ref 8.5–10.1)
CALCIUM SERPL-MCNC: 9.2 MG/DL (ref 8.5–10.5)
CHLORIDE SERPL-SCNC: 100 MMOL/L (ref 94–109)
CHLORIDE SERPL-SCNC: 100 MMOL/L (ref 94–109)
CHLORIDE SERPL-SCNC: 100 MMOL/L (ref 98–107)
CHLORIDE SERPL-SCNC: 101 MMOL/L (ref 94–109)
CHLORIDE SERPL-SCNC: 96 MMOL/L (ref 94–109)
CHLORIDE SERPL-SCNC: 96 MMOL/L (ref 94–109)
CHLORIDE SERPL-SCNC: 97 MMOL/L (ref 94–109)
CHLORIDE SERPL-SCNC: 97 MMOL/L (ref 94–109)
CHLORIDE SERPL-SCNC: 98 MMOL/L (ref 94–109)
CHLORIDE SERPL-SCNC: 99 MMOL/L (ref 94–109)
CHLORIDE SERPL-SCNC: 99 MMOL/L (ref 94–109)
CHLORIDE SERPL-SCNC: 99 MMOL/L (ref 98–107)
CHLORIDE SERPLBLD-SCNC: 101 MMOL/L (ref 98–109)
CHLORIDE SERPLBLD-SCNC: 102 MMOL/L (ref 98–109)
CHLORIDE SERPLBLD-SCNC: 102 MMOL/L (ref 98–109)
CHLORIDE SERPLBLD-SCNC: 97 MMOL/L (ref 98–109)
CHLORIDE SERPLBLD-SCNC: 98 MMOL/L (ref 98–109)
CHLORIDE SERPLBLD-SCNC: 99 MMOL/L (ref 98–109)
CO2 BLDCOV-SCNC: 26 MMOL/L (ref 21–28)
CO2 SERPL-SCNC: 21 MMOL/L (ref 20–29)
CO2 SERPL-SCNC: 21 MMOL/L (ref 20–29)
CO2 SERPL-SCNC: 21 MMOL/L (ref 20–32)
CO2 SERPL-SCNC: 22 MMOL/L (ref 22–31)
CO2 SERPL-SCNC: 24 MMOL/L (ref 20–29)
CO2 SERPL-SCNC: 24 MMOL/L (ref 20–29)
CO2 SERPL-SCNC: 24 MMOL/L (ref 22–31)
CO2 SERPL-SCNC: 25 MMOL/L (ref 20–32)
CO2 SERPL-SCNC: 25 MMOL/L (ref 20–32)
CO2 SERPL-SCNC: 25 MMOL/L (ref 23–29)
CO2 SERPL-SCNC: 26 MMOL/L (ref 20–29)
CO2 SERPL-SCNC: 26 MMOL/L (ref 20–32)
CO2 SERPL-SCNC: 26 MMOL/L (ref 20–99)
CO2 SERPL-SCNC: 26 MMOL/L (ref 23–29)
CO2 SERPL-SCNC: 27 MMOL/L (ref 20–32)
CO2 SERPL-SCNC: 28 MMOL/L (ref 20–32)
CO2 SERPL-SCNC: 29 MMOL/L (ref 20–32)
CO2 SERPL-SCNC: 29 MMOL/L (ref 20–32)
CO2 SERPL-SCNC: 30 MMOL/L (ref 20–32)
CO2 SERPL-SCNC: 31 MMOL/L (ref 20–32)
COLOR UR AUTO: YELLOW
COLOR UR AUTO: YELLOW
CREAT SERPL-MCNC: 0.81 MG/DL (ref 0.52–1.04)
CREAT SERPL-MCNC: 0.86 MG/DL (ref 0.52–1.04)
CREAT SERPL-MCNC: 0.89 MG/DL (ref 0.52–1.04)
CREAT SERPL-MCNC: 0.94 MG/DL (ref 0.52–1.04)
CREAT SERPL-MCNC: 1 MG/DL (ref 0.52–1.04)
CREAT SERPL-MCNC: 1.08 MG/DL (ref 0.52–1.04)
CREAT SERPL-MCNC: 1.16 MG/DL (ref 0.52–1.04)
CREAT SERPL-MCNC: 1.24 MG/DL (ref 0.52–1.04)
CREAT SERPL-MCNC: 1.25 MG/DL (ref 0.52–1.04)
CREAT SERPL-MCNC: 1.29 MG/DL (ref 0.52–1.04)
CREAT SERPL-MCNC: 1.32 MG/DL (ref 0.55–1.02)
CREAT SERPL-MCNC: 1.33 MG/DL (ref 0.55–1.02)
CREAT SERPL-MCNC: 1.34 MG/DL (ref 0.55–1.02)
CREAT SERPL-MCNC: 1.35 MG/DL (ref 0.52–1.04)
CREAT SERPL-MCNC: 1.38 MG/DL (ref 0.52–1.04)
CREAT SERPL-MCNC: 1.41 MG/DL (ref 0.52–1.04)
CREAT SERPL-MCNC: 1.43 MG/DL (ref 0.55–1.02)
CREAT SERPL-MCNC: 1.44 MG/DL (ref 0.55–1.02)
CREAT SERPL-MCNC: 1.44 MG/DL (ref 0.55–1.02)
CREAT SERPL-MCNC: 1.45 MG/DL (ref 0.55–1.02)
CREAT SERPL-MCNC: 1.47 MG/DL (ref 0.52–1.04)
CREAT SERPL-MCNC: 1.57 MG/DL (ref 0.52–1.04)
CREAT SERPL-MCNC: 1.59 MG/DL (ref 0.55–1.02)
CREAT SERPL-MCNC: 1.59 MG/DL (ref 0.55–1.02)
CREAT SERPL-MCNC: 1.62 MG/DL (ref 0.52–1.04)
CREAT SERPL-MCNC: 1.79 MG/DL (ref 0.7–1.3)
CREAT SERPL-MCNC: 1.89 MG/DL (ref 0.55–1.02)
CREAT SERPL-MCNC: 1.97 MG/DL (ref 0.7–1.3)
CRP SERPL-MCNC: 56.3 MG/L (ref 0–8)
D DIMER PPP FEU-MCNC: 3 UG/ML FEU (ref 0–0.5)
DIFFERENTIAL METHOD BLD: ABNORMAL
DIFFERENTIAL: ABNORMAL
EOSINOPHIL # BLD AUTO: 0 10E9/L (ref 0–0.7)
EOSINOPHIL # BLD AUTO: 0.1 10E9/L (ref 0–0.7)
EOSINOPHIL # BLD AUTO: 0.1 10E9/L (ref 0–0.7)
EOSINOPHIL # BLD AUTO: 0.6 10E9/L (ref 0–0.7)
EOSINOPHIL # BLD AUTO: 0.7 10E9/L (ref 0–0.7)
EOSINOPHIL NFR BLD AUTO: 0 %
EOSINOPHIL NFR BLD AUTO: 1 %
EOSINOPHIL NFR BLD AUTO: 1.6 %
EOSINOPHIL NFR BLD AUTO: 3 %
EOSINOPHIL NFR BLD AUTO: 4.2 %
ERYTHROCYTE [DISTWIDTH] IN BLOOD BY AUTOMATED COUNT: 12.8 % (ref 11–15)
ERYTHROCYTE [DISTWIDTH] IN BLOOD BY AUTOMATED COUNT: 13.3 % (ref 10–15)
ERYTHROCYTE [DISTWIDTH] IN BLOOD BY AUTOMATED COUNT: 13.4 % (ref 10–15)
ERYTHROCYTE [DISTWIDTH] IN BLOOD BY AUTOMATED COUNT: 14.1 % (ref 10–15)
ERYTHROCYTE [DISTWIDTH] IN BLOOD BY AUTOMATED COUNT: 15.1 % (ref 10–15)
ERYTHROCYTE [DISTWIDTH] IN BLOOD BY AUTOMATED COUNT: 15.4 % (ref 10–15)
ERYTHROCYTE [DISTWIDTH] IN BLOOD BY AUTOMATED COUNT: 19.2 % (ref 10–15)
ERYTHROCYTE [DISTWIDTH] IN BLOOD BY AUTOMATED COUNT: 19.4 % (ref 10–15)
FERRITIN SERPL-MCNC: 164 NG/ML (ref 8–252)
FERRITIN SERPL-MCNC: 52 NG/ML (ref 8–252)
FLUAV+FLUBV AG SPEC QL: NEGATIVE
FLUAV+FLUBV AG SPEC QL: POSITIVE
GFR SERPL CREATININE-BSD FRML MDRD: 24 ML/MIN/{1.73_M2}
GFR SERPL CREATININE-BSD FRML MDRD: 27 ML/MIN/{1.73_M2}
GFR SERPL CREATININE-BSD FRML MDRD: 28 ML/MIN/{1.73_M2}
GFR SERPL CREATININE-BSD FRML MDRD: 29 ML/MIN/1.73M2
GFR SERPL CREATININE-BSD FRML MDRD: 29 ML/MIN/{1.73_M2}
GFR SERPL CREATININE-BSD FRML MDRD: 32 ML/MIN/1.73M2
GFR SERPL CREATININE-BSD FRML MDRD: 32 ML/MIN/1.73M2
GFR SERPL CREATININE-BSD FRML MDRD: 32 ML/MIN/1.73M2 (ref 59–?)
GFR SERPL CREATININE-BSD FRML MDRD: 32 ML/MIN/{1.73_M2}
GFR SERPL CREATININE-BSD FRML MDRD: 33 ML/MIN/1.73M2
GFR SERPL CREATININE-BSD FRML MDRD: 33 ML/MIN/{1.73_M2}
GFR SERPL CREATININE-BSD FRML MDRD: 34 ML/MIN/{1.73_M2}
GFR SERPL CREATININE-BSD FRML MDRD: 35 ML/MIN/1.73M2
GFR SERPL CREATININE-BSD FRML MDRD: 35 ML/MIN/{1.73_M2}
GFR SERPL CREATININE-BSD FRML MDRD: 36 ML/MIN/1.73M2
GFR SERPL CREATININE-BSD FRML MDRD: 36 ML/MIN/1.73M2
GFR SERPL CREATININE-BSD FRML MDRD: 37 ML/MIN/{1.73_M2}
GFR SERPL CREATININE-BSD FRML MDRD: 38 ML/MIN/{1.73_M2}
GFR SERPL CREATININE-BSD FRML MDRD: 39 ML/MIN/{1.73_M2}
GFR SERPL CREATININE-BSD FRML MDRD: 42 ML/MIN/{1.73_M2}
GFR SERPL CREATININE-BSD FRML MDRD: 46 ML/MIN/{1.73_M2}
GFR SERPL CREATININE-BSD FRML MDRD: 50 ML/MIN/{1.73_M2}
GFR SERPL CREATININE-BSD FRML MDRD: 54 ML/MIN/{1.73_M2}
GFR SERPL CREATININE-BSD FRML MDRD: 58 ML/MIN/{1.73_M2}
GFR SERPL CREATININE-BSD FRML MDRD: 60 ML/MIN/{1.73_M2}
GFR SERPL CREATININE-BSD FRML MDRD: 65 ML/MIN/{1.73_M2}
GLUCOSE BLDC GLUCOMTR-MCNC: 131 MG/DL (ref 70–99)
GLUCOSE SERPL-MCNC: 101 MG/DL (ref 70–99)
GLUCOSE SERPL-MCNC: 101 MG/DL (ref 70–99)
GLUCOSE SERPL-MCNC: 103 MG/DL (ref 70–100)
GLUCOSE SERPL-MCNC: 103 MG/DL (ref 70–100)
GLUCOSE SERPL-MCNC: 105 MG/DL (ref 70–100)
GLUCOSE SERPL-MCNC: 105 MG/DL (ref 70–99)
GLUCOSE SERPL-MCNC: 106 MG/DL (ref 70–99)
GLUCOSE SERPL-MCNC: 116 MG/DL (ref 70–105)
GLUCOSE SERPL-MCNC: 119 MG/DL (ref 70–99)
GLUCOSE SERPL-MCNC: 119 MG/DL (ref 70–99)
GLUCOSE SERPL-MCNC: 120 MG/DL (ref 70–100)
GLUCOSE SERPL-MCNC: 123 MG/DL (ref 70–105)
GLUCOSE SERPL-MCNC: 129 MG/DL (ref 70–100)
GLUCOSE SERPL-MCNC: 132 MG/DL (ref 70–100)
GLUCOSE SERPL-MCNC: 133 MG/DL (ref 70–99)
GLUCOSE SERPL-MCNC: 166 MG/DL (ref 70–99)
GLUCOSE SERPL-MCNC: 170 MG/DL (ref 70–99)
GLUCOSE SERPL-MCNC: 174 MG/DL (ref 70–99)
GLUCOSE SERPL-MCNC: 206 MG/DL (ref 70–99)
GLUCOSE SERPL-MCNC: 75 MG/DL (ref 70–100)
GLUCOSE SERPL-MCNC: 84 MG/DL (ref 70–100)
GLUCOSE SERPL-MCNC: 86 MG/DL (ref 70–100)
GLUCOSE SERPL-MCNC: 88 MG/DL (ref 70–99)
GLUCOSE SERPL-MCNC: 90 MG/DL (ref 70–99)
GLUCOSE SERPL-MCNC: 91 MG/DL (ref 70–99)
GLUCOSE SERPL-MCNC: 94 MG/DL (ref 70–99)
GLUCOSE UR STRIP-MCNC: NEGATIVE MG/DL
GLUCOSE UR STRIP-MCNC: NEGATIVE MG/DL
HCO3 BLDV-SCNC: 25 MMOL/L (ref 21–28)
HCT VFR BLD AUTO: 22.3 % (ref 35–47)
HCT VFR BLD AUTO: 24.7 % (ref 35–47)
HCT VFR BLD AUTO: 26.1 % (ref 35–47)
HCT VFR BLD AUTO: 28.4 % (ref 35–47)
HCT VFR BLD AUTO: 29.7 % (ref 35–47)
HCT VFR BLD AUTO: 30.9 % (ref 35–46)
HCT VFR BLD AUTO: 34.3 % (ref 35–47)
HCT VFR BLD AUTO: 38.1 % (ref 35–47)
HEMOGLOBIN: 10.3 G/DL (ref 11.8–15.5)
HEMOGLOBIN: 8.1 G/DL (ref 11.8–15.5)
HEMOGLOBIN: 8.1 G/DL (ref 11.8–15.5)
HEMOGLOBIN: 8.2 G/DL (ref 11.8–15.5)
HEMOGLOBIN: 8.3 G/DL (ref 12–15.5)
HEMOGLOBIN: 8.6 G/DL (ref 12–15.5)
HEMOGLOBIN: 8.6 G/DL (ref 12–15.5)
HGB BLD-MCNC: 11.8 G/DL (ref 11.7–15.7)
HGB BLD-MCNC: 13.1 G/DL (ref 11.7–15.7)
HGB BLD-MCNC: 13.3 G/DL (ref 11.7–15.7)
HGB BLD-MCNC: 7.1 G/DL (ref 11.7–15.7)
HGB BLD-MCNC: 7.5 G/DL (ref 11.7–15.7)
HGB BLD-MCNC: 7.5 G/DL (ref 11.7–15.7)
HGB BLD-MCNC: 8 G/DL (ref 11.7–15.7)
HGB BLD-MCNC: 8.2 G/DL (ref 11.7–15.7)
HGB BLD-MCNC: 8.3 G/DL (ref 11.7–15.7)
HGB BLD-MCNC: 8.4 G/DL (ref 11.7–15.7)
HGB BLD-MCNC: 8.6 G/DL (ref 11.7–15.7)
HGB BLD-MCNC: 8.9 G/DL (ref 11.7–15.7)
HGB UR QL STRIP: ABNORMAL
HGB UR QL STRIP: NEGATIVE
IMM GRANULOCYTES # BLD: 0 10E9/L (ref 0–0.4)
IMM GRANULOCYTES # BLD: 0 10E9/L (ref 0–0.4)
IMM GRANULOCYTES # BLD: 0.1 10E9/L (ref 0–0.4)
IMM GRANULOCYTES # BLD: 0.1 10E9/L (ref 0–0.4)
IMM GRANULOCYTES NFR BLD: 0.1 %
IMM GRANULOCYTES NFR BLD: 0.2 %
IMM GRANULOCYTES NFR BLD: 0.6 %
IMM GRANULOCYTES NFR BLD: 1 %
INTERPRETATION ECG - MUSE: NORMAL
INTERPRETATION ECG - MUSE: NORMAL
IRON SATN MFR SERPL: 18 % (ref 15–46)
IRON SATN MFR SERPL: 4 % (ref 15–46)
IRON SERPL-MCNC: 12 UG/DL (ref 35–180)
IRON SERPL-MCNC: 48 UG/DL (ref 35–180)
KETONES UR STRIP-MCNC: NEGATIVE MG/DL
KETONES UR STRIP-MCNC: NEGATIVE MG/DL
LACTATE BLD-SCNC: 0.8 MMOL/L (ref 0.7–2)
LACTATE BLD-SCNC: 0.9 MMOL/L (ref 0.7–2)
LACTATE BLD-SCNC: 1.2 MMOL/L (ref 0.7–2)
LACTATE BLD-SCNC: 1.4 MMOL/L (ref 0.7–2)
LACTATE BLD-SCNC: 1.4 MMOL/L (ref 0.7–2.1)
LACTATE BLD-SCNC: 2.1 MMOL/L (ref 0.7–2)
LEUKOCYTE ESTERASE UR QL STRIP: ABNORMAL
LEUKOCYTE ESTERASE UR QL STRIP: ABNORMAL
LYMPHOCYTES # BLD AUTO: 0.5 10E9/L (ref 0.8–5.3)
LYMPHOCYTES # BLD AUTO: 0.6 10E9/L (ref 0.8–5.3)
LYMPHOCYTES # BLD AUTO: 0.9 10E9/L (ref 0.8–5.3)
LYMPHOCYTES # BLD AUTO: 1.1 10E9/L (ref 0.8–5.3)
LYMPHOCYTES # BLD AUTO: 2 10E9/L (ref 0.8–5.3)
LYMPHOCYTES NFR BLD AUTO: 10.5 %
LYMPHOCYTES NFR BLD AUTO: 7.2 %
LYMPHOCYTES NFR BLD AUTO: 7.8 %
LYMPHOCYTES NFR BLD AUTO: 8.6 %
LYMPHOCYTES NFR BLD AUTO: 9 %
Lab: ABNORMAL
Lab: NORMAL
MAGNESIUM SERPL-MCNC: 2.2 MG/DL (ref 1.6–2.3)
MAGNESIUM SERPL-MCNC: 2.3 MG/DL (ref 1.6–2.3)
MAGNESIUM SERPL-MCNC: 2.4 MG/DL (ref 1.6–2.3)
MCH RBC QN AUTO: 24.4 PG (ref 26.5–33)
MCH RBC QN AUTO: 24.9 PG (ref 26.5–33)
MCH RBC QN AUTO: 30.4 PG (ref 26.5–33)
MCH RBC QN AUTO: 30.5 PG (ref 26.5–33)
MCH RBC QN AUTO: 30.6 PG (ref 26.5–33)
MCH RBC QN AUTO: 30.9 PG (ref 26.5–33)
MCH RBC QN AUTO: 31.1 PG (ref 26.5–33)
MCHC RBC AUTO-ENTMCNC: 29.6 G/DL (ref 31.5–36.5)
MCHC RBC AUTO-ENTMCNC: 30 G/DL (ref 31.5–36.5)
MCHC RBC AUTO-ENTMCNC: 32.2 G/DL (ref 31.5–36.5)
MCHC RBC AUTO-ENTMCNC: 33.2 G/DL (ref 31.5–36.5)
MCHC RBC AUTO-ENTMCNC: 33.6 G/DL (ref 31.5–36.5)
MCHC RBC AUTO-ENTMCNC: 34.4 G/DL (ref 31.5–36.5)
MCHC RBC AUTO-ENTMCNC: 34.4 G/DL (ref 31.5–36.5)
MCV RBC AUTO: 81 FL (ref 78–100)
MCV RBC AUTO: 84 FL (ref 78–100)
MCV RBC AUTO: 89 FL (ref 78–100)
MCV RBC AUTO: 91 FL (ref 78–100)
MCV RBC AUTO: 92 FL (ref 78–100)
MCV RBC AUTO: 92 FL (ref 78–100)
MCV RBC AUTO: 92.8 FL (ref 80–100)
MCV RBC AUTO: 95 FL (ref 78–100)
MONOCYTES # BLD AUTO: 0 10E9/L (ref 0–1.3)
MONOCYTES # BLD AUTO: 0.4 10E9/L (ref 0–1.3)
MONOCYTES # BLD AUTO: 0.4 10E9/L (ref 0–1.3)
MONOCYTES # BLD AUTO: 0.6 10E9/L (ref 0–1.3)
MONOCYTES # BLD AUTO: 0.7 10E9/L (ref 0–1.3)
MONOCYTES NFR BLD AUTO: 0.3 %
MONOCYTES NFR BLD AUTO: 3 %
MONOCYTES NFR BLD AUTO: 3.3 %
MONOCYTES NFR BLD AUTO: 6.8 %
MONOCYTES NFR BLD AUTO: 7.5 %
MUCOUS THREADS #/AREA URNS LPF: PRESENT /LPF
NEUTROPHILS # BLD AUTO: 10.9 10E9/L (ref 1.6–8.3)
NEUTROPHILS # BLD AUTO: 11.5 10E9/L (ref 1.6–8.3)
NEUTROPHILS # BLD AUTO: 18.5 10E9/L (ref 1.6–8.3)
NEUTROPHILS # BLD AUTO: 4.1 10E9/L (ref 1.6–8.3)
NEUTROPHILS # BLD AUTO: 6.4 10E9/L (ref 1.6–8.3)
NEUTROPHILS NFR BLD AUTO: 79.3 %
NEUTROPHILS NFR BLD AUTO: 82.5 %
NEUTROPHILS NFR BLD AUTO: 82.6 %
NEUTROPHILS NFR BLD AUTO: 85 %
NEUTROPHILS NFR BLD AUTO: 91.8 %
NITRATE UR QL: NEGATIVE
NITRATE UR QL: NEGATIVE
NRBC # BLD AUTO: 0 10*3/UL
NRBC BLD AUTO-RTO: 0 /100
NT-PROBNP SERPL-MCNC: ABNORMAL PG/ML (ref 0–1800)
NT-PROBNP SERPL-MCNC: ABNORMAL PG/ML (ref 0–1800)
NT-PROBNP SERPL-MCNC: ABNORMAL PG/ML (ref 0–450)
OXYHGB MFR BLDV: 29 %
PCO2 BLDV: 39 MM HG (ref 40–50)
PCO2 BLDV: 46 MM HG (ref 40–50)
PH BLDV: 7.34 PH (ref 7.32–7.43)
PH BLDV: 7.44 PH (ref 7.32–7.43)
PH UR STRIP: 6.5 PH (ref 5–7)
PH UR STRIP: 6.5 PH (ref 5–7)
PHOSPHATE SERPL-MCNC: 4 MG/DL (ref 2.5–4.5)
PLATELET # BLD AUTO: 220 10E9/L (ref 150–450)
PLATELET # BLD AUTO: 249 10E9/L (ref 150–450)
PLATELET # BLD AUTO: 275 K/CMM (ref 140–450)
PLATELET # BLD AUTO: 284 10E9/L (ref 150–450)
PLATELET # BLD AUTO: 302 10E9/L (ref 150–450)
PLATELET # BLD AUTO: 393 10E9/L (ref 150–450)
PLATELET # BLD AUTO: 411 10E9/L (ref 150–450)
PLATELET # BLD AUTO: 434 10E9/L (ref 150–450)
PO2 BLDV: 19 MM HG (ref 25–47)
PO2 BLDV: 20 MM HG (ref 25–47)
POTASSIUM SERPL-SCNC: 2.6 MMOL/L (ref 3.4–5.3)
POTASSIUM SERPL-SCNC: 2.6 MMOL/L (ref 3.5–5.1)
POTASSIUM SERPL-SCNC: 2.8 MMOL/L (ref 3.4–5.3)
POTASSIUM SERPL-SCNC: 2.9 MMOL/L (ref 3.4–5.3)
POTASSIUM SERPL-SCNC: 3.1 MMOL/L (ref 3.5–5.1)
POTASSIUM SERPL-SCNC: 3.2 MMOL/L (ref 3.4–5.3)
POTASSIUM SERPL-SCNC: 3.2 MMOL/L (ref 3.5–5.1)
POTASSIUM SERPL-SCNC: 3.2 MMOL/L (ref 3.5–5.1)
POTASSIUM SERPL-SCNC: 3.3 MMOL/L (ref 3.4–5.3)
POTASSIUM SERPL-SCNC: 3.3 MMOL/L (ref 3.4–5.3)
POTASSIUM SERPL-SCNC: 3.4 MMOL/L (ref 3.4–5.3)
POTASSIUM SERPL-SCNC: 3.5 MMOL/L (ref 3.4–5.3)
POTASSIUM SERPL-SCNC: 3.5 MMOL/L (ref 3.4–5.3)
POTASSIUM SERPL-SCNC: 3.6 MMOL/L (ref 3.4–5.3)
POTASSIUM SERPL-SCNC: 3.6 MMOL/L (ref 3.4–5.3)
POTASSIUM SERPL-SCNC: 3.6 MMOL/L (ref 3.5–5.1)
POTASSIUM SERPL-SCNC: 3.7 MMOL/L (ref 3.4–5.3)
POTASSIUM SERPL-SCNC: 3.7 MMOL/L (ref 3.4–5.3)
POTASSIUM SERPL-SCNC: 3.7 MMOL/L (ref 3.5–5.2)
POTASSIUM SERPL-SCNC: 3.7 MMOL/L (ref 3.5–5.2)
POTASSIUM SERPL-SCNC: 3.8 MMOL/L (ref 3.5–5.2)
POTASSIUM SERPL-SCNC: 4 MMOL/L (ref 3.4–5.3)
POTASSIUM SERPL-SCNC: 4 MMOL/L (ref 3.4–5.3)
POTASSIUM SERPL-SCNC: 4.1 MMOL/L (ref 3.4–5.3)
POTASSIUM SERPL-SCNC: 4.1 MMOL/L (ref 3.5–5.1)
POTASSIUM SERPL-SCNC: 4.3 MMOL/L (ref 3.4–5.3)
POTASSIUM SERPL-SCNC: 4.6 MMOL/L (ref 3.5–5.1)
POTASSIUM SERPL-SCNC: 4.6 MMOL/L (ref 3.5–5.1)
POTASSIUM SERPL-SCNC: 4.7 MMOL/L (ref 3.4–5.3)
POTASSIUM SERPL-SCNC: 4.7 MMOL/L (ref 3.5–5.2)
POTASSIUM SERPL-SCNC: 5.1 MMOL/L (ref 3.4–5.3)
PROCALCITONIN SERPL-MCNC: 0.06 NG/ML
PROCALCITONIN SERPL-MCNC: 0.09 NG/ML
PROT SERPL-MCNC: 6.1 G/DL (ref 6.4–8.3)
PROT SERPL-MCNC: 6.3 G/DL (ref 6.8–8.8)
PROT SERPL-MCNC: 6.4 G/DL (ref 6.8–8.8)
PROT SERPL-MCNC: 6.7 G/DL (ref 6.4–8.3)
RBC # BLD AUTO: 2.43 10E12/L (ref 3.8–5.2)
RBC # BLD AUTO: 2.7 10E12/L (ref 3.8–5.2)
RBC # BLD AUTO: 2.75 10E12/L (ref 3.8–5.2)
RBC # BLD AUTO: 3.33 M/CMM (ref 3.7–5.2)
RBC # BLD AUTO: 3.38 10E12/L (ref 3.8–5.2)
RBC # BLD AUTO: 3.65 10E12/L (ref 3.8–5.2)
RBC # BLD AUTO: 3.85 10E12/L (ref 3.8–5.2)
RBC # BLD AUTO: 4.21 10E12/L (ref 3.8–5.2)
RBC #/AREA URNS AUTO: 0 /HPF (ref 0–2)
RBC #/AREA URNS AUTO: 2 /HPF (ref 0–2)
SAO2 % BLDV FROM PO2: 35 %
SODIUM SERPL-SCNC: 128 MMOL/L (ref 133–144)
SODIUM SERPL-SCNC: 129 MMOL/L (ref 133–144)
SODIUM SERPL-SCNC: 130 MMOL/L (ref 133–144)
SODIUM SERPL-SCNC: 130 MMOL/L (ref 133–144)
SODIUM SERPL-SCNC: 130 MMOL/L (ref 136–145)
SODIUM SERPL-SCNC: 131 MMOL/L (ref 133–144)
SODIUM SERPL-SCNC: 131 MMOL/L (ref 136–145)
SODIUM SERPL-SCNC: 132 MMOL/L (ref 133–144)
SODIUM SERPL-SCNC: 132 MMOL/L (ref 136–145)
SODIUM SERPL-SCNC: 132 MMOL/L (ref 136–145)
SODIUM SERPL-SCNC: 133 MMOL/L (ref 136–145)
SODIUM SERPL-SCNC: 133 MMOL/L (ref 136–145)
SODIUM SERPL-SCNC: 134 MMOL/L (ref 133–144)
SODIUM SERPL-SCNC: 134 MMOL/L (ref 133–144)
SODIUM SERPL-SCNC: 134 MMOL/L (ref 136–145)
SODIUM SERPL-SCNC: 135 MMOL/L (ref 133–144)
SODIUM SERPL-SCNC: 135 MMOL/L (ref 136–145)
SODIUM SERPL-SCNC: 136 MMOL/L (ref 133–144)
SODIUM SERPL-SCNC: 136 MMOL/L (ref 136–145)
SODIUM SERPL-SCNC: 136 MMOL/L (ref 136–145)
SODIUM SERPL-SCNC: 137 MMOL/L (ref 133–144)
SODIUM SERPL-SCNC: 137 MMOL/L (ref 133–144)
SOURCE: ABNORMAL
SOURCE: ABNORMAL
SP GR UR STRIP: 1.01 (ref 1–1.03)
SP GR UR STRIP: 1.01 (ref 1–1.03)
SPECIMEN EXP DATE BLD: NORMAL
SPECIMEN SOURCE: ABNORMAL
SPECIMEN SOURCE: ABNORMAL
SPECIMEN SOURCE: NORMAL
SQUAMOUS #/AREA URNS AUTO: <1 /HPF (ref 0–1)
SQUAMOUS #/AREA URNS AUTO: <1 /HPF (ref 0–1)
T4 FREE SERPL-MCNC: 1.04 NG/DL (ref 0.76–1.46)
TIBC SERPL-MCNC: 262 UG/DL (ref 240–430)
TIBC SERPL-MCNC: 334 UG/DL (ref 240–430)
TROPONIN I SERPL-MCNC: 0.03 UG/L (ref 0–0.04)
TROPONIN I SERPL-MCNC: 0.04 UG/L (ref 0–0.04)
TROPONIN I SERPL-MCNC: 0.06 UG/L (ref 0–0.04)
TROPONIN I SERPL-MCNC: 0.1 UG/L (ref 0–0.04)
TSH SERPL DL<=0.005 MIU/L-ACNC: 4.52 MU/L (ref 0.4–4)
UPPER GI ENDOSCOPY: NORMAL
UROBILINOGEN UR STRIP-MCNC: NORMAL MG/DL (ref 0–2)
UROBILINOGEN UR STRIP-MCNC: NORMAL MG/DL (ref 0–2)
VIT B12 SERPL-MCNC: 753 PG/ML (ref 193–986)
WBC # BLD AUTO: 12.6 10E9/L (ref 4–11)
WBC # BLD AUTO: 13.2 10E9/L (ref 4–11)
WBC # BLD AUTO: 21.9 10E9/L (ref 4–11)
WBC # BLD AUTO: 5.2 10E9/L (ref 4–11)
WBC # BLD AUTO: 6.6 10E9/L (ref 4–11)
WBC # BLD AUTO: 7.3 10E9/L (ref 4–11)
WBC # BLD AUTO: 7.4 10E9/L (ref 4–11)
WBC # BLD AUTO: 7.7 10E9/L (ref 4–11)
WBC # BLD AUTO: 9 K/CMM (ref 3.8–11)
WBC #/AREA URNS AUTO: 24 /HPF (ref 0–5)
WBC #/AREA URNS AUTO: 26 /HPF (ref 0–5)

## 2019-01-01 PROCEDURE — 25000132 ZZH RX MED GY IP 250 OP 250 PS 637: Performed by: INTERNAL MEDICINE

## 2019-01-01 PROCEDURE — A9270 NON-COVERED ITEM OR SERVICE: HCPCS | Mod: GY | Performed by: STUDENT IN AN ORGANIZED HEALTH CARE EDUCATION/TRAINING PROGRAM

## 2019-01-01 PROCEDURE — 85027 COMPLETE CBC AUTOMATED: CPT | Performed by: INTERNAL MEDICINE

## 2019-01-01 PROCEDURE — 84132 ASSAY OF SERUM POTASSIUM: CPT | Performed by: HOSPITALIST

## 2019-01-01 PROCEDURE — 83735 ASSAY OF MAGNESIUM: CPT | Performed by: HOSPITALIST

## 2019-01-01 PROCEDURE — 99232 SBSQ HOSP IP/OBS MODERATE 35: CPT | Performed by: INTERNAL MEDICINE

## 2019-01-01 PROCEDURE — 84484 ASSAY OF TROPONIN QUANT: CPT | Performed by: EMERGENCY MEDICINE

## 2019-01-01 PROCEDURE — A9270 NON-COVERED ITEM OR SERVICE: HCPCS | Mod: GY | Performed by: INTERNAL MEDICINE

## 2019-01-01 PROCEDURE — 99214 OFFICE O/P EST MOD 30 MIN: CPT | Performed by: INTERNAL MEDICINE

## 2019-01-01 PROCEDURE — 85018 HEMOGLOBIN: CPT | Performed by: INTERNAL MEDICINE

## 2019-01-01 PROCEDURE — A9270 NON-COVERED ITEM OR SERVICE: HCPCS | Performed by: INTERNAL MEDICINE

## 2019-01-01 PROCEDURE — 71046 X-RAY EXAM CHEST 2 VIEWS: CPT

## 2019-01-01 PROCEDURE — 25000132 ZZH RX MED GY IP 250 OP 250 PS 637: Mod: GY | Performed by: PHYSICIAN ASSISTANT

## 2019-01-01 PROCEDURE — 99207 ZZC CDG-MDM COMPONENT: MEETS LOW - DOWN CODED: CPT | Performed by: HOSPITALIST

## 2019-01-01 PROCEDURE — 99222 1ST HOSP IP/OBS MODERATE 55: CPT | Performed by: INTERNAL MEDICINE

## 2019-01-01 PROCEDURE — 25000128 H RX IP 250 OP 636: Performed by: INTERNAL MEDICINE

## 2019-01-01 PROCEDURE — 97161 PT EVAL LOW COMPLEX 20 MIN: CPT | Mod: GP

## 2019-01-01 PROCEDURE — 99207 ZZC CDG-MDM COMPONENT: MEETS MODERATE - UP CODED: CPT | Performed by: INTERNAL MEDICINE

## 2019-01-01 PROCEDURE — 99215 OFFICE O/P EST HI 40 MIN: CPT | Performed by: INTERNAL MEDICINE

## 2019-01-01 PROCEDURE — 80048 BASIC METABOLIC PNL TOTAL CA: CPT | Performed by: EMERGENCY MEDICINE

## 2019-01-01 PROCEDURE — A9270 NON-COVERED ITEM OR SERVICE: HCPCS | Performed by: PHYSICIAN ASSISTANT

## 2019-01-01 PROCEDURE — 99221 1ST HOSP IP/OBS SF/LOW 40: CPT | Performed by: INTERNAL MEDICINE

## 2019-01-01 PROCEDURE — 99225 ZZC SUBSEQUENT OBSERVATION CARE,LEVEL II: CPT | Performed by: PHYSICIAN ASSISTANT

## 2019-01-01 PROCEDURE — 25000132 ZZH RX MED GY IP 250 OP 250 PS 637: Performed by: PHYSICIAN ASSISTANT

## 2019-01-01 PROCEDURE — 83605 ASSAY OF LACTIC ACID: CPT | Performed by: INTERNAL MEDICINE

## 2019-01-01 PROCEDURE — 82805 BLOOD GASES W/O2 SATURATION: CPT | Performed by: INTERNAL MEDICINE

## 2019-01-01 PROCEDURE — 12000000 ZZH R&B MED SURG/OB

## 2019-01-01 PROCEDURE — 25000128 H RX IP 250 OP 636: Performed by: HOSPITALIST

## 2019-01-01 PROCEDURE — 99316 NF DSCHRG MGMT 30 MIN+: CPT | Performed by: NURSE PRACTITIONER

## 2019-01-01 PROCEDURE — 25000132 ZZH RX MED GY IP 250 OP 250 PS 637: Mod: GY | Performed by: STUDENT IN AN ORGANIZED HEALTH CARE EDUCATION/TRAINING PROGRAM

## 2019-01-01 PROCEDURE — 94640 AIRWAY INHALATION TREATMENT: CPT

## 2019-01-01 PROCEDURE — 36415 COLL VENOUS BLD VENIPUNCTURE: CPT | Performed by: NURSE PRACTITIONER

## 2019-01-01 PROCEDURE — 40000104 ZZH STATISTIC MODERATE SEDATION < 10 MIN: Performed by: INTERNAL MEDICINE

## 2019-01-01 PROCEDURE — 83880 ASSAY OF NATRIURETIC PEPTIDE: CPT | Performed by: NURSE PRACTITIONER

## 2019-01-01 PROCEDURE — 94640 AIRWAY INHALATION TREATMENT: CPT | Mod: 76

## 2019-01-01 PROCEDURE — 25000125 ZZHC RX 250: Performed by: EMERGENCY MEDICINE

## 2019-01-01 PROCEDURE — 25000128 H RX IP 250 OP 636: Performed by: PHYSICIAN ASSISTANT

## 2019-01-01 PROCEDURE — 40000193 ZZH STATISTIC PT WARD VISIT

## 2019-01-01 PROCEDURE — 36415 COLL VENOUS BLD VENIPUNCTURE: CPT | Performed by: PHYSICIAN ASSISTANT

## 2019-01-01 PROCEDURE — 36415 COLL VENOUS BLD VENIPUNCTURE: CPT | Performed by: HOSPITALIST

## 2019-01-01 PROCEDURE — 80053 COMPREHEN METABOLIC PANEL: CPT | Performed by: INTERNAL MEDICINE

## 2019-01-01 PROCEDURE — 82565 ASSAY OF CREATININE: CPT | Performed by: INTERNAL MEDICINE

## 2019-01-01 PROCEDURE — 99239 HOSP IP/OBS DSCHRG MGMT >30: CPT | Performed by: INTERNAL MEDICINE

## 2019-01-01 PROCEDURE — 40000275 ZZH STATISTIC RCP TIME EA 10 MIN

## 2019-01-01 PROCEDURE — 25000132 ZZH RX MED GY IP 250 OP 250 PS 637: Mod: GY | Performed by: INTERNAL MEDICINE

## 2019-01-01 PROCEDURE — 84132 ASSAY OF SERUM POTASSIUM: CPT | Performed by: PHYSICIAN ASSISTANT

## 2019-01-01 PROCEDURE — 25000125 ZZHC RX 250: Performed by: INTERNAL MEDICINE

## 2019-01-01 PROCEDURE — 80048 BASIC METABOLIC PNL TOTAL CA: CPT | Performed by: INTERNAL MEDICINE

## 2019-01-01 PROCEDURE — 84443 ASSAY THYROID STIM HORMONE: CPT | Performed by: NURSE PRACTITIONER

## 2019-01-01 PROCEDURE — 00000146 ZZHCL STATISTIC GLUCOSE BY METER IP

## 2019-01-01 PROCEDURE — 83880 ASSAY OF NATRIURETIC PEPTIDE: CPT | Performed by: INTERNAL MEDICINE

## 2019-01-01 PROCEDURE — G0378 HOSPITAL OBSERVATION PER HR: HCPCS

## 2019-01-01 PROCEDURE — 96375 TX/PRO/DX INJ NEW DRUG ADDON: CPT

## 2019-01-01 PROCEDURE — 99233 SBSQ HOSP IP/OBS HIGH 50: CPT | Performed by: INTERNAL MEDICINE

## 2019-01-01 PROCEDURE — 99310 SBSQ NF CARE HIGH MDM 45: CPT | Performed by: NURSE PRACTITIONER

## 2019-01-01 PROCEDURE — 81001 URINALYSIS AUTO W/SCOPE: CPT | Performed by: HOSPITALIST

## 2019-01-01 PROCEDURE — 36415 COLL VENOUS BLD VENIPUNCTURE: CPT | Performed by: INTERNAL MEDICINE

## 2019-01-01 PROCEDURE — 85025 COMPLETE CBC W/AUTO DIFF WBC: CPT | Performed by: PHYSICIAN ASSISTANT

## 2019-01-01 PROCEDURE — 87186 SC STD MICRODIL/AGAR DIL: CPT | Performed by: INTERNAL MEDICINE

## 2019-01-01 PROCEDURE — 83605 ASSAY OF LACTIC ACID: CPT

## 2019-01-01 PROCEDURE — 85025 COMPLETE CBC W/AUTO DIFF WBC: CPT | Performed by: EMERGENCY MEDICINE

## 2019-01-01 PROCEDURE — 71045 X-RAY EXAM CHEST 1 VIEW: CPT

## 2019-01-01 PROCEDURE — 84484 ASSAY OF TROPONIN QUANT: CPT | Performed by: INTERNAL MEDICINE

## 2019-01-01 PROCEDURE — 86140 C-REACTIVE PROTEIN: CPT | Performed by: PHYSICIAN ASSISTANT

## 2019-01-01 PROCEDURE — 99285 EMERGENCY DEPT VISIT HI MDM: CPT | Mod: 25

## 2019-01-01 PROCEDURE — 93005 ELECTROCARDIOGRAM TRACING: CPT | Performed by: INTERNAL MEDICINE

## 2019-01-01 PROCEDURE — 21000001 ZZH R&B HEART CARE

## 2019-01-01 PROCEDURE — 99223 1ST HOSP IP/OBS HIGH 75: CPT | Mod: AI | Performed by: INTERNAL MEDICINE

## 2019-01-01 PROCEDURE — 71250 CT THORAX DX C-: CPT

## 2019-01-01 PROCEDURE — 96374 THER/PROPH/DIAG INJ IV PUSH: CPT

## 2019-01-01 PROCEDURE — 87088 URINE BACTERIA CULTURE: CPT | Performed by: INTERNAL MEDICINE

## 2019-01-01 PROCEDURE — 99214 OFFICE O/P EST MOD 30 MIN: CPT | Performed by: PHYSICIAN ASSISTANT

## 2019-01-01 PROCEDURE — 99207 ZZC APP CREDIT; MD BILLING SHARED VISIT: CPT | Performed by: PHYSICIAN ASSISTANT

## 2019-01-01 PROCEDURE — C9113 INJ PANTOPRAZOLE SODIUM, VIA: HCPCS | Performed by: PHYSICIAN ASSISTANT

## 2019-01-01 PROCEDURE — 40000264 ECHOCARDIOGRAM COMPLETE

## 2019-01-01 PROCEDURE — 93005 ELECTROCARDIOGRAM TRACING: CPT

## 2019-01-01 PROCEDURE — 80048 BASIC METABOLIC PNL TOTAL CA: CPT | Performed by: PHYSICIAN ASSISTANT

## 2019-01-01 PROCEDURE — 25000132 ZZH RX MED GY IP 250 OP 250 PS 637: Mod: GY | Performed by: EMERGENCY MEDICINE

## 2019-01-01 PROCEDURE — 83605 ASSAY OF LACTIC ACID: CPT | Performed by: PHYSICIAN ASSISTANT

## 2019-01-01 PROCEDURE — 0DJ08ZZ INSPECTION OF UPPER INTESTINAL TRACT, VIA NATURAL OR ARTIFICIAL OPENING ENDOSCOPIC: ICD-10-PCS | Performed by: INTERNAL MEDICINE

## 2019-01-01 PROCEDURE — 83735 ASSAY OF MAGNESIUM: CPT | Performed by: PHYSICIAN ASSISTANT

## 2019-01-01 PROCEDURE — 85018 HEMOGLOBIN: CPT | Performed by: NURSE PRACTITIONER

## 2019-01-01 PROCEDURE — 99285 EMERGENCY DEPT VISIT HI MDM: CPT

## 2019-01-01 PROCEDURE — 82565 ASSAY OF CREATININE: CPT | Performed by: PHYSICIAN ASSISTANT

## 2019-01-01 PROCEDURE — 93306 TTE W/DOPPLER COMPLETE: CPT | Mod: 26 | Performed by: INTERNAL MEDICINE

## 2019-01-01 PROCEDURE — 25500064 ZZH RX 255 OP 636: Performed by: INTERNAL MEDICINE

## 2019-01-01 PROCEDURE — 82728 ASSAY OF FERRITIN: CPT | Performed by: INTERNAL MEDICINE

## 2019-01-01 PROCEDURE — A9270 NON-COVERED ITEM OR SERVICE: HCPCS | Mod: GY | Performed by: PHYSICIAN ASSISTANT

## 2019-01-01 PROCEDURE — 83735 ASSAY OF MAGNESIUM: CPT | Performed by: EMERGENCY MEDICINE

## 2019-01-01 PROCEDURE — 87086 URINE CULTURE/COLONY COUNT: CPT | Performed by: INTERNAL MEDICINE

## 2019-01-01 PROCEDURE — G0463 HOSPITAL OUTPT CLINIC VISIT: HCPCS

## 2019-01-01 PROCEDURE — 87804 INFLUENZA ASSAY W/OPTIC: CPT | Performed by: EMERGENCY MEDICINE

## 2019-01-01 PROCEDURE — 99214 OFFICE O/P EST MOD 30 MIN: CPT | Performed by: NURSE PRACTITIONER

## 2019-01-01 PROCEDURE — 99309 SBSQ NF CARE MODERATE MDM 30: CPT | Performed by: NURSE PRACTITIONER

## 2019-01-01 PROCEDURE — 82607 VITAMIN B-12: CPT | Performed by: INTERNAL MEDICINE

## 2019-01-01 PROCEDURE — 99207 ZZC CDG-CUT & PASTE-POTENTIAL IMPACT ON LEVEL: CPT | Performed by: NURSE PRACTITIONER

## 2019-01-01 PROCEDURE — 83880 ASSAY OF NATRIURETIC PEPTIDE: CPT | Performed by: EMERGENCY MEDICINE

## 2019-01-01 PROCEDURE — 84145 PROCALCITONIN (PCT): CPT | Performed by: PHYSICIAN ASSISTANT

## 2019-01-01 PROCEDURE — 99232 SBSQ HOSP IP/OBS MODERATE 35: CPT | Performed by: HOSPITALIST

## 2019-01-01 PROCEDURE — 86850 RBC ANTIBODY SCREEN: CPT | Performed by: PHYSICIAN ASSISTANT

## 2019-01-01 PROCEDURE — 25000132 ZZH RX MED GY IP 250 OP 250 PS 637: Performed by: EMERGENCY MEDICINE

## 2019-01-01 PROCEDURE — 87040 BLOOD CULTURE FOR BACTERIA: CPT | Performed by: HOSPITALIST

## 2019-01-01 PROCEDURE — 86900 BLOOD TYPING SEROLOGIC ABO: CPT | Performed by: PHYSICIAN ASSISTANT

## 2019-01-01 PROCEDURE — 85018 HEMOGLOBIN: CPT | Performed by: HOSPITALIST

## 2019-01-01 PROCEDURE — 25000128 H RX IP 250 OP 636: Performed by: EMERGENCY MEDICINE

## 2019-01-01 PROCEDURE — A9270 NON-COVERED ITEM OR SERVICE: HCPCS | Performed by: EMERGENCY MEDICINE

## 2019-01-01 PROCEDURE — 99239 HOSP IP/OBS DSCHRG MGMT >30: CPT | Performed by: HOSPITALIST

## 2019-01-01 PROCEDURE — 83550 IRON BINDING TEST: CPT | Performed by: INTERNAL MEDICINE

## 2019-01-01 PROCEDURE — 84100 ASSAY OF PHOSPHORUS: CPT | Performed by: PHYSICIAN ASSISTANT

## 2019-01-01 PROCEDURE — 80048 BASIC METABOLIC PNL TOTAL CA: CPT | Performed by: NURSE PRACTITIONER

## 2019-01-01 PROCEDURE — G0180 MD CERTIFICATION HHA PATIENT: HCPCS | Performed by: INTERNAL MEDICINE

## 2019-01-01 PROCEDURE — 99220 ZZC INITIAL OBSERVATION CARE,LEVL III: CPT | Performed by: INTERNAL MEDICINE

## 2019-01-01 PROCEDURE — 85018 HEMOGLOBIN: CPT | Performed by: PHYSICIAN ASSISTANT

## 2019-01-01 PROCEDURE — 99309 SBSQ NF CARE MODERATE MDM 30: CPT | Performed by: INTERNAL MEDICINE

## 2019-01-01 PROCEDURE — 83540 ASSAY OF IRON: CPT | Performed by: INTERNAL MEDICINE

## 2019-01-01 PROCEDURE — 99207 ZZC CDG-MDM COMPONENT: MEETS LOW - DOWN CODED: CPT | Performed by: INTERNAL MEDICINE

## 2019-01-01 PROCEDURE — 80048 BASIC METABOLIC PNL TOTAL CA: CPT | Performed by: HOSPITALIST

## 2019-01-01 PROCEDURE — 85048 AUTOMATED LEUKOCYTE COUNT: CPT | Performed by: PHYSICIAN ASSISTANT

## 2019-01-01 PROCEDURE — 97530 THERAPEUTIC ACTIVITIES: CPT | Mod: GP

## 2019-01-01 PROCEDURE — 84439 ASSAY OF FREE THYROXINE: CPT | Performed by: NURSE PRACTITIONER

## 2019-01-01 PROCEDURE — A9270 NON-COVERED ITEM OR SERVICE: HCPCS | Mod: GY | Performed by: EMERGENCY MEDICINE

## 2019-01-01 PROCEDURE — 84145 PROCALCITONIN (PCT): CPT | Performed by: HOSPITALIST

## 2019-01-01 PROCEDURE — 97110 THERAPEUTIC EXERCISES: CPT | Mod: GP

## 2019-01-01 PROCEDURE — 99239 HOSP IP/OBS DSCHRG MGMT >30: CPT | Performed by: PHYSICIAN ASSISTANT

## 2019-01-01 PROCEDURE — 43235 EGD DIAGNOSTIC BRUSH WASH: CPT | Performed by: INTERNAL MEDICINE

## 2019-01-01 PROCEDURE — 93970 EXTREMITY STUDY: CPT

## 2019-01-01 PROCEDURE — 86901 BLOOD TYPING SEROLOGIC RH(D): CPT | Performed by: PHYSICIAN ASSISTANT

## 2019-01-01 PROCEDURE — 99306 1ST NF CARE HIGH MDM 50: CPT | Performed by: INTERNAL MEDICINE

## 2019-01-01 PROCEDURE — 99215 OFFICE O/P EST HI 40 MIN: CPT | Performed by: NURSE PRACTITIONER

## 2019-01-01 PROCEDURE — 99226 ZZC SUBSEQUENT OBSERVATION CARE,LEVEL III: CPT | Performed by: PHYSICIAN ASSISTANT

## 2019-01-01 PROCEDURE — 85379 FIBRIN DEGRADATION QUANT: CPT | Performed by: HOSPITALIST

## 2019-01-01 PROCEDURE — 82803 BLOOD GASES ANY COMBINATION: CPT

## 2019-01-01 RX ORDER — ALBUTEROL SULFATE 0.83 MG/ML
3 SOLUTION RESPIRATORY (INHALATION)
Status: DISCONTINUED | OUTPATIENT
Start: 2019-01-01 | End: 2019-01-01

## 2019-01-01 RX ORDER — MORPHINE SULFATE 100 MG/5ML
5 SOLUTION ORAL
Status: DISCONTINUED | OUTPATIENT
Start: 2019-01-01 | End: 2019-01-01 | Stop reason: HOSPADM

## 2019-01-01 RX ORDER — ISOSORBIDE DINITRATE 10 MG/1
10 TABLET ORAL 3 TIMES DAILY
Qty: 60 TABLET | Refills: 3 | Status: SHIPPED | OUTPATIENT
Start: 2019-01-01 | End: 2019-01-01

## 2019-01-01 RX ORDER — FUROSEMIDE 10 MG/ML
40 INJECTION INTRAMUSCULAR; INTRAVENOUS EVERY 8 HOURS
Status: DISCONTINUED | OUTPATIENT
Start: 2019-01-01 | End: 2019-01-01

## 2019-01-01 RX ORDER — ONDANSETRON 2 MG/ML
4 INJECTION INTRAMUSCULAR; INTRAVENOUS EVERY 6 HOURS PRN
Status: DISCONTINUED | OUTPATIENT
Start: 2019-01-01 | End: 2019-01-01 | Stop reason: HOSPADM

## 2019-01-01 RX ORDER — SPIRONOLACTONE 25 MG
12.5 TABLET ORAL DAILY
Status: DISCONTINUED | OUTPATIENT
Start: 2019-01-01 | End: 2019-01-01

## 2019-01-01 RX ORDER — QUETIAPINE FUMARATE 25 MG/1
TABLET, FILM COATED ORAL
Qty: 45 TABLET | Refills: 3 | Status: SHIPPED | OUTPATIENT
Start: 2019-01-01

## 2019-01-01 RX ORDER — FUROSEMIDE 20 MG
40 TABLET ORAL 2 TIMES DAILY
Start: 2019-01-01

## 2019-01-01 RX ORDER — MICONAZOLE NITRATE 20 MG/G
CREAM TOPICAL 2 TIMES DAILY
DISCHARGE
Start: 2019-01-01

## 2019-01-01 RX ORDER — FUROSEMIDE 20 MG
TABLET ORAL
COMMUNITY
End: 2019-01-01

## 2019-01-01 RX ORDER — PROCHLORPERAZINE MALEATE 5 MG
5 TABLET ORAL EVERY 6 HOURS PRN
Status: DISCONTINUED | OUTPATIENT
Start: 2019-01-01 | End: 2019-01-01 | Stop reason: HOSPADM

## 2019-01-01 RX ORDER — LEVALBUTEROL 1.25 MG/.5ML
1.25 SOLUTION, CONCENTRATE RESPIRATORY (INHALATION) EVERY 4 HOURS
Qty: 90 ML | Refills: 0 | DISCHARGE
Start: 2019-01-01 | End: 2019-01-01

## 2019-01-01 RX ORDER — POTASSIUM CHLORIDE 1.5 G/1.58G
20-40 POWDER, FOR SOLUTION ORAL
Status: DISCONTINUED | OUTPATIENT
Start: 2019-01-01 | End: 2019-01-01 | Stop reason: HOSPADM

## 2019-01-01 RX ORDER — LIDOCAINE 40 MG/G
CREAM TOPICAL
Status: DISCONTINUED | OUTPATIENT
Start: 2019-01-01 | End: 2019-01-01 | Stop reason: HOSPADM

## 2019-01-01 RX ORDER — FUROSEMIDE 20 MG
20 TABLET ORAL DAILY
Qty: 30 TABLET | Refills: 0 | Status: ON HOLD | OUTPATIENT
Start: 2019-01-01 | End: 2019-01-01

## 2019-01-01 RX ORDER — ISOSORBIDE DINITRATE 10 MG/1
10 TABLET ORAL 3 TIMES DAILY
Qty: 270 TABLET | Refills: 3 | Status: SHIPPED | OUTPATIENT
Start: 2019-01-01

## 2019-01-01 RX ORDER — PANTOPRAZOLE SODIUM 40 MG/1
40 TABLET, DELAYED RELEASE ORAL
Status: DISCONTINUED | OUTPATIENT
Start: 2019-01-01 | End: 2019-01-01 | Stop reason: HOSPADM

## 2019-01-01 RX ORDER — HYDROMORPHONE HYDROCHLORIDE 1 MG/ML
.3-.5 INJECTION, SOLUTION INTRAMUSCULAR; INTRAVENOUS; SUBCUTANEOUS
Status: DISCONTINUED | OUTPATIENT
Start: 2019-01-01 | End: 2019-01-01 | Stop reason: HOSPADM

## 2019-01-01 RX ORDER — DILTIAZEM HYDROCHLORIDE 120 MG/1
120 CAPSULE, EXTENDED RELEASE ORAL DAILY
COMMUNITY
Start: 2019-01-01 | End: 2019-01-01

## 2019-01-01 RX ORDER — HYDROMORPHONE HYDROCHLORIDE 10 MG/ML
1 INJECTION INTRAMUSCULAR; INTRAVENOUS; SUBCUTANEOUS
Qty: 5 ML | Refills: 0 | Status: SHIPPED | OUTPATIENT
Start: 2019-01-01

## 2019-01-01 RX ORDER — FUROSEMIDE 40 MG
40 TABLET ORAL
Status: DISCONTINUED | OUTPATIENT
Start: 2019-01-01 | End: 2019-01-01

## 2019-01-01 RX ORDER — ISOSORBIDE DINITRATE 10 MG/1
10 TABLET ORAL 3 TIMES DAILY
Qty: 90 TABLET | Refills: 0 | Status: SHIPPED | OUTPATIENT
Start: 2019-01-01 | End: 2019-01-01

## 2019-01-01 RX ORDER — FUROSEMIDE 10 MG/ML
40 INJECTION INTRAMUSCULAR; INTRAVENOUS ONCE
Status: DISCONTINUED | OUTPATIENT
Start: 2019-01-01 | End: 2019-01-01

## 2019-01-01 RX ORDER — DILTIAZEM HYDROCHLORIDE 120 MG/1
120 CAPSULE, EXTENDED RELEASE ORAL DAILY
Status: DISCONTINUED | OUTPATIENT
Start: 2019-01-01 | End: 2019-01-01

## 2019-01-01 RX ORDER — POLYETHYLENE GLYCOL 3350 17 G/17G
17 POWDER, FOR SOLUTION ORAL DAILY PRN
Status: DISCONTINUED | OUTPATIENT
Start: 2019-01-01 | End: 2019-01-01 | Stop reason: HOSPADM

## 2019-01-01 RX ORDER — POTASSIUM CHLORIDE 1500 MG/1
40 TABLET, EXTENDED RELEASE ORAL EVERY 4 HOURS
Status: COMPLETED | OUTPATIENT
Start: 2019-01-01 | End: 2019-01-01

## 2019-01-01 RX ORDER — POTASSIUM CHLORIDE 1500 MG/1
20 TABLET, EXTENDED RELEASE ORAL 3 TIMES DAILY
Qty: 180 TABLET | Refills: 1 | Status: SHIPPED | OUTPATIENT
Start: 2019-01-01

## 2019-01-01 RX ORDER — SENNOSIDES 8.6 MG
1 TABLET ORAL DAILY PRN
COMMUNITY
End: 2019-01-01

## 2019-01-01 RX ORDER — METOPROLOL SUCCINATE 25 MG/1
25 TABLET, EXTENDED RELEASE ORAL EVERY MORNING
Status: DISCONTINUED | OUTPATIENT
Start: 2019-01-01 | End: 2019-01-01 | Stop reason: HOSPADM

## 2019-01-01 RX ORDER — TORSEMIDE 10 MG/1
10 TABLET ORAL DAILY
COMMUNITY
End: 2019-01-01

## 2019-01-01 RX ORDER — SPIRONOLACTONE 25 MG
12.5 TABLET ORAL DAILY
Status: DISCONTINUED | OUTPATIENT
Start: 2019-01-01 | End: 2019-01-01 | Stop reason: HOSPADM

## 2019-01-01 RX ORDER — ONDANSETRON 4 MG/1
4 TABLET, ORALLY DISINTEGRATING ORAL EVERY 6 HOURS PRN
Status: DISCONTINUED | OUTPATIENT
Start: 2019-01-01 | End: 2019-01-01 | Stop reason: HOSPADM

## 2019-01-01 RX ORDER — IPRATROPIUM BROMIDE AND ALBUTEROL SULFATE 2.5; .5 MG/3ML; MG/3ML
3 SOLUTION RESPIRATORY (INHALATION) ONCE
Status: COMPLETED | OUTPATIENT
Start: 2019-01-01 | End: 2019-01-01

## 2019-01-01 RX ORDER — ALBUTEROL SULFATE 0.83 MG/ML
5 SOLUTION RESPIRATORY (INHALATION) ONCE
Status: COMPLETED | OUTPATIENT
Start: 2019-01-01 | End: 2019-01-01

## 2019-01-01 RX ORDER — IPRATROPIUM BROMIDE AND ALBUTEROL SULFATE 2.5; .5 MG/3ML; MG/3ML
3 SOLUTION RESPIRATORY (INHALATION) 4 TIMES DAILY PRN
Status: DISCONTINUED | OUTPATIENT
Start: 2019-01-01 | End: 2019-01-01

## 2019-01-01 RX ORDER — POTASSIUM CHLORIDE 1.5 G/1.58G
20 POWDER, FOR SOLUTION ORAL DAILY
COMMUNITY
End: 2019-01-01

## 2019-01-01 RX ORDER — GUAIFENESIN 600 MG/1
600 TABLET, EXTENDED RELEASE ORAL 2 TIMES DAILY
COMMUNITY
End: 2019-01-01

## 2019-01-01 RX ORDER — POTASSIUM CHLORIDE 29.8 MG/ML
20 INJECTION INTRAVENOUS
Status: DISCONTINUED | OUTPATIENT
Start: 2019-01-01 | End: 2019-01-01 | Stop reason: HOSPADM

## 2019-01-01 RX ORDER — FLUCONAZOLE 150 MG/1
150 TABLET ORAL
COMMUNITY
End: 2019-01-01

## 2019-01-01 RX ORDER — METHYLPREDNISOLONE SODIUM SUCCINATE 125 MG/2ML
125 INJECTION, POWDER, LYOPHILIZED, FOR SOLUTION INTRAMUSCULAR; INTRAVENOUS ONCE
Status: COMPLETED | OUTPATIENT
Start: 2019-01-01 | End: 2019-01-01

## 2019-01-01 RX ORDER — ACETAMINOPHEN 325 MG/1
650 TABLET ORAL EVERY 4 HOURS PRN
Status: DISCONTINUED | OUTPATIENT
Start: 2019-01-01 | End: 2019-01-01 | Stop reason: HOSPADM

## 2019-01-01 RX ORDER — METOPROLOL SUCCINATE 25 MG/1
25 TABLET, EXTENDED RELEASE ORAL DAILY
Qty: 30 TABLET | Refills: 1 | Status: SHIPPED | OUTPATIENT
Start: 2019-01-01

## 2019-01-01 RX ORDER — LEVALBUTEROL 1.25 MG/.5ML
1.25 SOLUTION, CONCENTRATE RESPIRATORY (INHALATION)
Status: DISCONTINUED | OUTPATIENT
Start: 2019-01-01 | End: 2019-01-01 | Stop reason: HOSPADM

## 2019-01-01 RX ORDER — FUROSEMIDE 20 MG
40 TABLET ORAL EVERY MORNING
Status: ON HOLD | COMMUNITY
End: 2019-01-01

## 2019-01-01 RX ORDER — ONDANSETRON 4 MG/1
4 TABLET, ORALLY DISINTEGRATING ORAL EVERY 8 HOURS PRN
Qty: 12 TABLET | Refills: 0 | Status: SHIPPED | OUTPATIENT
Start: 2019-01-01

## 2019-01-01 RX ORDER — DILTIAZEM HYDROCHLORIDE 120 MG/1
120 CAPSULE, EXTENDED RELEASE ORAL DAILY
Status: DISCONTINUED | OUTPATIENT
Start: 2019-01-01 | End: 2019-01-01 | Stop reason: HOSPADM

## 2019-01-01 RX ORDER — DORZOLAMIDE HCL 20 MG/ML
1 SOLUTION/ DROPS OPHTHALMIC 2 TIMES DAILY
Status: DISCONTINUED | OUTPATIENT
Start: 2019-01-01 | End: 2019-01-01 | Stop reason: HOSPADM

## 2019-01-01 RX ORDER — ACETAMINOPHEN 650 MG/1
650 SUPPOSITORY RECTAL EVERY 4 HOURS PRN
Status: DISCONTINUED | OUTPATIENT
Start: 2019-01-01 | End: 2019-01-01 | Stop reason: HOSPADM

## 2019-01-01 RX ORDER — DILTIAZEM HYDROCHLORIDE 30 MG/1
30 TABLET, FILM COATED ORAL EVERY 6 HOURS SCHEDULED
Status: DISCONTINUED | OUTPATIENT
Start: 2019-01-01 | End: 2019-01-01

## 2019-01-01 RX ORDER — PANTOPRAZOLE SODIUM 40 MG/1
40 TABLET, DELAYED RELEASE ORAL
Refills: 0 | DISCHARGE
Start: 2019-01-01 | End: 2019-01-01

## 2019-01-01 RX ORDER — NYSTATIN 100000/ML
500000 SUSPENSION, ORAL (FINAL DOSE FORM) ORAL 4 TIMES DAILY
Refills: 0 | DISCHARGE
Start: 2019-01-01 | End: 2019-01-01

## 2019-01-01 RX ORDER — DILTIAZEM HYDROCHLORIDE 120 MG/1
120 CAPSULE, EXTENDED RELEASE ORAL DAILY
Qty: 30 CAPSULE | Refills: 0 | DISCHARGE
Start: 2019-01-01 | End: 2019-01-01

## 2019-01-01 RX ORDER — LEVOFLOXACIN 5 MG/ML
250 INJECTION, SOLUTION INTRAVENOUS EVERY 24 HOURS
Status: DISCONTINUED | OUTPATIENT
Start: 2019-01-01 | End: 2019-01-01

## 2019-01-01 RX ORDER — ISOSORBIDE MONONITRATE 30 MG/1
30 TABLET, EXTENDED RELEASE ORAL DAILY
Qty: 30 TABLET | Refills: 3 | Status: SHIPPED | OUTPATIENT
Start: 2019-01-01 | End: 2019-01-01

## 2019-01-01 RX ORDER — NALOXONE HYDROCHLORIDE 0.4 MG/ML
.1-.4 INJECTION, SOLUTION INTRAMUSCULAR; INTRAVENOUS; SUBCUTANEOUS
Status: DISCONTINUED | OUTPATIENT
Start: 2019-01-01 | End: 2019-01-01

## 2019-01-01 RX ORDER — PREDNISONE 20 MG/1
40 TABLET ORAL DAILY
Status: DISCONTINUED | OUTPATIENT
Start: 2019-01-01 | End: 2019-01-01 | Stop reason: HOSPADM

## 2019-01-01 RX ORDER — FUROSEMIDE 20 MG
20 TABLET ORAL
Status: ON HOLD | COMMUNITY
End: 2019-01-01

## 2019-01-01 RX ORDER — METOLAZONE 5 MG/1
5 TABLET ORAL EVERY OTHER DAY
Qty: 3 TABLET | Refills: 0 | Status: SHIPPED | OUTPATIENT
Start: 2019-01-01 | End: 2019-01-01

## 2019-01-01 RX ORDER — PROCHLORPERAZINE 25 MG
12.5 SUPPOSITORY, RECTAL RECTAL EVERY 12 HOURS PRN
Status: DISCONTINUED | OUTPATIENT
Start: 2019-01-01 | End: 2019-01-01 | Stop reason: HOSPADM

## 2019-01-01 RX ORDER — ALBUTEROL SULFATE 0.83 MG/ML
3 SOLUTION RESPIRATORY (INHALATION) EVERY 4 HOURS
Status: DISCONTINUED | OUTPATIENT
Start: 2019-01-01 | End: 2019-01-01

## 2019-01-01 RX ORDER — OSELTAMIVIR PHOSPHATE 30 MG/1
30 CAPSULE ORAL 2 TIMES DAILY
Status: DISCONTINUED | OUTPATIENT
Start: 2019-01-01 | End: 2019-01-01

## 2019-01-01 RX ORDER — ALBUTEROL SULFATE 90 UG/1
2 AEROSOL, METERED RESPIRATORY (INHALATION) EVERY 4 HOURS PRN
Status: DISCONTINUED | OUTPATIENT
Start: 2019-01-01 | End: 2019-01-01 | Stop reason: HOSPADM

## 2019-01-01 RX ORDER — NALOXONE HYDROCHLORIDE 0.4 MG/ML
.1-.4 INJECTION, SOLUTION INTRAMUSCULAR; INTRAVENOUS; SUBCUTANEOUS
Status: DISCONTINUED | OUTPATIENT
Start: 2019-01-01 | End: 2019-01-01 | Stop reason: HOSPADM

## 2019-01-01 RX ORDER — ANTIOX #8/OM3/DHA/EPA/LUT/ZEAX 250-2.5 MG
1 CAPSULE ORAL 2 TIMES DAILY
Status: DISCONTINUED | OUTPATIENT
Start: 2019-01-01 | End: 2019-01-01 | Stop reason: HOSPADM

## 2019-01-01 RX ORDER — ALBUTEROL SULFATE 0.83 MG/ML
2.5 SOLUTION RESPIRATORY (INHALATION) EVERY 4 HOURS PRN
Status: DISCONTINUED | OUTPATIENT
Start: 2019-01-01 | End: 2019-01-01 | Stop reason: HOSPADM

## 2019-01-01 RX ORDER — PREDNISONE 20 MG/1
TABLET ORAL
Qty: 18 TABLET | Refills: 0 | Status: SHIPPED | OUTPATIENT
Start: 2019-01-01 | End: 2019-01-01

## 2019-01-01 RX ORDER — BISACODYL 10 MG
10 SUPPOSITORY, RECTAL RECTAL DAILY PRN
Status: DISCONTINUED | OUTPATIENT
Start: 2019-01-01 | End: 2019-01-01 | Stop reason: HOSPADM

## 2019-01-01 RX ORDER — METOPROLOL SUCCINATE 25 MG/1
25 TABLET, EXTENDED RELEASE ORAL DAILY
Status: DISCONTINUED | OUTPATIENT
Start: 2019-01-01 | End: 2019-01-01 | Stop reason: HOSPADM

## 2019-01-01 RX ORDER — LOSARTAN POTASSIUM 50 MG/1
50 TABLET ORAL DAILY
Status: DISCONTINUED | OUTPATIENT
Start: 2019-01-01 | End: 2019-01-01 | Stop reason: HOSPADM

## 2019-01-01 RX ORDER — HYDROXYZINE PAMOATE 25 MG/1
25 CAPSULE ORAL EVERY 6 HOURS PRN
Status: DISCONTINUED | OUTPATIENT
Start: 2019-01-01 | End: 2019-01-01 | Stop reason: HOSPADM

## 2019-01-01 RX ORDER — POTASSIUM CHLORIDE 1500 MG/1
40 TABLET, EXTENDED RELEASE ORAL 2 TIMES DAILY
Status: DISCONTINUED | OUTPATIENT
Start: 2019-01-01 | End: 2019-01-01 | Stop reason: ALTCHOICE

## 2019-01-01 RX ORDER — ACETAMINOPHEN 650 MG/1
650 SUPPOSITORY RECTAL EVERY 4 HOURS PRN
Qty: 12 SUPPOSITORY | Refills: 0 | Status: SHIPPED | OUTPATIENT
Start: 2019-01-01

## 2019-01-01 RX ORDER — POTASSIUM CL/LIDO/0.9 % NACL 10MEQ/0.1L
10 INTRAVENOUS SOLUTION, PIGGYBACK (ML) INTRAVENOUS
Status: DISCONTINUED | OUTPATIENT
Start: 2019-01-01 | End: 2019-01-01 | Stop reason: HOSPADM

## 2019-01-01 RX ORDER — OSELTAMIVIR PHOSPHATE 75 MG/1
75 CAPSULE ORAL ONCE
Status: COMPLETED | OUTPATIENT
Start: 2019-01-01 | End: 2019-01-01

## 2019-01-01 RX ORDER — FLUMAZENIL 0.1 MG/ML
0.2 INJECTION, SOLUTION INTRAVENOUS
Status: ACTIVE | OUTPATIENT
Start: 2019-01-01 | End: 2019-01-01

## 2019-01-01 RX ORDER — HYDRALAZINE HYDROCHLORIDE 10 MG/1
10 TABLET, FILM COATED ORAL 3 TIMES DAILY
Status: DISCONTINUED | OUTPATIENT
Start: 2019-01-01 | End: 2019-01-01 | Stop reason: HOSPADM

## 2019-01-01 RX ORDER — POTASSIUM CHLORIDE 1500 MG/1
20-40 TABLET, EXTENDED RELEASE ORAL
Status: DISCONTINUED | OUTPATIENT
Start: 2019-01-01 | End: 2019-01-01 | Stop reason: HOSPADM

## 2019-01-01 RX ORDER — DILTIAZEM HYDROCHLORIDE 120 MG/1
120 CAPSULE, EXTENDED RELEASE ORAL DAILY
OUTPATIENT
Start: 2019-01-01

## 2019-01-01 RX ORDER — FUROSEMIDE 10 MG/ML
40 INJECTION INTRAMUSCULAR; INTRAVENOUS ONCE
Status: COMPLETED | OUTPATIENT
Start: 2019-01-01 | End: 2019-01-01

## 2019-01-01 RX ORDER — METHYLPREDNISOLONE 4 MG
1 TABLET, DOSE PACK ORAL EVERY MORNING
COMMUNITY
End: 2019-01-01

## 2019-01-01 RX ORDER — ISOSORBIDE DINITRATE 10 MG/1
10 TABLET ORAL 3 TIMES DAILY
Status: DISCONTINUED | OUTPATIENT
Start: 2019-01-01 | End: 2019-01-01 | Stop reason: HOSPADM

## 2019-01-01 RX ORDER — PREDNISONE 20 MG/1
40 TABLET ORAL DAILY
Qty: 10 TABLET | Refills: 0 | DISCHARGE
Start: 2019-01-01 | End: 2019-01-01

## 2019-01-01 RX ORDER — MICONAZOLE NITRATE 20 MG/G
CREAM TOPICAL 2 TIMES DAILY
Status: DISCONTINUED | OUTPATIENT
Start: 2019-01-01 | End: 2019-01-01 | Stop reason: HOSPADM

## 2019-01-01 RX ORDER — LEVOFLOXACIN 5 MG/ML
500 INJECTION, SOLUTION INTRAVENOUS EVERY 24 HOURS
Status: DISCONTINUED | OUTPATIENT
Start: 2019-01-01 | End: 2019-01-01

## 2019-01-01 RX ORDER — HYDRALAZINE HYDROCHLORIDE 10 MG/1
10 TABLET, FILM COATED ORAL 3 TIMES DAILY
Qty: 90 TABLET | Refills: 1 | Status: SHIPPED | OUTPATIENT
Start: 2019-01-01

## 2019-01-01 RX ORDER — ASPIRIN 81 MG/1
324 TABLET, CHEWABLE ORAL ONCE
Status: COMPLETED | OUTPATIENT
Start: 2019-01-01 | End: 2019-01-01

## 2019-01-01 RX ORDER — ATROPINE SULFATE 10 MG/ML
2 SOLUTION/ DROPS OPHTHALMIC
Qty: 2 ML | Refills: 0 | Status: SHIPPED | OUTPATIENT
Start: 2019-01-01

## 2019-01-01 RX ORDER — POTASSIUM CHLORIDE 1.5 G/1.58G
20 POWDER, FOR SOLUTION ORAL 3 TIMES DAILY
Qty: 30 PACKET | Refills: 1 | Status: SHIPPED | DISCHARGE
Start: 2019-01-01 | End: 2019-01-01

## 2019-01-01 RX ORDER — AMOXICILLIN 250 MG
2 CAPSULE ORAL 2 TIMES DAILY PRN
Status: DISCONTINUED | OUTPATIENT
Start: 2019-01-01 | End: 2019-01-01 | Stop reason: HOSPADM

## 2019-01-01 RX ORDER — SODIUM CHLORIDE AND POTASSIUM CHLORIDE 150; 900 MG/100ML; MG/100ML
INJECTION, SOLUTION INTRAVENOUS CONTINUOUS
Status: ACTIVE | OUTPATIENT
Start: 2019-01-01 | End: 2019-01-01

## 2019-01-01 RX ORDER — POTASSIUM CHLORIDE 1.5 G/1.58G
40 POWDER, FOR SOLUTION ORAL 2 TIMES DAILY
Status: DISCONTINUED | OUTPATIENT
Start: 2019-01-01 | End: 2019-01-01 | Stop reason: HOSPADM

## 2019-01-01 RX ORDER — FUROSEMIDE 10 MG/ML
40 INJECTION INTRAMUSCULAR; INTRAVENOUS
Status: DISCONTINUED | OUTPATIENT
Start: 2019-01-01 | End: 2019-01-01

## 2019-01-01 RX ORDER — POTASSIUM CHLORIDE 1500 MG/1
40 TABLET, EXTENDED RELEASE ORAL DAILY
Status: DISCONTINUED | OUTPATIENT
Start: 2019-01-01 | End: 2019-01-01

## 2019-01-01 RX ORDER — POTASSIUM CHLORIDE 7.45 MG/ML
10 INJECTION INTRAVENOUS
Status: DISCONTINUED | OUTPATIENT
Start: 2019-01-01 | End: 2019-01-01 | Stop reason: HOSPADM

## 2019-01-01 RX ORDER — HYDROXYZINE PAMOATE 25 MG/1
25 CAPSULE ORAL EVERY 6 HOURS PRN
Status: DISCONTINUED | OUTPATIENT
Start: 2019-01-01 | End: 2019-01-01

## 2019-01-01 RX ORDER — NYSTATIN 100000/ML
5 SUSPENSION, ORAL (FINAL DOSE FORM) ORAL 4 TIMES DAILY
COMMUNITY
End: 2019-01-01

## 2019-01-01 RX ORDER — FUROSEMIDE 10 MG/ML
40 INJECTION INTRAMUSCULAR; INTRAVENOUS EVERY 12 HOURS
Status: DISCONTINUED | OUTPATIENT
Start: 2019-01-01 | End: 2019-01-01 | Stop reason: HOSPADM

## 2019-01-01 RX ORDER — ALBUTEROL SULFATE 0.83 MG/ML
SOLUTION RESPIRATORY (INHALATION)
Status: DISCONTINUED
Start: 2019-01-01 | End: 2019-01-01 | Stop reason: HOSPADM

## 2019-01-01 RX ORDER — AMOXICILLIN 250 MG
1 CAPSULE ORAL 2 TIMES DAILY PRN
Status: DISCONTINUED | OUTPATIENT
Start: 2019-01-01 | End: 2019-01-01 | Stop reason: HOSPADM

## 2019-01-01 RX ORDER — POTASSIUM CHLORIDE 1.5 G/1.58G
20 POWDER, FOR SOLUTION ORAL ONCE
Status: COMPLETED | OUTPATIENT
Start: 2019-01-01 | End: 2019-01-01

## 2019-01-01 RX ORDER — NYSTATIN 100000/ML
500000 SUSPENSION, ORAL (FINAL DOSE FORM) ORAL 4 TIMES DAILY
Status: DISCONTINUED | OUTPATIENT
Start: 2019-01-01 | End: 2019-01-01 | Stop reason: HOSPADM

## 2019-01-01 RX ORDER — VIT C/E/ZN/COPPR/LUTEIN/ZEAXAN 60 MG-6 MG
1 CAPSULE ORAL 2 TIMES DAILY
Status: DISCONTINUED | OUTPATIENT
Start: 2019-01-01 | End: 2019-01-01 | Stop reason: HOSPADM

## 2019-01-01 RX ORDER — PANTOPRAZOLE SODIUM 40 MG/1
40 TABLET, DELAYED RELEASE ORAL 2 TIMES DAILY
Status: DISCONTINUED | OUTPATIENT
Start: 2019-01-01 | End: 2019-01-01 | Stop reason: HOSPADM

## 2019-01-01 RX ORDER — PANTOPRAZOLE SODIUM 20 MG/1
40 TABLET, DELAYED RELEASE ORAL 2 TIMES DAILY
DISCHARGE
Start: 2019-01-01

## 2019-01-01 RX ORDER — IPRATROPIUM BROMIDE AND ALBUTEROL SULFATE 2.5; .5 MG/3ML; MG/3ML
SOLUTION RESPIRATORY (INHALATION)
Qty: 270 ML | Refills: 3 | Status: SHIPPED | OUTPATIENT
Start: 2019-01-01 | End: 2019-01-01

## 2019-01-01 RX ORDER — IPRATROPIUM BROMIDE AND ALBUTEROL SULFATE 2.5; .5 MG/3ML; MG/3ML
1 SOLUTION RESPIRATORY (INHALATION) 4 TIMES DAILY
COMMUNITY
End: 2019-01-01

## 2019-01-01 RX ORDER — CALCIUM CARBONATE 500 MG/1
1000 TABLET, CHEWABLE ORAL
Status: DISCONTINUED | OUTPATIENT
Start: 2019-01-01 | End: 2019-01-01 | Stop reason: HOSPADM

## 2019-01-01 RX ORDER — OXYCODONE HYDROCHLORIDE 5 MG/1
5-10 TABLET ORAL
Status: DISCONTINUED | OUTPATIENT
Start: 2019-01-01 | End: 2019-01-01 | Stop reason: HOSPADM

## 2019-01-01 RX ORDER — POTASSIUM CHLORIDE 1.5 G/1.58G
20 POWDER, FOR SOLUTION ORAL 2 TIMES DAILY
Qty: 30 PACKET | Refills: 1 | Status: SHIPPED | OUTPATIENT
Start: 2019-01-01 | End: 2019-01-01

## 2019-01-01 RX ORDER — FENTANYL CITRATE 50 UG/ML
INJECTION, SOLUTION INTRAMUSCULAR; INTRAVENOUS PRN
Status: DISCONTINUED | OUTPATIENT
Start: 2019-01-01 | End: 2019-01-01 | Stop reason: HOSPADM

## 2019-01-01 RX ORDER — QUETIAPINE FUMARATE 25 MG/1
25 TABLET, FILM COATED ORAL AT BEDTIME
Status: DISCONTINUED | OUTPATIENT
Start: 2019-01-01 | End: 2019-01-01 | Stop reason: HOSPADM

## 2019-01-01 RX ORDER — VIT C/E/ZN/COPPR/LUTEIN/ZEAXAN 60 MG-6 MG
1 CAPSULE ORAL 2 TIMES DAILY
Status: DISCONTINUED | OUTPATIENT
Start: 2019-01-01 | End: 2019-01-01 | Stop reason: DRUGHIGH

## 2019-01-01 RX ORDER — METOPROLOL SUCCINATE 25 MG/1
12.5 TABLET, EXTENDED RELEASE ORAL DAILY
Qty: 30 TABLET | Refills: 1 | Status: SHIPPED | OUTPATIENT
Start: 2019-01-01 | End: 2019-01-01

## 2019-01-01 RX ORDER — METOPROLOL SUCCINATE 25 MG/1
25 TABLET, EXTENDED RELEASE ORAL DAILY
Qty: 30 TABLET | Refills: 1 | Status: SHIPPED | OUTPATIENT
Start: 2019-01-01 | End: 2019-01-01

## 2019-01-01 RX ORDER — HYDRALAZINE HYDROCHLORIDE 10 MG/1
10 TABLET, FILM COATED ORAL 3 TIMES DAILY
Status: DISCONTINUED | OUTPATIENT
Start: 2019-01-01 | End: 2019-01-01

## 2019-01-01 RX ORDER — TORSEMIDE 10 MG/1
10 TABLET ORAL EVERY MORNING
Status: DISCONTINUED | OUTPATIENT
Start: 2019-01-01 | End: 2019-01-01

## 2019-01-01 RX ORDER — FUROSEMIDE 20 MG
20 TABLET ORAL
Status: DISCONTINUED | OUTPATIENT
Start: 2019-01-01 | End: 2019-01-01

## 2019-01-01 RX ORDER — POTASSIUM CHLORIDE 1500 MG/1
20 TABLET, EXTENDED RELEASE ORAL 3 TIMES DAILY
Status: DISCONTINUED | OUTPATIENT
Start: 2019-01-01 | End: 2019-01-01

## 2019-01-01 RX ORDER — SODIUM CHLORIDE 9 MG/ML
INJECTION, SOLUTION INTRAVENOUS CONTINUOUS
Status: DISCONTINUED | OUTPATIENT
Start: 2019-01-01 | End: 2019-01-01

## 2019-01-01 RX ORDER — POTASSIUM CHLORIDE 1500 MG/1
20 TABLET, EXTENDED RELEASE ORAL
COMMUNITY
End: 2019-01-01

## 2019-01-01 RX ORDER — METHYLPREDNISOLONE SODIUM SUCCINATE 125 MG/2ML
60 INJECTION, POWDER, LYOPHILIZED, FOR SOLUTION INTRAMUSCULAR; INTRAVENOUS EVERY 6 HOURS
Status: DISCONTINUED | OUTPATIENT
Start: 2019-01-01 | End: 2019-01-01

## 2019-01-01 RX ORDER — ASPIRIN 81 MG/1
81 TABLET ORAL DAILY
Status: DISCONTINUED | OUTPATIENT
Start: 2019-01-01 | End: 2019-01-01

## 2019-01-01 RX ORDER — OSELTAMIVIR PHOSPHATE 30 MG/1
30 CAPSULE ORAL 2 TIMES DAILY
Status: COMPLETED | OUTPATIENT
Start: 2019-01-01 | End: 2019-01-01

## 2019-01-01 RX ORDER — PANTOPRAZOLE SODIUM 40 MG/1
40 TABLET, DELAYED RELEASE ORAL ONCE
Status: COMPLETED | OUTPATIENT
Start: 2019-01-01 | End: 2019-01-01

## 2019-01-01 RX ORDER — BISACODYL 10 MG
10 SUPPOSITORY, RECTAL RECTAL DAILY PRN
Qty: 6 SUPPOSITORY | Refills: 0 | Status: SHIPPED | OUTPATIENT
Start: 2019-01-01

## 2019-01-01 RX ORDER — FUROSEMIDE 40 MG
40 TABLET ORAL EVERY MORNING
Status: DISCONTINUED | OUTPATIENT
Start: 2019-01-01 | End: 2019-01-01

## 2019-01-01 RX ORDER — PREDNISONE 20 MG/1
20 TABLET ORAL DAILY
Qty: 7 TABLET | Refills: 0 | Status: SHIPPED | OUTPATIENT
Start: 2019-01-01 | End: 2019-01-01

## 2019-01-01 RX ORDER — FUROSEMIDE 20 MG
40 TABLET ORAL 2 TIMES DAILY
Qty: 180 TABLET | Refills: 1 | Status: SHIPPED | OUTPATIENT
Start: 2019-01-01 | End: 2019-01-01

## 2019-01-01 RX ORDER — POTASSIUM CHLORIDE 1500 MG/1
40 TABLET, EXTENDED RELEASE ORAL 2 TIMES DAILY
Status: DISCONTINUED | OUTPATIENT
Start: 2019-01-01 | End: 2019-01-01

## 2019-01-01 RX ORDER — HYDROXYZINE PAMOATE 25 MG/1
25 CAPSULE ORAL EVERY 6 HOURS PRN
COMMUNITY
Start: 2019-01-01 | End: 2019-01-01

## 2019-01-01 RX ORDER — FUROSEMIDE 20 MG
20 TABLET ORAL DAILY
Qty: 90 TABLET | Refills: 1 | Status: SHIPPED | OUTPATIENT
Start: 2019-01-01 | End: 2019-01-01

## 2019-01-01 RX ORDER — VIT C/E/ZN/COPPR/LUTEIN/ZEAXAN 60 MG-6 MG
1 CAPSULE ORAL 2 TIMES DAILY
Status: DISCONTINUED | OUTPATIENT
Start: 2019-01-01 | End: 2019-01-01

## 2019-01-01 RX ORDER — ALBUTEROL SULFATE 0.83 MG/ML
2.5 SOLUTION RESPIRATORY (INHALATION) EVERY 4 HOURS PRN
COMMUNITY

## 2019-01-01 RX ORDER — ISOSORBIDE MONONITRATE 30 MG/1
30 TABLET, EXTENDED RELEASE ORAL DAILY
Qty: 30 TABLET | Refills: 1 | Status: SHIPPED | OUTPATIENT
Start: 2019-01-01 | End: 2019-01-01

## 2019-01-01 RX ADMIN — Medication 1 CAPSULE: at 21:18

## 2019-01-01 RX ADMIN — HYDRALAZINE HYDROCHLORIDE 10 MG: 10 TABLET ORAL at 08:50

## 2019-01-01 RX ADMIN — HYDRALAZINE HYDROCHLORIDE 10 MG: 10 TABLET ORAL at 15:24

## 2019-01-01 RX ADMIN — OYSTER SHELL CALCIUM WITH VITAMIN D 1 TABLET: 500; 200 TABLET, FILM COATED ORAL at 09:01

## 2019-01-01 RX ADMIN — QUETIAPINE 25 MG: 25 TABLET ORAL at 21:06

## 2019-01-01 RX ADMIN — LEVALBUTEROL HYDROCHLORIDE 1.25 MG: 1.25 SOLUTION, CONCENTRATE RESPIRATORY (INHALATION) at 11:21

## 2019-01-01 RX ADMIN — LEVALBUTEROL HYDROCHLORIDE 1.25 MG: 1.25 SOLUTION, CONCENTRATE RESPIRATORY (INHALATION) at 15:51

## 2019-01-01 RX ADMIN — LOSARTAN POTASSIUM 50 MG: 50 TABLET, FILM COATED ORAL at 09:16

## 2019-01-01 RX ADMIN — FUROSEMIDE 40 MG: 40 TABLET ORAL at 08:11

## 2019-01-01 RX ADMIN — Medication 1 CAPSULE: at 18:20

## 2019-01-01 RX ADMIN — ALBUTEROL SULFATE 2.5 MG: 2.5 SOLUTION RESPIRATORY (INHALATION) at 23:44

## 2019-01-01 RX ADMIN — DEXTRAN 70, AND HYPROMELLOSE 2910 2 DROP: 1; 3 SOLUTION/ DROPS OPHTHALMIC at 08:35

## 2019-01-01 RX ADMIN — FUROSEMIDE 40 MG: 10 INJECTION, SOLUTION INTRAVENOUS at 23:29

## 2019-01-01 RX ADMIN — HYDRALAZINE HYDROCHLORIDE 10 MG: 10 TABLET ORAL at 15:39

## 2019-01-01 RX ADMIN — POTASSIUM CHLORIDE 20 MEQ: 1500 TABLET, EXTENDED RELEASE ORAL at 20:11

## 2019-01-01 RX ADMIN — ISOSORBIDE DINITRATE 10 MG: 10 TABLET ORAL at 09:02

## 2019-01-01 RX ADMIN — Medication 12.5 MG: at 09:39

## 2019-01-01 RX ADMIN — LOSARTAN POTASSIUM 50 MG: 50 TABLET, FILM COATED ORAL at 09:39

## 2019-01-01 RX ADMIN — ALBUTEROL SULFATE 2.5 MG: 2.5 SOLUTION RESPIRATORY (INHALATION) at 23:35

## 2019-01-01 RX ADMIN — DORZOLAMIDE HCL 1 DROP: 20 SOLUTION/ DROPS OPHTHALMIC at 20:47

## 2019-01-01 RX ADMIN — LEVOFLOXACIN 500 MG: 5 INJECTION, SOLUTION INTRAVENOUS at 04:17

## 2019-01-01 RX ADMIN — PANTOPRAZOLE SODIUM 40 MG: 40 TABLET, DELAYED RELEASE ORAL at 20:20

## 2019-01-01 RX ADMIN — ISOSORBIDE DINITRATE 10 MG: 10 TABLET ORAL at 21:20

## 2019-01-01 RX ADMIN — DORZOLAMIDE HCL 1 DROP: 20 SOLUTION/ DROPS OPHTHALMIC at 09:17

## 2019-01-01 RX ADMIN — PANTOPRAZOLE SODIUM 40 MG: 40 TABLET, DELAYED RELEASE ORAL at 07:52

## 2019-01-01 RX ADMIN — ALBUTEROL SULFATE 2.5 MG: 2.5 SOLUTION RESPIRATORY (INHALATION) at 19:44

## 2019-01-01 RX ADMIN — POTASSIUM CHLORIDE 20 MEQ: 1500 TABLET, EXTENDED RELEASE ORAL at 20:21

## 2019-01-01 RX ADMIN — OSELTAMIVIR PHOSPHATE 75 MG: 75 CAPSULE ORAL at 15:24

## 2019-01-01 RX ADMIN — DEXTRAN 70, AND HYPROMELLOSE 2910 2 DROP: 1; 3 SOLUTION/ DROPS OPHTHALMIC at 21:34

## 2019-01-01 RX ADMIN — FUROSEMIDE 40 MG: 40 TABLET ORAL at 11:19

## 2019-01-01 RX ADMIN — Medication 1 CAPSULE: at 08:51

## 2019-01-01 RX ADMIN — Medication 12.5 MG: at 10:00

## 2019-01-01 RX ADMIN — PANTOPRAZOLE SODIUM 40 MG: 40 TABLET, DELAYED RELEASE ORAL at 08:35

## 2019-01-01 RX ADMIN — OYSTER SHELL CALCIUM WITH VITAMIN D 1 TABLET: 500; 200 TABLET, FILM COATED ORAL at 20:24

## 2019-01-01 RX ADMIN — FUROSEMIDE 40 MG: 10 INJECTION, SOLUTION INTRAVENOUS at 16:21

## 2019-01-01 RX ADMIN — HYDRALAZINE HYDROCHLORIDE 10 MG: 10 TABLET ORAL at 08:35

## 2019-01-01 RX ADMIN — DORZOLAMIDE HCL 1 DROP: 20 SOLUTION/ DROPS OPHTHALMIC at 08:47

## 2019-01-01 RX ADMIN — IPRATROPIUM BROMIDE AND ALBUTEROL SULFATE 3 ML: .5; 3 SOLUTION RESPIRATORY (INHALATION) at 16:15

## 2019-01-01 RX ADMIN — Medication 1 CAPSULE: at 08:48

## 2019-01-01 RX ADMIN — METHYLPREDNISOLONE SODIUM SUCCINATE 62.5 MG: 125 INJECTION, POWDER, FOR SOLUTION INTRAMUSCULAR; INTRAVENOUS at 12:14

## 2019-01-01 RX ADMIN — ISOSORBIDE DINITRATE 10 MG: 10 TABLET ORAL at 08:35

## 2019-01-01 RX ADMIN — PANTOPRAZOLE SODIUM 40 MG: 40 TABLET, DELAYED RELEASE ORAL at 06:32

## 2019-01-01 RX ADMIN — ALBUTEROL SULFATE 2.5 MG: 2.5 SOLUTION RESPIRATORY (INHALATION) at 09:06

## 2019-01-01 RX ADMIN — Medication 140 MG: at 09:23

## 2019-01-01 RX ADMIN — ALBUTEROL SULFATE 2.5 MG: 2.5 SOLUTION RESPIRATORY (INHALATION) at 16:04

## 2019-01-01 RX ADMIN — HYDROXYZINE PAMOATE 25 MG: 25 CAPSULE ORAL at 13:00

## 2019-01-01 RX ADMIN — POTASSIUM CHLORIDE 20 MEQ: 1500 TABLET, EXTENDED RELEASE ORAL at 14:06

## 2019-01-01 RX ADMIN — APIXABAN 2.5 MG: 2.5 TABLET, FILM COATED ORAL at 21:34

## 2019-01-01 RX ADMIN — PANTOPRAZOLE SODIUM 40 MG: 40 INJECTION, POWDER, FOR SOLUTION INTRAVENOUS at 20:03

## 2019-01-01 RX ADMIN — Medication 1 CAPSULE: at 08:55

## 2019-01-01 RX ADMIN — ACETAMINOPHEN 650 MG: 325 TABLET, FILM COATED ORAL at 01:17

## 2019-01-01 RX ADMIN — UMECLIDINIUM BROMIDE AND VILANTEROL TRIFENATATE 1 PUFF: 62.5; 25 POWDER RESPIRATORY (INHALATION) at 08:42

## 2019-01-01 RX ADMIN — HUMAN ALBUMIN MICROSPHERES AND PERFLUTREN 4 ML: 10; .22 INJECTION, SOLUTION INTRAVENOUS at 09:00

## 2019-01-01 RX ADMIN — HYDRALAZINE HYDROCHLORIDE 10 MG: 10 TABLET ORAL at 21:22

## 2019-01-01 RX ADMIN — POTASSIUM CHLORIDE 40 MEQ: 1500 TABLET, EXTENDED RELEASE ORAL at 23:03

## 2019-01-01 RX ADMIN — Medication 1 CAPSULE: at 11:06

## 2019-01-01 RX ADMIN — LEVALBUTEROL HYDROCHLORIDE 1.25 MG: 1.25 SOLUTION, CONCENTRATE RESPIRATORY (INHALATION) at 19:26

## 2019-01-01 RX ADMIN — APIXABAN 2.5 MG: 2.5 TABLET, FILM COATED ORAL at 09:01

## 2019-01-01 RX ADMIN — OYSTER SHELL CALCIUM WITH VITAMIN D 1 TABLET: 500; 200 TABLET, FILM COATED ORAL at 21:17

## 2019-01-01 RX ADMIN — Medication 1 CAPSULE: at 10:00

## 2019-01-01 RX ADMIN — ALBUTEROL SULFATE 2.5 MG: 2.5 SOLUTION RESPIRATORY (INHALATION) at 11:56

## 2019-01-01 RX ADMIN — UMECLIDINIUM BROMIDE AND VILANTEROL TRIFENATATE 1 PUFF: 62.5; 25 POWDER RESPIRATORY (INHALATION) at 08:48

## 2019-01-01 RX ADMIN — DORZOLAMIDE HCL 1 DROP: 20 SOLUTION/ DROPS OPHTHALMIC at 20:44

## 2019-01-01 RX ADMIN — Medication 1 CAPSULE: at 08:30

## 2019-01-01 RX ADMIN — Medication 1 CAPSULE: at 09:26

## 2019-01-01 RX ADMIN — OSELTAMIVIR PHOSPHATE 30 MG: 30 CAPSULE ORAL at 21:34

## 2019-01-01 RX ADMIN — ALBUTEROL SULFATE 2 PUFF: 90 INHALANT RESPIRATORY (INHALATION) at 07:44

## 2019-01-01 RX ADMIN — UMECLIDINIUM BROMIDE AND VILANTEROL TRIFENATATE 1 PUFF: 62.5; 25 POWDER RESPIRATORY (INHALATION) at 08:39

## 2019-01-01 RX ADMIN — PANTOPRAZOLE SODIUM 40 MG: 40 TABLET, DELAYED RELEASE ORAL at 09:02

## 2019-01-01 RX ADMIN — Medication 1 CAPSULE: at 21:46

## 2019-01-01 RX ADMIN — FUROSEMIDE 40 MG: 10 INJECTION, SOLUTION INTRAVENOUS at 15:40

## 2019-01-01 RX ADMIN — LEVALBUTEROL HYDROCHLORIDE 1.25 MG: 1.25 SOLUTION, CONCENTRATE RESPIRATORY (INHALATION) at 21:10

## 2019-01-01 RX ADMIN — NYSTATIN 500000 UNITS: 500000 SUSPENSION ORAL at 21:18

## 2019-01-01 RX ADMIN — ISOSORBIDE DINITRATE 10 MG: 10 TABLET ORAL at 08:27

## 2019-01-01 RX ADMIN — DILTIAZEM HYDROCHLORIDE 120 MG: 120 CAPSULE, EXTENDED RELEASE ORAL at 09:01

## 2019-01-01 RX ADMIN — Medication 12.5 MG: at 09:16

## 2019-01-01 RX ADMIN — OSELTAMIVIR PHOSPHATE 30 MG: 30 CAPSULE ORAL at 09:16

## 2019-01-01 RX ADMIN — DORZOLAMIDE HCL 1 DROP: 20 SOLUTION/ DROPS OPHTHALMIC at 20:28

## 2019-01-01 RX ADMIN — METHYLPREDNISOLONE SODIUM SUCCINATE 62.5 MG: 125 INJECTION, POWDER, FOR SOLUTION INTRAMUSCULAR; INTRAVENOUS at 06:34

## 2019-01-01 RX ADMIN — APIXABAN 2.5 MG: 2.5 TABLET, FILM COATED ORAL at 21:17

## 2019-01-01 RX ADMIN — APIXABAN 2.5 MG: 2.5 TABLET, FILM COATED ORAL at 20:55

## 2019-01-01 RX ADMIN — DILTIAZEM HYDROCHLORIDE 30 MG: 30 TABLET, FILM COATED ORAL at 17:33

## 2019-01-01 RX ADMIN — METOPROLOL SUCCINATE 25 MG: 25 TABLET, EXTENDED RELEASE ORAL at 08:35

## 2019-01-01 RX ADMIN — HYDRALAZINE HYDROCHLORIDE 10 MG: 10 TABLET ORAL at 08:11

## 2019-01-01 RX ADMIN — PANTOPRAZOLE SODIUM 40 MG: 40 INJECTION, POWDER, FOR SOLUTION INTRAVENOUS at 11:07

## 2019-01-01 RX ADMIN — FUROSEMIDE 40 MG: 10 INJECTION, SOLUTION INTRAVENOUS at 21:25

## 2019-01-01 RX ADMIN — MICONAZOLE NITRATE: 2 POWDER TOPICAL at 08:44

## 2019-01-01 RX ADMIN — PANTOPRAZOLE SODIUM 40 MG: 40 TABLET, DELAYED RELEASE ORAL at 20:01

## 2019-01-01 RX ADMIN — QUETIAPINE 25 MG: 25 TABLET ORAL at 23:03

## 2019-01-01 RX ADMIN — ISOSORBIDE DINITRATE 10 MG: 10 TABLET ORAL at 14:06

## 2019-01-01 RX ADMIN — LEVALBUTEROL HYDROCHLORIDE 1.25 MG: 1.25 SOLUTION, CONCENTRATE RESPIRATORY (INHALATION) at 15:31

## 2019-01-01 RX ADMIN — METHYLPREDNISOLONE SODIUM SUCCINATE 62.5 MG: 125 INJECTION, POWDER, FOR SOLUTION INTRAMUSCULAR; INTRAVENOUS at 23:39

## 2019-01-01 RX ADMIN — ASPIRIN 81 MG 324 MG: 81 TABLET ORAL at 13:26

## 2019-01-01 RX ADMIN — DORZOLAMIDE HCL 1 DROP: 20 SOLUTION/ DROPS OPHTHALMIC at 09:12

## 2019-01-01 RX ADMIN — FUROSEMIDE 20 MG: 20 TABLET ORAL at 14:06

## 2019-01-01 RX ADMIN — OSELTAMIVIR PHOSPHATE 30 MG: 30 CAPSULE ORAL at 08:55

## 2019-01-01 RX ADMIN — PANTOPRAZOLE SODIUM 40 MG: 40 TABLET, DELAYED RELEASE ORAL at 09:23

## 2019-01-01 RX ADMIN — HYDRALAZINE HYDROCHLORIDE 10 MG: 10 TABLET ORAL at 16:20

## 2019-01-01 RX ADMIN — MICONAZOLE NITRATE: 2 POWDER TOPICAL at 20:37

## 2019-01-01 RX ADMIN — ALBUTEROL SULFATE 2.5 MG: 2.5 SOLUTION RESPIRATORY (INHALATION) at 12:04

## 2019-01-01 RX ADMIN — DORZOLAMIDE HCL 1 DROP: 20 SOLUTION/ DROPS OPHTHALMIC at 08:10

## 2019-01-01 RX ADMIN — POTASSIUM CHLORIDE 40 MEQ: 1.5 POWDER, FOR SOLUTION ORAL at 08:26

## 2019-01-01 RX ADMIN — HYDRALAZINE HYDROCHLORIDE 10 MG: 10 TABLET ORAL at 23:03

## 2019-01-01 RX ADMIN — ISOSORBIDE DINITRATE 10 MG: 10 TABLET ORAL at 15:57

## 2019-01-01 RX ADMIN — NYSTATIN 500000 UNITS: 500000 SUSPENSION ORAL at 13:17

## 2019-01-01 RX ADMIN — FUROSEMIDE 40 MG: 40 TABLET ORAL at 08:50

## 2019-01-01 RX ADMIN — NYSTATIN 500000 UNITS: 500000 SUSPENSION ORAL at 14:02

## 2019-01-01 RX ADMIN — POTASSIUM CHLORIDE 20 MEQ: 1500 TABLET, EXTENDED RELEASE ORAL at 08:35

## 2019-01-01 RX ADMIN — METOPROLOL SUCCINATE 25 MG: 25 TABLET, EXTENDED RELEASE ORAL at 08:50

## 2019-01-01 RX ADMIN — METHYLPREDNISOLONE SODIUM SUCCINATE 62.5 MG: 125 INJECTION, POWDER, FOR SOLUTION INTRAMUSCULAR; INTRAVENOUS at 06:26

## 2019-01-01 RX ADMIN — DILTIAZEM HYDROCHLORIDE 120 MG: 120 CAPSULE, EXTENDED RELEASE ORAL at 10:00

## 2019-01-01 RX ADMIN — ASPIRIN 81 MG: 81 TABLET, COATED ORAL at 20:55

## 2019-01-01 RX ADMIN — HYDRALAZINE HYDROCHLORIDE 10 MG: 10 TABLET ORAL at 15:57

## 2019-01-01 RX ADMIN — UMECLIDINIUM BROMIDE AND VILANTEROL TRIFENATATE 1 PUFF: 62.5; 25 POWDER RESPIRATORY (INHALATION) at 08:54

## 2019-01-01 RX ADMIN — FUROSEMIDE 40 MG: 40 TABLET ORAL at 14:28

## 2019-01-01 RX ADMIN — Medication 1 CAPSULE: at 20:33

## 2019-01-01 RX ADMIN — DORZOLAMIDE HCL 1 DROP: 20 SOLUTION/ DROPS OPHTHALMIC at 21:35

## 2019-01-01 RX ADMIN — LEVALBUTEROL HYDROCHLORIDE 1.25 MG: 1.25 SOLUTION, CONCENTRATE RESPIRATORY (INHALATION) at 16:00

## 2019-01-01 RX ADMIN — PANTOPRAZOLE SODIUM 40 MG: 40 TABLET, DELAYED RELEASE ORAL at 05:12

## 2019-01-01 RX ADMIN — PANTOPRAZOLE SODIUM 40 MG: 40 TABLET, DELAYED RELEASE ORAL at 07:02

## 2019-01-01 RX ADMIN — ALBUTEROL SULFATE 2.5 MG: 2.5 SOLUTION RESPIRATORY (INHALATION) at 15:56

## 2019-01-01 RX ADMIN — OYSTER SHELL CALCIUM WITH VITAMIN D 1 TABLET: 500; 200 TABLET, FILM COATED ORAL at 21:34

## 2019-01-01 RX ADMIN — CLOTRIMAZOLE 1 TROCHE: 10 LOZENGE ORAL at 11:14

## 2019-01-01 RX ADMIN — NYSTATIN 500000 UNITS: 500000 SUSPENSION ORAL at 18:01

## 2019-01-01 RX ADMIN — LEVALBUTEROL HYDROCHLORIDE 1.25 MG: 1.25 SOLUTION, CONCENTRATE RESPIRATORY (INHALATION) at 11:42

## 2019-01-01 RX ADMIN — Medication 1 CAPSULE: at 18:52

## 2019-01-01 RX ADMIN — TORSEMIDE 10 MG: 10 TABLET ORAL at 08:55

## 2019-01-01 RX ADMIN — UMECLIDINIUM BROMIDE AND VILANTEROL TRIFENATATE 1 PUFF: 62.5; 25 POWDER RESPIRATORY (INHALATION) at 08:49

## 2019-01-01 RX ADMIN — OYSTER SHELL CALCIUM WITH VITAMIN D 1 TABLET: 500; 200 TABLET, FILM COATED ORAL at 09:39

## 2019-01-01 RX ADMIN — Medication 12.5 MG: at 08:55

## 2019-01-01 RX ADMIN — LEVALBUTEROL HYDROCHLORIDE 1.25 MG: 1.25 SOLUTION, CONCENTRATE RESPIRATORY (INHALATION) at 20:30

## 2019-01-01 RX ADMIN — OYSTER SHELL CALCIUM WITH VITAMIN D 1 TABLET: 500; 200 TABLET, FILM COATED ORAL at 09:27

## 2019-01-01 RX ADMIN — DORZOLAMIDE HCL 1 DROP: 20 SOLUTION/ DROPS OPHTHALMIC at 08:39

## 2019-01-01 RX ADMIN — ALBUTEROL SULFATE 2.5 MG: 2.5 SOLUTION RESPIRATORY (INHALATION) at 16:16

## 2019-01-01 RX ADMIN — ISOSORBIDE DINITRATE 10 MG: 10 TABLET ORAL at 08:50

## 2019-01-01 RX ADMIN — HYDRALAZINE HYDROCHLORIDE 10 MG: 10 TABLET ORAL at 21:20

## 2019-01-01 RX ADMIN — LEVALBUTEROL HYDROCHLORIDE 1.25 MG: 1.25 SOLUTION, CONCENTRATE RESPIRATORY (INHALATION) at 00:30

## 2019-01-01 RX ADMIN — POTASSIUM CHLORIDE 40 MEQ: 1500 TABLET, EXTENDED RELEASE ORAL at 12:27

## 2019-01-01 RX ADMIN — PREDNISONE 40 MG: 20 TABLET ORAL at 10:00

## 2019-01-01 RX ADMIN — POTASSIUM CHLORIDE 40 MEQ: 1500 TABLET, EXTENDED RELEASE ORAL at 12:23

## 2019-01-01 RX ADMIN — METOPROLOL SUCCINATE 25 MG: 25 TABLET, EXTENDED RELEASE ORAL at 09:23

## 2019-01-01 RX ADMIN — POTASSIUM CHLORIDE 20 MEQ: 1500 TABLET, EXTENDED RELEASE ORAL at 15:39

## 2019-01-01 RX ADMIN — HYDRALAZINE HYDROCHLORIDE 10 MG: 10 TABLET ORAL at 22:05

## 2019-01-01 RX ADMIN — DORZOLAMIDE HCL 1 DROP: 20 SOLUTION/ DROPS OPHTHALMIC at 08:28

## 2019-01-01 RX ADMIN — LEVALBUTEROL HYDROCHLORIDE 1.25 MG: 1.25 SOLUTION, CONCENTRATE RESPIRATORY (INHALATION) at 07:31

## 2019-01-01 RX ADMIN — POTASSIUM CHLORIDE 20 MEQ: 1500 TABLET, EXTENDED RELEASE ORAL at 20:02

## 2019-01-01 RX ADMIN — ALBUTEROL SULFATE 2.5 MG: 2.5 SOLUTION RESPIRATORY (INHALATION) at 14:27

## 2019-01-01 RX ADMIN — POTASSIUM CHLORIDE 40 MEQ: 1.5 POWDER, FOR SOLUTION ORAL at 09:20

## 2019-01-01 RX ADMIN — OSELTAMIVIR PHOSPHATE 30 MG: 30 CAPSULE ORAL at 08:48

## 2019-01-01 RX ADMIN — ASPIRIN 81 MG: 81 TABLET, COATED ORAL at 20:24

## 2019-01-01 RX ADMIN — FUROSEMIDE 40 MG: 10 INJECTION, SOLUTION INTRAVENOUS at 08:58

## 2019-01-01 RX ADMIN — FUROSEMIDE 40 MG: 40 TABLET ORAL at 13:56

## 2019-01-01 RX ADMIN — ALBUTEROL SULFATE 5 MG: 2.5 SOLUTION RESPIRATORY (INHALATION) at 11:34

## 2019-01-01 RX ADMIN — QUETIAPINE 25 MG: 25 TABLET ORAL at 22:15

## 2019-01-01 RX ADMIN — POTASSIUM CHLORIDE AND SODIUM CHLORIDE 125 ML/HR: 900; 150 INJECTION, SOLUTION INTRAVENOUS at 12:17

## 2019-01-01 RX ADMIN — PANTOPRAZOLE SODIUM 40 MG: 40 TABLET, DELAYED RELEASE ORAL at 06:36

## 2019-01-01 RX ADMIN — DILTIAZEM HYDROCHLORIDE 120 MG: 120 CAPSULE, EXTENDED RELEASE ORAL at 08:58

## 2019-01-01 RX ADMIN — HYDROXYZINE PAMOATE 25 MG: 25 CAPSULE ORAL at 13:23

## 2019-01-01 RX ADMIN — DILTIAZEM HYDROCHLORIDE 120 MG: 120 CAPSULE, EXTENDED RELEASE ORAL at 10:56

## 2019-01-01 RX ADMIN — CALCIUM CARBONATE (ANTACID) CHEW TAB 500 MG 1000 MG: 500 CHEW TAB at 08:55

## 2019-01-01 RX ADMIN — NYSTATIN 500000 UNITS: 500000 SUSPENSION ORAL at 18:02

## 2019-01-01 RX ADMIN — METOPROLOL SUCCINATE 25 MG: 25 TABLET, EXTENDED RELEASE ORAL at 08:27

## 2019-01-01 RX ADMIN — POTASSIUM CHLORIDE 40 MEQ: 1.5 POWDER, FOR SOLUTION ORAL at 20:39

## 2019-01-01 RX ADMIN — HYDRALAZINE HYDROCHLORIDE 10 MG: 10 TABLET ORAL at 14:06

## 2019-01-01 RX ADMIN — ASPIRIN 81 MG: 81 TABLET, COATED ORAL at 21:01

## 2019-01-01 RX ADMIN — NYSTATIN 500000 UNITS: 500000 SUSPENSION ORAL at 21:37

## 2019-01-01 RX ADMIN — OYSTER SHELL CALCIUM WITH VITAMIN D 1 TABLET: 500; 200 TABLET, FILM COATED ORAL at 20:54

## 2019-01-01 RX ADMIN — Medication 1 CAPSULE: at 09:39

## 2019-01-01 RX ADMIN — ALBUTEROL SULFATE 2.5 MG: 2.5 SOLUTION RESPIRATORY (INHALATION) at 00:06

## 2019-01-01 RX ADMIN — POTASSIUM CHLORIDE 40 MEQ: 1.5 POWDER, FOR SOLUTION ORAL at 06:18

## 2019-01-01 RX ADMIN — FUROSEMIDE 40 MG: 10 INJECTION, SOLUTION INTRAVENOUS at 20:50

## 2019-01-01 RX ADMIN — LEVOFLOXACIN 250 MG: 5 INJECTION, SOLUTION INTRAVENOUS at 06:32

## 2019-01-01 RX ADMIN — QUETIAPINE 25 MG: 25 TABLET ORAL at 21:22

## 2019-01-01 RX ADMIN — CLOTRIMAZOLE 1 TROCHE: 10 LOZENGE ORAL at 20:02

## 2019-01-01 RX ADMIN — NYSTATIN 500000 UNITS: 500000 SUSPENSION ORAL at 13:35

## 2019-01-01 RX ADMIN — APIXABAN 2.5 MG: 2.5 TABLET, FILM COATED ORAL at 10:00

## 2019-01-01 RX ADMIN — METHYLPREDNISOLONE SODIUM SUCCINATE 125 MG: 125 INJECTION, POWDER, FOR SOLUTION INTRAMUSCULAR; INTRAVENOUS at 11:35

## 2019-01-01 RX ADMIN — POTASSIUM CHLORIDE 40 MEQ: 1.5 POWDER, FOR SOLUTION ORAL at 09:02

## 2019-01-01 RX ADMIN — PREDNISONE 40 MG: 20 TABLET ORAL at 09:15

## 2019-01-01 RX ADMIN — PANTOPRAZOLE SODIUM 40 MG: 40 TABLET, DELAYED RELEASE ORAL at 06:26

## 2019-01-01 RX ADMIN — POTASSIUM CHLORIDE 20 MEQ: 1500 TABLET, EXTENDED RELEASE ORAL at 08:27

## 2019-01-01 RX ADMIN — HYDRALAZINE HYDROCHLORIDE 10 MG: 10 TABLET ORAL at 08:27

## 2019-01-01 RX ADMIN — LEVALBUTEROL HYDROCHLORIDE 1.25 MG: 1.25 SOLUTION, CONCENTRATE RESPIRATORY (INHALATION) at 07:37

## 2019-01-01 RX ADMIN — POTASSIUM CHLORIDE 20 MEQ: 1500 TABLET, EXTENDED RELEASE ORAL at 16:20

## 2019-01-01 RX ADMIN — HYDRALAZINE HYDROCHLORIDE 10 MG: 10 TABLET ORAL at 09:23

## 2019-01-01 RX ADMIN — METHYLPREDNISOLONE SODIUM SUCCINATE 62.5 MG: 125 INJECTION, POWDER, FOR SOLUTION INTRAMUSCULAR; INTRAVENOUS at 18:21

## 2019-01-01 RX ADMIN — LEVALBUTEROL HYDROCHLORIDE 1.25 MG: 1.25 SOLUTION, CONCENTRATE RESPIRATORY (INHALATION) at 07:54

## 2019-01-01 RX ADMIN — PREDNISONE 40 MG: 20 TABLET ORAL at 09:01

## 2019-01-01 RX ADMIN — DORZOLAMIDE HCL 1 DROP: 20 SOLUTION/ DROPS OPHTHALMIC at 08:54

## 2019-01-01 RX ADMIN — DORZOLAMIDE HCL 1 DROP: 20 SOLUTION/ DROPS OPHTHALMIC at 08:29

## 2019-01-01 RX ADMIN — Medication 1 CAPSULE: at 20:02

## 2019-01-01 RX ADMIN — DILTIAZEM HYDROCHLORIDE 30 MG: 30 TABLET, FILM COATED ORAL at 12:44

## 2019-01-01 RX ADMIN — OSELTAMIVIR PHOSPHATE 30 MG: 30 CAPSULE ORAL at 20:55

## 2019-01-01 RX ADMIN — OYSTER SHELL CALCIUM WITH VITAMIN D 1 TABLET: 500; 200 TABLET, FILM COATED ORAL at 19:39

## 2019-01-01 RX ADMIN — OSELTAMIVIR PHOSPHATE 30 MG: 30 CAPSULE ORAL at 20:43

## 2019-01-01 RX ADMIN — ALBUTEROL SULFATE 2.5 MG: 2.5 SOLUTION RESPIRATORY (INHALATION) at 07:59

## 2019-01-01 RX ADMIN — DORZOLAMIDE HCL 1 DROP: 20 SOLUTION/ DROPS OPHTHALMIC at 20:43

## 2019-01-01 RX ADMIN — ALBUTEROL SULFATE 2.5 MG: 2.5 SOLUTION RESPIRATORY (INHALATION) at 11:38

## 2019-01-01 RX ADMIN — DILTIAZEM HYDROCHLORIDE 30 MG: 30 TABLET, FILM COATED ORAL at 00:05

## 2019-01-01 RX ADMIN — PANTOPRAZOLE SODIUM 40 MG: 40 INJECTION, POWDER, FOR SOLUTION INTRAVENOUS at 08:57

## 2019-01-01 RX ADMIN — OSELTAMIVIR PHOSPHATE 30 MG: 30 CAPSULE ORAL at 21:01

## 2019-01-01 RX ADMIN — ALBUTEROL SULFATE 2.5 MG: 2.5 SOLUTION RESPIRATORY (INHALATION) at 22:31

## 2019-01-01 RX ADMIN — OSELTAMIVIR PHOSPHATE 30 MG: 30 CAPSULE ORAL at 09:27

## 2019-01-01 RX ADMIN — ISOSORBIDE DINITRATE 10 MG: 10 TABLET ORAL at 16:20

## 2019-01-01 RX ADMIN — PANTOPRAZOLE SODIUM 40 MG: 40 TABLET, DELAYED RELEASE ORAL at 06:34

## 2019-01-01 RX ADMIN — METHYLPREDNISOLONE SODIUM SUCCINATE 62.5 MG: 125 INJECTION, POWDER, FOR SOLUTION INTRAMUSCULAR; INTRAVENOUS at 23:48

## 2019-01-01 RX ADMIN — ISOSORBIDE DINITRATE 10 MG: 10 TABLET ORAL at 22:08

## 2019-01-01 RX ADMIN — OYSTER SHELL CALCIUM WITH VITAMIN D 1 TABLET: 500; 200 TABLET, FILM COATED ORAL at 09:16

## 2019-01-01 RX ADMIN — APIXABAN 2.5 MG: 2.5 TABLET, FILM COATED ORAL at 20:42

## 2019-01-01 RX ADMIN — DORZOLAMIDE HCL 1 DROP: 20 SOLUTION/ DROPS OPHTHALMIC at 21:04

## 2019-01-01 RX ADMIN — PANTOPRAZOLE SODIUM 40 MG: 40 TABLET, DELAYED RELEASE ORAL at 08:11

## 2019-01-01 RX ADMIN — POTASSIUM CHLORIDE 40 MEQ: 1500 TABLET, EXTENDED RELEASE ORAL at 08:55

## 2019-01-01 RX ADMIN — ALBUTEROL SULFATE 2.5 MG: 2.5 SOLUTION RESPIRATORY (INHALATION) at 22:00

## 2019-01-01 RX ADMIN — APIXABAN 2.5 MG: 2.5 TABLET, FILM COATED ORAL at 09:16

## 2019-01-01 RX ADMIN — LOSARTAN POTASSIUM 50 MG: 50 TABLET, FILM COATED ORAL at 09:01

## 2019-01-01 RX ADMIN — UMECLIDINIUM BROMIDE AND VILANTEROL TRIFENATATE 1 PUFF: 62.5; 25 POWDER RESPIRATORY (INHALATION) at 09:08

## 2019-01-01 RX ADMIN — DORZOLAMIDE HCL 1 DROP: 20 SOLUTION/ DROPS OPHTHALMIC at 21:17

## 2019-01-01 RX ADMIN — ISOSORBIDE DINITRATE 10 MG: 10 TABLET ORAL at 15:24

## 2019-01-01 RX ADMIN — ALBUTEROL SULFATE 2.5 MG: 2.5 SOLUTION RESPIRATORY (INHALATION) at 07:35

## 2019-01-01 RX ADMIN — UMECLIDINIUM BROMIDE AND VILANTEROL TRIFENATATE 1 PUFF: 62.5; 25 POWDER RESPIRATORY (INHALATION) at 08:27

## 2019-01-01 RX ADMIN — DORZOLAMIDE HCL 1 DROP: 20 SOLUTION/ DROPS OPHTHALMIC at 08:51

## 2019-01-01 RX ADMIN — ISOSORBIDE DINITRATE 10 MG: 10 TABLET ORAL at 20:21

## 2019-01-01 RX ADMIN — NYSTATIN 500000 UNITS: 500000 SUSPENSION ORAL at 09:59

## 2019-01-01 RX ADMIN — PANTOPRAZOLE SODIUM 40 MG: 40 TABLET, DELAYED RELEASE ORAL at 20:10

## 2019-01-01 RX ADMIN — METOPROLOL SUCCINATE 25 MG: 25 TABLET, EXTENDED RELEASE ORAL at 08:11

## 2019-01-01 RX ADMIN — UMECLIDINIUM BROMIDE AND VILANTEROL TRIFENATATE 1 PUFF: 62.5; 25 POWDER RESPIRATORY (INHALATION) at 09:57

## 2019-01-01 RX ADMIN — METHYLPREDNISOLONE SODIUM SUCCINATE 62.5 MG: 125 INJECTION, POWDER, FOR SOLUTION INTRAMUSCULAR; INTRAVENOUS at 00:10

## 2019-01-01 RX ADMIN — Medication 140 MG: at 09:02

## 2019-01-01 RX ADMIN — PANTOPRAZOLE SODIUM 40 MG: 40 TABLET, DELAYED RELEASE ORAL at 21:22

## 2019-01-01 RX ADMIN — UMECLIDINIUM BROMIDE AND VILANTEROL TRIFENATATE 1 PUFF: 62.5; 25 POWDER RESPIRATORY (INHALATION) at 08:41

## 2019-01-01 RX ADMIN — POTASSIUM CHLORIDE 40 MEQ: 1500 TABLET, EXTENDED RELEASE ORAL at 03:08

## 2019-01-01 RX ADMIN — PANTOPRAZOLE SODIUM 40 MG: 40 TABLET, DELAYED RELEASE ORAL at 08:27

## 2019-01-01 RX ADMIN — QUETIAPINE 25 MG: 25 TABLET ORAL at 22:05

## 2019-01-01 RX ADMIN — LOSARTAN POTASSIUM 50 MG: 50 TABLET, FILM COATED ORAL at 08:55

## 2019-01-01 RX ADMIN — ISOSORBIDE DINITRATE 10 MG: 10 TABLET ORAL at 09:23

## 2019-01-01 RX ADMIN — DILTIAZEM HYDROCHLORIDE 120 MG: 120 CAPSULE, EXTENDED RELEASE ORAL at 08:30

## 2019-01-01 RX ADMIN — ALBUTEROL SULFATE 2.5 MG: 2.5 SOLUTION RESPIRATORY (INHALATION) at 11:39

## 2019-01-01 RX ADMIN — OYSTER SHELL CALCIUM WITH VITAMIN D 1 TABLET: 500; 200 TABLET, FILM COATED ORAL at 08:55

## 2019-01-01 RX ADMIN — ISOSORBIDE DINITRATE 10 MG: 10 TABLET ORAL at 15:38

## 2019-01-01 RX ADMIN — HYDRALAZINE HYDROCHLORIDE 10 MG: 10 TABLET ORAL at 20:21

## 2019-01-01 RX ADMIN — DORZOLAMIDE HCL 1 DROP: 20 SOLUTION/ DROPS OPHTHALMIC at 20:52

## 2019-01-01 RX ADMIN — Medication 12.5 MG: at 09:26

## 2019-01-01 RX ADMIN — ALBUTEROL SULFATE 2.5 MG: 2.5 SOLUTION RESPIRATORY (INHALATION) at 03:55

## 2019-01-01 RX ADMIN — DORZOLAMIDE HCL 1 DROP: 20 SOLUTION/ DROPS OPHTHALMIC at 20:48

## 2019-01-01 RX ADMIN — DILTIAZEM HYDROCHLORIDE 120 MG: 120 CAPSULE, EXTENDED RELEASE ORAL at 08:55

## 2019-01-01 RX ADMIN — CLOTRIMAZOLE 1 TROCHE: 10 LOZENGE ORAL at 08:32

## 2019-01-01 RX ADMIN — METHYLPREDNISOLONE SODIUM SUCCINATE 62.5 MG: 125 INJECTION, POWDER, FOR SOLUTION INTRAMUSCULAR; INTRAVENOUS at 18:27

## 2019-01-01 RX ADMIN — DILTIAZEM HYDROCHLORIDE 30 MG: 30 TABLET, FILM COATED ORAL at 06:32

## 2019-01-01 RX ADMIN — DORZOLAMIDE HCL 1 DROP: 20 SOLUTION/ DROPS OPHTHALMIC at 20:57

## 2019-01-01 RX ADMIN — PANTOPRAZOLE SODIUM 40 MG: 40 TABLET, DELAYED RELEASE ORAL at 16:54

## 2019-01-01 RX ADMIN — OSELTAMIVIR PHOSPHATE 30 MG: 30 CAPSULE ORAL at 09:01

## 2019-01-01 RX ADMIN — METHYLPREDNISOLONE SODIUM SUCCINATE 62.5 MG: 125 INJECTION, POWDER, FOR SOLUTION INTRAMUSCULAR; INTRAVENOUS at 12:39

## 2019-01-01 RX ADMIN — LEVALBUTEROL HYDROCHLORIDE 1.25 MG: 1.25 SOLUTION, CONCENTRATE RESPIRATORY (INHALATION) at 07:43

## 2019-01-01 RX ADMIN — DORZOLAMIDE HCL 1 DROP: 20 SOLUTION/ DROPS OPHTHALMIC at 09:01

## 2019-01-01 RX ADMIN — DORZOLAMIDE HCL 1 DROP: 20 SOLUTION/ DROPS OPHTHALMIC at 09:16

## 2019-01-01 RX ADMIN — PANTOPRAZOLE SODIUM 40 MG: 40 TABLET, DELAYED RELEASE ORAL at 19:22

## 2019-01-01 RX ADMIN — OSELTAMIVIR PHOSPHATE 30 MG: 30 CAPSULE ORAL at 09:39

## 2019-01-01 RX ADMIN — DORZOLAMIDE HCL 1 DROP: 20 SOLUTION/ DROPS OPHTHALMIC at 21:32

## 2019-01-01 RX ADMIN — ALBUTEROL SULFATE 2.5 MG: 2.5 SOLUTION RESPIRATORY (INHALATION) at 16:30

## 2019-01-01 RX ADMIN — PREDNISONE 40 MG: 20 TABLET ORAL at 08:48

## 2019-01-01 RX ADMIN — Medication 1 CAPSULE: at 19:39

## 2019-01-01 RX ADMIN — ENOXAPARIN SODIUM 40 MG: 40 INJECTION SUBCUTANEOUS at 08:56

## 2019-01-01 RX ADMIN — ENOXAPARIN SODIUM 40 MG: 40 INJECTION SUBCUTANEOUS at 08:54

## 2019-01-01 RX ADMIN — ALBUTEROL SULFATE 2.5 MG: 2.5 SOLUTION RESPIRATORY (INHALATION) at 06:06

## 2019-01-01 RX ADMIN — LEVALBUTEROL HYDROCHLORIDE 1.25 MG: 1.25 SOLUTION, CONCENTRATE RESPIRATORY (INHALATION) at 23:22

## 2019-01-01 RX ADMIN — HYDRALAZINE HYDROCHLORIDE 10 MG: 10 TABLET ORAL at 22:14

## 2019-01-01 RX ADMIN — DORZOLAMIDE HCL 1 DROP: 20 SOLUTION/ DROPS OPHTHALMIC at 20:50

## 2019-01-01 RX ADMIN — QUETIAPINE 25 MG: 25 TABLET ORAL at 21:20

## 2019-01-01 RX ADMIN — HYDROXYZINE PAMOATE 25 MG: 25 CAPSULE ORAL at 14:18

## 2019-01-01 RX ADMIN — Medication 12.5 MG: at 09:01

## 2019-01-01 RX ADMIN — FUROSEMIDE 40 MG: 10 INJECTION, SOLUTION INTRAVENOUS at 09:27

## 2019-01-01 RX ADMIN — DORZOLAMIDE HCL 1 DROP: 20 SOLUTION/ DROPS OPHTHALMIC at 20:51

## 2019-01-01 RX ADMIN — DORZOLAMIDE HCL 1 DROP: 20 SOLUTION/ DROPS OPHTHALMIC at 21:02

## 2019-01-01 RX ADMIN — Medication 12.5 MG: at 10:55

## 2019-01-01 RX ADMIN — OYSTER SHELL CALCIUM WITH VITAMIN D 1 TABLET: 500; 200 TABLET, FILM COATED ORAL at 21:01

## 2019-01-01 RX ADMIN — FUROSEMIDE 40 MG: 40 TABLET ORAL at 08:35

## 2019-01-01 RX ADMIN — LEVALBUTEROL HYDROCHLORIDE 1.25 MG: 1.25 SOLUTION, CONCENTRATE RESPIRATORY (INHALATION) at 23:19

## 2019-01-01 RX ADMIN — ALBUTEROL SULFATE 2.5 MG: 2.5 SOLUTION RESPIRATORY (INHALATION) at 20:22

## 2019-01-01 RX ADMIN — NYSTATIN 500000 UNITS: 500000 SUSPENSION ORAL at 09:06

## 2019-01-01 RX ADMIN — OYSTER SHELL CALCIUM WITH VITAMIN D 1 TABLET: 500; 200 TABLET, FILM COATED ORAL at 08:48

## 2019-01-01 RX ADMIN — LOSARTAN POTASSIUM 50 MG: 50 TABLET, FILM COATED ORAL at 10:00

## 2019-01-01 RX ADMIN — POTASSIUM CHLORIDE 20 MEQ: 1.5 POWDER, FOR SOLUTION ORAL at 20:51

## 2019-01-01 RX ADMIN — Medication 140 MG: at 08:12

## 2019-01-01 RX ADMIN — HYDRALAZINE HYDROCHLORIDE 10 MG: 10 TABLET ORAL at 15:38

## 2019-01-01 RX ADMIN — SPIRONOLACTONE 12.5 MG: 25 TABLET ORAL at 08:58

## 2019-01-01 RX ADMIN — Medication 1 CAPSULE: at 18:28

## 2019-01-01 RX ADMIN — PANTOPRAZOLE SODIUM 40 MG: 40 INJECTION, POWDER, FOR SOLUTION INTRAVENOUS at 08:33

## 2019-01-01 RX ADMIN — LEVALBUTEROL HYDROCHLORIDE 1.25 MG: 1.25 SOLUTION, CONCENTRATE RESPIRATORY (INHALATION) at 15:37

## 2019-01-01 RX ADMIN — POTASSIUM CHLORIDE 20 MEQ: 1500 TABLET, EXTENDED RELEASE ORAL at 08:11

## 2019-01-01 RX ADMIN — PANTOPRAZOLE SODIUM 40 MG: 40 TABLET, DELAYED RELEASE ORAL at 20:40

## 2019-01-01 RX ADMIN — FUROSEMIDE 40 MG: 10 INJECTION, SOLUTION INTRAVENOUS at 08:57

## 2019-01-01 RX ADMIN — LOSARTAN POTASSIUM 50 MG: 50 TABLET, FILM COATED ORAL at 09:27

## 2019-01-01 RX ADMIN — ISOSORBIDE DINITRATE 10 MG: 10 TABLET ORAL at 22:14

## 2019-01-01 RX ADMIN — Medication 1 CAPSULE: at 17:33

## 2019-01-01 RX ADMIN — METHYLPREDNISOLONE SODIUM SUCCINATE 125 MG: 125 INJECTION, POWDER, FOR SOLUTION INTRAMUSCULAR; INTRAVENOUS at 16:09

## 2019-01-01 RX ADMIN — POTASSIUM CHLORIDE 20 MEQ: 1500 TABLET, EXTENDED RELEASE ORAL at 08:49

## 2019-01-01 RX ADMIN — ALBUTEROL SULFATE 2.5 MG: 2.5 SOLUTION RESPIRATORY (INHALATION) at 15:33

## 2019-01-01 RX ADMIN — OYSTER SHELL CALCIUM WITH VITAMIN D 1 TABLET: 500; 200 TABLET, FILM COATED ORAL at 10:00

## 2019-01-01 RX ADMIN — POTASSIUM CHLORIDE 40 MEQ: 1.5 POWDER, FOR SOLUTION ORAL at 21:24

## 2019-01-01 RX ADMIN — OSELTAMIVIR PHOSPHATE 30 MG: 30 CAPSULE ORAL at 20:24

## 2019-01-01 RX ADMIN — HYDROXYZINE PAMOATE 25 MG: 25 CAPSULE ORAL at 10:55

## 2019-01-01 RX ADMIN — APIXABAN 2.5 MG: 2.5 TABLET, FILM COATED ORAL at 08:48

## 2019-01-01 RX ADMIN — Medication 1 CAPSULE: at 09:16

## 2019-01-01 RX ADMIN — DORZOLAMIDE HCL 1 DROP: 20 SOLUTION/ DROPS OPHTHALMIC at 08:35

## 2019-01-01 RX ADMIN — DEXTRAN 70, AND HYPROMELLOSE 2910 2 DROP: 1; 3 SOLUTION/ DROPS OPHTHALMIC at 17:33

## 2019-01-01 RX ADMIN — ALBUTEROL SULFATE 2.5 MG: 2.5 SOLUTION RESPIRATORY (INHALATION) at 20:06

## 2019-01-01 RX ADMIN — METHYLPREDNISOLONE SODIUM SUCCINATE 62.5 MG: 125 INJECTION, POWDER, FOR SOLUTION INTRAMUSCULAR; INTRAVENOUS at 18:20

## 2019-01-01 RX ADMIN — ISOSORBIDE DINITRATE 10 MG: 10 TABLET ORAL at 08:11

## 2019-01-01 RX ADMIN — ALBUTEROL SULFATE 2.5 MG: 2.5 SOLUTION RESPIRATORY (INHALATION) at 07:32

## 2019-01-01 RX ADMIN — CLOTRIMAZOLE 1 TROCHE: 10 LOZENGE ORAL at 14:43

## 2019-01-01 RX ADMIN — LEVALBUTEROL HYDROCHLORIDE 1.25 MG: 1.25 SOLUTION, CONCENTRATE RESPIRATORY (INHALATION) at 23:59

## 2019-01-01 RX ADMIN — FUROSEMIDE 40 MG: 40 TABLET ORAL at 12:27

## 2019-01-01 RX ADMIN — FUROSEMIDE 40 MG: 10 INJECTION, SOLUTION INTRAVENOUS at 09:43

## 2019-01-01 RX ADMIN — LOSARTAN POTASSIUM 50 MG: 50 TABLET, FILM COATED ORAL at 10:55

## 2019-01-01 RX ADMIN — LEVALBUTEROL HYDROCHLORIDE 1.25 MG: 1.25 SOLUTION, CONCENTRATE RESPIRATORY (INHALATION) at 19:48

## 2019-01-01 RX ADMIN — METHYLPREDNISOLONE SODIUM SUCCINATE 62.5 MG: 125 INJECTION, POWDER, FOR SOLUTION INTRAMUSCULAR; INTRAVENOUS at 05:08

## 2019-01-01 RX ADMIN — LEVALBUTEROL HYDROCHLORIDE 1.25 MG: 1.25 SOLUTION, CONCENTRATE RESPIRATORY (INHALATION) at 11:38

## 2019-01-01 RX ADMIN — DORZOLAMIDE HCL 1 DROP: 20 SOLUTION/ DROPS OPHTHALMIC at 08:25

## 2019-01-01 RX ADMIN — METOPROLOL SUCCINATE 25 MG: 25 TABLET, EXTENDED RELEASE ORAL at 09:02

## 2019-01-01 RX ADMIN — CALCIUM CARBONATE (ANTACID) CHEW TAB 500 MG 1000 MG: 500 CHEW TAB at 02:24

## 2019-01-01 RX ADMIN — TORSEMIDE 10 MG: 10 TABLET ORAL at 12:23

## 2019-01-01 RX ADMIN — FUROSEMIDE 40 MG: 10 INJECTION, SOLUTION INTRAVENOUS at 16:05

## 2019-01-01 RX ADMIN — LEVALBUTEROL HYDROCHLORIDE 1.25 MG: 1.25 SOLUTION, CONCENTRATE RESPIRATORY (INHALATION) at 11:12

## 2019-01-01 RX ADMIN — HYDRALAZINE HYDROCHLORIDE 10 MG: 10 TABLET ORAL at 09:02

## 2019-01-01 RX ADMIN — ISOSORBIDE DINITRATE 10 MG: 10 TABLET ORAL at 21:22

## 2019-01-01 RX ADMIN — ISOSORBIDE DINITRATE 10 MG: 10 TABLET ORAL at 15:39

## 2019-01-01 RX ADMIN — DORZOLAMIDE HCL 1 DROP: 20 SOLUTION/ DROPS OPHTHALMIC at 08:44

## 2019-01-01 ASSESSMENT — MIFFLIN-ST. JEOR
SCORE: 969.75
SCORE: 978
SCORE: 959.33
SCORE: 966.12
SCORE: 975.66
SCORE: 953.89
SCORE: 952.52
SCORE: 913.06
SCORE: 941.64
SCORE: 975.65
SCORE: 968.39
SCORE: 1018.88
SCORE: 939.82
SCORE: 924.4
SCORE: 969.31
SCORE: 981.1
SCORE: 929.84
SCORE: 966.58
SCORE: 962.5
SCORE: 939.36
SCORE: 982.92
SCORE: 989.27
SCORE: 985.19

## 2019-01-01 ASSESSMENT — ACTIVITIES OF DAILY LIVING (ADL)
ADLS_ACUITY_SCORE: 21
ADLS_ACUITY_SCORE: 21
ADLS_ACUITY_SCORE: 18
ADLS_ACUITY_SCORE: 19
ADLS_ACUITY_SCORE: 21
ADLS_ACUITY_SCORE: 19
DEPENDENT_IADLS:: INDEPENDENT
ADLS_ACUITY_SCORE: 21
RETIRED_EATING: 0-->INDEPENDENT
SWALLOWING: 0-->SWALLOWS FOODS/LIQUIDS WITHOUT DIFFICULTY
ADLS_ACUITY_SCORE: 19
TRANSFERRING: 1-->ASSISTIVE EQUIPMENT
ADLS_ACUITY_SCORE: 21
ADLS_ACUITY_SCORE: 19
ADLS_ACUITY_SCORE: 19
ADLS_ACUITY_SCORE: 17
ADLS_ACUITY_SCORE: 19
WHICH_OF_THE_ABOVE_FUNCTIONAL_RISKS_HAD_A_RECENT_ONSET_OR_CHANGE?: AMBULATION;TRANSFERRING
ADLS_ACUITY_SCORE: 21
ADLS_ACUITY_SCORE: 17
ADLS_ACUITY_SCORE: 19
ADLS_ACUITY_SCORE: 19
ADLS_ACUITY_SCORE: 21
ADLS_ACUITY_SCORE: 19
DEPENDENT_IADLS:: INDEPENDENT
ADLS_ACUITY_SCORE: 19
ADLS_ACUITY_SCORE: 17
ADLS_ACUITY_SCORE: 20
TOILETING: 1-->ASSISTIVE EQUIPMENT
ADLS_ACUITY_SCORE: 17
ADLS_ACUITY_SCORE: 19
ADLS_ACUITY_SCORE: 21
ADLS_ACUITY_SCORE: 21
FALL_HISTORY_WITHIN_LAST_SIX_MONTHS: NO
ADLS_ACUITY_SCORE: 20
AMBULATION: 1-->ASSISTIVE EQUIPMENT
DRESS: 0-->INDEPENDENT
ADLS_ACUITY_SCORE: 19
RETIRED_COMMUNICATION: 0-->UNDERSTANDS/COMMUNICATES WITHOUT DIFFICULTY
RETIRED_EATING: 0-->INDEPENDENT
TOILETING: 0-->INDEPENDENT
COGNITION: 0 - NO COGNITION ISSUES REPORTED
ADLS_ACUITY_SCORE: 17
DEPENDENT_IADLS:: INDEPENDENT
ADLS_ACUITY_SCORE: 19
ADLS_ACUITY_SCORE: 19
ADLS_ACUITY_SCORE: 21
ADLS_ACUITY_SCORE: 17
FALL_HISTORY_WITHIN_LAST_SIX_MONTHS: NO
ADLS_ACUITY_SCORE: 21
ADLS_ACUITY_SCORE: 17
ADLS_ACUITY_SCORE: 17
WHICH_OF_THE_ABOVE_FUNCTIONAL_RISKS_HAD_A_RECENT_ONSET_OR_CHANGE?: AMBULATION;TRANSFERRING;TOILETING;BATHING;DRESSING
ADLS_ACUITY_SCORE: 17
ADLS_ACUITY_SCORE: 19
DEPENDENT_IADLS:: INDEPENDENT
BATHING: 0-->INDEPENDENT
COGNITION: 0 - NO COGNITION ISSUES REPORTED
ADLS_ACUITY_SCORE: 19
ADLS_ACUITY_SCORE: 21
ADLS_ACUITY_SCORE: 22
ADLS_ACUITY_SCORE: 19
ADLS_ACUITY_SCORE: 17
SWALLOWING: 0-->SWALLOWS FOODS/LIQUIDS WITHOUT DIFFICULTY
ADLS_ACUITY_SCORE: 19
ADLS_ACUITY_SCORE: 17
ADLS_ACUITY_SCORE: 19
ADLS_ACUITY_SCORE: 21
RETIRED_COMMUNICATION: 0-->UNDERSTANDS/COMMUNICATES WITHOUT DIFFICULTY
ADLS_ACUITY_SCORE: 21
DRESS: 1-->ASSISTIVE EQUIPMENT
BATHING: 1-->ASSISTIVE EQUIPMENT
ADLS_ACUITY_SCORE: 19
AMBULATION: 1-->ASSISTIVE EQUIPMENT
ADLS_ACUITY_SCORE: 20
ADLS_ACUITY_SCORE: 19
ADLS_ACUITY_SCORE: 19
ADLS_ACUITY_SCORE: 17
TRANSFERRING: 1-->ASSISTIVE EQUIPMENT
ADLS_ACUITY_SCORE: 21

## 2019-01-01 ASSESSMENT — ENCOUNTER SYMPTOMS
SHORTNESS OF BREATH: 1
COUGH: 1
SHORTNESS OF BREATH: 1
ABDOMINAL PAIN: 0
VOMITING: 0
FEVER: 0
FEVER: 0
WHEEZING: 1
COUGH: 0
DIARRHEA: 0
WEAKNESS: 1
SORE THROAT: 0
BLOOD IN STOOL: 0

## 2019-01-02 NOTE — TELEPHONE ENCOUNTER
Called patient to follow up on how her rash is doing after resuming her aldactone.     Pt states she believes her rash is doing well. Pt reports that her rash is similar to hives she's had on and off for two years and that it didn't seem to get better or worse after stopping or restarting her aldactone. Pt instructed to call if any questions or concerns arise. Pt verbalized understanding.

## 2019-01-28 NOTE — TELEPHONE ENCOUNTER
Reason for Call:  Medication    Detailed comments: Patient got a letter from her insurance saying her medication losartan is being recalled- she doesn't need a refill yet but is wondering if she will need a new prescription? Would like to discuss with a nurse.    Phone Number Patient can be reached at: Home number on file [phone] (home)    Best Time: Anytime    Can we leave a detailed message on this number? YES    Call taken on 1/28/2019 at 3:27 PM by Anneliese Martinez

## 2019-01-31 NOTE — TELEPHONE ENCOUNTER
"Requested Prescriptions   Pending Prescriptions Disp Refills     ANORO ELLIPTA 62.5-25 MCG/INH oral inhaler [Pharmacy Med Name: Anoro Ellipta Inhalation Aerosol Powder Breath Activated 62.5-25 MCG/INH]  10     Sig: Inhale 1 puff into the lungs daily    Asthma Maintenance Inhalers - Anticholinergics Passed - 1/31/2019  8:50 AM       Passed - Patient is age 12 years or older       Passed - Recent (12 mo) or future (30 days) visit within the authorizing provider's specialty    Patient had office visit in the last 12 months or has a visit in the next 30 days with authorizing provider or within the authorizing provider's specialty.  See \"Patient Info\" tab in inbasket, or \"Choose Columns\" in Meds & Orders section of the refill encounter.             Passed - Medication is active on med list          "

## 2019-02-03 PROBLEM — J44.1 COPD EXACERBATION (H): Status: ACTIVE | Noted: 2019-01-01

## 2019-02-03 NOTE — PHARMACY-ADMISSION MEDICATION HISTORY
Admission medication history interview status for the 2/3/2019  admission is complete. See EPIC admission navigator for prior to admission medications     Medication history source reliability:Good    Actions taken by pharmacist (provider contacted, etc):  PTA list obtained through patient's home med list and interview with patient and daughter     Additional medication history information not noted on PTA med list :None    Medication reconciliation/reorder completed by provider prior to medication history? No    Time spent in this activity:  20 minutes    Prior to Admission medications    Medication Sig Last Dose Taking? Auth Provider   ANORO ELLIPTA 62.5-25 MCG/INH oral inhaler Inhale 1 puff into the lungs daily 2/3/2019 at AM Yes Madi Willoughby MD   ascorbic acid 500 MG TABS Take 500 mg by mouth daily 2/3/2019 at AM Yes Reported, Patient   aspirin 81 MG tablet Take 1 tablet (81 mg) by mouth daily 2/2/2019 at PM Yes Sharon Hurtado MD   bimatoprost (LUMIGAN) 0.01 % SOLN Place 1 drop into both eyes At Bedtime.   2/2/2019 at HS Yes Reported, Patient   Calcium Carb-Cholecalciferol (CALCIUM-VITAMIN D3) 600-500 MG-UNIT CAPS Take 1 tablet by mouth every 12 hours 2/3/2019 at AM Yes Unknown, Entered By History   dorzolamide (TRUSOPT) 2 % ophthalmic solution Place 1 drop into both eyes 2 times daily 2/3/2019 at AM Yes Reported, Patient   Flaxseed, Linseed, (FLAXSEED OIL PO) Take 1 tablet by mouth every morning 2/3/2019 at AM Yes Unknown, Entered By History   Glucosamine Sulfate 500 MG TABS Take 1 tablet by mouth every morning 2/3/2019 at AM Yes Unknown, Entered By History   losartan (COZAAR) 50 MG tablet Take 1 tablet (50 mg) by mouth daily 2/3/2019 at AM Yes Sharon Hurtado MD   Multiple Vitamins-Minerals (PRESERVISION AREDS 2 PO) Take 1 tablet by mouth 2 times daily 2/3/2019 at AM Yes Unknown, Entered By History   polyethylene glycol 400 (BLINK TEARS) 0.25 % SOLN ophthalmic solution Place 1 drop into both eyes  every 3 hours as needed for dry eyes 2/3/2019 Yes Unknown, Entered By History   spironolactone (ALDACTONE) 25 MG tablet Take 0.5 tablets (12.5 mg) by mouth daily HOLD 11/27/18 for two weeks due to rash 2/3/2019 at AM Yes Lizbeth Bond MD   albuterol (PROAIR HFA/PROVENTIL HFA/VENTOLIN HFA) 108 (90 Base) MCG/ACT Inhaler Inhale 2 puffs into the lungs every 4 hours as needed for shortness of breath / dyspnea or wheezing PRN  Reported, Patient     Suad Hughes, Pharm.D IV Student

## 2019-02-03 NOTE — ED PROVIDER NOTES
History     Chief Complaint:  Shortness of breath    HPI   Jamilah Rodriguez is a 87 year old female with a history of COPD who presents with shortness of breath. The patient reports that she has been experiencing shortness of breath and chest heaviness for 3 days. Last night, the patient bent over to take off her socks and that action took her breath away and she needed to use her inhaler. Actions such as getting dressed cause her to become short of breath. The patient also reports that she has been congested with a cough and wheezing for the past 2 nights. She notes that she had Spironolactone recently added to her prescription medications. The patient denies ever feeling like this before, any fever, leg swelling, history of heart attacks, blood in stool, ear pain, sore throat, any recent travel, or any usage of blood thinners.    Allergies:  Atenolol  Beta Adrenergic Blockers  Brimonidine  Cephalexin  Germall Plus  Latex  Morphine  Penicillins  Sulfa Drugs  Tramadol  Tylenol  Vicodin  Fentanyl     Medications:    Albuterol  Anoro Ellipta  Ascorbic Acid  Aspirin  Lumigan  Trusopt  Glucosamine Sulfate  Advil  Cozaar  Aldactone- Spironolactone     Past Medical History:    Abdominal Aortic Aneurysm  Cardiomyopathy  Cervical Cancer  Chromic Dermatitis  COPD  Glaucoma  Edema  Hypertension  Left Bundle Branch Block  Macular Degeneration  Osteoporosis  Peripheral Artery Disease  Vertebral Compression Fracture    Past Surgical History:    Cataract  Heart cath coronary angiogram   Hysterectomy  Laparoscopy Diagnostic   Open Reduction Internal Fixation Hip Nailing  Open Reduction internal fixation tibial plateau  Open Reducation internal fixation wrist    Family History:    Father: CAD  Sister: Diabetes    Social History:  Smoking Status: Former Smoker  Alcohol Use: No  Patient presents with another person.  Marital Status:        Review of Systems   Constitutional: Negative for fever.   HENT: Positive for  "congestion. Negative for ear pain and sore throat.    Respiratory: Positive for cough, shortness of breath and wheezing.    Cardiovascular: Positive for chest pain (heaviness). Negative for leg swelling.   Gastrointestinal: Negative for blood in stool.       Physical Exam   First Vitals:  BP: 118/68  Pulse: 90  Heart Rate: 87  Temp: 98.4  F (36.9  C)  Resp: 18  Height: 165.1 cm (5' 5\")  Weight: 55.3 kg (122 lb)  SpO2: 97 %      Physical Exam  Nursing note and vitals reviewed.  Constitutional:  Appears well-developed and well-nourished.   HENT:   Head:    Atraumatic.   Mouth/Throat:   Oropharynx is clear and moist. No oropharyngeal exudate.   Eyes:    Pupils are equal, round, and reactive to light.   Neck:    Normal range of motion. Neck supple.      No tracheal deviation present. No thyromegaly present.   Cardiovascular:  Normal rate, regular rhythm, no murmur   Pulmonary/Chest: Diminished breath sounds bilaterally with expiratory wheezes..  Abdominal:   Soft. Bowel sounds are normal. Exhibits no distension and      no mass. There is no tenderness.      There is no rebound and no guarding.   Musculoskeletal:  Exhibits no edema.   Lymphadenopathy:  No cervical adenopathy.   Neurological:   Alert and oriented to person, place, and time.   Skin:    Skin is warm and dry. No rash noted. No pallor.     Emergency Department Course     Imaging:  Radiographic findings were communicated with the patient who voiced understanding of the findings.  Chest XR, PA & LAT  Mild cardiomegaly. Pulmonary vasculature is not distended.  Prominent hyperinflation consistent with COPD. No focal infiltrates or  evidence of pleural fluid.  As read by Radiology.    Laboratory:  Influenza A/B Antigen: Positive (A)  CBC: WNL (WBC 5.2, HGB 13.1, )  Troponin l: 0.099 (H)  BMP:  (L)  o/w WNL (Creatinine 0.81)    Interventions:  1134 Albuterol 5 mg Nebulization  1135 Medrol 125 mg IV  1326 Aspirin 324 mg oral  1524 Tamiflu 74 mg " oral    Emergency Department Course:  Past medical records, nursing notes, and vitals reviewed.  1110: I performed an exam of the patient and obtained history, as documented above.    The patient was sent for a chest xray while in the emergency department, findings above.    Labs were performed,    Medication was given.    1308: I discussed the case with Dr. Al regarding the patient.    Findings and plan explained to the Patient who consents to admission.     1316: Discussed the patient with Dr. Al, who will admit the patient to a hospital bed for further monitoring, evaluation, and treatment.     Impression & Plan      Medical Decision Making:  Jamilah Rodriguez is a 87 year old female who I found to have hypoxic respiratory failure due to COPD exacerbation from influenza type A. There is no sign of bacterial pneumonia. She was given Tamiflu, Medrol, supplemental oxygen, and nebulizer treatments here and admitted to the hospital floor under the care of the hospitalist.     Diagnosis:    ICD-10-CM    1. Acute respiratory failure with hypoxia (H) J96.01 Influenza A/B antigen   2. COPD exacerbation (H) J44.1    3. Influenza A J10.1        Disposition:  Admitted to hospital    Discharge Medications:     Medication List      There are no discharge medications for this visit.       Bijal Cartwright  2/3/2019    EMERGENCY DEPARTMENT  I, Bijal Cartwright, am serving as a scribe at 11:10 AM on 2/3/2019 to document services personally performed by Matilde Conley MD based on my observations and the provider's statements to me.     Matilde Conley MD  02/03/19 2120

## 2019-02-03 NOTE — PLAN OF CARE
RECEIVING UNIT ED HANDOFF REVIEW    ED Nurse Handoff Report was reviewed by: Anneliese Redman on February 3, 2019 at 2:00 PM

## 2019-02-03 NOTE — ED NOTES
"[facility]  ED Nurse Handoff Report    ED Chief complaint: Shortness of Breath (with cp, started Friday, worse at night. )      ED Diagnosis:   Final diagnoses:   Acute respiratory failure with hypoxia (H)       Code Status: DNR / DNI    Allergies:   Allergies   Allergen Reactions     Atenolol Hives     Beta Adrenergic Blockers      Brimonidine      Cephalexin Hives     Germall Plus Itching     Imidazolidinyl Urea found in many topical creams and house hold products     Latex      Itching     Morphine GI Disturbance     No Clinical Screening - See Comments Itching     Carba Mix; IMIDAZOLIDINYL UREA - Allergic to this ingredient, which is found in many common topical moisturizers and household products.     Penicillins Hives     Sulfa Drugs GI Disturbance     Tramadol Nausea and Vomiting     Tylenol GI Disturbance     Vicodin [Hydrocodone-Acetaminophen]      Fentanyl Nausea       Activity level - Baseline/Home:  Stand with Assist    Activity Level - Current:   Stand with Assist     Needed?: No    Isolation: No  Infection: Not Applicable  Bariatric?: No    Vital Signs:   Vitals:    02/03/19 1208 02/03/19 1210 02/03/19 1230 02/03/19 1300   BP:   127/67 107/62   Pulse:   84    Resp:   15    Temp:       TempSrc:       SpO2: (!) 86% 90% 93%    Weight:       Height:           Cardiac Rhythm: ,        Pain level: 0-10 Pain Scale: 0    Is this patient confused?: No   Does this patient have a guardian?  No         If yes, is there guardianship documents in the Epic \"Code/ACP\" activity?  N/A         Guardian Notified?  N/A  [place] - Suicide Severity Rating Scale Completed?  Yes  If yes, what color did the patient score?  White    Patient Report: Initial Complaint: SOB  Focused Assessment: Patient comes to ED with daughter with worsening SOB since Friday.  States hx of COPD.  Given albuterol neb and solu medrol which improved.   Patient's sats dropped to 86% on RA.  Given 2L NC and patient sats " between 92-94%.  RR 19-25.  Patient also had an elevated troponin.  Given ASA.     Tests Performed: blood work/EKG/CXR  Abnormal Results:   Labs Ordered and Resulted from Time of ED Arrival Up to the Time of Departure from the ED   CBC WITH PLATELETS DIFFERENTIAL - Abnormal; Notable for the following components:       Result Value    Absolute Lymphocytes 0.5 (*)     All other components within normal limits   TROPONIN I - Abnormal; Notable for the following components:    Troponin I ES 0.099 (*)     All other components within normal limits   BASIC METABOLIC PANEL - Abnormal; Notable for the following components:    Sodium 130 (*)     All other components within normal limits   CARDIAC CONTINUOUS MONITORING   PULSE OXIMETRY NURSING   INFLUENZA A/B ANTIGEN     Treatments provided: ASA/albuterol    Family Comments: daughter @ bedside    OBS brochure/video discussed/provided to patient/family: N/A              Name of person given brochure if not patient:               Relationship to patient:     ED Medications:   Medications   albuterol (PROVENTIL) (2.5 MG/3ML) 0.083% neb solution (  Canceled Entry 2/3/19 1338)   albuterol (PROVENTIL) neb solution 5 mg (5 mg Nebulization Given 2/3/19 1134)   methylPREDNISolone sodium succinate (solu-MEDROL) injection 125 mg (125 mg Intravenous Given 2/3/19 1135)   aspirin (ASA) chewable tablet 324 mg (324 mg Oral Given 2/3/19 1326)       Drips infusing?:  No    For the majority of the shift this patient was Green.   Interventions performed were comfort measures.    Severe Sepsis OR Septic Shock Diagnosis Present: No    To be done/followed up on inpatient unit:  na    ED NURSE PHONE NUMBER: *[phone]

## 2019-02-03 NOTE — LETTER
Transition Communication Hand-off for Care Transitions to Next Level of Care Provider    Name: Jamilah Rodriguez  : 1931  MRN #: [id number]  Primary Care Provider: Sharon Hurtado     Primary Clinic: [address] [facility] [address]     Reason for Hospitalization:  Influenza A [J10.1]  COPD exacerbation (H) [J44.1]  Acute respiratory failure with hypoxia (H) [J96.01]  Admit Date/Time: 2/3/2019 10:59 AM  Discharge Date: 2/10/2019  Payor Source: Payor: MEDICARE / Plan: RR MEDICARE  / Product Type: Medicare /     Readmission Assessment Measure (JOSE) Risk Score/category: average           Reason for Communication Hand-off Referral: Other TCU admission    Discharge Plan:       Concern for non-adherence with plan of care:   Y/N N  Discharge Needs Assessment:  Needs      Most Recent Value   Equipment Currently Used at Home  walker, rolling   # of Referrals Placed by CTS  Post Acute Facilities          Already enrolled in Tele-monitoring program and name of program:  N  Follow-up specialty is recommended: Yes    Follow-up plan:  No future appointments.    Any outstanding tests or procedures:    Procedures     Future Labs/Procedures    Oxygen - Nasal cannula     Comments:    1-2 Lpm by nasal cannula to keep O2 sats 89% or greater. Wean as tolerates, testing oxygen with activity.          Referrals     Future Labs/Procedures    Occupational Therapy Adult Consult     Comments:    Evaluate and treat as clinically indicated.    Reason:   Deconditioned secondary to acute illness.   .    Physical Therapy Adult Consult     Comments:    Evaluate and treat as clinically indicated.    Reason:  Deconditioned secondary to acute illness.            Key Recommendations:  Pt discharge to [facility] TCU.  Please follow after discharge from TCU.  Follow up with pulmonary with Dr Casillas or Dr Araiza in 8 weeks      Maya Hutchinson    AVS/Discharge Summary is the source of truth; this is a helpful guide for improved communication of  patient story

## 2019-02-03 NOTE — H&P
Admitted:     02/03/2019      PRIMARY CARE PHYSICIAN:  Dr. Sharon Hurtado      CHIEF COMPLAINT:  Shortness of breath and wheezing with acute COPD exacerbation.      HISTORY OF PRESENT ILLNESS:  Ms. Jamilah Rodriguez is an 87-year-old female with history of COPD, non-oxygen dependent, and history of nonischemic cardiomyopathy, history of abdominal aortic aneurysm, hypertension; presented to the emergency room with worsening shortness of breath.  She started having worsening shortness of breath since last Friday, today being Sunday afternoon.  Associated with wheezing, especially the wheezing gets worse at night as per the patient.  She also felt chest heaviness with shortness of breath.  Otherwise, denies any cough; no fevers or chills.  No pleuritic component of the chest pain.  So she presented with these symptoms to the emergency room.  In the emergency room, she was found to have significant expiratory wheezing with diminished breath sounds, so thought to be COPD exacerbation.  A chest x-ray was done and it showed hyperinflation of the lung fields consistent with COPD, otherwise negative.  No focal infiltrates or evidence of pleural fluid seen.  Mild cardiomegaly is seen.  On her lab work, CBC and BMP are normal except a mild hyponatremia at 130 and troponin at 0.099.  She was hypoxic in the emergency room with sats  to 88% on room air, so was started on 2 liters by nasal cannula and she is currently ranging from 90% to 93% on 2 liters by nasal cannula.  She was given IV Solu-Medrol 125 mg IV, 1 dose, and regular aspirin and albuterol nebulization and a request for hospitalization was made.      PAST MEDICAL HISTORY:  Significant for:   1.  History of chronic obstructive pulmonary disease, not on oxygen or steroids prior to hospitalization.   2.  Nonischemic cardiomyopathy with EF of 20%.  She was found to have left bundle branch block in 10/2018, underwent a coronary angiogram which showed mild to moderate  nonocclusive disease and EF was found to be 20% and was treated with medications.   3.  Abdominal aortic aneurysm, as per the patient; her abdominal aortic aneurysm was at 3.8 cm in 05/2016.   4.  Hypertension.      ALLERGIES:  ALLERGIC TO MULTIPLE MEDICATIONS WHICH INCLUDE BETA BLOCKERS, ATENOLOL, BRIMONIDINE, CEPHALEXIN, , LATEX, MORPHINE, PENICILLIN, SULFA DRUGS, TRAMADOL, TYLENOL, VICODIN AND FENTANYL.      SOCIAL HISTORY:  Quit smoking in 1989.  Before that, she smoked 1 pack a day for 50 years.  No alcohol use.  She lives in an independent living.      FAMILY HISTORY:  Reviewed and noncontributory.      REVIEW OF SYSTEMS:  Ten-point review of systems was done and is negative except as in HPI.      PHYSICAL EXAMINATION:   VITAL SIGNS:  Temperature is 98.4 degrees Fahrenheit, pulse rate is 84, blood pressure is 107/62, respiratory rate is ranging from 15 to 20.  She was 88% on room air, currently at 90% to 93% on 2 liters by nasal cannula.     GENERAL:  She is in mild respiratory distress, using mild accessory muscles of respiration.  Elderly, thin female lying comfortably in bed, not in any acute distress.   HEENT:  Head is atraumatic, normocephalic.     NECK:  Supple.   HEART:  S1, S2 heard.  No murmurs, rubs or gallops.   LUNGS:  On auscultation, diminished air entry bilaterally with expiratory wheezes heard throughout.   ABDOMEN:  Soft, nontender, nondistended, no hepatosplenomegaly.   EXTREMITIES:  No clubbing, cyanosis or edema.   NEUROLOGIC:  No focal weakness.   PSYCHIATRIC:  Mood and affect are normal.      LABORATORY AND RADIOLOGICAL INVESTIGATIONS:  Her CBC showed WBC count of 5.2, hemoglobin 13.1, platelet count at 249.  Glucose at 94.  Troponin is mildly elevated at 0.099.  Sodium 130; otherwise, BMP is normal, with creatinine of 0.81.  Chest x-ray is negative.  A 12-lead EKG is reviewed and showed left bundle branch block which is known to her since 10/2018 with no acute signs of ischemia.       ASSESSMENT AND PLAN:     1.  An 87-year-old female with acute hypoxic respiratory failure secondary to acute chronic obstructive pulmonary disease exacerbation, so she will be hospitalized and will continue IV Solu-Medrol 60 mg q.6 hours and DuoNebs q.4 hours will be ordered to her, and albuterol nebs q.2 hours p.r.n. will be ordered.   2.  Elevated troponin, most likely due to demand ischemia in the setting of the COPD exacerbation and shortness of breath contributing.  We will do serial troponins and monitor her on telemetry.   3.  Nonischemic cardiomyopathy with a known EF of 20%.  We will continue the spironolactone and baby aspirin and losartan for now.   4.  History of macular degeneration.  We will continue her prior to admission eyedrops.   5.  DVT thrombosis prophylaxis with subcutaneous Lovenox.   6.  GI prophylaxis:  Protonix while she is on steroids.   7.  Code status is discussed with the patient and she wants to be DNR/DNI.  She will be admitted as inpatient as I am anticipating more than 2-night stay.    Addendum:  Influenza testing returned positive for influenza A. Start tamiflu and droplet precautions      MONTANA ULLOA MD             D: 2019   T: 2019   MT: LARRY      Name:     VANITA SHERIFF   MRN:      [id number]        Account:      [id number]   :      1931        Admitted:     2019                   Document: [id number]

## 2019-02-04 NOTE — PROGRESS NOTES
SPIRITUAL HEALTH SERVICES Progress Note  [facility]    Initial visit per pt request. Pt's dtr was with pt.  Pt briefly reported on this hospitalization and an unexpected diagnosis of influenza.  Pt said her breathing is already a bit compromised and having the flu made it worse. Pt said she's glad to be in the hospital and hopeful things will slowly improve.  Pt cites no other concerns, but did ask if she could receive communion.   provided emotional/spiritual support and communion/prayer for pt and pt's dtr.                                                                                                                                             Lawrence Matos M.Div., [facility]  Staff   Pager [phone]

## 2019-02-04 NOTE — CONSULTS
Care Transition Initial Assessment - RN        Met with: Patient and her daughter Florence.  Patient currently living in an independent 1 bedroom apartment which is equipped with 3 emergency call buttons.  Patient states that she has been on a waitlist for [facility] at [facility] for a few years.  Patient and Florence would like to have [facility] RN services at discharge to assist with medication management and ongoing assessment of respiratory status.  Patient's PCP is with [facility], so writer explained that if needed, would make referral to [facility] at discharge.    DATA   Active Problems:    COPD exacerbation (H)       Cognitive Status: awake, alert and oriented.        Contact information and PCP information verified: Yes  Lives With: alone                     Insurance concerns: No Insurance issues identified  ASSESSMENT  Patient currently receives the following services:  none        Identified issues/concerns regarding health management: Poor eye sight, getting harder to manage medications due to that.  PLAN  Financial costs for the patient include TBD .  Patient given options and choices for discharge Yes .          Patient anticipates needs for home equipment: Does not know  Plan/Disposition: TBD   Appointments: PCP Scheduled  Care  (CTS) will continue to follow as needed.

## 2019-02-04 NOTE — PROGRESS NOTES
[facility]    Hospitalist Progress Note    Date of Service (when I saw the patient): 02/04/2019    Assessment & Plan   Jamilah Rodriguez is a 87 year old female who was admitted on 2/3/2019.  An 87-year-old female with acute hypoxic respiratory failure secondary to acute chronic obstructive pulmonary disease exacerbation and influenza A:     continue IV Solu-Medrol 60 mg q.6 hours and DuoNebs q.4 hours , and albuterol nebs q.2 hours p.r.n.   Still Sob and actively wheezing  On tamiflu for influenza  Maintain droplet isolation   2.  Elevated troponin, most likely due to demand ischemia in the setting of the COPD exacerbation and shortness of breath contributing.    Serial trops trended down     3.  Nonischemic cardiomyopathy with a known EF of 20%.  We will continue the spironolactone and baby aspirin and losartan for now.   4.  History of macular degeneration.    We will continue her prior to admission eyedrops.   5.  DVT thrombosis prophylaxis with subcutaneous Lovenox.   6.  GI prophylaxis:  Protonix while she is on steroids.   7.  Code status is discussed with the patient and she wants to be DNR/DNI    Disposition: Expected discharge in  2-3days once SOB and wheezing improved     Earline Al MD  [phone] (P)      Interval History   SOB even at rest. Feels little better since admission. No cough     -Data reviewed today: I reviewed all new labs and imaging results over the last 24 hours. I personally reviewed no images or EKG's today.    Physical Exam   Temp: 97.6  F (36.4  C) Temp src: Oral BP: 115/60 Pulse: 88 Heart Rate: 92 Resp: 16 SpO2: 96 % O2 Device: Nasal cannula Oxygen Delivery: 2 LPM  Vitals:    02/03/19 0955 02/04/19 0407   Weight: 55.3 kg (122 lb) 53.3 kg (117 lb 9.6 oz)     Vital Signs with Ranges  Temp:  [97.6  F (36.4  C)-97.8  F (36.6  C)] 97.6  F (36.4  C)  Pulse:  [86-96] 88  Heart Rate:  [] 92  Resp:  [16-22] 16  BP: (112-138)/(60-85) 115/60  SpO2:  [93 %-100 %] 96  %  I/O last 3 completed shifts:  In: 240 [P.O.:240]  Out: 1000 [Urine:1000]    Constitutional: Awake, alert, cooperative, no apparent distress  Respiratory: diminished air entry with bilateral diffuse wheezing   Cardiovascular: Regular rate and rhythm, normal S1 and S2, and no murmur noted  GI: Normal bowel sounds, soft, non-distended, non-tender  Skin/Integumen: No rashes, no cyanosis, no edema  Other:     Medications       albuterol  3 mL Nebulization Q4H     aspirin  81 mg Oral Daily     bimatoprost  1 drop Both Eyes At Bedtime     calcium carbonate-vitamin D  1 tablet Oral Q12H     dorzolamide  1 drop Both Eyes BID     enoxaparin  40 mg Subcutaneous Q24H     losartan  50 mg Oral Daily     methylPREDNISolone  62.5 mg Intravenous Q6H     multivitamin  with lutein  1 capsule Oral BID     oseltamivir  30 mg Oral BID     pantoprazole  40 mg Oral QAM AC     spironolactone  12.5 mg Oral Daily     umeclidinium-vilanterol  1 puff Inhalation Daily       Data   Recent Labs   Lab 02/04/19  0524 02/04/19  0005 02/03/19  1817 02/03/19  1125   WBC 7.3  --   --  5.2   HGB 11.8  --   --  13.1   MCV 89  --   --  91     --   --  249   *  --   --  130*   POTASSIUM 4.1  --   --  4.7   CHLORIDE 101  --   --  98   CO2 21  --   --  26   BUN 20  --   --  14   CR 0.86  --   --  0.81   ANIONGAP 8  --   --  6   NAKIA 8.7  --   --  8.8   *  --   --  94   TROPI  --  0.043 0.059* 0.099*       No results found for this or any previous visit (from the past 24 hour(s)).

## 2019-02-04 NOTE — PLAN OF CARE
A/O. Dyspnea on exertion. Expiratory wheezing noted. Weaned to RA. Sats mid 90s. SBA to BR. Voiding adequatley. Good appetite. Skin ecchymotic. Pt declined evening ASA dose and lovenox. Rescheduled Lovenox to morning per pt request. Pt educated on risks of DVT and pt still declines. Trops trending down. Hard of hearing. Tele SR w/ 1st degree AVB.

## 2019-02-04 NOTE — PLAN OF CARE
Pt stable over night. VSS.  Pt on RA.  Pt reports MCCULLOUGH but denies SOB at rest, O2 sat's are in the mid 90's.  Troponin's are tending down from admission level and Pt denies CP.  No new issues, will continue to monitor.

## 2019-02-05 NOTE — PLAN OF CARE
A/O, anxious at times. VSS, O2sats 94% on 2 L nc, able to tolerate RA this AM but pt requested supplemental O2 for mild SOB. Expiratory wheezes throughout, coarse in bases, no significant change during shift. Tele SR c 1st degree AVB, BBB, and PACs. Denies pain. SBA c walker, steady, ambulated in [place]. Plan for transfer to medical floor when bed available, wean O2 as able, discharge 1-3 days.

## 2019-02-05 NOTE — CONSULTS
PULMONARY NOTE    I'm aware of the request for a Pulmonary consultation on this patient. I will be by later today or tomorrow morning to complete the consultation evaluation. If concerns of an immediate nature arise earlier, please give me a call.    Chart Reviewed.   -suggest 40mg/d for prednisone  -continue scheduled and prn bronchodilators  -will followup    Thank you.    Hay Casillas MD  Pulmonary & Critical Care Medicine  [facility]   Pager: [phone]  Office:[phone]

## 2019-02-05 NOTE — CONSULTS
[facility]    Cardiology Consultation     Date of Admission:  2/3/2019    Assessment & Plan   Jamilah Rodriguez is a 87 year old female who was admitted on 2/3/2019.    1.  Acute exacerbation of COPD due to influenza A infection    2.  Hypoxic respiratory failure    3.  Nonischemic cardiomyopathy with ejection fraction estimated at 20% in October 2018 by echocardiogram.  Coronary angiography in October 2018 demonstrated mild to moderate coronary artery disease.    4.  Mild, stable, chronic systolic heart failure, New York Heart Association class II.  The patient indicates that she has been exercising 4 times per week before this acute episode and was breathing comfortably.  She has not been on a beta-blocker due to a history of beta-blocker intolerance.  Beta-blockers caused worsening COPD issues.    5.  Chronic left bundle branch block.  The patient was evaluated for a biventricular pacemaker or ICD and declined to have device implantation.  She is DNR/DNI    6.  Insignificant troponin elevation likely due to respiratory stress.  She is at low risk for acute coronary syndrome.    Plan:    Cardiac issues appear stable.  Chest x-ray does not show pulmonary edema.  No additional cardiac evaluation or treatment is recommended beyond her usual outpatient therapy.    LINDSAY URBAN MD    Primary Care Physician   Sharon Hurtado    Reason for Consult   Reason for consult: I was asked by Dr. Al to evaluate this patient for history of cardiomyopathy.    History of Present Illness   Jamilah Rodriguez is a 87 year old female who presents with acute worsening respiratory status.  She tells me that she had been doing quite well for several months until earlier this week when she started experiencing more difficulty breathing and had difficulty with activity due to shortness of breath.  Her chest felt tight but she did not have pain in her chest.  There is no discomfort in the arms neck jaw back or  shoulder.    She came in to the emergency room for evaluation and tested positive for influenza A.  She was diagnosed with acute COPD exacerbation and admitted to the cardiac telemetry unit, probably due to her history of cardiomyopathy.  She did have a trivial troponin elevation of 0.099 on admission and it has decreased slightly since then.  Her EKG shows a chronic left bundle branch block.  Her last echocardiogram in October 2018 demonstrated severe left ventricular dysfunction with moderate left ventricular enlargement.  Her ejection fraction was 20%.  She was evaluated for implantation of biventricular pacemaker or ICD and she decided that she did not want to proceed with any device implantation.  She has been active and exercising 4 days/week and feeling good without significant shortness of breath problems until this acute episode started about 5 days ago.  She was using more nebulizer treatments and could not get control of her shortness of breath.  She did not have evidence of worsening edema PND orthopnea.  Her chest x-ray does not show evidence of pulmonary edema.    Patient Active Problem List   Diagnosis     AAA (abdominal aortic aneurysm) (H)     Macular degeneration     Osteoporosis     Vertebral compression fracture (H)     CARDIOVASCULAR SCREENING; LDL GOAL LESS THAN 160     Benign essential hypertension     Hip fx: s/p IM- [facility] 7/4/12     S/P cataract surgery: Recently 2012.     Constipation     Edema     Knee fracture, right     Other postprocedural status(V45.89)     Glaucoma     Chronic dermatitis     Seasonal allergic rhinitis, unspecified allergic rhinitis trigger     Chronic obstructive pulmonary disease, unspecified COPD type (H)     Abdominal pain     Cardiomyopathy, idiopathic (H)     COPD exacerbation (H)       Past Medical History   I have reviewed this patient's medical history and updated it with pertinent information if needed.   Past Medical History:   Diagnosis Date     AAA  (abdominal aortic aneurysm) (H)     Followed with yearly ultrasound     Cardiomyopathy, idiopathic (H) 10/2018     Cervical cancer (H) 1969     Chronic dermatitis     extensive allergy testing revealed allergy/sensitivity to carba mix ( rubber) and Imidazolidinyl Urea (common in beauty products)     Glaucoma      Glaucoma      History of COPD     very mild abnormal spirometry in 2007 (in [place]), has not been active since quitting smoking in 2009     HTN (hypertension)      LBBB (left bundle branch block) 10/2018     Macular degeneration      Osteoporosis 8/3/11    femoral T score -3.4 & -3.7     PAD (peripheral artery disease) (H)      Vertebral compression fracture (H) 5/17/11    L1 compression fracture, presumed osteoporotic compression fracture       Past Surgical History   I have reviewed this patient's surgical history and updated it with pertinent information if needed.  Past Surgical History:   Procedure Laterality Date     CATARACT IOL, RT/LT  2/8/12    left eye cataract removal     HEART CATH CORONARY ANGIOGRAM W/LV GRAM  10/2018    moderate nonocclusive CAD     HYSTERECTOMY TOTAL ABDOMINAL  1999    Fibroids     LAPAROSCOPY DIAGNOSTIC (GENERAL) N/A 4/15/2018    Procedure: LAPAROSCOPY DIAGNOSTIC (GENERAL);;  Surgeon: Jm Craig MD;  Location:  OR     LAPAROTOMY EXPLORATORY N/A 4/15/2018    Procedure: LAPAROTOMY EXPLORATORY;;  Surgeon: Jm Craig MD;  Location:  OR     OPEN REDUCTION INTERNAL FIXATION HIP NAILING  7/2/2012    Procedure: OPEN REDUCTION INTERNAL FIXATION HIP NAILING;  Intramedullary Fixation Right Intertrochanteric Hip Fracture;  Surgeon: Chalino Covarrubias MD;  Location:  OR     OPEN REDUCTION INTERNAL FIXATION TIBIAL PLATEAU  3/31/2013    Procedure: OPEN REDUCTION INTERNAL FIXATION TIBIAL PLATEAU;  RIGHT LATERAL TIBIAL PLATEAU FRACTURE - SYNTHES Medial Meniscus Repair, Lateral Compartment Release;  Surgeon: Alfred Cardenas MD;  Location:  OR     OPEN REDUCTION INTERNAL FIXATION  WRIST  1988    left wrist ORIF, hardware removed onemonth later       Prior to Admission Medications   Prior to Admission Medications   Prescriptions Last Dose Informant Patient Reported? Taking?   ANORO ELLIPTA 62.5-25 MCG/INH oral inhaler 2/3/2019 at AM  No Yes   Sig: Inhale 1 puff into the lungs daily   Calcium Carb-Cholecalciferol (CALCIUM-VITAMIN D3) 600-500 MG-UNIT CAPS 2/3/2019 at AM  Yes Yes   Sig: Take 1 tablet by mouth every 12 hours   Flaxseed, Linseed, (FLAXSEED OIL PO) 2/3/2019 at AM  Yes Yes   Sig: Take 1 tablet by mouth every morning   Glucosamine Sulfate 500 MG TABS 2/3/2019 at AM  Yes Yes   Sig: Take 1 tablet by mouth every morning   Multiple Vitamins-Minerals (PRESERVISION AREDS 2 PO) 2/3/2019 at AM Self Yes Yes   Sig: Take 1 tablet by mouth 2 times daily   albuterol (PROAIR HFA/PROVENTIL HFA/VENTOLIN HFA) 108 (90 Base) MCG/ACT Inhaler PRN  Yes No   Sig: Inhale 2 puffs into the lungs every 4 hours as needed for shortness of breath / dyspnea or wheezing   ascorbic acid 500 MG TABS 2/3/2019 at AM  Yes Yes   Sig: Take 500 mg by mouth daily   aspirin 81 MG tablet 2/2/2019 at PM  No Yes   Sig: Take 1 tablet (81 mg) by mouth daily   bimatoprost (LUMIGAN) 0.01 % SOLN 2/2/2019 at HS Self Yes Yes   Sig: Place 1 drop into both eyes At Bedtime.     dorzolamide (TRUSOPT) 2 % ophthalmic solution 2/3/2019 at AM Self Yes Yes   Sig: Place 1 drop into both eyes 2 times daily   losartan (COZAAR) 50 MG tablet 2/3/2019 at AM  No Yes   Sig: Take 1 tablet (50 mg) by mouth daily   polyethylene glycol 400 (BLINK TEARS) 0.25 % SOLN ophthalmic solution 2/3/2019  Yes Yes   Sig: Place 1 drop into both eyes every 3 hours as needed for dry eyes   spironolactone (ALDACTONE) 25 MG tablet 2/3/2019 at AM  Yes Yes   Sig: Take 0.5 tablets (12.5 mg) by mouth daily HOLD 11/27/18 for two weeks due to rash      Facility-Administered Medications: None     Current Facility-Administered Medications   Medication Dose Route Frequency      albuterol  3 mL Nebulization Q4H     aspirin  81 mg Oral Daily     bimatoprost  1 drop Both Eyes At Bedtime     calcium carbonate-vitamin D  1 tablet Oral Q12H     dorzolamide  1 drop Both Eyes BID     enoxaparin  40 mg Subcutaneous Q24H     losartan  50 mg Oral Daily     methylPREDNISolone  62.5 mg Intravenous Q6H     multivitamin  with lutein  1 capsule Oral BID     oseltamivir  30 mg Oral BID     pantoprazole  40 mg Oral QAM AC     spironolactone  12.5 mg Oral Daily     umeclidinium-vilanterol  1 puff Inhalation Daily     Current Facility-Administered Medications   Medication Last Rate     Allergies   Allergies   Allergen Reactions     Atenolol Hives     Beta Adrenergic Blockers      Brimonidine      Cephalexin Hives     Germall Plus Itching     Imidazolidinyl Urea found in many topical creams and house hold products     Latex      Itching     Morphine GI Disturbance     No Clinical Screening - See Comments Itching     Carba Mix; IMIDAZOLIDINYL UREA - Allergic to this ingredient, which is found in many common topical moisturizers and household products.     Penicillins Hives     Sulfa Drugs GI Disturbance     Tramadol Nausea and Vomiting     Tylenol GI Disturbance     Vicodin [Hydrocodone-Acetaminophen]      Fentanyl Nausea       Social History    reports that she quit smoking about 9 years ago. she has never used smokeless tobacco. She reports that she does not drink alcohol or use drugs.    Family History   Family History   Problem Relation Age of Onset     Breast Cancer Other      Circulatory Daughter 53        mengioma     C.A.D. Father      Diabetes Sister      Breast Cancer Maternal Aunt 60       Review of Systems   The comprehensive 10 point Review of Systems is negative other than noted in the HPI or here.     Physical Exam   Vital Signs with Ranges  Temp:  [96  F (35.6  C)-98.6  F (37  C)] 96  F (35.6  C)  Pulse:  [] 97  Heart Rate:  [] 104  Resp:  [16-20] 20  BP: (128-141)/(79-86)  "137/85  SpO2:  [93 %-98 %] 94 %  Wt Readings from Last 4 Encounters:   02/05/19 54.5 kg (120 lb 3.2 oz)   11/05/18 54.4 kg (120 lb)   10/31/18 54.4 kg (119 lb 14.4 oz)   10/12/18 53.6 kg (118 lb 1.6 oz)     I/O last 3 completed shifts:  In: 680 [P.O.:660; I.V.:20]  Out: 2100 [Urine:2100]      Vitals: /85   Pulse 97   Temp 96  F (35.6  C)   Resp 20   Ht 1.651 m (5' 5\")   Wt 54.5 kg (120 lb 3.2 oz)   SpO2 94%   BMI 20.00 kg/m      Examination today shows a very thin woman in m mild acute respiratory distress.  She has difficulty completing a full sentence without stopping to breathe.  Her respiratory rate is elevated.  She has prolonged expiratory phase.  Diffuse expiratory wheezes are audible on auscultation.  She does not clearly have jugular venous distention.  Heart tones are distant but seem regular without obvious cardiac murmur.  HEENT exam is unremarkable with moist mucous membranes and no xanthoma.  Abdomen is soft and nontender with no masses or hepatomegaly.  She has no lower extremity edema cyanosis or clubbing.  She has good peripheral pulses.  Skin is warm and dry and intact.  She is alert and oriented with no gross motor defects.    Recent Labs   Lab 02/04/19  0005 02/03/19 1817 02/03/19  1125   TROPI 0.043 0.059* 0.099*       Recent Labs   Lab 02/04/19  0524 02/04/19  0005 02/03/19  1817 02/03/19  1125   WBC 7.3  --   --  5.2   HGB 11.8  --   --  13.1   MCV 89  --   --  91     --   --  249   *  --   --  130*   POTASSIUM 4.1  --   --  4.7   CHLORIDE 101  --   --  98   CO2 21  --   --  26   BUN 20  --   --  14   CR 0.86  --   --  0.81   GFRESTIMATED 60*  --   --  65   GFRESTBLACK 70  --   --  75   ANIONGAP 8  --   --  6   NAKIA 8.7  --   --  8.8   *  --   --  94   TROPI  --  0.043 0.059* 0.099*     Recent Labs   Lab Test 09/28/18  1521 05/09/16  1020 04/08/15  1131   CHOL 141 194 187   HDL 70 83 86   LDL 59 100* 87   TRIG 62 56 69   CHOLHDLRATIO  --   --  2.2     Recent " Labs   Lab 19  0524 19  1125   WBC 7.3 5.2   HGB 11.8 13.1   HCT 34.3* 38.1   MCV 89 91    249     No results for input(s): PH, PHV, PO2, PO2V, SAT, PCO2, PCO2V, HCO3, HCO3V in the last 168 hours.  No results for input(s): NTBNPI, NTBNP in the last 168 hours.  No results for input(s): DD in the last 168 hours.  No results for input(s): SED, CRP in the last 168 hours.  Recent Labs   Lab 19  0524 19  1125    249     No results for input(s): TSH in the last 168 hours.  No results for input(s): COLOR, APPEARANCE, URINEGLC, URINEBILI, URINEKETONE, SG, UBLD, URINEPH, PROTEIN, UROBILINOGEN, NITRITE, LEUKEST, RBCU, WBCU in the last 168 hours.    Imaging:  No results found for this or any previous visit (from the past 48 hour(s)).    Echo:  Recent Results (from the past 4320 hour(s))   ECHO COMPLETE WITH OPTISON    Narrative    [id number]  [facility]  [id number]  [id number]^R        [facility]ch[facility]  [address]  [place] [address]  Phone [phone]  Fax [phone]        Name: VANITA SHERIFF  MRN: [id number]  : 1931  Study Date: 10/03/2018 10:08 AM  Age: 87 yrs  Gender: Female  Patient Location: [facility]  Reason For Study: Chest heaviness  Ordering Physician: MALA STOVER  Referring Physician: MALA STOVER  Performed By: Penny Leo     BSA: 1.6 m2  Height: 65 in  Weight: 124 lb  HR: 77  BP: 112/75 mmHg  _____________________________________________________________________________  __     Procedure  Complete Echo Adult. Contrast Optison.     _____________________________________________________________________________  __        Interpretation Summary     The left ventricle is moderately dilated. There is normal left ventricular  wall thickness. Left ventricular systolic function is severely reduced. The  visual ejection fraction is estimated at <20%. Grade I or  early diastolic  dysfunction. Diastolic Doppler findings (E/E' ratio and/or other parameters)  suggest left ventricular filling pressures are increased. There is severe  global hypokinesia of the left ventricle. There is no thrombus seen in the  left ventricle.  The right ventricle is normal size. Moderately decreased right ventricular  systolic function.  There is mod-severe biatrial enlargement.  Trace to mild mitral and tricuspid regurgitation.  Small posterior pericardial effusion. There are no echocardiographic  indications of cardiac tamponade.  In direct comparison to the previous study dated 07/02/2012, there has been a  significant interval deterioration in left ventricular systolic function.  Dr Hurtado's office was contacted with these findings immediately after review of  the study.  _____________________________________________________________________________  __        Left Ventricle  The left ventricle is moderately dilated. There is normal left ventricular  wall thickness. Left ventricular systolic function is severely reduced. The  visual ejection fraction is estimated at <20%. Grade I or early diastolic  dysfunction. Diastolic Doppler findings (E/E' ratio and/or other parameters)  suggest left ventricular filling pressures are increased. There is severe  global hypokinesia of the left ventricle. There is no thrombus seen in the  left ventricle.     Right Ventricle  The right ventricle is normal size. Moderately decreased right ventricular  systolic function.     Atria  There is mod-severe biatrial enlargement. There is no color Doppler evidence  of an atrial shunt.     Mitral Valve  There is trace to mild mitral regurgitation.     Tricuspid Valve  There is mild (1+) tricuspid regurgitation. The right ventricular systolic  pressure is approximated at 24.0 mmHg plus the right atrial pressure.        Aortic Valve  There is trace aortic regurgitation. No aortic stenosis is present.     Pulmonic  Valve  There is mild (1+) pulmonic valvular regurgitation. There is no pulmonic  valvular stenosis.     Vessels  The aortic root is normal size. Normal size ascending aorta. Dilated inferior  vena cava.     Pericardium  Small posterior pericardial effusion. There are no echocardiographic  indications of cardiac tamponade.     Rhythm  Sinus rhythm was noted.     _____________________________________________________________________________  __  MMode/2D Measurements & Calculations  RVDd: 4.3 cm  IVSd: 0.76 cm  LVIDd: 5.7 cm  LVIDs: 5.3 cm  LVPWd: 0.85 cm  FS: 6.3 %  LV mass(C)d: 170.2 grams  LV mass(C)dI: 105.4 grams/m2  Ao root diam: 3.1 cm  LA dimension: 4.8 cm     asc Aorta Diam: 3.6 cm  LA/Ao: 1.6  LVOT diam: 2.0 cm  LVOT area: 3.1 cm2  LA Volume (BP): 80.9 ml  LA Volume Index (BP): 50.2 ml/m2  RWT: 0.30        Doppler Measurements & Calculations  MV E max clarita: 71.3 cm/sec  MV A max clarita: 93.1 cm/sec  MV E/A: 0.77  PA V2 max: 73.3 cm/sec  PA max P.2 mmHg  PA acc time: 0.10 sec  PI end-d clarita: 167.0 cm/sec     TR max clarita: 244.5 cm/sec  TR max P.0 mmHg  Medial E/e': 21.4           _____________________________________________________________________________  __           Report approved by: Jam Gomez 10/03/2018 12:51 PM

## 2019-02-05 NOTE — PLAN OF CARE
A&Ox4. VSS on 2L O2. Dyspnea on exertion. Lung sounds- expiratory wheezing. Tele Sinus tachy with 1st degree AV block with BBB. SBA. Voiding adequately. Tolerating low fat/cardiac diet. Droplet precautions maintained. Denies pain.   Pt's daughter requesting herself or nurse be present in room when MD rounds to ensure Pt hearing/understanding information.

## 2019-02-05 NOTE — PROGRESS NOTES
[facility]    Hospitalist Progress Note    Date of Service (when I saw the patient): 02/05/2019    Assessment & Plan   Jamilah Rodriguez is a 87 year old female who was admitted on 2/3/2019.  An 87-year-old female with acute hypoxic respiratory failure secondary to acute chronic obstructive pulmonary disease exacerbation and influenza A:     continue IV Solu-Medrol 60 mg q.6 hours and DuoNebs q.4 hours , and albuterol nebs q.2 hours p.r.n.   Still Sob and actively wheezing.nebs make her more wheezy as per pt  Slow improvement    On tamiflu for influenza  Maintain droplet isolation   Pulmonary consult placed today   2.  Elevated troponin, most likely due to demand ischemia in the setting of the COPD exacerbation and shortness of breath contributing.    Serial trops trended down     3.  Nonischemic cardiomyopathy with a known EF of 20%.  We will continue the spironolactone and baby aspirin and losartan for now.   4.  History of macular degeneration.    We will continue her prior to admission eyedrops.   5.  DVT thrombosis prophylaxis with subcutaneous Lovenox.   6.  GI prophylaxis:  Protonix while she is on steroids.   7.  Code status is discussed with the patient and she wants to be DNR/DNI    Disposition: Expected discharge in  2-3days once SOB and wheezing improved   Discussed with bedside RN, patient and daughter today. Daughter's Qs answered   Earline Al MD  [phone] (P)      Interval History   SOB even at rest. Feels little worse today. No cough     -Data reviewed today: I reviewed all new labs and imaging results over the last 24 hours. I personally reviewed no images or EKG's today.    Physical Exam   Temp: 98.6  F (37  C) Temp src: Oral BP: 128/86 Pulse: 99 Heart Rate: 104 Resp: 20 SpO2: 95 % O2 Device: Nasal cannula Oxygen Delivery: 2 LPM  Vitals:    02/03/19 0955 02/04/19 0407 02/05/19 0630   Weight: 55.3 kg (122 lb) 53.3 kg (117 lb 9.6 oz) 54.5 kg (120 lb 3.2 oz)     Vital Signs  with Ranges  Temp:  [98.2  F (36.8  C)-98.7  F (37.1  C)] 98.6  F (37  C)  Pulse:  [] 99  Heart Rate:  [] 104  Resp:  [15-20] 20  BP: (120-139)/(81-93) 128/86  SpO2:  [93 %-98 %] 95 %  I/O last 3 completed shifts:  In: 1350 [P.O.:1330; I.V.:20]  Out: 2000 [Urine:2000]    Constitutional: Awake, alert, cooperative, no apparent distress  Respiratory: diminished air entry with bilateral diffuse wheezing   Cardiovascular: Regular rate and rhythm, normal S1 and S2, and no murmur noted  GI: Normal bowel sounds, soft, non-distended, non-tender  Skin/Integumen: No rashes, no cyanosis, no edema  Other:     Medications       albuterol  3 mL Nebulization Q4H     aspirin  81 mg Oral Daily     bimatoprost  1 drop Both Eyes At Bedtime     calcium carbonate-vitamin D  1 tablet Oral Q12H     dorzolamide  1 drop Both Eyes BID     enoxaparin  40 mg Subcutaneous Q24H     losartan  50 mg Oral Daily     methylPREDNISolone  62.5 mg Intravenous Q6H     multivitamin  with lutein  1 capsule Oral BID     oseltamivir  30 mg Oral BID     pantoprazole  40 mg Oral QAM AC     spironolactone  12.5 mg Oral Daily     umeclidinium-vilanterol  1 puff Inhalation Daily       Data   Recent Labs   Lab 02/04/19  0524 02/04/19  0005 02/03/19  1817 02/03/19  1125   WBC 7.3  --   --  5.2   HGB 11.8  --   --  13.1   MCV 89  --   --  91     --   --  249   *  --   --  130*   POTASSIUM 4.1  --   --  4.7   CHLORIDE 101  --   --  98   CO2 21  --   --  26   BUN 20  --   --  14   CR 0.86  --   --  0.81   ANIONGAP 8  --   --  6   NAKIA 8.7  --   --  8.8   *  --   --  94   TROPI  --  0.043 0.059* 0.099*       No results found for this or any previous visit (from the past 24 hour(s)).

## 2019-02-06 NOTE — CONSULTS
Pulmonary Medicine Consultation      Date of Admission: 2/3/2019  Primary Attending:  Earline Al MD  Consulting Physician: Hay Casillas MD    History:    Jamilah is an 87-year-old former smoker with past medical history notable for COPD typically well controlled on Anoro and rescue bronchodilator.  She was seen by me in clinic early last year and has completed pulmonary rehab and has typically done well.  Also has chronic systolic congestive heart failure.  Admitted with acute onset of dyspnea and found to be influenza positive.  With Tamiflu and steroids, she is feeling much better today.  Oxygen is weaning.  She was not on oxygen prior to admission.      Review of Systems - A 10-system ROS is negative except for items mentioned above and in HPI.       Prior medical history:  Past Medical History:   Diagnosis Date     AAA (abdominal aortic aneurysm) (H)     Followed with yearly ultrasound     Cardiomyopathy, idiopathic (H) 10/2018     Cervical cancer (H) 1969     Chronic dermatitis     extensive allergy testing revealed allergy/sensitivity to carba mix ( rubber) and Imidazolidinyl Urea (common in beauty products)     Glaucoma      Glaucoma      History of COPD     very mild abnormal spirometry in 2007 (in [place]), has not been active since quitting smoking in 2009     HTN (hypertension)      LBBB (left bundle branch block) 10/2018     Macular degeneration      Osteoporosis 8/3/11    femoral T score -3.4 & -3.7     PAD (peripheral artery disease) (H)      Vertebral compression fracture (H) 5/17/11    L1 compression fracture, presumed osteoporotic compression fracture       Past Surgical History:   Procedure Laterality Date     CATARACT IOL, RT/LT  2/8/12    left eye cataract removal     HEART CATH CORONARY ANGIOGRAM W/LV GRAM  10/2018    moderate nonocclusive CAD     HYSTERECTOMY TOTAL ABDOMINAL  1999    Fibroids     LAPAROSCOPY DIAGNOSTIC (GENERAL) N/A 4/15/2018    Procedure: LAPAROSCOPY DIAGNOSTIC  (GENERAL);;  Surgeon: Jm Craig MD;  Location: [facility]     LAPAROTOMY EXPLORATORY N/A 4/15/2018    Procedure: LAPAROTOMY EXPLORATORY;;  Surgeon: Jm Craig MD;  Location: [facility] OR     OPEN REDUCTION INTERNAL FIXATION HIP NAILING  7/2/2012    Procedure: OPEN REDUCTION INTERNAL FIXATION HIP NAILING;  Intramedullary Fixation Right Intertrochanteric Hip Fracture;  Surgeon: Chalino Covarrubias MD;  Location: [facility]     OPEN REDUCTION INTERNAL FIXATION TIBIAL PLATEAU  3/31/2013    Procedure: OPEN REDUCTION INTERNAL FIXATION TIBIAL PLATEAU;  RIGHT LATERAL TIBIAL PLATEAU FRACTURE - SYNTHES Medial Meniscus Repair, Lateral Compartment Release;  Surgeon: Alfred Cardenas MD;  Location: [facility] OR     OPEN REDUCTION INTERNAL FIXATION WRIST  1988    left wrist ORIF, hardware removed onemonth later       Patient Active Problem List   Diagnosis     AAA (abdominal aortic aneurysm) (H)     Macular degeneration     Osteoporosis     Vertebral compression fracture (H)     CARDIOVASCULAR SCREENING; LDL GOAL LESS THAN 160     Benign essential hypertension     Hip fx: s/p IM- [facility] 7/4/12     S/P cataract surgery: Recently 2012.     Constipation     Edema     Knee fracture, right     Other postprocedural status(V45.89)     Glaucoma     Chronic dermatitis     Seasonal allergic rhinitis, unspecified allergic rhinitis trigger     Chronic obstructive pulmonary disease, unspecified COPD type (H)     Abdominal pain     Cardiomyopathy, idiopathic (H)     COPD exacerbation (H)       Social History     Social History     Socioeconomic History     Marital status:      Spouse name: Not on file     Number of children: 2     Years of education: Not on file     Highest education level: Not on file   Social Needs     Financial resource strain: Not on file     Food insecurity - worry: Not on file     Food insecurity - inability: Not on file     Transportation needs - medical: Not on file     Transportation needs - non-medical: Not on file    Occupational History     Not on file   Tobacco Use     Smoking status: Former Smoker     Last attempt to quit: 2009     Years since quittin.6     Smokeless tobacco: Never Used     Tobacco comment: former 1/2-1 ppd since age 20-22   Substance and Sexual Activity     Alcohol use: No     Alcohol/week: 0.0 oz     Drug use: No     Sexual activity: No   Other Topics Concern     Parent/sibling w/ CABG, MI or angioplasty before 65F 55M? Not Asked   Social History Narrative     Not on file         Family History  Family History   Problem Relation Age of Onset     Breast Cancer Other      Circulatory Daughter 53        mengioma     C.A.D. Father      Diabetes Sister      Breast Cancer Maternal Aunt 60         Medications  No current outpatient medications on file.     Current Facility-Administered Medications Ordered in Epic   Medication Dose Route Frequency Last Rate Last Dose     acetaminophen (TYLENOL) tablet 650 mg  650 mg Oral Q4H PRN         aspirin EC tablet 81 mg  81 mg Oral Daily   81 mg at 19     bimatoprost (LUMIGAN) 0.01 % ophthalmic drops 1 drop  1 drop Both Eyes At Bedtime   1 drop at 19     bisacodyl (DULCOLAX) Suppository 10 mg  10 mg Rectal Daily PRN         calcium carbonate-vitamin D (OSCAL w/D) per tablet 1 tablet  1 tablet Oral Q12H   1 tablet at 19 09     dorzolamide (TRUSOPT) 2 % ophthalmic solution 1 drop  1 drop Both Eyes BID   1 drop at 19 09     HYDROmorphone (PF) (DILAUDID) injection 0.3-0.5 mg  0.3-0.5 mg Intravenous Q2H PRN         levalbuterol (XOPENEX CONC) neb solution 1.25 mg  1.25 mg Nebulization Q4H         losartan (COZAAR) tablet 50 mg  50 mg Oral Daily   50 mg at 19 0927     melatonin tablet 1 mg  1 mg Oral At Bedtime PRN         methylPREDNISolone sodium succinate (solu-MEDROL) injection 62.5 mg  62.5 mg Intravenous Q6H   62.5 mg at 19 0626     multivitamin  with lutein (OCUVITE WITH LTEIN) per capsule 1 capsule  1 capsule Oral  BID   1 capsule at 02/06/19 0926     naloxone (NARCAN) injection 0.1-0.4 mg  0.1-0.4 mg Intravenous Q2 Min PRN         ondansetron (ZOFRAN-ODT) ODT tab 4 mg  4 mg Oral Q6H PRN        Or     ondansetron (ZOFRAN) injection 4 mg  4 mg Intravenous Q6H PRN         oseltamivir (TAMIFLU) capsule 30 mg  30 mg Oral BID   30 mg at 02/06/19 0927     oxyCODONE (ROXICODONE) tablet 5-10 mg  5-10 mg Oral Q3H PRN         pantoprazole (PROTONIX) EC tablet 40 mg  40 mg Oral QAM AC   40 mg at 02/06/19 0626     polyethylene glycol (MIRALAX/GLYCOLAX) Packet 17 g  17 g Oral Daily PRN         prochlorperazine (COMPAZINE) injection 5 mg  5 mg Intravenous Q6H PRN        Or     prochlorperazine (COMPAZINE) tablet 5 mg  5 mg Oral Q6H PRN        Or     prochlorperazine (COMPAZINE) Suppository 12.5 mg  12.5 mg Rectal Q12H PRN         spironolactone (ALDACTONE) half-tab 12.5 mg  12.5 mg Oral Daily   12.5 mg at 02/06/19 0926     umeclidinium-vilanterol (ANORO ELLIPTA) 62.5-25 MCG/INH oral inhaler 1 puff  1 puff Inhalation Daily   1 puff at 02/06/19 0915     No current Epic-ordered outpatient medications on file.       Allergies   Allergen Reactions     Atenolol Hives     Beta Adrenergic Blockers      Brimonidine      Cephalexin Hives     Germall Plus Itching     Imidazolidinyl Urea found in many topical creams and house hold products     Latex      Itching     Morphine GI Disturbance     No Clinical Screening - See Comments Itching     Carba Mix; IMIDAZOLIDINYL UREA - Allergic to this ingredient, which is found in many common topical moisturizers and household products.     Penicillins Hives     Sulfa Drugs GI Disturbance     Tramadol Nausea and Vomiting     Tylenol GI Disturbance     Vicodin [Hydrocodone-Acetaminophen]      Fentanyl Nausea         Physical Examination:   Vitals:    02/06/19 0421 02/06/19 0628 02/06/19 0754 02/06/19 0840   BP: 116/63  132/81 117/62   BP Location: Right arm  Right arm Right arm   Pulse:       Resp: 20  22 20    Temp: 97.4  F (36.3  C)  98.1  F (36.7  C) 99.1  F (37.3  C)   TempSrc: Oral  Oral Oral   SpO2: 96% 97% 92% 91%   Weight:       Height:         Body mass index is 20 kg/m .  Temp (24hrs), Av.6  F (36.4  C), Min:96  F (35.6  C), Max:99.1  F (37.3  C)        Constitutional:  Appears comfortable. Frail  HENT:  mucous membranes moist.  Eyes: no icterus, no pallor.   Neck:  trachea midline, no carotid bruits.  Cardiovascular: irregular  Respiratory/Chest: No use of accessory muscle, faint minimal wheeze  Gastrointestinal: Abdomen was soft, non-tender  Musculoskeletal: No clubbing or cyanosis  Neurological: No focal motor or sensory deficits  Skin: No skin rash      CMP  Recent Labs   Lab 19  0524 19  1125   * 130*   POTASSIUM 4.1 4.7   CHLORIDE 101 98   CO2 21 26   ANIONGAP 8 6   * 94   BUN 20 14   CR 0.86 0.81   GFRESTIMATED 60* 65   GFRESTBLACK 70 75   NAKIA 8.7 8.8     CBC  Recent Labs   Lab 19  0524 19  1125   WBC 7.3 5.2   RBC 3.85 4.21   HGB 11.8 13.1   HCT 34.3* 38.1   MCV 89 91   MCH 30.6 31.1   MCHC 34.4 34.4   RDW 13.3 13.4    249     INRNo lab results found in last 7 days.  Arterial Blood GasNo lab results found in last 7 days.  No results for input(s): CULT in the last 168 hours.    Diagnostic Studies:  Chest Radiology:     cxr - Mild cardiomegaly. Pulmonary vasculature is not distended. Prominent hyperinflation consistent with COPD. No focal infiltrates or evidence of pleural fluid.        Assessment/Plan:     Jamilah is an 87-year-old former smoker with past medical history notable for COPD typically well controlled on Anoro and rescue bronchodilator.  She was seen by me in clinic early last year and has completed pulmonary rehab and has typically done well.  Also has chronic systolic congestive heart failure.  Admitted with acute onset of dyspnea and found to be influenza positive.  With Tamiflu and steroids, she is feeling much better today.  Oxygen is  weaning.  She was not on oxygen prior to admission.    -suggest 40mg/d of prednisone for 7 days  -continue tamiflu  -continue bronchodilators  -wean o2, goal sat ~ 91-92%. She will need walk for desaturation prior to discharge and may need oxygen on discharge, if she is willing to use it.  -outpt pulmonary followup with Dr Casillas or Dr Araiza in 8 weeks  -likely home soon. Please call if needed.      Hay Casillas MD  Pulmonary, Critical Care and Sleep Medicine  [facility]   Pager: [phone]  Office:[phone]

## 2019-02-06 NOTE — PROGRESS NOTES
[facility]    Hospitalist Progress Note    Date of Service (when I saw the patient): 02/06/2019    Assessment & Plan   Jamilah Rodriguez is a 87 year old female who was admitted on 2/3/2019.  An 87-year-old female with acute hypoxic respiratory failure secondary to acute chronic obstructive pulmonary disease exacerbation and influenza A:     continue IV Solu-Medrol 60 mg q.6 hours and DuoNebs q.4 hours , and albuterol nebs q.2 hours p.r.n.   Went in to AFIB RVR with HR in the 110s today  Switched albuterol nebs to xopenex nebs due to tachycardia   Iv solumedrol witched to prednisone 40mg PO daily today   SOb improving   Slow improvement    On tamiflu for influenza  Maintain droplet isolation   Pulmonary consulted and are following   2. AFIB RVR:  Want in to AFIB RVR   HR in the 110s   Avoid betablcokers due to COPD   Discussed with pt and started anticoagulation with elliquis 2.5mg PO BID   2.  Elevated troponin, most likely due to demand ischemia in the setting of the COPD exacerbation and shortness of breath contributing.    Serial trops trended down     3.  Nonischemic cardiomyopathy with a known EF of 20%.  We will continue the spironolactone and baby aspirin and losartan for now.   Echo showed EF 10-20%   Not a candidate for AICD or CRT per EP  No further cardiac work up needed per cardiology   4.  History of macular degeneration.    We will continue her prior to admission eyedrops.   5.  DVT thrombosis prophylaxis with subcutaneous Lovenox.   6.  GI prophylaxis:  Protonix while she is on steroids.   7.  Code status is discussed with the patient and she wants to be DNR/DNI    Disposition: Expected discharge in  2-3days once SOB and wheezing improved   Discussed with bedside RN, patient  today  Earline Al MD  [phone] (P)      Interval History   SOB improving. Feels little better  today. No cough     -Data reviewed today: I reviewed all new labs and imaging results over the last 24 hours.  I personally reviewed no images or EKG's today.    Physical Exam   Temp: 98.6  F (37  C) Temp src: Oral BP: 115/72 Pulse: 97 Heart Rate: 111 Resp: 20 SpO2: 90 % O2 Device: Nasal cannula Oxygen Delivery: 1 LPM  Vitals:    02/03/19 0955 02/04/19 0407 02/05/19 0630   Weight: 55.3 kg (122 lb) 53.3 kg (117 lb 9.6 oz) 54.5 kg (120 lb 3.2 oz)     Vital Signs with Ranges  Temp:  [96  F (35.6  C)-99.1  F (37.3  C)] 98.6  F (37  C)  Pulse:  [97] 97  Heart Rate:  [] 111  Resp:  [20-22] 20  BP: (115-139)/(62-86) 115/72  SpO2:  [90 %-97 %] 90 %  I/O last 3 completed shifts:  In: -   Out: 650 [Urine:650]    Constitutional: Awake, alert, cooperative, no apparent distress  Respiratory: diminished air entry . No wheezing today   Cardiovascular: Regular rate and rhythm, normal S1 and S2, and no murmur noted  GI: Normal bowel sounds, soft, non-distended, non-tender  Skin/Integumen: No rashes, no cyanosis, no edema  Other:     Medications       apixaban ANTICOAGULANT  2.5 mg Oral BID     aspirin  81 mg Oral Daily     bimatoprost  1 drop Both Eyes At Bedtime     calcium carbonate-vitamin D  1 tablet Oral Q12H     dorzolamide  1 drop Both Eyes BID     levalbuterol  1.25 mg Nebulization Q4H     losartan  50 mg Oral Daily     multivitamin  with lutein  1 capsule Oral BID     oseltamivir  30 mg Oral BID     pantoprazole  40 mg Oral QAM AC     [START ON 2/7/2019] predniSONE  40 mg Oral Daily     spironolactone  12.5 mg Oral Daily     umeclidinium-vilanterol  1 puff Inhalation Daily       Data   Recent Labs   Lab 02/04/19  0524 02/04/19  0005 02/03/19  1817 02/03/19  1125   WBC 7.3  --   --  5.2   HGB 11.8  --   --  13.1   MCV 89  --   --  91     --   --  249   *  --   --  130*   POTASSIUM 4.1  --   --  4.7   CHLORIDE 101  --   --  98   CO2 21  --   --  26   BUN 20  --   --  14   CR 0.86  --   --  0.81   ANIONGAP 8  --   --  6   NAKIA 8.7  --   --  8.8   *  --   --  94   TROPI  --  0.043 0.059* 0.099*       No results  found for this or any previous visit (from the past 24 hour(s)).

## 2019-02-06 NOTE — PLAN OF CARE
Pt A&Ox4. VSS on 2 L NC, O2 sats 93-95%. Up with SBA and walker to BA. MCCULLOUGH, some SOB reported lung sounds with coarse crackles and exp wheezes. Continues on IV solumedrol and scheduled nebs. Tele SR with 1st degree AVB,BBB. Continue to monitor.

## 2019-02-06 NOTE — CONSULTS
[facility]    Cardiac Electrophysiology Consultation     Date of Admission:  2/3/2019  Date of Consult (When I saw the patient): 02/06/19    Assessment & Plan   Jamilah Rodriguez is a 87 year old female who was admitted on 2/3/2019. I was asked to see the patient for consideration of CRT. Known severe non-ICM with EF of 25% along with LBBB and severe COPD. Had discussion with my partner, Dr. Arana regarding CRT-p or CRT-ICD in the past. Pt declined as CRT may not contributing to improve LVEF and CHF given QRS only 142 ms admitted for increasing dry cough and noted to have Influenza and COPD exacerbation. No previous hosp for CHF decompensation alone.     I expressed similar sentiment re: questionable benefit of CRT with pacemaker only given QRS not greater than 150 ms and COPD. Not ICD candidate due to DNR status and co-morbidities. Certainly, if CHF decompensated in the future not unreasonable to purse CRT-pacer.    Reginaldo Wilkins    Code Status    DNR/DNI    Primary Care Physician   Sharon Hurtado    History is obtained from the patient    Past Medical History   I have reviewed this patient's medical history and updated it with pertinent information if needed.   Past Medical History:   Diagnosis Date     AAA (abdominal aortic aneurysm) (H)     Followed with yearly ultrasound     Cardiomyopathy, idiopathic (H) 10/2018     Cervical cancer (H) 1969     Chronic dermatitis     extensive allergy testing revealed allergy/sensitivity to carba mix ( rubber) and Imidazolidinyl Urea (common in beauty products)     Glaucoma      Glaucoma      History of COPD     very mild abnormal spirometry in 2007 (in [place]), has not been active since quitting smoking in 2009     HTN (hypertension)      LBBB (left bundle branch block) 10/2018     Macular degeneration      Osteoporosis 8/3/11    femoral T score -3.4 & -3.7     PAD (peripheral artery disease) (H)      Vertebral compression fracture (H) 5/17/11    L1 compression  fracture, presumed osteoporotic compression fracture       Past Surgical History   I have reviewed this patient's surgical history and updated it with pertinent information if needed.  Past Surgical History:   Procedure Laterality Date     CATARACT IOL, RT/LT  2/8/12    left eye cataract removal     HEART CATH CORONARY ANGIOGRAM W/LV GRAM  10/2018    moderate nonocclusive CAD     HYSTERECTOMY TOTAL ABDOMINAL  1999    Fibroids     LAPAROSCOPY DIAGNOSTIC (GENERAL) N/A 4/15/2018    Procedure: LAPAROSCOPY DIAGNOSTIC (GENERAL);;  Surgeon: Jm Craig MD;  Location: [facility]     LAPAROTOMY EXPLORATORY N/A 4/15/2018    Procedure: LAPAROTOMY EXPLORATORY;;  Surgeon: Jm Craig MD;  Location: [facility]     OPEN REDUCTION INTERNAL FIXATION HIP NAILING  7/2/2012    Procedure: OPEN REDUCTION INTERNAL FIXATION HIP NAILING;  Intramedullary Fixation Right Intertrochanteric Hip Fracture;  Surgeon: Chalino Covarrubias MD;  Location: [facility] OR     OPEN REDUCTION INTERNAL FIXATION TIBIAL PLATEAU  3/31/2013    Procedure: OPEN REDUCTION INTERNAL FIXATION TIBIAL PLATEAU;  RIGHT LATERAL TIBIAL PLATEAU FRACTURE - SYNTHES Medial Meniscus Repair, Lateral Compartment Release;  Surgeon: Alfred Cardenas MD;  Location: [facility] OR     OPEN REDUCTION INTERNAL FIXATION WRIST  1988    left wrist ORIF, hardware removed onemonth later       Prior to Admission Medications   Prior to Admission Medications   Prescriptions Last Dose Informant Patient Reported? Taking?   ANORO ELLIPTA 62.5-25 MCG/INH oral inhaler 2/3/2019 at AM  No Yes   Sig: Inhale 1 puff into the lungs daily   Calcium Carb-Cholecalciferol (CALCIUM-VITAMIN D3) 600-500 MG-UNIT CAPS 2/3/2019 at AM  Yes Yes   Sig: Take 1 tablet by mouth every 12 hours   Flaxseed, Linseed, (FLAXSEED OIL PO) 2/3/2019 at AM  Yes Yes   Sig: Take 1 tablet by mouth every morning   Glucosamine Sulfate 500 MG TABS 2/3/2019 at AM  Yes Yes   Sig: Take 1 tablet by mouth every morning   Multiple Vitamins-Minerals (PRESERVISION  AREDS 2 PO) 2/3/2019 at AM Self Yes Yes   Sig: Take 1 tablet by mouth 2 times daily   albuterol (PROAIR HFA/PROVENTIL HFA/VENTOLIN HFA) 108 (90 Base) MCG/ACT Inhaler PRN  Yes No   Sig: Inhale 2 puffs into the lungs every 4 hours as needed for shortness of breath / dyspnea or wheezing   ascorbic acid 500 MG TABS 2/3/2019 at AM  Yes Yes   Sig: Take 500 mg by mouth daily   aspirin 81 MG tablet 2/2/2019 at PM  No Yes   Sig: Take 1 tablet (81 mg) by mouth daily   bimatoprost (LUMIGAN) 0.01 % SOLN 2/2/2019 at HS Self Yes Yes   Sig: Place 1 drop into both eyes At Bedtime.     dorzolamide (TRUSOPT) 2 % ophthalmic solution 2/3/2019 at AM Self Yes Yes   Sig: Place 1 drop into both eyes 2 times daily   losartan (COZAAR) 50 MG tablet 2/3/2019 at AM  No Yes   Sig: Take 1 tablet (50 mg) by mouth daily   polyethylene glycol 400 (BLINK TEARS) 0.25 % SOLN ophthalmic solution 2/3/2019  Yes Yes   Sig: Place 1 drop into both eyes every 3 hours as needed for dry eyes   spironolactone (ALDACTONE) 25 MG tablet 2/3/2019 at AM  Yes Yes   Sig: Take 0.5 tablets (12.5 mg) by mouth daily HOLD 11/27/18 for two weeks due to rash      Facility-Administered Medications: None     Allergies   Allergies   Allergen Reactions     Atenolol Hives     Beta Adrenergic Blockers      Brimonidine      Cephalexin Hives     Germall Plus Itching     Imidazolidinyl Urea found in many topical creams and house hold products     Latex      Itching     Morphine GI Disturbance     No Clinical Screening - See Comments Itching     Carba Mix; IMIDAZOLIDINYL UREA - Allergic to this ingredient, which is found in many common topical moisturizers and household products.     Penicillins Hives     Sulfa Drugs GI Disturbance     Tramadol Nausea and Vomiting     Tylenol GI Disturbance     Vicodin [Hydrocodone-Acetaminophen]      Fentanyl Nausea       Social History   I have reviewed this patient's social history and updated it with pertinent information if needed. Jamilah RIVERA  Michael  reports that she quit smoking about 9 years ago. she has never used smokeless tobacco. She reports that she does not drink alcohol or use drugs.    Family History   I have reviewed this patient's family history and updated it with pertinent information if needed.   Family History   Problem Relation Age of Onset     Breast Cancer Other      Circulatory Daughter 53        mengioma     C.A.D. Father      Diabetes Sister      Breast Cancer Maternal Aunt 60       Review of Systems   Comprehensive review of systems was performed with pertinent positives and negatives listed in assessment and plan section.    Physical Exam   Temp: 99.1  F (37.3  C) Temp src: Oral BP: 117/62 Pulse: 97 Heart Rate: 97 Resp: 20 SpO2: 91 % O2 Device: Nasal cannula Oxygen Delivery: 2 LPM  Vital Signs with Ranges  Temp:  [96  F (35.6  C)-99.1  F (37.3  C)] 99.1  F (37.3  C)  Pulse:  [] 97  Heart Rate:  [] 97  Resp:  [20-22] 20  BP: (116-141)/(62-86) 117/62  SpO2:  [91 %-97 %] 91 %  120 lbs 3.2 oz    Constitutional: awake, alert, cooperative, no apparent distress, and appears stated age  Eyes: Lids and lashes normal, pupils equal, round and reactive to light, extra ocular muscles intact, sclera clear, conjunctiva normal  ENT: Normocephalic, without obvious abnormality, atraumatic, sinuses nontender on palpation, external ears without lesions, oral pharynx with moist mucous membranes, tonsils without erythema or exudates, gums normal and good dentition.  Hematologic / Lymphatic: no cervical lymphadenopathy  Respiratory: no increased work of breathing, poor aeration  Cardiovascular: Normal apical impulse, regular rate and rhythm, normal S1 and S2, no S3 or S4, and no murmur noted  GI: No scars, normal bowel sounds, soft, non-distended, non-tender, no masses palpated, no hepatosplenomegally  Skin: no bruising or bleeding  Musculoskeletal: There is no redness, warmth, or swelling of the joints.  Full range of motion noted.    normal.  Neurologic: Awake, alert, ensory is intact.  Babinski down going, Romberg negative, and gait is normal.  Neuropsychiatric: General: normal, calm and normal eye contact    Data   I personally reviewed all recent ECGs and images.  Results for orders placed or performed during the hospital encounter of 02/03/19 (from the past 24 hour(s))   Cardiology IP Consult: Patient to be seen: Routine - within 24 hours; pt with known EF 19% c/o chest heaviness and with copd exacerbation; Consultant may enter orders: Yes    Narrative    Ramon Baker MD     2/5/2019  5:50 PM  [facility]    Cardiology Consultation     Date of Admission:  2/3/2019    Assessment & Plan   Jamilah Rodriguez is a 87 year old female who was admitted on   2/3/2019.    1.  Acute exacerbation of COPD due to influenza A infection    2.  Hypoxic respiratory failure    3.  Nonischemic cardiomyopathy with ejection fraction estimated   at 20% in October 2018 by echocardiogram.  Coronary angiography   in October 2018 demonstrated mild to moderate coronary artery   disease.    4.  Mild, stable, chronic systolic heart failure, New York Heart   Association class II.  The patient indicates that she has been   exercising 4 times per week before this acute episode and was   breathing comfortably.  She has not been on a beta-blocker due to   a history of beta-blocker intolerance.  Beta-blockers caused   worsening COPD issues.    5.  Chronic left bundle branch block.  The patient was evaluated   for a biventricular pacemaker or ICD and declined to have device   implantation.  She is DNR/DNI    6.  Insignificant troponin elevation likely due to respiratory   stress.  She is at low risk for acute coronary syndrome.    Plan:    Cardiac issues appear stable.  Chest x-ray does not show   pulmonary edema.  No additional cardiac evaluation or treatment   is recommended beyond her usual outpatient therapy.    RAMON BAKER MD    Primary Care  Physician   Sharon Hurtado    Reason for Consult   Reason for consult: I was asked by Dr. Al to evaluate this   patient for history of cardiomyopathy.    History of Present Illness   Jamilah Rodriguez is a 87 year old female who presents with acute   worsening respiratory status.  She tells me that she had been   doing quite well for several months until earlier this week when   she started experiencing more difficulty breathing and had   difficulty with activity due to shortness of breath.  Her chest   felt tight but she did not have pain in her chest.  There is no   discomfort in the arms neck jaw back or shoulder.    She came in to the emergency room for evaluation and tested   positive for influenza A.  She was diagnosed with acute COPD   exacerbation and admitted to the cardiac telemetry unit, probably   due to her history of cardiomyopathy.  She did have a trivial   troponin elevation of 0.099 on admission and it has decreased   slightly since then.  Her EKG shows a chronic left bundle branch   block.  Her last echocardiogram in October 2018 demonstrated   severe left ventricular dysfunction with moderate left   ventricular enlargement.  Her ejection fraction was 20%.  She was   evaluated for implantation of biventricular pacemaker or ICD and   she decided that she did not want to proceed with any device   implantation.  She has been active and exercising 4 days/week and   feeling good without significant shortness of breath problems   until this acute episode started about 5 days ago.  She was using   more nebulizer treatments and could not get control of her   shortness of breath.  She did not have evidence of worsening   edema PND orthopnea.  Her chest x-ray does not show evidence of   pulmonary edema.    Patient Active Problem List   Diagnosis     AAA (abdominal aortic aneurysm) (H)     Macular degeneration     Osteoporosis     Vertebral compression fracture (H)     CARDIOVASCULAR SCREENING; LDL GOAL  LESS THAN 160     Benign essential hypertension     Hip fx: s/p IM- [facility] 7/4/12     S/P cataract surgery: Recently 2012.     Constipation     Edema     Knee fracture, right     Other postprocedural status(V45.89)     Glaucoma     Chronic dermatitis     Seasonal allergic rhinitis, unspecified allergic rhinitis   trigger     Chronic obstructive pulmonary disease, unspecified COPD type   (H)     Abdominal pain     Cardiomyopathy, idiopathic (H)     COPD exacerbation (H)       Past Medical History   I have reviewed this patient's medical history and updated it   with pertinent information if needed.   Past Medical History:   Diagnosis Date     AAA (abdominal aortic aneurysm) (H)     Followed with yearly ultrasound     Cardiomyopathy, idiopathic (H) 10/2018     Cervical cancer (H) 1969     Chronic dermatitis     extensive allergy testing revealed allergy/sensitivity to carba   mix ( rubber) and Imidazolidinyl Urea (common in beauty products)       Glaucoma      Glaucoma      History of COPD     very mild abnormal spirometry in 2007 (in [place]), has not been   active since quitting smoking in 2009     HTN (hypertension)      LBBB (left bundle branch block) 10/2018     Macular degeneration      Osteoporosis 8/3/11    femoral T score -3.4 & -3.7     PAD (peripheral artery disease) (H)      Vertebral compression fracture (H) 5/17/11    L1 compression fracture, presumed osteoporotic compression   fracture       Past Surgical History   I have reviewed this patient's surgical history and updated it   with pertinent information if needed.  Past Surgical History:   Procedure Laterality Date     CATARACT IOL, RT/LT  2/8/12    left eye cataract removal     HEART CATH CORONARY ANGIOGRAM W/LV GRAM  10/2018    moderate nonocclusive CAD     HYSTERECTOMY TOTAL ABDOMINAL  1999    Fibroids     LAPAROSCOPY DIAGNOSTIC (GENERAL) N/A 4/15/2018    Procedure: LAPAROSCOPY DIAGNOSTIC (GENERAL);;  Surgeon: Jm Craig MD;  Location:  OR      LAPAROTOMY EXPLORATORY N/A 4/15/2018    Procedure: LAPAROTOMY EXPLORATORY;;  Surgeon: Jm Craig MD;  Location: [facility] OR     OPEN REDUCTION INTERNAL FIXATION HIP NAILING  7/2/2012    Procedure: OPEN REDUCTION INTERNAL FIXATION HIP NAILING;    Intramedullary Fixation Right Intertrochanteric Hip Fracture;    Surgeon: Chalino Covarrubias MD;  Location: [facility] OR     OPEN REDUCTION INTERNAL FIXATION TIBIAL PLATEAU  3/31/2013    Procedure: OPEN REDUCTION INTERNAL FIXATION TIBIAL PLATEAU;    RIGHT LATERAL TIBIAL PLATEAU FRACTURE - SYNTHES Medial Meniscus   Repair, Lateral Compartment Release;  Surgeon: Alfred Cardenas MD;  Location: [facility] OR     OPEN REDUCTION INTERNAL FIXATION WRIST  1988    left wrist ORIF, hardware removed onemonth later       Prior to Admission Medications   Prior to Admission Medications   Prescriptions Last Dose Informant Patient Reported? Taking?   ANORO ELLIPTA 62.5-25 MCG/INH oral inhaler 2/3/2019 at AM  No Yes     Sig: Inhale 1 puff into the lungs daily   Calcium Carb-Cholecalciferol (CALCIUM-VITAMIN D3) 600-500 MG-UNIT   CAPS 2/3/2019 at AM  Yes Yes   Sig: Take 1 tablet by mouth every 12 hours   Flaxseed, Linseed, (FLAXSEED OIL PO) 2/3/2019 at AM  Yes Yes   Sig: Take 1 tablet by mouth every morning   Glucosamine Sulfate 500 MG TABS 2/3/2019 at AM  Yes Yes   Sig: Take 1 tablet by mouth every morning   Multiple Vitamins-Minerals (PRESERVISION AREDS 2 PO) 2/3/2019 at   AM Self Yes Yes   Sig: Take 1 tablet by mouth 2 times daily   albuterol (PROAIR HFA/PROVENTIL HFA/VENTOLIN HFA) 108 (90 Base)   MCG/ACT Inhaler PRN  Yes No   Sig: Inhale 2 puffs into the lungs every 4 hours as needed for   shortness of breath / dyspnea or wheezing   ascorbic acid 500 MG TABS 2/3/2019 at AM  Yes Yes   Sig: Take 500 mg by mouth daily   aspirin 81 MG tablet 2/2/2019 at PM  No Yes   Sig: Take 1 tablet (81 mg) by mouth daily   bimatoprost (LUMIGAN) 0.01 % SOLN 2/2/2019 at HS Self Yes Yes   Sig: Place 1 drop into both eyes  At Bedtime.     dorzolamide (TRUSOPT) 2 % ophthalmic solution 2/3/2019 at AM Self   Yes Yes   Sig: Place 1 drop into both eyes 2 times daily   losartan (COZAAR) 50 MG tablet 2/3/2019 at AM  No Yes   Sig: Take 1 tablet (50 mg) by mouth daily   polyethylene glycol 400 (BLINK TEARS) 0.25 % SOLN ophthalmic   solution 2/3/2019  Yes Yes   Sig: Place 1 drop into both eyes every 3 hours as needed for dry   eyes   spironolactone (ALDACTONE) 25 MG tablet 2/3/2019 at AM  Yes Yes   Sig: Take 0.5 tablets (12.5 mg) by mouth daily HOLD 11/27/18 for   two weeks due to rash      Facility-Administered Medications: None     Current Facility-Administered Medications   Medication Dose Route Frequency     albuterol  3 mL Nebulization Q4H     aspirin  81 mg Oral Daily     bimatoprost  1 drop Both Eyes At Bedtime     calcium carbonate-vitamin D  1 tablet Oral Q12H     dorzolamide  1 drop Both Eyes BID     enoxaparin  40 mg Subcutaneous Q24H     losartan  50 mg Oral Daily     methylPREDNISolone  62.5 mg Intravenous Q6H     multivitamin  with lutein  1 capsule Oral BID     oseltamivir  30 mg Oral BID     pantoprazole  40 mg Oral QAM AC     spironolactone  12.5 mg Oral Daily     umeclidinium-vilanterol  1 puff Inhalation Daily     Current Facility-Administered Medications   Medication Last Rate     Allergies   Allergies   Allergen Reactions     Atenolol Hives     Beta Adrenergic Blockers      Brimonidine      Cephalexin Hives     Germall Plus Itching     Imidazolidinyl Urea found in many topical creams and house hold   products     Latex      Itching     Morphine GI Disturbance     No Clinical Screening - See Comments Itching     Carba Mix; IMIDAZOLIDINYL UREA - Allergic to this ingredient,   which is found in many common topical moisturizers and household   products.     Penicillins Hives     Sulfa Drugs GI Disturbance     Tramadol Nausea and Vomiting     Tylenol GI Disturbance     Vicodin [Hydrocodone-Acetaminophen]      Fentanyl Nausea  "      Social History    reports that she quit smoking about 9 years ago. she has never   used smokeless tobacco. She reports that she does not drink   alcohol or use drugs.    Family History   Family History   Problem Relation Age of Onset     Breast Cancer Other      Circulatory Daughter 53        mengioma     C.A.D. Father      Diabetes Sister      Breast Cancer Maternal Aunt 60       Review of Systems   The comprehensive 10 point Review of Systems is negative other   than noted in the HPI or here.     Physical Exam   Vital Signs with Ranges  Temp:  [96  F (35.6  C)-98.6  F (37  C)] 96  F (35.6  C)  Pulse:  [] 97  Heart Rate:  [] 104  Resp:  [16-20] 20  BP: (128-141)/(79-86) 137/85  SpO2:  [93 %-98 %] 94 %  Wt Readings from Last 4 Encounters:   02/05/19 54.5 kg (120 lb 3.2 oz)   11/05/18 54.4 kg (120 lb)   10/31/18 54.4 kg (119 lb 14.4 oz)   10/12/18 53.6 kg (118 lb 1.6 oz)     I/O last 3 completed shifts:  In: 680 [P.O.:660; I.V.:20]  Out: 2100 [Urine:2100]      Vitals: /85   Pulse 97   Temp 96  F (35.6  C)   Resp 20     Ht 1.651 m (5' 5\")   Wt 54.5 kg (120 lb 3.2 oz)   SpO2 94%     BMI 20.00 kg/m      Examination today shows a very thin woman in m mild acute   respiratory distress.  She has difficulty completing a full   sentence without stopping to breathe.  Her respiratory rate is   elevated.  She has prolonged expiratory phase.  Diffuse   expiratory wheezes are audible on auscultation.  She does not   clearly have jugular venous distention.  Heart tones are distant   but seem regular without obvious cardiac murmur.  HEENT exam is   unremarkable with moist mucous membranes and no xanthoma.    Abdomen is soft and nontender with no masses or hepatomegaly.    She has no lower extremity edema cyanosis or clubbing.  She has   good peripheral pulses.  Skin is warm and dry and intact.  She is   alert and oriented with no gross motor defects.    Recent Labs   Lab 02/04/19  0005 02/03/19  1817 " 19  1125   TROPI 0.043 0.059* 0.099*       Recent Labs   Lab 19  0524 19  0005 19  1817 19  1125   WBC 7.3  --   --  5.2   HGB 11.8  --   --  13.1   MCV 89  --   --  91     --   --  249   *  --   --  130*   POTASSIUM 4.1  --   --  4.7   CHLORIDE 101  --   --  98   CO2 21  --   --  26   BUN 20  --   --  14   CR 0.86  --   --  0.81   GFRESTIMATED 60*  --   --  65   GFRESTBLACK 70  --   --  75   ANIONGAP 8  --   --  6   NAKIA 8.7  --   --  8.8   *  --   --  94   TROPI  --  0.043 0.059* 0.099*     Recent Labs   Lab Test 18  1521 16  1020 04/08/15  1131   CHOL 141 194 187   HDL 70 83 86   LDL 59 100* 87   TRIG 62 56 69   CHOLHDLRATIO  --   --  2.2     Recent Labs   Lab 19  0524 19  1125   WBC 7.3 5.2   HGB 11.8 13.1   HCT 34.3* 38.1   MCV 89 91    249     No results for input(s): PH, PHV, PO2, PO2V, SAT, PCO2, PCO2V,   HCO3, HCO3V in the last 168 hours.  No results for input(s): NTBNPI, NTBNP in the last 168 hours.  No results for input(s): DD in the last 168 hours.  No results for input(s): SED, CRP in the last 168 hours.  Recent Labs   Lab 19  0524 19  1125    249     No results for input(s): TSH in the last 168 hours.  No results for input(s): COLOR, APPEARANCE, URINEGLC, URINEBILI,   URINEKETONE, SG, UBLD, URINEPH, PROTEIN, UROBILINOGEN, NITRITE,   LEUKEST, RBCU, WBCU in the last 168 hours.    Imaging:  No results found for this or any previous visit (from the past 48   hour(s)).    Echo:  Recent Results (from the past 4320 hour(s))   ECHO COMPLETE WITH OPTISON    Narrative    [id number]  [facility]  [id number]  [id number]^[email or web address]^VLADIMIR        [facility]  Echocardiography Laboratory  [address]  [address]  [place] [address]  Phone [phone]  Fax [phone]        Name: VANITA SHERIFF NICOLE  MRN: [id number]  : 1931  Study Date: 10/03/2018 10:08  AM  Age: 87 yrs  Gender: Female  Patient Location[place] [facility] [place]  Reason For Study: Chest heaviness  Ordering Physician: MALA HURTADO  Referring Physician: MALA HURTADO  Performed By: Penny Leo     BSA: 1.6 m2  Height: 65 in  Weight: 124 lb  HR: 77  BP: 112/75 mmHg  __________________________________________________________________  ___________  __     Procedure  Complete Echo Adult. Contrast Optison.     __________________________________________________________________  ___________  __        Interpretation Summary     The left ventricle is moderately dilated. There is normal left   ventricular  wall thickness. Left ventricular systolic function is severely   reduced. The  visual ejection fraction is estimated at <20%. Grade I or early   diastolic  dysfunction. Diastolic Doppler findings (E/E' ratio and/or other   parameters)  suggest left ventricular filling pressures are increased. There   is severe  global hypokinesia of the left ventricle. There is no thrombus   seen in the  left ventricle.  The right ventricle is normal size. Moderately decreased right   ventricular  systolic function.  There is mod-severe biatrial enlargement.  Trace to mild mitral and tricuspid regurgitation.  Small posterior pericardial effusion. There are no   echocardiographic  indications of cardiac tamponade.  In direct comparison to the previous study dated 07/02/2012,   there has been a  significant interval deterioration in left ventricular systolic   function.  Dr Hurtado's office was contacted with these findings immediately   after review of  the study.  __________________________________________________________________  ___________  __        Left Ventricle  The left ventricle is moderately dilated. There is normal left   ventricular  wall thickness. Left ventricular systolic function is severely   reduced. The  visual ejection fraction is estimated at <20%. Grade I or early   diastolic  dysfunction. Diastolic Doppler findings  (E/E' ratio and/or other   parameters)  suggest left ventricular filling pressures are increased. There   is severe  global hypokinesia of the left ventricle. There is no thrombus   seen in the  left ventricle.     Right Ventricle  The right ventricle is normal size. Moderately decreased right   ventricular  systolic function.     Atria  There is mod-severe biatrial enlargement. There is no color   Doppler evidence  of an atrial shunt.     Mitral Valve  There is trace to mild mitral regurgitation.     Tricuspid Valve  There is mild (1+) tricuspid regurgitation. The right ventricular   systolic  pressure is approximated at 24.0 mmHg plus the right atrial   pressure.        Aortic Valve  There is trace aortic regurgitation. No aortic stenosis is   present.     Pulmonic Valve  There is mild (1+) pulmonic valvular regurgitation. There is no   pulmonic  valvular stenosis.     Vessels  The aortic root is normal size. Normal size ascending aorta.   Dilated inferior  vena cava.     Pericardium  Small posterior pericardial effusion. There are no   echocardiographic  indications of cardiac tamponade.     Rhythm  Sinus rhythm was noted.     __________________________________________________________________  ___________  __  MMode/2D Measurements & Calculations  RVDd: 4.3 cm  IVSd: 0.76 cm  LVIDd: 5.7 cm  LVIDs: 5.3 cm  LVPWd: 0.85 cm  FS: 6.3 %  LV mass(C)d: 170.2 grams  LV mass(C)dI: 105.4 grams/m2  Ao root diam: 3.1 cm  LA dimension: 4.8 cm     asc Aorta Diam: 3.6 cm  LA/Ao: 1.6  LVOT diam: 2.0 cm  LVOT area: 3.1 cm2  LA Volume (BP): 80.9 ml  LA Volume Index (BP): 50.2 ml/m2  RWT: 0.30        Doppler Measurements & Calculations  MV E max clarita: 71.3 cm/sec  MV A max clarita: 93.1 cm/sec  MV E/A: 0.77  PA V2 max: 73.3 cm/sec  PA max P.2 mmHg  PA acc time: 0.10 sec  PI end-d clarita: 167.0 cm/sec     TR max clarita: 244.5 cm/sec  TR max P.0 mmHg  Medial E/e': 21.4            __________________________________________________________________  ___________  __           Report approved by: Jam Gomez 10/03/2018 12:51 PM                  Lactic acid level STAT for sepsis protocol   Result Value Ref Range    Lactate for Sepsis Protocol 1.2 0.7 - 2.0 mmol/L   Echocardiogram Complete    Narrative    [id number]  [id number]  [id number]  [id number]^LAILA^MONTANA           [facility]  Echocardiography Laboratory  [address] [place] [address]        Name: VANITA SHERIFF  MRN: [id number]  : 1931  Study Date: 2019 08:31 AM  Age: 87 yrs  Gender: Female  Patient Location: [facility]  Reason For Study: SOB  Ordering Physician: MONTANA ULLOA  Referring Physician: MALA STOVER  Performed By: Aisha Lane     BSA: 1.6 m2  Height: 65 in  Weight: 120 lb  HR: 103  BP: 116/63 mmHg  _____________________________________________________________________________  __        Procedure  Complete Portable Echo Adult. Contrast Optison.  _____________________________________________________________________________  __        Interpretation Summary     The left ventricle is severely dilated. The visual ejection fraction is  estimated at 10-20%.  Right ventricular function cannot be assessed due to poor image quality.  Probably mildly reduced? TDI velocities suggest normal function, but this  cannot be fully assessed.  Trivial pericardial effusion     _____________________________________________________________________________  __        Left Ventricle  The left ventricle is severely dilated. There is mild to moderate eccentric  left ventricular hypertrophy. The visual ejection fraction is estimated at 10-  20%. Diastolic function not assessed due to arrhythmia. Septal motion is  consistent with conduction abnormality.     Right Ventricle  Right ventricular function cannot be assessed due to poor image quality.     Atria  The left atrium is severely dilated. Right atrium not  well visualized.     Mitral Valve  There is mild (1+) mitral regurgitation.        Tricuspid Valve  There is mild (1+) tricuspid regurgitation. The right ventricular systolic  pressure is approximated at 28.6 mmHg plus the right atrial pressure. Right  ventricular systolic pressure could be underestimated due to incomplete  tricuspid regurgitation velocity envelope.     Aortic Valve  No aortic regurgitation is present. No hemodynamically significant valvular  aortic stenosis.     Pulmonic Valve  The pulmonic valve is not well visualized.     Vessels  The aortic root is not well visualized but is probably normal size. Inferior  vena cava not well visualized for estimation of right atrial pressure.     Pericardium  Trivial pericardial effusion.     _____________________________________________________________________________  __  MMode/2D Measurements & Calculations  IVSd: 0.87 cm  LVIDd: 5.8 cm  LVIDs: 5.7 cm  LVPWd: 0.77 cm  FS: 1.2 %  LV mass(C)d: 180.5 grams  LV mass(C)dI: 113.4 grams/m2     Ao root diam: 3.1 cm  LA dimension: 4.2 cm  asc Aorta Diam: 3.7 cm  LA/Ao: 1.4  LVOT diam: 2.2 cm  LVOT area: 3.8 cm2  LA Volume (BP): 80.5 ml  LA Volume Index (BP): 50.6 ml/m2  RWT: 0.27        Doppler Measurements & Calculations  MV E max clarita: 122.4 cm/sec  MV dec time: 0.10 sec     PA V2 max: 86.2 cm/sec  PA max PG: 3.0 mmHg  PA acc time: 0.09 sec  TR max clarita: 267.6 cm/sec  TR max P.6 mmHg  E/E' av.6  Lateral E/e': 10.8  Medial E/e': 16.5           _____________________________________________________________________________  __           Report approved by: Jam Turner 2019 10:01 AM

## 2019-02-06 NOTE — PLAN OF CARE
[id number]:  Pt is A/Ox4, VSS on 2L O2 NC, denies pain other than slight chest pressure d/t SOB and cough. 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 AUO Up SBA w/walker, D/C pending clinical improvement.

## 2019-02-06 NOTE — PLAN OF CARE
Pt short of breath on exertion and at rest on days. O2 weaned from 2L to 1L. O2 sats remain lower 90's on 1L of O2 at rest and during ambulation. Pt used IS on days and needs encouragement. Pt has dry cough, non productive. Minimal expiratory wheeze in upper bases and coarse in lower bases. Pt had new onset of a-fib-will start on eliquis BID and continues on tele. Pt has decreased appetite and is eating about 50% of meals. Pt steady on feet with ambulation, however short of breath. Ambulated SBA with RW. Pt on nebs QID and will transition to PO prednisone tomorrow. Echo done- EP consulted and visited with pt. Pulm rounded.

## 2019-02-06 NOTE — PROVIDER NOTIFICATION
Tele shows afib this morning.  Slightly tachy in the 100's, BBB.  Text paged to MD.  Currently having cardiac echo.

## 2019-02-06 NOTE — PLAN OF CARE
VSS. Monitor remains Sinus rhythm with 1st degree AVB, BBB. Stable O2 sats with O2 at 2 L NC. Pt. Has had intermittent chest heaviness with cough. Pt. Transferred to [facility]note 3[facility] per order. Report called to receiving nurse Shani. Pt. Daughter notified of pt. Transfer.

## 2019-02-07 NOTE — PLAN OF CARE
Discharge Planner PT   Patient plan for discharge: TCU  Current status: Pt is an 87 year old female with history of COPD, admitted with SBO, found to test positive for influenza A. At baseline, pt lives alone in a senior living apartment, elevator access. Pt reports independence with mobility and ambulation with FWW. Pts daughter assists with driving and shopping, otherwise, patient independent at baseline.  This date, patient requires SBA for bed mobility, good sitting balance at EOB. Pt performs sit to/from stand with SBA. Pt tolerates ambulating 50' with FWW and CGA, no overt LOB noted. Pt with SOB and fatigue following ambulation, O2 sats 98% on 2L via NC.  Barriers to return to prior living situation: Decreased activity tolerance, level of assist, fall risk, lives alone, fatigue, SOB.  Recommendations for discharge: TCU  Rationale for recommendations: Pt will benefit from daily continued therapy to address impairments and increase mobility and functional independence prior to returning home.        Entered by: Deepthi Cota 02/07/2019 11:51 AM

## 2019-02-07 NOTE — PLAN OF CARE
[id number] Pt. Is alert and orientedx4. VSS on 1L sating in the mid 90's. Pt dyspneic with activity, NEB QID. Pt has dry cough that is non productive. Lungs expiratory wheezing. Ax1 with ALONA and walker. New A-fib, started on Eliquis today.

## 2019-02-07 NOTE — PROVIDER NOTIFICATION
MD Notification    Notified Person: MD    Notified Person Name: Ruby    Notification Date/Time: 2/7/2019    Notification Interaction: spoke with physician 0111, 2/7/2019    Purpose of Notification: New temp of 102.8    Orders Received: UA/UC, blood cultures, treat with symptoms with tylenol    Comments:

## 2019-02-07 NOTE — PROGRESS NOTES
X cover 0110    Called for temp of 102.8; denies any chest pain, cough or SOB  - currently on Tamiflu for influenza  - will get a blood culture, check UA  - tylenol prn    0254  - noted abnormal ua  - will start levaquin (allergic to cephalexin); await urine culture

## 2019-02-07 NOTE — CONSULTS
Medication coverage check for Eliquis. $47 monthly copay.  Leyla Lopez CphT  [facility] Discharge Pharmacy Liaison  Liaison Cell: [phone]

## 2019-02-07 NOTE — PLAN OF CARE
Pt febrile at start of shift, 102.8. MD notified. UA/UC and Blood cultures drawn. Tylenol given, recheck temp 98.7. UA abnormal, pt started on IV levaquin. O2 sats dropped to 87-89% on 1 L NC, O2 increased to 2 L, weaned back to 1 after fever resolved. Tachycardic at start of shift, -110, Tele afib uncontrolled.  A&Ox4. Up with SBA walker to BA. Low fat/Low Na diet. Very [place]. LS coarse in the bases with exp wheezes. Sched nebs. Switching to PO prednisone today. Continue to monitor.

## 2019-02-07 NOTE — PROGRESS NOTES
[facility]    Hospitalist Progress Note    Date of Service (when I saw the patient): 02/07/2019    Assessment & Plan   Jamilah Rodriguez is a 87 year old female who was admitted on 2/3/2019.  An 87-year-old female with acute hypoxic respiratory failure secondary to acute chronic obstructive pulmonary disease exacerbation and influenza A:     continue IV Solu-Medrol 60 mg q.6 hours and DuoNebs q.4 hours , and albuterol nebs q.2 hours p.r.n.   Went in to AFIB RVR with HR in the 110s today  Switched albuterol nebs to xopenex nebs due to tachycardia   Iv solumedrol witched to prednisone 40mg PO daily today   SOb improving   Slow improvement    On tamiflu for influenza  Maintain droplet isolation   Pulmonary consulted and are following   2. UTI:  Pt had fever of 102 yesterday   UA was abnormal   Started on levaquin   Urine culture and blood cultures pending   2. AFIB RVR:  Went in to AFIB RVR on 2/6/19  HR in the 90s at rest today   Avoid betablcokers due to COPD   Discussed with pt and started anticoagulation with elliquis 2.5mg PO BID   Start diltiazem 30mg Q 8hours due to soft BPs today   2.  Elevated troponin, most likely due to demand ischemia in the setting of the COPD exacerbation and shortness of breath contributing.    Serial trops trended down     3.  Nonischemic cardiomyopathy with a known EF of 20%.  We will continue the spironolactone and baby aspirin and losartan for now.   Stopped baby aspirin while on elliquis   Echo showed EF 10-20%   Not a candidate for AICD due to her being DNR/DNI or CRT per EP  No further cardiac work up needed per cardiology   4.  History of macular degeneration.    We will continue her prior to admission eyedrops.   5.  DVT thrombosis prophylaxis with subcutaneous Lovenox.   6.  GI prophylaxis:  Protonix while she is on steroids.   7.  Code status is discussed with the patient and she wants to be DNR/DNI    Disposition: Expected discharge tomorrow   Discussed with  bedside RN, patient  Today  PT consulted   Earline Al MD  [phone] (P)      Interval History   SOB improving. Feels little better  today. Had fever up to 102 last night started on levaquin for UTI     -Data reviewed today: I reviewed all new labs and imaging results over the last 24 hours. I personally reviewed no images or EKG's today.    Physical Exam   Temp: 97.5  F (36.4  C) Temp src: Axillary BP: 120/69 Pulse: 93 Heart Rate: 82 Resp: 24 SpO2: 94 % O2 Device: Nasal cannula Oxygen Delivery: 2 LPM  Vitals:    02/03/19 0955 02/04/19 0407 02/05/19 0630   Weight: 55.3 kg (122 lb) 53.3 kg (117 lb 9.6 oz) 54.5 kg (120 lb 3.2 oz)     Vital Signs with Ranges  Temp:  [97.4  F (36.3  C)-102.8  F (39.3  C)] 97.5  F (36.4  C)  Pulse:  [82-93] 93  Heart Rate:  [] 82  Resp:  [20-24] 24  BP: (116-150)/(59-89) 120/69  SpO2:  [89 %-98 %] 94 %  I/O last 3 completed shifts:  In: 480 [P.O.:480]  Out: 250 [Urine:250]    Constitutional: Awake, alert, cooperative, no apparent distress  Respiratory: diminished air entry . occaional  wheezing today   Cardiovascular: irregular , normal S1 and S2, and no murmur noted  GI: Normal bowel sounds, soft, non-distended, non-tender  Skin/Integumen: No rashes, no cyanosis, no edema  Other:     Medications       apixaban ANTICOAGULANT  2.5 mg Oral BID     bimatoprost  1 drop Both Eyes At Bedtime     calcium carbonate-vitamin D  1 tablet Oral Q12H     diltiazem  30 mg Oral Q6H MARTHA     dorzolamide  1 drop Both Eyes BID     levalbuterol  1.25 mg Nebulization Q4H     [START ON 2/8/2019] levofloxacin  250 mg Intravenous Q24H     losartan  50 mg Oral Daily     multivitamin  with lutein  1 capsule Oral BID     oseltamivir  30 mg Oral BID     pantoprazole  40 mg Oral QAM AC     predniSONE  40 mg Oral Daily     spironolactone  12.5 mg Oral Daily     umeclidinium-vilanterol  1 puff Inhalation Daily       Data   Recent Labs   Lab 02/04/19  0524 02/04/19  0005 02/03/19  1817 02/03/19  1125   WBC  7.3  --   --  5.2   HGB 11.8  --   --  13.1   MCV 89  --   --  91     --   --  249   *  --   --  130*   POTASSIUM 4.1  --   --  4.7   CHLORIDE 101  --   --  98   CO2 21  --   --  26   BUN 20  --   --  14   CR 0.86  --   --  0.81   ANIONGAP 8  --   --  6   NAKIA 8.7  --   --  8.8   *  --   --  94   TROPI  --  0.043 0.059* 0.099*       No results found for this or any previous visit (from the past 24 hour(s)).

## 2019-02-07 NOTE — PLAN OF CARE
Pt was febrile on NOC shift, but remained afebrile throughout day shift. UA was abnormal, but pt denies any pain or discomfort with urination. Pt alert and oriented. Pt had expiratory wheeze in all bases and coarse in the lower bases and called out for shortness of breath with activity. O2 sats remain in the mid 90's on 1L of O2-dips with eating. Pt was in A-fib this AM- HR stable in the 80's-90's. Started on cardizem on days. Pt felt more tired today than usual. PT evaluated and is recommending TCU for discharge.

## 2019-02-07 NOTE — PROVIDER NOTIFICATION
MD Notification    Notified Person: MD    Notified Person Name: Ruby    Notification Date/Time: 2/7/2019, 0255    Notification Interaction: paged at [id number]    Purpose of Notification: UA results back, looks abnormal    Orders Received:    Comments:

## 2019-02-07 NOTE — PROGRESS NOTES
02/07/19 1100   Quick Adds   Type of Visit Initial PT Evaluation   Living Environment   Lives With alone   Living Arrangements apartment   Home Accessibility no concerns   Transportation Anticipated family or friend will provide   Self-Care   Usual Activity Tolerance good   Current Activity Tolerance moderate   Regular Exercise No   Equipment Currently Used at Home walker, rolling   Functional Level Prior   Ambulation 1-->assistive equipment   Transferring 1-->assistive equipment   Fall history within last six months no   Which of the above functional risks had a recent onset or change? ambulation;transferring   Prior Functional Level Comment Pt reports independence with mobility prior to admit. Daughter drives to/from appointments and stores.   General Information   Onset of Illness/Injury or Date of Surgery - Date 02/03/19   Referring Physician Dr. Al   Patient/Family Goals Statement To go home   Pertinent History of Current Problem (include personal factors and/or comorbidities that impact the POC) Pt is an 87 year old female admitted with SOB, positive for influenza A.   Precautions/Limitations fall precautions   Cognitive Status Examination   Orientation orientation to person, place and time   Level of Consciousness alert   Follows Commands and Answers Questions 100% of the time   Pain Assessment   Patient Currently in Pain No   Range of Motion (ROM)   ROM Quick Adds No deficits were identified   Strength   Strength Comments Gross LE strenth 4/5 bilaterally   Bed Mobility   Bed Mobility Comments Supine to/from sit SBA   Transfer Skills   Transfer Comments Sit to/from stand SBA   Gait   Gait Comments 15' FWW SBA, decreased flaquita and step length   Balance   Balance Comments Good in sitting, fair in standing   General Therapy Interventions   Planned Therapy Interventions balance training;bed mobility training;gait training;strengthening;progressive activity/exercise;home program guidelines;transfer training  "  Clinical Impression   Criteria for Skilled Therapeutic Intervention yes, treatment indicated   PT Diagnosis Impaired ambulation   Influenced by the following impairments Decreased strength, decreased endurance, decreased balance   Functional limitations due to impairments Difficulty with bed mobility, transfers, ambulation   Clinical Presentation Stable/Uncomplicated   Clinical Presentation Rationale VSS, pain controlled   Clinical Decision Making (Complexity) Low complexity   Therapy Frequency` 5 times/week   Predicted Duration of Therapy Intervention (days/wks) 1 week   Anticipated Discharge Disposition Transitional Care Facility   Risk & Benefits of therapy have been explained Yes   Patient, Family & other staff in agreement with plan of care Yes   [facility] TM \"6 Clicks\"   2016, Trustees of [facility], under license to [employer].  All rights reserved.   6 Clicks Short Forms Basic Mobility Inpatient Short Form   [facility]  \"6 Clicks\" V.2 Basic Mobility Inpatient Short Form   1. Turning from your back to your side while in a flat bed without using bedrails? 3 - A Little   2. Moving from lying on your back to sitting on the side of a flat bed without using bedrails? 3 - A Little   3. Moving to and from a bed to a chair (including a wheelchair)? 3 - A Little   4. Standing up from a chair using your arms (e.g., wheelchair, or bedside chair)? 3 - A Little   5. To walk in hospital room? 3 - A Little   6. Climbing 3-5 steps with a railing? 2 - A Lot   Basic Mobility Raw Score (Score out of 24.Lower scores equate to lower levels of function) 17   Total Evaluation Time   Total Evaluation Time (Minutes) 12     "

## 2019-02-08 NOTE — PROGRESS NOTES
[facility]    Hospitalist Progress Note    Date of Service (when I saw the patient): 02/08/2019    Assessment & Plan   Jamilah Rodriguez is a 87 year old female who was admitted on 2/3/2019.  An 87-year-old female with acute hypoxic respiratory failure secondary to acute chronic obstructive pulmonary disease exacerbation and influenza A:     was on  IV Solu-Medrol 60 mg q.6 hours and DuoNebs q.4 hours , and albuterol nebs q.2 hours p.r.n.   Went in to AFIB RVR with HR in the 110s on 2/6  Switched albuterol nebs to xopenex nebs due to tachycardia   Iv solumedrol witched to prednisone 40mg PO daily on 2/6    SOb feels little worse today   Discussed with pulmonary with  recommended VBG ordered   Slow improvement    On tamiflu for influenza for 1more day for total of 5day course   Maintain droplet isolation   Pulmonary consulted and are following   2. UTI:  Pt had fever of 102 on 2/6 most likely from known influenza    UA was abnormal   Started on levaquin   Urine culture and blood cultures negative. Stopped  levaquin today   2. AFIB RVR:  Went in to AFIB RVR on 2/6/19  HR in the 90s at rest  Avoid betablcokers due to COPD   Discussed with pt and started anticoagulation with elliquis 2.5mg PO BID   Started  diltiazem 30mg Q 8hours due to soft BPs today as per cardiology Recs   Switch to diltiazem ER 120mg PO daily today   3. Oral thrush:  Start nystatin swish and spit 4 times daily today   2.  Elevated troponin, most likely due to demand ischemia in the setting of the COPD exacerbation and shortness of breath contributing.    Serial trops trended down     3.  Nonischemic cardiomyopathy with a known EF of 20%.  We will continue the spironolactone and baby aspirin and losartan for now.   Stopped baby aspirin while on elliquis   Echo showed EF 10-20%   Not a candidate for AICD due to her being DNR/DNI or CRT per EP  No further cardiac work up needed per cardiology   4.  History of macular  degeneration.    We will continue her prior to admission eyedrops.   5.  DVT thrombosis prophylaxis with elliquis   6.  GI prophylaxis:  Protonix while she is on steroids.   7.  Code status is discussed with the patient and she wants to be DNR/DNI    Disposition: Expected discharge in the next 1-2days when respiratory status improves   Discussed with bedside RN, patient  And pulmonary Today  PT consulted   Earline Al MD  [phone] (P)      Interval History   SOB feels worse today .couging occasional productive sputum     -Data reviewed today: I reviewed all new labs and imaging results over the last 24 hours. I personally reviewed no images or EKG's today.    Physical Exam   Temp: 98.1  F (36.7  C) Temp src: Oral BP: 103/64   Heart Rate: 73 Resp: 28 SpO2: 94 % O2 Device: Nasal cannula Oxygen Delivery: 1 LPM  Vitals:    02/03/19 0955 02/04/19 0407 02/05/19 0630   Weight: 55.3 kg (122 lb) 53.3 kg (117 lb 9.6 oz) 54.5 kg (120 lb 3.2 oz)     Vital Signs with Ranges  Temp:  [97.8  F (36.6  C)-98.1  F (36.7  C)] 98.1  F (36.7  C)  Heart Rate:  [72-84] 73  Resp:  [20-28] 28  BP: (102-134)/(34-72) 103/64  SpO2:  [93 %-96 %] 94 %  I/O last 3 completed shifts:  In: 420 [P.O.:420]  Out: 1150 [Urine:1150]    Constitutional: Awake, alert, cooperative, no apparent distress  Respiratory: diminished air entry . Diffuse wheezing today   Cardiovascular: irregular , normal S1 and S2, and no murmur noted  GI: Normal bowel sounds, soft, non-distended, non-tender  Skin/Integumen: No rashes, no cyanosis, no edema  Other:     Medications       apixaban ANTICOAGULANT  2.5 mg Oral BID     bimatoprost  1 drop Both Eyes At Bedtime     calcium carbonate-vitamin D  1 tablet Oral Q12H     diltiazem ER  120 mg Oral Daily     dorzolamide  1 drop Both Eyes BID     levalbuterol  1.25 mg Nebulization Q4H     losartan  50 mg Oral Daily     nystatin  500,000 Units Swish & Spit 4x Daily     oseltamivir  30 mg Oral BID     pantoprazole  40 mg Oral  QAM AC     predniSONE  40 mg Oral Daily     PRESERVISION AREDS 2  1 capsule Oral BID     spironolactone  12.5 mg Oral Daily     umeclidinium-vilanterol  1 puff Inhalation Daily       Data   Recent Labs   Lab 02/08/19  0826 02/04/19  0524 02/04/19  0005 02/03/19  1817 02/03/19  1125   WBC  --  7.3  --   --  5.2   HGB  --  11.8  --   --  13.1   MCV  --  89  --   --  91   PLT  --  220  --   --  249   NA  --  130*  --   --  130*   POTASSIUM  --  4.1  --   --  4.7   CHLORIDE  --  101  --   --  98   CO2  --  21  --   --  26   BUN  --  20  --   --  14   CR 0.89 0.86  --   --  0.81   ANIONGAP  --  8  --   --  6   NAKIA  --  8.7  --   --  8.8   GLC  --  206*  --   --  94   TROPI  --   --  0.043 0.059* 0.099*       No results found for this or any previous visit (from the past 24 hour(s)).

## 2019-02-08 NOTE — PLAN OF CARE
No events overnight. Breathing appears labored, reports dyspnea with minimal exertion. Lungs coarse with expiratory wheezes. SaO2 maintained on 2L via nasal cannula. Reports some relief with scheduled nebs.     Tele: a-fib with CVR and BBB. Received Eliquis and cardizem. BP stable.     Up with A1 and walker to the restroom. Droplet precautions maintained. A+O x 4, pleasant and sharp.

## 2019-02-08 NOTE — PLAN OF CARE
"PT: Attempted PT session. Pt in bed on 2L O2 NC with resting O2 sats 86-88%. Pt reports transitioning from a mask to the NC in the past hour. With conversation O2 sats drop to 84-85%, improves to 90% with cues for PLB and total concentration. Pt requested to wait to walk \"until I can breathe more easily without the mask.\" Encouraged pt to walk with nursing staff later tonight as able. Pt reports that she did walk to the bathroom recently with assist but stated \"that took all the effort I had.\"  "

## 2019-02-08 NOTE — PLAN OF CARE
Pt is A/Ox4. Up with SBA, walker, GB. Afebrile. VSS on 2L of oxygen via NC, sating 93%-95%. Denies pain. LS with expiratory wheezes, coarse. Frequent congested non productive cough. Pt reports SOB and MCCULLOUGH. Pt is on scheduled neb and inhaler treatment. Also, on prednisone PO. Reports throat feeling dry and sore, nystatin swish and spit ordered and given. PO2 venous low (19), MD notified, MD asked to put pt on oxymask 4L, pt is sating 96%. Soft /49, MD aware. Tele Afib BBB CVR. Droplet isolation maintained, continues on Tamiflu for one more day. PT is following. Nursing continue to monitor.

## 2019-02-09 NOTE — PROGRESS NOTES
[facility]    Medicine Progress Note - Hospitalist Service       Date of Admission:  2/3/2019  Assessment & Plan   An 87-year-old female with acute hypoxic respiratory failure secondary to acute chronic obstructive pulmonary disease exacerbation   Febrile illness, influenza A:  - IV solumedrol to prednisone 40 mg daily starting 2/7.   - Xopenex nebs scheduled (afib with RVR on albuterol), anora ellipta  - completed 5 day course of Tamiflu, droplet isolation   - Pulmonary consulted and are following   - Wean O2, check CXR to evaluate for CHF due to continued wheezing, rhonchi (lower sodium and high BUN, questionable CHF    Nonischemic cardiomyopathy with a known EF of 20%. Echo showed EF 10-20%   Not a candidate for AICD due to her being DNR/DNI or CRT per EP. No further cardiac work up needed per cardiology   - continue PTA spironolactone, baby aspirin, losartan for now.   - Stopped baby aspirin while on elliquis   - Sodium down to 128, BUN up at 33 on 02/09/19 (fluid up or fluid down?)   Wt @ admission 55.3 > 54.7 on 02/09/19. No edema, wheezing and rhonchi bilaterally.  Suspect mild dehydration but will get CXR to evaluate for CHF.     AFIB RVR: Went in to AFIB RVR on 2/6/19  - Betablockers not contraindicated in COPD and may be of benefit. Use if needed.   - Discussed with pt and started anticoagulation with elliquis 2.5mg PO BID   - Started diltiazem ER 120mg PO daily     Abnormal UA   Febrile, likely secondary to influenza.   Urine culture and blood cultures negative. Stopped empiric levaquin    Oral thrush:  - On nystatin swish and spit 4 times daily    Minimally elevated troponin (max 0.099),likely demand ischemia, in the setting of the COPD exacerbation, CHF and shortness of breath contributing.  Serial trops trended down.      Macular degeneration.    - We will continue her prior to admission eyedrops.     DVT thrombosis prophylaxis with elliquis     GI prophylaxis:  Protonix while she is  on steroids.       Diet: Regular Diet Adult  Snacks/Supplements Adult: Boost Plus; Between Meals    DVT Prophylaxis: Eliquis  Morrow Catheter: not present  Code Status: DNR/DNI      Disposition Plan   Expected discharge: 1-2 days, recommended to transitional care unit once attempt to wean off O2 to determine needs at discharge and TCU placement secured, sodium improved or stable.  Entered: Tara Duran MD 02/09/2019, 11:22 AM     The patient's care was discussed with the Bedside Nurse and son Jonathan.     Tara Duran MD  Hospitalist Service  [facility]    ______________________________________________________________________    Interval History   Very slow improvement today with continued SOB, wheezing and rhonchi on exam. Afebrile. No CP, no swelling. Drinking well but appetite down.     Data reviewed today: I reviewed all medications, new labs and imaging results over the last 24 hours. I personally reviewed the chest x-ray image(s) showing WILL REVIEW LATER TODAY WHEN BACK.    Physical Exam   Vital Signs: Temp: 97.7  F (36.5  C) Temp src: Oral BP: 125/74 Pulse: 98 Heart Rate: 98 Resp: 18 SpO2: 96 % O2 Device: Nasal cannula Oxygen Delivery: 3 LPM  Weight: 120 lbs 3.2 oz  Constitutional:  NAD, FRAIL  Neuropsyche:  alert and oriented, answers questions appropriately.   Respiratory:  Mildly SOB, marked decrease air exchange, wheezes and rhonchi bilaterally.   Cardiovascular:  Regular rate and rhythm, no edema.  GI:  soft, NT/ND, BS normal  Skin/Integumen:  No acute rash, bruising or sign of bleeding.     Data   Recent Labs   Lab 02/09/19  0925 02/08/19  0826 02/04/19  0524 02/04/19  0005 02/03/19  1817 02/03/19  1125   WBC  --   --  7.3  --   --  5.2   HGB 13.3  --  11.8  --   --  13.1   MCV  --   --  89  --   --  91   PLT  --   --  220  --   --  249   *  --  130*  --   --  130*   POTASSIUM 4.3  --  4.1  --   --  4.7   CHLORIDE 96  --  101  --   --  98   CO2 25  --  21  --   --  26   BUN  33*  --  20  --   --  14   CR 0.94 0.89 0.86  --   --  0.81   ANIONGAP 7  --  8  --   --  6   NAKIA 8.5  --  8.7  --   --  8.8   *  --  206*  --   --  94   TROPI  --   --   --  0.043 0.059* 0.099*     No results found for this or any previous visit (from the past 24 hour(s)).  Medications       apixaban ANTICOAGULANT  2.5 mg Oral BID     bimatoprost  1 drop Both Eyes At Bedtime     calcium carbonate-vitamin D  1 tablet Oral Q12H     diltiazem ER  120 mg Oral Daily     dorzolamide  1 drop Both Eyes BID     levalbuterol  1.25 mg Nebulization Q4H     losartan  50 mg Oral Daily     nystatin  500,000 Units Swish & Spit 4x Daily     pantoprazole  40 mg Oral QAM AC     predniSONE  40 mg Oral Daily     PRESERVISION AREDS 2  1 capsule Oral BID     spironolactone  12.5 mg Oral Daily     umeclidinium-vilanterol  1 puff Inhalation Daily

## 2019-02-09 NOTE — PLAN OF CARE
A&O x4, SBA + walker.  VSS on 2 L nc.  Regular diet.  Denies pain.  MCCULLOUGH.  LS with expiratory wheezes; scheduled nebs.  Frequent non-productive cough.  Tele NSR with BBB, 1st degree AVB, and PACs. PIV saline locked. Droplet isolation maintained.  Continue to monitor.

## 2019-02-09 NOTE — CONSULTS
Care Transition Initial Assessment - SW     Met with: Patient    Active Problems:    COPD exacerbation (H)       DATA  Lives With: alone   Living Arrangements: apartment  Identified issues/concerns regarding health management: PT recommends TCU. Pt in agreement. She would like to go to [facility] where she has been three times before. A second choice would be [facility]. Her daughter would transport if discharged Sunday. Referral sent via [facility] protocol.      Transportation Anticipated: family or friend will provide    ASSESSMENT  Cognitive Status: Alert and oriented per nursing.  Concerns to be addressed: On-going discharge planning.     PLAN  Financial costs for the patient includes: None.  Patient given options and choices for discharge: Yes.  Patient/family is agreeable to the plan? YES  Patient Goals and Preferences: TCU.  Patient anticipates discharging to: TCU.    GODWIN Cr, LGSW  [id number]

## 2019-02-09 NOTE — PLAN OF CARE
Pt is A&O x4. SBA, walker, GB.  VSS on 2 L of oxygen via nc. Denies pain. SOB and MCCULLOUGH, pt desats with activity to mid 80%, requires 3L-4L with activity. LS with expiratory wheezes and coarse. Infrequent nonproductive cough. Pt is on scheduled neb and inhaler treatment. Also, on prednisone PO. Regular diet, eating well. Tele SR with BBB, PACs. PIV saline locked. Chest xray today, negative. Haley 128, MD aware. Voiding. +BS, BM this morning. Droplet isolation maintained. Continue to monitor.      Ambulated in the [place] x 1, needed 3L of oxygen with walking. Pt was sating 91% on 3 L when walking.

## 2019-02-09 NOTE — PROGRESS NOTES
Pt. Sitting up talking on cell phone. Spo2 94% on 2LPM-NC.  Took all tx's but refused 0300 am tx. Told her to tell the nurse if she changed her mind.  Will continue to monitor. Pt. Stable at this time

## 2019-02-09 NOTE — PROGRESS NOTES
"PULMONARY PROGRESS NOTE          Interval History:      Feeling about the same. Still wheezing and fatigued. Finds nebs helpful         Physical Exam:      Blood pressure 123/64, pulse 93, temperature 97.3  F (36.3  C), temperature source Oral, resp. rate 20, height 1.651 m (5' 5\"), weight 54.5 kg (120 lb 3.2 oz), SpO2 93 %, not currently breastfeeding.  Vitals:    02/03/19 0955 02/04/19 0407 02/05/19 0630   Weight: 55.3 kg (122 lb) 53.3 kg (117 lb 9.6 oz) 54.5 kg (120 lb 3.2 oz)     Vital Signs with Ranges  Temp:  [97.3  F (36.3  C)-98.1  F (36.7  C)] 97.3  F (36.3  C)  Heart Rate:  [73-86] 86  Resp:  [20-28] 20  BP: (102-134)/(34-72) 123/64  SpO2:  [93 %-97 %] 93 %  I/O's Last 24 hours  I/O last 3 completed shifts:  In: 420 [P.O.:420]  Out: 1025 [Urine:1025]    Lungs: Scattered wheze   Cardiovascular: regular   Other: abd soft               Medications:          apixaban ANTICOAGULANT  2.5 mg Oral BID     bimatoprost  1 drop Both Eyes At Bedtime     calcium carbonate-vitamin D  1 tablet Oral Q12H     diltiazem ER  120 mg Oral Daily     dorzolamide  1 drop Both Eyes BID     levalbuterol  1.25 mg Nebulization Q4H     losartan  50 mg Oral Daily     nystatin  500,000 Units Swish & Spit 4x Daily     oseltamivir  30 mg Oral BID     pantoprazole  40 mg Oral QAM AC     predniSONE  40 mg Oral Daily     PRESERVISION AREDS 2  1 capsule Oral BID     spironolactone  12.5 mg Oral Daily     umeclidinium-vilanterol  1 puff Inhalation Daily            Data:      All new lab and imaging data was reviewed.   Recent Labs   Lab Test 02/04/19  0524 02/03/19  1125 10/12/18  0843  04/03/13  0716  04/02/13  0610   WBC 7.3 5.2 4.6   < > 6.9  --  7.1   HGB 11.8 13.1 13.0   < > 9.4*   < > 7.4*   MCV 89 91 87   < > 89  --  91    249 246   < > 204  --  163   INR  --   --  1.13  --  1.47*  --  1.21*    < > = values in this interval not displayed.      Recent Labs   Lab Test 02/08/19  0826 02/04/19  0524 02/03/19  1125 10/12/18  0843   NA "  --  130* 130* 134   POTASSIUM  --  4.1 4.7 4.8   CHLORIDE  --  101 98 104   CO2  --  21 26 26   BUN  --  20 14 13   CR 0.89 0.86 0.81 0.81   ANIONGAP  --  8 6 4   NAKIA  --  8.7 8.8 8.8   GLC  --  206* 94 92        I reviewed the patient's new clinical lab test results.     I reviewed the patient's new imaging test results.       Active Problems:    COPD exacerbation (H)           Assessment and Plan:      Jamilah is an 87-year-old former smoker with past medical history notable for COPD typically well controlled on Anoro and rescue bronchodilator.  She was seen by me in clinic early last year and has completed pulmonary rehab and has typically done well.  Also has chronic systolic congestive heart failure.  Admitted with acute onset of dyspnea and found to be influenza positive.  With Tamiflu and steroids, she is feeling much better today.  Oxygen is weaning. VBG ok, not hypoxic on low flow o2. She was not on oxygen prior to admission. Anticipate very slow recovery.      -suggest 40mg/d of prednisone for 7 days  -continue tamiflu  -continue bronchodilators  -wean o2, goal sat ~ 91-92%. She will need walk for desaturation prior to discharge and may need oxygen on discharge, if she is willing to use it.  -outpt pulmonary followup with Dr Casillas or Dr Araiza in 8 weeks  -likely home soon. Will follow peripherally over the weekend. Please call if needed.        Hay Casillas MD  Pulmonary, Critical Care and Sleep Medicine  [facility]   Pager: [phone]  Office:[phone]

## 2019-02-09 NOTE — PLAN OF CARE
Nursing note.  Pt is A/Ox4. Up with SBA/walker. Afebrile. VSS on 2L of oxygen via NC, sating 93%-95%. Denies pain. LS with expiratory wheezes. Frequent non productive cough and congested. Pt reports SOB/ MCCULLOUGH, on scheduled neb/ inhaler treatment. Reports throat feeling dry and sore,pt on nystatin.Tele Afib w/cvr/ BBB. Pt on Droplet precaution, pt on Tamiflu for one more day. Pulmonology following. Will continue to monitor.

## 2019-02-10 NOTE — DISCHARGE SUMMARY
[facility]  Hospitalist Discharge Summary       Date of Admission:  2/3/2019  Date of Discharge:  2/10/2019  Discharging Provider: Tara Duran MD      Discharge Diagnoses   Acute Hypoxic Respiratory Failure  Acute Exacerbation of COPD  Nonischemic systolic heart failure, stable  Mild chronic hyponatremia  Paroxysmal atrial fibrillation   Oral thrush  Demand ischemia  Macular Degeneration     Follow-ups Needed After Discharge   Follow-up Appointments     Follow Up and recommended labs and tests      Follow up with long-term physician.  The following labs/tests are recommended: BMP, hgb.   Follow up with pulmonary with Dr Casillas or Dr Araiza in 8 weeks               Unresulted Labs Ordered in the Past 30 Days of this Admission     Date and Time Order Name Status Description    2/7/2019 0110 Blood culture Preliminary     2/7/2019 0110 Blood culture Preliminary       No growth to date. Hospitalist service will be called if positive.     Hospital Course      An 87-year-old female with acute hypoxic respiratory failure secondary to acute chronic obstructive pulmonary disease exacerbation   Febrile illness, influenza A:  - IV solumedrol to prednisone 40 mg daily starting 2/7 for 5 more days at discharge.   - Xopenex nebs scheduled (afib with RVR on albuterol), Anora Ellipta  - Completed 5 day course of Tamiflu, droplet isolation.   - Pulmonary consulted and are following, recommend follow up as outpatient in 8 weeks.     Nonischemic cardiomyopathy with a known EF of 20%. Echo showed EF 10-20%   Not a candidate for AICD due to her being DNR/DNI or CRT per EP. No further cardiac work up needed per cardiology   - continue PTA spironolactone, baby aspirin, losartan for now.   - Stopped baby aspirin while on elliquis   - Wt @ admission 55.3 > 54.9 on 02/10/19. No edema, wheezing and rhonchi bilaterally. - F/u CXR on 2/9 reviewed no acute abnormality, no evidence of CHF or infiltrate.     AFIB RVR: Went in  to AFIB RVR on 2/6/1  - Betablockers not contraindicated in COPD and may be of benefit. Use if needed.   - Started on anticoagulation with elliquis 2.5mg PO BID after discussion with patient.   - Started diltiazem ER 120mg PO daily.     Abnormal UA   Febrile, likely secondary to influenza. Urine culture and blood cultures negative. Stopped  empiric levaquin    Oral thrush:  - On nystatin swish and spit 4 times daily x 7 more days at discharge.     Minimally elevated troponin (max 0.099),likely demand ischemia, in the setting of the COPD exacerbation, CHF and shortness of breath contributing.  Serial trops trended down.      Macular degeneration.    - We will continue her prior to admission eyedrops.     GI prophylaxis:  Protonix while she is on steroids.     Communication: Discussed with pulmonary, daughter, social work and beside nurse on 02/10/19      Consultations This Hospital Stay   CARE COORDINATOR IP CONSULT  RESPIRATORY CARE IP CONSULT  PULMONARY IP CONSULT  CARDIOLOGY IP CONSULT  PHARMACY LIAISON FOR MEDICATION COVERAGE CONSULT  ELECTROPHYSIOLOGY IP CONSULT  PHYSICAL THERAPY ADULT IP CONSULT  SOCIAL WORK IP CONSULT  PHYSICAL THERAPY ADULT IP CONSULT  OCCUPATIONAL THERAPY ADULT IP CONSULT    Code Status   DNR/DNI    Time Spent on this Encounter   I, Tara Duran, personally saw the patient today and spent greater than 30 minutes discharging this patient.       Tara Duarn MD  [facility]  ______________________________________________________________________    Physical Exam   Vital Signs: Temp: 97.8  F (36.6  C) Temp src: Oral BP: 125/68   Heart Rate: 82 Resp: 18 SpO2: 92 % O2 Device: Nasal cannula Oxygen Delivery: 2 LPM  Weight: 121 lbs 1.6 oz  Constitutional: NAD, frail   Neuropsyche:  alert and oriented, answers questions appropriately.   Respiratory Persed lip breathing decreased air exchange, bilateral wheezing with prolonged expiration, unchanged for several days.    Cardiovascular: Regular rate and rhythm, no edema.  GI: soft, NT/ND, BS normal  Skin/Integumen:  No acute rash, severe bruising or sign of bleeding.          Primary Care Physician   Sharon Hurtado    Discharge Disposition   Discharged to short-term care facility  Condition at discharge: Stable    Significant Results and Procedures   Most Recent 3 CBC's:  Recent Labs   Lab Test 02/09/19  0925 02/04/19  0524 02/03/19  1125 10/12/18  0843   WBC  --  7.3 5.2 4.6   HGB 13.3 11.8 13.1 13.0   MCV  --  89 91 87   PLT  --  220 249 246     Most Recent 3 BMP's:  Recent Labs   Lab Test 02/10/19  0951 02/09/19  0925 02/08/19  0826 02/04/19  0524   * 128*  --  130*   POTASSIUM 5.1 4.3  --  4.1   CHLORIDE 97 96  --  101   CO2 27 25  --  21   BUN 40* 33*  --  20   CR 1.00 0.94 0.89 0.86   ANIONGAP 5 7  --  8   NAKIA 9.1 8.5  --  8.7   GLC 91 106*  --  206*     Most Recent 3 Troponin's:  Recent Labs   Lab Test 02/04/19  0005 02/03/19  1817 02/03/19  1125   TROPI 0.043 0.059* 0.099*     Most Recent 6 Bacteria Isolates From Any Culture (See EPIC Reports for Culture Details):  Lab Test 02/07/19  0153 02/07/19  0130 02/07/19  0125   CULT <10,000 colonies/mL  urogenital alfredo  Susceptibility testing not routinely done   No growth after 3 days No growth after 3 days         Most Recent Urinalysis:  Recent Labs   Lab Test 02/07/19  0153 12/21/16  1000   COLOR Yellow Yellow   APPEARANCE Clear Clear   URINEGLC Negative Negative   URINEBILI Negative Negative   URINEKETONE Negative Trace*   SG 1.014 1.015   UBLD Trace* Negative   URINEPH 6.5 6.0   PROTEIN 30* Trace*   UROBILINOGEN  --  0.2   NITRITE Negative Negative   LEUKEST Moderate* Large*   RBCU 2 5-10*   WBCU 26* 25-50*       Results for orders placed or performed during the hospital encounter of 02/03/19   Chest XR,  PA & LAT    Narrative    CHEST TWO VIEWS   2/3/2019 12:58 PM     HISTORY: Shortness of breath and chest pressure, check for pneumonia.    COMPARISON: 7/27/2018  x-ray.      Impression    IMPRESSION: Mild cardiomegaly. Pulmonary vasculature is not distended.  Prominent hyperinflation consistent with COPD. No focal infiltrates or  evidence of pleural fluid.    SHAAN VELEZ MD   XR Chest 2 Views    Narrative    CHEST TWO VIEWS 2/9/2019 1:11 PM     HISTORY: Evaluate for congestive heart failure.    COMPARISON: February 3, 2019     FINDINGS:  There are no acute infiltrates. The cardiac silhouette is  prominent but similar to previous. Pulmonary vasculature is  unremarkable.       Impression    IMPRESSION: No acute disease.    NIRALI RUBALCAVA MD       Discharge Orders      General info for SNF    Length of Stay Estimate: Short Term Care: Estimated # of Days <30  Condition at Discharge: Improving  Level of care:skilled   Rehabilitation Potential: Fair  Admission H&P remains valid and up-to-date: Yes  Recent Chemotherapy: N/A  Use Nursing Home Standing Orders: Yes     Mantoux instructions    Give two-step Mantoux (PPD) Per Facility Policy Yes     Reason for your hospital stay    You were admitted with respiratory failure due to a combination of the flu, COPD and heart failure.     Follow Up and recommended labs and tests    Follow up with [facility] physician.  The following labs/tests are recommended: BMP, hgb.   Follow up with pulmonary with Dr Casillas or Dr Araiza in 8 weeks     Activity - Up with nursing assistance     Physical Therapy Adult Consult    Evaluate and treat as clinically indicated.    Reason:  Deconditioned secondary to acute illness.     Occupational Therapy Adult Consult    Evaluate and treat as clinically indicated.    Reason:   Deconditioned secondary to acute illness.   .     Oxygen - Nasal cannula    1-2 Lpm by nasal cannula to keep O2 sats 89% or greater. Wean as tolerates, testing oxygen with activity.     Advance Diet as Tolerated    Follow this diet upon discharge: Orders Placed This Encounter      Snacks/Supplements Adult: Boost Plus; Between Meals       Regular Diet Adult     Discharge Medications   Current Discharge Medication List      START taking these medications    Details   apixaban ANTICOAGULANT (ELIQUIS) 2.5 MG tablet Take 1 tablet (2.5 mg) by mouth 2 times daily  Qty: 60 tablet, Refills: 0    Associated Diagnoses: Paroxysmal atrial fibrillation (H)      diltiazem ER (DILT-XR) 120 MG 24 hr capsule Take 1 capsule (120 mg) by mouth daily  Qty: 30 capsule, Refills: 0    Associated Diagnoses: Paroxysmal atrial fibrillation (H)      levalbuterol (XOPENEX CONC) 1.25 MG/0.5ML neb solution Take 0.5 mLs (1.25 mg) by nebulization every 4 hours  Qty: 90 mL, Refills: 0    Associated Diagnoses: COPD exacerbation (H)      nystatin (MYCOSTATIN) 405309 UNIT/ML suspension Swish and spit 5 mLs (500,000 Units) in mouth 4 times daily for 7 days  Refills: 0    Associated Diagnoses: Thrush      pantoprazole (PROTONIX) 40 MG EC tablet Take 1 tablet (40 mg) by mouth every morning (before breakfast) for 7 days  Refills: 0    Associated Diagnoses: COPD exacerbation (H)      predniSONE (DELTASONE) 20 MG tablet Take 40 mg by mouth daily for 5 days.  Qty: 10 tablet, Refills: 0    Associated Diagnoses: COPD exacerbation (H)         CONTINUE these medications which have NOT CHANGED    Details   ANORO ELLIPTA 62.5-25 MCG/INH oral inhaler Inhale 1 puff into the lungs daily  Qty: 1 Inhaler, Refills: 8    Associated Diagnoses: Chronic obstructive pulmonary disease, unspecified COPD type (H)      ascorbic acid 500 MG TABS Take 500 mg by mouth daily      bimatoprost (LUMIGAN) 0.01 % SOLN Place 1 drop into both eyes At Bedtime.        Calcium Carb-Cholecalciferol (CALCIUM-VITAMIN D3) 600-500 MG-UNIT CAPS Take 1 tablet by mouth every 12 hours      dorzolamide (TRUSOPT) 2 % ophthalmic solution Place 1 drop into both eyes 2 times daily      Flaxseed, Linseed, (FLAXSEED OIL PO) Take 1 tablet by mouth every morning      Glucosamine Sulfate 500 MG TABS Take 1 tablet by mouth every morning       losartan (COZAAR) 50 MG tablet Take 1 tablet (50 mg) by mouth daily  Qty: 90 tablet, Refills: 3    Associated Diagnoses: Benign essential hypertension      Multiple Vitamins-Minerals (PRESERVISION AREDS 2 PO) Take 1 tablet by mouth 2 times daily      polyethylene glycol 400 (BLINK TEARS) 0.25 % SOLN ophthalmic solution Place 1 drop into both eyes every 3 hours as needed for dry eyes      spironolactone (ALDACTONE) 25 MG tablet Take 0.5 tablets (12.5 mg) by mouth daily HOLD 11/27/18 for two weeks due to rash  Qty: 90 tablet, Refills: 1    Associated Diagnoses: Chest heaviness      albuterol (PROAIR HFA/PROVENTIL HFA/VENTOLIN HFA) 108 (90 Base) MCG/ACT Inhaler Inhale 2 puffs into the lungs every 4 hours as needed for shortness of breath / dyspnea or wheezing         STOP taking these medications       aspirin 81 MG tablet Comments:   Reason for Stopping:             Allergies   Allergies   Allergen Reactions     Atenolol Hives     Beta Adrenergic Blockers      Brimonidine      Cephalexin Hives     Germall Plus Itching     Imidazolidinyl Urea found in many topical creams and house hold products     Latex      Itching     Morphine GI Disturbance     No Clinical Screening - See Comments Itching     Carba Mix; IMIDAZOLIDINYL UREA - Allergic to this ingredient, which is found in many common topical moisturizers and household products.     Penicillins Hives     Sulfa Drugs GI Disturbance     Tramadol Nausea and Vomiting     Tylenol GI Disturbance     Vicodin [Hydrocodone-Acetaminophen]      Fentanyl Nausea

## 2019-02-10 NOTE — PLAN OF CARE
PT:  Discharging shortly to TCU.     Physical Therapy Discharge Summary    Reason for therapy discharge:    Discharged to transitional care facility.    Progress towards therapy goal(s). See goals on [facility] record for goal details.  Goals not met.  Barriers to achieving goals:   discharge from facility.    Therapy recommendation(s):    Continued therapy is recommended.  Rationale/Recommendations:  To increase strength and functional mobility.

## 2019-02-10 NOTE — PROGRESS NOTES
D: SW following for discharge planning.   I: Pt will discharge today to [facility] via  WC at 1700. Orders faxed and facility updated. Pt in agreement. She is aware she will be billed for the cost of the ride. Portable O2 tank ordered from [facility]. Orders faxed. SW informed pt she will be billed $75 for the portable tank. Per Renu at [facility], pt will be placed in a private room at no additional fee due to isolation needs. Nursing aware.   P: Discharge.     GODWIN Cr, LGSW  [id number]    PAS-RR    D: Per DHS regulation, SW completed and submitted PAS-RR to [place] [employer] Direct Connect via the Senior LinkAge Line.  PAS-RR confirmation # is : [id number]    I: SW spoke with pt and they are aware a PAS-RR has been submitted.  SW reviewed with pt that they may be contacted for a follow up appointment within 10 days of hospital discharge if their SNF stay is < 30 days.  Contact information for [facility] LinkAge Line was also provided.    A: Pt verbalized understanding.    P: Further questions may be directed to [facility] LinkAge Line at #[phone], option #4 for PAS-RR staff.

## 2019-02-10 NOTE — PLAN OF CARE
Pt is A&O x4. SBA, walker, GB. VSS on 0.5L to 1L of oxygen via nc. Denies pain. SOB and MCCULLOUGH improving, pt desats with activity to mid 80% but recovers quickly, requires more oxygen when walking, 2L to 3L. LS with expiratory wheezes and coarse. Infrequent nonproductive cough. Pt is on scheduled neb and inhaler treatment. Also, on prednisone PO. Regular diet, eating well. Tele NSR with BBB, PACs. PIV saline locked. +BS, BM this morning. Droplet isolation maintained. Plan to discharge today to [facility], 5 pm.

## 2019-02-10 NOTE — PLAN OF CARE
AVS reviewed with the patient. Home medications, eye drops given to the patient. Flowers placed by the [facility] desk. Patient's daughter will  tomorrow. Chart copy placed in the folder and she is discharged to TCU.

## 2019-02-10 NOTE — PROGRESS NOTES
D: DOTTIE following for discharge planning.   I: [facility] can accept pt if ready today.   P: DOTTIE following.     GODWIN Cr, LGSW  [id number]

## 2019-02-10 NOTE — PLAN OF CARE
A&O x4, SBA +gb/walker.  Regular diet.  Denies pain.  VSS on 1L nc.  MCCULLOUGH.  LS with expiratory wheezes.  Tele NSR with PACs and 1st degree AVB.  Droplet isolation maintained.  Continue to monitor.

## 2019-02-11 NOTE — PROGRESS NOTES
[facility]  PRIMARY CARE PROVIDER AND CLINIC:  Sharon Hurtado [address] [place] [address] / [facility] [address]  Chief Complaint   Patient presents with     Hospital F/U     [facility] Medical Record Number:  [id number]  Place of Service where encounter took place:  [facility] [[id number]]    HPI:    Jamilah Rodriguez is a 87 year old  (4/5/1931),admitted to the above facility from  [facility].  Hospital stay 2/3/19 through 2/10/19.   Acute hypoxic respiratory failure: acute on chronic COPD, influenza A, : IV solumedrol to prednisone 40mg QD, xopenex nebs scheduled, completed course of tamiflu, f/u with pulmonology in 8 weeks  Non ischemic cardiomyopathy with known EF of 20%: not candidate for AICD due to DNR/DNI: no workup by cardiology, continue medical management, no evidence of CHF during hospitalization  Afib with RVR: went into Afib with RVR on 2/6/19:  Started on eliquis, DC ASA, also started on diltiazem ER 120mg QD  Did have elevated troponin: likely demand ischemia, Trop trended down  Oral thrush: nystatin swish and spit 7 days  Admitted to this facility for  rehab, medical management and nursing care.  HPI information obtained from: facility chart records, facility staff, patient report and [facility] chart review.  Current issues are:    On exam today patient sitting up in WC, alert, pleasant, states she is very SOB with any activity, c/o loose non productive cough, no SOB at rest, denies fever, chills, N/V/D or constipation, denies pain or discomfort, CP, palpitations, no other concerns at this time.   Last 3 BPs: 112/69, 110/60, 112/69 mmHg  HR Ranges: 97 bpm  Admission Weight: 125.9 lbs    CODE STATUS/ADVANCE DIRECTIVES DISCUSSION:   DNR / DNI  Patient's living condition: lives alone    ALLERGIES:Atenolol; Beta adrenergic blockers; Brimonidine; Cephalexin; Germall plus; Latex; Morphine; No clinical screening - see comments; Penicillins; Sulfa drugs; Tramadol;  Tylenol; Vicodin [hydrocodone-acetaminophen]; and Fentanyl  PAST MEDICAL HISTORY:  has a past medical history of AAA (abdominal aortic aneurysm) (H), Cardiomyopathy, idiopathic (H) (10/2018), Cervical cancer (H) (1969), Chronic dermatitis, Glaucoma, Glaucoma, History of COPD, HTN (hypertension), LBBB (left bundle branch block) (10/2018), Macular degeneration, Osteoporosis (8/3/11), PAD (peripheral artery disease) (H), and Vertebral compression fracture (H) (5/17/11).  PAST SURGICAL HISTORY:  has a past surgical history that includes Hysterectomy total abdominal (1999); cataract iol, rt/lt (2/8/12); Open reduction internal fixation wrist (1988); Open reduction internal fixation hip nailing (7/2/2012); Open reduction internal fixation tibial plateau (3/31/2013); Laparoscopy diagnostic (general) (N/A, 4/15/2018); Laparotomy exploratory (N/A, 4/15/2018); and Heart Cath Coronary angiogram w/lv gram (10/2018).  FAMILY HISTORY: family history includes Breast Cancer in an other family member; Breast Cancer (age of onset: 60) in her maternal aunt; C.A.D. in her father; Circulatory (age of onset: 53) in her daughter; Diabetes in her sister.  SOCIAL HISTORY:  reports that she quit smoking about 9 years ago. she has never used smokeless tobacco. She reports that she does not drink alcohol or use drugs.    Post Discharge Medication Reconciliation Status: discharge medications reconciled, continue medications without change.  Current Outpatient Medications   Medication Sig Dispense Refill     albuterol (PROAIR HFA/PROVENTIL HFA/VENTOLIN HFA) 108 (90 Base) MCG/ACT Inhaler Inhale 2 puffs into the lungs every 4 hours as needed for shortness of breath / dyspnea or wheezing       ANORO ELLIPTA 62.5-25 MCG/INH oral inhaler Inhale 1 puff into the lungs daily 1 Inhaler 8     apixaban ANTICOAGULANT (ELIQUIS) 2.5 MG tablet Take 1 tablet (2.5 mg) by mouth 2 times daily 60 tablet 0     ascorbic acid 500 MG TABS Take 500 mg by mouth daily        bimatoprost (LUMIGAN) 0.01 % SOLN Place 1 drop into both eyes At Bedtime.         Calcium Carb-Cholecalciferol (CALCIUM-VITAMIN D3) 600-500 MG-UNIT CAPS Take 1 tablet by mouth every 12 hours       diltiazem ER (DILT-XR) 120 MG 24 hr capsule Take 1 capsule (120 mg) by mouth daily 30 capsule 0     dorzolamide (TRUSOPT) 2 % ophthalmic solution Place 1 drop into both eyes 2 times daily       Flaxseed, Linseed, (FLAXSEED OIL PO) Take 1 tablet by mouth every morning       Glucosamine Sulfate 500 MG TABS Take 1 tablet by mouth every morning       levalbuterol (XOPENEX CONC) 1.25 MG/0.5ML neb solution Take 0.5 mLs (1.25 mg) by nebulization every 4 hours 90 mL 0     losartan (COZAAR) 50 MG tablet Take 1 tablet (50 mg) by mouth daily 90 tablet 3     Multiple Vitamins-Minerals (PRESERVISION AREDS 2 PO) Take 1 tablet by mouth 2 times daily       nystatin (MYCOSTATIN) 433926 UNIT/ML suspension Swish and spit 5 mLs (500,000 Units) in mouth 4 times daily for 7 days  0     pantoprazole (PROTONIX) 40 MG EC tablet Take 1 tablet (40 mg) by mouth every morning (before breakfast) for 7 days  0     polyethylene glycol 400 (BLINK TEARS) 0.25 % SOLN ophthalmic solution Place 1 drop into both eyes every 3 hours as needed for dry eyes       predniSONE (DELTASONE) 20 MG tablet Take 40 mg by mouth daily for 5 days. 10 tablet 0     spironolactone (ALDACTONE) 25 MG tablet Take 12.5 mg by mouth daily  90 tablet 1       ROS:  10 point ROS of systems including Constitutional, Eyes, Respiratory, Cardiovascular, Gastroenterology, Genitourinary, Integumentary, Musculoskeletal, Psychiatric were all negative except for pertinent positives noted in my HPI.    Exam:  /69   Pulse 97   Temp 96.9  F (36.1  C)   Resp 18   Wt 57.1 kg (125 lb 14.4 oz)   SpO2 97%   BMI 20.95 kg/m    GENERAL APPEARANCE:  Alert, in mild distress due to SOB  ENT:  Mouth and posterior oropharynx normal, moist mucous membranes, [place]  EYES:  EOM, conjunctivae, lids,  pupils and irises normal, PERRL  RESP:  respiratory effort and palpation of chest normal, diminished breath sounds bases bilaterally with exp wheezes noted throughout fields bilaterally  CV:  Palpation and auscultation of heart done , regular rate and rhythm, no murmur, rub, or gallop, no edema  ABDOMEN:  normal bowel sounds, soft, nontender, no hepatosplenomegaly or other masses  M/S:   Examination of:   right upper extremity, left upper extremity, right lower extremity and left lower extremity  Inspection, ROM, stability and muscle strength normal and generalized weakness noted  SKIN:  Inspection of skin and subcutaneous tissue baseline  NEURO:   Cranial nerves 2-12 are normal tested and grossly at patient's baseline, speech WNL  PSYCH:  affect and mood normal    Lab/Diagnostic data:  CBC RESULTS:   Recent Labs   Lab Test 02/09/19  0925 02/04/19  0524 02/03/19  1125   WBC  --  7.3 5.2   RBC  --  3.85 4.21   HGB 13.3 11.8 13.1   HCT  --  34.3* 38.1   MCV  --  89 91   MCH  --  30.6 31.1   MCHC  --  34.4 34.4   RDW  --  13.3 13.4   PLT  --  220 249       Last Basic Metabolic Panel:  Recent Labs   Lab Test 02/10/19  0951 02/09/19  0925   * 128*   POTASSIUM 5.1 4.3   CHLORIDE 97 96   NAKIA 9.1 8.5   CO2 27 25   BUN 40* 33*   CR 1.00 0.94   GLC 91 106*     ASSESSMENT/PLAN:  COPD exacerbation (H)  Influenza A  Physical deconditioning  Acute/ongoing: PT and OT for strengthening, monitor SaO2 at rest and with activity, continue prednisone 40mg QD for 5 days then DC, anoro ellipta 62.5/25mcg 1 puff QD, duonebs  QID scheduled and albuterol nebs q 4 hours prn, mucinex 600mg BID for 5 days   F/u with Dr. Casillas pulmonology in 8 weeks    Cardiomyopathy, idiopathic (H)  Benign essential hypertension  Ongoing: medically manage, daily weights, vitals daily and prn, BMP twice weekly, continue losartan 50mg QD, aldactone 12.5mg QD, went into atrial fib with RVR see below    Atrial fibrillation with RVR (H)  Acute/ongoing: started  on diltiazem ER 120mg QD and apixaban 2.5mg BID, ASA DC     Oral thrush  Acute: continue nystatin swish and spit 500,000 units QID for 7 days        Orders:  BMP and CBC weekly  mucinex 600mg q 12 hours for 5 days  duonebs QID  Albuterol nebs q 4 hours prn  Daily weights        Electronically signed by:  Tonya Lynn Haase, APRN CNP

## 2019-02-11 NOTE — PROGRESS NOTES
Clinic Care Coordination Contact  Care Coordination Transition Communication    Referral Source: IP Report    Clinical Data: Patient was hospitalized at [facility] from 2/3/2019 to 2/10/2019 with diagnosis of Acute Hypoxic Respiratory Failure, Acute Exacerbation of COPD, Nonischemic systolic heart failure, Mild chronic hyponatremia, Paroxysmal atrial fibrillation, Oral thrush, Demand, ischemia, and Macular Degeneration.     Transition to Facility:              [facility]              Contact name and phone number/fax: [phone]/ [phone]    Plan: SW Care Coordinator will await notification from facility staff informing RN/SW Care Coordinator of patient's discharge plans/needs. SW Care Coordinator will review chart and outreach to facility staff every 4 weeks and as needed.     GODWIN Cespedes, LG  Clinic Care Coordinator  [facility]  [phone]  [email or web address]

## 2019-02-11 NOTE — LETTER
[facility]   To:   [facility]          Please give to facility    From:   DOTTIE Cespedes Care Coordinator [facility]     Patient Name:  Jamilah Rodriguez YOB: 1931   Admit date: 2/10/2019      *Information Needed:  Please contact me when the patient will discharge (or if they will move to long term care)- include the discharge date, disposition, and main diagnosis   - If the patient is discharged with home care services, please provide the name of the agency    Also- Please inform me if a care conference is being held.   Phone, Fax or Email with information       Thank You,   ERUM Cespedes, MSW  Clinic Care Coordinator  [facility] [place]  Ph: [phone]  [email or web address]

## 2019-02-11 NOTE — LETTER
2/11/2019        RE: Jamilah Rodriguez  [address]  [place] [address]        [facility]  PRIMARY CARE PROVIDER AND CLINIC:  Sharon Hurtado [address] [address]  Chief Complaint   Patient presents with     Hospital F/U     [facility] Medical Record Number:  [id number]  Place of Service where encounter took place:  [facility] ([facility] [[id number]]    HPI:    Jamilah Rodriguez is a 87 year old  (4/5/1931),admitted to the above facility from  [facility].  Hospital stay 2/3/19 through 2/10/19.   Acute hypoxic respiratory failure: acute on chronic COPD, influenza A, : IV solumedrol to prednisone 40mg QD, xopenex nebs scheduled, completed course of tamiflu, f/u with pulmonology in 8 weeks  Non ischemic cardiomyopathy with known EF of 20%: not candidate for AICD due to DNR/DNI: no workup by cardiology, continue medical management, no evidence of CHF during hospitalization  Afib with RVR: went into Afib with RVR on 2/6/19:  Started on eliquis, DC ASA, also started on diltiazem ER 120mg QD  Did have elevated troponin: likely demand ischemia, Trop trended down  Oral thrush: nystatin swish and spit 7 days  Admitted to this facility for  rehab, medical management and nursing care.  HPI information obtained from: facility chart records, facility staff, patient report and [facility] chart review.  Current issues are:    On exam today patient sitting up in WC, alert, pleasant, states she is very SOB with any activity, c/o loose non productive cough, no SOB at rest, denies fever, chills, N/V/D or constipation, denies pain or discomfort, CP, palpitations, no other concerns at this time.   Last 3 BPs: 112/69, 110/60, 112/69 mmHg  HR Ranges: 97 bpm  Admission Weight: 125.9 lbs    CODE STATUS/ADVANCE DIRECTIVES DISCUSSION:   DNR / DNI  Patient's living condition: lives alone    ALLERGIES:Atenolol; Beta adrenergic blockers; Brimonidine; Cephalexin;  Germall plus; Latex; Morphine; No clinical screening - see comments; Penicillins; Sulfa drugs; Tramadol; Tylenol; Vicodin [hydrocodone-acetaminophen]; and Fentanyl  PAST MEDICAL HISTORY:  has a past medical history of AAA (abdominal aortic aneurysm) (H), Cardiomyopathy, idiopathic (H) (10/2018), Cervical cancer (H) (1969), Chronic dermatitis, Glaucoma, Glaucoma, History of COPD, HTN (hypertension), LBBB (left bundle branch block) (10/2018), Macular degeneration, Osteoporosis (8/3/11), PAD (peripheral artery disease) (H), and Vertebral compression fracture (H) (5/17/11).  PAST SURGICAL HISTORY:  has a past surgical history that includes Hysterectomy total abdominal (1999); cataract iol, rt/lt (2/8/12); Open reduction internal fixation wrist (1988); Open reduction internal fixation hip nailing (7/2/2012); Open reduction internal fixation tibial plateau (3/31/2013); Laparoscopy diagnostic (general) (N/A, 4/15/2018); Laparotomy exploratory (N/A, 4/15/2018); and Heart Cath Coronary angiogram w/lv gram (10/2018).  FAMILY HISTORY: family history includes Breast Cancer in an other family member; Breast Cancer (age of onset: 60) in her maternal aunt; C.A.D. in her father; Circulatory (age of onset: 53) in her daughter; Diabetes in her sister.  SOCIAL HISTORY:  reports that she quit smoking about 9 years ago. she has never used smokeless tobacco. She reports that she does not drink alcohol or use drugs.    Post Discharge Medication Reconciliation Status: discharge medications reconciled, continue medications without change.  Current Outpatient Medications   Medication Sig Dispense Refill     albuterol (PROAIR HFA/PROVENTIL HFA/VENTOLIN HFA) 108 (90 Base) MCG/ACT Inhaler Inhale 2 puffs into the lungs every 4 hours as needed for shortness of breath / dyspnea or wheezing       ANORO ELLIPTA 62.5-25 MCG/INH oral inhaler Inhale 1 puff into the lungs daily 1 Inhaler 8     apixaban ANTICOAGULANT (ELIQUIS) 2.5 MG tablet Take 1 tablet  (2.5 mg) by mouth 2 times daily 60 tablet 0     ascorbic acid 500 MG TABS Take 500 mg by mouth daily       bimatoprost (LUMIGAN) 0.01 % SOLN Place 1 drop into both eyes At Bedtime.         Calcium Carb-Cholecalciferol (CALCIUM-VITAMIN D3) 600-500 MG-UNIT CAPS Take 1 tablet by mouth every 12 hours       diltiazem ER (DILT-XR) 120 MG 24 hr capsule Take 1 capsule (120 mg) by mouth daily 30 capsule 0     dorzolamide (TRUSOPT) 2 % ophthalmic solution Place 1 drop into both eyes 2 times daily       Flaxseed, Linseed, (FLAXSEED OIL PO) Take 1 tablet by mouth every morning       Glucosamine Sulfate 500 MG TABS Take 1 tablet by mouth every morning       levalbuterol (XOPENEX CONC) 1.25 MG/0.5ML neb solution Take 0.5 mLs (1.25 mg) by nebulization every 4 hours 90 mL 0     losartan (COZAAR) 50 MG tablet Take 1 tablet (50 mg) by mouth daily 90 tablet 3     Multiple Vitamins-Minerals (PRESERVISION AREDS 2 PO) Take 1 tablet by mouth 2 times daily       nystatin (MYCOSTATIN) 292525 UNIT/ML suspension Swish and spit 5 mLs (500,000 Units) in mouth 4 times daily for 7 days  0     pantoprazole (PROTONIX) 40 MG EC tablet Take 1 tablet (40 mg) by mouth every morning (before breakfast) for 7 days  0     polyethylene glycol 400 (BLINK TEARS) 0.25 % SOLN ophthalmic solution Place 1 drop into both eyes every 3 hours as needed for dry eyes       predniSONE (DELTASONE) 20 MG tablet Take 40 mg by mouth daily for 5 days. 10 tablet 0     spironolactone (ALDACTONE) 25 MG tablet Take 12.5 mg by mouth daily  90 tablet 1       ROS:  10 point ROS of systems including Constitutional, Eyes, Respiratory, Cardiovascular, Gastroenterology, Genitourinary, Integumentary, Musculoskeletal, Psychiatric were all negative except for pertinent positives noted in my HPI.    Exam:  /69   Pulse 97   Temp 96.9  F (36.1  C)   Resp 18   Wt 57.1 kg (125 lb 14.4 oz)   SpO2 97%   BMI 20.95 kg/m     GENERAL APPEARANCE:  Alert, in mild distress due to SOB  ENT:   Mouth and posterior oropharynx normal, moist mucous membranes, [place]  EYES:  EOM, conjunctivae, lids, pupils and irises normal, PERRL  RESP:  respiratory effort and palpation of chest normal, diminished breath sounds bases bilaterally with exp wheezes noted throughout fields bilaterally  CV:  Palpation and auscultation of heart done , regular rate and rhythm, no murmur, rub, or gallop, no edema  ABDOMEN:  normal bowel sounds, soft, nontender, no hepatosplenomegaly or other masses  M/S:   Examination of:   right upper extremity, left upper extremity, right lower extremity and left lower extremity  Inspection, ROM, stability and muscle strength normal and generalized weakness noted  SKIN:  Inspection of skin and subcutaneous tissue baseline  NEURO:   Cranial nerves 2-12 are normal tested and grossly at patient's baseline, speech WNL  PSYCH:  affect and mood normal    Lab/Diagnostic data:  CBC RESULTS:   Recent Labs   Lab Test 02/09/19  0925 02/04/19  0524 02/03/19  1125   WBC  --  7.3 5.2   RBC  --  3.85 4.21   HGB 13.3 11.8 13.1   HCT  --  34.3* 38.1   MCV  --  89 91   MCH  --  30.6 31.1   MCHC  --  34.4 34.4   RDW  --  13.3 13.4   PLT  --  220 249       Last Basic Metabolic Panel:  Recent Labs   Lab Test 02/10/19  0951 02/09/19  0925   * 128*   POTASSIUM 5.1 4.3   CHLORIDE 97 96   NAKIA 9.1 8.5   CO2 27 25   BUN 40* 33*   CR 1.00 0.94   GLC 91 106*     ASSESSMENT/PLAN:  COPD exacerbation (H)  Influenza A  Physical deconditioning  Acute/ongoing: PT and OT for strengthening, monitor SaO2 at rest and with activity, continue prednisone 40mg QD for 5 days then DC, anoro ellipta 62.5/25mcg 1 puff QD, duonebs  QID scheduled and albuterol nebs q 4 hours prn, mucinex 600mg BID for 5 days   F/u with Dr. Casillas pulmonology in 8 weeks    Cardiomyopathy, idiopathic (H)  Benign essential hypertension  Ongoing: medically manage, daily weights, vitals daily and prn, BMP twice weekly, continue losartan 50mg QD, aldactone 12.5mg QD,  went into atrial fib with RVR see below    Atrial fibrillation with RVR (H)  Acute/ongoing: started on diltiazem ER 120mg QD and apixaban 2.5mg BID, ASA DC     Oral thrush  Acute: continue nystatin swish and spit 500,000 units QID for 7 days        Orders:  BMP and CBC weekly  mucinex 600mg q 12 hours for 5 days  duonebs QID  Albuterol nebs q 4 hours prn  Daily weights        Electronically signed by:  Tonya Lynn Haase, APRN CNP                    Sincerely,        Tonya Lynn Haase, APRN CNP

## 2019-02-17 NOTE — PROGRESS NOTES
[facility]  PRIMARY CARE PROVIDER AND CLINIC:  Sharon Hurtado [address] [place] [address] / [facility] [address]    Pt was seen by Dr Yo on 2/16/19 at the [facility] for an initial TCU visit      HPI:    Jamilah Rodriguez is a 87 year old  (4/5/1931), history of COPD, non-ischemic cardiomyopathy, who was hospitalize at [facility] from 2/3/19-2/10/19 for the management of cute hypoxic respiratory failure secondary to acute Influenza A infection    Hospital course reviewed by me, is as per the hospital discharge summary and NP note.    Respiratory status improved with tamiflu, IV followed by oral steroids, xopenex nebs.    Course was complicated by afib with RVR, treated with diltiazem and eliquis, with conversion to NSR, oral thrush, mild hyponatremia (129 at time of discharge)      Admitted to this facility for  rehab, medical management and nursing care.    Pt states has been gradually improving, though she remains dyspneic with activity. She continues to require 02 (new).  She has an occ non-productive cough. She denies fevers, chills, abd pain, nausea, diarrhea    CODE STATUS/ADVANCE DIRECTIVES DISCUSSION:   DNR / DNI  Patient's living condition: lives alone    ALLERGIES:Atenolol; Beta adrenergic blockers; Brimonidine; Cephalexin; Germall plus; Latex; Morphine; No clinical screening - see comments; Penicillins; Sulfa drugs; Tramadol; Tylenol; Vicodin [hydrocodone-acetaminophen]; and Fentanyl  PAST MEDICAL HISTORY:  has a past medical history of AAA (abdominal aortic aneurysm) (H), Cardiomyopathy, idiopathic (H) (10/2018), Cervical cancer (H) (1969), Chronic dermatitis, Glaucoma, Glaucoma, History of COPD, HTN (hypertension), LBBB (left bundle branch block) (10/2018), Macular degeneration, Osteoporosis (8/3/11), PAD (peripheral artery disease) (H), and Vertebral compression fracture (H) (5/17/11).  PAST SURGICAL HISTORY:  has a past surgical history that includes Hysterectomy total abdominal (1999);  cataract iol, rt/lt (2/8/12); Open reduction internal fixation wrist (1988); Open reduction internal fixation hip nailing (7/2/2012); Open reduction internal fixation tibial plateau (3/31/2013); Laparoscopy diagnostic (general) (N/A, 4/15/2018); Laparotomy exploratory (N/A, 4/15/2018); and Heart Cath Coronary angiogram w/lv gram (10/2018).  FAMILY HISTORY: family history includes Breast Cancer in an other family member; Breast Cancer (age of onset: 60) in her maternal aunt; C.A.D. in her father; Circulatory (age of onset: 53) in her daughter; Diabetes in her sister.  SOCIAL HISTORY:  reports that she quit smoking about 9 years ago. she has never used smokeless tobacco. She reports that she does not drink alcohol or use drugs.        Post Discharge Medication Reconciliation Status:   .  Current Outpatient Medications   Medication Sig Dispense Refill     albuterol (PROAIR HFA/PROVENTIL HFA/VENTOLIN HFA) 108 (90 Base) MCG/ACT Inhaler Inhale 2 puffs into the lungs every 4 hours as needed for shortness of breath / dyspnea or wheezing       ANORO ELLIPTA 62.5-25 MCG/INH oral inhaler Inhale 1 puff into the lungs daily 1 Inhaler 8     apixaban ANTICOAGULANT (ELIQUIS) 2.5 MG tablet Take 1 tablet (2.5 mg) by mouth 2 times daily 60 tablet 0     ascorbic acid 500 MG TABS Take 500 mg by mouth daily       bimatoprost (LUMIGAN) 0.01 % SOLN Place 1 drop into both eyes At Bedtime.         Calcium Carb-Cholecalciferol (CALCIUM-VITAMIN D3) 600-500 MG-UNIT CAPS Take 1 tablet by mouth every 12 hours       diltiazem ER (DILT-XR) 120 MG 24 hr capsule Take 1 capsule (120 mg) by mouth daily 30 capsule 0     dorzolamide (TRUSOPT) 2 % ophthalmic solution Place 1 drop into both eyes 2 times daily       Flaxseed, Linseed, (FLAXSEED OIL PO) Take 1 tablet by mouth every morning       Glucosamine Sulfate 500 MG TABS Take 1 tablet by mouth every morning       levalbuterol (XOPENEX CONC) 1.25 MG/0.5ML neb solution Take 0.5 mLs (1.25 mg) by  nebulization every 4 hours 90 mL 0     losartan (COZAAR) 50 MG tablet Take 1 tablet (50 mg) by mouth daily 90 tablet 3     Multiple Vitamins-Minerals (PRESERVISION AREDS 2 PO) Take 1 tablet by mouth 2 times daily       nystatin (MYCOSTATIN) 846481 UNIT/ML suspension Swish and spit 5 mLs (500,000 Units) in mouth 4 times daily for 7 days  0     pantoprazole (PROTONIX) 40 MG EC tablet Take 1 tablet (40 mg) by mouth every morning (before breakfast) for 7 days  0     polyethylene glycol 400 (BLINK TEARS) 0.25 % SOLN ophthalmic solution Place 1 drop into both eyes every 3 hours as needed for dry eyes       predniSONE (DELTASONE) 20 MG tablet Take 40 mg by mouth daily for 5 days. 10 tablet 0     spironolactone (ALDACTONE) 25 MG tablet Take 12.5 mg by mouth daily  90 tablet 1       ROS:  10 point ROS neg except as noted above    Exam:    GENERAL APPEARANCE:  Alert, in NAD, sitting up in bed  ENT:  Mouth and posterior oropharynx normal, moist mucous membranes, no exudate, [place]  EYES:  EOM, conjunctivae, lids nl  RESP:  RR 16, unlabored, lungs are clear this am  CV: RRR  ABDOMEN:  M/S:  No LE edema. Venous stasis changes B  SKIN:  No rash  NEURO:   Cranial nerves 2-12 intact. No focal weakness  Gait was not assessed  PSYCH:  affect and mood normal    Lab/Diagnostic data:  CBC RESULTS:   Recent Labs   Lab Test 02/09/19  0925 02/04/19  0524 02/03/19  1125   WBC  --  7.3 5.2   RBC  --  3.85 4.21   HGB 13.3 11.8 13.1   HCT  --  34.3* 38.1   MCV  --  89 91   MCH  --  30.6 31.1   MCHC  --  34.4 34.4   RDW  --  13.3 13.4   PLT  --  220 249       Last Basic Metabolic Panel:  Recent Labs   Lab Test 02/10/19  0951 02/09/19  0925   * 128*   POTASSIUM 5.1 4.3   CHLORIDE 97 96   NAKIA 9.1 8.5   CO2 27 25   BUN 40* 33*   CR 1.00 0.94   GLC 91 106*     ASSESSMENT/PLAN:    COPD exacerbation (H)  Influenza A  Physical deconditioning  Respiratory status has been slowly improving  Plan complete prednisone course, continue duonebs and anoro  ellipta, wean 02 as possible  PT/OT.  Pulmonary f/u    Cardiomyopathy, idiopathic (H)  Benign essential hypertension  Hyponatremia  CV status stable  Plan continue current medications, monitor wt, exam, vitals, BMP      Atrial fibrillation with RVR (H)  Heart rhythm currently regular  Plan continue diltiazem ER 120mg QD and apixaban 2.5mg BID      Dontae Yo MD

## 2019-02-19 NOTE — PROGRESS NOTES
[facility]    Chief Complaint   Patient presents with     FDC Acute       [place] Medical Record Number:  [id number]  Place of Service where encounter took place:  [facility] ([facility] [[id number]]    HPI:    Jamilah Rodriguez is a 87 year old  (4/5/1931), who is being seen today for an episodic care visit.  HPI information obtained from: facility chart records, facility staff, patient report and [facility] chart review.    Today's concern is:  COPD exacerbation (H)  Influenza A  Overall feels resp status improving but morning time congestion persists. Previously treated with Mucinex, now stopped. Sats are 92% on room air, attempting to wean off. Has PRN albuterol, scheduled Anoro Ellipta, DuoNebs scheduled QID. Cough but typically not productive of mucus.     Physical deconditioning  Continues therapies and getting stronger. Recent BIMS 15/15 SBT 0/28  Pt ambulated 1 x 260 feet using FWW, supervision and w/c follow  Lives independently and alone homes to get back to own home when stronger.     Cardiomyopathy, idiopathic (H)  Benign essential hypertension  Atrial fibrillation with RVR (H)  Abdominal aortic aneurysm greater than 39 mm in diameter (H)  No chest pain or edema. Reports legs with poor circulation, especially right leg - feels heavy/numb at times. Chronic LE discoloration lower legs hairless extremities but no open areas. Takes aldactone, cozaar and diltiazem.   Lab Results   Component Value Date    CR 1.00 02/10/2019    CR 0.94 02/09/2019   Down 3 lbs since admission. -143.   92% NC and 96% on room air.   BP Ranges: 102-147/60-93 mmHg  Admission Weight: 2/10/19: 124.9 lbs  Current Weights 2/14/19: 119.2 lbs - 2/18/19: 138.6 lbs    Oral thrush  No complaints of symptoms today. On nystatin course, effective.     ALLERGIES: Atenolol; Beta adrenergic blockers; Brimonidine; Cephalexin; Germall plus; Latex; Morphine; No clinical screening - see comments; Penicillins;  Sulfa drugs; Tramadol; Tylenol; Vicodin [hydrocodone-acetaminophen]; and Fentanyl  Past Medical, Surgical, Family and Social History reviewed and updated in [facility].    Current Outpatient Medications   Medication Sig Dispense Refill     albuterol (PROAIR HFA/PROVENTIL HFA/VENTOLIN HFA) 108 (90 Base) MCG/ACT Inhaler Inhale 2 puffs into the lungs every 4 hours as needed for shortness of breath / dyspnea or wheezing       albuterol (PROVENTIL) (2.5 MG/3ML) 0.083% neb solution Take 2.5 mg by nebulization every 4 hours as needed for shortness of breath / dyspnea or wheezing       ANORO ELLIPTA 62.5-25 MCG/INH oral inhaler Inhale 1 puff into the lungs daily 1 Inhaler 8     apixaban ANTICOAGULANT (ELIQUIS) 2.5 MG tablet Take 1 tablet (2.5 mg) by mouth 2 times daily 60 tablet 0     ascorbic acid 500 MG TABS Take 500 mg by mouth daily       bimatoprost (LUMIGAN) 0.01 % SOLN Place 1 drop into both eyes At Bedtime.         Calcium Carb-Cholecalciferol (CALCIUM-VITAMIN D3) 600-500 MG-UNIT CAPS Take 1 tablet by mouth every 12 hours       diltiazem ER (DILT-XR) 120 MG 24 hr capsule Take 1 capsule (120 mg) by mouth daily 30 capsule 0     dorzolamide (TRUSOPT) 2 % ophthalmic solution Place 1 drop into both eyes 2 times daily       Glucosamine Sulfate 500 MG TABS Take 1 tablet by mouth every morning       ipratropium - albuterol 0.5 mg/2.5 mg/3 mL (DUONEB) 0.5-2.5 (3) MG/3ML neb solution Take 1 vial by nebulization 4 times daily       losartan (COZAAR) 50 MG tablet Take 1 tablet (50 mg) by mouth daily 90 tablet 3     Multiple Vitamins-Minerals (PRESERVISION AREDS 2 PO) Take 1 tablet by mouth 2 times daily       polyethylene glycol 400 (BLINK TEARS) 0.25 % SOLN ophthalmic solution Place 1 drop into both eyes every 3 hours as needed for dry eyes       spironolactone (ALDACTONE) 25 MG tablet Take 12.5 mg by mouth daily  90 tablet 1     Medications reviewed:  Medications reconciled to facility chart and changes were made to reflect current  medications as identified as above med list. Below are the changes that were made:   Medications stopped since last EPIC medication reconciliation:   Medications Discontinued During This Encounter   Medication Reason     predniSONE (DELTASONE) 20 MG tablet Therapy completed     pantoprazole (PROTONIX) 40 MG EC tablet Therapy completed     nystatin (MYCOSTATIN) 750152 UNIT/ML suspension Therapy completed     levalbuterol (XOPENEX CONC) 1.25 MG/0.5ML neb solution Therapy completed     Flaxseed, Linseed, (FLAXSEED OIL PO) Therapy completed       Medications started since last Psychiatric medication reconciliation:  Orders Placed This Encounter   Medications     albuterol (PROVENTIL) (2.5 MG/3ML) 0.083% neb solution     Sig: Take 2.5 mg by nebulization every 4 hours as needed for shortness of breath / dyspnea or wheezing     ipratropium - albuterol 0.5 mg/2.5 mg/3 mL (DUONEB) 0.5-2.5 (3) MG/3ML neb solution     Sig: Take 1 vial by nebulization 4 times daily     REVIEW OF SYSTEMS:  10 point ROS of systems including Constitutional, Eyes, Respiratory, Cardiovascular, Gastroenterology, Genitourinary, Integumentary, Musculoskeletal, Psychiatric were all negative except for pertinent positives noted in my HPI.    Physical Exam:  /62   Pulse 89   Temp 98.5  F (36.9  C)   Resp 16   Wt 62.9 kg (138 lb 9.6 oz)   SpO2 99%   BMI 23.06 kg/m    GENERAL APPEARANCE:  Alert, in no distress, pleasant, cooperative, oriented x 4  EYES:  EOM, lids, pupils and irises normal, sclera clear and conjunctiva normal, no discharge or mattering on lids or lashes noted  ENT:  Mouth normal, moist mucous membranes, nose normal without drainage or crusting, external ears without lesions, hearing acuity intact  RESP:  respiratory effort normal, no chest wall tenderness, no respiratory distress, Lung sounds diminished at bases but otherwise clear, patient is on room air  CV:  Auscultation of heart done, rate and rhythm controlled and regular, no  murmur, no rub or gallop. Edema none bilateral lower extremities. VASCULAR: lower legs hairless, cool to touch, no open areas but chronic dark staining.   ABDOMEN:  normal bowel sounds, soft, nontender, no palpable masses.  M/S:   Gait and station walks with assist , no tenderness or swelling of the joints; able to move all extremities, digits normal  SKIN:  Inspection and palpation of skin and subcutaneous tissue: skin on extremities dry and intact without rashes  NEURO: cranial nerves 2-12 grossly intact, no facial asymmetry, no speech deficits and able to follow directions, moves all extremities symmetrically  PSYCH:  insight and judgement intact, memory intact, affect and mood normal     Recent Labs:  2/13/19 Hgb 11, WBC 10.8, Plt 314  BUN 34, Cr 1.01, Na 132 and K 47    CBC RESULTS:   Recent Labs   Lab Test 02/09/19  0925 02/04/19  0524 02/03/19  1125   WBC  --  7.3 5.2   RBC  --  3.85 4.21   HGB 13.3 11.8 13.1   HCT  --  34.3* 38.1   MCV  --  89 91   MCH  --  30.6 31.1   MCHC  --  34.4 34.4   RDW  --  13.3 13.4   PLT  --  220 249       Last Basic Metabolic Panel:  Recent Labs   Lab Test 02/10/19  0951 02/09/19  0925   * 128*   POTASSIUM 5.1 4.3   CHLORIDE 97 96   NAKIA 9.1 8.5   CO2 27 25   BUN 40* 33*   CR 1.00 0.94   GLC 91 106*       Assessment/Plan:  COPD exacerbation (H)  Influenza A  Acute on chronic over all improving. Patient to continue weaning o2. Add scheduled Mucinex. Staff to update provider if not effective.     Physical deconditioning  Acute on chronic. Therapies and f/u with progress next week.     Cardiomyopathy, idiopathic (H)  Benign essential hypertension  Atrial fibrillation with RVR (H)  Abdominal aortic aneurysm greater than 39 mm in diameter (H)  Chronic issues, stable. Meds, vs, wt as ordered. Labs as ordered while at TCU. Monitor for bleeding or bruising.     Oral thrush  Resolving. Continues on Nystatin x 7 days, done 2/22. Staff to update provider if not effective.      Orders:  1. Mucinex 600 mg PO BID diagnosis COPD, congestion  2. Weekly CBC and BMP on Wednesdays while at U    Electronically signed by  JAMIN Delgado CNP

## 2019-02-19 NOTE — LETTER
2/19/2019        RE: Jamilah Rodriguez  [address]  [facility] [address]        [place] [facility]    Chief Complaint   Patient presents with     snf Acute       [place] Medical Record Number:  [id number]  Place of Service where encounter took place:  [facility] ([facility]) [[id number]]    HPI:    Jamilah Rodriguez is a 87 year old  (4/5/1931), who is being seen today for an episodic care visit.  HPI information obtained from: facility chart records, facility staff, patient report and [facility] chart review.    Today's concern is:  COPD exacerbation (H)  Influenza A  Overall feels resp status improving but morning time congestion persists. Previously treated with Mucinex, now stopped. Sats are 92% on room air, attempting to wean off. Has PRN albuterol, scheduled Anoro Ellipta, DuoNebs scheduled QID. Cough but typically not productive of mucus.     Physical deconditioning  Continues therapies and getting stronger. Recent BIMS 15/15 SBT 0/28  Pt ambulated 1 x 260 feet using FWW, supervision and w/c follow  Lives independently and alone homes to get back to own home when stronger.     Cardiomyopathy, idiopathic (H)  Benign essential hypertension  Atrial fibrillation with RVR (H)  Abdominal aortic aneurysm greater than 39 mm in diameter (H)  No chest pain or edema. Reports legs with poor circulation, especially right leg - feels heavy/numb at times. Chronic LE discoloration lower legs hairless extremities but no open areas. Takes aldactone, cozaar and diltiazem.   Lab Results   Component Value Date    CR 1.00 02/10/2019    CR 0.94 02/09/2019   Down 3 lbs since admission. -143.   92% NC and 96% on room air.   BP Ranges: 102-147/60-93 mmHg  Admission Weight: 2/10/19: 124.9 lbs  Current Weights 2/14/19: 119.2 lbs - 2/18/19: 138.6 lbs    Oral thrush  No complaints of symptoms today. On nystatin course, effective.     ALLERGIES: Atenolol; Beta adrenergic blockers;  Brimonidine; Cephalexin; Germall plus; Latex; Morphine; No clinical screening - see comments; Penicillins; Sulfa drugs; Tramadol; Tylenol; Vicodin [hydrocodone-acetaminophen]; and Fentanyl  Past Medical, Surgical, Family and Social History reviewed and updated in [facility].    Current Outpatient Medications   Medication Sig Dispense Refill     albuterol (PROAIR HFA/PROVENTIL HFA/VENTOLIN HFA) 108 (90 Base) MCG/ACT Inhaler Inhale 2 puffs into the lungs every 4 hours as needed for shortness of breath / dyspnea or wheezing       albuterol (PROVENTIL) (2.5 MG/3ML) 0.083% neb solution Take 2.5 mg by nebulization every 4 hours as needed for shortness of breath / dyspnea or wheezing       ANORO ELLIPTA 62.5-25 MCG/INH oral inhaler Inhale 1 puff into the lungs daily 1 Inhaler 8     apixaban ANTICOAGULANT (ELIQUIS) 2.5 MG tablet Take 1 tablet (2.5 mg) by mouth 2 times daily 60 tablet 0     ascorbic acid 500 MG TABS Take 500 mg by mouth daily       bimatoprost (LUMIGAN) 0.01 % SOLN Place 1 drop into both eyes At Bedtime.         Calcium Carb-Cholecalciferol (CALCIUM-VITAMIN D3) 600-500 MG-UNIT CAPS Take 1 tablet by mouth every 12 hours       diltiazem ER (DILT-XR) 120 MG 24 hr capsule Take 1 capsule (120 mg) by mouth daily 30 capsule 0     dorzolamide (TRUSOPT) 2 % ophthalmic solution Place 1 drop into both eyes 2 times daily       Glucosamine Sulfate 500 MG TABS Take 1 tablet by mouth every morning       ipratropium - albuterol 0.5 mg/2.5 mg/3 mL (DUONEB) 0.5-2.5 (3) MG/3ML neb solution Take 1 vial by nebulization 4 times daily       losartan (COZAAR) 50 MG tablet Take 1 tablet (50 mg) by mouth daily 90 tablet 3     Multiple Vitamins-Minerals (PRESERVISION AREDS 2 PO) Take 1 tablet by mouth 2 times daily       polyethylene glycol 400 (BLINK TEARS) 0.25 % SOLN ophthalmic solution Place 1 drop into both eyes every 3 hours as needed for dry eyes       spironolactone (ALDACTONE) 25 MG tablet Take 12.5 mg by mouth daily  90 tablet 1      Medications reviewed:  Medications reconciled to facility chart and changes were made to reflect current medications as identified as above med list. Below are the changes that were made:   Medications stopped since last EPIC medication reconciliation:   Medications Discontinued During This Encounter   Medication Reason     predniSONE (DELTASONE) 20 MG tablet Therapy completed     pantoprazole (PROTONIX) 40 MG EC tablet Therapy completed     nystatin (MYCOSTATIN) [id number] UNIT/ML suspension Therapy completed     levalbuterol (XOPENEX CONC) 1.25 MG/0.5ML neb solution Therapy completed     Flaxseed, Linseed, (FLAXSEED OIL PO) Therapy completed       Medications started since last [facility] medication reconciliation:  Orders Placed This Encounter   Medications     albuterol (PROVENTIL) (2.5 MG/3ML) 0.083% neb solution     Sig: Take 2.5 mg by nebulization every 4 hours as needed for shortness of breath / dyspnea or wheezing     ipratropium - albuterol 0.5 mg/2.5 mg/3 mL (DUONEB) 0.5-2.5 (3) MG/3ML neb solution     Sig: Take 1 vial by nebulization 4 times daily     REVIEW OF SYSTEMS:  10 point ROS of systems including Constitutional, Eyes, Respiratory, Cardiovascular, Gastroenterology, Genitourinary, Integumentary, Musculoskeletal, Psychiatric were all negative except for pertinent positives noted in my HPI.    Physical Exam:  /62   Pulse 89   Temp 98.5  F (36.9  C)   Resp 16   Wt 62.9 kg (138 lb 9.6 oz)   SpO2 99%   BMI 23.06 kg/m     GENERAL APPEARANCE:  Alert, in no distress, pleasant, cooperative, oriented x 4  EYES:  EOM, lids, pupils and irises normal, sclera clear and conjunctiva normal, no discharge or mattering on lids or lashes noted  ENT:  Mouth normal, moist mucous membranes, nose normal without drainage or crusting, external ears without lesions, hearing acuity intact  RESP:  respiratory effort normal, no chest wall tenderness, no respiratory distress, Lung sounds diminished at bases but otherwise  clear, patient is on room air  CV:  Auscultation of heart done, rate and rhythm controlled and regular, no murmur, no rub or gallop. Edema none bilateral lower extremities. VASCULAR: lower legs hairless, cool to touch, no open areas but chronic dark staining.   ABDOMEN:  normal bowel sounds, soft, nontender, no palpable masses.  M/S:   Gait and station walks with assist , no tenderness or swelling of the joints; able to move all extremities, digits normal  SKIN:  Inspection and palpation of skin and subcutaneous tissue: skin on extremities dry and intact without rashes  NEURO: cranial nerves 2-12 grossly intact, no facial asymmetry, no speech deficits and able to follow directions, moves all extremities symmetrically  PSYCH:  insight and judgement intact, memory intact, affect and mood normal     Recent Labs:  2/13/19 Hgb 11, WBC 10.8, Plt 314  BUN 34, Cr 1.01, Na 132 and K 47    CBC RESULTS:   Recent Labs   Lab Test 02/09/19  0925 02/04/19  0524 02/03/19  1125   WBC  --  7.3 5.2   RBC  --  3.85 4.21   HGB 13.3 11.8 13.1   HCT  --  34.3* 38.1   MCV  --  89 91   MCH  --  30.6 31.1   MCHC  --  34.4 34.4   RDW  --  13.3 13.4   PLT  --  220 249       Last Basic Metabolic Panel:  Recent Labs   Lab Test 02/10/19  0951 02/09/19  0925   * 128*   POTASSIUM 5.1 4.3   CHLORIDE 97 96   NAKIA 9.1 8.5   CO2 27 25   BUN 40* 33*   CR 1.00 0.94   GLC 91 106*       Assessment/Plan:  COPD exacerbation (H)  Influenza A  Acute on chronic over all improving. Patient to continue weaning o2. Add scheduled Mucinex. Staff to update provider if not effective.     Physical deconditioning  Acute on chronic. Therapies and f/u with progress next week.     Cardiomyopathy, idiopathic (H)  Benign essential hypertension  Atrial fibrillation with RVR (H)  Abdominal aortic aneurysm greater than 39 mm in diameter (H)  Chronic issues, stable. Meds, vs, wt as ordered. Labs as ordered while at TCU. Monitor for bleeding or bruising.     Oral  thrush  Resolving. Continues on Nystatin x 7 days, done 2/22. Staff to update provider if not effective.     Orders:  1. Mucinex 600 mg PO BID diagnosis COPD, congestion  2. Weekly CBC and BMP on Wednesdays while at U    Electronically signed by  JAMIN Delgado CNP                      Sincerely,        JAMIN Delgado CNP

## 2019-02-22 NOTE — LETTER
2/22/2019        RE: Jamilah Rodriguez  [address]  [facility] [address]          [place] [facility] DISCHARGE SUMMARY    PATIENT'S NAME: Jamilah Rodriguez  YOB: 1931  MEDICAL RECORD NUMBER:  [id number]  Place of Service where encounter took place:  [facility] ([facility]) [[id number]]    PRIMARY CARE PROVIDER AND CLINIC RESPONSIBLE AFTER TRANSFER: Sharon Hurtado [address]  / [facility] [address]     TRANSFERRING PROVIDERS: Tonya Lynn Haase, APRN CNP, Dr. Sanaz MD  DATE OF SNF ADMISSION:  February / 10 / 2019  DATE OF SNF (anticipated) DISCHARGE: February / 24 / 2019  DISCHARGE DISPOSITION: [facility] Provider   RECENT HOSPITALIZATION/ED:  [facility] Hospital stay 2/3/19 to 2/10/19.     CODE STATUS/ADVANCE DIRECTIVES DISCUSSION:   DNR / DNI     Allergies   Allergen Reactions     Atenolol Hives     Beta Adrenergic Blockers      Brimonidine      Cephalexin Hives     Germall Plus Itching     Imidazolidinyl Urea found in many topical creams and house hold products     Latex      Itching     Morphine GI Disturbance     No Clinical Screening - See Comments Itching     Carba Mix; IMIDAZOLIDINYL UREA - Allergic to this ingredient, which is found in many common topical moisturizers and household products.     Penicillins Hives     Sulfa Drugs GI Disturbance     Tramadol Nausea and Vomiting     Tylenol GI Disturbance     Vicodin [Hydrocodone-Acetaminophen]      Fentanyl Nausea     Condition on Discharge:  Stable.  Function:  Walking up to 200 feet using a RW indep   Cognitive Scores: BIMS 15/15 SBT 0/28    Equipment: walker    DISCHARGE DIAGNOSIS:   1. COPD exacerbation (H)    2. Influenza A    3. Physical deconditioning    4. Cardiomyopathy, idiopathic (H)    5. Benign essential hypertension    6. Atrial fibrillation with RVR (H)    7. Oral thrush            PAST MEDICAL HISTORY:  has a past medical history of AAA (abdominal aortic aneurysm) (H),  Cardiomyopathy, idiopathic (H) (10/2018), Cervical cancer (H) (1969), Chronic dermatitis, Glaucoma, Glaucoma, History of COPD, HTN (hypertension), LBBB (left bundle branch block) (10/2018), Macular degeneration, Osteoporosis (8/3/11), PAD (peripheral artery disease) (H), and Vertebral compression fracture (H) (5/17/11).    DISCHARGE MEDICATIONS:  Current Outpatient Medications   Medication Sig Dispense Refill     albuterol (PROVENTIL) (2.5 MG/3ML) 0.083% neb solution Take 2.5 mg by nebulization every 4 hours as needed for shortness of breath / dyspnea or wheezing       ANORO ELLIPTA 62.5-25 MCG/INH oral inhaler Inhale 1 puff into the lungs daily 1 Inhaler 8     apixaban ANTICOAGULANT (ELIQUIS) 2.5 MG tablet Take 1 tablet (2.5 mg) by mouth 2 times daily 60 tablet 0     ascorbic acid 500 MG TABS Take 500 mg by mouth daily       bimatoprost (LUMIGAN) 0.01 % SOLN Place 1 drop into both eyes At Bedtime.         Calcium Carb-Cholecalciferol (CALCIUM-VITAMIN D3) 600-500 MG-UNIT CAPS Take 1 tablet by mouth every 12 hours       diltiazem ER (DILT-XR) 120 MG 24 hr capsule Take 1 capsule (120 mg) by mouth daily 30 capsule 0     dorzolamide (TRUSOPT) 2 % ophthalmic solution Place 1 drop into both eyes 2 times daily       Glucosamine Sulfate 500 MG TABS Take 1 tablet by mouth every morning       guaiFENesin (MUCINEX) 600 MG 12 hr tablet Take 600 mg by mouth 2 times daily       ipratropium - albuterol 0.5 mg/2.5 mg/3 mL (DUONEB) 0.5-2.5 (3) MG/3ML neb solution Take 1 vial by nebulization 4 times daily       losartan (COZAAR) 50 MG tablet Take 1 tablet (50 mg) by mouth daily 90 tablet 3     Multiple Vitamins-Minerals (PRESERVISION AREDS 2 PO) Take 1 tablet by mouth 2 times daily       polyethylene glycol 400 (BLINK TEARS) 0.25 % SOLN ophthalmic solution Place 1 drop into both eyes every 3 hours as needed for dry eyes       spironolactone (ALDACTONE) 25 MG tablet Take 12.5 mg by mouth daily  90 tablet 1     albuterol (PROAIR  HFA/PROVENTIL HFA/VENTOLIN HFA) 108 (90 Base) MCG/ACT Inhaler Inhale 2 puffs into the lungs every 4 hours as needed for shortness of breath / dyspnea or wheezing         MEDICATION CHANGES/RATIONALE:   Started on duonebs QID on admission, will change to prn at discharge from TCU  Started mucinex 600mg BID on 12/19/19 for cough and congestion  Last 3 BPs: 113/68, 123/73, 103/62 mmHg  HR Ranges: 68-91 bpm  Admission Weight: 2/10/19: 124.9 lbs  Current Weights: 2/15/19: 122.9 lbs - 2/21/19: 123.1 lbs  Controlled medications sent with patient:   not applicable/none     ROS:    10 point ROS of systems including Constitutional, Eyes, Respiratory, Cardiovascular, Gastroenterology, Genitourinary, Integumentary, Musculoskeletal, Psychiatric were all negative except for pertinent positives noted in my HPI.    Physical Exam:   Vitals: /68   Pulse 91   Temp 97  F (36.1  C)   Resp 18   Wt 55.8 kg (123 lb 1.6 oz)   SpO2 99%   BMI 20.48 kg/m     BMI= Body mass index is 20.48 kg/m .  GENERAL APPEARANCE:  Alert, in mild distress due to SOB  ENT:  Mouth and posterior oropharynx normal, moist mucous membranes, [place]  EYES:  EOM, conjunctivae, lids, pupils and irises normal, PERRL  RESP:  respiratory effort and palpation of chest normal, diminished breath sounds bases bilaterally with exp wheezes noted throughout fields bilaterally  CV:  Palpation and auscultation of heart done , regular rate and rhythm, no murmur, rub, or gallop, no edema  ABDOMEN:  normal bowel sounds, soft, nontender, no hepatosplenomegaly or other masses  M/S:   Examination of:   right upper extremity, left upper extremity, right lower extremity and left lower extremity  Inspection, ROM, stability and muscle strength normal and generalized weakness noted  SKIN:  Inspection of skin and subcutaneous tissue baseline  NEURO:   Cranial nerves 2-12 are normal tested and grossly at patient's baseline, speech WNL  PSYCH:  affect and mood normal      HPI Nursing  Facility Course: patient progressed to walking up to 200 feet using a RW indep, indep with ADL's, weaned off O2 and SAO2 > 90% on room air. Patient will DC home with home PT, RN and HHA through [facility].   HPI information obtained from: facility chart records, facility staff, patient report and [facility] chart review.      COPD exacerbation (H)  Influenza A  Physical deconditioning  Acute/ongoing: DC home with home PT, RN and HHA through [facility], did get a prednisone burst, now off prednisone, anoro ellipta 62.5/25mcg 1 puff QD, duonebs  QID  prn,  albuterol MDI q 4 hours prn, mucinex 600mg BID   F/u with  pulmonology 4/2/19     Cardiomyopathy, idiopathic (H)  Benign essential hypertension  Ongoing: continue daily weights, continue losartan 50mg QD, aldactone 12.5mg QD, went into atrial fib with RVR see below     Atrial fibrillation with RVR (H)  Acute/ongoing: started on diltiazem ER 120mg QD and apixaban 2.5mg BID, ASA DC      Oral thrush  resolved    DISCHARGE PLAN:  Physical Therapy, Registered Nurse, Home Health Aide and From:  [facility]  Patient instructed to follow-up with:  PCP in 5-7 days       Current [place] scheduled appointments:  No future appointments.    MTM referral needed and placed by this provider: No    Pending labs: none  SNF labs   CBC RESULTS:   Recent Labs   Lab Test 02/20/19 02/09/19  0925 02/04/19  0524 02/03/19  1125   WBC 9.0  --  7.3 5.2   RBC 3.33*  --  3.85 4.21   HGB 10.3* 13.3 11.8 13.1   HCT 30.9*  --  34.3* 38.1   MCV 92.8  --  89 91   MCH  --   --  30.6 31.1   MCHC  --   --  34.4 34.4   RDW 12.8  --  13.3 13.4     --  220 249     Differential (02/13/2019 6:19 AM CST)  Differential (02/13/2019 6:19 AM CST)   Component Value Ref Range Performed At Pathologist Signature   Absolute Neutrophils 9.2 (H) 1.8 - 8.0 k/cmm PN SOFT     Absolute Lymphocytes 0.7 (L) 1.1 - 4.0 k/cmm PN SOFT     Absolute Monocytes 0.7 0.2 - 0.8 k/cmm PN SOFT     Absolute Eosinophils 0.0 0.0 - 0.5  k/cmm PN SOFT     Absolute Basophils 0.0 0.0 - 0.2 k/cmm PN SOFT     Immature Granulocytes 1.4 (H) 0.0 - 0.5 % PN SOFT      Complete Blood Count-W/Diff (02/13/2019 6:19 AM CST)  Complete Blood Count-W/Diff (02/13/2019 6:19 AM CST)   Component Value Ref Range Performed At Pathologist [facility]   White Blood Cell Count 10.8 3.8 - 11.0 k/cmm PN SOFT     Red Blood Cell Count 3.53 (L) 3.70 - 5.20 m/cmm PN SOFT     Hemoglobin 11.0 (L) 11.8 - 15.5 g/dL PN SOFT     Hematocrit 32.5 (L) 35.0 - 46.0 % PN SOFT     Mean Corpuscular Volume 92.1 80.0 - 100.0 fL PN SOFT     RDW 12.9 11.0 - 15.0 % PN SOFT     Platelet Count 314 140 - 450 k/cmm PN SOFT       Last Basic Metabolic Panel:  Recent Labs   Lab Test 02/20/19 02/10/19  0951   * 129*   POTASSIUM 4.7 5.1   CHLORIDE 101 97   NAKIA 8.2* 9.1   CO2 22 27   BUN 28* 40*   CR 1.33* 1.00   GLC 86 91     Basic Metabolic Panel (02/13/2019 6:19 AM CST)  Basic Metabolic Panel (02/13/2019 6:19 AM CST)   Component Value Ref Range Performed At [facility]   Creatinine Serum 1.01 0.55 - 1.02 mg/dL PN SOFT     Lab Glucose 89  Comment:   The stated glucose range is for the fasting state.  Non-fasting glucose range is  mg/dL   70 - 100 mg/dL PN SOFT     CO2 25 22 - 31 mmol/L PN SOFT     Chloride 102 98 - 109 mmol/L PN SOFT     Potassium 4.7 3.5 - 5.2 mmol/L PN SOFT     Sodium 132 (L) 136 - 145 mmol/L PN SOFT     Blood Urea Nitrogen 34 (H) 9 - 26 mg/dL PN SOFT     Calcium 8.6 8.4 - 10.4 mg/dL PN SOFT     Est GFR African Am 58 (L) >60 mL/min/1.73m2 PN SOFT     Est GFR Non-Afr Am 50 (L)  Comment:   Normal>60, moderate decrease 30 - 59, severe decrease 15 - 29,  renal failure <15 mL/min/1.73 m2  NOTE:  Choose the eGFR result above appropriate for the race of the patient.   >60 mL/min/1.73m2 PN SOFT            Discharge Treatments:none    TOTAL DISCHARGE TIME:   Greater than 30 minutes  Electronically signed by:  Tonya Lynn Haase, APRN CNP      Sincerely,        Tonya Lynn Haase,  APRN CNP

## 2019-02-22 NOTE — PROGRESS NOTES
[place] [facility] DISCHARGE SUMMARY    PATIENT'S NAME: Jamilah Rodriguez  YOB: 1931  MEDICAL RECORD NUMBER:  [id number]  Place of Service where encounter took place:  [facility] ([facility] [[id number]]    PRIMARY CARE PROVIDER AND CLINIC RESPONSIBLE AFTER TRANSFER: Sharon Hurtado [address] [place] [address] / [facility] [address]     TRANSFERRING PROVIDERS: Tonya Lynn Haase, APRN CNP, Dr. Sanaz MD  DATE OF SNF ADMISSION:  February / 10 / 2019  DATE OF SNF (anticipated) DISCHARGE: February / 24 / 2019  DISCHARGE DISPOSITION: [facility] Provider   RECENT HOSPITALIZATION/ED:  [facility] stay 2/3/19 to 2/10/19.     CODE STATUS/ADVANCE DIRECTIVES DISCUSSION:   DNR / DNI     Allergies   Allergen Reactions     Atenolol Hives     Beta Adrenergic Blockers      Brimonidine      Cephalexin Hives     Germall Plus Itching     Imidazolidinyl Urea found in many topical creams and house hold products     Latex      Itching     Morphine GI Disturbance     No Clinical Screening - See Comments Itching     Carba Mix; IMIDAZOLIDINYL UREA - Allergic to this ingredient, which is found in many common topical moisturizers and household products.     Penicillins Hives     Sulfa Drugs GI Disturbance     Tramadol Nausea and Vomiting     Tylenol GI Disturbance     Vicodin [Hydrocodone-Acetaminophen]      Fentanyl Nausea     Condition on Discharge:  Stable.  Function:  Walking up to 200 feet using a RW indep   Cognitive Scores: BIMS 15/15 SBT 0/28    Equipment: walker    DISCHARGE DIAGNOSIS:   1. COPD exacerbation (H)    2. Influenza A    3. Physical deconditioning    4. Cardiomyopathy, idiopathic (H)    5. Benign essential hypertension    6. Atrial fibrillation with RVR (H)    7. Oral thrush            PAST MEDICAL HISTORY:  has a past medical history of AAA (abdominal aortic aneurysm) (H), Cardiomyopathy, idiopathic (H) (10/2018), Cervical cancer (H) (1969), Chronic dermatitis, Glaucoma, Glaucoma,  History of COPD, HTN (hypertension), LBBB (left bundle branch block) (10/2018), Macular degeneration, Osteoporosis (8/3/11), PAD (peripheral artery disease) (H), and Vertebral compression fracture (H) (5/17/11).    DISCHARGE MEDICATIONS:  Current Outpatient Medications   Medication Sig Dispense Refill     albuterol (PROVENTIL) (2.5 MG/3ML) 0.083% neb solution Take 2.5 mg by nebulization every 4 hours as needed for shortness of breath / dyspnea or wheezing       ANORO ELLIPTA 62.5-25 MCG/INH oral inhaler Inhale 1 puff into the lungs daily 1 Inhaler 8     apixaban ANTICOAGULANT (ELIQUIS) 2.5 MG tablet Take 1 tablet (2.5 mg) by mouth 2 times daily 60 tablet 0     ascorbic acid 500 MG TABS Take 500 mg by mouth daily       bimatoprost (LUMIGAN) 0.01 % SOLN Place 1 drop into both eyes At Bedtime.         Calcium Carb-Cholecalciferol (CALCIUM-VITAMIN D3) 600-500 MG-UNIT CAPS Take 1 tablet by mouth every 12 hours       diltiazem ER (DILT-XR) 120 MG 24 hr capsule Take 1 capsule (120 mg) by mouth daily 30 capsule 0     dorzolamide (TRUSOPT) 2 % ophthalmic solution Place 1 drop into both eyes 2 times daily       Glucosamine Sulfate 500 MG TABS Take 1 tablet by mouth every morning       guaiFENesin (MUCINEX) 600 MG 12 hr tablet Take 600 mg by mouth 2 times daily       ipratropium - albuterol 0.5 mg/2.5 mg/3 mL (DUONEB) 0.5-2.5 (3) MG/3ML neb solution Take 1 vial by nebulization 4 times daily       losartan (COZAAR) 50 MG tablet Take 1 tablet (50 mg) by mouth daily 90 tablet 3     Multiple Vitamins-Minerals (PRESERVISION AREDS 2 PO) Take 1 tablet by mouth 2 times daily       polyethylene glycol 400 (BLINK TEARS) 0.25 % SOLN ophthalmic solution Place 1 drop into both eyes every 3 hours as needed for dry eyes       spironolactone (ALDACTONE) 25 MG tablet Take 12.5 mg by mouth daily  90 tablet 1     albuterol (PROAIR HFA/PROVENTIL HFA/VENTOLIN HFA) 108 (90 Base) MCG/ACT Inhaler Inhale 2 puffs into the lungs every 4 hours as needed  for shortness of breath / dyspnea or wheezing         MEDICATION CHANGES/RATIONALE:   Started on duonebs QID on admission, will change to prn at discharge from TCU  Started mucinex 600mg BID on 12/19/19 for cough and congestion  Last 3 BPs: 113/68, 123/73, 103/62 mmHg  HR Ranges: 68-91 bpm  Admission Weight: 2/10/19: 124.9 lbs  Current Weights: 2/15/19: 122.9 lbs - 2/21/19: 123.1 lbs  Controlled medications sent with patient:   not applicable/none     ROS:    10 point ROS of systems including Constitutional, Eyes, Respiratory, Cardiovascular, Gastroenterology, Genitourinary, Integumentary, Musculoskeletal, Psychiatric were all negative except for pertinent positives noted in my HPI.    Physical Exam:   Vitals: /68   Pulse 91   Temp 97  F (36.1  C)   Resp 18   Wt 55.8 kg (123 lb 1.6 oz)   SpO2 99%   BMI 20.48 kg/m    BMI= Body mass index is 20.48 kg/m .  GENERAL APPEARANCE:  Alert, in mild distress due to SOB  ENT:  Mouth and posterior oropharynx normal, moist mucous membranes, [place]  EYES:  EOM, conjunctivae, lids, pupils and irises normal, PERRL  RESP:  respiratory effort and palpation of chest normal, diminished breath sounds bases bilaterally with exp wheezes noted throughout fields bilaterally  CV:  Palpation and auscultation of heart done , regular rate and rhythm, no murmur, rub, or gallop, no edema  ABDOMEN:  normal bowel sounds, soft, nontender, no hepatosplenomegaly or other masses  M/S:   Examination of:   right upper extremity, left upper extremity, right lower extremity and left lower extremity  Inspection, ROM, stability and muscle strength normal and generalized weakness noted  SKIN:  Inspection of skin and subcutaneous tissue baseline  NEURO:   Cranial nerves 2-12 are normal tested and grossly at patient's baseline, speech WNL  PSYCH:  affect and mood normal      HPI Nursing Facility Course: patient progressed to walking up to 200 feet using a RW indep, indep with ADL's, weaned off O2 and  SAO2 > 90% on room air. Patient will DC home with home PT, RN and HHA through [facility].   HPI information obtained from: facility chart records, facility staff, patient report and [facility] chart review.      COPD exacerbation (H)  Influenza A  Physical deconditioning  Acute/ongoing: DC home with home PT, RN and HHA through [facility], did get a prednisone burst, now off prednisone, anoro ellipta 62.5/25mcg 1 puff QD, duonebs  QID prn,  albuterol MDI q 4 hours prn, mucinex 600mg BID   F/u with  pulmonology 4/2/19     Cardiomyopathy, idiopathic (H)  Benign essential hypertension  Ongoing: continue daily weights, continue losartan 50mg QD, aldactone 12.5mg QD, went into atrial fib with RVR see below     Atrial fibrillation with RVR (H)  Acute/ongoing: started on diltiazem ER 120mg QD and apixaban 2.5mg BID, ASA DC      Oral thrush  resolved    DISCHARGE PLAN:  Physical Therapy, Registered Nurse, Home Health Aide and From:  [facility]  Patient instructed to follow-up with:  PCP in 5-7 days       Current [facility] scheduled appointments:  No future appointments.    MTM referral needed and placed by this provider: No    Pending labs: none  SNF labs   CBC RESULTS:   Recent Labs   Lab Test 02/20/19 02/09/19  0925 02/04/19  0524 02/03/19  1125   WBC 9.0  --  7.3 5.2   RBC 3.33*  --  3.85 4.21   HGB 10.3* 13.3 11.8 13.1   HCT 30.9*  --  34.3* 38.1   MCV 92.8  --  89 91   MCH  --   --  30.6 31.1   MCHC  --   --  34.4 34.4   RDW 12.8  --  13.3 13.4     --  220 249     Differential (02/13/2019 6:19 AM CST)  Differential (02/13/2019 6:19 AM CST)   Component Value Ref Range Performed At Pathologist Signature   Absolute Neutrophils 9.2 (H) 1.8 - 8.0 k/cmm PN SOFT     Absolute Lymphocytes 0.7 (L) 1.1 - 4.0 k/cmm PN SOFT     Absolute Monocytes 0.7 0.2 - 0.8 k/cmm PN SOFT     Absolute Eosinophils 0.0 0.0 - 0.5 k/cmm PN SOFT     Absolute Basophils 0.0 0.0 - 0.2 k/cmm PN SOFT     Immature Granulocytes 1.4 (H) 0.0 - 0.5 % PN  SOFT      Complete Blood Count-W/Diff (02/13/2019 6:19 AM CST)  Complete Blood Count-W/Diff (02/13/2019 6:19 AM CST)   Component Value Ref Range Performed At Pathologist Signature   White Blood Cell Count 10.8 3.8 - 11.0 k/cmm PN SOFT     Red Blood Cell Count 3.53 (L) 3.70 - 5.20 m/cmm PN SOFT     Hemoglobin 11.0 (L) 11.8 - 15.5 g/dL PN SOFT     Hematocrit 32.5 (L) 35.0 - 46.0 % PN SOFT     Mean Corpuscular Volume 92.1 80.0 - 100.0 fL PN SOFT     RDW 12.9 11.0 - 15.0 % PN SOFT     Platelet Count 314 140 - 450 k/cmm PN SOFT       Last Basic Metabolic Panel:  Recent Labs   Lab Test 02/20/19 02/10/19  0951   * 129*   POTASSIUM 4.7 5.1   CHLORIDE 101 97   NAKIA 8.2* 9.1   CO2 22 27   BUN 28* 40*   CR 1.33* 1.00   GLC 86 91     Basic Metabolic Panel (02/13/2019 6:19 AM CST)  Basic Metabolic Panel (02/13/2019 6:19 AM CST)   Component Value Ref Range Performed At Pathologist Signature   Creatinine Serum 1.01 0.55 - 1.02 mg/dL PN SOFT     Lab Glucose 89  Comment:   The stated glucose range is for the fasting state.  Non-fasting glucose range is  mg/dL   70 - 100 mg/dL PN SOFT     CO2 25 22 - 31 mmol/L PN SOFT     Chloride 102 98 - 109 mmol/L PN SOFT     Potassium 4.7 3.5 - 5.2 mmol/L PN SOFT     Sodium 132 (L) 136 - 145 mmol/L PN SOFT     Blood Urea Nitrogen 34 (H) 9 - 26 mg/dL PN SOFT     Calcium 8.6 8.4 - 10.4 mg/dL PN SOFT     Est GFR African Am 58 (L) >60 mL/min/1.73m2 PN SOFT     Est GFR Non-Afr Am 50 (L)  Comment:   Normal>60, moderate decrease 30 - 59, severe decrease 15 - 29,  renal failure <15 mL/min/1.73 m2  NOTE:  Choose the eGFR result above appropriate for the race of the patient.   >60 mL/min/1.73m2 PN SOFT            Discharge Treatments:none    TOTAL DISCHARGE TIME:   Greater than 30 minutes  Electronically signed by:  Tonya Lynn Haase, APRN CNP

## 2019-02-25 NOTE — LETTER
[place] CARE COORDINATION    February 25, 2019    Jamilah Rodriguez  [address]   [facility] [address]      Dear Jamilah,    I am a clinic care coordinator who works with Sharon Hurtado MD at [facility]. Thank you for speaking with me.  I wanted provide you with my contact information so that you can call me with questions or concerns about your health care. Below is a description of clinic care coordination and how I can further assist you.     The clinic care coordinator is a registered nurse and/or  who understand the health care system. The goal of clinic care coordination is to help you manage your health and improve access to the [facility] system in the most efficient manner. The registered nurse can assist you in meeting your health care goals by providing education, coordinating services, and strengthening the communication among your providers. The  can assist you with financial, behavioral, psychosocial, chemical dependency, counseling, and/or psychiatric resources.    Please feel free to contact me at [phone], with any questions or concerns. We at [facility] are focused on providing you with the highest-quality healthcare experience possible and that all starts with you.     Sincerely,     Corin Byers    Enclosed: I have enclosed a copy of a 24 Hour Access Plan. This has helpful phone numbers for you to call when needed. Please keep this in an easy to access place to use as needed.

## 2019-02-25 NOTE — PROGRESS NOTES
Clinic Care Coordination Contact    Clinic Care Coordination Contact  OUTREACH    Referral Information:  Referral Source: IP Report    Primary Diagnosis: COPD    Chief Complaint   Patient presents with     Clinic Care Coordination - Initial        Universal Utilization: 1 ED to admission in past 90 days.  Clinic Utilization  Difficulty keeping appointments:: No  Compliance Concerns: No  No-Show Concerns: No  No PCP office visit in Past Year: No  Utilization    Last refreshed: 2/22/2019 11:41 AM:  Hospital Admissions 2           Last refreshed: 2/22/2019 11:41 AM:  ED Visits 1           Last refreshed: 2/22/2019 11:41 AM:  No Show Count (past year) 0              Current as of: 2/22/2019 11:41 AM              Clinical Concerns:  Current Medical Concerns:  Acute Hypoxic Respiratory Failure, Acute Exacerbation of COPD, Nonischemic systolic heart failure, Mild chronic hyponatremia, Paroxysmal atrial fibrillation, Oral thrush, Demand, ischemia, and Macular Degeneration.       Medication Management:  Pt shared that she is concerned with organizing her medications. She explained that her daughter will be getting her a new pill box and a nurse will be coming to the house to show her how to use her nebulizer. CC SW offered MTM services, pt declined at this time as she states the nurse should be enough to assist her.    Functional Status:  Dependent ADLs:: Independent  Dependent IADLs:: Independent    Living Situation:  Pt states that she is getting used to being back home (she lives alone independently). She said that she was a little nervous because of her breathing but she has her inhaler and has tried the nebulizer which she thinks helped. She states that she will call 911 if she needs too.    Diet/Exercise/Sleep:  No concerns noted at this time.    Transportation:  Her daughter provides transportation to appointments    Psychosocial:  Pt reports that she has a good support system, including friends and her children. They  come to see her each week.    Financial/Insurance:   No concerns noted at this time     Resources and Interventions:  Current Resources:   List of home care services:: Skilled Nursing, Home Health Aid, Physicial Therapy;   Referrals Placed: None     Goals: Pt reports that she will be receiving in-home services and plans to utilize this to get stronger and healthier at home    Patient/Caregiver understanding: Pt verbally stated understanding     Future Appointments              In 3 days Sharon Hurtado MD [facility]        Plan: No further outreaches will be made at this time unless a new referral is made or a change in the pt's status occurs. Patient was provided with [facility] contact information and encouraged to call with any questions or concerns.    GODWIN Cespedes, LGSW  Clinic Care Coordinator  [facility]  [phone]  [email or web address]

## 2019-02-25 NOTE — LETTER
Health Care Home - Access Care Plan    About Me  Patient Name:  Jamilah Sheriff    YOB: 1931  Age:                             87 year old   Avinger MRN:            [id number] Telephone Information:   Home Phone [phone]   Mobile Not on file.       Address:    [address]  [facility] [address] Email address:  [email or web address]      Emergency Contact(s)  Name Relationship Lgl Grd Work Phone Home Phone Mobile Phone   1. FRANCISCO SHERIFF  Son No none [phone] none   2. KOBY SHERIFF Daughter No none [phone]               My Access Plan  Medical Emergency 911   Questions or concerns during clinic hours Primary Clinic Line, I will call the clinic directly: [facility] - [phone]   24 Hour Appointment Line [phone] or  [phone]) (toll free)   24 Hour Nurse Line [phone] (toll free)   Questions or concerns outside clinic hours 24 Hour Appointment Line, I will call the after-hours on-call line:   [facility] [phone] or [phone] ([phone]) (toll-free)   Preferred Urgent Care     Preferred Hospital [facility]  [phone]   Preferred Pharmacy [id number] [address] [address] [facility] Crisis Line The National Suicide Prevention Lifeline at 1-820.671.2474 or 916     My Care Team Members  Patient Care Team       Relationship Specialty Notifications Start End    Sharon Hurtado MD PCP - General Internal Medicine  2/19/18     Phone: [phone] Fax: [phone]         [address] [place] [address]    Sharon Hurtado MD PCP - Assigned PCP   2/25/18     Phone: [phone] Fax: [phone]         [address] [place] [address]    Mayra Wang EP Cardiac Rehabilitation Therapist   4/9/18 4/9/19    Phone: [phone] Fax: [phone]         [facility] [address] [place] [address]            My Medical and Care Information  Problem List   Patient Active Problem List   Diagnosis     AAA (abdominal aortic aneurysm) (H)     Macular degeneration     Osteoporosis     Vertebral compression fracture (H)     CARDIOVASCULAR SCREENING; LDL GOAL LESS THAN 160     Benign essential hypertension     Hip fx: s/p IM- [facility] 7/4/12     S/P cataract surgery: Recently 2012.     Constipation     Edema     Knee fracture, right     Other postprocedural status(V45.89)     Glaucoma     Chronic dermatitis     Seasonal allergic rhinitis, unspecified allergic rhinitis trigger     Chronic obstructive pulmonary disease, unspecified COPD type (H)     Abdominal pain     Cardiomyopathy, idiopathic (H)     COPD exacerbation (H)

## 2019-03-04 PROBLEM — I48.0 PAROXYSMAL ATRIAL FIBRILLATION (H): Status: ACTIVE | Noted: 2019-01-01

## 2019-03-04 NOTE — PATIENT INSTRUCTIONS
Claritin, zyrtec should be fine for itching.     Prednisone    Use skin moisturizers such as Cetaphil, Eucerin and Lubriderm or bath additives such as Aveeno or AlphaKeri. Avoid excessive soap and water which may dry the skin. Reviewed sensitive skin cares, avoiding perfumed skin products, detergents, and soaps.

## 2019-03-04 NOTE — PROGRESS NOTES
SUBJECTIVE:   Jamilah Rodriguez is a 87 year old female who presents to clinic today for the following health issues:      Itchy rash all over body x 5-6 days       Duration: 5-6 days    Description (location/character/radiation): all over body - itchy     Intensity:  severe    Accompanying signs and symptoms: swelling on face red blotchy rash all over    History (similar episodes/previous evaluation): None    Precipitating or alleviating factors: None    Notes no new medications/supplement or diet change since she has been home.    Rash started 2 days after she was discharged from TCU        Therapies tried and outcome: calamine lotion and benadryl cream      Patient recently in hospital and TCU in February for influenza and COPD exacerbation  Just out of the hospital 2/22  She has a hospital follow up with PCP in 2 days.          -------------------------------------    Problem list and histories reviewed & adjusted, as indicated.  Additional history: as documented    Labs reviewed in EPIC    Reviewed and updated as needed this visit by clinical staff  Tobacco  Allergies  Meds       Reviewed and updated as needed this visit by Provider         ROS:  Constitutional, HEENT, cardiovascular, pulmonary, gi and gu systems are negative, except as otherwise noted.    OBJECTIVE:     /62   Pulse 95   Temp 98  F (36.7  C) (Oral)   Resp 20   Wt 56.2 kg (123 lb 14.4 oz)   BMI 20.62 kg/m    Body mass index is 20.62 kg/m .  GENERAL: healthy, alert and no distress  RESP: lungs clear to auscultation - no rales, rhonchi or wheezes  CV: irregularly irregular rhythm, normal S1 S2, no S3 or S4 and no peripheral edema  MS: no gross musculoskeletal defects noted, no edema  SKIN: generalized blotchy macular to papular rash, hive like especially on abdomin back face       Diagnostic Test Results:  none     ASSESSMENT/PLAN:             1. Urticaria  New x 5 days   - predniSONE (DELTASONE) 20 MG tablet; Take 20 mg by mouth  daily for 7 days.  Dispense: 7 tablet; Refill: 0    2. COPD exacerbation (H)  Stable with hospital follow up scheduled in 2 days   Continue with nebulizers/inhalers     3. Paroxysmal atrial fibrillation (H)  In a fib today   On medications for stroke preventing and rate control           25 minutes spent with patient > 50% of time on counseling and plan of car.e    See Patient Instructions    Sandra Levy PA-C  [facility]

## 2019-03-04 NOTE — TELEPHONE ENCOUNTER
"Prescription approved per [facility] Refill Protocol.        Requested Prescriptions   Pending Prescriptions Disp Refills     ipratropium - albuterol 0.5 mg/2.5 mg/3 mL (DUONEB) 0.5-2.5 (3) MG/3ML neb solution [Pharmacy Med Name: Ipratropium-Albuterol Inhalation Solution 0.5-2.5 (3) MG/3ML] 270 mL 1     Sig: USE 1 VIAL VIA NEBULIZER EVERY 4 HOURS AS NEEDED FOR SHORTNESS OF BREATH/WHEEZING    Short-Acting Beta Agonist Inhalers Protocol  Passed - 3/4/2019  4:42 PM       Passed - Patient is age 12 or older       Passed - Recent (12 mo) or future (30 days) visit within the authorizing provider's specialty    Patient had office visit in the last 12 months or has a visit in the next 30 days with authorizing provider or within the authorizing provider's specialty.  See \"Patient Info\" tab in inbasket, or \"Choose Columns\" in Meds & Orders section of the refill encounter.             Passed - Medication is active on med list          "

## 2019-03-06 NOTE — PATIENT INSTRUCTIONS
STOP the following:    Eliquis (blood thinner)    Losartan (blood pressure medication)    Ascorbic acid (vitamin C supplement)    Glucosamine (joint supplement)    Mucinex (mucus thinning)    Sandra's prednisone prescription - new one below    ---    START the following:    Xarelto 15mg twice a day (replaces Eliquis)    Prednisone: 3 tabs (taken together) daily x 3 days, then 2 tabs (taken together) daily x 3 days, then 1 tab daily x 3 days.    ---    Follow-up with me next week please.

## 2019-03-06 NOTE — PROGRESS NOTES
SUBJECTIVE:                                                      HPI: Jamilah Rodriguez is a pleasant 87 year old female who presents for follow-up:    Accompanied by daughter.    Patient was admitted to [facility] 2/3/19-2/10/19 with acute hypoxic respiratory failure due to COPD exacerbation and influenza. She was treated with IV then PO steroids and a course of Tamiflu. Hospitalization complicated by afib with RVR. Patient was started on diltiazem for rate control and Eliquis for AC. Allergies significant for atenolol (hives).    Patient was discharged to [facility] 2/10/2019-2/24/2019. Started on Mucinex for cough and congestion.    Discharged home 2/24/2019. No new medications since being home.    Patient developed a generalized pruritic rash several days after being discharged home. She was evaluated by Sandra preciado this week. Prescribed prednisone 20 mg daily times 7 days. Unfortunately, patient has only had mild improvement with prednisone. She continues to be very uncomfortable, itching herself throughout the visit.    Of note, patient's blood pressure is well within normal limits today on losartan 50 mg and Aldactone 12.5 mg, both daily.    Medications also significant for glucosamine and vitamin C.    The medication, allergy, and problem lists have been reviewed and updated as appropriate.     OBJECTIVE:                                                      BP 98/60   Pulse 92   Temp 98  F (36.7  C) (Oral)   Resp 20   Wt 56.3 kg (124 lb 3.2 oz)   SpO2 98%   BMI 20.67 kg/m    Constitutional: uncomfortable appearing, itching herself throughout visit  Respiratory: mildly increased respiratory effort; clear to auscultation bilaterally - no wheezing  Cardiovascular: irregularly irregular; no edema  Gastrointestinal: soft, non-tender, non-distended, and bowel sounds present; no organomegaly or masses   Psych: normal judgment and insight; normal mood and affect; recent and remote memory  intact  Integumentary: diffuse, confluent, erythematous, blanching rash involving entire torso, arms, upper legs, neck, and face; ears are very erythematous with overlying scale    ASSESSMENT/PLAN:                                                      (Z09) Hospital discharge follow-up  (primary encounter diagnosis)  (J96.01) Acute respiratory failure with hypoxia (H)  (J44.9) Chronic obstructive pulmonary disease, unspecified COPD type (H)  (J10.1) Influenza A  Comment: back to baseline from a respiratory perspective.  Plan: CPM.    (I48.0) Paroxysmal atrial fibrillation (H)  Comment: rate controlled and on AC.  Plan: see below regarding medication management.    (R21) Rash  (Z79.899) Medication management  Comment:    - etiology of rash unclear   - differential includes drug reaction, hives in response to environmental stimuli, and relapsing polychondritis.   - patient cannot take NSAIDs due to being on AC.  Plan:    - REPLACE Eliquis with Xarelto.   - CONTINUE Diltiazem for now (allergy to atenolol).   - STOP Mucinex, vitamin C, and glucosamine.   - STOP losartan.   - replace current prednisone Rx with high dose prednisone taper.    - follow-up next week (earlier as needed).    The instructions on the AVS were discussed and explained to the patient. Patient expressed understanding of instructions.    (Chart documentation was completed, in part, with [employer] voice-recognition software. Even though reviewed, some grammatical, spelling, and word errors may remain.)    Sharon Hurtado MD   [address] [address]  T: [phone], F: [phone]

## 2019-03-08 NOTE — TELEPHONE ENCOUNTER
Talked to patient.    She has already noticed an improvement on her torso and ears.    Reassured patient that she should continue to notice incremental improvement with each day.

## 2019-03-08 NOTE — TELEPHONE ENCOUNTER
Reason for Call:  Other call back    Detailed comments: Pt is requesting a call back from Dr Hurtado in regards to prednisone.  Pt started on a higher dose yesterday.  She wants to know how long it will take before the medication will help the rash and itching. Pt declined speaking with a nurse    Phone Number Patient can be reached at: Home number on file [phone] (home)    Best Time: anytime    Can we leave a detailed message on this number? YES    Call taken on 3/8/2019 at 2:43 PM by REYES VELIZ

## 2019-03-14 NOTE — PATIENT INSTRUCTIONS
Labs and chest xray today; recommendations to follow.    Treatment options include another diuretic or re-increasing the steroids.

## 2019-03-14 NOTE — PROGRESS NOTES
SUBJECTIVE:                                                      HPI: Jamilah Rodriguez is a pleasant 87 year old female who presents for follow-up:    Accompanied by son.    Patient was seen last week for hospital follow-up. Patient had been hospitalized for acute hypoxic respiratory failure due to COPD exacerbation and influenza. Her auscultation has been collocated by atrial fibrillation with RVR for which she was started on diltiazem and Eliquis.    Patient had developed a generalized pruritic rash several days prior to her visit. This was her chief concern last week. Multiple medications discontinued including Mucinex, vitamin C, glucosamine, and losartan. Eliquis was replaced with Xarelto. And patient was started on a high dose extended steroid taper. Patient is on day 8/9 of her taper. Patient reports that her rash has nearly resolved and she is feeling much better from that perspective.    Her main concern today is shortness of breath at rest. Has been worsening over the last several days. No wheezing or cough. No fevers or chills. Does endorse orthopnea and worsening lower extremity swelling. PMH significant for severe idiopathic cardiomyopathy with EF 10-20%. Patient is currently on spironolactone only (for ischemic cardiomyopathy). Losartan was discontinued at last visit due to low blood pressure.    The medication, allergy, and problem lists have been reviewed and updated as appropriate.     OBJECTIVE:                                                      /66   Pulse 96   Temp 97.9  F (36.6  C) (Oral)   Resp 22   Wt 56.8 kg (125 lb 4.8 oz)   SpO2 97%   BMI 20.85 kg/m    Constitutional: well-appearing  Respiratory: increased respiratory effort; clear to auscultation bilaterally - no wheezing or crackles  Cardiovascular: regular rate and rhythm; moderate, bilateral, pitting edema from below to above ankles    ASSESSMENT/PLAN:                                                      (R06.02)  Shortness of breath  (primary encounter diagnosis)  (J44.9) Chronic obstructive pulmonary disease, unspecified COPD type (H)  (I42.8) Cardiomyopathy, idiopathic (H)  Comment: etiology of shortness of breath unclear exam is not consistent with COPD exacerbation; suspect acute CHF exacerbation.  Plan:    - CBC, CMP, BNP, CXR today.   - recommendations to follow.   - if above suggestive of CHF exacerbation we will start Lasix.   - if not, can re-increase oral steroids as her shortness of breath may be a response to the taper.    (R21) Rash  Comment: nearly resolved.  Plan: continue to monitor once off of oral steroid taper.    The instructions on the AVS were discussed and explained to the patient. Patient expressed understanding of instructions.    (Chart documentation was completed, in part, with [employer] voice-recognition software. Even though reviewed, some grammatical, spelling, and word errors may remain.)    Sharon Hurtado MD   [address] [address]  T: [phone], F: [phone]

## 2019-03-14 NOTE — TELEPHONE ENCOUNTER
CXR without effusions or suggestion of fluid overload.    Labs significant for leukocytosis with neutrophil predominance (likely from recent high-dose steroid taper), worsening anemia (etiology unclear - no obvious source of blood loss and normal iron studies), and significantly elevated BNP.    Will treat with Lasix 20mg daily for next 5-7 days. Patient should follow-up with a colleague next week for recheck of breathing and orthopnea (I will be out of office next week). Repeat CBC and BMP recommended at that time.    Discussed plan with patient who is in agreement.

## 2019-03-15 NOTE — TELEPHONE ENCOUNTER
Daughter Lulu is calling regarding lab results.  Daughter is concerned about kidney function high.  Daughter feels she should be admitted to ED.  Jamilah just finished taking prednisone and is wheezing and out of breath quickly.  Hives are present today.  Daughter has not gotten lasix prescription yet for mother.      Reason for Disposition    [1] MODERATE difficulty breathing (e.g., speaks in phrases, SOB even at rest, pulse 100-120) AND [2] NEW-onset or WORSE than normal    Additional Information    Negative: [1] Breathing stopped AND [2] hasn't returned    Negative: Choking on something    Negative: Severe difficulty breathing (e.g., struggling for each breath, speaks in single words)    Negative: Bluish lips, tongue, or face now    Negative: Difficult to awaken or acting confused  (e.g., disoriented, slurred speech)    Negative: Passed out (i.e., lost consciousness, collapsed and was not responding)    Negative: Wheezing started suddenly after medicine, an allergic food or bee sting    Negative: Stridor    Negative: Slow, shallow and weak breathing    Negative: Sounds like a life-threatening emergency to the triager    Protocols used: BREATHING DIFFICULTY-ADULT-AH

## 2019-03-17 PROBLEM — I50.9 CHF (CONGESTIVE HEART FAILURE) (H): Status: ACTIVE | Noted: 2019-01-01

## 2019-03-17 NOTE — ED PROVIDER NOTES
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 The patient has had shortness of breath for the last couple days which has progressively worsened. The patient was not able to sleep last night as laying down exacerbated her breathing. Today, EMS was called and the patient was found to be in atrial fibrillation with RVR. The patient had rales on lower left side. Home care gave 1 nebulizer with some alleviation. The patient was at 70s SpO2 on room air which increased to 90s with 6 liters nasal cannula upon EMS arrival to [facility]. The patient was given 324 mg aspirin. Due to concern, the patient has been taken to the ED for evaluation and treatment via ambulance. Upon arrival, the patient reports leg swelling. She states her breathing has improved here at the ED. The patient denies any fever, cough, vomiting, diarrhea, abdominal pain, or chest pain. The patient is on lasix recently but states it is not helping. The patient denies any history of heart attacks and denies taking any steroids right now.    [facility] 2/5/2019:  Echocardiogram: The left ventricle is severely dilated. The visual ejection fraction is estimated at 10-20%. Right ventricular function cannot be assessed due to poor image quality. Probably mildly reduced? TDI velocities suggest normal function, but this cannot be fully assessed. Trivial pericardial effusion    Allergies:  atenolol   Beta adrenergic blockers  Brimonidine   Cephalexin  germall plus  Latex   Morphine   Penicillin  Sulfa drugs  Tramadol   Tylenol  vicodin   Fentanyl     Medications:    proair  proventil  lumigan  Diltiazem  Dorzolamide  Lasix   xarelto   Aldactone      Past Medical  "History:    Abdominal aortic aneurysm  Cardiomyopathy   Cervical cancer  Chronic dermatitis   Glaucoma  Chronic obstructive pulmonary disease   Hypertension   Left bundle branch block  Macular degeneration   Osteoperosis  Peripheral Artery Disease   Vertebral compression fracture   Paryoxysmal Atrial Fibrillation    Past Surgical History:    cataract IOL, right/left  Heart cath coronary angiogram  Hysterectomy total abdominal   Reduction hip nailing  Reduction tibial plateau   reduction wrist left    Family History:    breast cancer  Circulatory   Coronary artery disease  Diabetes mellitus     Social History:  Marital Status:     Smoker:   Former   Smokeless:   Never   Alcohol:   No   Drugs:   No     Review of Systems   Constitutional: Negative for fever.   Respiratory: Positive for shortness of breath. Negative for cough.    Cardiovascular: Positive for leg swelling. Negative for chest pain.   Gastrointestinal: Negative for abdominal pain, diarrhea and vomiting.   All other systems reviewed and are negative.    Physical Exam     Patient Vitals for the past 24 hrs:   BP Temp Temp src Pulse Heart Rate Resp SpO2 Height Weight   03/17/19 1831 -- -- -- -- 110 22 94 % -- --   03/17/19 1830 93/62 -- -- 98 109 (!) 44 93 % -- --   03/17/19 1801 105/61 -- -- -- 96 15 96 % -- --   03/17/19 1715 96/79 -- -- 121 105 (!) 32 94 % -- --   03/17/19 1700 96/73 -- -- 109 106 25 92 % -- --   03/17/19 1636 115/64 -- -- 114 123 24 95 % -- --   03/17/19 1630 93/65 -- -- 107 106 30 96 % -- --   03/17/19 1620 (!) 106/94 -- -- 116 122 18 96 % -- --   03/17/19 1616 109/72 -- -- -- -- -- -- -- --   03/17/19 1615 -- -- -- -- 115 17 97 % -- --   03/17/19 1603 -- 98.2  F (36.8  C) Oral 125 -- 25 99 % 1.626 m (5' 4\") 55.8 kg (123 lb 0.3 oz)     Physical Exam  VS: Reviewed per above  HENT: Mucous membranes moist  EYES: sclera anicteric  CV: Rate as noted, regular rhythm.   RESP: Effort normal. Breath sounds are normal bilaterally.  GI: no " tenderness, not distended.  NEURO: Alert, moving all extremities  MSK: No deformity of the extremities, symmetric bilateral lower extremity edema up to the mid tibia.  No overlying erythema or warmth.  SKIN: Warm and dry    Emergency Department Course   ECG:  Indication: shortness of breath   Time: 1600  Vent. Rate 126 bpm. TN interval 224. QRS duration 132. QT/QTc 354/512. P-R-T axis * -63 105. Sinus tachycardia with 1st degree AV block. Left axis deviation. Left bundle branch block. Abnormal ECG. Read time: 1603    Imaging:  Radiographic findings were communicated with the patient who voiced understanding of the findings.    XR Port chest, 1 view:   Cardiomegaly is present. There is some new interstitial  change in the lungs bilaterally with a small right pleural effusion.  Pulmonary vasculature is also slightly more prominent. This is  probably due to congestive heart failure.  as per radiology.     Laboratory:  ISTAT VBG POCT: pH 7.44 / PCO2 39 / PO2 20 / Bicarb 26  Nt Probnp inpatient (BNP): 10,760  1605 Troponin: 0.0026  CBC: WBC: 13.2, HGB: 8.2, PLT: 302  BMP: Glucose 119, sodium 132, urea nitrogen 35, creatinine 1.16, GFR 42, Calcium 8.4, o/w WNL  Influenza A/B: negative     Interventions:  1609: methylprednisolone 125 mg IV  1615: Duoneb 3 mL nebulization   1621: Lasix 40 mg IV    Emergency Department Course:  (1557) I performed an exam of the patient as documented above.      Work up completed. Results above.    (1601)  Electrodes placed  (1602)  ECG taken  (1604)  IV started, blood taken  (1609)  US performed  (1610)  XR port ordered  (1645)  I consulted with Dr. Duran of the hospitalist services. They are in agreement to accept the patient for admission.    Findings and plan explained to the Patient who consents to admission. Discussed the patient with Dr. Duran, who will admit the patient to a inpatient med telemetry bed for further monitoring, evaluation, and treatment.    Impression & Plan       Medical Decision Making:  Patient presents to the ER for evaluation of shortness of breath, hypoxemia.  Initial vital signs are notable for heart rate of 125, afebrile, SPO2 of mid to high 90s on 3 L nasal cannula.  Exam is notable for symmetric bilateral lower extremity edema up to the mid tibia.  No overlying erythema or warmth.  Lower suspicion for occult skin or soft tissue infection.  I-STAT VBG was unrevealing without evidence of respiratory acidosis.  Lactic acid was within normal limits.  There is no fever to suggest occult systemic infection. With history of orthopnea and increasing lower extremity edema as well as hypoxemia and history of heart failure, I was worried for heart failure exacerbation.  Portal chest x-ray did reveal some pulmonary vascular prominence.  Altogether it seems like she does have some evidence of heart failure exacerbation.  She was given 40 of IV Lasix.  BNP added later on her course was elevated to greater than 10,000.  EKG revealed left bundle branch block which is not new but there are no specific signs of ischemia and a single troponin was not elevated.  I have a lower suspicion for occult ACS.  Given history of significant COPD she was also given 125 mg of methylprednisolone and DuoNeb here in the ER.  I performed a bedside cardiac ultrasound was not archived was obtained in real-time initial assessment.  There is no significant RV dilation or pericardial effusion noted.  I have a lower suspicion for massive PE given current clinical picture.  With respect to her A. fib, she was noted to have rapid ventricular response to 125 upon arrival although with serial monitoring in the ER without intervention it was noted to drop to high 90s low 100s.  She is already on Xarelto and further rate control was not performed in the ER.  Blood pressure remained stable between 90 to low 100s systolic while in the ER.  She was admitted to the cardiac floor for further  treatment.    Diagnosis:    ICD-10-CM    1. Hypoxemia R09.02 Nt probnp inpatient     Nt probnp inpatient     CANCELED: Nt probnp inpatient   2. Congestive heart failure, unspecified HF chronicity, unspecified heart failure type (H) I50.9    3. Atrial fibrillation, unspecified type (H) I48.91    4. COPD exacerbation (H) J44.1      Disposition:  Admitted to inpatient [facility] telemetry bed.    Scribe Disclosure:  I, Anuj Valenzuela, am serving as a scribe on 3/17/2019 at 3:57 PM to personally document services performed by No att. providers found based on my observations and the provider's statements to me.     Anuj Valenzuela  3/17/2019    EMERGENCY DEPARTMENT       Binh Brantley MD  03/17/19 [id number]

## 2019-03-17 NOTE — PHARMACY-ADMISSION MEDICATION HISTORY
Admission medication history interview status for the 3/17/2019  admission is complete. See EPIC admission navigator for prior to admission medications     Medication history source reliability:Good    Actions taken by pharmacist (provider contacted, etc):None     Additional medication history information not noted on PTA med list :  Patient's family member is bringing in Preservision AREDS 2 vitamin from home.     Medication reconciliation/reorder completed by provider prior to medication history? No    Time spent in this activity: 15 minutes    Prior to Admission medications    Medication Sig Last Dose Taking? Auth Provider   albuterol (PROAIR HFA/PROVENTIL HFA/VENTOLIN HFA) 108 (90 Base) MCG/ACT Inhaler Inhale 2 puffs into the lungs every 4 hours as needed for shortness of breath / dyspnea or wheezing prn Yes Reported, Patient   albuterol (PROVENTIL) (2.5 MG/3ML) 0.083% neb solution Take 2.5 mg by nebulization every 4 hours as needed for shortness of breath / dyspnea or wheezing prn Yes Reported, Patient   ANORO ELLIPTA 62.5-25 MCG/INH oral inhaler Inhale 1 puff into the lungs daily 3/17/2019 Yes Madi Willoughby MD   bimatoprost (LUMIGAN) 0.01 % SOLN Place 1 drop into both eyes At Bedtime.   3/16/2019 Yes Reported, Patient   Calcium Carb-Cholecalciferol (CALCIUM-VITAMIN D3) 600-500 MG-UNIT CAPS Take 1 tablet by mouth every 12 hours 3/17/2019 Yes Unknown, Entered By History   diltiazem ER (DILT-XR) 120 MG 24 hr capsule Take 1 capsule (120 mg) by mouth daily 3/17/2019 Yes Sharon Hurtado MD   dorzolamide (TRUSOPT) 2 % ophthalmic solution Place 1 drop into both eyes 2 times daily 3/17/2019 Yes Reported, Patient   furosemide (LASIX) 20 MG tablet Take 1 tablet (20 mg) by mouth daily 3/17/2019 Yes Sharon Hurtado MD   Multiple Vitamins-Minerals (PRESERVISION AREDS 2 PO) Take 1 tablet by mouth 2 times daily 3/17/2019 Yes Unknown, Entered By History   polyethylene glycol 400 (BLINK TEARS) 0.25 % SOLN ophthalmic  solution Place 1 drop into both eyes every 3 hours as needed for dry eyes prn Yes Unknown, Entered By History   rivaroxaban ANTICOAGULANT (XARELTO) 15 MG TABS tablet Take 1 tablet (15 mg) by mouth 2 times daily (with meals) for 21 days 3/17/2019 Yes Sharon Hurtado MD   spironolactone (ALDACTONE) 25 MG tablet Take 12.5 mg by mouth daily  3/17/2019 Yes Lizbeth Bond MD

## 2019-03-17 NOTE — ED NOTES
"[facility]  ED Nurse Handoff Report    ED Chief complaint: Shortness of Breath (Pt reports two days of unable to lay flat. Pt reports when lays flat face swells up)      ED Diagnosis:   Final diagnoses:   Hypoxemia   Congestive heart failure, unspecified HF chronicity, unspecified heart failure type (H)   Atrial fibrillation, unspecified type (H)   COPD exacerbation (H)       Code Status: DNR / DNI    Allergies:   Allergies   Allergen Reactions     Atenolol Hives     Beta Adrenergic Blockers      Brimonidine      Cephalexin Hives     Germall Plus Itching     Imidazolidinyl Urea found in many topical creams and house hold products     Latex      Itching     Morphine GI Disturbance     No Clinical Screening - See Comments Itching     Carba Mix; IMIDAZOLIDINYL UREA - Allergic to this ingredient, which is found in many common topical moisturizers and household products.     Penicillins Hives     Sulfa Drugs GI Disturbance     Tramadol Nausea and Vomiting     Tylenol GI Disturbance     Vicodin [Hydrocodone-Acetaminophen]      Fentanyl Nausea       Activity level - Baseline/Home:  Stand with Assist    Activity Level - Current:   Independent     Needed?: No    Isolation: No  Infection: Not Applicable  Bariatric?: No    Vital Signs:   Vitals:    03/17/19 1620 03/17/19 1630 03/17/19 1636 03/17/19 1700   BP: (!) 106/94 93/65 115/64 96/73   Pulse: 116 107 114 109   Resp: 18 30 24 25   Temp:       TempSrc:       SpO2: 96% 96% 95% 92%   Weight:       Height:           Cardiac Rhythm: ,   Cardiac  Cardiac Rhythm: Atrial fibrillation    Pain level: 0-10 Pain Scale: 0    Is this patient confused?: No   Does this patient have a guardian?  No         If yes, is there guardianship documents in the Epic \"Code/ACP\" activity?  N/A         Guardian Notified?  N/A  [place] - Suicide Severity Rating Scale Completed?  Yes  If yes, what color did the patient score?  White    Patient Report: Initial Complaint: " "SOB  Focused Assessment: Pt reports x2 days of SOB, increased swelling b/l LE, unable to lay flat stating \"it makes my face and ears swell up\". Pt 70% O2 on RA when EMS arrived. Upon arrival at ED was tripod positioning with accessory muscle use, lung sounds clear upper anterior/posterior, lower lobes inspiratory wheezes.  Maintaining above 92% on 2L via NC since arrival.   Tests Performed: lab, chest XR  Abnormal Results:   Labs Ordered and Resulted from Time of ED Arrival Up to the Time of Departure from the ED   CBC WITH PLATELETS DIFFERENTIAL - Abnormal; Notable for the following components:       Result Value    WBC 13.2 (*)     RBC Count 2.70 (*)     Hemoglobin 8.2 (*)     Hematocrit 24.7 (*)     RDW 15.1 (*)     Absolute Neutrophil 10.9 (*)     All other components within normal limits   BASIC METABOLIC PANEL - Abnormal; Notable for the following components:    Sodium 132 (*)     Glucose 119 (*)     Urea Nitrogen 35 (*)     Creatinine 1.16 (*)     GFR Estimate 42 (*)     GFR Estimate If Black 49 (*)     Calcium 8.4 (*)     All other components within normal limits   ISTAT  GASES LACTATE COBY POCT - Abnormal; Notable for the following components:    Ph Venous 7.44 (*)     PCO2 Venous 39 (*)     PO2 Venous 20 (*)     All other components within normal limits   TROPONIN I   ISTAT GAS OR ELECTROLYTE NURSING POCT   CARDIAC CONTINUOUS MONITORING   INFLUENZA A/B ANTIGEN       Treatments provided: Nebulizer, oxygen, peripheral IV, IV medication     Family Comments: Daughter at bedside     OBS brochure/video discussed/provided to patient/family: N/A              Name of person given brochure if not patient:               Relationship to patient:     ED Medications:   Medications   ipratropium - albuterol 0.5 mg/2.5 mg/3 mL (DUONEB) neb solution 3 mL (3 mLs Nebulization Given 3/17/19 1615)   methylPREDNISolone sodium succinate (solu-MEDROL) injection 125 mg (125 mg Intravenous Given by Other 3/17/19 1609)   furosemide " (LASIX) injection 40 mg (40 mg Intravenous Given 3/17/19 5489)       Drips infusing?:  No    For the majority of the shift this patient was Green.   Interventions performed were .    Severe Sepsis OR Septic Shock Diagnosis Present: No    To be done/followed up on inpatient unit:      ED NURSE PHONE NUMBER: [phone]

## 2019-03-17 NOTE — ED NOTES
Bed: [facility]  Expected date: 3/17/19  Expected time: 3:39 PM  Means of arrival: Ambulance  Comments:  516 87f COPD/CHF/CP  ETA [id number]

## 2019-03-17 NOTE — DISCHARGE SUMMARY
RECEIVING UNIT ED HANDOFF REVIEW    ED Nurse Handoff Report was reviewed by: Carlito Gates on March 17, 2019 at 6:11 PM

## 2019-03-17 NOTE — H&P
[facility]    History and Physical - Hospitalist Service       Date of Admission:  3/17/2019    Assessment & Plan   Jamilah Rodriguez is a 87 year old female COPD, nonischemic cardiomyopathy with an EF of 10-20%, recent diagnosis of atrial fibrillation on chronic anticoagulation who presents with progressive shortness of breath, recently hospitalized February 3 - 10 for acute respiratory distress, due to influenza with COPD exacerbation. She complains of increased swelling, orthopnea and CXR shows increased interstitial markings and small R pleural effusion, c/w CHF. BNP 10,760. Wt is 55.8 in ED up from 54.9 at discharge. No wheezing or cough to suggest COPD.     An 87-year-old female with acute hypoxic respiratory failure  Suspect acute exacerbation of HFrEF, non-ischemic cardiomyopathy  Echo showed EF 10-20% 2/2019. She has LBBB, CRT was mentioned as a potential therapy in the past, but patient states she declined this. Started on lasix 20 mg daily a few days PTA without effect.   - Lasix 40 mg IV x 1 given in the ED, continue lasix 40 mg IV BID following BUN/Cr, strict I and O's daily wts.   - Continue PTA losartan, spironolactone 12.5 mg daily.   - Apparently beta-blockers caused worsening of COPD in the past, although evidence shows that people with COPD + heart indications for beta-blockers usually do better on cardioselective beta-blockers.     GARIMA on CKD - Baseline Cr ~ 0.9. BUN Cr 35/1.15 @ admission. Suspect this may be due to decompensated HF.   - Follow with diuresis.     - Continue cardiac medications including spironolactone and losartan for now.     AFIB RVR, chronic  - As above, beta-blockers were felt to worsen sx of COPD.   - Eliquis started during last admission > changed to Xarelto due to rash. Noted that she is on DVT dosing at BID. Adjusted to once daily dosing at admission.   - Continue PTA diltiazem ER 120mg PO daily for rate control.   - ? Digoxin as a better option given  reduced EF but given CKD, would like cardiology's input on this.   - Cardiology consulted regarding this and mgt options for CHF.     COPD, chronic - Not usually on home O2, and stable on Anoro Ellipta + albuterol inhaler up until hospitalization for influenza. S/p pulmonary rehab. Currently no wheezing or cough and lung exam not c/w acute exacerbation. She has been on two recent bursts of steroids, one for exacerbation during last hospitalization in 2/2019 and another more recent burst for rash.  - Continue PTA Anoro Ellipta + albuterol INH     Anemia, sub-acute - Hgb 13.3 at discharge from hospital on 2/9, down to 10.3 on 2/20 to 8.4 on 3/14 to 8.2 at admission. Patient denies any signs of significant bleeding. Had self-limited nosebleed, which was minimal. No black or bloody stools. This may be dilutional from CHF.   - Continue Xarelto while following daily hgb x 3.      Macular degeneration.    - We will continue her prior to admission eyedrops.      Communication: Discussed with daughter, ER doctor and patient.      Diet: Low salt diet   DVT Prophylaxis: On Xarelto.   Morrow Catheter: not present  Code Status: DNR/DNI, confirmed at admission.     Disposition Plan   Expected discharge: TBD, anticipate at least 2-3 days, recommended to TBD, PT will be consulted.  once improvement in breating..  Entered: Tara Duran MD 03/17/2019, 5:45 PM       Tara Duran MD  [facility]    ______________________________________________________________________    Chief Complaint   SOB    History is obtained from the patient and patient's daughter.     History of Present Illness   Jamilah Rodriguez is a 87 year old female COPD, nonischemic cardiomyopathy with an EF of 10%, atrial fibrillation on chronic anticoagulation who presents with progressive shortness of breath.  She was recently admitted from February 3 - February 10 for acute respiratory distress.  This was attributed with influenza to COPD  exacerbation.  She discharged on 1-2 L of oxygen.  She was discharged to a TCU.  She states when she returned home she is no longer needing oxygen.  But she continued to have shortness of breath with minimal exertion.  Her breathing has not been the same since her last hospitalization.  However is notably gotten worse in the last 3 days.  She attributes this to more fluid in her system.  She started noticing that her face and neck with swelling up at night and she has had to prop herself up at night to breathe better.  She has noted increased lower extremity swelling as well.  She does not weigh herself regular.  Regularly.  Her weight here in the ED is 55.8 kg and at discharge from the hospital she was 54.9 kg.  Lasix was started 3 do not days ago by her primary care physician and she has not noticed much improvement with this.  She is noted no change in her urination.  She has not noticed any increased cough or wheezing.  She was recently on prednisone for a rash and received prednisone during her last hospitalization for COPD.  She denies any fevers, chills, nausea or vomiting or diarrhea, or symptoms to suggest UTI.     At presentation her temperature is 98.2 heart rate is 125 going down to 96 respirations 17 oxygenation 97% on 3 L oxygen.  Notably per EMS her oxygen sat was 70% on room air.  EKG shows old left bundle branch block with atrial fibrillation.  Troponin is normal at 0.026.  BNP is elevated at 10,760.  Her creatinine is up from baseline with a BUN of 35 creatinine 1.16.  Her baseline creatinine is around 0.9.  VBG reveals a pH of 7.44 CO2 39.  Her hemoglobin is low at 8.2 and it is noticed that prior hemoglobin was 13.3 on 2/9/2019, hemoglobin at discharge from her last hospitalization was 10.3.  Patient denies any history of black or bloody stools.  She had a self-limited nosebleed and she notices that she bleeds a little easier but no significant bleeding that should cause a drop in her hemoglobin.   White cell count is mildly elevated at 13.2.  Platelets are normal.  A rapid flu was done which was negative however she has had no symptoms to suggest acute flu no congestion fever or chills.  Chest x-ray reviewed by myself reveals increased interstitial markings and a small right pleural effusion.  As well as some pulmonary vascular congestion.     Review of Systems    The 10 point Review of Systems is negative other than noted in the HPI    Past Medical History    I have reviewed this patient's medical history and updated it with pertinent information if needed.     HFrEF - Nonischemic cardiomyopathy. Echo 2/3/19 EF 10-20%, RV could not be assessed. Selective R and L heart cath in 19/2018 showed nonocclusive CAD, nl LVEDP,    Chronic LBBB - Cardiology was considering a CRT-device    Recent admission for influenza A, discharged on oxygen.      As well as the following.     Past Medical History:   Diagnosis Date     AAA (abdominal aortic aneurysm) (H)     Followed with yearly ultrasound     Cardiomyopathy, idiopathic (H) 10/2018     Cervical cancer (H) 1969     Chronic dermatitis     extensive allergy testing revealed allergy/sensitivity to carba mix ( rubber) and Imidazolidinyl Urea (common in beauty products)     Glaucoma      Glaucoma      History of COPD     very mild abnormal spirometry in 2007 (in [place]), has not been active since quitting smoking in 2009     HTN (hypertension)      LBBB (left bundle branch block) 10/2018     Macular degeneration      Osteoporosis 8/3/11    femoral T score -3.4 & -3.7     PAD (peripheral artery disease) (H)      Vertebral compression fracture (H) 5/17/11    L1 compression fracture, presumed osteoporotic compression fracture       Past Surgical History   I have reviewed this patient's surgical history and updated it with pertinent information if needed.  Past Surgical History:   Procedure Laterality Date     CATARACT IOL, RT/LT  2/8/12    left eye cataract removal     HEART CATH  CORONARY ANGIOGRAM W/LV GRAM  10/2018    moderate nonocclusive CAD     HYSTERECTOMY TOTAL ABDOMINAL  1999    Fibroids     LAPAROSCOPY DIAGNOSTIC (GENERAL) N/A 4/15/2018    Procedure: LAPAROSCOPY DIAGNOSTIC (GENERAL);;  Surgeon: Jm Craig MD;  Location: [facility] OR     LAPAROTOMY EXPLORATORY N/A 4/15/2018    Procedure: LAPAROTOMY EXPLORATORY;;  Surgeon: Jm Craig MD;  Location: [facility] OR     OPEN REDUCTION INTERNAL FIXATION HIP NAILING  2012    Procedure: OPEN REDUCTION INTERNAL FIXATION HIP NAILING;  Intramedullary Fixation Right Intertrochanteric Hip Fracture;  Surgeon: Chalino Covarrubias MD;  Location: [facility] OR     OPEN REDUCTION INTERNAL FIXATION TIBIAL PLATEAU  3/31/2013    Procedure: OPEN REDUCTION INTERNAL FIXATION TIBIAL PLATEAU;  RIGHT LATERAL TIBIAL PLATEAU FRACTURE - SYNTHES Medial Meniscus Repair, Lateral Compartment Release;  Surgeon: Alfred Cardenas MD;  Location: [facility] OR     OPEN REDUCTION INTERNAL FIXATION WRIST  1988    left wrist ORIF, hardware removed onemonth later       Social History   I have reviewed this patient's social history and updated it with pertinent information if needed.  Social History     Tobacco Use     Smoking status: Former Smoker     Last attempt to quit: 2009     Years since quittin.7     Smokeless tobacco: Never Used     Tobacco comment: former 1/2-1 ppd since age [age]   Substance Use Topics     Alcohol use: No     Alcohol/week: 0.0 oz     Drug use: No       Family History   I have reviewed this patient's family history and updated it with pertinent information if needed.   Family History   Problem Relation Age of Onset     Breast Cancer Other      Circulatory Daughter 53        mengioma     C.A.D. Father      Diabetes Sister      Breast Cancer Maternal Aunt 60       Prior to Admission Medications   Prior to Admission Medications   Prescriptions Last Dose Informant Patient Reported? Taking?   ANORO ELLIPTA 62.5-25 MCG/INH oral inhaler 3/17/2019 Self No Yes   Sig:  Inhale 1 puff into the lungs daily   Calcium Carb-Cholecalciferol (CALCIUM-VITAMIN D3) 600-500 MG-UNIT CAPS 3/17/2019 Self Yes Yes   Sig: Take 1 tablet by mouth every 12 hours   Multiple Vitamins-Minerals (PRESERVISION AREDS 2 PO) 3/17/2019 Self Yes Yes   Sig: Take 1 tablet by mouth 2 times daily   albuterol (PROAIR HFA/PROVENTIL HFA/VENTOLIN HFA) 108 (90 Base) MCG/ACT Inhaler prn Self Yes Yes   Sig: Inhale 2 puffs into the lungs every 4 hours as needed for shortness of breath / dyspnea or wheezing   albuterol (PROVENTIL) (2.5 MG/3ML) 0.083% neb solution prn Self Yes Yes   Sig: Take 2.5 mg by nebulization every 4 hours as needed for shortness of breath / dyspnea or wheezing   bimatoprost (LUMIGAN) 0.01 % SOLN 3/16/2019 Self Yes Yes   Sig: Place 1 drop into both eyes At Bedtime.     diltiazem ER (DILT-XR) 120 MG 24 hr capsule 3/17/2019 Self Yes Yes   Sig: Take 1 capsule (120 mg) by mouth daily   dorzolamide (TRUSOPT) 2 % ophthalmic solution 3/17/2019 Self Yes Yes   Sig: Place 1 drop into both eyes 2 times daily   furosemide (LASIX) 20 MG tablet 3/17/2019 Self No Yes   Sig: Take 1 tablet (20 mg) by mouth daily   polyethylene glycol 400 (BLINK TEARS) 0.25 % SOLN ophthalmic solution prn Self Yes Yes   Sig: Place 1 drop into both eyes every 3 hours as needed for dry eyes   rivaroxaban ANTICOAGULANT (XARELTO) 15 MG TABS tablet 3/17/2019 Self No Yes   Sig: Take 1 tablet (15 mg) by mouth 2 times daily (with meals) for 21 days   spironolactone (ALDACTONE) 25 MG tablet 3/17/2019 Self Yes Yes   Sig: Take 12.5 mg by mouth daily       Facility-Administered Medications: None     Allergies   Allergies   Allergen Reactions     Atenolol Hives     Beta Adrenergic Blockers      Brimonidine      Cephalexin Hives     Germall Plus Itching     Imidazolidinyl Urea found in many topical creams and house hold products     Latex      Itching     Morphine GI Disturbance     No Clinical Screening - See Comments Itching     Carba Mix;  IMIDAZOLIDINYL UREA - Allergic to this ingredient, which is found in many common topical moisturizers and household products.     Penicillins Hives     Sulfa Drugs GI Disturbance     Tramadol Nausea and Vomiting     Tylenol GI Disturbance     Vicodin [Hydrocodone-Acetaminophen]      Fentanyl Nausea       Physical Exam   Vital Signs: Temp: 98.2  F (36.8  C) Temp src: Oral BP: 96/79 Pulse: 121 Heart Rate: 105 Resp: (!) 32 SpO2: 94 % O2 Device: Nasal cannula    Weight: 123 lbs .27 oz    Constitutional:   awake, alert, cooperative, no apparent distress, and appears stated age, appears frail.      Eyes:   Lids and lashes normal, pupils equal, round and reactive to light, extra ocular muscles intact, sclera clear, conjunctiva normal     ENT:   Normocephalic, without obvious abnormality, atramatic, oral pharynx with moist mucus membranes, tonsils without erythema or exudates.      Neck:   Supple, symmetrical, trachea midline, no adenopathy, thyroid symmetric, not enlarged and no tenderness, skin normal     Lungs:   Mildly increased work of breathing, no wheezing, expiration minimally prolonged, crackles in lower lung fields.      Cardiovascular:   Irregular rate and rhythm, normal S1 and S2, no S3 or S4, and no murmur noted. Extremities are warm. 2+ edema.      Abdomen:   Normal bowel sounds, soft, non-distended, non-tender, no masses palpated, no hepatosplenomegally     Musculoskeletal:   There is no redness, warmth, or swelling of the joints. Normal build.      Neurologic:   Awake, alert, oriented to name, place and time.  Cranial nerves II-XII are grossly intact.  Moving a 4 extremities without gross focal weakness.     Neuropsychiatric:   General: normal, calm and normal eye contact     Skin:   no bruising or bleeding, no redness, warmth, or swelling and no rashes       Data   Data reviewed today: I reviewed all medications, new labs and imaging results over the last 24 hours. I personally reviewed the EKG tracing  showing as above  and the chest x-ray image(s) showing as above.    Recent Labs   Lab 03/17/19  1605 03/14/19  1119   WBC 13.2* 21.9*   HGB 8.2* 8.4*   MCV 92 95    393   * 131*   POTASSIUM 3.4 4.0   CHLORIDE 96 99   CO2 29 25   BUN 35* 35*   CR 1.16* 1.08*   ANIONGAP 7 7   NAKIA 8.4* 9.1   * 170*   ALBUMIN  --  3.3*   PROTTOTAL  --  6.3*   BILITOTAL  --  0.5   ALKPHOS  --  111   ALT  --  23   AST  --  13   TROPI 0.026  --      Recent Results (from the past 24 hour(s))   XR Chest Port 1 View    Narrative    CHEST ONE VIEW PORTABLE   3/17/2019 4:18 PM     HISTORY: Shortness of breath.     COMPARISON: 3/14/2019.      Impression    IMPRESSION: Cardiomegaly is present. There is some new interstitial  change in the lungs bilaterally with a small right pleural effusion.  Pulmonary vasculature is also slightly more prominent. This is  probably due to congestive heart failure.     ZAKI CARTER MD

## 2019-03-18 NOTE — PROGRESS NOTES
[facility]    Sepsis Evaluation Progress Note    Date of Service: 03/18/2019    I was called to see Jamilah Rodriguez due to abnormal vital signs triggering the Sepsis SIRS screening alert. She is not known to have an infection.     Physical Exam    Vital Signs:  Temp: 98  F (36.7  C) Temp src: Oral BP: 106/64 Pulse: 98 Heart Rate: 106 Resp: 20 SpO2: 99 % O2 Device: None (Room air)      Lab:  Lactic Acid   Date Value Ref Range Status   03/17/2019 1.4 0.7 - 2.1 mmol/L Final     Lactate for Sepsis Protocol   Date Value Ref Range Status   03/18/2019 2.1 (H) 0.7 - 2.0 mmol/L Final     Comment:     Significant value called to and read back by  KAYCE MA IN [facility] AT 1000 SM         The patient is at baseline mental status.    The rest of their physical exam is significant for clear lungs, tachycardia, benign abdominal exam, +pedal edema.    Assessment and Plan    The SIRS and exam findings are likely due to aggressive diuresis, there is no sign of sepsis at this time.    Disposition: The patient will remain on the current unit. We will continue to monitor this patient closely.    Jimy Ferguson PA-C

## 2019-03-18 NOTE — PROGRESS NOTES
SPIRITUAL HEALTH SERVICES Progress Note  [facility]    Initial visit per consult order & pt request.  Pt recalled  visit a month ago and again asked for communion.  Pt additionally reflected on her prolonged illness in the context of her esther.  Pt endorses that her esther allows her to face the challenges of aging with acceptance, patience and hope.  [facility] affirmed pt's esther and provided communion and prayer.   remains available for f/u if/as needed or requested.                                                                                                                                            Lawrence Matos M.Div., [facility]  Staff   Pager [phone]

## 2019-03-18 NOTE — CONSULTS
Inpatient Cardiology Consultation   [facility]  Date of Admission:3/17/2019  Date of consult: 3/18/2019      RECOMMENDATIONS:  Inpatient cardiology consultation note has been dictated. Dictation number [id number]        Bernarda Rosales MD Merged with [facility]  Cardiology        REVIEW OF SYSTEMS:  A comprehensive 10 point review of systems was completed and the pertinent positives are documented in history of present illness.    MEDICATIONS:  Prior to Admission Medications   Prescriptions Last Dose Informant Patient Reported? Taking?   ANORO ELLIPTA 62.5-25 MCG/INH oral inhaler 3/17/2019 Self No Yes   Sig: Inhale 1 puff into the lungs daily   Calcium Carb-Cholecalciferol (CALCIUM-VITAMIN D3) 600-500 MG-UNIT CAPS 3/17/2019 Self Yes Yes   Sig: Take 1 tablet by mouth every 12 hours   Multiple Vitamins-Minerals (PRESERVISION AREDS 2 PO) 3/17/2019 Self Yes Yes   Sig: Take 1 tablet by mouth 2 times daily   albuterol (PROAIR HFA/PROVENTIL HFA/VENTOLIN HFA) 108 (90 Base) MCG/ACT Inhaler prn Self Yes Yes   Sig: Inhale 2 puffs into the lungs every 4 hours as needed for shortness of breath / dyspnea or wheezing   albuterol (PROVENTIL) (2.5 MG/3ML) 0.083% neb solution prn Self Yes Yes   Sig: Take 2.5 mg by nebulization every 4 hours as needed for shortness of breath / dyspnea or wheezing   bimatoprost (LUMIGAN) 0.01 % SOLN 3/16/2019 Self Yes Yes   Sig: Place 1 drop into both eyes At Bedtime.     diltiazem ER (DILT-XR) 120 MG 24 hr capsule 3/17/2019 Self Yes Yes   Sig: Take 1 capsule (120 mg) by mouth daily   dorzolamide (TRUSOPT) 2 % ophthalmic solution 3/17/2019 Self Yes Yes   Sig: Place 1 drop into both eyes 2 times daily   furosemide (LASIX) 20 MG tablet 3/17/2019 Self No Yes   Sig: Take 1 tablet (20 mg) by mouth daily   polyethylene glycol 400 (BLINK TEARS) 0.25 % SOLN ophthalmic solution prn Self Yes Yes   Sig: Place 1 drop into both eyes every 3 hours as needed for dry eyes   rivaroxaban ANTICOAGULANT (XARELTO) 15 MG  TABS tablet   Yes Yes   Sig: Take 15 mg by mouth daily (with dinner) CHANGED from BID dosing 3/17/2017 on admission.  A-fib dosing for Pt w/Clcr 50 mL/min or less is 15 mg ONCE daily   spironolactone (ALDACTONE) 25 MG tablet 3/17/2019 Self Yes Yes   Sig: Take 12.5 mg by mouth daily       Facility-Administered Medications: None       ALLERGIES:  Allergies   Allergen Reactions     Atenolol Hives     Beta Adrenergic Blockers      Brimonidine      Cephalexin Hives     Germall Plus Itching     Imidazolidinyl Urea found in many topical creams and house hold products     Latex      Itching     Morphine GI Disturbance     No Clinical Screening - See Comments Itching     Carba Mix; IMIDAZOLIDINYL UREA - Allergic to this ingredient, which is found in many common topical moisturizers and household products.     Penicillins Hives     Sulfa Drugs GI Disturbance     Tramadol Nausea and Vomiting     Tylenol GI Disturbance     Vicodin [Hydrocodone-Acetaminophen]      Fentanyl Nausea       PAST MEDICAL HISTORY:  Past Medical History:   Diagnosis Date     AAA (abdominal aortic aneurysm) (H)     Followed with yearly ultrasound     Cardiomyopathy, idiopathic (H) 10/2018     Cervical cancer (H) 1969     Chronic dermatitis     extensive allergy testing revealed allergy/sensitivity to carba mix ( rubber) and Imidazolidinyl Urea (common in beauty products)     Glaucoma      Glaucoma      History of COPD     very mild abnormal spirometry in 2007 (in [place]), has not been active since quitting smoking in 2009     HTN (hypertension)      LBBB (left bundle branch block) 10/2018     Macular degeneration      Osteoporosis 8/3/11    femoral T score -3.4 & -3.7     PAD (peripheral artery disease) (H)      Vertebral compression fracture (H) 5/17/11    L1 compression fracture, presumed osteoporotic compression fracture       PAST SURGICAL HISTORY:  Past Surgical History:   Procedure Laterality Date     CATARACT IOL, RT/LT  2/8/12    left eye cataract  removal     HEART CATH CORONARY ANGIOGRAM W/LV GRAM  10/2018    moderate nonocclusive CAD     HYSTERECTOMY TOTAL ABDOMINAL  1999    Fibroids     LAPAROSCOPY DIAGNOSTIC (GENERAL) N/A 4/15/2018    Procedure: LAPAROSCOPY DIAGNOSTIC (GENERAL);;  Surgeon: Jm Craig MD;  Location: [facility] OR     LAPAROTOMY EXPLORATORY N/A 4/15/2018    Procedure: LAPAROTOMY EXPLORATORY;;  Surgeon: Jm Craig MD;  Location: [facility] OR     OPEN REDUCTION INTERNAL FIXATION HIP NAILING  7/2/2012    Procedure: OPEN REDUCTION INTERNAL FIXATION HIP NAILING;  Intramedullary Fixation Right Intertrochanteric Hip Fracture;  Surgeon: Chalino Covarrubias MD;  Location: [facility] OR     OPEN REDUCTION INTERNAL FIXATION TIBIAL PLATEAU  3/31/2013    Procedure: OPEN REDUCTION INTERNAL FIXATION TIBIAL PLATEAU;  RIGHT LATERAL TIBIAL PLATEAU FRACTURE - SYNTHES Medial Meniscus Repair, Lateral Compartment Release;  Surgeon: Alfred Cardenas MD;  Location: [facility] OR     OPEN REDUCTION INTERNAL FIXATION WRIST  1988    left wrist ORIF, hardware removed onemonth later       SOCIAL HISTORY:   Jamilah Rodriguez  reports that she quit smoking about 9 years ago. She has a 60.00 pack-year smoking history. she has never used smokeless tobacco. She reports that she does not drink alcohol or use drugs.    FAMILY HISTORY:  Family History   Problem Relation Age of Onset     Breast Cancer Other      Circulatory Daughter 53        mengioma     C.A.D. Father      Diabetes Sister      Breast Cancer Maternal Aunt 60       PHYSICAL EXAMINATION:  Temp: 98  F (36.7  C) Temp src: Oral BP: 106/64 Pulse: 98 Heart Rate: 106 Resp: 20 SpO2: 99 % O2 Device: None (Room air)    03/13 0700 - 03/18 0659  In: 240 [P.O.:240]  Out: 2800 [Urine:2800]  Net: -2560  Vitals:    03/17/19 1603 03/18/19 0438   Weight: 55.8 kg (123 lb 0.3 oz) 52.2 kg (115 lb)

## 2019-03-18 NOTE — PROVIDER NOTIFICATION
MD Notification    Notified Person: MD    Notified Person Name:     Notification Date/Time: 3/18/2019, 0800    Notification Interaction:    Purpose of Notification: Hemoglobin 7.1    Orders Received:    Comments:

## 2019-03-18 NOTE — PROGRESS NOTES
[facility]    Medicine Progress Note - Hospitalist Service       Date of Admission:  3/17/2019    Assessment & Plan   Jamilah Rodriguez is a 87 year old female COPD, nonischemic cardiomyopathy with an EF of 10-20%, recent diagnosis of atrial fibrillation on chronic anticoagulation who presents with progressive shortness of breath, recently hospitalized February 3 - 10 for acute respiratory distress, due to influenza with COPD exacerbation. She complains of increased swelling, orthopnea and CXR shows increased interstitial markings and small R pleural effusion, c/w CHF. BNP 10,760. Wt is 55.8 in ED up from 54.9 at discharge. No wheezing or cough to suggest COPD.      Acute hypoxic respiratory failure, resolved  Suspect acute exacerbation of HFrEF, non-ischemic cardiomyopathy  Echo showed EF 10-20% 2/2019. She has LBBB, CRT was mentioned as a potential therapy in the past, but patient states she declined this. Started on lasix 20 mg daily a few days PTA without effect. Has been weaned from supplemental oxygen.  - Received diuresis with IV lasix 40mg BID on admission, transitioning to torsemide 10mg daily tomorrow per cardiology  - Daily BMP while receiving IV diuresis, strict I and O's daily wts.   - Continue PTA losartan  - Holding spironolactone due to recent rash  - Apparently beta-blockers caused worsening of COPD in the past, therefore holding    Anemia, sub-acute   Hgb 13.3 at discharge from hospital on 2/9, has since down-trended to 7.1 this AM. Patient denies any signs of significant bleeding. Had self-limited nosebleed, which was minimal. No black or bloody stools. Patient is at increased risk of developing GIB given ongoing use of steroids with DVT dosing of Xarelto without GI prophylaxis.  - Holding Xarelto  - GI consult to evaluate & consider UGI  - Protonix daily added to regimen  - Blood transfusion consent signed, transfuse PRN Hgb < 7     GARIMA on CKD   Baseline Cr ~ 0.9. Cr 1.16--1.29  in setting of IV diuresis.  - Follow with diuresis.     - Holding spironolactone  - Continue losartan     AFIB RVR, chronic  As above, beta-blockers were felt to worsen sx of COPD.   - Eliquis started during last admission > changed to Xarelto due to rash. Noted that she is on DVT dosing at BID. Adjusted to once daily dosing at admission. Currently held due to new anemia.  - Continue PTA diltiazem ER 120mg PO daily for rate control.   - Cardiology consulted regarding this and mgt options for CHF, following    Rash, suspected atopic dermatitis  Pt reports rash in breast folds bilaterally. Not pruritic, nontender. Does report having a nonspecific skin condition, has not been utilizing hydration cream recently.  - Miconazole powder to skin folds BID     COPD, chronic   Not usually on home O2, and stable on Anoro Ellipta + albuterol inhaler up until hospitalization for influenza. S/p pulmonary rehab. Currently no wheezing or cough and lung exam not c/w acute exacerbation. She has been on two recent bursts of steroids, one for exacerbation during last hospitalization in 2/2019 and another more recent burst for rash.  - Continue PTA Anoro Ellipta + albuterol INH      Macular degeneration.    - We will continue her prior to admission eyedrops.     Diet: Combination Diet Regular Diet Adult; 2 gm NA Diet    DVT Prophylaxis: Pneumatic Compression Devices  Morrow Catheter: not present  Code Status: DNR/DNI      Disposition Plan   Expected discharge: 2 - 3 days, recommended to prior living arrangement once hemoglobin stable and O2 use less than 1 liters/minute.  Entered: Jimy Ferguson PA-C 03/18/2019, 3:15 PM       The patient's care was discussed with the Attending Physician, Dr. Rodriguez.    Spent 45 minutes directly at patient bedside with greater than 50% spent in patient education and coordination of care.    Jimy Ferguson PA-C  Hospitalist Service  [facility]  Hospital    ______________________________________________________________________    Interval History   Pt seen & evaluated with daughter at bedside. Frustrated regarding multiple hospitalizations and ongoing health concerns as an outpatient. Breathing is improved and she does feel more comfortable. Is hopeful to discharge within next 1-2 days.    Data reviewed today: I reviewed all medications, new labs and imaging results over the last 24 hours. I personally reviewed no images or EKG's today.    Physical Exam   Vital Signs: Temp: 98.2  F (36.8  C) Temp src: Oral BP: 103/64 Pulse: 98 Heart Rate: 99 Resp: 20 SpO2: 99 % O2 Device: None (Room air)    Weight: 115 lbs 0 oz  Constitutional: awake, alert, cooperative, no apparent distress, and appears stated age  Eyes: Lids and lashes normal, pupils equal, round and reactive to light, extra ocular muscles intact, sclera clear, conjunctiva normal  ENT: Normocephalic, without obvious abnormality, atraumatic, sinuses nontender on palpation, external ears without lesions, oral pharynx with moist mucous membranes, tonsils without erythema or exudates, gums normal and good dentition.  Respiratory: No increased work of breathing, good air exchange, clear to auscultation bilaterally, no crackles or wheezing  Cardiovascular: Regular rate and rhythm, normal S1 and S2, no S3 or S4, and no murmur noted  GI: No scars, normal bowel sounds, soft, non-distended, non-tender, no masses palpated, no hepatosplenomegally  Skin: no bruising or bleeding, normal skin color, texture, turgor and no redness, warmth; 1-2+ pitting pedal edema, rash present to bilateral breast folds, erythematous, not warm, no open wounds or areas of skin    Data   Recent Labs   Lab 03/18/19  1100 03/18/19  0531 03/17/19  1605 03/14/19  1119   WBC 12.6*  --  13.2* 21.9*   HGB 7.5* 7.1* 8.2* 8.4*   MCV 92  --  92 95     --  302 393   NA  --  134 132* 131*   POTASSIUM  --  3.6 3.4 4.0   CHLORIDE  --  99 96 99    CO2  --  26 29 25   BUN  --  42* 35* 35*   CR  --  1.29* 1.16* 1.08*   ANIONGAP  --  9 7 7   NAKIA  --  7.5* 8.4* 9.1   GLC  --  174* 119* 170*   ALBUMIN  --   --   --  3.3*   PROTTOTAL  --   --   --  6.3*   BILITOTAL  --   --   --  0.5   ALKPHOS  --   --   --  111   ALT  --   --   --  23   AST  --   --   --  13   TROPI  --   --  0.026  --      Recent Results (from the past 24 hour(s))   XR Chest Port 1 View    Narrative    CHEST ONE VIEW PORTABLE   3/17/2019 4:18 PM     HISTORY: Shortness of breath.     COMPARISON: 3/14/2019.      Impression    IMPRESSION: Cardiomegaly is present. There is some new interstitial  change in the lungs bilaterally with a small right pleural effusion.  Pulmonary vasculature is also slightly more prominent. This is  probably due to congestive heart failure.     ZAKI CARTER MD     Medications       bimatoprost  1 drop Both Eyes At Bedtime     diltiazem ER  120 mg Oral Daily     dorzolamide  1 drop Both Eyes BID     furosemide  40 mg Intravenous BID     multivitamin  with lutein  1 capsule Oral BID     pantoprazole (PROTONIX) IV  40 mg Intravenous Daily with breakfast     potassium chloride  40 mEq Oral Daily     torsemide  10 mg Oral QAM     umeclidinium-vilanterol  1 puff Inhalation Daily

## 2019-03-19 NOTE — CONSULTS
87 year old female with h/o new anemia.  H/O advanced CAD. CHF, poor EF about 20%. Being diuresed.  New findings of anemia without any obvious source of bleeding.  Plan to do EGD tomorrow.  Full consult dictated.  Continue on Protonix    Naveen Santo MD

## 2019-03-19 NOTE — PROGRESS NOTES
[facility]    Medicine Progress Note - Hospitalist Service       Date of Admission:  3/17/2019  Assessment & Plan      Jamilah Rodriguez is a 87 year old female COPD, nonischemic cardiomyopathy with an EF of 10-20%, recent diagnosis of atrial fibrillation on chronic anticoagulation who presents with progressive shortness of breath, recently hospitalized February 3 - 10 for acute respiratory distress, due to influenza with COPD exacerbation. She complains of increased swelling, orthopnea and CXR shows increased interstitial markings and small R pleural effusion, c/w CHF. BNP 10,760. Wt is 55.8 in ED up from 54.9 at discharge. No wheezing or cough to suggest COPD.      Acute hypoxic respiratory failure, resolved  Suspect acute exacerbation of HFrEF, non-ischemic cardiomyopathy  Echo showed EF 10-20% 2/2019. She has LBBB, CRT was mentioned as a potential therapy in the past, but patient states she declined this. Started on lasix 20 mg daily a few days PTA without effect. Has been weaned from supplemental oxygen.  - Received diuresis with IV lasix 40mg BID on admission, transitioning to torsemide 10mg daily tomorrow per cardiology  - Daily BMP while receiving IV diuresis, strict I and O's daily wts.   - Continue PTA losartan  - Holding spironolactone due to recent rash  - Apparently beta-blockers caused worsening of COPD in the past, therefore holding    Anemia, sub-acute   Hgb 13.3 at discharge from hospital on 2/9, has since down-trended to 7.1 3/19, stabilized at 7.5. Patient denies any signs of significant bleeding. Had self-limited nosebleed, which was minimal. No black or bloody stools. Patient is at increased risk of developing GIB given ongoing use of steroids with DVT dosing of Xarelto without GI prophylaxis. Does report epigastric discomfort with heartburn sx since placed NPO.  - Holding Xarelto  - GI consulted, planning EGD today  - Increase PPI to BID dosing  - Blood transfusion consent  signed, transfuse PRN Hgb < 7     GARIMA on CKD   Baseline Cr ~ 0.9. Cr 1.16--1.29--1.57 in setting of IV diuresis. Appears to have inadvertently received both IV furosemide and PO torsemide this AM.  - Daily BMP   - Holding spironolactone and further diuretics     AFIB RVR, chronic  As above, beta-blockers were felt to worsen sx of COPD.   - Eliquis started during last admission > changed to Xarelto due to rash. Noted that she is on DVT dosing at BID. Adjusted to once daily dosing at admission.  Currently held due to new anemia.  - Continue PTA diltiazem ER 120mg PO daily for rate control.   - Cardiology consulted regarding this and mgt options for CHF, following    Rash, suspected atopic dermatitis  Pt reports rash in breast folds bilaterally. Not pruritic, nontender. Does report having a nonspecific skin condition, has not been utilizing hydration cream recently.  - Miconazole powder to skin folds BID     COPD, chronic   Not usually on home O2, and stable on Anoro Ellipta + albuterol inhaler up until hospitalization for influenza. S/p pulmonary rehab. Currently no wheezing or cough and lung exam not c/w acute exacerbation. She has been on two recent bursts of steroids, one for exacerbation during last hospitalization in 2/2019 and another more recent burst for rash.  - Continue PTA Anoro Ellipta + albuterol INH      Macular degeneration.    - We will continue her prior to admission eyedrops.     Diet: Combination Diet Regular Diet Adult; 2 gm NA Diet    DVT Prophylaxis: Pneumatic Compression Devices  Morrow Catheter: not present  Code Status: DNR/DNI      Disposition Plan   Expected discharge: 1-3 days, recommended to prior living arrangement once hemoglobin stable and renal function improved.  Entered: Jimy Ferguson PA-C 03/19/2019, 1:18 PM       The patient's care was discussed with the Attending Physician, Dr. Rodriguez.    Jiym Ferguson PA-C  Hospitalist Service  [facility]  [facility]    ______________________________________________________________________    Interval History   Pt seen & evaluated with daughter at bedside. Is experiencing large amount of reflux and heartburn symptoms despite starting IV PPI, believes it to be related to NPO status and inability to consume water. Reports she typically goes to bed with a large glass of water and drinks from it regularly throughout the night when experiencing symptoms. Also reports mild epigastric discomfort and intermittent chest discomfort, which is new. Feels improved with supplemental oxygen at 1L.    Data reviewed today: I reviewed all medications, new labs and imaging results over the last 24 hours. I personally reviewed no images or EKG's today.    Physical Exam   Vital Signs: Temp: 98.2  F (36.8  C) Temp src: Oral BP: 102/62 Pulse: 81 Heart Rate: 101 Resp: 18 SpO2: 94 % O2 Device: None (Room air) Oxygen Delivery: 2 LPM  Weight: 111 lbs 3.2 oz  Constitutional: awake, alert, cooperative, no apparent distress, and appears stated age  Eyes: Lids and lashes normal, pupils equal, round and reactive to light, extra ocular muscles intact, sclera clear, conjunctiva normal  Respiratory: No increased work of breathing, good air exchange, clear to auscultation bilaterally, no crackles or wheezing  Cardiovascular: Normal apical impulse, regular rate and rhythm, normal S1 and S2, no S3 or S4, and no murmur noted  GI: No scars, normal bowel sounds, soft, non-distended, non-tender, no masses palpated, no hepatosplenomegally  Skin: no bruising or bleeding, normal skin color, texture, turgor, no redness, warmth; no pedal edema  Musculoskeletal: There is no redness, warmth, or swelling of the joints.  Full range of motion noted.  Motor strength is 5 out of 5 all extremities bilaterally.  Tone is normal.    Data   Recent Labs   Lab 03/19/19  0534 03/18/19  1100 03/18/19  0531 03/17/19  1605 03/14/19  1119   WBC  --  12.6*  --  13.2* 21.9*   HGB 7.5*  7.5* 7.1* 8.2* 8.4*   MCV  --  92  --  92 95   PLT  --  284  --  302 393     --  134 132* 131*   POTASSIUM 3.2*  --  3.6 3.4 4.0   CHLORIDE 98  --  99 96 99   CO2 26  --  26 29 25   BUN 46*  --  42* 35* 35*   CR 1.57*  --  1.29* 1.16* 1.08*   ANIONGAP 10  --  9 7 7   NAKIA 7.8*  --  7.5* 8.4* 9.1   *  --  174* 119* 170*   ALBUMIN  --   --   --   --  3.3*   PROTTOTAL  --   --   --   --  6.3*   BILITOTAL  --   --   --   --  0.5   ALKPHOS  --   --   --   --  111   ALT  --   --   --   --  23   AST  --   --   --   --  13   TROPI  --   --   --  0.026  --      No results found for this or any previous visit (from the past 24 hour(s)).  Medications     0.9% sodium chloride + KCl 20 mEq/L 125 mL/hr (03/19/19 1217)       bimatoprost  1 drop Both Eyes At Bedtime     diltiazem ER  120 mg Oral Daily     dorzolamide  1 drop Both Eyes BID     miconazole with skin protectant   Topical BID     multivitamin  with lutein  1 capsule Oral BID     pantoprazole (PROTONIX) IV  40 mg Intravenous BID     umeclidinium-vilanterol  1 puff Inhalation Daily

## 2019-03-19 NOTE — CONSULTS
[facility]  Gastroenterology Consultation         Jamilah Rodriguez  [id number]   [place] [address]  87 year old female    Admission Date/Time: 3/17/2019  Primary Care Provider: Sharon Hurtado  Referring / Attending Physician: Jimy Ferguson    We were asked to see the patient in consultation by Dr. Jimy Ferguson for evaluation of anemia.      CC: Anemia    HPI:  Jamilah Rodriguez is a 87 year old female who was admitted due to atrial fibrillation, CHF shortness of breath with a known history of nonischemic cardiomyopathy ejection fraction between 10 and 20% left bundle branch block who was admitted due to above-mentioned symptoms patient has been diuresed patient is feeling better regarding her shortness of breath during her workup patient was found to have new anemia hemoglobin was in the range of 13 on February 9 which is now down to 7.1 patient denied any history of obvious GI bleed denied any history of hematemesis melena patient has been on Xarelto.  Patient never had colonoscopy or upper GI endoscopy.  Patient is clinically doing well other than above-mentioned symptoms.  Patient denied any history of fever chills chest pain.  Patient has a history of chronic COPD chronic kidney disease patient is not on any home oxygen.    ROS: A comprehensive ten point review of systems was negative aside from those in mentioned in the HPI.      PAST MED HX:  I have reviewed this patient's medical history and updated it with pertinent information if needed.   Past Medical History:   Diagnosis Date     AAA (abdominal aortic aneurysm) (H)     Followed with yearly ultrasound     Cardiomyopathy, idiopathic (H) 10/2018     Cervical cancer (H) 1969     Chronic dermatitis     extensive allergy testing revealed allergy/sensitivity to carba mix ( rubber) and Imidazolidinyl Urea (common in beauty products)     Glaucoma      Glaucoma      History of COPD     very mild abnormal spirometry in 2007 (in  [place]), has not been active since quitting smoking in 2009     HTN (hypertension)      LBBB (left bundle branch block) 10/2018     Macular degeneration      Osteoporosis 8/3/11    femoral T score -3.4 & -3.7     PAD (peripheral artery disease) (H)      Vertebral compression fracture (H) 5/17/11    L1 compression fracture, presumed osteoporotic compression fracture       MEDICATIONS:   Prior to Admission Medications   Prescriptions Last Dose Informant Patient Reported? Taking?   ANORO ELLIPTA 62.5-25 MCG/INH oral inhaler 3/17/2019 Self No Yes   Sig: Inhale 1 puff into the lungs daily   Calcium Carb-Cholecalciferol (CALCIUM-VITAMIN D3) 600-500 MG-UNIT CAPS 3/17/2019 Self Yes Yes   Sig: Take 1 tablet by mouth every 12 hours   Multiple Vitamins-Minerals (PRESERVISION AREDS 2 PO) 3/17/2019 Self Yes Yes   Sig: Take 1 tablet by mouth 2 times daily   albuterol (PROAIR HFA/PROVENTIL HFA/VENTOLIN HFA) 108 (90 Base) MCG/ACT Inhaler prn Self Yes Yes   Sig: Inhale 2 puffs into the lungs every 4 hours as needed for shortness of breath / dyspnea or wheezing   albuterol (PROVENTIL) (2.5 MG/3ML) 0.083% neb solution prn Self Yes Yes   Sig: Take 2.5 mg by nebulization every 4 hours as needed for shortness of breath / dyspnea or wheezing   bimatoprost (LUMIGAN) 0.01 % SOLN 3/16/2019 Self Yes Yes   Sig: Place 1 drop into both eyes At Bedtime.     diltiazem ER (DILT-XR) 120 MG 24 hr capsule 3/17/2019 Self Yes Yes   Sig: Take 1 capsule (120 mg) by mouth daily   dorzolamide (TRUSOPT) 2 % ophthalmic solution 3/17/2019 Self Yes Yes   Sig: Place 1 drop into both eyes 2 times daily   furosemide (LASIX) 20 MG tablet 3/17/2019 Self No Yes   Sig: Take 1 tablet (20 mg) by mouth daily   polyethylene glycol 400 (BLINK TEARS) 0.25 % SOLN ophthalmic solution prn Self Yes Yes   Sig: Place 1 drop into both eyes every 3 hours as needed for dry eyes   rivaroxaban ANTICOAGULANT (XARELTO) 15 MG TABS tablet   Yes Yes   Sig: Take 15 mg by mouth daily (with  dinner) CHANGED from BID dosing 3/17/2017 on admission.  A-fib dosing for Pt w/Clcr 50 mL/min or less is 15 mg ONCE daily   spironolactone (ALDACTONE) 25 MG tablet 3/17/2019 Self Yes Yes   Sig: Take 12.5 mg by mouth daily       Facility-Administered Medications: None       ALLERGIES:   Allergies   Allergen Reactions     Atenolol Hives     Beta Adrenergic Blockers      Brimonidine      Cephalexin Hives     Germall Plus Itching     Imidazolidinyl Urea found in many topical creams and house hold products     Latex      Itching     Morphine GI Disturbance     No Clinical Screening - See Comments Itching     Carba Mix; IMIDAZOLIDINYL UREA - Allergic to this ingredient, which is found in many common topical moisturizers and household products.     Penicillins Hives     Sulfa Drugs GI Disturbance     Tramadol Nausea and Vomiting     Tylenol GI Disturbance     Vicodin [Hydrocodone-Acetaminophen]      Fentanyl Nausea       SOCIAL HISTORY:  Social History     Tobacco Use     Smoking status: Former Smoker     Packs/day: 1.00     Years: 60.00     Pack years: 60.00     Last attempt to quit: 2009     Years since quittin.7     Smokeless tobacco: Never Used     Tobacco comment: former 1/2-1 ppd since age [age]   Substance Use Topics     Alcohol use: No     Alcohol/week: 0.0 oz     Drug use: No       FAMILY HISTORY:  Family History   Problem Relation Age of Onset     Breast Cancer Other      Circulatory Daughter 53        mengioma     C.A.D. Father      Diabetes Sister      Breast Cancer Maternal Aunt 60       PHYSICAL EXAM:   General awake alert oriented  Vital Signs with Ranges  Temp: 97.8  F (36.6  C) Temp src: Oral BP: 108/64   Heart Rate: 92 Resp: 18 SpO2: 97 % O2 Device: None (Room air)    I/O last 3 completed shifts:  In: 720 [P.O.:720]  Out: 3900 [Urine:3900]    Constitutional: healthy, alert and no distress   Cardiovascular: negative, PMI normal. No lifts, heaves, or thrills. RRR. No murmurs, clicks gallops or  rub  Respiratory: negative, Percussion normal. Good diaphragmatic excursion. Lungs clear  Head: Normocephalic. No masses, lesions, tenderness or abnormalities  Neck: Neck supple. No adenopathy. Thyroid symmetric, normal size,, Carotids without bruits.  Abdomen: Abdomen soft, non-tender. BS normal. No masses, organomegaly  SKIN: no suspicious lesions or rashes          ADDITIONAL COMMENTS:   I reviewed the patient's new clinical lab test results.   Recent Labs   Lab Test 03/18/19  1100 03/18/19  0531 03/17/19  1605 03/14/19  1119  10/12/18  0843  04/03/13  0716  04/02/13  0610   WBC 12.6*  --  13.2* 21.9*   < > 4.6   < > 6.9  --  7.1   HGB 7.5* 7.1* 8.2* 8.4*   < > 13.0   < > 9.4*   < > 7.4*   MCV 92  --  92 95   < > 87   < > 89  --  91     --  302 393   < > 246   < > 204  --  163   INR  --   --   --   --   --  1.13  --  1.47*  --  1.21*    < > = values in this interval not displayed.     Recent Labs   Lab Test 03/18/19  0531 03/17/19  1605 03/14/19  1119   POTASSIUM 3.6 3.4 4.0   CHLORIDE 99 96 99   CO2 26 29 25   BUN 42* 35* 35*   ANIONGAP 9 7 7     Recent Labs   Lab Test 03/14/19  1119 02/07/19  0153 07/27/18  1004 04/15/18  1402  12/21/16  1000   ALBUMIN 3.3*  --  3.8 4.0   < >  --    BILITOTAL 0.5  --  0.6 0.9   < >  --    ALT 23  --  21 17   < >  --    AST 13  --  12 19   < >  --    PROTEIN  --  30*  --   --   --  Trace*   LIPASE  --   --   --  289  --   --     < > = values in this interval not displayed.       I reviewed the patient's new imaging results.        CONSULTATION ASSESSMENT AND PLAN:    Active Problems:    CHF (congestive heart failure) (H)    Assessment: Very pleasant 88-year-old female with multiple chronic health problem with significant symptoms of respiratory compromise with cardiopulmonary problem congestive heart failure currently patient is doing better after diuresis however patient also have new anemia of unclear etiology patient has been on Xarelto.  Possible differential  diagnosis would include low-grade GI bleed patient very well can have peptic ulcer disease neoplastic process GI AVMs.  At this point I would recommend her to be started on Protonix of recommend to hold her Xarelto and plan to do upper GI endoscopy.  If upper GI endoscopy is negative patient may need further evaluation with colonoscopy however giving her underlying compromised cardiorespiratory status and chronic kidney disease patient will be at significant risk.  Risk-benefit plan were discussed in detail with the hospitalist team.  Thank you very much for letting me participate in her care.                 Naveen Santo MD, FACP  [facility].  Office: [phone]  Cell : [phone]

## 2019-03-19 NOTE — PROGRESS NOTES
[facility]  Patient is currently open to home care services with [facility]. The patient is currently receiving Skilled Nursing, PT/OT and HHA services. [facility]  and team have been notified of patient admission. [facility] liaison will continue to follow patient during stay. If appropriate provide orders to resume home care at time of discharge.

## 2019-03-19 NOTE — PROGRESS NOTES
[facility].  Inpatient Cardiology Progress Note.  Date of Service: 3/19/2019      INTERVAL HISTORY:  Has diuresed 12 pounds in fluid weight since admission with IV furosemide 40 mg twice daily (note, she was not on a loop diuretic at home and was only taking 12.5 mg of spironolactone.  Weight is down from 123 pounds at admission to 111 pounds today.  Breathing has improved.  Renal function has slightly deteriorated (see below).  Expected hypokalemia of 3.2.  Was seen by gastroenterologist who has recommended an upper GI endoscopy today for evaluation of her anemia after starting anticoagulation (without overt blood loss).     She generally feels worse today.  All night and this morning she has had heartburn-like symptoms.  She has been getting IV Protonix.  She is n.p.o., awaiting her endoscopy.    On exam - afebrile, heart rate  bpm (atrial fibrillation), BP 1 1 4/81 mmHg, respiratory rate 18/min, sats 95% on room air.  Weight today is 111 pounds (50.4 kg).  Lower extremity edema resolved.  Jugular venous pressure is not elevated.  She does appear intravascularly dry.  Lungs are clear.  Irregular heart sounds without audible murmur.  Alert and oriented X 3.    Labs - sodium 134, potassium 2.2, BUN 46, creatinine 1.5 (baseline 1.0).    DIAGNOSES:  1.  Multifactorial dyspnea -- Due to fluid overload from recent prednisone bursts, recovery from recent influenza, in the context of known severe non-ischemic cardiomyopathy with an left ventricular function of 19%.   2.  Recent influenza A (treated with Tamiflu 02/2019).   3.  New diagnosis of anemia with a hemoglobin of 7.1 without overt blood loss, since starting anticoagulation in 02/2019.   4.  Nonischemic cardiomyopathy with LVEF of 19% (diagnosed 10/2018).   5.  Moderate right ventricular systolic dysfunction without clinical decompensation.   6.  Moderate nonocclusive coronary artery disease, asymptomatic.   7.  Persistent atrial  fibrillation -- rate control and anticoagulation strategy.   8.  Left bundle branch block with a QRS greater than 130 milliseconds.  Defibrillator placement deferred by patient.   9.  History of infrarenal abdominal aortic aneurysm (3.9 x 4.6 cm).   10.  History of perforated bowel and small-bowel obstruction, status post exploratory laparotomy (04/2018).   11.  Elderly frailty.     ASSESSMENT/PLAN:  1.  Her fluid overload has resolved with IV Lasix therapy.  Although her dyspnea has improved, I suspect she is dehydrated - likely appears dry, is uremic with worsening of renal function.  She appears to have received both 40 mg IV Lasix and 10 mg oral torsemide today.  I recommend stopping all diuretics today.  Check renal panel tomorrow.  If renal function has improved, start torsemide 5 mg or 10 mg daily in 2 days 03/21/2019.  2.  Give 500 cc of IV normal saline (I have ordered this).  3. Continue to hold spironolactone 12.5 mg. She has been having rash, etc., so it would be worthwhile to take spironolactone out of the picture.  4.  Upper GI endoscopy today. She may have an upper GI ulcer (has been on anticoagulation and prednisone, is getting persistent GERD despite IV PPI, and is anemic).  Anticoagulation has been appropriately held while he anemia workup underway.    5.  Replace potassium IV.  6.  Continue diltiazem 120 mg daily for atrial fibrillation rate control.  Switch to metoprolol XL (for guideline directed therapy of heart failure) once her acute issues have been addressed.  7.  Cardiology will follow.  Recommendation discussed with daughter and primary service.      Bernarda Rosales MD [facility]  Cardiology.      VITAL SIGNS:  Temp: 98.5  F (36.9  C) Temp src: Oral BP: 114/81   Heart Rate: 101 Resp: 18 SpO2: 95 % O2 Device: None (Room air) Oxygen Delivery: 1 LPM  03/14 0700 - 03/19 0659  In: 720 [P.O.:720]  Out: 5300 [Urine:5300]  Net: -4580  Vitals:    03/17/19 1603 03/18/19 0438 03/19/19 0234   Weight:  55.8 kg (123 lb 0.3 oz) 52.2 kg (115 lb) 50.4 kg (111 lb 3.2 oz)

## 2019-03-20 NOTE — TELEPHONE ENCOUNTER
This pt is hospitalized. Any RF requests should be redone after d/c in case changes are made. Inform pharmacy

## 2019-03-20 NOTE — CONSULTS
[facility] evaluation referral received from Lakisha Dykes CNP and Dr. Rosales.      Jamilah is not currently established in the [facility]. Note she is being treated for dyspnea w/ hx of non-ischemic CM and HFrEF <20%.     [facility] appointment made for:  Wednesday 3/27 for BMP, pro BNP, TSH reflex, Hgb and visit with Alice May CNP  Orders for said follow-up placed.       We look forward to seeing Jamilah in the clinic.   Please call with questions.     Mira Cedeno RN, BSN  [facility] RN Care Coordinator  [facility]  [phone]    [facility]: Cardiomyopathy, Optimization, Rehabilitation, Education   The [facility] is a heart failure specialty clinic within the [facility] where you will work with your cardiologist, nurse practitioners, physician assistants and registered nurses who specialize in heart failure care. They are dedicated to helping patients with heart failure to carefully adjust medications, receive education, and learn who and when to call if symptoms develop. They specialize in helping you better understand your condition, slow the progression of your disease, improve the length and quality of your life, help you detect future heart problems before they become life threatening, and avoid hospitalizations.

## 2019-03-20 NOTE — PROGRESS NOTES
[facility]  Gastroenterology Progress Note     Jamilah Rodriguez MRN# [id number]   YOB: 1931 Age: 87 year old          Assessment and Plan:     CHF (congestive heart failure) (H)  Anemia- Status post EGD that revealed esophageal candidiasis. Started on mycelex lozenge. To be given 5 times daily.  To continue with pantoprazole. Hemoglobin stable. Tolerating mechanical soft diet.  Ok per GI to discharge. Will continue to see patient. Will f/u with patient at [facility] in 1-2 weeks.            Hypoxemia  Congestive heart failure, unspecified HF chronicity, unspecified heart failure type (H)  Atrial fibrillation, unspecified type (H)  COPD exacerbation (H)  Cardiomyopathy, idiopathic (H)  CHF (congestive heart failure) (H)      Interval History:   no new complaints, doing well, denies chest pain, denies shortness of breath, up and ambulating and doing well; no cp, sob, n/v/d, or abd pain.              Review of Systems:   C: NEGATIVE for fever, chills, change in weight  E/M: NEGATIVE for ear, mouth and throat problems  R: NEGATIVE for significant cough or SOB  CV: NEGATIVE for chest pain, palpitations or peripheral edema             Medications:   I have reviewed this patient's current medications    bimatoprost  1 drop Both Eyes At Bedtime     clotrimazole  1 Mor Buccal 5x Daily     dorzolamide  1 drop Both Eyes BID     [START ON 3/21/2019] metoprolol succinate ER  25 mg Oral QAM     miconazole with skin protectant   Topical BID     multivitamin  with lutein  1 capsule Oral BID     pantoprazole (PROTONIX) IV  40 mg Intravenous BID     umeclidinium-vilanterol  1 puff Inhalation Daily                  Physical Exam:   Vitals were reviewed  Vital Signs with Ranges  Temp:  [98.1  F (36.7  C)-98.4  F (36.9  C)] 98.4  F (36.9  C)  Pulse:  [] 106  Heart Rate:  [] 96  Resp:  [15-41] 16  BP: (102-135)/(62-99) 135/85  SpO2:  [94 %-100 %] 96 %  I/O last 3 completed shifts:  In: 240  [P.O.:240]  Out: 3200 [Urine:3200]  Constitutional: healthy, alert and no distress   Cardiovascular: negative, PMI normal. No lifts, heaves, or thrills. RRR. No murmurs, clicks gallops or rub  Respiratory: negative, Percussion normal. Good diaphragmatic excursion. Lungs clear  Head: Normocephalic. No masses, lesions, tenderness or abnormalities  Neck: Neck supple. No adenopathy. Thyroid symmetric, normal size,, Carotids without bruits.  Abdomen: Abdomen soft, non-tender. BS normal. No masses, organomegaly           Data:   I reviewed the patient's new clinical lab test results.   Recent Labs   Lab Test 03/20/19  0548 03/19/19  0534 03/18/19  1100  03/17/19  1605 03/14/19  1119  10/12/18  0843  04/03/13  0716  04/02/13  0610   WBC  --   --  12.6*  --  13.2* 21.9*   < > 4.6   < > 6.9  --  7.1   HGB 8.2* 7.5* 7.5*   < > 8.2* 8.4*   < > 13.0   < > 9.4*   < > 7.4*   MCV  --   --  92  --  92 95   < > 87   < > 89  --  91   PLT  --   --  284  --  302 393   < > 246   < > 204  --  163   INR  --   --   --   --   --   --   --  1.13  --  1.47*  --  1.21*    < > = values in this interval not displayed.     Recent Labs   Lab Test 03/20/19  0548 03/19/19  0534 03/18/19  0531   POTASSIUM 3.6 3.2* 3.6   CHLORIDE 100 98 99   CO2 28 26 26   BUN 40* 46* 42*   ANIONGAP 9 10 9     Recent Labs   Lab Test 03/14/19  1119 02/07/19  0153 07/27/18  1004 04/15/18  1402  12/21/16  1000   ALBUMIN 3.3*  --  3.8 4.0   < >  --    BILITOTAL 0.5  --  0.6 0.9   < >  --    ALT 23  --  21 17   < >  --    AST 13  --  12 19   < >  --    PROTEIN  --  30*  --   --   --  Trace*   LIPASE  --   --   --  289  --   --     < > = values in this interval not displayed.       I reviewed the patient's new imaging results.    All laboratory data reviewed  All imaging studies reviewed by me.    Marielos Olivas PA-C,  3/20/2019  [facility]  Office : [phone]  Cell: [phone] (Dr. Santo)  Cell: [phone] (Marielos Olivas PA-C)

## 2019-03-20 NOTE — DISCHARGE SUMMARY
[facility]    Discharge Summary  Hospitalist    Date of Admission:  3/17/2019  Date of Discharge:  3/20/2019  Discharging Provider: Jimy Ferguson PA-C    Discharge Diagnoses      Hypoxemia  Congestive heart failure, unspecified HF chronicity, unspecified heart failure type (H)  Atrial fibrillation, unspecified type (H)  COPD exacerbation (H)  Cardiomyopathy, idiopathic (H)  CHF (congestive heart failure) (H)  Candida esophagitis (H)  Chronic dermatitis    History of Present Illness   Jamilah Rodriguez is a 87 year old female COPD, nonischemic cardiomyopathy with an EF of 10-20%, recent diagnosis of atrial fibrillation on chronic anticoagulation who presented with progressive shortness of breath, recently hospitalized February 3 - 10 for acute respiratory distress, due to influenza with COPD exacerbation.     HPI from admission H&P:  Jamilah Rodriguez is a 87 year old female COPD, nonischemic cardiomyopathy with an EF of 10%, atrial fibrillation on chronic anticoagulation who presents with progressive shortness of breath.  She was recently admitted from February 3 - February 10 for acute respiratory distress.  This was attributed with influenza to COPD exacerbation.  She discharged on 1-2 L of oxygen.  She was discharged to a TCU.  She states when she returned home she is no longer needing oxygen.  But she continued to have shortness of breath with minimal exertion.  Her breathing has not been the same since her last hospitalization.  However is notably gotten worse in the last 3 days.  She attributes this to more fluid in her system.  She started noticing that her face and neck with swelling up at night and she has had to prop herself up at night to breathe better.  She has noted increased lower extremity swelling as well.  She does not weigh herself regular.  Regularly.  Her weight here in the ED is 55.8 kg and at discharge from the hospital she was 54.9 kg.  Lasix was started 3 do not days ago by  her primary care physician and she has not noticed much improvement with this.  She is noted no change in her urination.  She has not noticed any increased cough or wheezing.  She was recently on prednisone for a rash and received prednisone during her last hospitalization for COPD.  She denies any fevers, chills, nausea or vomiting or diarrhea, or symptoms to suggest UTI.     At presentation her temperature is 98.2 heart rate is 125 going down to 96 respirations 17 oxygenation 97% on 3 L oxygen.  Notably per EMS her oxygen sat was 70% on room air.  EKG shows old left bundle branch block with atrial fibrillation.  Troponin is normal at 0.026.  BNP is elevated at 10,760.  Her creatinine is up from baseline with a BUN of 35 creatinine 1.16.  Her baseline creatinine is around 0.9.  VBG reveals a pH of 7.44 CO2 39.  Her hemoglobin is low at 8.2 and it is noticed that prior hemoglobin was 13.3 on 2/9/2019, hemoglobin at discharge from her last hospitalization was 10.3.  Patient denies any history of black or bloody stools.  She had a self-limited nosebleed and she notices that she bleeds a little easier but no significant bleeding that should cause a drop in her hemoglobin.  White cell count is mildly elevated at 13.2.  Platelets are normal.  A rapid flu was done which was negative however she has had no symptoms to suggest acute flu no congestion fever or chills.  Chest x-ray reviewed by myself reveals increased interstitial markings and a small right pleural effusion.  As well as some pulmonary vascular congestion.     Hospital Course   Jamilah Rodriguez was admitted on 3/17/2019.  The following problems were addressed during her hospitalization:    Acute hypoxic respiratory failure, resolved  Suspect acute exacerbation of HFrEF, non-ischemic cardiomyopathy  Echo showed EF 10-20% 2/2019. She has LBBB, CRT was mentioned as a potential therapy in the past, but patient states she declined this. Started on lasix 20 mg  daily a few days PTA without effect. Has been weaned from supplemental oxygen.   - Daily weights at discharge  - Holding spironolactone due to recent rash and GARIMA  - Apparently beta-blockers caused worsening of COPD in the past, therefore holding     Anemia, sub-acute   Esophageal candidiasis  Hgb 13.3 at discharge from hospital on 2/9, has since down-trended to 7.1 3/19, stabilized at 7.5. Patient denies any signs of significant bleeding. Had self-limited nosebleed, which was minimal. No black or bloody stools. Patient is at increased risk of developing GIB given ongoing use of steroids with DVT dosing of Xarelto without GI prophylaxis.   - Holding Xarelto until resumed by Cardiology in clinic (est. 3-4 weeks)  - GI consulted, s/p EGD with findings of esophageal candidiasis, follow-up in 1-2 weeks  - BID Protonix at discharge  - Mycelex lozenge 5x daily prescribed at discharge for esophageal candidiasis    GARIMA on CKD   Baseline Cr ~ 0.9. Cr up-trended to value 1.62 day of discharge in setting of IV diuresis, received both IV furosemide and PO torsemide day prior to discharge.  - Holding all diuretics at discharge  - Repeat BMP in 5-7 days. Once Cr is less than 1, to resume torsemide 10mg daily     AFIB RVR, chronic  As above, beta-blockers were felt to worsen sx of COPD.   - Eliquis started during last admission > changed to Xarelto due to rash. Noted that she is on DVT dosing at BID on admission. Adjusted to once daily, held due to new anemia. Discussed with Cardiology, plan to continue holding at discharge until follow-up in 3-4 weeks.  - Continue PTA diltiazem ER 120mg PO daily for rate control     Rash, suspected atopic dermatitis  Pt reports rash in breast folds bilaterally. Not pruritic, nontender. Does report having a nonspecific skin condition, has not been utilizing hydration cream recently.  - Miconazole powder to skin folds BID     COPD, chronic   Not usually on home O2, and stable on Anoro Ellipta +  albuterol inhaler up until hospitalization for influenza. S/p pulmonary rehab. Currently no wheezing or cough and lung exam not c/w acute exacerbation. She has been on two recent bursts of steroids, one for exacerbation during last hospitalization in 2/2019 and another more recent burst for rash.  - Continue PTA Anoro Ellipta + albuterol INH      Macular degeneration.  Continue her prior to admission eyedrops.    Jimy Ferguson PA-C    Significant Results and Procedures   As noted    Pending Results   These results will be followed up by n/a  Unresulted Labs Ordered in the Past 30 Days of this Admission     No orders found from 1/16/2019 to 3/18/2019.          Code Status   DNR / DNI       Primary Care Physician   Sharon Hurtado    Physical Exam   Temp: 97.8  F (36.6  C) Temp src: Axillary BP: 108/57 Pulse: 106 Heart Rate: 73 Resp: 18 SpO2: 95 % O2 Device: None (Room air) Oxygen Delivery: 2 LPM  Vitals:    03/18/19 0438 03/19/19 0234 03/20/19 0555   Weight: 52.2 kg (115 lb) 50.4 kg (111 lb 3.2 oz) 49.3 kg (108 lb 11.2 oz)     Vital Signs with Ranges  Temp:  [97.8  F (36.6  C)-98.4  F (36.9  C)] 97.8  F (36.6  C)  Pulse:  [] 106  Heart Rate:  [] 73  Resp:  [15-41] 18  BP: (105-135)/(57-99) 108/57  SpO2:  [95 %-100 %] 95 %  I/O last 3 completed shifts:  In: 240 [P.O.:240]  Out: 3200 [Urine:3200]    GEN: well-developed, well-nourished, appears comfortable  PULM: lungs CTA bilaterally, no increased work of breathing, no wheeze, rales, rhonchi  CV: RRR, S1 & S2  GI: soft, nontender, nondistended, no guarding or rigidity, +BS in all 4 quadrants  SKIN: warm & dry without rash, no pedal edema    Discharge Disposition   Discharged to rehabilitation facility  Condition at discharge: Stable    Consultations This Hospital Stay   CARDIOLOGY IP CONSULT  SPIRITUAL HEALTH SERVICES IP CONSULT  GASTROENTEROLOGY IP CONSULT  PHYSICAL THERAPY ADULT IP CONSULT  [facility] EVALUATION IP CONSULT  CARE TRANSITION RN/SW IP  CONSULT  PHYSICAL THERAPY ADULT IP CONSULT  OCCUPATIONAL THERAPY ADULT IP CONSULT    Time Spent on this Encounter   I, Jimy Ferguson, personally saw the patient today and spent greater than 30 minutes discharging this patient.    Discharge Orders      Basic metabolic panel     TSH with free T4 reflex    Last Lab Result: TSH (mU/L)       Date                     Value                 09/26/2017               3.29             ----------     Hemoglobin    Last Lab Result: Hemoglobin (g/dL)       Date                     Value                 03/20/2019               8.2 (L)          ----------     N terminal pro BNP outpatient     Follow-Up with Cardiologist      Follow-Up with [facility]      General info for SNF    Length of Stay Estimate: Short Term Care: Estimated # of Days <30  Condition at Discharge: Improving  Level of care:skilled   Rehabilitation Potential: Good  Admission H&P remains valid and up-to-date: Yes  Recent Chemotherapy: N/A  Use Nursing Home Standing Orders: Yes     Mantoux instructions    Give two-step Mantoux (PPD) Per Facility Policy Yes     Intake and output    Every shift     Daily weights    Call Provider for weight gain of more than 2 pounds per day or 5 pounds per week.     Follow Up and recommended labs and tests    Follow up with intermediate physician.  The following labs/tests are recommended: repeat BMP in one week.    Follow up with GI physician Dr. Santo within 1-2 weeks.    Follow up with Cardiologist as directed.     Activity - Up with nursing assistance     Physical Therapy Adult Consult    Evaluate and treat as clinically indicated.    Reason:  Physical deconditioning     Occupational Therapy Adult Consult    Evaluate and treat as clinically indicated.    Reason:  Physical deconditioning     Advance Diet as Tolerated    Follow this diet upon discharge: Orders Placed This Encounter      Mechanical/Dental Soft Diet, advance to regular as tolerates     Discharge Medications    Current Discharge Medication List      START taking these medications    Details   clotrimazole (MYCELEX) 10 MG LOZG lozenge Place 1 lozenge (1 Mor) inside cheek 5 times daily    Associated Diagnoses: Candida esophagitis (H)      miconazole with skin protectant (SHANNAN ANTIFUNGAL) 2 % CREA cream Apply topically 2 times daily    Associated Diagnoses: Chronic dermatitis      pantoprazole (PROTONIX) 20 MG EC tablet Take 2 tablets (40 mg) by mouth 2 times daily    Associated Diagnoses: Candida esophagitis (H)         CONTINUE these medications which have NOT CHANGED    Details   albuterol (PROAIR HFA/PROVENTIL HFA/VENTOLIN HFA) 108 (90 Base) MCG/ACT Inhaler Inhale 2 puffs into the lungs every 4 hours as needed for shortness of breath / dyspnea or wheezing      albuterol (PROVENTIL) (2.5 MG/3ML) 0.083% neb solution Take 2.5 mg by nebulization every 4 hours as needed for shortness of breath / dyspnea or wheezing      ANORO ELLIPTA 62.5-25 MCG/INH oral inhaler Inhale 1 puff into the lungs daily  Qty: 1 Inhaler, Refills: 8    Associated Diagnoses: Chronic obstructive pulmonary disease, unspecified COPD type (H)      bimatoprost (LUMIGAN) 0.01 % SOLN Place 1 drop into both eyes At Bedtime.        Calcium Carb-Cholecalciferol (CALCIUM-VITAMIN D3) 600-500 MG-UNIT CAPS Take 1 tablet by mouth every 12 hours      diltiazem ER (DILT-XR) 120 MG 24 hr capsule Take 1 capsule (120 mg) by mouth daily    Associated Diagnoses: Paroxysmal atrial fibrillation (H)      dorzolamide (TRUSOPT) 2 % ophthalmic solution Place 1 drop into both eyes 2 times daily      Multiple Vitamins-Minerals (PRESERVISION AREDS 2 PO) Take 1 tablet by mouth 2 times daily      polyethylene glycol 400 (BLINK TEARS) 0.25 % SOLN ophthalmic solution Place 1 drop into both eyes every 3 hours as needed for dry eyes         STOP taking these medications       furosemide (LASIX) 20 MG tablet Comments:   Reason for Stopping:         rivaroxaban ANTICOAGULANT (XARELTO)  15 MG TABS tablet Comments:   Reason for Stopping:         spironolactone (ALDACTONE) 25 MG tablet Comments:   Reason for Stopping:             Allergies   Allergies   Allergen Reactions     Atenolol Hives     Beta Adrenergic Blockers      Brimonidine      Cephalexin Hives     Germall Plus Itching     Imidazolidinyl Urea found in many topical creams and house hold products     Latex      Itching     Morphine GI Disturbance     No Clinical Screening - See Comments Itching     Carba Mix; IMIDAZOLIDINYL UREA - Allergic to this ingredient, which is found in many common topical moisturizers and household products.     Penicillins Hives     Sulfa Drugs GI Disturbance     Tramadol Nausea and Vomiting     Tylenol GI Disturbance     Vicodin [Hydrocodone-Acetaminophen]      Fentanyl Nausea     Data   Most Recent 3 CBC's:  Recent Labs   Lab Test 03/20/19  0548 03/19/19  0534 03/18/19  1100  03/17/19  1605 03/14/19  1119   WBC  --   --  12.6*  --  13.2* 21.9*   HGB 8.2* 7.5* 7.5*   < > 8.2* 8.4*   MCV  --   --  92  --  92 95   PLT  --   --  284  --  302 393    < > = values in this interval not displayed.      Most Recent 3 BMP's:  Recent Labs   Lab Test 03/20/19  0548 03/19/19  0534 03/18/19  0531    134 134   POTASSIUM 3.6 3.2* 3.6   CHLORIDE 100 98 99   CO2 28 26 26   BUN 40* 46* 42*   CR 1.62* 1.57* 1.29*   ANIONGAP 9 10 9   NAKIA 8.2* 7.8* 7.5*   * 101* 174*     Most Recent 2 LFT's:  Recent Labs   Lab Test 03/14/19  1119 07/27/18  1004   AST 13 12   ALT 23 21   ALKPHOS 111 131   BILITOTAL 0.5 0.6     Most Recent INR's and Anticoagulation Dosing History:  Anticoagulation Dose History     Recent Dosing and Labs Latest Ref Rng & Units 7/1/2012 3/31/2013 4/1/2013 4/2/2013 4/3/2013 10/12/2018    Warfarin 4 mg - - 4 mg 4 mg 4 mg - -    INR 0.86 - 1.14 1.03 1.18(H) 1.16(H) 1.21(H) 1.47(H) 1.13        Most Recent 3 Troponin's:  Recent Labs   Lab Test 03/17/19  1605 02/04/19  0005 02/03/19  1817   TROPI 0.026 0.043 0.059*      Most Recent Cholesterol Panel:  Recent Labs   Lab Test 09/28/18  1521   CHOL 141   LDL 59   HDL 70   TRIG 62     Most Recent 6 Bacteria Isolates From Any Culture (See EPIC Reports for Culture Details):  Recent Labs   Lab Test 02/07/19  0153 02/07/19  0130 02/07/19  0125 12/21/16  1009   CULT <10,000 colonies/mL  urogenital alfredo  Susceptibility testing not routinely done   No growth No growth >100,000 colonies/mL Enterobacter aerogenes  10,000 to 50,000 colonies/mL mixed urogenital alfredo  *     Most Recent TSH, T4 and A1c Labs:  Recent Labs   Lab Test 09/26/17  1030  10/10/11  1240   TSH 3.29   < > 2.75   T4  --   --  0.93    < > = values in this interval not displayed.     Results for orders placed or performed during the hospital encounter of 03/17/19   XR Chest Port 1 View    Narrative    CHEST ONE VIEW PORTABLE   3/17/2019 4:18 PM     HISTORY: Shortness of breath.     COMPARISON: 3/14/2019.      Impression    IMPRESSION: Cardiomegaly is present. There is some new interstitial  change in the lungs bilaterally with a small right pleural effusion.  Pulmonary vasculature is also slightly more prominent. This is  probably due to congestive heart failure.     ZAKI CARTER MD

## 2019-03-20 NOTE — TELEPHONE ENCOUNTER
"Requested Prescriptions   Pending Prescriptions Disp Refills     diltiazem ER (DILT-XR) 120 MG 24 hr capsule       Sig: Take 1 capsule (120 mg) by mouth daily    Calcium Channel Blockers Protocol  Failed - 3/20/2019 10:56 AM       Failed - Normal serum creatinine on file in past 12 months    Recent Labs   Lab Test 03/20/19  0548  10/10/18  1248   CR 1.62*   < >  --    CREAT  --   --  0.8    < > = values in this interval not displayed.            Passed - Blood pressure under 140/90 in past 12 months    BP Readings from Last 3 Encounters:   03/20/19 135/85   03/14/19 100/66   03/06/19 98/60                Passed - Normal ALT in past 12 months    Recent Labs   Lab Test 03/14/19  1119   ALT 23            Passed - Recent (12 mo) or future (30 days) visit within the authorizing provider's specialty    Patient had office visit in the last 12 months or has a visit in the next 30 days with authorizing provider or within the authorizing provider's specialty.  See \"Patient Info\" tab in inbasket, or \"Choose Columns\" in Meds & Orders section of the refill encounter.             Passed - Medication is active on med list       Passed - Patient is age 18 or older       Passed - No active pregnancy on record       Passed - No positive pregnancy test in past 12 months        diltiazem ER (DILT-XR) 120 MG 24 hr capsule      Last Written Prescription Date:  3/6/19  Last Fill Quantity: ,   # refills:   Last Office Visit: 3/14/19  Future Office visit:       Routing refill request to provider for review/approval because:  Medication is reported/historical    "

## 2019-03-20 NOTE — PROGRESS NOTES
[facility].  Inpatient Cardiology Progress Note.  Date of Service: 3/20/2019.    INTERVAL HISTORY:  Upper GI endoscopy done yesterday showed Candida esophagitis with a moderate hiatus hernia but no significant source of bleeding.  Patient continues to have significant GERD-like symptoms despite being on IV PPI.  Clinically euvolemic.  Creatinine has gone up from 1.0  to 1.6 after receiving a double dose of diuretic yesterday (IV furosemide and oral torsemide).    On exam -135/85 mmHg, pulse 80-90 bpm, alert, appears tired, complaining of GERD, lungs are clear, irregularly irregular heart sounds without murmur, no lower extremity edema, JVP normal.    Labs - Hemoglobin today has gone up from 7.1 to 8.2.  Creatinine 1.6 (from 1.0 on admission).    DIAGNOSES:  1.  Multifactorial dyspnea -- Due to fluid overload from recent prednisone bursts, recovery from recent influenza, in the context of known severe non-ischemic cardiomyopathy with an left ventricular function of 19%.   3.  New diagnosis of anemia with a hemoglobin of 7.1 without overt blood loss, since starting anticoagulation in 02/2019.   3.  Diagnosis of Candida esophagitis on upper GI endoscopy (3/19/2019).  4.  Recent influenza A (treated with Tamiflu 02/2019).   5.  Nonischemic cardiomyopathy with LVEF of 19% (diagnosed 10/2018) currently euvolemic at 108 pounds.   6.  Moderate right ventricular systolic dysfunction without clinical decompensation.   7.  Moderate nonocclusive coronary artery disease on angiogram (October 2018), asymptomatic.   9.  Persistent atrial fibrillation -- rate control.  Coagulation being held during anemia workup.  10.  Left bundle branch block with a QRS greater than 130 milliseconds.  Defibrillator placement deferred by patient.   11.  History of infrarenal abdominal aortic aneurysm (3.9 x 4.6 cm).   12.  History of perforated bowel and small-bowel obstruction, status post exploratory laparotomy (04/2018).   13.   Elderly frailty.     ASSESSMENT/PLAN:  1.  From a cardiac status, she is stable.  Cardiology will sign off and schedule follow-up in the [facility] (3/27/2019) and downstream with patient's primary cardiologist, Dr. Lizbeth Bond.  Outpatient re-assessment of candidacy for CRT-D, as requested by patient.  2.  Given her prerenal azotemia and worsening renal function, please continue to hold diuretic.  When creatinine has trended down to 1.0, discharge on 10 mg of torsemide.  Discontinue low-dose spironolactone.  3.  Since upper GI endoscopy did not reveal a clear source for blood loss, we request that she have a complete anemia workup, as an outpatient, if needed.  Hold anticoagulation for now.  Can be restarted once anemia workup completed.  4.  Switch from diltiazem 120 mg daily to Toprol-XL 25 mg daily for guideline directed therapy of heart failure and atrial fibrillation.  Uptitrate metoprolol as needed.    Cardiology will sign off.  Thank you for consulting us.    Bernarda Rosales MD [facility]  Cardiology.      VITAL SIGNS:  Temp: 98.4  F (36.9  C) Temp src: Oral BP: 135/85 Pulse: 106 Heart Rate: 96 Resp: 16 SpO2: 96 % O2 Device: Nasal cannula Oxygen Delivery: 2 LPM  03/15 0700 - 03/20 0659  In: 960 [P.O.:960]  Out: 8500 [Urine:8500]  Net: -7540  Vitals:    03/17/19 1603 03/18/19 0438 03/19/19 0234 03/20/19 0555   Weight: 55.8 kg (123 lb 0.3 oz) 52.2 kg (115 lb) 50.4 kg (111 lb 3.2 oz) 49.3 kg (108 lb 11.2 oz)

## 2019-03-20 NOTE — PROGRESS NOTES
[facility]  Gastroenterology Progress Note     Jamilah Rodriguez MRN# [id number]   YOB: 1931 Age: 87 year old          Assessment and Plan:     CHF (congestive heart failure) (H)  Patient is clinically doing well no new GI complaint patient hemoglobin has been stable.  EGD done today showed Candida esophagitis moderate hiatus hernia otherwise fairly unremarkable upper GI study.  Patient had thick phlegm and some food particle in the back of her throat.  I would recommend the patient to be started on soft diet Will start her on Mycelex.  Repeat labs again tomorrow I will continue to follow her along with you.            Hypoxemia  Congestive heart failure, unspecified HF chronicity, unspecified heart failure type (H)  Atrial fibrillation, unspecified type (H)  COPD exacerbation (H)      Interval History:   doing well; no cp, sob, n/v/d, or abd pain.              Review of Systems:   C: NEGATIVE for fever, chills, change in weight  E/M: NEGATIVE for ear, mouth and throat problems  R: NEGATIVE for significant cough or SOB  CV: NEGATIVE for chest pain, palpitations or peripheral edema             Medications:   I have reviewed this patient's current medications    bimatoprost  1 drop Both Eyes At Bedtime     clotrimazole  1 Mor Buccal 5x Daily     diltiazem ER  120 mg Oral Daily     dorzolamide  1 drop Both Eyes BID     miconazole with skin protectant   Topical BID     multivitamin  with lutein  1 capsule Oral BID     pantoprazole (PROTONIX) IV  40 mg Intravenous BID     umeclidinium-vilanterol  1 puff Inhalation Daily                  Physical Exam:   Vitals were reviewed  Vital Signs with Ranges  Temp:  [98.1  F (36.7  C)-98.5  F (36.9  C)] 98.1  F (36.7  C)  Pulse:  [] 101  Heart Rate:  [] 92  Resp:  [15-41] 20  BP: (102-128)/(62-99) 120/72  SpO2:  [94 %-100 %] 97 %  I/O last 3 completed shifts:  In: -   Out: 3600 [Urine:3600]  Constitutional: healthy, alert and no  distress   Cardiovascular: negative, PMI normal. No lifts, heaves, or thrills. RRR. No murmurs, clicks gallops or rub  Respiratory: negative, Percussion normal. Good diaphragmatic excursion. Lungs clear  Head: Normocephalic. No masses, lesions, tenderness or abnormalities  Neck: Neck supple. No adenopathy. Thyroid symmetric, normal size,, Carotids without bruits.  Abdomen: Abdomen soft, non-tender. BS normal. No masses, organomegaly  SKIN: no suspicious lesions or rashes           Data:   I reviewed the patient's new clinical lab test results.   Recent Labs   Lab Test 03/19/19  0534 03/18/19  1100 03/18/19  0531 03/17/19  1605 03/14/19  1119  10/12/18  0843  04/03/13  0716  04/02/13  0610   WBC  --  12.6*  --  13.2* 21.9*   < > 4.6   < > 6.9  --  7.1   HGB 7.5* 7.5* 7.1* 8.2* 8.4*   < > 13.0   < > 9.4*   < > 7.4*   MCV  --  92  --  92 95   < > 87   < > 89  --  91   PLT  --  284  --  302 393   < > 246   < > 204  --  163   INR  --   --   --   --   --   --  1.13  --  1.47*  --  1.21*    < > = values in this interval not displayed.     Recent Labs   Lab Test 03/19/19  0534 03/18/19  0531 03/17/19  1605   POTASSIUM 3.2* 3.6 3.4   CHLORIDE 98 99 96   CO2 26 26 29   BUN 46* 42* 35*   ANIONGAP 10 9 7     Recent Labs   Lab Test 03/14/19  1119 02/07/19  0153 07/27/18  1004 04/15/18  1402  12/21/16  1000   ALBUMIN 3.3*  --  3.8 4.0   < >  --    BILITOTAL 0.5  --  0.6 0.9   < >  --    ALT 23  --  21 17   < >  --    AST 13  --  12 19   < >  --    PROTEIN  --  30*  --   --   --  Trace*   LIPASE  --   --   --  289  --   --     < > = values in this interval not displayed.       I reviewed the patient's new imaging results.    All laboratory data reviewed  All imaging studies reviewed by me.    Naveen Santo MD,  3/19/2019  [facility]  Office : [phone]  Cell: [phone]

## 2019-03-20 NOTE — PROGRESS NOTES
03/20/19 1000   Quick Adds   Type of Visit Initial PT Evaluation   Living Environment   Lives With alone   Living Arrangements independent living facility   Home Accessibility no concerns   Self-Care   Usual Activity Tolerance moderate   Current Activity Tolerance poor   Equipment Currently Used at Home walker, rolling   Activity/Exercise/Self-Care Comment IND with ADLs   Functional Level Prior   Ambulation 1-->assistive equipment   Transferring 1-->assistive equipment   Toileting 1-->assistive equipment   Bathing 1-->assistive equipment   Fall history within last six months no   Which of the above functional risks had a recent onset or change? ambulation;transferring   Prior Functional Level Comment Mod-I with FWW. Daughter assists with errands. Recently discharged from TCU   General Information   Onset of Illness/Injury or Date of Surgery - Date 03/17/19   Referring Physician Bernarda Rosales MD   Patient/Family Goals Statement Hopes to return home   Pertinent History of Current Problem (include personal factors and/or comorbidities that impact the POC) 87 year old female COPD, nonischemic cardiomyopathy with an EF of 10-20%, recent diagnosis of atrial fibrillation on chronic anticoagulation who presents with progressive shortness of breath, recently hospitalized February 3 - 10 for acute respiratory distress, due to influenza with COPD exacerbation. She complains of increased swelling, orthopnea and CXR shows increased interstitial markings and small R pleural effusion, c/w CHF. BNP 10,760. Wt is 55.8 in ED up from 54.9 at discharge. No wheezing or cough to suggest COPD.    Precautions/Limitations fall precautions   General Info Comments Pt comfortable at rest   Cognitive Status Examination   Orientation orientation to person, place and time   Level of Consciousness alert   Follows Commands and Answers Questions 100% of the time   Pain Assessment   Patient Currently in Pain No   Posture    Posture  "Kyphosis;Forward head position   Range of Motion (ROM)   ROM Comment WFL   Strength   Strength Comments NT, B LE >3/5 functionally   Bed Mobility   Bed Mobility Comments SBA supine<>sit, SOB   Transfer Skills   Transfer Comments SBA sit<>stand, SOB   Gait   Gait Comments SBA with FWW, shuffling gait, frequent pauses for fatigue   Balance   Balance Comments Requires UE support   Sensory Examination   Sensory Perception no deficits were identified   General Therapy Interventions   Planned Therapy Interventions gait training;strengthening;progressive activity/exercise;home program guidelines;risk factor education   Clinical Impression   Criteria for Skilled Therapeutic Intervention yes, treatment indicated   PT Diagnosis Impaired functional mobility   Influenced by the following impairments Fatigue, respiratory status, dizziness   Functional limitations due to impairments Transfers, gait   Clinical Presentation Evolving/Changing   Clinical Presentation Rationale Medical status   Clinical Decision Making (Complexity) Low complexity   Therapy Frequency` daily   Predicted Duration of Therapy Intervention (days/wks) 3 days   Anticipated Discharge Disposition Transitional Care Facility   Risk & Benefits of therapy have been explained Yes   Patient, Family & other staff in agreement with plan of care Yes   [facility] [employer] TM \"6 Clicks\"   2016, Trustees of [facility], under license to [employer].  All rights reserved.   6 Clicks Short Forms Basic Mobility Inpatient Short Form   [facility] [employer]  \"6 Clicks\" V.2 Basic Mobility Inpatient Short Form   1. Turning from your back to your side while in a flat bed without using bedrails? 3 - A Little   2. Moving from lying on your back to sitting on the side of a flat bed without using bedrails? 3 - A Little   3. Moving to and from a bed to a chair (including a wheelchair)? 3 - A Little   4. Standing up from a chair using your arms (e.g., wheelchair, or " bedside chair)? 3 - A Little   5. To walk in hospital room? 3 - A Little   6. Climbing 3-5 steps with a railing? 1 - Total   Basic Mobility Raw Score (Score out of 24.Lower scores equate to lower levels of function) 16   Total Evaluation Time   Total Evaluation Time (Minutes) 9

## 2019-03-21 NOTE — PROGRESS NOTES
Clinic Care Coordination Contact  Care Coordination Transition Communication    Referral Source: IP Report    Clinical Data: Patient was hospitalized at [facility] from 3/17 to 3/20/2019 with diagnosis of Hypoxemia, congestive heart failure, and COPD exacerbation.     Transition to Facility:              Facility Name: [facility] [place] [facility]              Contact name and phone number/fax: [phone]/ [phone]    Plan: Fax sent to [facility] with my contact information requesting notification upon discharge. SW Care Coordinator will await notification from facility staff informing  Care Coordinator of patient's discharge plans/needs. SW Care Coordinator will review chart and outreach to facility staff every 4 weeks and as needed.     GODWIN Cespedes, LGSW  Clinic Care Coordinator  [facility]  [phone]  [email or web address]

## 2019-03-21 NOTE — PROGRESS NOTES
[facility]  PRIMARY CARE PROVIDER AND CLINIC:  Sharon Hurtado MD, [address]  / [facility] [address]  Chief Complaint   Patient presents with     Hospital F/U     [facility] Medical Record Number:  [id number]  Place of Service where encounter took place:  [facility] ([facility]) [[id number]]    Jamilah Rodriguez  is a 87 year old  (4/5/1931), PMH of COPD, cardiomyopathy EF of 10-20%, atrial fib on AC, HTN,  who presented to ED with progressive SOB  admitted to the above facility from  [facility]. Hospital stay 3/17/19 through 3/20/19..  Admitted to this facility for  rehab, medical management and nursing care.    HPI:    HPI information obtained from: facility chart records, facility staff, patient report and [facility] chart review.   Brief Summary of Hospital Course:   Acute hypoxic respiratory failure: acute exacerbation HFrEF: EF 10-20%, IV diureses lasix to PO torsemide, holding diuretics at disharge due to GARIMA  Anemia:Hgb 13.3 on 2/9, recent admit Hgb 7.1, stabilized to 7.5 during hospitalization, no overt signs of bleeding, GI did EGD with findings of esophageal candidiasis  mycelex lozenge 5X daily, Hold xarelto and protonix BID  GARIMA/CKD: creat baseline 0.9 to 1.62 by disharge, holding diuretics, once creat < 1.0 resume torsemide 10mg QD  Afib with RVR: no beta blockers due to worsening COPD, xarelto held due to anemia, f/u with cardiology in 3-4 weeks, continue PTA diltiazem ER 120mg QD  COPD: chronic stable, continue PTA inhalers, not on home O2  Rash: breast folds, miconazole powder BID  Updates on Status Since Skilled nursing Admission: on exam today patient is resting in bed, denies SOB at time of exam, she is off O2, denies cough or congestion, states she does have intermittent SOB and MCC[place]OUGH, denies CP, palpitations, N/V/D states she has not had a BM since Sunday and c/o occasional nausea, states she has a poor appetite. Patient also states she has been having  trouble with vision, light hurting her eyes, she has an eye appt on monday 3/25/19.     CODE STATUS/ADVANCE DIRECTIVES DISCUSSION:   DNR / DNI  Patient's living condition: lives alone  ALLERGIES: Atenolol; Beta adrenergic blockers; Brimonidine; Cephalexin; Germall plus; Latex; Morphine; No clinical screening - see comments; Penicillins; Sulfa drugs; Tramadol; Tylenol; Vicodin [hydrocodone-acetaminophen]; and Fentanyl  PAST MEDICAL HISTORY:  has a past medical history of AAA (abdominal aortic aneurysm) (H), Cardiomyopathy, idiopathic (H) (10/2018), Cervical cancer (H) (1969), Chronic dermatitis, Glaucoma, Glaucoma, History of COPD, HTN (hypertension), LBBB (left bundle branch block) (10/2018), Macular degeneration, Osteoporosis (8/3/11), PAD (peripheral artery disease) (H), and Vertebral compression fracture (H) (5/17/11).  PAST SURGICAL HISTORY:   has a past surgical history that includes Hysterectomy total abdominal (1999); cataract iol, rt/lt (2/8/12); Open reduction internal fixation wrist (1988); Open reduction internal fixation hip nailing (7/2/2012); Open reduction internal fixation tibial plateau (3/31/2013); Laparoscopy diagnostic (general) (N/A, 4/15/2018); Laparotomy exploratory (N/A, 4/15/2018); Heart Cath Coronary angiogram w/lv gram (10/2018); and Esophagoscopy, gastroscopy, duodenoscopy (EGD), combined (N/A, 3/19/2019).  FAMILY HISTORY: family history includes Breast Cancer in an other family member; Breast Cancer (age of onset: 60) in her maternal aunt; C.A.D. in her father; Circulatory (age of onset: 53) in her daughter; Diabetes in her sister.  SOCIAL HISTORY:   reports that she quit smoking about 9 years ago. She has a 60.00 pack-year smoking history. she has never used smokeless tobacco. She reports that she does not drink alcohol or use drugs.    Post Discharge Medication Reconciliation Status: discharge medications reconciled, continue medications without change    Current Outpatient Medications    Medication Sig Dispense Refill     albuterol (PROAIR HFA/PROVENTIL HFA/VENTOLIN HFA) 108 (90 Base) MCG/ACT Inhaler Inhale 2 puffs into the lungs every 4 hours as needed for shortness of breath / dyspnea or wheezing       albuterol (PROVENTIL) (2.5 MG/3ML) 0.083% neb solution Take 2.5 mg by nebulization every 4 hours as needed for shortness of breath / dyspnea or wheezing       ANORO ELLIPTA 62.5-25 MCG/INH oral inhaler Inhale 1 puff into the lungs daily 1 Inhaler 8     bimatoprost (LUMIGAN) 0.01 % SOLN Place 1 drop into both eyes At Bedtime.         Calcium Carb-Cholecalciferol (CALCIUM-VITAMIN D3) 600-500 MG-UNIT CAPS Take 1 tablet by mouth every 12 hours       clotrimazole (MYCELEX) 10 MG LOZG lozenge Place 1 lozenge (1 Mor) inside cheek 5 times daily       diltiazem ER (DILT-XR) 120 MG 24 hr capsule Take 1 capsule (120 mg) by mouth daily       dorzolamide (TRUSOPT) 2 % ophthalmic solution Place 1 drop into both eyes 2 times daily       miconazole with skin protectant (SHANNAN ANTIFUNGAL) 2 % CREA cream Apply topically 2 times daily       Multiple Vitamins-Minerals (PRESERVISION AREDS 2 PO) Take 1 tablet by mouth 2 times daily       pantoprazole (PROTONIX) 20 MG EC tablet Take 2 tablets (40 mg) by mouth 2 times daily       polyethylene glycol 400 (BLINK TEARS) 0.25 % SOLN ophthalmic solution Place 1 drop into both eyes every 3 hours as needed for dry eyes         ROS:  10 point ROS of systems including Constitutional, Eyes, Respiratory, Cardiovascular, Gastroenterology, Genitourinary, Integumentary, Musculoskeletal, Psychiatric were all negative except for pertinent positives noted in my HPI.    Vitals:  /68   Pulse 88   Temp 97.4  F (36.3  C)   Resp 20   Wt 49.6 kg (109 lb 4.8 oz)   SpO2 94%   BMI 18.76 kg/m    Exam:  GENERAL APPEARANCE:  Alert, in no distress, thin  ENT:  Mouth and posterior oropharynx normal, moist mucous membranes, [place]  EYES:  EOM, conjunctivae, lids, pupils and irises normal,  PERRL  RESP:  respiratory effort and palpation of chest normal, lungs clear to auscultation , no respiratory distress, diminished breath sounds bases bilaterally  CV:  Palpation and auscultation of heart done , regular rate and rhythm, no murmur, rub, or gallop, no edema  ABDOMEN:  normal bowel sounds, soft, nontender, no hepatosplenomegaly or other masses  M/S:   Examination of:   right upper extremity, left upper extremity, right lower extremity and left lower extremity  Inspection, ROM, stability and muscle strength normal and generalized weakness noted  SKIN:  Inspection of skin and subcutaneous tissue baseline  NEURO:   Cranial nerves 2-12 are normal tested and grossly at patient's baseline, speech WNL  PSYCH:  affect and mood normal    Lab/Diagnostic data:  Recent labs in [facility] reviewed by me today.     ASSESSMENT/PLAN:     Acute on chronic systolic heart failure (H)  Cardiomyopathy, idiopathic (H)  Acute/ongoing: EF 10-20%, daily weights, vitals daily and prn, BMP twice weekly,   No diuretics at this time, will start torsemide once creat  to baseline 1.0    GARIMA/CKD 3  Acute/ongoing: BMP twice weekly, creat baseline 0.9, holding diuretic until creat closer to baseline, avoid nephrotoxic agents    Benign essential hypertension  Atrial fibrillation with RVR (H)  Ongoing: vitals daily and prn, BMP twice weekly, continue diltiazem ER 120mg QD, holding xarelto due to Hgb drop from 13 to 7.5, f/u with cardiology in 2-3 weeks for f/u and recommendations    [place]rush  Acute/ongoing: GI consult and EGD during hospitalization, started on mycelex lozenge unavailable from pharmacy, diflucan 150mg QD for 3 days    Anemia due to blood loss, acute  Acute: Hgb baseline 13 was 7.1 on admission, EGD as above, holding xarelto  Will check Hgb twice weekly    Chronic obstructive pulmonary disease, unspecified COPD type (H)  Ongoing: monitor SaO2 at rest and with activity, continue anoro ellipta 62.5-25mcg inhaled once day,   Albuterol MDI 2 puff q 4 hours prn, albuterol nebs q 4 hours prn       Orders written by provider at facility  BMP and Hgb twice weekly  dc mycelex lozenge  Diflucan 150mg QD for 3 days        Electronically signed by:  Tonya Lynn Haase, APRN CNP

## 2019-03-21 NOTE — TELEPHONE ENCOUNTER
Patient was evaluated by cardiology while inpatient for CHF exacerbation, anemia of unknown origin Hgb 7.1. Pt is recovering from Influenza and subsequent Prednisone bursts. 3/19/19 EGD shows Candida esophagitis, but no bleeding source. Hx of NICM with EF 19% and chronic A. Fib. OAC being held until anemia work up completed per Dr. Rosales's IP note. IV Lasix diuresis achieved. RN called [facility] TCU to confirm the follow up plan for patient with Care Coordinator. RN confirmed with Care Coordinator that patient has a scheduled F/U appt on 3/27/19 with [facility], for CORE enrollment, with labs prior. RN confirmed with Care Coordinator that patient is weighed daily, to call clinic with a weight gain of 2 lbs overnight or 5 lbs in a week and to bring list of daily weights/vitals, last progress note, MAR, active orders, and provider order sheet to appt. HAN Vivas RN.

## 2019-03-21 NOTE — CONSULTS
Care Transition Initial Assessment -      Met with: Patient and Family  (daughter Lulu)  Active Problems:    CHF (congestive heart failure) (H)       DATA  Lives With: alone   Living Arrangements: independent living facility  Quality of Family Relationships: involved, supportive  Description of Support System: Supportive, Involved  Who is your support system?: Children  Support Assessment: Adequate family and caregiver support.   Identified issues/concerns regarding health management:     Resources List: Skilled Nursing Facility     Quality of Family Relationships: involved, supportive     Per social work consult for discharge planning.  Patient was admitted on 3-17-19 with CHF exacerbation.  The tentative date of discharge is 3-20-19.  Reviewed chart and spoke with patient and daughter regarding discharge plans.  Per patient and daughter report, patient lives alone in an apartment.  Patient uses a rolling walker at baseline.  Patient is independent with ADL's.  Patient states she was recently discharged from [facility].  Reviewed the therapy discharge recommendations of tcu placement on discharge and patient is in agreement.  Patient is asking for referrals to be sent to [facility] and [facility].  Referrals sent, via discharge on the double, to check bed availability.  Received a call back from [facility].  They can accept patient today.  Patient's daughter will transport around 16:00 today.  Patient and daughter informed of the plan and in agreement to the plan.  Call placed to update [facility] and faxed the orders.  Patient does not need a PAS as she was recently at [facility].      ASSESSMENT  Cognitive Status:  awake, alert and oriented  Concerns to be addressed: discharge planning, tcu placement on discharge     PLAN  Financial costs for the patient includes N/A.  Patient given options and choices for discharge TCU choices.  Patient/family is agreeable to the plan?  Yes  Patient Goals and Preferences: TCU  on discharge.  Patient anticipates discharging to:  [facility].    No further needs anticipated.    GODWIN Bella, [facility]  [phone]

## 2019-03-21 NOTE — LETTER
3/21/2019        RE: Jamilah Rodriguez  [address]  [facility] [id number]        [facility]  PRIMARY CARE PROVIDER AND CLINIC:  Sharon Hurtado MD, [address]  / [facility] [address]  Chief Complaint   Patient presents with     Hospital F/U     [facility] Record Number:  [id number]  Place of Service where encounter took place:  [facility] ([facility]) [[id number]]    Jamilah Rodriguez  is a 87 year old  (4/5/1931), PMH of COPD, cardiomyopathy EF of 10-20%, atrial fib on AC, HTN,  who presented to ED with progressive SOB  admitted to the above facility from  [facility]. Hospital stay 3/17/19 through 3/20/19..  Admitted to this facility for  rehab, medical management and nursing care.    HPI:    HPI information obtained from: facility chart records, facility staff, patient report and [facility] chart review.   Brief Summary of Hospital Course:   Acute hypoxic respiratory failure: acute exacerbation HFrEF: EF 10-20%, IV diureses lasix to PO torsemide, holding diuretics at disharge due to GARIMA  Anemia:Hgb 13.3 on 2/9, recent admit Hgb 7.1, stabilized to 7.5 during hospitalization, no overt signs of bleeding, GI did EGD with findings of esophageal candidiasis  mycelex lozenge 5X daily, Hold xarelto and protonix BID  GARIMA/CKD: creat baseline 0.9 to 1.62 by disharge, holding diuretics, once creat < 1.0 resume torsemide 10mg QD  Afib with RVR: no beta blockers due to worsening COPD, xarelto held due to anemia, f/u with cardiology in 3-4 weeks, continue PTA diltiazem ER 120mg QD  COPD: chronic stable, continue PTA inhalers, not on home O2  Rash: breast folds, miconazole powder BID  Updates on Status Since Skilled nursing Admission: on exam today patient is resting in bed, denies SOB at time of exam, she is off O2, denies cough or congestion, states she does have intermittent SOB and MCC[place]OUGH, denies CP, palpitations, N/V/D states she has not had a BM since  Sunday and c/o occasional nausea, states she has a poor appetite. Patient also states she has been having trouble with vision, light hurting her eyes, she has an eye appt on monday 3/25/19.     CODE STATUS/ADVANCE DIRECTIVES DISCUSSION:   DNR / DNI  Patient's living condition: lives alone  ALLERGIES: Atenolol; Beta adrenergic blockers; Brimonidine; Cephalexin; Germall plus; Latex; Morphine; No clinical screening - see comments; Penicillins; Sulfa drugs; Tramadol; Tylenol; Vicodin [hydrocodone-acetaminophen]; and Fentanyl  PAST MEDICAL HISTORY:  has a past medical history of AAA (abdominal aortic aneurysm) (H), Cardiomyopathy, idiopathic (H) (10/2018), Cervical cancer (H) (1969), Chronic dermatitis, Glaucoma, Glaucoma, History of COPD, HTN (hypertension), LBBB (left bundle branch block) (10/2018), Macular degeneration, Osteoporosis (8/3/11), PAD (peripheral artery disease) (H), and Vertebral compression fracture (H) (5/17/11).  PAST SURGICAL HISTORY:   has a past surgical history that includes Hysterectomy total abdominal (1999); cataract iol, rt/lt (2/8/12); Open reduction internal fixation wrist (1988); Open reduction internal fixation hip nailing (7/2/2012); Open reduction internal fixation tibial plateau (3/31/2013); Laparoscopy diagnostic (general) (N/A, 4/15/2018); Laparotomy exploratory (N/A, 4/15/2018); Heart Cath Coronary angiogram w/lv gram (10/2018); and Esophagoscopy, gastroscopy, duodenoscopy (EGD), combined (N/A, 3/19/2019).  FAMILY HISTORY: family history includes Breast Cancer in an other family member; Breast Cancer (age of onset: 60) in her maternal aunt; C.A.D. in her father; Circulatory (age of onset: 53) in her daughter; Diabetes in her sister.  SOCIAL HISTORY:   reports that she quit smoking about 9 years ago. She has a 60.00 pack-year smoking history. she has never used smokeless tobacco. She reports that she does not drink alcohol or use drugs.    Post Discharge Medication Reconciliation  Status: discharge medications reconciled, continue medications without change    Current Outpatient Medications   Medication Sig Dispense Refill     albuterol (PROAIR HFA/PROVENTIL HFA/VENTOLIN HFA) 108 (90 Base) MCG/ACT Inhaler Inhale 2 puffs into the lungs every 4 hours as needed for shortness of breath / dyspnea or wheezing       albuterol (PROVENTIL) (2.5 MG/3ML) 0.083% neb solution Take 2.5 mg by nebulization every 4 hours as needed for shortness of breath / dyspnea or wheezing       ANORO ELLIPTA 62.5-25 MCG/INH oral inhaler Inhale 1 puff into the lungs daily 1 Inhaler 8     bimatoprost (LUMIGAN) 0.01 % SOLN Place 1 drop into both eyes At Bedtime.         Calcium Carb-Cholecalciferol (CALCIUM-VITAMIN D3) 600-500 MG-UNIT CAPS Take 1 tablet by mouth every 12 hours       clotrimazole (MYCELEX) 10 MG LOZG lozenge Place 1 lozenge (1 Mor) inside cheek 5 times daily       diltiazem ER (DILT-XR) 120 MG 24 hr capsule Take 1 capsule (120 mg) by mouth daily       dorzolamide (TRUSOPT) 2 % ophthalmic solution Place 1 drop into both eyes 2 times daily       miconazole with skin protectant (SHANNAN ANTIFUNGAL) 2 % CREA cream Apply topically 2 times daily       Multiple Vitamins-Minerals (PRESERVISION AREDS 2 PO) Take 1 tablet by mouth 2 times daily       pantoprazole (PROTONIX) 20 MG EC tablet Take 2 tablets (40 mg) by mouth 2 times daily       polyethylene glycol 400 (BLINK TEARS) 0.25 % SOLN ophthalmic solution Place 1 drop into both eyes every 3 hours as needed for dry eyes         ROS:  10 point ROS of systems including Constitutional, Eyes, Respiratory, Cardiovascular, Gastroenterology, Genitourinary, Integumentary, Musculoskeletal, Psychiatric were all negative except for pertinent positives noted in my HPI.    Vitals:  /68   Pulse 88   Temp 97.4  F (36.3  C)   Resp 20   Wt 49.6 kg (109 lb 4.8 oz)   SpO2 94%   BMI 18.76 kg/m     Exam:  GENERAL APPEARANCE:  Alert, in no distress, thin  ENT:  Mouth and  posterior oropharynx normal, moist mucous membranes, [place]  EYES:  EOM, conjunctivae, lids, pupils and irises normal, PERRL  RESP:  respiratory effort and palpation of chest normal, lungs clear to auscultation , no respiratory distress, diminished breath sounds bases bilaterally  CV:  Palpation and auscultation of heart done , regular rate and rhythm, no murmur, rub, or gallop, no edema  ABDOMEN:  normal bowel sounds, soft, nontender, no hepatosplenomegaly or other masses  M/S:   Examination of:   right upper extremity, left upper extremity, right lower extremity and left lower extremity  Inspection, ROM, stability and muscle strength normal and generalized weakness noted  SKIN:  Inspection of skin and subcutaneous tissue baseline  NEURO:   Cranial nerves 2-12 are normal tested and grossly at patient's baseline, speech WNL  PSYCH:  affect and mood normal    Lab/Diagnostic data:  Recent labs in [facility] reviewed by me today.     ASSESSMENT/PLAN:     Acute on chronic systolic heart failure (H)  Cardiomyopathy, idiopathic (H)  Acute/ongoing: EF 10-20%, daily weights, vitals daily and prn, BMP twice weekly,   No diuretics at this time, will start torsemide once creat  to baseline 1.0    GARIMA/CKD 3  Acute/ongoing: BMP twice weekly, creat baseline 0.9, holding diuretic until creat closer to baseline, avoid nephrotoxic agents    Benign essential hypertension  Atrial fibrillation with RVR (H)  Ongoing: vitals daily and prn, BMP twice weekly, continue diltiazem ER 120mg QD, holding xarelto due to Hgb drop from 13 to 7.5, f/u with cardiology in 2-3 weeks for f/u and recommendations    Thrush  Acute/ongoing: GI consult and EGD during hospitalization, started on mycelex lozenge unavailable from pharmacy, diflucan 150mg QD for 3 days    Anemia due to blood loss, acute  Acute: Hgb baseline 13 was 7.1 on admission, EGD as above, holding xarelto  Will check Hgb twice weekly    Chronic obstructive pulmonary disease, unspecified  COPD type (H)  Ongoing: monitor SaO2 at rest and with activity, continue anoro ellipta 62.5-25mcg inhaled once day,  Albuterol MDI 2 puff q 4 hours prn, albuterol nebs q 4 hours prn       Orders written by provider at facility  BMP and Hgb twice weekly  dc mycelex lozenge  Diflucan 150mg QD for 3 days        Electronically signed by:  Tonya Lynn Haase, APRN CNP                       Sincerely,        Tonya Lynn Haase, APRN CNP

## 2019-03-21 NOTE — LETTER
[facility]   To[facility]   [facility]          Please give to facility    From:   DOTTIE Cespedes [facility] Coordinator [facility]     Patient Name:  Annie Rodriguez YOB: 1931   Admit date: 3/20/2019      *Information Needed:  Please contact me when the patient will discharge (or if they will move to long term care)- include the discharge date, disposition, and main diagnosis   - If the patient is discharged with home care services, please provide the name of the agency    Also- Please inform me if a care conference is being held.   Phone, Fax or Email with information       Thank You,   ERUM Cespedes, MSW  Clinic Care Coordinator  [facility]  Ph: [phone]  [email or web address]

## 2019-03-22 NOTE — PROGRESS NOTES
[facility]  [facility] Medical Record Number:  [id number]  Place of Service where encounter took place:  [facility] ([facility]) [[id number]]  Chief Complaint   Patient presents with     RECHECK       HPI:    Jamilah Rodriguez  is a 87 year old (4/5/1931), who is being seen today for an episodic care visit.  HPI information obtained from: facility chart records, facility staff, patient report and [facility] chart review. Today's concern is:  CHF/cardiomyopathy: on exam today patient breathing is stable, admit weight was 109lbs and today weight is 112lbs patient has dyspea on exertion  Thrush: continue to c/o intermittent nausea and decreased appetite, denies pain or discomfort when swallowing.   HTN: BP today 125/70, HR 81bpm  Anemia: see labs  CKD: creat still elevated 1.62  Anemia: Hgb 8.2    Past Medical and Surgical History reviewed in Epic today.    MEDICATIONS:  Current Outpatient Medications   Medication Sig Dispense Refill     albuterol (PROAIR HFA/PROVENTIL HFA/VENTOLIN HFA) 108 (90 Base) MCG/ACT Inhaler Inhale 2 puffs into the lungs every 4 hours as needed for shortness of breath / dyspnea or wheezing       albuterol (PROVENTIL) (2.5 MG/3ML) 0.083% neb solution Take 2.5 mg by nebulization every 4 hours as needed for shortness of breath / dyspnea or wheezing       ANORO ELLIPTA 62.5-25 MCG/INH oral inhaler Inhale 1 puff into the lungs daily 1 Inhaler 8     bimatoprost (LUMIGAN) 0.01 % SOLN Place 1 drop into both eyes At Bedtime.         Calcium Carb-Cholecalciferol (CALCIUM-VITAMIN D3) 600-500 MG-UNIT CAPS Take 1 tablet by mouth every 12 hours       diltiazem ER (DILT-XR) 120 MG 24 hr capsule Take 1 capsule (120 mg) by mouth daily       dorzolamide (TRUSOPT) 2 % ophthalmic solution Place 1 drop into both eyes 2 times daily       fluconazole (DIFLUCAN) 150 MG tablet Take 150 mg by mouth for 3 Administrations       miconazole with skin protectant (SHANNAN ANTIFUNGAL) 2 % CREA cream Apply  topically 2 times daily       Multiple Vitamins-Minerals (PRESERVISION AREDS 2 PO) Take 1 tablet by mouth 2 times daily       pantoprazole (PROTONIX) 20 MG EC tablet Take 2 tablets (40 mg) by mouth 2 times daily       polyethylene glycol 400 (BLINK TEARS) 0.25 % SOLN ophthalmic solution Place 1 drop into both eyes every 3 hours as needed for dry eyes         REVIEW OF SYSTEMS:  10 point ROS of systems including Constitutional, Eyes, Respiratory, Cardiovascular, Gastroenterology, Genitourinary, Integumentary, Musculoskeletal, Psychiatric were all negative except for pertinent positives noted in my HPI.    Objective:  /70   Pulse 81   Temp 97  F (36.1  C)   Resp 22   Wt 50.8 kg (112 lb)   SpO2 98%   BMI 19.22 kg/m    Exam:  GENERAL APPEARANCE:  Alert, in no distress, thin  ENT:  Mouth and posterior oropharynx normal, moist mucous membranes, [place]  EYES:  EOM, conjunctivae, lids, pupils and irises normal, PERRL  RESP:  respiratory effort and palpation of chest normal, lungs clear to auscultation , no respiratory distress, diminished breath sounds bases bilaterally  CV:  Palpation and auscultation of heart done , regular rate and rhythm, no murmur, rub, or gallop, peripheral edema trace+ in LE bilaterally  ABDOMEN:  normal bowel sounds, soft, nontender, no hepatosplenomegaly or other masses  M/S:   Examination of:   right upper extremity, left upper extremity, right lower extremity and left lower extremity  Inspection, ROM, stability and muscle strength normal and generalized weakness noted  SKIN:  Inspection of skin and subcutaneous tissue baseline  NEURO:   Cranial nerves 2-12 are normal tested and grossly at patient's baseline, speech WNL  PSYCH:  affect and mood normal    Labs:   Recent labs in [facility] reviewed by me today.     ASSESSMENT/PLAN:     Acute on chronic systolic heart failure (H)  Cardiomyopathy, idiopathic (H)  Acute/ongoing: EF 10-20%, daily weights, vitals daily and prn, BMP twice weekly,  weight up 3 lbs since admission, start torsemide 10mg QD and KCL 20meq QD, check CMP on monday  Baseline creat 1.0, current creat 1.6  Fluid restriction 1200cc day     GARIMA/CKD 3  Acute/ongoing: BMP twice weekly, creat baseline 1.0, start diuretic as above, avoid nephrotoxic agents     Benign essential hypertension  Atrial fibrillation with RVR (H)  Ongoing: vitals daily and prn, BMP twice weekly, continue diltiazem ER 120mg QD, holding xarelto due to Hgb drop from 13 to 7.5, f/u with cardiology in 2-3 weeks for f/u and recommendations     Thrush  Acute/ongoing: GI consult and EGD during hospitalization, started on mycelex lozenge unavailable from pharmacy, diflucan 150mg QD extend through weekend and reassess, advance diet as tolerated     Anemia due to blood loss, acute  Acute: Hgb baseline 13 was 7.1 on admission, EGD as above, holding xarelto  Will check Hgb twice weekly     Chronic obstructive pulmonary disease, unspecified COPD type (H)  Ongoing: monitor SaO2 at rest and with activity, continue anoro ellipta 62.5-25mcg inhaled once day,  Albuterol MDI 2 puff q 4 hours prn, albuterol nebs q 4 hours prn       Talked to daughter at bedside and answered questions regarding thrush, medications, weight/chf management,   Discussed case with  as well    Orders written by provider at facility  CMP on monday  Extend diflucan 150mg through weekend  Torsemide 10mg QD  KCL 20meq QD  Fluid restriction 1200cc per day    Total time spent with patient visit at the skilled nursing facility was 45 min including patient visit, review of past records and talked to daughter at bedside discussed POC, medication changes, labs and vitals. Greater than 50% of total time spent with counseling and coordinating care due to changes in orders  Electronically signed by:  Tonya Lynn Haase, APRN CNP

## 2019-03-24 NOTE — TELEPHONE ENCOUNTER
Requesting PRN bowel medications    Ordered:  Senna-S 1 tab daily prn    Electronically signed by Jordana Pastor RN, CNP

## 2019-03-26 NOTE — PROGRESS NOTES
Cardiology Clinic Progress Note  Jamilah Rodriguez MRN# [id number]   YOB: 1931 Age: 87 year old   Primary Cardiologist: Dr. Bond Reason for visit: CORE enrollment            Assessment and Plan:   Jamilah Rodriguez is a very pleasant 87 year old female with a history of HFrEF, nonischemic cardiomyopathy (diagnosed 10/2018), LVEF 10-20%, LV dimension 5.8cm per echocardiogram 2/6/19, chronic LBBB, atrial fibrillation, AAA (followed by Dr. Boogie) and COPD. Patient with 2 recent hospitalizations 3/17/2019-3/20/2019 presenting with complaints of shortness of breath found to be hypoxic 70s, thought to be secondary to volume overload. Patient was diuresed from 123# to 108# at discharge. She was discharged on furosemide 40mg BID. It was recommended that patient start metoprolol XL 25mg daily but appears patient was restarted on diltiazem at time of discharge. Patient creatinine was noted to be rising while in the hospital. 2/3/2019-2/10/2019 hospitalized related to acute respiratory distress due to influenza with COPD exacerbation. Patient here today for CORE enrollment and notes she has continued to feel worse since hospital discharge, persisting shortness of breath and fatigue. Labs show continued worsening kidney function. Baseline creatinine 0.7-0.9, recent creatinine trend has been 1.6-> 1.89 -> 1.97 today. Sodium level low at 132. BNP elevated at 34K from 11K at time of admission, this is likely secondary to worsening kidney function. Hemoglobin remains low at 8.4.     1.  Chronic systolic heart failure/HFrEF, nonischemic cardiomyopathy : LVEF 10-20%, LV dimension 5.8cm per echocardiogram 2/6/19. Patient appears hypovolemic on exam. Worsening kidney function which is concerning for overdiuresis. Complaints of excessive thirst and hard stools. Persistent dyspnea which is likely multifactorial (COPD, deconditioning, anemia), patients multiple chronic co-morbidities make it difficult to differentiate what is  causing dyspnea. Today I do not feel dyspnea is related to her heart failure and feel patient appears hypovolemic. She is currently on no guideline HF therapy.    - NYHA class III, stage C   - Etiology : nonischemic, idopathic?    - Fluid status : hypovolemic   - Diuretic regimen : HOLD torsemide, given concerns of hypovolemia and worsening GARIMA   - STOP potassium   - Last RHC : none, will consider if continued difficulties with managing volume status.   - Will consider cardiomems implantation in the future if difficulties with managing volume status.    - Ischemic evaluation : [facility] completed 10/12/2018 showed moderate nonocclusive coronary artery disease   - STOP diltiazem    - Guideline directed medical therapy    - Betablocker: will trial starting Metoprolol XL 25mg daily today.     ** Atenolol allergy noted in chart (hives) and concerns for betablocker therapy in the past given COPD. While hospitalized patient was started on metoprolol XL 25mg daily, this was not continued at discharge. Given HFrEF recommend restarting metoprolol XL with close monitoring.     - ACEI/ARB/ARNI: none, was on losartan in the past, appears at time of admission patient states it was stopped? Patient will benefit from restarting if kidney function normalizes.     - Aldactone antagonist: was on spironolactone 12.5mg, this was held at discharge related to rash/GARIMA. Will consider in future if kidney function improves.    - Sudden cardiac death prophylaxis : patient was evaluated for BiV ICD consideration, pt declined. Further reviewed with patient today as she noted she is now considering, wishes to further discuss once she is feeling better.    - Patient will require close core followup.    - Heart failure education given, provided with written education materials   - Continue daily weights and low sodium diet.     2. Acute kidney injury - Baseline creatinine 0.7-0.9, recent creatinine trend has been 1.6-> 1.89 -> 1.97 today. Appears  secondary to overdiuresis.   - HOLD torsemide     3. Atrial fibrillation - chronic, stable, rate controlled, asymptomatic. LEG6MG3-ACTa score at least 5 (HTN, age, HF, female).    -  Xarelto held during hospitalization and at discharge related to anemia, GI consulted, s/p EGD which showed esophageal candidiasis, no active bleeding found.    - Recommend continuing to hold xarelto at this times.    - STOP diltiazem, given HFrEF   - Will trial metoprolol XL 25mg daily , see above, atenolol allergy noted, reviewed extensively with patient and family and agree with plan to trial beta blocker therapy.     4. Anemia - noted after starting anticoagulation for atrial fibrillation, no bleeding source found. Hemoglobin today is 8.4, down from 13.3 on 2/9/19, as low as 7.1 3/18/19.   - Anticoagulation has remained on hold, continue to hold for now.    - Recommended consideration for blood transfusion, patient was resistant to this and declined consideration today.    - Patient will benefit from hematology evaluation as anemia is likely exacerbation HF and worsening kidney function. Patient wishes to further discuss at follow up appointments.     5. Advance care planning - need to further explore with patient and family. She notes during clinic visit today that she doesn't wish to undergo invasive procedures. Difficult to have further goals of care conversations today as patient feels very defeated with how she has been feeling since discharge. Will continue exploring goals of care with patient and family.        Changes today: HOLD torsemide, STOP diltiazem, START metoprolol XL 25mg daily, STOP potassium.    Atenolol allergy noted in chart (hives) and concerns for betablocker therapy in the past given COPD. While hospitalized patient was started on metoprolol XL 25mg daily, this was not continued at discharge. Given HFrEF recommend restarting metoprolol XL with close monitoring.     Follow up plan:     CORE followup with me  Friday with labs.    Will likely establish care with CORE MD, otherwise schedule followup with Dr. Bond         History of Presenting Illness:    Jamilah Rodriguez is a very pleasant 87 year old female with a history of HFrEF, nonischemic cardiomyopathy (diagnosed 10/2018), LVEF 10-20%, LV dimension 5.8cm per echocardiogram 2/6/19, chronic LBBB, atrial fibrillation, AAA (followed by Dr. Boogie) and COPD.     Patient with 2 recent hospitalizations 3/17/2019-3/20/2019 presenting with complaints of shortness of breath felt to be secondary to volume overload. Reports that her face and neck were swelling up and needing to prop herself up at night to breath. Worsening lower extremity edema. EMS noted patient was 70% on room air. BNP 10,760. Troponin normal 0.026. Hgb 8.2 (13.3 2/9/29, as low as 7.1 3/18/19). Admission weight 123#. Discharge weight 108#. Spironolactone held at discharge related to rash/GARIMA. No betablockers as caused worsening COPD in the past. It was recommended that patients diltiazem be discontinued and metoprolol XL started, it appears patient started this but wasn't carried over at discharge. Diuresed with IV furosemide and transitioned to PO torsemide at discharge. Xarelto held during hospitalization and at discharge related to anemia, GI consulted, s/p EGD which showed esophageal candidiasis, no active bleeding found. Patient was on losartan at one point but at time of admission she notes it was discontinued. 2/3/2019-2/10/2019 hospitalized related to acute respiratory distress due to influenza with COPD exacerbation. Discharged to TCU on 1-2L oxygen, stating when she returned home she didn't need oxygen.       Patient has followed with Dr. Bond and Dr. Arana in outpatient cardiology. Dr. Bond saw patient on 10/5/2018 due to new systolic cardiomyopathy after which she underwent a complete workup. Normal LVEF in 2012 and nuclear study noted normal LVEF in 2015. Cardiac MRI 10/3/2018 demonstrated moderate LV  "dilation with severely reduced systolic function. Late gadolinium enhancement imaging demonstrates a small area of midwall enhancement involving the mid and basal inferoseptal wall. This is nonspecific pattern consistent with idiopathic nonischemic cardiomyopathy. Cardiac catheterization completed 10/12/2018 showed moderate nonocclusive coronary artery disease. LAD 30% stenosis, circumflex 30-40%, and RCA 30-40%. At this time she was on losartan and aldactone was started. Consideration for CRT was also recommended given chronic LBBB. Patient was seen by Dr. Arana 11/5/2018 to be evaluated for BiV ICD consideration. At this time patient was not interested in invasive procedures and given advanced age Dr. Arana saw little benefit. Noting that reconsideration in the future could be made if HF decompensation.    Patient is here today for CORE enrollment after recent HF hospitalization. Patient is currently at TCU.     Patient reports feeling okay, but frustrated that she is not \"snapping back\" quicker. Weights being monitored daily at TCU.  Reports stable, weight yesterday and today 110#. Denies lower extremity edema. Denies abdominal distention/bloated. Complaints of dyspnea even with talking. Exertional dyspnea with any activity, getting dressed, getting to bathroom. Working with PT/OT daily at TCU. Prior to recent hospitalization patient had been living at home independently. Sleeping okay. Complaints of dyspnea when moving around in bed. Sleeping with her head of bed elevated.     Patient denies chest pain or chest tightness. Denies dizziness, lightheadedness or other presyncopal symptoms. Denies palpitations. Complaints of sensations that heart is racing. Low back aches. Complaints of hard stools, drinking prune juice. Denies episodes of bleeding, hematochezia, hemoptysis or melena. Complaints of excessive thirst.     Labs today show worsening kidney function, baseline creatinine 0.7-0.9, recent creatinine trend has been " 1.6-> 1.89 -> 1.97 today. Sodium level low at 132. BNP elevated at 34K from 11K at time of admission, this is likely secondary to worsening kidney function. TSH 4.52 and Free T4 1.04. Hemoglobin 8.4. Blood pressure 96/62 and HR 72 in clinic today.    Feels appetite is starting to improve. Not adding additional salt to foods. Does note that yesterday she had potato chips. Working with therapy, doing leg and arm exercises and walking, able to walk 67 feet. Denies alcohol use. Denies tobacco use.         Recent Hospitalizations   3/17/2019-3/20/2019 presenting with complaints of shortness of breath. Reports that her face and neck were swelling up and needing to prop herself up at night to breath. Worsening lower extremity edema. EMS noted patient was 70% on room air. BNP 10,760. Troponin normal 0.026. Hgb 8.2 (13.3 29, as low as 7.1 3/18/19). Admission weight 123#. Discharge weight 108#. Spironolactone held at discharge related to rash/GARIMA. No betablockers as caused worsening COPD in the past. Diuresed with IV furosemide and transitioned to PO torsemide at discharge.   2/3/2019-2/10/2019 related to acute respiratory distress due to influenza with COPD exacerbation.           Social History    2 children   Social History     Socioeconomic History     Marital status:      Spouse name: Not on file     Number of children: 2     Years of education: Not on file     Highest education level: Not on file   Occupational History     Not on file   Social Needs     Financial resource strain: Not on file     Food insecurity:     Worry: Not on file     Inability: Not on file     Transportation needs:     Medical: Not on file     Non-medical: Not on file   Tobacco Use     Smoking status: Former Smoker     Packs/day: 1.00     Years: 60.00     Pack years: 60.00     Last attempt to quit: 2009     Years since quittin.7     Smokeless tobacco: Never Used     Tobacco comment: former 1/2-1 ppd since age [age]   Substance  "and Sexual Activity     Alcohol use: No     Alcohol/week: 0.0 oz     Comment: NA     Drug use: No     Sexual activity: No   Lifestyle     Physical activity:     Days per week: Not on file     Minutes per session: Not on file     Stress: Not on file   Relationships     Social connections:     Talks on phone: Not on file     Gets together: Not on file     Attends Jain service: Not on file     Active member of club or organization: Not on file     Attends meetings of clubs or organizations: Not on file     Relationship status: Not on file     Intimate partner violence:     Fear of current or ex partner: Not on file     Emotionally abused: Not on file     Physically abused: Not on file     Forced sexual activity: Not on file   Other Topics Concern     Parent/sibling w/ CABG, MI or angioplasty before 65F 55M? Not Asked   Social History Narrative     Not on file            Review of Systems:   Skin:  Negative     Eyes:  Negative    ENT:  Negative    Respiratory:  Positive for shortness of breath;dyspnea on exertion  Cardiovascular:  Negative    Gastroenterology: Positive for nausea  Genitourinary:  Negative    Musculoskeletal:  Negative    Neurologic:  Negative    Psychiatric:  Negative    Heme/Lymph/Imm:  Negative    Endocrine:  Negative           Physical Exam:   Vitals: BP 96/62   Pulse 72   Ht 1.651 m (5' 5\")   Wt 51.8 kg (114 lb 3.2 oz)   BMI 19.00 kg/m     Wt Readings from Last 4 Encounters:   03/27/19 51.8 kg (114 lb 3.2 oz)   03/25/19 50.5 kg (111 lb 4.8 oz)   03/22/19 50.8 kg (112 lb)   03/20/19 49.6 kg (109 lb 4.8 oz)     GEN: well nourished, in no acute distress.  HEENT:  Pupils equal, round. Sclerae nonicteric.   NECK: Supple, no masses appreciated. JVD appears normal on exam.   C/V:  Irregularly irregular rate and rhythm, no murmur  RESP: Respirations are unlabored. Clear to auscultation bilaterally without wheezing  GI: Abdomen soft, nontender.  EXTREM: No LE edema.  NEURO: Alert and oriented, " cooperative.  SKIN: Warm and dry, skin to bilateral lower extremities scaly.        Data:   ECHO 2/6/2019  The left ventricle is severely dilated. The visual ejection fraction is  estimated at 10-20%.  Right ventricular function cannot be assessed due to poor image quality.  Probably mildly reduced? TDI velocities suggest normal function, but this  cannot be fully assessed.  Trivial pericardial effusion    Cardiac catheterization 10/12/2018  Summary  1. Moderate nonocclusive coronary artery disease  2. Normal left ventricular end-diastolic pressure    Cardiac MRI 1010/2018  1. The LV is moderately dilated. The global systolic function is severely reduced. The LVEF is 19%. There  is severe global hypokinesis of the left ventricle with minor regional variation.      2. The RV is normal in cavity size. The global systolic function is mildly to moderately reduced. The RVEF  is 41%.      3. There is moderate to severe biatrial enlargement.     4. There is no evidence of myocardial iron overload.      5. Late gadolinium enhancement imagingdemonstrates a small area of midwall enhancement involving the mid to  basal inferoseptal wall. This is a non-specific pattern that has been described in idiopathic nonischemic  cardiomyopathies.      6.  A small circumferential pericardial effusion is noted.         CONCLUSIONS:  Moderate left ventricular dilatation with severely reduced systolic function. Late gadolinium  enhancement imagingdemonstrates a small area of midwall enhancement involving the mid to basal inferoseptal  wall. This is a non-specific pattern that has been described in idiopathic nonischemic cardiomyopathies.     LIPID RESULTS:  Lab Results   Component Value Date    CHOL 141 09/28/2018    HDL 70 09/28/2018    LDL 59 09/28/2018    TRIG 62 09/28/2018    CHOLHDLRATIO 2.2 04/08/2015     LIVER ENZYME RESULTS:  Lab Results   Component Value Date    AST 15 03/25/2019    ALT 15 03/25/2019     CBC RESULTS:  Lab Results    Component Value Date    WBC 12.6 (H) 03/18/2019    RBC 2.43 (L) 03/18/2019    HGB 8.4 (L) 03/27/2019    HCT 22.3 (L) 03/18/2019    MCV 92 03/18/2019    MCH 30.9 03/18/2019    MCHC 33.6 03/18/2019    RDW 15.4 (H) 03/18/2019     03/18/2019     BMP RESULTS:  Lab Results   Component Value Date     (L) 03/27/2019    POTASSIUM 4.1 03/27/2019    CHLORIDE 99 03/27/2019    CO2 26 03/27/2019    ANIONGAP 11.1 03/27/2019     (H) 03/27/2019    BUN 27 03/27/2019    CR 1.97 (H) 03/27/2019    GFRESTIMATED 24 (L) 03/27/2019    GFRESTBLACK 29 (L) 03/27/2019    NAKIA 8.5 03/27/2019      A1C RESULTS:  No results found for: A1C  INR RESULTS:  Lab Results   Component Value Date    INR 1.13 10/12/2018    INR 1.47 (H) 04/03/2013            Medications     Current Outpatient Medications   Medication Sig Dispense Refill     albuterol (PROAIR HFA/PROVENTIL HFA/VENTOLIN HFA) 108 (90 Base) MCG/ACT Inhaler Inhale 2 puffs into the lungs every 4 hours as needed for shortness of breath / dyspnea or wheezing       albuterol (PROVENTIL) (2.5 MG/3ML) 0.083% neb solution Take 2.5 mg by nebulization every 4 hours as needed for shortness of breath / dyspnea or wheezing       ANORO ELLIPTA 62.5-25 MCG/INH oral inhaler Inhale 1 puff into the lungs daily 1 Inhaler 8     bimatoprost (LUMIGAN) 0.01 % SOLN Place 1 drop into both eyes At Bedtime.         Calcium Carb-Cholecalciferol (CALCIUM-VITAMIN D3) 600-500 MG-UNIT CAPS Take 1 tablet by mouth every 12 hours       dorzolamide (TRUSOPT) 2 % ophthalmic solution Place 1 drop into both eyes 2 times daily       metoprolol succinate ER (TOPROL-XL) 25 MG 24 hr tablet Take 1 tablet (25 mg) by mouth daily 30 tablet 1     miconazole with skin protectant (SHANNAN ANTIFUNGAL) 2 % CREA cream Apply topically 2 times daily       Multiple Vitamins-Minerals (PRESERVISION AREDS 2 PO) Take 1 tablet by mouth 2 times daily       pantoprazole (PROTONIX) 20 MG EC tablet Take 2 tablets (40 mg) by mouth 2 times  daily       polyethylene glycol 400 (BLINK TEARS) 0.25 % SOLN ophthalmic solution Place 1 drop into both eyes every 3 hours as needed for dry eyes       sennosides (SENOKOT) 8.6 MG tablet Take 1 tablet by mouth daily as needed for constipation       Throat Lozenges (LOZENGES MT) Give 1 lozenge by mouth four times a day for yeast infection in mouth            Past Medical History     Past Medical History:   Diagnosis Date     AAA (abdominal aortic aneurysm) (H)     Followed with yearly ultrasound     Cardiomyopathy, idiopathic (H) 10/2018     Cervical cancer (H) 1969     Chronic dermatitis     extensive allergy testing revealed allergy/sensitivity to carba mix ( rubber) and Imidazolidinyl Urea (common in beauty products)     Glaucoma      Glaucoma      History of COPD     very mild abnormal spirometry in 2007 (in [place]), has not been active since quitting smoking in 2009     HTN (hypertension)      LBBB (left bundle branch block) 10/2018     Macular degeneration      Osteoporosis 8/3/11    femoral T score -3.4 & -3.7     PAD (peripheral artery disease) (H)      Vertebral compression fracture (H) 5/17/11    L1 compression fracture, presumed osteoporotic compression fracture     Past Surgical History:   Procedure Laterality Date     CATARACT IOL, RT/LT  2/8/12    left eye cataract removal     ESOPHAGOSCOPY, GASTROSCOPY, DUODENOSCOPY (EGD), COMBINED N/A 3/19/2019    Procedure: COMBINED ESOPHAGOSCOPY, GASTROSCOPY, DUODENOSCOPY (EGD);  Surgeon: Naveen Santo MD;  Location:  GI     HEART CATH CORONARY ANGIOGRAM W/LV GRAM  10/2018    moderate nonocclusive CAD     HYSTERECTOMY TOTAL ABDOMINAL  1999    Fibroids     LAPAROSCOPY DIAGNOSTIC (GENERAL) N/A 4/15/2018    Procedure: LAPAROSCOPY DIAGNOSTIC (GENERAL);;  Surgeon: Jm Craig MD;  Location:  OR     LAPAROTOMY EXPLORATORY N/A 4/15/2018    Procedure: LAPAROTOMY EXPLORATORY;;  Surgeon: Jm Craig MD;  Location:  OR     OPEN REDUCTION INTERNAL FIXATION  HIP NAILING  7/2/2012    Procedure: OPEN REDUCTION INTERNAL FIXATION HIP NAILING;  Intramedullary Fixation Right Intertrochanteric Hip Fracture;  Surgeon: Chalino Covarrubias MD;  Location:  OR     OPEN REDUCTION INTERNAL FIXATION TIBIAL PLATEAU  3/31/2013    Procedure: OPEN REDUCTION INTERNAL FIXATION TIBIAL PLATEAU;  RIGHT LATERAL TIBIAL PLATEAU FRACTURE - SYNTHES Medial Meniscus Repair, Lateral Compartment Release;  Surgeon: Alfred Cardenas MD;  Location:  OR     OPEN REDUCTION INTERNAL FIXATION WRIST  1988    left wrist ORIF, hardware removed onemonth later     Family History   Problem Relation Age of Onset     Breast Cancer Other      Circulatory Daughter 53        [email or web address]     C.A.D. Father      Diabetes Sister      Breast Cancer Maternal Aunt 60     Other - See Comments Mother         arterial sclerosis     Family History Negative Son             Allergies   Atenolol; Beta adrenergic blockers; Brimonidine; Cephalexin; Germall plus; Latex; Morphine; No clinical screening - see comments; Penicillins; Sulfa drugs; Tramadol; Tylenol; Vicodin [hydrocodone-acetaminophen]; and Fentanyl        JAMIN Corrales [facility]  Pager: [phone]

## 2019-03-26 NOTE — PROGRESS NOTES
[facility]  [place] Medical Record Number:  [id number]  Place of Service where encounter took place:  [facility] ([facility]) [[id number]]  Chief Complaint   Patient presents with     RECHECK       HPI:    Jamilah Rodriguez  is a 87 year old (4/5/1931), who is being seen today for an episodic care visit.  HPI information obtained from: facility chart records, facility staff, patient report and [facility] chart review. Today's concern is:  CHF/cardiomyopathy: on exam today patient breathing is stable, admit weight was 109lbs and today weight is 110.2lbs, patient has dyspea on exertion she is only able to walk 20 feet using a RW with SBA, not able to walk much more due to Dyspnea  GARIMA/CKD: creat elevated 1.89 with BUN 32, patient has cardiology appt tomorrow  Thrush: no c/o pain, poor appetite  HTN: BP  As follows: 92/60, 110/60, 110/50 , HR 81bpm  Anemia: see labs  Anemia: Hgb 8.2    Past Medical and Surgical History reviewed in Epic today.    MEDICATIONS:  Current Outpatient Medications   Medication Sig Dispense Refill     albuterol (PROAIR HFA/PROVENTIL HFA/VENTOLIN HFA) 108 (90 Base) MCG/ACT Inhaler Inhale 2 puffs into the lungs every 4 hours as needed for shortness of breath / dyspnea or wheezing       albuterol (PROVENTIL) (2.5 MG/3ML) 0.083% neb solution Take 2.5 mg by nebulization every 4 hours as needed for shortness of breath / dyspnea or wheezing       ANORO ELLIPTA 62.5-25 MCG/INH oral inhaler Inhale 1 puff into the lungs daily 1 Inhaler 8     bimatoprost (LUMIGAN) 0.01 % SOLN Place 1 drop into both eyes At Bedtime.         Calcium Carb-Cholecalciferol (CALCIUM-VITAMIN D3) 600-500 MG-UNIT CAPS Take 1 tablet by mouth every 12 hours       diltiazem ER (DILT-XR) 120 MG 24 hr capsule Take 1 capsule (120 mg) by mouth daily       dorzolamide (TRUSOPT) 2 % ophthalmic solution Place 1 drop into both eyes 2 times daily       miconazole with skin protectant (SHANNAN ANTIFUNGAL) 2 % CREA cream  Apply topically 2 times daily       Multiple Vitamins-Minerals (PRESERVISION AREDS 2 PO) Take 1 tablet by mouth 2 times daily       pantoprazole (PROTONIX) 20 MG EC tablet Take 2 tablets (40 mg) by mouth 2 times daily       polyethylene glycol 400 (BLINK TEARS) 0.25 % SOLN ophthalmic solution Place 1 drop into both eyes every 3 hours as needed for dry eyes       potassium chloride (KLOR-CON) 20 MEQ packet Take 20 mEq by mouth daily       sennosides (SENOKOT) 8.6 MG tablet Take 1 tablet by mouth daily as needed for constipation       Throat Lozenges (LOZENGES MT) Give 1 lozenge by mouth four times a day for yeast infection in mouth       torsemide (DEMADEX) 10 MG tablet Take 10 mg by mouth daily         REVIEW OF SYSTEMS:  10 point ROS of systems including Constitutional, Eyes, Respiratory, Cardiovascular, Gastroenterology, Genitourinary, Integumentary, Musculoskeletal, Psychiatric were all negative except for pertinent positives noted in my HPI.    Objective:  /60   Pulse 100   Temp 98  F (36.7  C)   Resp 20   Wt 50.5 kg (111 lb 4.8 oz)   SpO2 94%   BMI 19.10 kg/m    Exam:  GENERAL APPEARANCE:  Alert, in no distress, thin  ENT:  Mouth and posterior oropharynx normal, moist mucous membranes, [place]  EYES:  EOM, conjunctivae, lids, pupils and irises normal, PERRL  RESP:  respiratory effort and palpation of chest normal, lungs clear to auscultation , no respiratory distress, diminished breath sounds bases bilaterally  CV:  Palpation and auscultation of heart done , regular rate and rhythm, no murmur, rub, or gallop, peripheral edema trace+ in LE bilaterally improved from friday  ABDOMEN:  normal bowel sounds, soft, nontender, no hepatosplenomegaly or other masses  M/S:   Examination of:   right upper extremity, left upper extremity, right lower extremity and left lower extremity  Inspection, ROM, stability and muscle strength normal and generalized weakness noted  SKIN:  Inspection of skin and subcutaneous  tissue baseline  NEURO:   Cranial nerves 2-12 are normal tested and grossly at patient's baseline, speech WNL  PSYCH:  affect and mood normal    Labs:   Recent labs in EPIC reviewed by me today.     ASSESSMENT/PLAN:     Acute on chronic systolic heart failure (H)  Cardiomyopathy, idiopathic (H)  Acute/ongoing: EF 10-20%, daily weights, vitals daily and prn, BMP twice weekly, continue  torsemide 10mg QD and KCL 20meq QD,  Baseline creat 1.0, current creat 1.8  Fluid restriction 1200cc day     GARIMA/CKD 3  Acute/ongoing: BMP twice weekly, creat baseline 1.0, avoid nephrotoxic agents     Benign essential hypertension  Atrial fibrillation with RVR (H)  Ongoing: vitals daily and prn, BMP twice weekly, continue diltiazem ER 120mg QD, holding xarelto due to Hgb drop from 13 to 7.5,   Cardiology appt tomorrow 3/27/19 first appt [facility]     Thrush  Acute/ongoing: GI consult and EGD during hospitalization, started on mycelex lozenge unavailable from pharmacy, diflucan 150mg QD given for 5 days total, will DC now, advance diet as tolerated     Anemia due to blood loss, acute  Acute: Hgb baseline 13 was 7.1 on admission, EGD as above, holding xarelto  Will check Hgb twice weekly     Chronic obstructive pulmonary disease, unspecified COPD type (H)  Ongoing: monitor SaO2 at rest and with activity, continue anoro ellipta 62.5-25mcg inhaled once day,  Albuterol MDI 2 puff q 4 hours prn, albuterol nebs q 4 hours prn     Orders written by provider at facility  No further diflucan    Total time spent with patient visit at the skilled nursing facility was 45 min including patient visit, review of past records and talked to daughter at bedside discussed POC, medication changes, labs and vitals. Greater than 50% of total time spent with counseling and coordinating care due to changes in orders  Electronically signed by:  Tonya Lynn Haase, APRN CNP

## 2019-03-26 NOTE — LETTER
3/26/2019        RE: Jamilah Rodriguez  [address]  [facility] [address]        [facility]  [place] [facility] Record Number:  [id number]  Place of Service where encounter took place:  [facility] ([facility]) [[id number]]  Chief Complaint   Patient presents with     RECHECK       HPI:    Jamilah Rodriguez  is a 87 year old (4/5/1931), who is being seen today for an episodic care visit.  HPI information obtained from: facility chart records, facility staff, patient report and [facility] chart review. Today's concern is:  CHF/cardiomyopathy: on exam today patient breathing is stable, admit weight was 109lbs and today weight is 110.2lbs, patient has dyspea on exertion she is only able to walk 20 feet using a RW with SBA, not able to walk much more due to Dyspnea  GARIMA/CKD: creat elevated 1.89 with BUN 32, patient has cardiology appt tomorrow  Thrush: no c/o pain, poor appetite  HTN: BP  As follows: 92/60, 110/60, 110/50 , HR 81bpm  Anemia: see labs  Anemia: Hgb 8.2    Past Medical and Surgical History reviewed in Epic today.    MEDICATIONS:  Current Outpatient Medications   Medication Sig Dispense Refill     albuterol (PROAIR HFA/PROVENTIL HFA/VENTOLIN HFA) 108 (90 Base) MCG/ACT Inhaler Inhale 2 puffs into the lungs every 4 hours as needed for shortness of breath / dyspnea or wheezing       albuterol (PROVENTIL) (2.5 MG/3ML) 0.083% neb solution Take 2.5 mg by nebulization every 4 hours as needed for shortness of breath / dyspnea or wheezing       ANORO ELLIPTA 62.5-25 MCG/INH oral inhaler Inhale 1 puff into the lungs daily 1 Inhaler 8     bimatoprost (LUMIGAN) 0.01 % SOLN Place 1 drop into both eyes At Bedtime.         Calcium Carb-Cholecalciferol (CALCIUM-VITAMIN D3) 600-500 MG-UNIT CAPS Take 1 tablet by mouth every 12 hours       diltiazem ER (DILT-XR) 120 MG 24 hr capsule Take 1 capsule (120 mg) by mouth daily       dorzolamide (TRUSOPT) 2 % ophthalmic solution Place 1 drop  into both eyes 2 times daily       miconazole with skin protectant (SHANNAN ANTIFUNGAL) 2 % CREA cream Apply topically 2 times daily       Multiple Vitamins-Minerals (PRESERVISION AREDS 2 PO) Take 1 tablet by mouth 2 times daily       pantoprazole (PROTONIX) 20 MG EC tablet Take 2 tablets (40 mg) by mouth 2 times daily       polyethylene glycol 400 (BLINK TEARS) 0.25 % SOLN ophthalmic solution Place 1 drop into both eyes every 3 hours as needed for dry eyes       potassium chloride (KLOR-CON) 20 MEQ packet Take 20 mEq by mouth daily       sennosides (SENOKOT) 8.6 MG tablet Take 1 tablet by mouth daily as needed for constipation       Throat Lozenges (LOZENGES MT) Give 1 lozenge by mouth four times a day for yeast infection in mouth       torsemide (DEMADEX) 10 MG tablet Take 10 mg by mouth daily         REVIEW OF SYSTEMS:  10 point ROS of systems including Constitutional, Eyes, Respiratory, Cardiovascular, Gastroenterology, Genitourinary, Integumentary, Musculoskeletal, Psychiatric were all negative except for pertinent positives noted in my HPI.    Objective:  /60   Pulse 100   Temp 98  F (36.7  C)   Resp 20   Wt 50.5 kg (111 lb 4.8 oz)   SpO2 94%   BMI 19.10 kg/m     Exam:  GENERAL APPEARANCE:  Alert, in no distress, thin  ENT:  Mouth and posterior oropharynx normal, moist mucous membranes, [place]  EYES:  EOM, conjunctivae, lids, pupils and irises normal, PERRL  RESP:  respiratory effort and palpation of chest normal, lungs clear to auscultation , no respiratory distress, diminished breath sounds bases bilaterally  CV:  Palpation and auscultation of heart done , regular rate and rhythm, no murmur, rub, or gallop, peripheral edema trace+ in LE bilaterally improved from friday  ABDOMEN:  normal bowel sounds, soft, nontender, no hepatosplenomegaly or other masses  M/S:   Examination of:   right upper extremity, left upper extremity, right lower extremity and left lower extremity  Inspection, ROM, stability and  muscle strength normal and generalized weakness noted  SKIN:  Inspection of skin and subcutaneous tissue baseline  NEURO:   Cranial nerves 2-12 are normal tested and grossly at patient's baseline, speech WNL  PSYCH:  affect and mood normal    Labs:   Recent labs in EPIC reviewed by me today.     ASSESSMENT/PLAN:     Acute on chronic systolic heart failure (H)  Cardiomyopathy, idiopathic (H)  Acute/ongoing: EF 10-20%, daily weights, vitals daily and prn, BMP twice weekly, continue  torsemide 10mg QD and KCL 20meq QD,  Baseline creat 1.0, current creat 1.8  Fluid restriction 1200cc day     GARIMA/CKD 3  Acute/ongoing: BMP twice weekly, creat baseline 1.0, avoid nephrotoxic agents     Benign essential hypertension  Atrial fibrillation with RVR (H)  Ongoing: vitals daily and prn, BMP twice weekly, continue diltiazem ER 120mg QD, holding xarelto due to Hgb drop from 13 to 7.5,   Cardiology appt tomorrow 3/27/19 [facility] appt [facility]     Thrush  Acute/ongoing: GI consult and EGD during hospitalization, started on mycelex lozenge unavailable from pharmacy, diflucan 150mg QD given for 5 days total, will DC now, advance diet as tolerated     Anemia due to blood loss, acute  Acute: Hgb baseline 13 was 7.1 on admission, EGD as above, holding xarelto  Will check Hgb twice weekly     Chronic obstructive pulmonary disease, unspecified COPD type (H)  Ongoing: monitor SaO2 at rest and with activity, continue anoro ellipta 62.5-25mcg inhaled once day,  Albuterol MDI 2 puff q 4 hours prn, albuterol nebs q 4 hours prn     Orders written by provider at facility  No further diflucan    Total time spent with patient visit at the [facility] was 45 min including patient visit, review of past records and talked to daughter at bedside discussed POC, medication changes, labs and vitals. Greater than 50% of total time spent with counseling and coordinating care due to changes in orders  Electronically signed by:  Jazmin Garcia  Haase, APRN CNP           Sincerely,        Tonya Lynn Haase, APRN CNP

## 2019-03-27 NOTE — LETTER
3/27/2019    Sharon Hurtado MD  [address] [facility] [address]    RE: Jamilah Rodriguez       Dear Colleague,    I had the pleasure of seeing Jamilah Rodriguez in the [facility].    Cardiology Clinic Progress Note  Jamilah Rodriguez MRN# [id number]   YOB: 1931 Age: 87 year old   Primary Cardiologist: Dr. Bond Reason for visit: CORE enrollment            Assessment and Plan:   Jamilah Rodriguez is a very pleasant 87 year old female with a history of HFrEF, nonischemic cardiomyopathy (diagnosed 10/2018), LVEF 10-20%, LV dimension 5.8cm per echocardiogram 2/6/19, chronic LBBB, atrial fibrillation, AAA (followed by Dr. Boogie) and COPD. Patient with 2 recent hospitalizations 3/17/2019-3/20/2019 presenting with complaints of shortness of breath found to be hypoxic 70s, thought to be secondary to volume overload. Patient was diuresed from 123# to 108# at discharge. She was discharged on furosemide 40mg BID. It was recommended that patient start metoprolol XL 25mg daily but appears patient was restarted on diltiazem at time of discharge. Patient creatinine was noted to be rising while in the hospital. 2/3/2019-2/10/2019 hospitalized related to acute respiratory distress due to influenza with COPD exacerbation. Patient here today for CORE enrollment and notes she has continued to feel worse since hospital discharge, persisting shortness of breath and fatigue. Labs show continued worsening kidney function. Baseline creatinine 0.7-0.9, recent creatinine trend has been 1.6-> 1.89 -> 1.97 today. Sodium level low at 132. BNP elevated at 34K from 11K at time of admission, this is likely secondary to worsening kidney function. Hemoglobin remains low at 8.4.     1.  Chronic systolic heart failure/HFrEF, nonischemic cardiomyopathy : LVEF 10-20%, LV dimension 5.8cm per echocardiogram 2/6/19. Patient appears hypovolemic on exam. Worsening kidney function which is concerning for  overdiuresis. Complaints of excessive thirst and hard stools. Persistent dyspnea which is likely multifactorial (COPD, deconditioning, anemia), patients multiple chronic co-morbidities make it difficult to differentiate what is causing dyspnea. Today I do not feel dyspnea is related to her heart failure and feel patient appears hypovolemic. She is currently on no guideline HF therapy.    - NYHA class III, stage C   - Etiology : nonischemic, idopathic?    - Fluid status : hypovolemic   - Diuretic regimen : HOLD torsemide, given concerns of hypovolemia and worsening GARIMA   - STOP potassium   - Last RHC : none, will consider if continued difficulties with managing volume status.   - Will consider cardiomems implantation in the future if difficulties with managing volume status.    - Ischemic evaluation : [facility] completed 10/12/2018 showed moderate nonocclusive coronary artery disease   - STOP diltiazem    - Guideline directed medical therapy    - Betablocker: will trial starting Metoprolol XL 25mg daily today.     ** Atenolol allergy noted in chart (hives) and concerns for betablocker therapy in the past given COPD. While hospitalized patient was started on metoprolol XL 25mg daily, this was not continued at discharge. Given HFrEF recommend restarting metoprolol XL with close monitoring.     - ACEI/ARB/ARNI: none, was on losartan in the past, appears at time of admission patient states it was stopped? Patient will benefit from restarting if kidney function normalizes.     - Aldactone antagonist: was on spironolactone 12.5mg, this was held at discharge related to rash/GARIMA. Will consider in future if kidney function improves.    - Sudden cardiac death prophylaxis : patient was evaluated for BiV ICD consideration, pt declined. Further reviewed with patient today as she noted she is now considering, wishes to further discuss once she is feeling better.    - Patient will require close core followup.    - Heart failure education  given, provided with written education materials   - Continue daily weights and low sodium diet.     2. Acute kidney injury - Baseline creatinine 0.7-0.9, recent creatinine trend has been 1.6-> 1.89 -> 1.97 today. Appears secondary to overdiuresis.   - HOLD torsemide     3. Atrial fibrillation - chronic, stable, rate controlled, asymptomatic. [id number] score at least 5 (HTN, age, HF, female).    -  Xarelto held during hospitalization and at discharge related to anemia, GI consulted, s/p EGD which showed esophageal candidiasis, no active bleeding found.    - Recommend continuing to hold xarelto at this times.    - STOP diltiazem, given HFrEF   - Will trial metoprolol XL 25mg daily , see above, atenolol allergy noted, reviewed extensively with patient and family and agree with plan to trial beta blocker therapy.     4. Anemia - noted after starting anticoagulation for atrial fibrillation, no bleeding source found. Hemoglobin today is 8.4, down from 13.3 on 2/9/19, as low as 7.1 3/18/19.   - Anticoagulation has remained on hold, continue to hold for now.    - Recommended consideration for blood transfusion, patient was resistant to this and declined consideration today.    - Patient will benefit from hematology evaluation as anemia is likely exacerbation HF and worsening kidney function. Patient wishes to further discuss at follow up appointments.     5. Advance care planning - need to further explore with patient and family. She notes during clinic visit today that she doesn't wish to undergo invasive procedures. Difficult to have further goals of care conversations today as patient feels very defeated with how she has been feeling since discharge. Will continue exploring goals of care with patient and family.        Changes today: HOLD torsemide, STOP diltiazem, START metoprolol XL 25mg daily, STOP potassium.    Atenolol allergy noted in chart (hives) and concerns for betablocker therapy in the past given COPD.  While hospitalized patient was started on metoprolol XL 25mg daily, this was not continued at discharge. Given HFrEF recommend restarting metoprolol XL with close monitoring.     Follow up plan:     CORE followup with me Friday with labs.    Will likely establish care with CORE MD, otherwise schedule followup with Dr. Bond         History of Presenting Illness:    Jamilah Rodriguez is a very pleasant 87 year old female with a history of HFrEF, nonischemic cardiomyopathy (diagnosed 10/2018), LVEF 10-20%, LV dimension 5.8cm per echocardiogram 2/6/19, chronic LBBB, atrial fibrillation, AAA (followed by Dr. Boogie) and COPD.     Patient with 2 recent hospitalizations 3/17/2019-3/20/2019 presenting with complaints of shortness of breath felt to be secondary to volume overload. Reports that her face and neck were swelling up and needing to prop herself up at night to breath. Worsening lower extremity edema. EMS noted patient was 70% on room air. BNP 10,760. Troponin normal 0.026. Hgb 8.2 (13.3 2/9/29, as low as 7.1 3/18/19). Admission weight 123#. Discharge weight 108#. Spironolactone held at discharge related to rash/GARMIA. No betablockers as caused worsening COPD in the past. It was recommended that patients diltiazem be discontinued and metoprolol XL started, it appears patient started this but wasn't carried over at discharge. Diuresed with IV furosemide and transitioned to PO torsemide at discharge. Xarelto held during hospitalization and at discharge related to anemia, GI consulted, s/p EGD which showed esophageal candidiasis, no active bleeding found. Patient was on losartan at one point but at time of admission she notes it was discontinued. 2/3/2019-2/10/2019 hospitalized related to acute respiratory distress due to influenza with COPD exacerbation. Discharged to TCU on 1-2L oxygen, stating when she returned home she didn't need oxygen.       Patient has followed with Dr. Bond and Dr. Arana in outpatient cardiology.   "Ronda saw patient on 10/5/2018 due to new systolic cardiomyopathy after which she underwent a complete workup. Normal LVEF in 2012 and nuclear study noted normal LVEF in 2015. Cardiac MRI 10/3/2018 demonstrated moderate LV dilation with severely reduced systolic function. Late gadolinium enhancement imaging demonstrates a small area of midwall enhancement involving the mid and basal inferoseptal wall. This is nonspecific pattern consistent with idiopathic nonischemic cardiomyopathy. Cardiac catheterization completed 10/12/2018 showed moderate nonocclusive coronary artery disease. LAD 30% stenosis, circumflex 30-40%, and RCA 30-40%. At this time she was on losartan and aldactone was started. Consideration for CRT was also recommended given chronic LBBB. Patient was seen by Dr. Arana 11/5/2018 to be evaluated for BiV ICD consideration. At this time patient was not interested in invasive procedures and given advanced age Dr. Arana saw little benefit. Noting that reconsideration in the future could be made if HF decompensation.    Patient is here today for CORE enrollment after recent HF hospitalization. Patient is currently at TCU.     Patient reports feeling okay, but frustrated that she is not \"snapping back\" quicker. Weights being monitored daily at TCU.  Reports stable, weight yesterday and today 110#. Denies lower extremity edema. Denies abdominal distention/bloated. Complaints of dyspnea even with talking. Exertional dyspnea with any activity, getting dressed, getting to bathroom. Working with PT/OT daily at TCU. Prior to recent hospitalization patient had been living at home independently. Sleeping okay. Complaints of dyspnea when moving around in bed. Sleeping with her head of bed elevated.     Patient denies chest pain or chest tightness. Denies dizziness, lightheadedness or other presyncopal symptoms. Denies palpitations. Complaints of sensations that heart is racing. Low back aches. Complaints of hard stools, " drinking prune juice. Denies episodes of bleeding, hematochezia, hemoptysis or melena. Complaints of excessive thirst.     Labs today show worsening kidney function, baseline creatinine 0.7-0.9, recent creatinine trend has been 1.6-> 1.89 -> 1.97 today. Sodium level low at 132. BNP elevated at 34K from 11K at time of admission, this is likely secondary to worsening kidney function. TSH 4.52 and Free T4 1.04. Hemoglobin 8.4. Blood pressure 96/62 and HR 72 in clinic today.    Feels appetite is starting to improve. Not adding additional salt to foods. Does note that yesterday she had potato chips. Working with therapy, doing leg and arm exercises and walking, able to walk 67 feet. Denies alcohol use. Denies tobacco use.         Recent Hospitalizations   3/17/2019-3/20/2019 presenting with complaints of shortness of breath. Reports that her face and neck were swelling up and needing to prop herself up at night to breath. Worsening lower extremity edema. EMS noted patient was 70% on room air. BNP 10,760. Troponin normal 0.026. Hgb 8.2 (13.3 2/9/29, as low as 7.1 3/18/19). Admission weight 123#. Discharge weight 108#. Spironolactone held at discharge related to rash/GARIMA. No betablockers as caused worsening COPD in the past. Diuresed with IV furosemide and transitioned to PO torsemide at discharge.   2/3/2019-2/10/2019 related to acute respiratory distress due to influenza with COPD exacerbation.           Social History    2 children   Social History     Socioeconomic History     Marital status:      Spouse name: Not on file     Number of children: 2     Years of education: Not on file     Highest education level: Not on file   Occupational History     Not on file   Social Needs     Financial resource strain: Not on file     Food insecurity:     Worry: Not on file     Inability: Not on file     Transportation needs:     Medical: Not on file     Non-medical: Not on file   Tobacco Use     Smoking status: Former  "Smoker     Packs/day: 1.00     Years: 60.00     Pack years: 60.00     Last attempt to quit: 2009     Years since quittin.7     Smokeless tobacco: Never Used     Tobacco comment: former 1/2-1 ppd since age 20-22   Substance and Sexual Activity     Alcohol use: No     Alcohol/week: 0.0 oz     Comment: NA     Drug use: No     Sexual activity: No   Lifestyle     Physical activity:     Days per week: Not on file     Minutes per session: Not on file     Stress: Not on file   Relationships     Social connections:     Talks on phone: Not on file     Gets together: Not on file     Attends [employer] service: Not on file     Active member of club or organization: Not on file     Attends meetings of clubs or organizations: Not on file     Relationship status: Not on file     Intimate partner violence:     Fear of current or ex partner: Not on file     Emotionally abused: Not on file     Physically abused: Not on file     Forced sexual activity: Not on file   Other Topics Concern     Parent/sibling w/ CABG, MI or angioplasty before 65F 55M? Not Asked   Social History Narrative     Not on file            Review of Systems:   Skin:  Negative     Eyes:  Negative    ENT:  Negative    Respiratory:  Positive for shortness of breath;dyspnea on exertion  Cardiovascular:  Negative    Gastroenterology: Positive for nausea  Genitourinary:  Negative    Musculoskeletal:  Negative    Neurologic:  Negative    Psychiatric:  Negative    Heme/Lymph/Imm:  Negative    Endocrine:  Negative           Physical Exam:   Vitals: BP 96/62   Pulse 72   Ht 1.651 m (5' 5\")   Wt 51.8 kg (114 lb 3.2 oz)   BMI 19.00 kg/m      Wt Readings from Last 4 Encounters:   19 51.8 kg (114 lb 3.2 oz)   19 50.5 kg (111 lb 4.8 oz)   19 50.8 kg (112 lb)   19 49.6 kg (109 lb 4.8 oz)     GEN: well nourished, in no acute distress.  HEENT:  Pupils equal, round. Sclerae nonicteric.   NECK: Supple, no masses appreciated. JVD appears normal on " exam.   C/V:  Irregularly irregular rate and rhythm, no murmur  RESP: Respirations are unlabored. Clear to auscultation bilaterally without wheezing  GI: Abdomen soft, nontender.  EXTREM: No LE edema.  NEURO: Alert and oriented, cooperative.  SKIN: Warm and dry, skin to bilateral lower extremities scaly.        Data:   ECHO 2/6/2019  The left ventricle is severely dilated. The visual ejection fraction is  estimated at 10-20%.  Right ventricular function cannot be assessed due to poor image quality.  Probably mildly reduced? TDI velocities suggest normal function, but this  cannot be fully assessed.  Trivial pericardial effusion    Cardiac catheterization 10/12/2018  Summary  1. Moderate nonocclusive coronary artery disease  2. Normal left ventricular end-diastolic pressure    Cardiac MRI 1010/2018  1. The LV is moderately dilated. The global systolic function is severely reduced. The LVEF is 19%. There  is severe global hypokinesis of the left ventricle with minor regional variation.      2. The RV is normal in cavity size. The global systolic function is mildly to moderately reduced. The RVEF  is 41%.      3. There is moderate to severe biatrial enlargement.     4. There is no evidence of myocardial iron overload.      5. Late gadolinium enhancement imagingdemonstrates a small area of midwall enhancement involving the mid to  basal inferoseptal wall. This is a non-specific pattern that has been described in idiopathic nonischemic  cardiomyopathies.      6.  A small circumferential pericardial effusion is noted.         CONCLUSIONS:  Moderate left ventricular dilatation with severely reduced systolic function. Late gadolinium  enhancement imagingdemonstrates a small area of midwall enhancement involving the mid to basal inferoseptal  wall. This is a non-specific pattern that has been described in idiopathic nonischemic cardiomyopathies.     LIPID RESULTS:  Lab Results   Component Value Date    CHOL 141 09/28/2018     HDL 70 09/28/2018    LDL 59 09/28/2018    TRIG 62 09/28/2018    CHOLHDLRATIO 2.2 04/08/2015     LIVER ENZYME RESULTS:  Lab Results   Component Value Date    AST 15 03/25/2019    ALT 15 03/25/2019     CBC RESULTS:  Lab Results   Component Value Date    WBC 12.6 (H) 03/18/2019    RBC 2.43 (L) 03/18/2019    HGB 8.4 (L) 03/27/2019    HCT 22.3 (L) 03/18/2019    MCV 92 03/18/2019    MCH 30.9 03/18/2019    MCHC 33.6 03/18/2019    RDW 15.4 (H) 03/18/2019     03/18/2019     BMP RESULTS:  Lab Results   Component Value Date     (L) 03/27/2019    POTASSIUM 4.1 03/27/2019    CHLORIDE 99 03/27/2019    CO2 26 03/27/2019    ANIONGAP 11.1 03/27/2019     (H) 03/27/2019    BUN 27 03/27/2019    CR 1.97 (H) 03/27/2019    GFRESTIMATED 24 (L) 03/27/2019    GFRESTBLACK 29 (L) 03/27/2019    NAKIA 8.5 03/27/2019      A1C RESULTS:  No results found for: A1C  INR RESULTS:  Lab Results   Component Value Date    INR 1.13 10/12/2018    INR 1.47 (H) 04/03/2013            Medications     Current Outpatient Medications   Medication Sig Dispense Refill     albuterol (PROAIR HFA/PROVENTIL HFA/VENTOLIN HFA) 108 (90 Base) MCG/ACT Inhaler Inhale 2 puffs into the lungs every 4 hours as needed for shortness of breath / dyspnea or wheezing       albuterol (PROVENTIL) (2.5 MG/3ML) 0.083% neb solution Take 2.5 mg by nebulization every 4 hours as needed for shortness of breath / dyspnea or wheezing       ANORO ELLIPTA 62.5-25 MCG/INH oral inhaler Inhale 1 puff into the lungs daily 1 Inhaler 8     bimatoprost (LUMIGAN) 0.01 % SOLN Place 1 drop into both eyes At Bedtime.         Calcium Carb-Cholecalciferol (CALCIUM-VITAMIN D3) 600-500 MG-UNIT CAPS Take 1 tablet by mouth every 12 hours       dorzolamide (TRUSOPT) 2 % ophthalmic solution Place 1 drop into both eyes 2 times daily       metoprolol succinate ER (TOPROL-XL) 25 MG 24 hr tablet Take 1 tablet (25 mg) by mouth daily 30 tablet 1     miconazole with skin protectant (SHANNAN ANTIFUNGAL) 2 %  CREA cream Apply topically 2 times daily       Multiple Vitamins-Minerals (PRESERVISION AREDS 2 PO) Take 1 tablet by mouth 2 times daily       pantoprazole (PROTONIX) 20 MG EC tablet Take 2 tablets (40 mg) by mouth 2 times daily       polyethylene glycol 400 (BLINK TEARS) 0.25 % SOLN ophthalmic solution Place 1 drop into both eyes every 3 hours as needed for dry eyes       sennosides (SENOKOT) 8.6 MG tablet Take 1 tablet by mouth daily as needed for constipation       Throat Lozenges (LOZENGES MT) Give 1 lozenge by mouth four times a day for yeast infection in mouth            Past Medical History     Past Medical History:   Diagnosis Date     AAA (abdominal aortic aneurysm) (H)     Followed with yearly ultrasound     Cardiomyopathy, idiopathic (H) 10/2018     Cervical cancer (H) 1969     Chronic dermatitis     extensive allergy testing revealed allergy/sensitivity to carba mix ( rubber) and Imidazolidinyl Urea (common in beauty products)     Glaucoma      Glaucoma      History of COPD     very mild abnormal spirometry in 2007 (in [place]), has not been active since quitting smoking in 2009     HTN (hypertension)      LBBB (left bundle branch block) 10/2018     Macular degeneration      Osteoporosis 8/3/11    femoral T score -3.4 & -3.7     PAD (peripheral artery disease) (H)      Vertebral compression fracture (H) 5/17/11    L1 compression fracture, presumed osteoporotic compression fracture     Past Surgical History:   Procedure Laterality Date     CATARACT IOL, RT/LT  2/8/12    left eye cataract removal     ESOPHAGOSCOPY, GASTROSCOPY, DUODENOSCOPY (EGD), COMBINED N/A 3/19/2019    Procedure: COMBINED ESOPHAGOSCOPY, GASTROSCOPY, DUODENOSCOPY (EGD);  Surgeon: Naveen Santo MD;  Location:  GI     HEART CATH CORONARY ANGIOGRAM W/LV GRAM  10/2018    moderate nonocclusive CAD     HYSTERECTOMY TOTAL ABDOMINAL  1999    Fibroids     LAPAROSCOPY DIAGNOSTIC (GENERAL) N/A 4/15/2018    Procedure: LAPAROSCOPY DIAGNOSTIC  (GENERAL);;  Surgeon: Jm Craig MD;  Location: [facility] OR     LAPAROTOMY EXPLORATORY N/A 4/15/2018    Procedure: LAPAROTOMY EXPLORATORY;;  Surgeon: Jm Craig MD;  Location: [facility] OR     OPEN REDUCTION INTERNAL FIXATION HIP NAILING  7/2/2012    Procedure: OPEN REDUCTION INTERNAL FIXATION HIP NAILING;  Intramedullary Fixation Right Intertrochanteric Hip Fracture;  Surgeon: Chalino Covarrubias MD;  Location: [facility] OR     OPEN REDUCTION INTERNAL FIXATION TIBIAL PLATEAU  3/31/2013    Procedure: OPEN REDUCTION INTERNAL FIXATION TIBIAL PLATEAU;  RIGHT LATERAL TIBIAL PLATEAU FRACTURE - SYNTHES Medial Meniscus Repair, Lateral Compartment Release;  Surgeon: Alfred Cardenas MD;  Location: [facility] OR     OPEN REDUCTION INTERNAL FIXATION WRIST  1988    left wrist ORIF, hardware removed onemonth later     Family History   Problem Relation Age of Onset     Breast Cancer Other      Circulatory Daughter 53        mengioma     C.A.D. Father      Diabetes Sister      Breast Cancer Maternal Aunt 60     Other - See Comments Mother         arterial sclerosis     Family History Negative Son             Allergies   Atenolol; Beta adrenergic blockers; Brimonidine; Cephalexin; Germall plus; Latex; Morphine; No clinical screening - see comments; Penicillins; Sulfa drugs; Tramadol; Tylenol; Vicodin [hydrocodone-acetaminophen]; and Fentanyl        JAMIN Corrales CNP  [facility]  Pager: [phone]      Thank you for allowing me to participate in the care of your patient.    Sincerely,     JAMIN Corrales CNP     [facility]

## 2019-03-27 NOTE — PATIENT INSTRUCTIONS
Call CORE nurse for any questions or concerns Mon-Fri 8am-4pm:                                                 #[phone]                                       For concerns after hours:                                               #[phone]     1: Medication changes: HOLD torsemide, STOP diltiazem, START metoprolol XL 25mg daily  2: Plan from today: medication changes as above, followup with Alice ROSE in 2 days (Friday 3/29/19).  Labs at 9:10 and see Alice at 10:10  3: Lab results: see attached; worsening kidney function, concerns for over diuresis (too much water pill).   Component      Latest Ref Rng & Units 3/25/2019 3/27/2019   Sodium      136 - 145 mmol/L 133 (A) 132 (L)   Potassium      3.5 - 5.1 mmol/L 3.8 4.1   Chloride      98 - 107 mmol/L 98 99   Carbon Dioxide      23 - 29 mmol/L 24 26   Anion Gap      6 - 17 mmol/L  11.1   Glucose      70 - 105 mg/dL 120 (A) 116 (H)   Urea Nitrogen      7 - 30 mg/dL 32 (A) 27   Creatinine      0.70 - 1.30 mg/dL 1.89 (A) 1.97 (H)   GFR Estimate      >60 mL/min/1.73:m2 33 (A) 24 (L)   GFR Estimate If Black      >60 mL/min/1.73:m2 27 (A) 29 (L)   Calcium      8.5 - 10.5 mg/dL 8.7 8.5   Hemoglobin      11.7 - 15.7 g/dL 8.2 (A) 8.4 (L)   TSH      0.40 - 4.00 mU/L  4.52 (H)

## 2019-03-27 NOTE — LETTER
3/27/2019    Sharon Hurtado MD  [address] [facility] [address]    RE: Jamilah Rodriguez       Dear Colleague,    I had the pleasure of seeing Jamilah Rodriguez in the [facility].    Cardiology Clinic Progress Note  Jamilah Rodriguez MRN# [id number]   YOB: 1931 Age: 87 year old   Primary Cardiologist: Dr. Bond Reason for visit: CORE enrollment            Assessment and Plan:   Jamilah Rodriguez is a very pleasant 87 year old female with a history of HFrEF, nonischemic cardiomyopathy (diagnosed 10/2018), LVEF 10-20%, LV dimension 5.8cm per echocardiogram 2/6/19, chronic LBBB, atrial fibrillation, AAA (followed by Dr. Boogie) and COPD. Patient with 2 recent hospitalizations 3/17/2019-3/20/2019 presenting with complaints of shortness of breath found to be hypoxic 70s, thought to be secondary to volume overload. Patient was diuresed from 123# to 108# at discharge. She was discharged on furosemide 40mg BID. It was recommended that patient start metoprolol XL 25mg daily but appears patient was restarted on diltiazem at time of discharge. Patient creatinine was noted to be rising while in the hospital. 2/3/2019-2/10/2019 hospitalized related to acute respiratory distress due to influenza with COPD exacerbation. Patient here today for CORE enrollment and notes she has continued to feel worse since hospital discharge, persisting shortness of breath and fatigue. Labs show continued worsening kidney function. Baseline creatinine 0.7-0.9, recent creatinine trend has been 1.6-> 1.89 -> 1.97 today. Sodium level low at 132. BNP elevated at 34K from 11K at time of admission, this is likely secondary to worsening kidney function. Hemoglobin remains low at 8.4.     1.  Chronic systolic heart failure/HFrEF, nonischemic cardiomyopathy : LVEF 10-20%, LV dimension 5.8cm per echocardiogram 2/6/19. Patient appears hypovolemic on exam. Worsening kidney function which is concerning for  overdiuresis. Complaints of excessive thirst and hard stools. Persistent dyspnea which is likely multifactorial (COPD, deconditioning, anemia), patients multiple chronic co-morbidities make it difficult to differentiate what is causing dyspnea. Today I do not feel dyspnea is related to her heart failure and feel patient appears hypovolemic. She is currently on no guideline HF therapy.    - NYHA class III, stage C   - Etiology : nonischemic, idopathic?    - Fluid status : hypovolemic   - Diuretic regimen : HOLD torsemide, given concerns of hypovolemia and worsening GARIMA   - STOP potassium   - Last RHC : none, will consider if continued difficulties with managing volume status.   - Will consider cardiomems implantation in the future if difficulties with managing volume status.    - Ischemic evaluation : [facility] completed 10/12/2018 showed moderate nonocclusive coronary artery disease   - STOP diltiazem    - Guideline directed medical therapy    - Betablocker: will trial starting Metoprolol XL 25mg daily today.     ** Atenolol allergy noted in chart (hives) and concerns for betablocker therapy in the past given COPD. While hospitalized patient was started on metoprolol XL 25mg daily, this was not continued at discharge. Given HFrEF recommend restarting metoprolol XL with close monitoring.     - ACEI/ARB/ARNI: none, was on losartan in the past, appears at time of admission patient states it was stopped? Patient will benefit from restarting if kidney function normalizes.     - Aldactone antagonist: was on spironolactone 12.5mg, this was held at discharge related to rash/GARIMA. Will consider in future if kidney function improves.    - Sudden cardiac death prophylaxis : patient was evaluated for BiV ICD consideration, pt declined. Further reviewed with patient today as she noted she is now considering, wishes to further discuss once she is feeling better.    - Patient will require close core followup.    - Heart failure education  given, provided with written education materials   - Continue daily weights and low sodium diet.     2. Acute kidney injury - Baseline creatinine 0.7-0.9, recent creatinine trend has been 1.6-> 1.89 -> 1.97 today. Appears secondary to overdiuresis.   - HOLD torsemide     3. Atrial fibrillation - chronic, stable, rate controlled, asymptomatic. [id number] score at least 5 (HTN, age, HF, female).    -  Xarelto held during hospitalization and at discharge related to anemia, GI consulted, s/p EGD which showed esophageal candidiasis, no active bleeding found.    - Recommend continuing to hold xarelto at this times.    - STOP diltiazem, given HFrEF   - Will trial metoprolol XL 25mg daily , see above, atenolol allergy noted, reviewed extensively with patient and family and agree with plan to trial beta blocker therapy.     4. Anemia - noted after starting anticoagulation for atrial fibrillation, no bleeding source found. Hemoglobin today is 8.4, down from 13.3 on 2/9/19, as low as 7.1 3/18/19.   - Anticoagulation has remained on hold, continue to hold for now.    - Recommended consideration for blood transfusion, patient was resistant to this and declined consideration today.    - Patient will benefit from hematology evaluation as anemia is likely exacerbation HF and worsening kidney function. Patient wishes to further discuss at follow up appointments.     5. Advance care planning - need to further explore with patient and family. She notes during clinic visit today that she doesn't wish to undergo invasive procedures. Difficult to have further goals of care conversations today as patient feels very defeated with how she has been feeling since discharge. Will continue exploring goals of care with patient and family.        Changes today: HOLD torsemide, STOP diltiazem, START metoprolol XL 25mg daily, STOP potassium.    Atenolol allergy noted in chart (hives) and concerns for betablocker therapy in the past given COPD.  While hospitalized patient was started on metoprolol XL 25mg daily, this was not continued at discharge. Given HFrEF recommend restarting metoprolol XL with close monitoring.     Follow up plan:     CORE followup with me Friday with labs.    Will likely establish care with CORE MD, otherwise schedule followup with Dr. Bond         History of Presenting Illness:    Jamilah Rodriguez is a very pleasant 87 year old female with a history of HFrEF, nonischemic cardiomyopathy (diagnosed 10/2018), LVEF 10-20%, LV dimension 5.8cm per echocardiogram 2/6/19, chronic LBBB, atrial fibrillation, AAA (followed by Dr. Boogie) and COPD.     Patient with 2 recent hospitalizations 3/17/2019-3/20/2019 presenting with complaints of shortness of breath felt to be secondary to volume overload. Reports that her face and neck were swelling up and needing to prop herself up at night to breath. Worsening lower extremity edema. EMS noted patient was 70% on room air. BNP 10,760. Troponin normal 0.026. Hgb 8.2 (13.3 2/9/29, as low as 7.1 3/18/19). Admission weight 123#. Discharge weight 108#. Spironolactone held at discharge related to rash/GARIMA. No betablockers as caused worsening COPD in the past. It was recommended that patients diltiazem be discontinued and metoprolol XL started, it appears patient started this but wasn't carried over at discharge. Diuresed with IV furosemide and transitioned to PO torsemide at discharge. Xarelto held during hospitalization and at discharge related to anemia, GI consulted, s/p EGD which showed esophageal candidiasis, no active bleeding found. Patient was on losartan at one point but at time of admission she notes it was discontinued. 2/3/2019-2/10/2019 hospitalized related to acute respiratory distress due to influenza with COPD exacerbation. Discharged to TCU on 1-2L oxygen, stating when she returned home she didn't need oxygen.       Patient has followed with Dr. Bond and Dr. Arana in outpatient cardiology.   "Ronda saw patient on 10/5/2018 due to new systolic cardiomyopathy after which she underwent a complete workup. Normal LVEF in 2012 and nuclear study noted normal LVEF in 2015. Cardiac MRI 10/3/2018 demonstrated moderate LV dilation with severely reduced systolic function. Late gadolinium enhancement imaging demonstrates a small area of midwall enhancement involving the mid and basal inferoseptal wall. This is nonspecific pattern consistent with idiopathic nonischemic cardiomyopathy. Cardiac catheterization completed 10/12/2018 showed moderate nonocclusive coronary artery disease. LAD 30% stenosis, circumflex 30-40%, and RCA 30-40%. At this time she was on losartan and aldactone was started. Consideration for CRT was also recommended given chronic LBBB. Patient was seen by Dr. Arana 11/5/2018 to be evaluated for BiV ICD consideration. At this time patient was not interested in invasive procedures and given advanced age Dr. Arana saw little benefit. Noting that reconsideration in the future could be made if HF decompensation.    Patient is here today for CORE enrollment after recent HF hospitalization. Patient is currently at TCU.     Patient reports feeling okay, but frustrated that she is not \"snapping back\" quicker. Weights being monitored daily at TCU.  Reports stable, weight yesterday and today 110#. Denies lower extremity edema. Denies abdominal distention/bloated. Complaints of dyspnea even with talking. Exertional dyspnea with any activity, getting dressed, getting to bathroom. Working with PT/OT daily at TCU. Prior to recent hospitalization patient had been living at home independently. Sleeping okay. Complaints of dyspnea when moving around in bed. Sleeping with her head of bed elevated.     Patient denies chest pain or chest tightness. Denies dizziness, lightheadedness or other presyncopal symptoms. Denies palpitations. Complaints of sensations that heart is racing. Low back aches. Complaints of hard stools, " drinking prune juice. Denies episodes of bleeding, hematochezia, hemoptysis or melena. Complaints of excessive thirst.     Labs today show worsening kidney function, baseline creatinine 0.7-0.9, recent creatinine trend has been 1.6-> 1.89 -> 1.97 today. Sodium level low at 132. BNP elevated at 34K from 11K at time of admission, this is likely secondary to worsening kidney function. TSH 4.52 and Free T4 1.04. Hemoglobin 8.4. Blood pressure 96/62 and HR 72 in clinic today.    Feels appetite is starting to improve. Not adding additional salt to foods. Does note that yesterday she had potato chips. Working with therapy, doing leg and arm exercises and walking, able to walk 67 feet. Denies alcohol use. Denies tobacco use.         Recent Hospitalizations   3/17/2019-3/20/2019 presenting with complaints of shortness of breath. Reports that her face and neck were swelling up and needing to prop herself up at night to breath. Worsening lower extremity edema. EMS noted patient was 70% on room air. BNP 10,760. Troponin normal 0.026. Hgb 8.2 (13.3 2/9/29, as low as 7.1 3/18/19). Admission weight 123#. Discharge weight 108#. Spironolactone held at discharge related to rash/GARIMA. No betablockers as caused worsening COPD in the past. Diuresed with IV furosemide and transitioned to PO torsemide at discharge.   2/3/2019-2/10/2019 related to acute respiratory distress due to influenza with COPD exacerbation.           Social History    2 children   Social History     Socioeconomic History     Marital status:      Spouse name: Not on file     Number of children: 2     Years of education: Not on file     Highest education level: Not on file   Occupational History     Not on file   Social Needs     Financial resource strain: Not on file     Food insecurity:     Worry: Not on file     Inability: Not on file     Transportation needs:     Medical: Not on file     Non-medical: Not on file   Tobacco Use     Smoking status: Former  "Smoker     Packs/day: 1.00     Years: 60.00     Pack years: 60.00     Last attempt to quit: 2009     Years since quittin.7     Smokeless tobacco: Never Used     Tobacco comment: former 1/2-1 ppd since age 20-22   Substance and Sexual Activity     Alcohol use: No     Alcohol/week: 0.0 oz     Comment: NA     Drug use: No     Sexual activity: No   Lifestyle     Physical activity:     Days per week: Not on file     Minutes per session: Not on file     Stress: Not on file   Relationships     Social connections:     Talks on phone: Not on file     Gets together: Not on file     Attends Faith service: Not on file     Active member of club or organization: Not on file     Attends meetings of clubs or organizations: Not on file     Relationship status: Not on file     Intimate partner violence:     Fear of current or ex partner: Not on file     Emotionally abused: Not on file     Physically abused: Not on file     Forced sexual activity: Not on file   Other Topics Concern     Parent/sibling w/ CABG, MI or angioplasty before 65F 55M? Not Asked   Social History Narrative     Not on file            Review of Systems:   Skin:  Negative     Eyes:  Negative    ENT:  Negative    Respiratory:  Positive for shortness of breath;dyspnea on exertion  Cardiovascular:  Negative    Gastroenterology: Positive for nausea  Genitourinary:  Negative    Musculoskeletal:  Negative    Neurologic:  Negative    Psychiatric:  Negative    Heme/Lymph/Imm:  Negative    Endocrine:  Negative           Physical Exam:   Vitals: BP 96/62   Pulse 72   Ht 1.651 m (5' 5\")   Wt 51.8 kg (114 lb 3.2 oz)   BMI 19.00 kg/m      Wt Readings from Last 4 Encounters:   19 51.8 kg (114 lb 3.2 oz)   19 50.5 kg (111 lb 4.8 oz)   19 50.8 kg (112 lb)   19 49.6 kg (109 lb 4.8 oz)     GEN: well nourished, in no acute distress.  HEENT:  Pupils equal, round. Sclerae nonicteric.   NECK: Supple, no masses appreciated. JVD appears normal on " exam.   C/V:  Irregularly irregular rate and rhythm, no murmur  RESP: Respirations are unlabored. Clear to auscultation bilaterally without wheezing  GI: Abdomen soft, nontender.  EXTREM: No LE edema.  NEURO: Alert and oriented, cooperative.  SKIN: Warm and dry, skin to bilateral lower extremities scaly.        Data:   ECHO 2/6/2019  The left ventricle is severely dilated. The visual ejection fraction is  estimated at 10-20%.  Right ventricular function cannot be assessed due to poor image quality.  Probably mildly reduced? TDI velocities suggest normal function, but this  cannot be fully assessed.  Trivial pericardial effusion    Cardiac catheterization 10/12/2018  Summary  1. Moderate nonocclusive coronary artery disease  2. Normal left ventricular end-diastolic pressure    Cardiac MRI 1010/2018  1. The LV is moderately dilated. The global systolic function is severely reduced. The LVEF is 19%. There  is severe global hypokinesis of the left ventricle with minor regional variation.      2. The RV is normal in cavity size. The global systolic function is mildly to moderately reduced. The RVEF  is 41%.      3. There is moderate to severe biatrial enlargement.     4. There is no evidence of myocardial iron overload.      5. Late gadolinium enhancement imagingdemonstrates a small area of midwall enhancement involving the mid to  basal inferoseptal wall. This is a non-specific pattern that has been described in idiopathic nonischemic  cardiomyopathies.      6.  A small circumferential pericardial effusion is noted.         CONCLUSIONS:  Moderate left ventricular dilatation with severely reduced systolic function. Late gadolinium  enhancement imagingdemonstrates a small area of midwall enhancement involving the mid to basal inferoseptal  wall. This is a non-specific pattern that has been described in idiopathic nonischemic cardiomyopathies.     LIPID RESULTS:  Lab Results   Component Value Date    CHOL 141 09/28/2018     HDL 70 09/28/2018    LDL 59 09/28/2018    TRIG 62 09/28/2018    CHOLHDLRATIO 2.2 04/08/2015     LIVER ENZYME RESULTS:  Lab Results   Component Value Date    AST 15 03/25/2019    ALT 15 03/25/2019     CBC RESULTS:  Lab Results   Component Value Date    WBC 12.6 (H) 03/18/2019    RBC 2.43 (L) 03/18/2019    HGB 8.4 (L) 03/27/2019    HCT 22.3 (L) 03/18/2019    MCV 92 03/18/2019    MCH 30.9 03/18/2019    MCHC 33.6 03/18/2019    RDW 15.4 (H) 03/18/2019     03/18/2019     BMP RESULTS:  Lab Results   Component Value Date     (L) 03/27/2019    POTASSIUM 4.1 03/27/2019    CHLORIDE 99 03/27/2019    CO2 26 03/27/2019    ANIONGAP 11.1 03/27/2019     (H) 03/27/2019    BUN 27 03/27/2019    CR 1.97 (H) 03/27/2019    GFRESTIMATED 24 (L) 03/27/2019    GFRESTBLACK 29 (L) 03/27/2019    NAKIA 8.5 03/27/2019      A1C RESULTS:  No results found for: A1C  INR RESULTS:  Lab Results   Component Value Date    INR 1.13 10/12/2018    INR 1.47 (H) 04/03/2013            Medications     Current Outpatient Medications   Medication Sig Dispense Refill     albuterol (PROAIR HFA/PROVENTIL HFA/VENTOLIN HFA) 108 (90 Base) MCG/ACT Inhaler Inhale 2 puffs into the lungs every 4 hours as needed for shortness of breath / dyspnea or wheezing       albuterol (PROVENTIL) (2.5 MG/3ML) 0.083% neb solution Take 2.5 mg by nebulization every 4 hours as needed for shortness of breath / dyspnea or wheezing       ANORO ELLIPTA 62.5-25 MCG/INH oral inhaler Inhale 1 puff into the lungs daily 1 Inhaler 8     bimatoprost (LUMIGAN) 0.01 % SOLN Place 1 drop into both eyes At Bedtime.         Calcium Carb-Cholecalciferol (CALCIUM-VITAMIN D3) 600-500 MG-UNIT CAPS Take 1 tablet by mouth every 12 hours       dorzolamide (TRUSOPT) 2 % ophthalmic solution Place 1 drop into both eyes 2 times daily       metoprolol succinate ER (TOPROL-XL) 25 MG 24 hr tablet Take 1 tablet (25 mg) by mouth daily 30 tablet 1     miconazole with skin protectant (SHANNAN ANTIFUNGAL) 2 %  CREA cream Apply topically 2 times daily       Multiple Vitamins-Minerals (PRESERVISION AREDS 2 PO) Take 1 tablet by mouth 2 times daily       pantoprazole (PROTONIX) 20 MG EC tablet Take 2 tablets (40 mg) by mouth 2 times daily       polyethylene glycol 400 (BLINK TEARS) 0.25 % SOLN ophthalmic solution Place 1 drop into both eyes every 3 hours as needed for dry eyes       sennosides (SENOKOT) 8.6 MG tablet Take 1 tablet by mouth daily as needed for constipation       Throat Lozenges (LOZENGES MT) Give 1 lozenge by mouth four times a day for yeast infection in mouth            Past Medical History     Past Medical History:   Diagnosis Date     AAA (abdominal aortic aneurysm) (H)     Followed with yearly ultrasound     Cardiomyopathy, idiopathic (H) 10/2018     Cervical cancer (H) 1969     Chronic dermatitis     extensive allergy testing revealed allergy/sensitivity to carba mix ( rubber) and Imidazolidinyl Urea (common in beauty products)     Glaucoma      Glaucoma      History of COPD     very mild abnormal spirometry in 2007 (in [place]), has not been active since quitting smoking in 2009     HTN (hypertension)      LBBB (left bundle branch block) 10/2018     Macular degeneration      Osteoporosis 8/3/11    femoral T score -3.4 & -3.7     PAD (peripheral artery disease) (H)      Vertebral compression fracture (H) 5/17/11    L1 compression fracture, presumed osteoporotic compression fracture     Past Surgical History:   Procedure Laterality Date     CATARACT IOL, RT/LT  2/8/12    left eye cataract removal     ESOPHAGOSCOPY, GASTROSCOPY, DUODENOSCOPY (EGD), COMBINED N/A 3/19/2019    Procedure: COMBINED ESOPHAGOSCOPY, GASTROSCOPY, DUODENOSCOPY (EGD);  Surgeon: Naveen Santo MD;  Location:  GI     HEART CATH CORONARY ANGIOGRAM W/LV GRAM  10/2018    moderate nonocclusive CAD     HYSTERECTOMY TOTAL ABDOMINAL  1999    Fibroids     LAPAROSCOPY DIAGNOSTIC (GENERAL) N/A 4/15/2018    Procedure: LAPAROSCOPY DIAGNOSTIC  (GENERAL);;  Surgeon: Jm Craig MD;  Location: [facility] OR     LAPAROTOMY EXPLORATORY N/A 4/15/2018    Procedure: LAPAROTOMY EXPLORATORY;;  Surgeon: Jm Craig MD;  Location: [facility] OR     OPEN REDUCTION INTERNAL FIXATION HIP NAILING  7/2/2012    Procedure: OPEN REDUCTION INTERNAL FIXATION HIP NAILING;  Intramedullary Fixation Right Intertrochanteric Hip Fracture;  Surgeon: Chalino Covarrubias MD;  Location: [facility] OR     OPEN REDUCTION INTERNAL FIXATION TIBIAL PLATEAU  3/31/2013    Procedure: OPEN REDUCTION INTERNAL FIXATION TIBIAL PLATEAU;  RIGHT LATERAL TIBIAL PLATEAU FRACTURE - SYNTHES Medial Meniscus Repair, Lateral Compartment Release;  Surgeon: Alfred Cardenas MD;  Location: [facility] OR     OPEN REDUCTION INTERNAL FIXATION WRIST  1988    left wrist ORIF, hardware removed onemonth later     Family History   Problem Relation Age of Onset     Breast Cancer Other      Circulatory Daughter 53        mengioma     C.A.D. Father      Diabetes Sister      Breast Cancer Maternal Aunt 60     Other - See Comments Mother         arterial sclerosis     Family History Negative Son             Allergies   Atenolol; Beta adrenergic blockers; Brimonidine; Cephalexin; Germall plus; Latex; Morphine; No clinical screening - see comments; Penicillins; Sulfa drugs; Tramadol; Tylenol; Vicodin [hydrocodone-acetaminophen]; and Fentanyl        JAMIN Corrales CNP  [facility]  Pager: [phone]      Thank you for allowing me to participate in the care of your patient.      Sincerely,     JAMIN Corrales CNP     [facility]    cc:   Bernarda Rosales MD  [address] [place] [id number]

## 2019-03-27 NOTE — PROGRESS NOTES
I received call from SHELBIE Russell at [facility]. Pt nervous about taking metoprolol XL because of her COPD. I reviewed with Alice. She said there is no documentation of pt having allergic reaction to metoprolol in the past. With EF so low, it would be beneficial for pt to try taking metoprolol. If pt has any shortness of breath after starting the metoprolol we should be notified. I called SHELBIE Russell back and reviewed this update with her. Althea MORFIN March 27, 2019, 3:50 PM

## 2019-03-28 NOTE — PROGRESS NOTES
Pt's daughter, Cadence, called and left message stating her mom had a rough night, and she is concerned about her. I called Cadence back. She said pt told her she did not sleep well due to intermittent shortness of breath. She also said pt has bed sores that are painful so she can't lay on bed, and has to sleep sitting up straight. I called pt's RN, No, to get an update. She said pt did have shortness of breath last night so pt was given an albuterol nebulizer treatment. Pt had more shortness of breath this morning so she was given another treatment. Pt's RN doesn't think the shortness of breath has worsened. They did their own weekly labs on pt this morning (hgb, bmp) and results are pending. Pt's weight is 110.3#. /72, HR 99 .PT was given her first dose of metoprolol today. Will send update to Alice. Althea MORFIN March 28, 2019, 10:03 AM

## 2019-03-28 NOTE — PROGRESS NOTES
Dietician from [facility] called to report that pt and her daughter want to take her off the 2 gm Na diet as pt's appetite has been low and she doesn't find the low sodium diet appealing. Also, dietician wondering if pt still needs strict 1200 ml fluid restriction. I told her pt has OV with Alice tomorrow and this can be further discussed at the time. Althea MORFIN March 28, 2019, 3:09 PM      I left message with pt's daughter to let her know Alice does not want to make any changes at this time. Althea MORFIN March 28, 2019, 3:09 PM

## 2019-03-29 NOTE — LETTER
3/29/2019    Sharon Hurtado MD  [address] [facility] [address]    RE: Jamilah Rodriguez       Dear Colleague,    I had the pleasure of seeing Jamilah Rodriguez in the [facility].    Cardiology Clinic Progress Note  Jamilah Rodriguez MRN# [id number]   YOB: 1931 Age: 87 year old   Primary Cardiologist: Dr. Bond Reason for visit: CORE followup            Assessment and Plan:   Jamilah Rodriguez is a very pleasant 87 year old female with a history of HFrEF, nonischemic cardiomyopathy (diagnosed 10/2018), LVEF 10-20%, LV dimension 5.8cm per echocardiogram 2/6/19, chronic LBBB, atrial fibrillation, AAA (followed by Dr. Boogie) and COPD. Patient with 2 recent hospitalizations 3/17/2019-3/20/2019 presenting with complaints of shortness of breath found to be hypoxic 70s, thought to be secondary to volume overload. Patient was diuresed from 123# to 108# at discharge. She was discharged on furosemide 40mg BID. It was recommended that patient start metoprolol XL 25mg daily but appears patient was restarted on diltiazem at time of discharge. Patient creatinine was noted to be rising while in the hospital. 2/3/2019-2/10/2019 hospitalized related to acute respiratory distress due to influenza with COPD exacerbation. Patient here today for CORE follow up. During CORE enrollment on Wednesday patient was significantly dyspenic and in GARIMA. Concerns for over diuresis. Patients torsemide and diltiazem was stopped. She was started on metoprolol XL 25mg daily. Today she notes she is starting to note some improvement, no longer getting short of breath with talking. States she slept good last night. Concerns for low output state given low ejection fraction and no vasodilator therapy. Patients blood pressures are soft but today will decrease metoprolol to 12.5mg daily and start hydralazine to 10mg TID.     Labs today show some improvement in her kidney function, creatinine 1.97 -> 1.79  today. Sodium starting to improve 132-> 133 today. Hemoglobin 8.6. BNP 35K today, 34K on Wednesday, 11K at hospital discharge. Bump in BNP felt to be secondary to GARIMA.     enrollment and notes she has continued to feel worse since hospital discharge, persisting shortness of breath and fatigue. Labs show continued worsening kidney function. Baseline creatinine 0.7-0.9, recent creatinine trend has been 1.6-> 1.89 -> 1.97 today. Sodium level low at 132. BNP elevated at 34K from 11K at time of admission, this is likely secondary to worsening kidney function. Hemoglobin remains low at 8.4.     Briefly reviewed with Dr. Fairbanks today ([facility]) who is in agreement with need for vasodilation.     1.  Chronic systolic heart failure/HFrEF, nonischemic cardiomyopathy : LVEF 10-20%, LV dimension 5.8cm per echocardiogram 2/6/19. Patient appears hypovolemic on exam. Worsening kidney function which is concerning for overdiuresis. Complaints of excessive thirst and hard stools. Persistent dyspnea which is likely multifactorial (COPD, deconditioning, anemia), patients multiple chronic co-morbidities make it difficult to differentiate what is causing dyspnea. Today I do not feel dyspnea is related to her heart failure and feel patient appears hypovolemic. She is currently on no guideline HF therapy.    - NYHA class III, stage C   - Etiology : nonischemic, idopathic?    - Fluid status : hypovolemic   - Diuretic regimen : HOLD torsemide, given concerns of hypovolemia and worsening GARIMA,. Torsemide was started to be held 3/27/19.    - Last RHC : none, will consider if continued difficulties with managing volume status.   - Will consider cardiomems implantation in the future if difficulties with managing volume status.    - Ischemic evaluation : C completed 10/12/2018 showed moderate nonocclusive coronary artery disease   - Guideline directed medical therapy    - Betablocker: DECREASE Metoprolol XL 12.5mg daily today. (decreasing to allow  room in blood pressure to initiate vasodilators).    ** Atenolol allergy noted in chart (hives) and concerns for betablocker therapy in the past given COPD. While hospitalized patient was recommended to start on metoprolol XL 25mg daily, this was not continued at discharge. Given HFrEF recommend restarting metoprolol XL with close monitoring. Patient has been tolerating metoprolol with no signs or symptoms of adverse reaction.     -  START hydralazine 10mg TID.     - If blood pressure allows after initiation of hydralazine will start isosorbide mononitrate    - ACEI/ARB/ARNI: none, was on losartan in the past, appears at time of admission patient states it was stopped? Patient will benefit from restarting if kidney function normalizes.     - Aldactone antagonist: was on spironolactone 12.5mg, this was held at discharge related to rash/GARIMA. Will consider in future if kidney function improves.    - Sudden cardiac death prophylaxis : patient was evaluated for BiV ICD consideration, pt declined. Further reviewed with patient today as she noted she is now considering, wishes to further discuss once she is feeling better.    - Patient will require close core followup, scheduled for weekly appts x 3 weeks.    - Heart failure education given, provided with written education materials   - Continue daily weights and low sodium diet.     2. Acute kidney injury - Baseline creatinine 0.7-0.9, recent creatinine trend has been 1.6-> 1.89 -> 1.97-> 1.79. Appears secondary to overdiuresis, creatinine improving off diuretics.    - HOLD torsemide     3. Atrial fibrillation - chronic, stable, rate controlled, asymptomatic. [id number] score at least 5 (HTN, age, HF, female).    -  Xarelto held during hospitalization and at discharge related to anemia, GI consulted, s/p EGD which showed esophageal candidiasis, no active bleeding found.    - Recommend continuing to hold xarelto at this times.    -  REMAIN OFF diltiazem, given HFrEF   -  Continue metoprolol XL 12.5mg daily (decreased today) , see above, atenolol allergy noted, reviewed extensively with patient and family and agree with plan to trial beta blocker therapy, tolerating BB with no adverse effects.     4. Anemia - noted after starting anticoagulation for atrial fibrillation, no bleeding source found. Hemoglobin today is 8.4, down from 13.3 on 2/9/19, as low as 7.1 3/18/19.   - Anticoagulation has remained on hold, continue to hold for now.    - Recommended consideration for blood transfusion, patient was resistant to this and declined consideration today.    - Patient will benefit from hematology evaluation as anemia is likely exacerbation HF and worsening kidney function. Patient wishes to further discuss at follow up appointments.     5. Advance care planning - need to further explore with patient and family. She notes during clinic visit today that she doesn't wish to undergo invasive procedures. Difficult to have further goals of care conversations today as patient feels very defeated with how she has been feeling since discharge. Will continue exploring goals of care with patient and family.        Changes today: DECREASE metoprolol 12.5mg daily, START hydralazine 10mg TID    Follow up plan:     CORE followup with me in 5 days. Will have labs drawn at facility the day prior to visit per patient preference. Then weekly for next 3 weeks.     Will likely establish care with CORE MD, otherwise schedule followup with Dr. Bond         History of Presenting Illness:    Jamilah Rodriguez is a very pleasant 87 year old female with a history of HFrEF, nonischemic cardiomyopathy (diagnosed 10/2018), LVEF 10-20%, LV dimension 5.8cm per echocardiogram 2/6/19, chronic LBBB, atrial fibrillation, AAA (followed by Dr. Boogie) and COPD.     Patient with 2 recent hospitalizations 3/17/2019-3/20/2019 presenting with complaints of shortness of breath felt to be secondary to volume overload. Reports that  her face and neck were swelling up and needing to prop herself up at night to breath. Worsening lower extremity edema. EMS noted patient was 70% on room air. BNP 10,760. Troponin normal 0.026. Hgb 8.2 (13.3 2/9/29, as low as 7.1 3/18/19). Admission weight 123#. Discharge weight 108#. Spironolactone held at discharge related to rash/GARIMA. No betablockers as caused worsening COPD in the past. It was recommended that patients diltiazem be discontinued and metoprolol XL started, it appears patient started this but wasn't carried over at discharge. Diuresed with IV furosemide and transitioned to PO torsemide at discharge. Xarelto held during hospitalization and at discharge related to anemia, GI consulted, s/p EGD which showed esophageal candidiasis, no active bleeding found. Patient was on losartan at one point but at time of admission she notes it was discontinued. 2/3/2019-2/10/2019 hospitalized related to acute respiratory distress due to influenza with COPD exacerbation. Discharged to TCU on 1-2L oxygen, stating when she returned home she didn't need oxygen.       Patient has followed with Dr. Bond and Dr. Arana in outpatient cardiology. Dr. Bond saw patient on 10/5/2018 due to new systolic cardiomyopathy after which she underwent a complete workup. Normal LVEF in 2012 and nuclear study noted normal LVEF in 2015. Cardiac MRI 10/3/2018 demonstrated moderate LV dilation with severely reduced systolic function. Late gadolinium enhancement imaging demonstrates a small area of midwall enhancement involving the mid and basal inferoseptal wall. This is nonspecific pattern consistent with idiopathic nonischemic cardiomyopathy. Cardiac catheterization completed 10/12/2018 showed moderate nonocclusive coronary artery disease. LAD 30% stenosis, circumflex 30-40%, and RCA 30-40%. At this time she was on losartan and aldactone was started. Consideration for CRT was also recommended given chronic LBBB. Patient was seen by Dr. Arana  11/5/2018 to be evaluated for BiV ICD consideration. At this time patient was not interested in invasive procedures and given advanced age Dr. Arana saw little benefit. Noting that reconsideration in the future could be made if HF decompensation.    Patient is here today for CORE followup. Patient remains at TCU. Patient reports feeling okay, states yesterday was a better day. Reports that breathing felt improved. Notes she slept good last night. No orthopnea or PND. Patient is still frustrated that she is not improving quicker. Weights being monitored daily at TCU.  Reports have remained stable off diuretics. 114# Wednesday, 115# today. Denies lower extremity edema. Denies abdominal distention/bloated. Improved dyspnea at rest. Exertional dyspnea with any activity, getting dressed, getting to bathroom, but noting improvement. Working with PT/OT daily at TCU. Prior to recent hospitalization patient had been living at home independently. Patients daughter is present during clinic visit today.     Patient denies chest pain or chest tightness. Denies dizziness, lightheadedness or other presyncopal symptoms. Denies palpitations. Complaints of sensations that heart is racing. Low back aches. Stool consistency has improved, complaints of hard stools earlier this week. Denies episodes of bleeding, hematochezia, hemoptysis or melena. Still complaints of excessive thirst.     Labs today show some improvement in her kidney function, creatinine 1.97 -> 1.79 today. Sodium starting to improve 132-> 133 today. Hemoglobin 8.6. BNP 35K today, 34K on Wednesday, 11K at hospital discharge. Bump in BNP felt to be secondary to GARIMA. Blood pressure 96/62 and HR 78 in clinic today.    Feels appetite is continuing to improve. Not adding additional salt to foods. Drinking occasional boost, trying to drink one daily. Working with therapy, doing leg and arm exercises and walking, able to walk 87 feet. Denies alcohol use. Denies tobacco use.          Recent Hospitalizations   3/17/2019-3/20/2019 presenting with complaints of shortness of breath. Reports that her face and neck were swelling up and needing to prop herself up at night to breath. Worsening lower extremity edema. EMS noted patient was 70% on room air. BNP 10,760. Troponin normal 0.026. Hgb 8.2 (13.3 29, as low as 7.1 3/18/19). Admission weight 123#. Discharge weight 108#. Spironolactone held at discharge related to rash/GARIMA. No betablockers as caused worsening COPD in the past. Diuresed with IV furosemide and transitioned to PO torsemide at discharge.   2/3/2019-2/10/2019 related to acute respiratory distress due to influenza with COPD exacerbation.         Social History    2 children   Social History     Socioeconomic History     Marital status:      Spouse name: Not on file     Number of children: 2     Years of education: Not on file     Highest education level: Not on file   Occupational History     Not on file   Social Needs     Financial resource strain: Not on file     Food insecurity:     Worry: Not on file     Inability: Not on file     Transportation needs:     Medical: Not on file     Non-medical: Not on file   Tobacco Use     Smoking status: Former Smoker     Packs/day: 1.00     Years: 60.00     Pack years: 60.00     Last attempt to quit: 2009     Years since quittin.7     Smokeless tobacco: Never Used     Tobacco comment: former 1/2-1 ppd since age 20-22   Substance and Sexual Activity     Alcohol use: No     Alcohol/week: 0.0 oz     Comment: NA     Drug use: No     Sexual activity: No   Lifestyle     Physical activity:     Days per week: Not on file     Minutes per session: Not on file     Stress: Not on file   Relationships     Social connections:     Talks on phone: Not on file     Gets together: Not on file     Attends Alevism service: Not on file     Active member of club or organization: Not on file     Attends meetings of clubs or organizations: Not on  "file     Relationship status: Not on file     Intimate partner violence:     Fear of current or ex partner: Not on file     Emotionally abused: Not on file     Physically abused: Not on file     Forced sexual activity: Not on file   Other Topics Concern     Parent/sibling w/ CABG, MI or angioplasty before 65F 55M? Not Asked   Social History Narrative     Not on file            Review of Systems:   Skin:  Negative     Eyes:  Negative    ENT:  Negative    Respiratory:  Positive for shortness of breath;dyspnea on exertion  Cardiovascular:  Negative    Gastroenterology: Positive for nausea  Genitourinary:  Negative    Musculoskeletal:  Negative    Neurologic:  Negative    Psychiatric:  Negative    Heme/Lymph/Imm:  Negative    Endocrine:  Negative           Physical Exam:   Vitals: BP 92/62   Pulse 78   Ht 1.651 m (5' 5\")   Wt 52.3 kg (115 lb 6.4 oz)   BMI 19.20 kg/m      Wt Readings from Last 4 Encounters:   03/29/19 52.3 kg (115 lb 6.4 oz)   03/27/19 51.8 kg (114 lb 3.2 oz)   03/25/19 50.5 kg (111 lb 4.8 oz)   03/22/19 50.8 kg (112 lb)     GEN: well nourished, in no acute distress.  HEENT:  Pupils equal, round. Sclerae nonicteric.   NECK: Supple, no masses appreciated. JVD appears normal on exam.   C/V:  Irregularly irregular rate and rhythm, no murmur  RESP: Respirations are unlabored. Clear to auscultation bilaterally without wheezing  GI: Abdomen soft, nontender.  EXTREM: No LE edema.  NEURO: Alert and oriented, cooperative.  SKIN: Warm and dry, skin to bilateral lower extremities scaly.        Data:   ECHO 2/6/2019  The left ventricle is severely dilated. The visual ejection fraction is  estimated at 10-20%.  Right ventricular function cannot be assessed due to poor image quality.  Probably mildly reduced? TDI velocities suggest normal function, but this  cannot be fully assessed.  Trivial pericardial effusion    Cardiac catheterization 10/12/2018  Summary  1. Moderate nonocclusive coronary artery disease  2. " Normal left ventricular end-diastolic pressure    Cardiac MRI 1010/2018  1. The LV is moderately dilated. The global systolic function is severely reduced. The LVEF is 19%. There  is severe global hypokinesis of the left ventricle with minor regional variation.      2. The RV is normal in cavity size. The global systolic function is mildly to moderately reduced. The RVEF  is 41%.      3. There is moderate to severe biatrial enlargement.     4. There is no evidence of myocardial iron overload.      5. Late gadolinium enhancement imagingdemonstrates a small area of midwall enhancement involving the mid to  basal inferoseptal wall. This is a non-specific pattern that has been described in idiopathic nonischemic  cardiomyopathies.      6.  A small circumferential pericardial effusion is noted.         CONCLUSIONS:  Moderate left ventricular dilatation with severely reduced systolic function. Late gadolinium  enhancement imagingdemonstrates a small area of midwall enhancement involving the mid to basal inferoseptal  wall. This is a non-specific pattern that has been described in idiopathic nonischemic cardiomyopathies.     LIPID RESULTS:  Lab Results   Component Value Date    CHOL 141 09/28/2018    HDL 70 09/28/2018    LDL 59 09/28/2018    TRIG 62 09/28/2018    CHOLHDLRATIO 2.2 04/08/2015     LIVER ENZYME RESULTS:  Lab Results   Component Value Date    AST 15 03/25/2019    ALT 15 03/25/2019     CBC RESULTS:  Lab Results   Component Value Date    WBC 12.6 (H) 03/18/2019    RBC 2.43 (L) 03/18/2019    HGB 8.6 (L) 03/29/2019    HCT 22.3 (L) 03/18/2019    MCV 92 03/18/2019    MCH 30.9 03/18/2019    MCHC 33.6 03/18/2019    RDW 15.4 (H) 03/18/2019     03/18/2019     BMP RESULTS:  Lab Results   Component Value Date     (L) 03/29/2019    POTASSIUM 3.6 03/29/2019    CHLORIDE 100 03/29/2019    CO2 25 03/29/2019    ANIONGAP 11.6 03/29/2019     (H) 03/29/2019    BUN 25 03/29/2019    CR 1.79 (H) 03/29/2019     GFRESTIMATED 27 (L) 03/29/2019    GFRESTBLACK 32 (L) 03/29/2019    NAKIA 9.2 03/29/2019      A1C RESULTS:  No results found for: A1C  INR RESULTS:  Lab Results   Component Value Date    INR 1.13 10/12/2018    INR 1.47 (H) 04/03/2013            Medications     Current Outpatient Medications   Medication Sig Dispense Refill     albuterol (PROAIR HFA/PROVENTIL HFA/VENTOLIN HFA) 108 (90 Base) MCG/ACT Inhaler Inhale 2 puffs into the lungs every 4 hours as needed for shortness of breath / dyspnea or wheezing       albuterol (PROVENTIL) (2.5 MG/3ML) 0.083% neb solution Take 2.5 mg by nebulization every 4 hours as needed for shortness of breath / dyspnea or wheezing       ANORO ELLIPTA 62.5-25 MCG/INH oral inhaler Inhale 1 puff into the lungs daily 1 Inhaler 8     bimatoprost (LUMIGAN) 0.01 % SOLN Place 1 drop into both eyes At Bedtime.         Calcium Carb-Cholecalciferol (CALCIUM-VITAMIN D3) 600-500 MG-UNIT CAPS Take 1 tablet by mouth every 12 hours       dorzolamide (TRUSOPT) 2 % ophthalmic solution Place 1 drop into both eyes 2 times daily       metoprolol succinate ER (TOPROL-XL) 25 MG 24 hr tablet Take 1 tablet (25 mg) by mouth daily 30 tablet 1     miconazole with skin protectant (SHANNAN ANTIFUNGAL) 2 % CREA cream Apply topically 2 times daily       Multiple Vitamins-Minerals (PRESERVISION AREDS 2 PO) Take 1 tablet by mouth 2 times daily       pantoprazole (PROTONIX) 20 MG EC tablet Take 2 tablets (40 mg) by mouth 2 times daily       polyethylene glycol 400 (BLINK TEARS) 0.25 % SOLN ophthalmic solution Place 1 drop into both eyes every 3 hours as needed for dry eyes       sennosides (SENOKOT) 8.6 MG tablet Take 1 tablet by mouth daily as needed for constipation       Throat Lozenges (LOZENGES MT) Give 1 lozenge by mouth four times a day for yeast infection in mouth            Past Medical History     Past Medical History:   Diagnosis Date     AAA (abdominal aortic aneurysm) (H)     Followed with yearly ultrasound      Cardiomyopathy, idiopathic (H) 10/2018     Cervical cancer (H) 1969     Chronic dermatitis     extensive allergy testing revealed allergy/sensitivity to carba mix ( rubber) and Imidazolidinyl Urea (common in beauty products)     Glaucoma      Glaucoma      History of COPD     very mild abnormal spirometry in 2007 (in [place]), has not been active since quitting smoking in 2009     HTN (hypertension)      LBBB (left bundle branch block) 10/2018     Macular degeneration      Osteoporosis 8/3/11    femoral T score -3.4 & -3.7     PAD (peripheral artery disease) (H)      Vertebral compression fracture (H) 5/17/11    L1 compression fracture, presumed osteoporotic compression fracture     Past Surgical History:   Procedure Laterality Date     CATARACT IOL, RT/LT  2/8/12    left eye cataract removal     ESOPHAGOSCOPY, GASTROSCOPY, DUODENOSCOPY (EGD), COMBINED N/A 3/19/2019    Procedure: COMBINED ESOPHAGOSCOPY, GASTROSCOPY, DUODENOSCOPY (EGD);  Surgeon: Naveen Santo MD;  Location:  GI     HEART CATH CORONARY ANGIOGRAM W/LV GRAM  10/2018    moderate nonocclusive CAD     HYSTERECTOMY TOTAL ABDOMINAL  1999    Fibroids     LAPAROSCOPY DIAGNOSTIC (GENERAL) N/A 4/15/2018    Procedure: LAPAROSCOPY DIAGNOSTIC (GENERAL);;  Surgeon: Jm Craig MD;  Location:  OR     LAPAROTOMY EXPLORATORY N/A 4/15/2018    Procedure: LAPAROTOMY EXPLORATORY;;  Surgeon: Jm Craig MD;  Location:  OR     OPEN REDUCTION INTERNAL FIXATION HIP NAILING  7/2/2012    Procedure: OPEN REDUCTION INTERNAL FIXATION HIP NAILING;  Intramedullary Fixation Right Intertrochanteric Hip Fracture;  Surgeon: Chalino Covarrubias MD;  Location:  OR     OPEN REDUCTION INTERNAL FIXATION TIBIAL PLATEAU  3/31/2013    Procedure: OPEN REDUCTION INTERNAL FIXATION TIBIAL PLATEAU;  RIGHT LATERAL TIBIAL PLATEAU FRACTURE - SYNTHES Medial Meniscus Repair, Lateral Compartment Release;  Surgeon: Alfred Cardenas MD;  Location:  OR     OPEN REDUCTION INTERNAL FIXATION  WRIST  1988    left wrist ORIF, hardware removed onemonth later     Family History   Problem Relation Age of Onset     Breast Cancer Other      Circulatory Daughter 53        [email or web address]     C.A.D. Father      Diabetes Sister      Breast Cancer Maternal Aunt 60     Other - See Comments Mother         arterial sclerosis     Family History Negative Son             Allergies   Atenolol; Beta adrenergic blockers; Brimonidine; Cephalexin; Germall plus; Latex; Morphine; No clinical screening - see comments; Penicillins; Sulfa drugs; Tramadol; Tylenol; Vicodin [hydrocodone-acetaminophen]; and Fentanyl        JAMIN Corrales CNP  [facility]  Pager: [phone]      Thank you for allowing me to participate in the care of your patient.      Sincerely,     JAMIN Corrales CNP     [facility]    cc:   No referring provider defined for this encounter.

## 2019-03-29 NOTE — PROGRESS NOTES
Cardiology Clinic Progress Note  Jamilah Rodriguez MRN# [id number]   YOB: 1931 Age: 87 year old   Primary Cardiologist: Dr. Bond Reason for visit: CORE followup            Assessment and Plan:   Jamilah Rodriguez is a very pleasant 87 year old female with a history of HFrEF, nonischemic cardiomyopathy (diagnosed 10/2018), LVEF 10-20%, LV dimension 5.8cm per echocardiogram 2/6/19, chronic LBBB, atrial fibrillation, AAA (followed by Dr. Boogie) and COPD. Patient with 2 recent hospitalizations 3/17/2019-3/20/2019 presenting with complaints of shortness of breath found to be hypoxic 70s, thought to be secondary to volume overload. Patient was diuresed from 123# to 108# at discharge. She was discharged on furosemide 40mg BID. It was recommended that patient start metoprolol XL 25mg daily but appears patient was restarted on diltiazem at time of discharge. Patient creatinine was noted to be rising while in the hospital. 2/3/2019-2/10/2019 hospitalized related to acute respiratory distress due to influenza with COPD exacerbation. Patient here today for CORE follow up. During CORE enrollment on Wednesday patient was significantly dyspenic and in GARIMA. Concerns for over diuresis. Patients torsemide and diltiazem was stopped. She was started on metoprolol XL 25mg daily. Today she notes she is starting to note some improvement, no longer getting short of breath with talking. States she slept good last night. Concerns for low output state given low ejection fraction and no vasodilator therapy. Patients blood pressures are soft but today will decrease metoprolol to 12.5mg daily and start hydralazine to 10mg TID.     Labs today show some improvement in her kidney function, creatinine 1.97 -> 1.79 today. Sodium starting to improve 132-> 133 today. Hemoglobin 8.6. BNP 35K today, 34K on Wednesday, 11K at hospital discharge. Bump in BNP felt to be secondary to GARIMA.     Briefly reviewed with Dr. Fairbanks today ([facility]) who is in  agreement with need for vasodilation.     1.  Chronic systolic heart failure/HFrEF, nonischemic cardiomyopathy : LVEF 10-20%, LV dimension 5.8cm per echocardiogram 2/6/19. Patient appears hypovolemic on exam. Worsening kidney function which is concerning for overdiuresis. Complaints of excessive thirst and hard stools. Persistent dyspnea which is likely multifactorial (COPD, deconditioning, anemia), patients multiple chronic co-morbidities make it difficult to differentiate what is causing dyspnea. Today I do not feel dyspnea is related to her heart failure and feel patient appears hypovolemic. She is currently on no guideline HF therapy.    - NYHA class III, stage C   - Etiology : nonischemic, idopathic?    - Fluid status : hypovolemic   - Diuretic regimen : HOLD torsemide, given concerns of hypovolemia and worsening GARIMA,. Torsemide was started to be held 3/27/19.    - Last RHC : none, will consider if continued difficulties with managing volume status.   - Will consider cardiomems implantation in the future if difficulties with managing volume status.    - Ischemic evaluation : C completed 10/12/2018 showed moderate nonocclusive coronary artery disease   - Guideline directed medical therapy    - Betablocker: DECREASE Metoprolol XL 12.5mg daily today. (decreasing to allow room in blood pressure to initiate vasodilators).    ** Atenolol allergy noted in chart (hives) and concerns for betablocker therapy in the past given COPD. While hospitalized patient was recommended to start on metoprolol XL 25mg daily, this was not continued at discharge. Given HFrEF recommend restarting metoprolol XL with close monitoring. Patient has been tolerating metoprolol with no signs or symptoms of adverse reaction.     -  START hydralazine 10mg TID.     - If blood pressure allows after initiation of hydralazine will start isosorbide mononitrate    - ACEI/ARB/ARNI: none, was on losartan in the past, appears at time of admission  patient states it was stopped? Patient will benefit from restarting if kidney function normalizes.     - Aldactone antagonist: was on spironolactone 12.5mg, this was held at discharge related to rash/GARIMA. Will consider in future if kidney function improves.    - Sudden cardiac death prophylaxis : patient was evaluated for BiV ICD consideration, pt declined. Further reviewed with patient today as she noted she is now considering, wishes to further discuss once she is feeling better.    - Patient will require close core followup, scheduled for weekly appts x 3 weeks.    - Heart failure education given, provided with written education materials   - Continue daily weights and low sodium diet.     2. Acute kidney injury - Baseline creatinine 0.7-0.9, recent creatinine trend has been 1.6-> 1.89 -> 1.97-> 1.79. Appears secondary to overdiuresis, creatinine improving off diuretics.    - HOLD torsemide     3. Atrial fibrillation - chronic, stable, rate controlled, asymptomatic. [id number] score at least 5 (HTN, age, HF, female).    -  Xarelto held during hospitalization and at discharge related to anemia, GI consulted, s/p EGD which showed esophageal candidiasis, no active bleeding found.    - Recommend continuing to hold xarelto at this times.    -  REMAIN OFF diltiazem, given HFrEF   - Continue metoprolol XL 12.5mg daily (decreased today) , see above, atenolol allergy noted, reviewed extensively with patient and family and agree with plan to trial beta blocker therapy, tolerating BB with no adverse effects.     4. Anemia - noted after starting anticoagulation for atrial fibrillation, no bleeding source found. Hemoglobin today is 8.4, down from 13.3 on 2/9/19, as low as 7.1 3/18/19.   - Anticoagulation has remained on hold, continue to hold for now.    - Recommended consideration for blood transfusion, patient was resistant to this and declined consideration today.    - Patient will benefit from hematology evaluation as  anemia is likely exacerbation HF and worsening kidney function. Patient wishes to further discuss at follow up appointments.     5. Advance care planning - need to further explore with patient and family. She notes during clinic visit today that she doesn't wish to undergo invasive procedures. Difficult to have further goals of care conversations today as patient feels very defeated with how she has been feeling since discharge. Will continue exploring goals of care with patient and family.        Changes today: DECREASE metoprolol 12.5mg daily, START hydralazine 10mg TID    Follow up plan:     CORE followup with me in 5 days. Will have labs drawn at facility the day prior to visit per patient preference. Then weekly for next 3 weeks.     Will likely establish care with CORE MD, otherwise schedule followup with Dr. Bond         History of Presenting Illness:    aJmilah Rodriguez is a very pleasant 87 year old female with a history of HFrEF, nonischemic cardiomyopathy (diagnosed 10/2018), LVEF 10-20%, LV dimension 5.8cm per echocardiogram 2/6/19, chronic LBBB, atrial fibrillation, AAA (followed by Dr. Boogie) and COPD.     Patient with 2 recent hospitalizations 3/17/2019-3/20/2019 presenting with complaints of shortness of breath felt to be secondary to volume overload. Reports that her face and neck were swelling up and needing to prop herself up at night to breath. Worsening lower extremity edema. EMS noted patient was 70% on room air. BNP 10,760. Troponin normal 0.026. Hgb 8.2 (13.3 2/9/29, as low as 7.1 3/18/19). Admission weight 123#. Discharge weight 108#. Spironolactone held at discharge related to rash/GARIMA. No betablockers as caused worsening COPD in the past. It was recommended that patients diltiazem be discontinued and metoprolol XL started, it appears patient started this but wasn't carried over at discharge. Diuresed with IV furosemide and transitioned to PO torsemide at discharge. Xarelto held during  hospitalization and at discharge related to anemia, GI consulted, s/p EGD which showed esophageal candidiasis, no active bleeding found. Patient was on losartan at one point but at time of admission she notes it was discontinued. 2/3/2019-2/10/2019 hospitalized related to acute respiratory distress due to influenza with COPD exacerbation. Discharged to TCU on 1-2L oxygen, stating when she returned home she didn't need oxygen.       Patient has followed with Dr. Bond and Dr. Arana in outpatient cardiology. Dr. Bond saw patient on 10/5/2018 due to new systolic cardiomyopathy after which she underwent a complete workup. Normal LVEF in 2012 and nuclear study noted normal LVEF in 2015. Cardiac MRI 10/3/2018 demonstrated moderate LV dilation with severely reduced systolic function. Late gadolinium enhancement imaging demonstrates a small area of midwall enhancement involving the mid and basal inferoseptal wall. This is nonspecific pattern consistent with idiopathic nonischemic cardiomyopathy. Cardiac catheterization completed 10/12/2018 showed moderate nonocclusive coronary artery disease. LAD 30% stenosis, circumflex 30-40%, and RCA 30-40%. At this time she was on losartan and aldactone was started. Consideration for CRT was also recommended given chronic LBBB. Patient was seen by Dr. Arana 11/5/2018 to be evaluated for BiV ICD consideration. At this time patient was not interested in invasive procedures and given advanced age Dr. Arana saw little benefit. Noting that reconsideration in the future could be made if HF decompensation.    Patient is here today for CORE followup. Patient remains at TCU. Patient reports feeling okay, states yesterday was a better day. Reports that breathing felt improved. Notes she slept good last night. No orthopnea or PND. Patient is still frustrated that she is not improving quicker. Weights being monitored daily at TCU.  Reports have remained stable off diuretics. 114# Wednesday, 115# today. Denies  lower extremity edema. Denies abdominal distention/bloated. Improved dyspnea at rest. Exertional dyspnea with any activity, getting dressed, getting to bathroom, but noting improvement. Working with PT/OT daily at TCU. Prior to recent hospitalization patient had been living at home independently. Patients daughter is present during clinic visit today.     Patient denies chest pain or chest tightness. Denies dizziness, lightheadedness or other presyncopal symptoms. Denies palpitations. Complaints of sensations that heart is racing. Low back aches. Stool consistency has improved, complaints of hard stools earlier this week. Denies episodes of bleeding, hematochezia, hemoptysis or melena. Still complaints of excessive thirst.     Labs today show some improvement in her kidney function, creatinine 1.97 -> 1.79 today. Sodium starting to improve 132-> 133 today. Hemoglobin 8.6. BNP 35K today, 34K on Wednesday, 11K at hospital discharge. Bump in BNP felt to be secondary to GARIMA. Blood pressure 96/62 and HR 78 in clinic today.    Feels appetite is continuing to improve. Not adding additional salt to foods. Drinking occasional boost, trying to drink one daily. Working with therapy, doing leg and arm exercises and walking, able to walk 87 feet. Denies alcohol use. Denies tobacco use.         Recent Hospitalizations   3/17/2019-3/20/2019 presenting with complaints of shortness of breath. Reports that her face and neck were swelling up and needing to prop herself up at night to breath. Worsening lower extremity edema. EMS noted patient was 70% on room air. BNP 10,760. Troponin normal 0.026. Hgb 8.2 (13.3 2/9/29, as low as 7.1 3/18/19). Admission weight 123#. Discharge weight 108#. Spironolactone held at discharge related to rash/GARIMA. No betablockers as caused worsening COPD in the past. Diuresed with IV furosemide and transitioned to PO torsemide at discharge.   2/3/2019-2/10/2019 related to acute respiratory distress due to  influenza with COPD exacerbation.         Social History    2 children   Social History     Socioeconomic History     Marital status:      Spouse name: Not on file     Number of children: 2     Years of education: Not on file     Highest education level: Not on file   Occupational History     Not on file   Social Needs     Financial resource strain: Not on file     Food insecurity:     Worry: Not on file     Inability: Not on file     Transportation needs:     Medical: Not on file     Non-medical: Not on file   Tobacco Use     Smoking status: Former Smoker     Packs/day: 1.00     Years: 60.00     Pack years: 60.00     Last attempt to quit: 2009     Years since quittin.7     Smokeless tobacco: Never Used     Tobacco comment: former 2-1 ppd since age 20-22   Substance and Sexual Activity     Alcohol use: No     Alcohol/week: 0.0 oz     Comment: NA     Drug use: No     Sexual activity: No   Lifestyle     Physical activity:     Days per week: Not on file     Minutes per session: Not on file     Stress: Not on file   Relationships     Social connections:     Talks on phone: Not on file     Gets together: Not on file     Attends Tenri[employer] service: Not on file     Active member of club or organization: Not on file     Attends meetings of clubs or organizations: Not on file     Relationship status: Not on file     Intimate partner violence:     Fear of current or ex partner: Not on file     Emotionally abused: Not on file     Physically abused: Not on file     Forced sexual activity: Not on file   Other Topics Concern     Parent/sibling w/ CABG, MI or angioplasty before 65F 55M? Not Asked   Social History Narrative     Not on file            Review of Systems:   Skin:  Negative     Eyes:  Negative    ENT:  Negative    Respiratory:  Positive for shortness of breath;dyspnea on exertion  Cardiovascular:  Negative    Gastroenterology: Positive for nausea  Genitourinary:  Negative    Musculoskeletal:  Negative   "  Neurologic:  Negative    Psychiatric:  Negative    Heme/Lymph/Imm:  Negative    Endocrine:  Negative           Physical Exam:   Vitals: BP 92/62   Pulse 78   Ht 1.651 m (5' 5\")   Wt 52.3 kg (115 lb 6.4 oz)   BMI 19.20 kg/m     Wt Readings from Last 4 Encounters:   03/29/19 52.3 kg (115 lb 6.4 oz)   03/27/19 51.8 kg (114 lb 3.2 oz)   03/25/19 50.5 kg (111 lb 4.8 oz)   03/22/19 50.8 kg (112 lb)     GEN: well nourished, in no acute distress.  HEENT:  Pupils equal, round. Sclerae nonicteric.   NECK: Supple, no masses appreciated. JVD appears normal on exam.   C/V:  Irregularly irregular rate and rhythm, no murmur  RESP: Respirations are unlabored. Clear to auscultation bilaterally without wheezing  GI: Abdomen soft, nontender.  EXTREM: No LE edema.  NEURO: Alert and oriented, cooperative.  SKIN: Warm and dry, skin to bilateral lower extremities scaly.        Data:   ECHO 2/6/2019  The left ventricle is severely dilated. The visual ejection fraction is  estimated at 10-20%.  Right ventricular function cannot be assessed due to poor image quality.  Probably mildly reduced? TDI velocities suggest normal function, but this  cannot be fully assessed.  Trivial pericardial effusion    Cardiac catheterization 10/12/2018  Summary  1. Moderate nonocclusive coronary artery disease  2. Normal left ventricular end-diastolic pressure    Cardiac MRI 1010/2018  1. The LV is moderately dilated. The global systolic function is severely reduced. The LVEF is 19%. There  is severe global hypokinesis of the left ventricle with minor regional variation.      2. The RV is normal in cavity size. The global systolic function is mildly to moderately reduced. The RVEF  is 41%.      3. There is moderate to severe biatrial enlargement.     4. There is no evidence of myocardial iron overload.      5. Late gadolinium enhancement imagingdemonstrates a small area of midwall enhancement involving the mid to  basal inferoseptal wall. This is a " non-specific pattern that has been described in idiopathic nonischemic  cardiomyopathies.      6.  A small circumferential pericardial effusion is noted.         CONCLUSIONS:  Moderate left ventricular dilatation with severely reduced systolic function. Late gadolinium  enhancement imagingdemonstrates a small area of midwall enhancement involving the mid to basal inferoseptal  wall. This is a non-specific pattern that has been described in idiopathic nonischemic cardiomyopathies.     LIPID RESULTS:  Lab Results   Component Value Date    CHOL 141 09/28/2018    HDL 70 09/28/2018    LDL 59 09/28/2018    TRIG 62 09/28/2018    CHOLHDLRATIO 2.2 04/08/2015     LIVER ENZYME RESULTS:  Lab Results   Component Value Date    AST 15 03/25/2019    ALT 15 03/25/2019     CBC RESULTS:  Lab Results   Component Value Date    WBC 12.6 (H) 03/18/2019    RBC 2.43 (L) 03/18/2019    HGB 8.6 (L) 03/29/2019    HCT 22.3 (L) 03/18/2019    MCV 92 03/18/2019    MCH 30.9 03/18/2019    MCHC 33.6 03/18/2019    RDW 15.4 (H) 03/18/2019     03/18/2019     BMP RESULTS:  Lab Results   Component Value Date     (L) 03/29/2019    POTASSIUM 3.6 03/29/2019    CHLORIDE 100 03/29/2019    CO2 25 03/29/2019    ANIONGAP 11.6 03/29/2019     (H) 03/29/2019    BUN 25 03/29/2019    CR 1.79 (H) 03/29/2019    GFRESTIMATED 27 (L) 03/29/2019    GFRESTBLACK 32 (L) 03/29/2019    NAKIA 9.2 03/29/2019      A1C RESULTS:  No results found for: A1C  INR RESULTS:  Lab Results   Component Value Date    INR 1.13 10/12/2018    INR 1.47 (H) 04/03/2013            Medications     Current Outpatient Medications   Medication Sig Dispense Refill     albuterol (PROAIR HFA/PROVENTIL HFA/VENTOLIN HFA) 108 (90 Base) MCG/ACT Inhaler Inhale 2 puffs into the lungs every 4 hours as needed for shortness of breath / dyspnea or wheezing       albuterol (PROVENTIL) (2.5 MG/3ML) 0.083% neb solution Take 2.5 mg by nebulization every 4 hours as needed for shortness of breath / dyspnea  or wheezing       ANORO ELLIPTA 62.5-25 MCG/INH oral inhaler Inhale 1 puff into the lungs daily 1 Inhaler 8     bimatoprost (LUMIGAN) 0.01 % SOLN Place 1 drop into both eyes At Bedtime.         Calcium Carb-Cholecalciferol (CALCIUM-VITAMIN D3) 600-500 MG-UNIT CAPS Take 1 tablet by mouth every 12 hours       dorzolamide (TRUSOPT) 2 % ophthalmic solution Place 1 drop into both eyes 2 times daily       metoprolol succinate ER (TOPROL-XL) 25 MG 24 hr tablet Take 1 tablet (25 mg) by mouth daily 30 tablet 1     miconazole with skin protectant (SHANNAN ANTIFUNGAL) 2 % CREA cream Apply topically 2 times daily       Multiple Vitamins-Minerals (PRESERVISION AREDS 2 PO) Take 1 tablet by mouth 2 times daily       pantoprazole (PROTONIX) 20 MG EC tablet Take 2 tablets (40 mg) by mouth 2 times daily       polyethylene glycol 400 (BLINK TEARS) 0.25 % SOLN ophthalmic solution Place 1 drop into both eyes every 3 hours as needed for dry eyes       sennosides (SENOKOT) 8.6 MG tablet Take 1 tablet by mouth daily as needed for constipation       Throat Lozenges (LOZENGES MT) Give 1 lozenge by mouth four times a day for yeast infection in mouth            Past Medical History     Past Medical History:   Diagnosis Date     AAA (abdominal aortic aneurysm) (H)     Followed with yearly ultrasound     Cardiomyopathy, idiopathic (H) 10/2018     Cervical cancer (H) 1969     Chronic dermatitis     extensive allergy testing revealed allergy/sensitivity to carba mix ( rubber) and Imidazolidinyl Urea (common in beauty products)     Glaucoma      Glaucoma      History of COPD     very mild abnormal spirometry in 2007 (in [place]), has not been active since quitting smoking in 2009     HTN (hypertension)      LBBB (left bundle branch block) 10/2018     Macular degeneration      Osteoporosis 8/3/11    femoral T score -3.4 & -3.7     PAD (peripheral artery disease) (H)      Vertebral compression fracture (H) 5/17/11    L1 compression fracture, presumed  osteoporotic compression fracture     Past Surgical History:   Procedure Laterality Date     CATARACT IOL, RT/LT  2/8/12    left eye cataract removal     ESOPHAGOSCOPY, GASTROSCOPY, DUODENOSCOPY (EGD), COMBINED N/A 3/19/2019    Procedure: COMBINED ESOPHAGOSCOPY, GASTROSCOPY, DUODENOSCOPY (EGD);  Surgeon: Naveen Santo MD;  Location:  GI     HEART CATH CORONARY ANGIOGRAM W/LV GRAM  10/2018    moderate nonocclusive CAD     HYSTERECTOMY TOTAL ABDOMINAL  1999    Fibroids     LAPAROSCOPY DIAGNOSTIC (GENERAL) N/A 4/15/2018    Procedure: LAPAROSCOPY DIAGNOSTIC (GENERAL);;  Surgeon: Jm Craig MD;  Location:  OR     LAPAROTOMY EXPLORATORY N/A 4/15/2018    Procedure: LAPAROTOMY EXPLORATORY;;  Surgeon: Jm Craig MD;  Location:  OR     OPEN REDUCTION INTERNAL FIXATION HIP NAILING  7/2/2012    Procedure: OPEN REDUCTION INTERNAL FIXATION HIP NAILING;  Intramedullary Fixation Right Intertrochanteric Hip Fracture;  Surgeon: Chalino Covarrubias MD;  Location:  OR     OPEN REDUCTION INTERNAL FIXATION TIBIAL PLATEAU  3/31/2013    Procedure: OPEN REDUCTION INTERNAL FIXATION TIBIAL PLATEAU;  RIGHT LATERAL TIBIAL PLATEAU FRACTURE - SYNTHES Medial Meniscus Repair, Lateral Compartment Release;  Surgeon: Alfred Cardenas MD;  Location:  OR     OPEN REDUCTION INTERNAL FIXATION WRIST  1988    left wrist ORIF, hardware removed onemonth later     Family History   Problem Relation Age of Onset     Breast Cancer Other      Circulatory Daughter 53        [email or web address]     C.A.D. Father      Diabetes Sister      Breast Cancer Maternal Aunt 60     Other - See Comments Mother         arterial sclerosis     Family History Negative Son             Allergies   Atenolol; Beta adrenergic blockers; Brimonidine; Cephalexin; Germall plus; Latex; Morphine; No clinical screening - see comments; Penicillins; Sulfa drugs; Tramadol; Tylenol; Vicodin [hydrocodone-acetaminophen]; and Fentanyl        JAMIN Corrales CNP  [facility]  Pager: [phone]

## 2019-03-29 NOTE — LETTER
3/29/2019    Sharon Hurtado MD  [address] [facility] [address]    RE: Jamilah Rodriguez       Dear Colleague,    I had the pleasure of seeing Jamilah Rodriguez in the [facility].    Cardiology Clinic Progress Note  Jamilah Rodriguez MRN# [id number]   YOB: 1931 Age: 87 year old   Primary Cardiologist: Dr. Bond Reason for visit: CORE followup            Assessment and Plan:   Jamilah Rodriguez is a very pleasant 87 year old female with a history of HFrEF, nonischemic cardiomyopathy (diagnosed 10/2018), LVEF 10-20%, LV dimension 5.8cm per echocardiogram 2/6/19, chronic LBBB, atrial fibrillation, AAA (followed by Dr. Boogie) and COPD. Patient with 2 recent hospitalizations 3/17/2019-3/20/2019 presenting with complaints of shortness of breath found to be hypoxic 70s, thought to be secondary to volume overload. Patient was diuresed from 123# to 108# at discharge. She was discharged on furosemide 40mg BID. It was recommended that patient start metoprolol XL 25mg daily but appears patient was restarted on diltiazem at time of discharge. Patient creatinine was noted to be rising while in the hospital. 2/3/2019-2/10/2019 hospitalized related to acute respiratory distress due to influenza with COPD exacerbation. Patient here today for CORE follow up. During CORE enrollment on Wednesday patient was significantly dyspenic and in GARIMA. Concerns for over diuresis. Patients torsemide and diltiazem was stopped. She was started on metoprolol XL 25mg daily. Today she notes she is starting to note some improvement, no longer getting short of breath with talking. States she slept good last night. Concerns for low output state given low ejection fraction and no vasodilator therapy. Patients blood pressures are soft but today will decrease metoprolol to 12.5mg daily and start hydralazine to 10mg TID.     Labs today show some improvement in her kidney function, creatinine 1.97 -> 1.79  today. Sodium starting to improve 132-> 133 today. Hemoglobin 8.6. BNP 35K today, 34K on Wednesday, 11K at hospital discharge. Bump in BNP felt to be secondary to GARIMA.     enrollment and notes she has continued to feel worse since hospital discharge, persisting shortness of breath and fatigue. Labs show continued worsening kidney function. Baseline creatinine 0.7-0.9, recent creatinine trend has been 1.6-> 1.89 -> 1.97 today. Sodium level low at 132. BNP elevated at 34K from 11K at time of admission, this is likely secondary to worsening kidney function. Hemoglobin remains low at 8.4.     Briefly reviewed with Dr. Fairbanks today ([facility]) who is in agreement with need for vasodilation.     1.  Chronic systolic heart failure/HFrEF, nonischemic cardiomyopathy : LVEF 10-20%, LV dimension 5.8cm per echocardiogram 2/6/19. Patient appears hypovolemic on exam. Worsening kidney function which is concerning for overdiuresis. Complaints of excessive thirst and hard stools. Persistent dyspnea which is likely multifactorial (COPD, deconditioning, anemia), patients multiple chronic co-morbidities make it difficult to differentiate what is causing dyspnea. Today I do not feel dyspnea is related to her heart failure and feel patient appears hypovolemic. She is currently on no guideline HF therapy.    - NYHA class III, stage C   - Etiology : nonischemic, idopathic?    - Fluid status : hypovolemic   - Diuretic regimen : HOLD torsemide, given concerns of hypovolemia and worsening GARIMA,. Torsemide was started to be held 3/27/19.    - Last RHC : none, will consider if continued difficulties with managing volume status.   - Will consider cardiomems implantation in the future if difficulties with managing volume status.    - Ischemic evaluation : C completed 10/12/2018 showed moderate nonocclusive coronary artery disease   - Guideline directed medical therapy    - Betablocker: DECREASE Metoprolol XL 12.5mg daily today. (decreasing to allow  room in blood pressure to initiate vasodilators).    ** Atenolol allergy noted in chart (hives) and concerns for betablocker therapy in the past given COPD. While hospitalized patient was recommended to start on metoprolol XL 25mg daily, this was not continued at discharge. Given HFrEF recommend restarting metoprolol XL with close monitoring. Patient has been tolerating metoprolol with no signs or symptoms of adverse reaction.     -  START hydralazine 10mg TID.     - If blood pressure allows after initiation of hydralazine will start isosorbide mononitrate    - ACEI/ARB/ARNI: none, was on losartan in the past, appears at time of admission patient states it was stopped? Patient will benefit from restarting if kidney function normalizes.     - Aldactone antagonist: was on spironolactone 12.5mg, this was held at discharge related to rash/GARIMA. Will consider in future if kidney function improves.    - Sudden cardiac death prophylaxis : patient was evaluated for BiV ICD consideration, pt declined. Further reviewed with patient today as she noted she is now considering, wishes to further discuss once she is feeling better.    - Patient will require close core followup, scheduled for weekly appts x 3 weeks.    - Heart failure education given, provided with written education materials   - Continue daily weights and low sodium diet.     2. Acute kidney injury - Baseline creatinine 0.7-0.9, recent creatinine trend has been 1.6-> 1.89 -> 1.97-> 1.79. Appears secondary to overdiuresis, creatinine improving off diuretics.    - HOLD torsemide     3. Atrial fibrillation - chronic, stable, rate controlled, asymptomatic. [id number] score at least 5 (HTN, age, HF, female).    -  Xarelto held during hospitalization and at discharge related to anemia, GI consulted, s/p EGD which showed esophageal candidiasis, no active bleeding found.    - Recommend continuing to hold xarelto at this times.    -  REMAIN OFF diltiazem, given HFrEF   -  Continue metoprolol XL 12.5mg daily (decreased today) , see above, atenolol allergy noted, reviewed extensively with patient and family and agree with plan to trial beta blocker therapy, tolerating BB with no adverse effects.     4. Anemia - noted after starting anticoagulation for atrial fibrillation, no bleeding source found. Hemoglobin today is 8.4, down from 13.3 on 2/9/19, as low as 7.1 3/18/19.   - Anticoagulation has remained on hold, continue to hold for now.    - Recommended consideration for blood transfusion, patient was resistant to this and declined consideration today.    - Patient will benefit from hematology evaluation as anemia is likely exacerbation HF and worsening kidney function. Patient wishes to further discuss at follow up appointments.     5. Advance care planning - need to further explore with patient and family. She notes during clinic visit today that she doesn't wish to undergo invasive procedures. Difficult to have further goals of care conversations today as patient feels very defeated with how she has been feeling since discharge. Will continue exploring goals of care with patient and family.        Changes today: DECREASE metoprolol 12.5mg daily, START hydralazine 10mg TID    Follow up plan:     CORE followup with me in 5 days. Will have labs drawn at facility the day prior to visit per patient preference. Then weekly for next 3 weeks.     Will likely establish care with CORE MD, otherwise schedule followup with Dr. Bond         History of Presenting Illness:    Jamilah Rodriguez is a very pleasant 87 year old female with a history of HFrEF, nonischemic cardiomyopathy (diagnosed 10/2018), LVEF 10-20%, LV dimension 5.8cm per echocardiogram 2/6/19, chronic LBBB, atrial fibrillation, AAA (followed by Dr. Boogie) and COPD.     Patient with 2 recent hospitalizations 3/17/2019-3/20/2019 presenting with complaints of shortness of breath felt to be secondary to volume overload. Reports that  her face and neck were swelling up and needing to prop herself up at night to breath. Worsening lower extremity edema. EMS noted patient was 70% on room air. BNP 10,760. Troponin normal 0.026. Hgb 8.2 (13.3 2/9/29, as low as 7.1 3/18/19). Admission weight 123#. Discharge weight 108#. Spironolactone held at discharge related to rash/GARIMA. No betablockers as caused worsening COPD in the past. It was recommended that patients diltiazem be discontinued and metoprolol XL started, it appears patient started this but wasn't carried over at discharge. Diuresed with IV furosemide and transitioned to PO torsemide at discharge. Xarelto held during hospitalization and at discharge related to anemia, GI consulted, s/p EGD which showed esophageal candidiasis, no active bleeding found. Patient was on losartan at one point but at time of admission she notes it was discontinued. 2/3/2019-2/10/2019 hospitalized related to acute respiratory distress due to influenza with COPD exacerbation. Discharged to TCU on 1-2L oxygen, stating when she returned home she didn't need oxygen.       Patient has followed with Dr. Bond and Dr. Arana in outpatient cardiology. Dr. Bond saw patient on 10/5/2018 due to new systolic cardiomyopathy after which she underwent a complete workup. Normal LVEF in 2012 and nuclear study noted normal LVEF in 2015. Cardiac MRI 10/3/2018 demonstrated moderate LV dilation with severely reduced systolic function. Late gadolinium enhancement imaging demonstrates a small area of midwall enhancement involving the mid and basal inferoseptal wall. This is nonspecific pattern consistent with idiopathic nonischemic cardiomyopathy. Cardiac catheterization completed 10/12/2018 showed moderate nonocclusive coronary artery disease. LAD 30% stenosis, circumflex 30-40%, and RCA 30-40%. At this time she was on losartan and aldactone was started. Consideration for CRT was also recommended given chronic LBBB. Patient was seen by Dr. Arana  11/5/2018 to be evaluated for BiV ICD consideration. At this time patient was not interested in invasive procedures and given advanced age Dr. Arana saw little benefit. Noting that reconsideration in the future could be made if HF decompensation.    Patient is here today for CORE followup. Patient remains at TCU. Patient reports feeling okay, states yesterday was a better day. Reports that breathing felt improved. Notes she slept good last night. No orthopnea or PND. Patient is still frustrated that she is not improving quicker. Weights being monitored daily at TCU.  Reports have remained stable off diuretics. 114# Wednesday, 115# today. Denies lower extremity edema. Denies abdominal distention/bloated. Improved dyspnea at rest. Exertional dyspnea with any activity, getting dressed, getting to bathroom, but noting improvement. Working with PT/OT daily at TCU. Prior to recent hospitalization patient had been living at home independently. Patients daughter is present during clinic visit today.     Patient denies chest pain or chest tightness. Denies dizziness, lightheadedness or other presyncopal symptoms. Denies palpitations. Complaints of sensations that heart is racing. Low back aches. Stool consistency has improved, complaints of hard stools earlier this week. Denies episodes of bleeding, hematochezia, hemoptysis or melena. Still complaints of excessive thirst.     Labs today show some improvement in her kidney function, creatinine 1.97 -> 1.79 today. Sodium starting to improve 132-> 133 today. Hemoglobin 8.6. BNP 35K today, 34K on Wednesday, 11K at hospital discharge. Bump in BNP felt to be secondary to GARIMA. Blood pressure 96/62 and HR 78 in clinic today.    Feels appetite is continuing to improve. Not adding additional salt to foods. Drinking occasional boost, trying to drink one daily. Working with therapy, doing leg and arm exercises and walking, able to walk 87 feet. Denies alcohol use. Denies tobacco use.          Recent Hospitalizations   3/17/2019-3/20/2019 presenting with complaints of shortness of breath. Reports that her face and neck were swelling up and needing to prop herself up at night to breath. Worsening lower extremity edema. EMS noted patient was 70% on room air. BNP 10,760. Troponin normal 0.026. Hgb 8.2 (13.3 29, as low as 7.1 3/18/19). Admission weight 123#. Discharge weight 108#. Spironolactone held at discharge related to rash/GARIMA. No betablockers as caused worsening COPD in the past. Diuresed with IV furosemide and transitioned to PO torsemide at discharge.   2/3/2019-2/10/2019 related to acute respiratory distress due to influenza with COPD exacerbation.         Social History    2 children   Social History     Socioeconomic History     Marital status:      Spouse name: Not on file     Number of children: 2     Years of education: Not on file     Highest education level: Not on file   Occupational History     Not on file   Social Needs     Financial resource strain: Not on file     Food insecurity:     Worry: Not on file     Inability: Not on file     Transportation needs:     Medical: Not on file     Non-medical: Not on file   Tobacco Use     Smoking status: Former Smoker     Packs/day: 1.00     Years: 60.00     Pack years: 60.00     Last attempt to quit: 2009     Years since quittin.7     Smokeless tobacco: Never Used     Tobacco comment: former 1/2-1 ppd since age 20-22   Substance and Sexual Activity     Alcohol use: No     Alcohol/week: 0.0 oz     Comment: NA     Drug use: No     Sexual activity: No   Lifestyle     Physical activity:     Days per week: Not on file     Minutes per session: Not on file     Stress: Not on file   Relationships     Social connections:     Talks on phone: Not on file     Gets together: Not on file     Attends Druze service: Not on file     Active member of club or organization: Not on file     Attends meetings of clubs or organizations: Not on  "file     Relationship status: Not on file     Intimate partner violence:     Fear of current or ex partner: Not on file     Emotionally abused: Not on file     Physically abused: Not on file     Forced sexual activity: Not on file   Other Topics Concern     Parent/sibling w/ CABG, MI or angioplasty before 65F 55M? Not Asked   Social History Narrative     Not on file            Review of Systems:   Skin:  Negative     Eyes:  Negative    ENT:  Negative    Respiratory:  Positive for shortness of breath;dyspnea on exertion  Cardiovascular:  Negative    Gastroenterology: Positive for nausea  Genitourinary:  Negative    Musculoskeletal:  Negative    Neurologic:  Negative    Psychiatric:  Negative    Heme/Lymph/Imm:  Negative    Endocrine:  Negative           Physical Exam:   Vitals: BP 92/62   Pulse 78   Ht 1.651 m (5' 5\")   Wt 52.3 kg (115 lb 6.4 oz)   BMI 19.20 kg/m      Wt Readings from Last 4 Encounters:   03/29/19 52.3 kg (115 lb 6.4 oz)   03/27/19 51.8 kg (114 lb 3.2 oz)   03/25/19 50.5 kg (111 lb 4.8 oz)   03/22/19 50.8 kg (112 lb)     GEN: well nourished, in no acute distress.  HEENT:  Pupils equal, round. Sclerae nonicteric.   NECK: Supple, no masses appreciated. JVD appears normal on exam.   C/V:  Irregularly irregular rate and rhythm, no murmur  RESP: Respirations are unlabored. Clear to auscultation bilaterally without wheezing  GI: Abdomen soft, nontender.  EXTREM: No LE edema.  NEURO: Alert and oriented, cooperative.  SKIN: Warm and dry, skin to bilateral lower extremities scaly.        Data:   ECHO 2/6/2019  The left ventricle is severely dilated. The visual ejection fraction is  estimated at 10-20%.  Right ventricular function cannot be assessed due to poor image quality.  Probably mildly reduced? TDI velocities suggest normal function, but this  cannot be fully assessed.  Trivial pericardial effusion    Cardiac catheterization 10/12/2018  Summary  1. Moderate nonocclusive coronary artery disease  2. " Normal left ventricular end-diastolic pressure    Cardiac MRI 1010/2018  1. The LV is moderately dilated. The global systolic function is severely reduced. The LVEF is 19%. There  is severe global hypokinesis of the left ventricle with minor regional variation.      2. The RV is normal in cavity size. The global systolic function is mildly to moderately reduced. The RVEF  is 41%.      3. There is moderate to severe biatrial enlargement.     4. There is no evidence of myocardial iron overload.      5. Late gadolinium enhancement imagingdemonstrates a small area of midwall enhancement involving the mid to  basal inferoseptal wall. This is a non-specific pattern that has been described in idiopathic nonischemic  cardiomyopathies.      6.  A small circumferential pericardial effusion is noted.         CONCLUSIONS:  Moderate left ventricular dilatation with severely reduced systolic function. Late gadolinium  enhancement imagingdemonstrates a small area of midwall enhancement involving the mid to basal inferoseptal  wall. This is a non-specific pattern that has been described in idiopathic nonischemic cardiomyopathies.     LIPID RESULTS:  Lab Results   Component Value Date    CHOL 141 09/28/2018    HDL 70 09/28/2018    LDL 59 09/28/2018    TRIG 62 09/28/2018    CHOLHDLRATIO 2.2 04/08/2015     LIVER ENZYME RESULTS:  Lab Results   Component Value Date    AST 15 03/25/2019    ALT 15 03/25/2019     CBC RESULTS:  Lab Results   Component Value Date    WBC 12.6 (H) 03/18/2019    RBC 2.43 (L) 03/18/2019    HGB 8.6 (L) 03/29/2019    HCT 22.3 (L) 03/18/2019    MCV 92 03/18/2019    MCH 30.9 03/18/2019    MCHC 33.6 03/18/2019    RDW 15.4 (H) 03/18/2019     03/18/2019     BMP RESULTS:  Lab Results   Component Value Date     (L) 03/29/2019    POTASSIUM 3.6 03/29/2019    CHLORIDE 100 03/29/2019    CO2 25 03/29/2019    ANIONGAP 11.6 03/29/2019     (H) 03/29/2019    BUN 25 03/29/2019    CR 1.79 (H) 03/29/2019     GFRESTIMATED 27 (L) 03/29/2019    GFRESTBLACK 32 (L) 03/29/2019    NAKIA 9.2 03/29/2019      A1C RESULTS:  No results found for: A1C  INR RESULTS:  Lab Results   Component Value Date    INR 1.13 10/12/2018    INR 1.47 (H) 04/03/2013            Medications     Current Outpatient Medications   Medication Sig Dispense Refill     albuterol (PROAIR HFA/PROVENTIL HFA/VENTOLIN HFA) 108 (90 Base) MCG/ACT Inhaler Inhale 2 puffs into the lungs every 4 hours as needed for shortness of breath / dyspnea or wheezing       albuterol (PROVENTIL) (2.5 MG/3ML) 0.083% neb solution Take 2.5 mg by nebulization every 4 hours as needed for shortness of breath / dyspnea or wheezing       ANORO ELLIPTA 62.5-25 MCG/INH oral inhaler Inhale 1 puff into the lungs daily 1 Inhaler 8     bimatoprost (LUMIGAN) 0.01 % SOLN Place 1 drop into both eyes At Bedtime.         Calcium Carb-Cholecalciferol (CALCIUM-VITAMIN D3) 600-500 MG-UNIT CAPS Take 1 tablet by mouth every 12 hours       dorzolamide (TRUSOPT) 2 % ophthalmic solution Place 1 drop into both eyes 2 times daily       metoprolol succinate ER (TOPROL-XL) 25 MG 24 hr tablet Take 1 tablet (25 mg) by mouth daily 30 tablet 1     miconazole with skin protectant (SHANNAN ANTIFUNGAL) 2 % CREA cream Apply topically 2 times daily       Multiple Vitamins-Minerals (PRESERVISION AREDS 2 PO) Take 1 tablet by mouth 2 times daily       pantoprazole (PROTONIX) 20 MG EC tablet Take 2 tablets (40 mg) by mouth 2 times daily       polyethylene glycol 400 (BLINK TEARS) 0.25 % SOLN ophthalmic solution Place 1 drop into both eyes every 3 hours as needed for dry eyes       sennosides (SENOKOT) 8.6 MG tablet Take 1 tablet by mouth daily as needed for constipation       Throat Lozenges (LOZENGES MT) Give 1 lozenge by mouth four times a day for yeast infection in mouth            Past Medical History     Past Medical History:   Diagnosis Date     AAA (abdominal aortic aneurysm) (H)     Followed with yearly ultrasound      Cardiomyopathy, idiopathic (H) 10/2018     Cervical cancer (H) 1969     Chronic dermatitis     extensive allergy testing revealed allergy/sensitivity to carba mix ( rubber) and Imidazolidinyl Urea (common in beauty products)     Glaucoma      Glaucoma      History of COPD     very mild abnormal spirometry in 2007 (in [place]), has not been active since quitting smoking in 2009     HTN (hypertension)      LBBB (left bundle branch block) 10/2018     Macular degeneration      Osteoporosis 8/3/11    femoral T score -3.4 & -3.7     PAD (peripheral artery disease) (H)      Vertebral compression fracture (H) 5/17/11    L1 compression fracture, presumed osteoporotic compression fracture     Past Surgical History:   Procedure Laterality Date     CATARACT IOL, RT/LT  2/8/12    left eye cataract removal     ESOPHAGOSCOPY, GASTROSCOPY, DUODENOSCOPY (EGD), COMBINED N/A 3/19/2019    Procedure: COMBINED ESOPHAGOSCOPY, GASTROSCOPY, DUODENOSCOPY (EGD);  Surgeon: Naveen Santo MD;  Location:  GI     HEART CATH CORONARY ANGIOGRAM W/LV GRAM  10/2018    moderate nonocclusive CAD     HYSTERECTOMY TOTAL ABDOMINAL  1999    Fibroids     LAPAROSCOPY DIAGNOSTIC (GENERAL) N/A 4/15/2018    Procedure: LAPAROSCOPY DIAGNOSTIC (GENERAL);;  Surgeon: Jm Craig MD;  Location:  OR     LAPAROTOMY EXPLORATORY N/A 4/15/2018    Procedure: LAPAROTOMY EXPLORATORY;;  Surgeon: Jm Craig MD;  Location:  OR     OPEN REDUCTION INTERNAL FIXATION HIP NAILING  7/2/2012    Procedure: OPEN REDUCTION INTERNAL FIXATION HIP NAILING;  Intramedullary Fixation Right Intertrochanteric Hip Fracture;  Surgeon: Chalino Covarrubias MD;  Location:  OR     OPEN REDUCTION INTERNAL FIXATION TIBIAL PLATEAU  3/31/2013    Procedure: OPEN REDUCTION INTERNAL FIXATION TIBIAL PLATEAU;  RIGHT LATERAL TIBIAL PLATEAU FRACTURE - SYNTHES Medial Meniscus Repair, Lateral Compartment Release;  Surgeon: Alfred Cardenas MD;  Location:  OR     OPEN REDUCTION INTERNAL FIXATION  WRIST  1988    left wrist ORIF, hardware removed onemonth later     Family History   Problem Relation Age of Onset     Breast Cancer Other      Circulatory Daughter 53        [email or web address]     C.A.D. Father      Diabetes Sister      Breast Cancer Maternal Aunt 60     Other - See Comments Mother         arterial sclerosis     Family History Negative Son             Allergies   Atenolol; Beta adrenergic blockers; Brimonidine; Cephalexin; Germall plus; Latex; Morphine; No clinical screening - see comments; Penicillins; Sulfa drugs; Tramadol; Tylenol; Vicodin [hydrocodone-acetaminophen]; and Fentanyl        JAMIN Corrales CNP  [facility]  Pager: [phone]      Thank you for allowing me to participate in the care of your patient.    Sincerely,     JAMIN Corrales CNP     [facility]

## 2019-03-29 NOTE — PATIENT INSTRUCTIONS
Call CORE nurse for any questions or concerns Mon-Fri 8am-4pm:                                                 #[phone]                                       For concerns after hours:                                               #[phone]     1: Medication changes: DECREASE metoprolol XL to 12.5mg daily (1/2 tablet), START hydralazine 10mg 3 x a day.   2: Plan from today: Make medication changes as noted above, 2L fluid restriction, followup weekly for 3 weeks.   3: Lab results: see attached; improvement in kidney function noted.   Component      Latest Ref Rng & Units 3/28/2019 3/29/2019   Sodium      136 - 145 mmol/L 134 (A) 133 (L)   Potassium      3.5 - 5.1 mmol/L 3.7 3.6   Chloride      98 - 107 mmol/L 101 100   Carbon Dioxide      23 - 29 mmol/L 24 25   Anion Gap      6 - 17 mmol/L  11.6   Glucose      70 - 105 mg/dL 103 (A) 123 (H)   Urea Nitrogen      7 - 30 mg/dL 25 25   Creatinine      0.70 - 1.30 mg/dL 1.59 (A) 1.79 (H)   GFR Estimate      >60 mL/min/1.73:m2 29 (L) 27 (L)   GFR Estimate If Black      >60 mL/min/1.73:m2 33 (L) 32 (L)   Calcium      8.5 - 10.5 mg/dL 8.4 9.2   Hemoglobin      11.7 - 15.7 g/dL 8.1 (A) 8.6 (L)

## 2019-03-29 NOTE — TELEPHONE ENCOUNTER
----- Message from JAMIN Wylie CNP sent at 3/29/2019 12:16 PM CDT -----  Hi,    Patient seen for followup, feeling a slight improvement today.     Changes today:   DECREASE metoprolol 12.5mg daily, START hydralazine 10mg TID    I scheduled patient for weekly followup x 3 weeks. She prefers to have labs drawn at facility the day prior to her clinic appt. Can you please fax lab order to facility to be drawn the day prior to [facility] appt?     Can you also touch base with facility to ensure medication changes are implemented.     Thanks,  Alice

## 2019-03-29 NOTE — TELEPHONE ENCOUNTER
Called [facility], spoke with No. They received orders and have decreased Metoprolol to 12.5mg daily and will start Hydralazine 10mg TID. Also reviewed we would like labs drawn on 4/2 for review at visit with us on 4/3 No stated understanding and asked lab orders be faxed to [phone]. Faxed orders as requested for BMP to be drawn 4/2 per TURNER Braga. SHELBIE Hatch 2:10 PM 03/29/19

## 2019-04-01 NOTE — LETTER
4/1/2019        RE: Jamilah Rodriguez  [address]  [facility] [address]        [place] [facility]  [place] Medical Record Number:  [id number]  Place of Service where encounter took place:  [facility] (S) [[id number]]  Chief Complaint   Patient presents with     Nursing Home Acute       HPI:    Jamilah Rodriguez  is a 87 year old (4/5/1931), who is being seen today for an episodic care visit.  HPI information obtained from: facility chart records, facility staff, patient report and [facility] chart review.     Today's concern is:  Acute on chronic systolic heart failure (H)  Cardiomyopathy, idiopathic (H)  Benign essential hypertension  Atrial fibrillation with RVR (H)  87 year old female of severe nonischemic cardiomyopathy, COPD recently hospitalized with exacerbation of sHF. Diuresed, lost 15 lbs. She is fatigued, has worsened renal function, dyspnea. Saw [facility] on 3/29 - now on 2L fluid restriction, decreased Metoprolol XL to 12.5 mg daily, started hydralazine 10 mg PO TID. Seeing [facility] again on 4/3 for follow up and needs BMP check on 4/2. Off diuretics due to decreased renal function. Reports dyspnea continues but room air sats 99%. SBP ranges  and up 2lbs in one week (wt 113 lbs today).   Recent ejection fraction of 10-20%      CKD (chronic kidney disease) stage 3, GFR 30-59 ml/min (H)  On recent check continues worsened renal   Lab Results   Component Value Date    CR 1.79 03/29/2019    CR 1.59 03/28/2019     Anemia due to blood loss, acute  Has anemia of unclear etiology but likely secondary to chronic illness and perhaps, occult upper GI bleeding.  Xarelto (for atrial fibrillation) has been held secondary to concern regarding possible bleeding. Last check Hgb improved.   Lab Results   Component Value Date    HGB 8.6 03/29/2019    HGB 8.1 03/28/2019       Chronic obstructive pulmonary disease, unspecified COPD type (H)  Chronic, on room air. Takes  anoro ellipta, albuterol PRN.     Physical deconditioning  Here for therapies. Per PT notes: Pt ambulated 1 x 103 feet, 1 x 87 feet using FWW, s    Past Medical and Surgical History reviewed in Epic today.    MEDICATIONS:  Current Outpatient Medications   Medication Sig Dispense Refill     albuterol (PROAIR HFA/PROVENTIL HFA/VENTOLIN HFA) 108 (90 Base) MCG/ACT Inhaler Inhale 2 puffs into the lungs every 4 hours as needed for shortness of breath / dyspnea or wheezing       albuterol (PROVENTIL) (2.5 MG/3ML) 0.083% neb solution Take 2.5 mg by nebulization every 4 hours as needed for shortness of breath / dyspnea or wheezing       ANORO ELLIPTA 62.5-25 MCG/INH oral inhaler Inhale 1 puff into the lungs daily 1 Inhaler 8     bimatoprost (LUMIGAN) 0.01 % SOLN Place 1 drop into both eyes At Bedtime.         dorzolamide (TRUSOPT) 2 % ophthalmic solution Place 1 drop into both eyes 2 times daily       hydrALAZINE (APRESOLINE) 10 MG tablet Take 1 tablet (10 mg) by mouth 3 times daily 90 tablet 1     metoprolol succinate ER (TOPROL-XL) 25 MG 24 hr tablet Take 0.5 tablets (12.5 mg) by mouth daily 30 tablet 1     miconazole with skin protectant (SHANNAN ANTIFUNGAL) 2 % CREA cream Apply topically 2 times daily       Multiple Vitamins-Minerals (PRESERVISION AREDS 2 PO) Take 1 tablet by mouth 2 times daily       pantoprazole (PROTONIX) 20 MG EC tablet Take 2 tablets (40 mg) by mouth 2 times daily       polyethylene glycol 400 (BLINK TEARS) 0.25 % SOLN ophthalmic solution Place 1 drop into both eyes every 3 hours as needed for dry eyes       sennosides (SENOKOT) 8.6 MG tablet Take 1 tablet by mouth daily as needed for constipation       Throat Lozenges (LOZENGES MT) Give 1 lozenge by mouth four times a day for yeast infection in mouth       Calcium Carb-Cholecalciferol (CALCIUM-VITAMIN D3) 600-500 MG-UNIT CAPS Take 1 tablet by mouth every 12 hours         REVIEW OF SYSTEMS:  10 point ROS of systems including Constitutional, Eyes,  Respiratory, Cardiovascular, Gastroenterology, Genitourinary, Integumentary, Musculoskeletal, Psychiatric were all negative except for pertinent positives noted in my HPI.    Objective:  /79   Pulse 74   Temp 97.2  F (36.2  C)   Resp 20   Wt 51.4 kg (113 lb 6.4 oz)   SpO2 99%   BMI 18.87 kg/m     Exam:  GENERAL APPEARANCE:  Alert, in no distress, pleasant, cooperative, oriented x 4  EYES:  EOM, lids, pupils and irises normal, sclera clear and conjunctiva normal, no discharge or mattering on lids or lashes noted  ENT:  Mouth normal, moist mucous membranes, nose normal without drainage or crusting, external ears without lesions, hearing acuity intact  RESP:  respiratory effort normal, no chest wall tenderness, no respiratory distress, Lung sounds diminished at bases otherwise clear, patient is on room air  CV:  Auscultation of heart done, rate and rhythm controlled and irregular, no murmur, no rub or gallop. Edema none bilateral lower extremities.  ABDOMEN:  normal bowel sounds, soft, nontender, no palpable masses.  M/S:   Gait and station walks with assist , no tenderness or swelling of the joints; able to move all extremities, digits normal  NEURO: cranial nerves 2-12 grossly intact, no facial asymmetry, no speech deficits and able to follow directions, moves all extremities symmetrically  PSYCH:  insight and judgement and memory intact, affect and mood normal     Labs:     Most Recent 3 CBC's:  Recent Labs   Lab Test 03/29/19 0915 03/28/19 03/27/19  1122  03/18/19  1100  03/17/19  1605 03/14/19  1119   WBC  --   --   --   --  12.6*  --  13.2* 21.9*   HGB 8.6* 8.1* 8.4*   < > 7.5*   < > 8.2* 8.4*   MCV  --   --   --   --  92  --  92 95   PLT  --   --   --   --  284  --  302 393    < > = values in this interval not displayed.     Most Recent 3 BMP's:  Recent Labs   Lab Test 03/29/19  0915 03/28/19 03/27/19  1122  03/20/19  0548   * 134* 132*   < > 137   POTASSIUM 3.6 3.7 4.1   < > 3.6   CHLORIDE  100 101 99   < > 100   CO2 25 24 26   < > 28   BUN 25 25 27   < > 40*   CR 1.79* 1.59* 1.97*   < > 1.62*   ANIONGAP 11.6  --  11.1  --  9   NAKIA 9.2 8.4 8.5   < > 8.2*   * 103* 116*   < > 119*    < > = values in this interval not displayed.       ASSESSMENT/PLAN:  Acute on chronic systolic heart failure (H)  Cardiomyopathy, idiopathic (H)  Benign essential hypertension  Atrial fibrillation with RVR (H)  Chronic issues, continues with fatigue and dyspnea with exertion. Continue meds, vs, wt, o2 monitoring as ordered. F/U [facility] clinic on 4/3. Check CMP, HGB on 4/2 and then check weekly BMP and HGB on tuesdays starting 4/9 (patient asks to check labs only once weekly if possible). Fluid restriction as ordered.     CKD (chronic kidney disease) stage 3, GFR 30-59 ml/min (H)  Acute on chronic. CMP tomorrow and weekly starting 4/9. F/U with results when available. Avoid nephrotoxic meds.     Anemia due to blood loss, acute  Chronic, Hgb on 4/2 and weekly. F/U with results.     Chronic obstructive pulmonary disease, unspecified COPD type (H)  Chronic, unchanged from last week. Meds, o2 PRN. Monitor.     Physical deconditioning  Acute on chronic. Therapies, f/u with status next week or sooner PRN.     Orders written by provider at facility  1. Stop all currently scheduled labs. Instead:  --check CMP, Hgb on 4/2  --Check BMP, HGB weekly on tuesdays starting 4/9      Electronically signed by:  JAMIN Delgado CNP               Sincerely,        JAMIN Delgado CNP

## 2019-04-01 NOTE — PROGRESS NOTES
[place] [facility]  [place] Medical Record Number:  [id number]    Patient was seen for a follow-up TCU visit by Dr. Yo on March 30, 2019        HPI:    Jamilah Rodriguez  is a 87 year old (4/5/1931), with history of severe nonischemic cardiomyopathy, COPD who was most recently hospitalized at [facility] from March 17, 2019 through March 30, 2019 for the management of exacerbation of systolic congestive heart failure.  Restaurant status improved with IV followed by oral diuretics with weight loss of 15 pounds.      She has since developed increased fatigue weakness, worsening renal function with creatinine of 1.97.  In spite of this,  has had persistent dyspnea.    Patient was seen in the cardiology clinic 3 days ago at which time diuretics were held with the concern that she was over diuresed and that her symptoms were secondary to poor cardiac output in view of her ejection fraction of 10-20%      She has been started on low-dose beta-blocker, with caution, in view of concern regarding possible past exacerbation of COPD.  Diltiazem has been discontinued.    Concurrent  issues have included recurrent anemia of unclear etiology but likely secondary to chronic illness and perhaps, occult upper GI bleeding.  Xarelto (for atrial fibrillation) has been held secondary to concern regarding possible bleeding.  Spironolactone has been discontinued secondary to acute kidney injury and rash.    Patient states she continues to feel fatigued, short of breath with any activity and to have a poor appetite.  This morning she is nauseated.  She denies abdominal pain, heartburn, rectal bleeding or black stools.  She denies cough, fever or chills    MEDICATIONS:  Current Outpatient Medications   Medication Sig Dispense Refill     albuterol (PROAIR HFA/PROVENTIL HFA/VENTOLIN HFA) 108 (90 Base) MCG/ACT Inhaler Inhale 2 puffs into the lungs every 4 hours as needed for shortness of breath / dyspnea or  wheezing       albuterol (PROVENTIL) (2.5 MG/3ML) 0.083% neb solution Take 2.5 mg by nebulization every 4 hours as needed for shortness of breath / dyspnea or wheezing       ANORO ELLIPTA 62.5-25 MCG/INH oral inhaler Inhale 1 puff into the lungs daily 1 Inhaler 8     bimatoprost (LUMIGAN) 0.01 % SOLN Place 1 drop into both eyes At Bedtime.         Calcium Carb-Cholecalciferol (CALCIUM-VITAMIN D3) 600-500 MG-UNIT CAPS Take 1 tablet by mouth every 12 hours       dorzolamide (TRUSOPT) 2 % ophthalmic solution Place 1 drop into both eyes 2 times daily       hydrALAZINE (APRESOLINE) 10 MG tablet Take 1 tablet (10 mg) by mouth 3 times daily 90 tablet 1     metoprolol succinate ER (TOPROL-XL) 25 MG 24 hr tablet Take 0.5 tablets (12.5 mg) by mouth daily 30 tablet 1     miconazole with skin protectant (SHANNAN ANTIFUNGAL) 2 % CREA cream Apply topically 2 times daily       Multiple Vitamins-Minerals (PRESERVISION AREDS 2 PO) Take 1 tablet by mouth 2 times daily       pantoprazole (PROTONIX) 20 MG EC tablet Take 2 tablets (40 mg) by mouth 2 times daily       polyethylene glycol 400 (BLINK TEARS) 0.25 % SOLN ophthalmic solution Place 1 drop into both eyes every 3 hours as needed for dry eyes       sennosides (SENOKOT) 8.6 MG tablet Take 1 tablet by mouth daily as needed for constipation       Throat Lozenges (LOZENGES MT) Give 1 lozenge by mouth four times a day for yeast infection in mouth           Exam:  GENERAL APPEARANCE:   Frail-appearing, sitting up in bed, mildly dyspneic with conversation, pale appearing  ENT: Oral mucosa dry  EYES: No drainage or erythema  RESP:  respiratory rate 20, slightly labored with conversation.  Decreased breath sounds at both bases  CV generally regular.  No murmurs.  Heart rate 70s  ABDOMEN:  soft,  nontender, nondistended   No lower extremity edema.  Patient is alert fully oriented, appears fatigued      ASSESSMENT/PLAN:     Cardiomyopathy, idiopathic (H)  Acute/ongoing: EF 10-20%,  Recent  hospitalization for congestive heart failure.  Now with symptoms suggestive of hypoperfusion, secondary to volume depletion versus poor cardiac output secondary to severe systolic dysfunction.  Diuretics currently on hold.  Renal function is being monitored.  Low-dose beta-blockers have been started with caution in view of history of COPD.  Has been unable to tolerate ACE inhibitors/ARB and Aldactone.  Cardiology is considering trial of hydralazine.  Chronic dyspnea may be secondary to poor LV systolic function as well as COPD  Will closely follow-up with cardiology clinic    Acute kidney injury, possibly secondary to overdiuresis, although difficult to manage inpatient with severe LV dysfunction       Atrial fibrillation with RVR (H)  Heart rate controlled  Xarelto on hold secondary to anemia      Anemia  Normochromic/normocytic  Recent unremarkable iron studies  Most consistent with chronic disease, possibly with superimposed intermittent occult blood loss.  Plan monitor hemoglobin.  Consider hematology consultation.  Xarelto remains on hold        Chronic obstructive pulmonary disease, unspecified COPD type (H)  Unclear component to patient's chronic dyspnea  Plan continue current treatments.  O2 as needed to keep sats over 90% and for comfort.        Dontae Yo MD

## 2019-04-01 NOTE — PROGRESS NOTES
[place] [facility]  [place] Medical Record Number:  [id number]  Place of Service where encounter took place:  [facility] (S) [[id number]]  Chief Complaint   Patient presents with     Nursing Home Acute       HPI:    Jamilah Rodriguez  is a 87 year old (4/5/1931), who is being seen today for an episodic care visit.  HPI information obtained from: facility chart records, facility staff, patient report and [facility] chart review.     Today's concern is:  Acute on chronic systolic heart failure (H)  Cardiomyopathy, idiopathic (H)  Benign essential hypertension  Atrial fibrillation with RVR (H)  87 year old female of severe nonischemic cardiomyopathy, COPD recently hospitalized with exacerbation of sHF. Diuresed, lost 15 lbs. She is fatigued, has worsened renal function, dyspnea. Saw [facility] clinic on 3/29 - now on 2L fluid restriction, decreased Metoprolol XL to 12.5 mg daily, started hydralazine 10 mg PO TID. Seeing [facility] again on 4/3 for follow up and needs BMP check on 4/2. Off diuretics due to decreased renal function. Reports dyspnea continues but room air sats 99%. SBP ranges  and up 2lbs in one week (wt 113 lbs today).   Recent ejection fraction of 10-20%      CKD (chronic kidney disease) stage 3, GFR 30-59 ml/min (H)  On recent check continues worsened renal   Lab Results   Component Value Date    CR 1.79 03/29/2019    CR 1.59 03/28/2019     Anemia due to blood loss, acute  Has anemia of unclear etiology but likely secondary to chronic illness and perhaps, occult upper GI bleeding.  Xarelto (for atrial fibrillation) has been held secondary to concern regarding possible bleeding. Last check Hgb improved.   Lab Results   Component Value Date    HGB 8.6 03/29/2019    HGB 8.1 03/28/2019       Chronic obstructive pulmonary disease, unspecified COPD type (H)  Chronic, on room air. Takes anoro ellipta, albuterol PRN.     Physical deconditioning  Here for therapies. Per PT notes: Pt ambulated 1  x 103 feet, 1 x 87 feet using FWW, s    Past Medical and Surgical History reviewed in Epic today.    MEDICATIONS:  Current Outpatient Medications   Medication Sig Dispense Refill     albuterol (PROAIR HFA/PROVENTIL HFA/VENTOLIN HFA) 108 (90 Base) MCG/ACT Inhaler Inhale 2 puffs into the lungs every 4 hours as needed for shortness of breath / dyspnea or wheezing       albuterol (PROVENTIL) (2.5 MG/3ML) 0.083% neb solution Take 2.5 mg by nebulization every 4 hours as needed for shortness of breath / dyspnea or wheezing       ANORO ELLIPTA 62.5-25 MCG/INH oral inhaler Inhale 1 puff into the lungs daily 1 Inhaler 8     bimatoprost (LUMIGAN) 0.01 % SOLN Place 1 drop into both eyes At Bedtime.         dorzolamide (TRUSOPT) 2 % ophthalmic solution Place 1 drop into both eyes 2 times daily       hydrALAZINE (APRESOLINE) 10 MG tablet Take 1 tablet (10 mg) by mouth 3 times daily 90 tablet 1     metoprolol succinate ER (TOPROL-XL) 25 MG 24 hr tablet Take 0.5 tablets (12.5 mg) by mouth daily 30 tablet 1     miconazole with skin protectant (SHANNAN ANTIFUNGAL) 2 % CREA cream Apply topically 2 times daily       Multiple Vitamins-Minerals (PRESERVISION AREDS 2 PO) Take 1 tablet by mouth 2 times daily       pantoprazole (PROTONIX) 20 MG EC tablet Take 2 tablets (40 mg) by mouth 2 times daily       polyethylene glycol 400 (BLINK TEARS) 0.25 % SOLN ophthalmic solution Place 1 drop into both eyes every 3 hours as needed for dry eyes       sennosides (SENOKOT) 8.6 MG tablet Take 1 tablet by mouth daily as needed for constipation       Throat Lozenges (LOZENGES MT) Give 1 lozenge by mouth four times a day for yeast infection in mouth       Calcium Carb-Cholecalciferol (CALCIUM-VITAMIN D3) 600-500 MG-UNIT CAPS Take 1 tablet by mouth every 12 hours         REVIEW OF SYSTEMS:  10 point ROS of systems including Constitutional, Eyes, Respiratory, Cardiovascular, Gastroenterology, Genitourinary, Integumentary, Musculoskeletal, Psychiatric were  all negative except for pertinent positives noted in my HPI.    Objective:  /79   Pulse 74   Temp 97.2  F (36.2  C)   Resp 20   Wt 51.4 kg (113 lb 6.4 oz)   SpO2 99%   BMI 18.87 kg/m    Exam:  GENERAL APPEARANCE:  Alert, in no distress, pleasant, cooperative, oriented x 4  EYES:  EOM, lids, pupils and irises normal, sclera clear and conjunctiva normal, no discharge or mattering on lids or lashes noted  ENT:  Mouth normal, moist mucous membranes, nose normal without drainage or crusting, external ears without lesions, hearing acuity intact  RESP:  respiratory effort normal, no chest wall tenderness, no respiratory distress, Lung sounds diminished at bases otherwise clear, patient is on room air  CV:  Auscultation of heart done, rate and rhythm controlled and irregular, no murmur, no rub or gallop. Edema none bilateral lower extremities.  ABDOMEN:  normal bowel sounds, soft, nontender, no palpable masses.  M/S:   Gait and station walks with assist , no tenderness or swelling of the joints; able to move all extremities, digits normal  NEURO: cranial nerves 2-12 grossly intact, no facial asymmetry, no speech deficits and able to follow directions, moves all extremities symmetrically  PSYCH:  insight and judgement and memory intact, affect and mood normal     Labs:     Most Recent 3 CBC's:  Recent Labs   Lab Test 03/29/19  0915 03/28/19 03/27/19  1122  03/18/19  1100  03/17/19  1605 03/14/19  1119   WBC  --   --   --   --  12.6*  --  13.2* 21.9*   HGB 8.6* 8.1* 8.4*   < > 7.5*   < > 8.2* 8.4*   MCV  --   --   --   --  92  --  92 95   PLT  --   --   --   --  284  --  302 393    < > = values in this interval not displayed.     Most Recent 3 BMP's:  Recent Labs   Lab Test 03/29/19  0915 03/28/19 03/27/19  1122  03/20/19  0548   * 134* 132*   < > 137   POTASSIUM 3.6 3.7 4.1   < > 3.6   CHLORIDE 100 101 99   < > 100   CO2 25 24 26   < > 28   BUN 25 25 27   < > 40*   CR 1.79* 1.59* 1.97*   < > 1.62*    ANIONGAP 11.6  --  11.1  --  9   NAKIA 9.2 8.4 8.5   < > 8.2*   * 103* 116*   < > 119*    < > = values in this interval not displayed.       ASSESSMENT/PLAN:  Acute on chronic systolic heart failure (H)  Cardiomyopathy, idiopathic (H)  Benign essential hypertension  Atrial fibrillation with RVR (H)  Chronic issues, continues with fatigue and dyspnea with exertion. Continue meds, vs, wt, o2 monitoring as ordered. F/U [facility] clinic on 4/3. Check CMP, HGB on 4/2 and then check weekly BMP and HGB on tuesdays starting 4/9 (patient asks to check labs only once weekly if possible). Fluid restriction as ordered.     CKD (chronic kidney disease) stage 3, GFR 30-59 ml/min (H)  Acute on chronic. CMP tomorrow and weekly starting 4/9. F/U with results when available. Avoid nephrotoxic meds.     Anemia due to blood loss, acute  Chronic, Hgb on 4/2 and weekly. F/U with results.     Chronic obstructive pulmonary disease, unspecified COPD type (H)  Chronic, unchanged from last week. Meds, o2 PRN. Monitor.     Physical deconditioning  Acute on chronic. Therapies, f/u with status next week or sooner PRN.     Orders written by provider at facility  1. Stop all currently scheduled labs. Instead:  --check CMP, Hgb on 4/2  --Check BMP, HGB weekly on tuesdays starting 4/9      Electronically signed by:  JAMIN Delgado CNP     ADDENDUM  On 4/2 labs reviewed:  Na 131, Cr 1.43, K 4.6 and BUN 26  Will send to cards f/u with patient tomorrow.     Electronically signed by JAMIN Delgado, IV[place]

## 2019-04-03 NOTE — LETTER
4/3/2019    Sharon Hurtado MD  [address] [facility] [address]    RE: Jamilah Rodriguez       Dear Colleague,    I had the pleasure of seeing Jamilah Rodriguez in the [facility].    Cardiology Clinic Progress Note  Jamilah Rodriguez MRN# [id number]   YOB: 1931 Age: 87 year old   Primary Cardiologist: Dr. Bond Reason for visit: CORE followup            Assessment and Plan:   Jamliah Rodriguez is a very pleasant 87 year old female with a history of HFrEF, nonischemic cardiomyopathy (diagnosed 10/2018), LVEF 10-20%, LV dimension 5.8cm per echocardiogram 2/6/19, chronic LBBB, atrial fibrillation, AAA (followed by Dr. Boogie) and COPD. Patient with 2 recent hospitalizations 3/17/2019-3/20/2019 presenting with complaints of shortness of breath found to be hypoxic 70s, thought to be secondary to volume overload. Patient was diuresed from 123# to 108# at discharge. She was discharged on furosemide 40mg BID. It was recommended that patient start metoprolol XL 25mg daily but appears patient was restarted on diltiazem at time of discharge. Patient creatinine was noted to be rising while in the hospital. 2/3/2019-2/10/2019 hospitalized related to acute respiratory distress due to influenza with COPD exacerbation. Patient here today for CORE follow up. During CORE last core visit on 3/29/19 she reported some improvement, no longer getting short of breath with talking. Concerns for low output state given low ejection fraction and no vasodilator therapy. Patients blood pressures are soft but today will decrease metoprolol to 12.5mg daily and start hydralazine to 10mg TID.     Today patients weight is up 4# in past 4 days, now with signs of fluid retention, elevated JVD and lower extremity edema. Patient also noted to be tachycardic. Will restart diuretic therapy with furosemide 20mg daily. Increase metoprolol XL to 25mg daily.     Labs yesterday continue to show improvement in her  kidney function, creatinine 1.97 -> 1.79 -> 1.45. Sodium stable at 132.     1.  Chronic systolic heart failure/HFrEF, nonischemic cardiomyopathy : LVEF 10-20%, LV dimension 5.8cm per echocardiogram 2/6/19. Patient appears slightly volume up on exam, weight up 4# in past 5 days (torsemide has been on hold since 3/27/19). With initiation of vasodilator therapy, improvement in blood pressure noted 96/62 -> 126/83 today. Persistent dyspnea which is likely multifactorial (COPD, deconditioning, anemia), patients multiple chronic co-morbidities make it difficult to differentiate what is causing dyspnea. Will continue following very closely with patient to optimize her heart failure medications/status.     - NYHA class III, stage C   - Etiology : nonischemic, idopathic?    - Fluid status : hypovolemic   - Diuretic regimen : START furosemide 20mg daily. (Torsemide on hold from 3/27/19 - 4/3/19) Patient has not required diuretics prior to hospitalization.    - Last RHC : none, will consider if continued difficulties with managing volume status.   - Will consider cardiomems implantation in the future if difficulties with managing volume status.    - Ischemic evaluation : LHC completed 10/12/2018 showed moderate nonocclusive coronary artery disease   - Guideline directed medical therapy    - Betablocker: INCREASE Metoprolol XL 25mg daily today.     ** Atenolol allergy noted in chart (hives) and concerns for betablocker therapy in the past given COPD. Patient has been tolerating metoprolol with no signs or symptoms of adverse reaction.     -  Continue hydralazine 10mg TID    - At follow up next week will consider initiation of isosorbide mononitrate    - ACEI/ARB/ARNI: none, was on losartan in the past, appears at time of admission patient states it was stopped? Patient will benefit from restarting if kidney function normalizes.     - Aldactone antagonist: was on spironolactone 12.5mg, this was held at discharge related to  rash/GARIMA. Will consider in future if kidney function improves.    - Sudden cardiac death prophylaxis : patient was evaluated for BiV ICD consideration, pt declined. Further reviewed with patient today as she noted she is now considering, wishes to further discuss once she is feeling better.    - Patient will require close core followup, scheduled for weekly appts x 3 weeks.    - Order placed to establish care with CORE MD.    - Reinforced heart failure education given, continue daily weights and low sodium diet.     2. Acute kidney injury - Baseline creatinine 0.7-0.9, kidney function has been improving 1.6-> 1.89 -> 1.97-> 1.79 -> 1.45 today. Has been noted to improve with vasodilation and initial hold of diuretics (restarting furosemide today given concerns for hypervolemia).     3. Atrial fibrillation - chronic, stable, rate controlled, asymptomatic. [id number] score at least 5 (HTN, age, HF, female).    -  Xarelto held during hospitalization and at discharge related to anemia, GI consulted, s/p EGD which showed esophageal candidiasis, no active bleeding found.    - Recommend continuing to hold xarelto at this times.    -  REMAIN OFF diltiazem, given HFrEF   - Increase metoprolol XL 25mg daily, see above, atenolol allergy noted, reviewed extensively with patient and family and agree with plan to trial beta blocker therapy, tolerating BB with no adverse effects.     4. Anemia - noted after starting anticoagulation for atrial fibrillation, no bleeding source found. Hemoglobin 4/2 is 8.3, down from 13.3 on 2/9/19, as low as 7.1 3/18/19.   - Anticoagulation has remained on hold, continue to hold for now.    - Recommended consideration for blood transfusion, patient remains resistant to this    - Patient will benefit from hematology evaluation as anemia is likely exacerbating HF and worsening kidney function. Patient wishes to further discuss at follow up appointments.     5. Advance care planning - need to further  explore with patient and family. She notes during clinic visit today that she doesn't wish to undergo invasive procedures. Difficult to have further goals of care conversations today as patient feels very defeated with how she has been feeling since discharge. Will continue exploring goals of care with patient and family.        Changes today: INCREASE metoprolol 25mg daily, START furosemide 20mg daily    Follow up plan:     CORE followup with me 4/10/19 and 4/19/19    Establish care with CORE MD - order placed today    CORE RN to call facility Friday, check on weight, heart rates and blood pressure.         History of Presenting Illness:    Jamilah Rodriguez is a very pleasant 87 year old female with a history of HFrEF, nonischemic cardiomyopathy (diagnosed 10/2018), LVEF 10-20%, LV dimension 5.8cm per echocardiogram 2/6/19, chronic LBBB, atrial fibrillation, AAA (followed by Dr. Boogie) and COPD.     Patient with 2 recent hospitalizations 3/17/2019-3/20/2019 presenting with complaints of shortness of breath felt to be secondary to volume overload. Reports that her face and neck were swelling up and needing to prop herself up at night to breath. Worsening lower extremity edema. EMS noted patient was 70% on room air. BNP 10,760. Troponin normal 0.026. Hgb 8.2 (13.3 2/9/29, as low as 7.1 3/18/19). Admission weight 123#. Discharge weight 108#. Spironolactone held at discharge related to rash/GARIMA. No betablockers as caused worsening COPD in the past. It was recommended that patients diltiazem be discontinued and metoprolol XL started, it appears patient started this but wasn't carried over at discharge. Diuresed with IV furosemide and transitioned to PO torsemide at discharge. Xarelto held during hospitalization and at discharge related to anemia, GI consulted, s/p EGD which showed esophageal candidiasis, no active bleeding found. Patient was on losartan at one point but at time of admission she notes it was  discontinued. 2/3/2019-2/10/2019 hospitalized related to acute respiratory distress due to influenza with COPD exacerbation. Discharged to TCU on 1-2L oxygen, stating when she returned home she didn't need oxygen.       Patient has followed with Dr. Bond and Dr. Arana in outpatient cardiology. Dr. Bond saw patient on 10/5/2018 due to new systolic cardiomyopathy after which she underwent a complete workup. Normal LVEF in 2012 and nuclear study noted normal LVEF in 2015. Cardiac MRI 10/3/2018 demonstrated moderate LV dilation with severely reduced systolic function. Late gadolinium enhancement imaging demonstrates a small area of midwall enhancement involving the mid and basal inferoseptal wall. This is nonspecific pattern consistent with idiopathic nonischemic cardiomyopathy. Cardiac catheterization completed 10/12/2018 showed moderate nonocclusive coronary artery disease. LAD 30% stenosis, circumflex 30-40%, and RCA 30-40%. At this time she was on losartan and aldactone was started. Consideration for CRT was also recommended given chronic LBBB. Patient was seen by Dr. Arana 11/5/2018 to be evaluated for BiV ICD consideration. At this time patient was not interested in invasive procedures and given advanced age Dr. Arana saw little benefit. Noting that reconsideration in the future could be made if HF decompensation.    During last CORE visit on 3/29/19 patients vasodilation was increased with the addition of hydralazine 10mg TID. Metoprolol XL was decreased to 12.5mg. Torsemide has been on hold since 3/27/19.     Patient is here today for CORE followup. Patient remains at TCU. Patient and daughter anxious to get home, but patient still not feeling great. Weight today in clinic is 119#, which is up from 115#. Patient continues to be frustrated that she is not improving quicker. Patient with persisting short of breath, feels it has been worsening. Has been noting some slight edema in her feet, which is new. Denies abdominal  distention/bloated. Some dyspnea at rest. Exertional dyspnea with any activity, getting dressed, getting to bathroom. When she does her morning routine she has to take multiple breaks. Working with PT/OT daily at TCU, she notes she continues to oxygenate well. Prior to recent hospitalization patient had been living at home independently. Patients daughter is present during clinic visit today.     Patient denies chest pain or chest tightness. Denies dizziness, lightheadedness or other presyncopal symptoms. Complaints of sensations that heart is racing, feels this has increased since decreasing her metoprolol. Still complaints of excessive thirst.     Labs from yesterday show continued improvement in her kidney function, creatinine 1.97 -> 1.79 -> 1.45. Sodium stable, remains low at 132. Hepatic panel stable. Hemoglobin 8.3. Blood pressure 126/83 and HR 99 in clinic today.    Appetite poor, noting she is not liking the food at the TCU. Not adding additional salt to foods. Drinking occasional ensure, trying to drink one daily. Working with therapy, doing leg and arm exercises and walking, able to walk 87 feet. Denies alcohol use. Denies tobacco use.         Recent Hospitalizations   3/17/2019-3/20/2019 presenting with complaints of shortness of breath. Reports that her face and neck were swelling up and needing to prop herself up at night to breath. Worsening lower extremity edema. EMS noted patient was 70% on room air. BNP 10,760. Troponin normal 0.026. Hgb 8.2 (13.3 2/9/29, as low as 7.1 3/18/19). Admission weight 123#. Discharge weight 108#. Spironolactone held at discharge related to rash/GARIMA. No betablockers as caused worsening COPD in the past. Diuresed with IV furosemide and transitioned to PO torsemide at discharge.   2/3/2019-2/10/2019 related to acute respiratory distress due to influenza with COPD exacerbation.         Social History    2 children   Social History     Socioeconomic History     Marital  status:      Spouse name: Not on file     Number of children: 2     Years of education: Not on file     Highest education level: Not on file   Occupational History     Not on file   Social Needs     Financial resource strain: Not on file     Food insecurity:     Worry: Not on file     Inability: Not on file     Transportation needs:     Medical: Not on file     Non-medical: Not on file   Tobacco Use     Smoking status: Former Smoker     Packs/day: 1.00     Years: 60.00     Pack years: 60.00     Last attempt to quit: 2009     Years since quittin.7     Smokeless tobacco: Never Used     Tobacco comment: former 2-1 ppd since age [age]   Substance and Sexual Activity     Alcohol use: No     Alcohol/week: 0.0 oz     Comment: NA     Drug use: No     Sexual activity: No   Lifestyle     Physical activity:     Days per week: Not on file     Minutes per session: Not on file     Stress: Not on file   Relationships     Social connections:     Talks on phone: Not on file     Gets together: Not on file     Attends Church service: Not on file     Active member of club or organization: Not on file     Attends meetings of clubs or organizations: Not on file     Relationship status: Not on file     Intimate partner violence:     Fear of current or ex partner: Not on file     Emotionally abused: Not on file     Physically abused: Not on file     Forced sexual activity: Not on file   Other Topics Concern     Parent/sibling w/ CABG, MI or angioplasty before 65F 55M? Not Asked   Social History Narrative     Not on file            Review of Systems:   Skin:  Negative     Eyes:  Negative    ENT:  Negative    Respiratory:  Positive for shortness of breath;dyspnea on exertion  Cardiovascular:    Positive for;edema;lightheadedness;dizziness  Gastroenterology: Positive for nausea;heartburn  Genitourinary:  Negative    Musculoskeletal:  Negative    Neurologic:  Positive for numbness or tingling of hands  Psychiatric:   "Negative    Heme/Lymph/Imm:  Positive for allergies  Endocrine:  Negative           Physical Exam:   Vitals: /83   Pulse 99   Ht 1.651 m (5' 5\")   Wt 54 kg (119 lb)   SpO2 99%   BMI 19.80 kg/m      Wt Readings from Last 4 Encounters:   04/03/19 54 kg (119 lb)   04/01/19 51.4 kg (113 lb 6.4 oz)   03/29/19 52.3 kg (115 lb 6.4 oz)   03/27/19 51.8 kg (114 lb 3.2 oz)     GEN: well nourished, in no acute distress.  HEENT:  Pupils equal, round. Sclerae nonicteric.   NECK: Supple, no masses appreciated. JVD elevated at 10-12cm at 90 degrees.   C/V:  Irregularly irregular rate and rhythm, no murmur  RESP: Respirations are unlabored. Clear to auscultation bilaterally without wheezing  GI: Abdomen soft, nontender.  EXTREM: Trace to +1 LE edema to bilateral ankles/feet  NEURO: Alert and oriented, cooperative.  SKIN: Warm and dry, skin to bilateral lower extremities scaly.        Data:   ECHO 2/6/2019  The left ventricle is severely dilated. The visual ejection fraction is  estimated at 10-20%.  Right ventricular function cannot be assessed due to poor image quality.  Probably mildly reduced? TDI velocities suggest normal function, but this  cannot be fully assessed.  Trivial pericardial effusion    Cardiac catheterization 10/12/2018  Summary  1. Moderate nonocclusive coronary artery disease  2. Normal left ventricular end-diastolic pressure    Cardiac MRI 1010/2018  1. The LV is moderately dilated. The global systolic function is severely reduced. The LVEF is 19%. There  is severe global hypokinesis of the left ventricle with minor regional variation.   2. The RV is normal in cavity size. The global systolic function is mildly to moderately reduced. The RVEF  is 41%.   3. There is moderate to severe biatrial enlargement.   4. There is no evidence of myocardial iron overload.   5. Late gadolinium enhancement imagingdemonstrates a small area of midwall enhancement involving the mid to  basal inferoseptal wall. This is " a non-specific pattern that has been described in idiopathic nonischemic  cardiomyopathies.   6.  A small circumferential pericardial effusion is noted.         CONCLUSIONS:  Moderate left ventricular dilatation with severely reduced systolic function. Late gadolinium  enhancement imagingdemonstrates a small area of midwall enhancement involving the mid to basal inferoseptal  wall. This is a non-specific pattern that has been described in idiopathic nonischemic cardiomyopathies.     LIPID RESULTS:  Lab Results   Component Value Date    CHOL 141 09/28/2018    HDL 70 09/28/2018    LDL 59 09/28/2018    TRIG 62 09/28/2018    CHOLHDLRATIO 2.2 04/08/2015     LIVER ENZYME RESULTS:  Lab Results   Component Value Date    AST 15 04/02/2019    ALT 14 04/02/2019     CBC RESULTS:  Lab Results   Component Value Date    WBC 12.6 (H) 03/18/2019    RBC 2.43 (L) 03/18/2019    HGB 8.3 (A) 04/02/2019    HCT 22.3 (L) 03/18/2019    MCV 92 03/18/2019    MCH 30.9 03/18/2019    MCHC 33.6 03/18/2019    RDW 15.4 (H) 03/18/2019     03/18/2019     BMP RESULTS:  Lab Results   Component Value Date     (A) 04/02/2019     (L) 04/02/2019    POTASSIUM 4.6 04/02/2019    POTASSIUM 4.6 04/02/2019    CHLORIDE 102 04/02/2019    CHLORIDE 102 04/02/2019    CO2 21 04/02/2019    CO2 21 04/02/2019    ANIONGAP 8 04/02/2019    ANIONGAP 9 04/02/2019     (A) 04/02/2019     (H) 04/02/2019    BUN 26 04/02/2019    BUN 26 04/02/2019    CR 1.43 (A) 04/02/2019    CR 1.45 (H) 04/02/2019    GFRESTIMATED 33 (L) 04/02/2019    GFRESTIMATED 32 (L) 04/02/2019    GFRESTBLACK 38 (L) 04/02/2019    GFRESTBLACK 37 (L) 04/02/2019    NAKIA 9.0 04/02/2019    NAKIA 9.0 04/02/2019      INR RESULTS:  Lab Results   Component Value Date    INR 1.13 10/12/2018    INR 1.47 (H) 04/03/2013            Medications     Current Outpatient Medications   Medication Sig Dispense Refill     albuterol (PROAIR HFA/PROVENTIL HFA/VENTOLIN HFA) 108 (90 Base) MCG/ACT Inhaler  Inhale 2 puffs into the lungs every 4 hours as needed for shortness of breath / dyspnea or wheezing       albuterol (PROVENTIL) (2.5 MG/3ML) 0.083% neb solution Take 2.5 mg by nebulization every 4 hours as needed for shortness of breath / dyspnea or wheezing       ANORO ELLIPTA 62.5-25 MCG/INH oral inhaler Inhale 1 puff into the lungs daily 1 Inhaler 8     bimatoprost (LUMIGAN) 0.01 % SOLN Place 1 drop into both eyes At Bedtime.         dorzolamide (TRUSOPT) 2 % ophthalmic solution Place 1 drop into both eyes 2 times daily       furosemide (LASIX) 20 MG tablet Take 1 tablet (20 mg) by mouth daily 90 tablet 1     hydrALAZINE (APRESOLINE) 10 MG tablet Take 1 tablet (10 mg) by mouth 3 times daily 90 tablet 1     metoprolol succinate ER (TOPROL-XL) 25 MG 24 hr tablet Take 1 tablet (25 mg) by mouth daily 30 tablet 1     miconazole with skin protectant (SHANNAN ANTIFUNGAL) 2 % CREA cream Apply topically 2 times daily       Multiple Vitamins-Minerals (PRESERVISION AREDS 2 PO) Take 1 tablet by mouth 2 times daily       pantoprazole (PROTONIX) 20 MG EC tablet Take 2 tablets (40 mg) by mouth 2 times daily       polyethylene glycol 400 (BLINK TEARS) 0.25 % SOLN ophthalmic solution Place 1 drop into both eyes every 3 hours as needed for dry eyes       sennosides (SENOKOT) 8.6 MG tablet Take 1 tablet by mouth daily as needed for constipation       Throat Lozenges (LOZENGES MT) Give 1 lozenge by mouth four times a day for yeast infection in mouth            Past Medical History     Past Medical History:   Diagnosis Date     AAA (abdominal aortic aneurysm) (H)     Followed with yearly ultrasound     Cardiomyopathy, idiopathic (H) 10/2018     Cervical cancer (H) 1969     Chronic dermatitis     extensive allergy testing revealed allergy/sensitivity to carba mix ( rubber) and Imidazolidinyl Urea (common in beauty products)     Glaucoma      Glaucoma      History of COPD     very mild abnormal spirometry in 2007 (in [place]), has not been  active since quitting smoking in 2009     HTN (hypertension)      LBBB (left bundle branch block) 10/2018     Macular degeneration      Osteoporosis 8/3/11    femoral T score -3.4 & -3.7     PAD (peripheral artery disease) (H)      Vertebral compression fracture (H) 5/17/11    L1 compression fracture, presumed osteoporotic compression fracture     Past Surgical History:   Procedure Laterality Date     CATARACT IOL, RT/LT  2/8/12    left eye cataract removal     ESOPHAGOSCOPY, GASTROSCOPY, DUODENOSCOPY (EGD), COMBINED N/A 3/19/2019    Procedure: COMBINED ESOPHAGOSCOPY, GASTROSCOPY, DUODENOSCOPY (EGD);  Surgeon: Naveen Santo MD;  Location:  GI     HEART CATH CORONARY ANGIOGRAM W/LV GRAM  10/2018    moderate nonocclusive CAD     HYSTERECTOMY TOTAL ABDOMINAL  1999    Fibroids     LAPAROSCOPY DIAGNOSTIC (GENERAL) N/A 4/15/2018    Procedure: LAPAROSCOPY DIAGNOSTIC (GENERAL);;  Surgeon: Jm Craig MD;  Location:  OR     LAPAROTOMY EXPLORATORY N/A 4/15/2018    Procedure: LAPAROTOMY EXPLORATORY;;  Surgeon: Jm Craig MD;  Location:  OR     OPEN REDUCTION INTERNAL FIXATION HIP NAILING  7/2/2012    Procedure: OPEN REDUCTION INTERNAL FIXATION HIP NAILING;  Intramedullary Fixation Right Intertrochanteric Hip Fracture;  Surgeon: Chalino Covarrubias MD;  Location:  OR     OPEN REDUCTION INTERNAL FIXATION TIBIAL PLATEAU  3/31/2013    Procedure: OPEN REDUCTION INTERNAL FIXATION TIBIAL PLATEAU;  RIGHT LATERAL TIBIAL PLATEAU FRACTURE - SYNTHES Medial Meniscus Repair, Lateral Compartment Release;  Surgeon: Alfred Cardenas MD;  Location:  OR     OPEN REDUCTION INTERNAL FIXATION WRIST  1988    left wrist ORIF, hardware removed onemonth later     Family History   Problem Relation Age of Onset     Breast Cancer Other      Circulatory Daughter 53        [email or web address]     C.A.D. Father      Diabetes Sister      Breast Cancer Maternal Aunt 60     Other - See Comments Mother         arterial sclerosis     Family History  Negative Son             Allergies   Atenolol; Beta adrenergic blockers; Brimonidine; Cephalexin; Germall plus; Latex; Morphine; No clinical screening - see comments; Penicillins; Sulfa drugs; Tramadol; Tylenol; Vicodin [hydrocodone-acetaminophen]; and Fentanyl        JAMIN Corrales CNP  [facility]  Pager: [phone]      Thank you for allowing me to participate in the care of your patient.    Sincerely,     JAMIN Corrales CNP     [facility]

## 2019-04-03 NOTE — PATIENT INSTRUCTIONS
1. START furosemide 20mg daily  2. INCREASE metoprolol succinate to 25mg daily  3. Followup with Alice next week 4/10/19 @ 2:30pm

## 2019-04-03 NOTE — PROGRESS NOTES
Cardiology Clinic Progress Note  Jamilah Rodriguez MRN# [id number]   YOB: 1931 Age: 87 year old   Primary Cardiologist: Dr. Bond Reason for visit: CORE followup            Assessment and Plan:   Jamilah Rodriguez is a very pleasant 87 year old female with a history of HFrEF, nonischemic cardiomyopathy (diagnosed 10/2018), LVEF 10-20%, LV dimension 5.8cm per echocardiogram 2/6/19, chronic LBBB, atrial fibrillation, AAA (followed by Dr. Boogie) and COPD. Patient with 2 recent hospitalizations 3/17/2019-3/20/2019 presenting with complaints of shortness of breath found to be hypoxic 70s, thought to be secondary to volume overload. Patient was diuresed from 123# to 108# at discharge. She was discharged on furosemide 40mg BID. It was recommended that patient start metoprolol XL 25mg daily but appears patient was restarted on diltiazem at time of discharge. Patient creatinine was noted to be rising while in the hospital. 2/3/2019-2/10/2019 hospitalized related to acute respiratory distress due to influenza with COPD exacerbation. Patient here today for CORE follow up. During CORE last core visit on 3/29/19 she reported some improvement, no longer getting short of breath with talking. Concerns for low output state given low ejection fraction and no vasodilator therapy. Patients blood pressures are soft but today will decrease metoprolol to 12.5mg daily and start hydralazine to 10mg TID.     Today patients weight is up 4# in past 4 days, now with signs of fluid retention, elevated JVD and lower extremity edema. Patient also noted to be tachycardic. Will restart diuretic therapy with furosemide 20mg daily. Increase metoprolol XL to 25mg daily.     Labs yesterday continue to show improvement in her kidney function, creatinine 1.97 -> 1.79 -> 1.45. Sodium stable at 132.     1.  Chronic systolic heart failure/HFrEF, nonischemic cardiomyopathy : LVEF 10-20%, LV dimension 5.8cm per echocardiogram 2/6/19. Patient  appears slightly volume up on exam, weight up 4# in past 5 days (torsemide has been on hold since 3/27/19). With initiation of vasodilator therapy, improvement in blood pressure noted 96/62 -> 126/83 today. Persistent dyspnea which is likely multifactorial (COPD, deconditioning, anemia), patients multiple chronic co-morbidities make it difficult to differentiate what is causing dyspnea. Will continue following very closely with patient to optimize her heart failure medications/status.     - NYHA class III, stage C   - Etiology : nonischemic, idopathic?    - Fluid status : hypovolemic   - Diuretic regimen : START furosemide 20mg daily. (Torsemide on hold from 3/27/19 - 4/3/19) Patient has not required diuretics prior to hospitalization.    - Last RHC : none, will consider if continued difficulties with managing volume status.   - Will consider cardiomems implantation in the future if difficulties with managing volume status.    - Ischemic evaluation : LHC completed 10/12/2018 showed moderate nonocclusive coronary artery disease   - Guideline directed medical therapy    - Betablocker: INCREASE Metoprolol XL 25mg daily today.     ** Atenolol allergy noted in chart (hives) and concerns for betablocker therapy in the past given COPD. Patient has been tolerating metoprolol with no signs or symptoms of adverse reaction.     -  Continue hydralazine 10mg TID    - At follow up next week will consider initiation of isosorbide mononitrate    - ACEI/ARB/ARNI: none, was on losartan in the past, appears at time of admission patient states it was stopped? Patient will benefit from restarting if kidney function normalizes.     - Aldactone antagonist: was on spironolactone 12.5mg, this was held at discharge related to rash/GARIMA. Will consider in future if kidney function improves.    - Sudden cardiac death prophylaxis : patient was evaluated for BiV ICD consideration, pt declined. Further reviewed with patient today as she noted she  is now considering, wishes to further discuss once she is feeling better.    - Patient will require close core followup, scheduled for weekly appts x 3 weeks.    - Order placed to establish care with CORE MD.    - Reinforced heart failure education given, continue daily weights and low sodium diet.     2. Acute kidney injury - Baseline creatinine 0.7-0.9, kidney function has been improving 1.6-> 1.89 -> 1.97-> 1.79 -> 1.45 today. Has been noted to improve with vasodilation and initial hold of diuretics (restarting furosemide today given concerns for hypervolemia).     3. Atrial fibrillation - chronic, stable, rate controlled, asymptomatic. [id number] score at least 5 (HTN, age, HF, female).    -  Xarelto held during hospitalization and at discharge related to anemia, GI consulted, s/p EGD which showed esophageal candidiasis, no active bleeding found.    - Recommend continuing to hold xarelto at this times.    -  REMAIN OFF diltiazem, given HFrEF   - Increase metoprolol XL 25mg daily, see above, atenolol allergy noted, reviewed extensively with patient and family and agree with plan to trial beta blocker therapy, tolerating BB with no adverse effects.     4. Anemia - noted after starting anticoagulation for atrial fibrillation, no bleeding source found. Hemoglobin 4/2 is 8.3, down from 13.3 on 2/9/19, as low as 7.1 3/18/19.   - Anticoagulation has remained on hold, continue to hold for now.    - Recommended consideration for blood transfusion, patient remains resistant to this    - Patient will benefit from hematology evaluation as anemia is likely exacerbating HF and worsening kidney function. Patient wishes to further discuss at follow up appointments.     5. Advance care planning - need to further explore with patient and family. She notes during clinic visit today that she doesn't wish to undergo invasive procedures. Difficult to have further goals of care conversations today as patient feels very defeated with  how she has been feeling since discharge. Will continue exploring goals of care with patient and family.        Changes today: INCREASE metoprolol 25mg daily, START furosemide 20mg daily    Follow up plan:     CORE followup with me 4/10/19 and 4/19/19    Establish care with CORE MD - order placed today    CORE RN to call facility Friday, check on weight, heart rates and blood pressure.         History of Presenting Illness:    Jamilah Rodriguez is a very pleasant 87 year old female with a history of HFrEF, nonischemic cardiomyopathy (diagnosed 10/2018), LVEF 10-20%, LV dimension 5.8cm per echocardiogram 2/6/19, chronic LBBB, atrial fibrillation, AAA (followed by Dr. Boogie) and COPD.     Patient with 2 recent hospitalizations 3/17/2019-3/20/2019 presenting with complaints of shortness of breath felt to be secondary to volume overload. Reports that her face and neck were swelling up and needing to prop herself up at night to breath. Worsening lower extremity edema. EMS noted patient was 70% on room air. BNP 10,760. Troponin normal 0.026. Hgb 8.2 (13.3 2/9/29, as low as 7.1 3/18/19). Admission weight 123#. Discharge weight 108#. Spironolactone held at discharge related to rash/GARIMA. No betablockers as caused worsening COPD in the past. It was recommended that patients diltiazem be discontinued and metoprolol XL started, it appears patient started this but wasn't carried over at discharge. Diuresed with IV furosemide and transitioned to PO torsemide at discharge. Xarelto held during hospitalization and at discharge related to anemia, GI consulted, s/p EGD which showed esophageal candidiasis, no active bleeding found. Patient was on losartan at one point but at time of admission she notes it was discontinued. 2/3/2019-2/10/2019 hospitalized related to acute respiratory distress due to influenza with COPD exacerbation. Discharged to TCU on 1-2L oxygen, stating when she returned home she didn't need oxygen.       Patient  has followed with Dr. Bond and Dr. Arana in outpatient cardiology. Dr. Bond saw patient on 10/5/2018 due to new systolic cardiomyopathy after which she underwent a complete workup. Normal LVEF in 2012 and nuclear study noted normal LVEF in 2015. Cardiac MRI 10/3/2018 demonstrated moderate LV dilation with severely reduced systolic function. Late gadolinium enhancement imaging demonstrates a small area of midwall enhancement involving the mid and basal inferoseptal wall. This is nonspecific pattern consistent with idiopathic nonischemic cardiomyopathy. Cardiac catheterization completed 10/12/2018 showed moderate nonocclusive coronary artery disease. LAD 30% stenosis, circumflex 30-40%, and RCA 30-40%. At this time she was on losartan and aldactone was started. Consideration for CRT was also recommended given chronic LBBB. Patient was seen by Dr. Arana 11/5/2018 to be evaluated for BiV ICD consideration. At this time patient was not interested in invasive procedures and given advanced age Dr. Arana saw little benefit. Noting that reconsideration in the future could be made if HF decompensation.    During last CORE visit on 3/29/19 patients vasodilation was increased with the addition of hydralazine 10mg TID. Metoprolol XL was decreased to 12.5mg. Torsemide has been on hold since 3/27/19.     Patient is here today for CORE followup. Patient remains at TCU. Patient and daughter anxious to get home, but patient still not feeling great. Weight today in clinic is 119#, which is up from 115#. Patient continues to be frustrated that she is not improving quicker. Patient with persisting short of breath, feels it has been worsening. Has been noting some slight edema in her feet, which is new. Denies abdominal distention/bloated. Some dyspnea at rest. Exertional dyspnea with any activity, getting dressed, getting to bathroom. When she does her morning routine she has to take multiple breaks. Working with PT/OT daily at TCU, she notes she  continues to oxygenate well. Prior to recent hospitalization patient had been living at home independently. Patients daughter is present during clinic visit today.     Patient denies chest pain or chest tightness. Denies dizziness, lightheadedness or other presyncopal symptoms. Complaints of sensations that heart is racing, feels this has increased since decreasing her metoprolol. Still complaints of excessive thirst.     Labs from yesterday show continued improvement in her kidney function, creatinine 1.97 -> 1.79 -> 1.45. Sodium stable, remains low at 132. Hepatic panel stable. Hemoglobin 8.3. Blood pressure 126/83 and HR 99 in clinic today.    Appetite poor, noting she is not liking the food at the TCU. Not adding additional salt to foods. Drinking occasional ensure, trying to drink one daily. Working with therapy, doing leg and arm exercises and walking, able to walk 87 feet. Denies alcohol use. Denies tobacco use.         Recent Hospitalizations   3/17/2019-3/20/2019 presenting with complaints of shortness of breath. Reports that her face and neck were swelling up and needing to prop herself up at night to breath. Worsening lower extremity edema. EMS noted patient was 70% on room air. BNP 10,760. Troponin normal 0.026. Hgb 8.2 (13.3 2/9/29, as low as 7.1 3/18/19). Admission weight 123#. Discharge weight 108#. Spironolactone held at discharge related to rash/GARIMA. No betablockers as caused worsening COPD in the past. Diuresed with IV furosemide and transitioned to PO torsemide at discharge.   2/3/2019-2/10/2019 related to acute respiratory distress due to influenza with COPD exacerbation.         Social History    2 children   Social History     Socioeconomic History     Marital status:      Spouse name: Not on file     Number of children: 2     Years of education: Not on file     Highest education level: Not on file   Occupational History     Not on file   Social Needs     Financial resource strain: Not  "on file     Food insecurity:     Worry: Not on file     Inability: Not on file     Transportation needs:     Medical: Not on file     Non-medical: Not on file   Tobacco Use     Smoking status: Former Smoker     Packs/day: 1.00     Years: 60.00     Pack years: 60.00     Last attempt to quit: 2009     Years since quittin.7     Smokeless tobacco: Never Used     Tobacco comment: former 1/2-1 ppd since age 20-22   Substance and Sexual Activity     Alcohol use: No     Alcohol/week: 0.0 oz     Comment: NA     Drug use: No     Sexual activity: No   Lifestyle     Physical activity:     Days per week: Not on file     Minutes per session: Not on file     Stress: Not on file   Relationships     Social connections:     Talks on phone: Not on file     Gets together: Not on file     Attends Yazdanism service: Not on file     Active member of club or organization: Not on file     Attends meetings of clubs or organizations: Not on file     Relationship status: Not on file     Intimate partner violence:     Fear of current or ex partner: Not on file     Emotionally abused: Not on file     Physically abused: Not on file     Forced sexual activity: Not on file   Other Topics Concern     Parent/sibling w/ CABG, MI or angioplasty before 65F 55M? Not Asked   Social History Narrative     Not on file            Review of Systems:   Skin:  Negative     Eyes:  Negative    ENT:  Negative    Respiratory:  Positive for shortness of breath;dyspnea on exertion  Cardiovascular:    Positive for;edema;lightheadedness;dizziness  Gastroenterology: Positive for nausea;heartburn  Genitourinary:  Negative    Musculoskeletal:  Negative    Neurologic:  Positive for numbness or tingling of hands  Psychiatric:  Negative    Heme/Lymph/Imm:  Positive for allergies  Endocrine:  Negative           Physical Exam:   Vitals: /83   Pulse 99   Ht 1.651 m (5' 5\")   Wt 54 kg (119 lb)   SpO2 99%   BMI 19.80 kg/m     Wt Readings from Last 4 Encounters: "   04/03/19 54 kg (119 lb)   04/01/19 51.4 kg (113 lb 6.4 oz)   03/29/19 52.3 kg (115 lb 6.4 oz)   03/27/19 51.8 kg (114 lb 3.2 oz)     GEN: well nourished, in no acute distress.  HEENT:  Pupils equal, round. Sclerae nonicteric.   NECK: Supple, no masses appreciated. JVD elevated at 10-12cm at 90 degrees.   C/V:  Irregularly irregular rate and rhythm, no murmur  RESP: Respirations are unlabored. Clear to auscultation bilaterally without wheezing  GI: Abdomen soft, nontender.  EXTREM: Trace to +1 LE edema to bilateral ankles/feet  NEURO: Alert and oriented, cooperative.  SKIN: Warm and dry, skin to bilateral lower extremities scaly.        Data:   ECHO 2/6/2019  The left ventricle is severely dilated. The visual ejection fraction is  estimated at 10-20%.  Right ventricular function cannot be assessed due to poor image quality.  Probably mildly reduced? TDI velocities suggest normal function, but this  cannot be fully assessed.  Trivial pericardial effusion    Cardiac catheterization 10/12/2018  Summary  1. Moderate nonocclusive coronary artery disease  2. Normal left ventricular end-diastolic pressure    Cardiac MRI 1010/2018  1. The LV is moderately dilated. The global systolic function is severely reduced. The LVEF is 19%. There  is severe global hypokinesis of the left ventricle with minor regional variation.   2. The RV is normal in cavity size. The global systolic function is mildly to moderately reduced. The RVEF  is 41%.   3. There is moderate to severe biatrial enlargement.   4. There is no evidence of myocardial iron overload.   5. Late gadolinium enhancement imagingdemonstrates a small area of midwall enhancement involving the mid to  basal inferoseptal wall. This is a non-specific pattern that has been described in idiopathic nonischemic  cardiomyopathies.   6.  A small circumferential pericardial effusion is noted.         CONCLUSIONS:  Moderate left ventricular dilatation with severely reduced systolic  function. Late gadolinium  enhancement imagingdemonstrates a small area of midwall enhancement involving the mid to basal inferoseptal  wall. This is a non-specific pattern that has been described in idiopathic nonischemic cardiomyopathies.     LIPID RESULTS:  Lab Results   Component Value Date    CHOL 141 09/28/2018    HDL 70 09/28/2018    LDL 59 09/28/2018    TRIG 62 09/28/2018    CHOLHDLRATIO 2.2 04/08/2015     LIVER ENZYME RESULTS:  Lab Results   Component Value Date    AST 15 04/02/2019    ALT 14 04/02/2019     CBC RESULTS:  Lab Results   Component Value Date    WBC 12.6 (H) 03/18/2019    RBC 2.43 (L) 03/18/2019    HGB 8.3 (A) 04/02/2019    HCT 22.3 (L) 03/18/2019    MCV 92 03/18/2019    MCH 30.9 03/18/2019    MCHC 33.6 03/18/2019    RDW 15.4 (H) 03/18/2019     03/18/2019     BMP RESULTS:  Lab Results   Component Value Date     (A) 04/02/2019     (L) 04/02/2019    POTASSIUM 4.6 04/02/2019    POTASSIUM 4.6 04/02/2019    CHLORIDE 102 04/02/2019    CHLORIDE 102 04/02/2019    CO2 21 04/02/2019    CO2 21 04/02/2019    ANIONGAP 8 04/02/2019    ANIONGAP 9 04/02/2019     (A) 04/02/2019     (H) 04/02/2019    BUN 26 04/02/2019    BUN 26 04/02/2019    CR 1.43 (A) 04/02/2019    CR 1.45 (H) 04/02/2019    GFRESTIMATED 33 (L) 04/02/2019    GFRESTIMATED 32 (L) 04/02/2019    GFRESTBLACK 38 (L) 04/02/2019    GFRESTBLACK 37 (L) 04/02/2019    NAKIA 9.0 04/02/2019    NAKIA 9.0 04/02/2019      INR RESULTS:  Lab Results   Component Value Date    INR 1.13 10/12/2018    INR 1.47 (H) 04/03/2013            Medications     Current Outpatient Medications   Medication Sig Dispense Refill     albuterol (PROAIR HFA/PROVENTIL HFA/VENTOLIN HFA) 108 (90 Base) MCG/ACT Inhaler Inhale 2 puffs into the lungs every 4 hours as needed for shortness of breath / dyspnea or wheezing       albuterol (PROVENTIL) (2.5 MG/3ML) 0.083% neb solution Take 2.5 mg by nebulization every 4 hours as needed for shortness of breath / dyspnea  or wheezing       ANORO ELLIPTA 62.5-25 MCG/INH oral inhaler Inhale 1 puff into the lungs daily 1 Inhaler 8     bimatoprost (LUMIGAN) 0.01 % SOLN Place 1 drop into both eyes At Bedtime.         dorzolamide (TRUSOPT) 2 % ophthalmic solution Place 1 drop into both eyes 2 times daily       furosemide (LASIX) 20 MG tablet Take 1 tablet (20 mg) by mouth daily 90 tablet 1     hydrALAZINE (APRESOLINE) 10 MG tablet Take 1 tablet (10 mg) by mouth 3 times daily 90 tablet 1     metoprolol succinate ER (TOPROL-XL) 25 MG 24 hr tablet Take 1 tablet (25 mg) by mouth daily 30 tablet 1     miconazole with skin protectant (SHANNAN ANTIFUNGAL) 2 % CREA cream Apply topically 2 times daily       Multiple Vitamins-Minerals (PRESERVISION AREDS 2 PO) Take 1 tablet by mouth 2 times daily       pantoprazole (PROTONIX) 20 MG EC tablet Take 2 tablets (40 mg) by mouth 2 times daily       polyethylene glycol 400 (BLINK TEARS) 0.25 % SOLN ophthalmic solution Place 1 drop into both eyes every 3 hours as needed for dry eyes       sennosides (SENOKOT) 8.6 MG tablet Take 1 tablet by mouth daily as needed for constipation       Throat Lozenges (LOZENGES MT) Give 1 lozenge by mouth four times a day for yeast infection in mouth            Past Medical History     Past Medical History:   Diagnosis Date     AAA (abdominal aortic aneurysm) (H)     Followed with yearly ultrasound     Cardiomyopathy, idiopathic (H) 10/2018     Cervical cancer (H) 1969     Chronic dermatitis     extensive allergy testing revealed allergy/sensitivity to carba mix ( rubber) and Imidazolidinyl Urea (common in beauty products)     Glaucoma      Glaucoma      History of COPD     very mild abnormal spirometry in 2007 (in [place]), has not been active since quitting smoking in 2009     HTN (hypertension)      LBBB (left bundle branch block) 10/2018     Macular degeneration      Osteoporosis 8/3/11    femoral T score -3.4 & -3.7     PAD (peripheral artery disease) (H)      Vertebral  compression fracture (H) 5/17/11    L1 compression fracture, presumed osteoporotic compression fracture     Past Surgical History:   Procedure Laterality Date     CATARACT IOL, RT/LT  2/8/12    left eye cataract removal     ESOPHAGOSCOPY, GASTROSCOPY, DUODENOSCOPY (EGD), COMBINED N/A 3/19/2019    Procedure: COMBINED ESOPHAGOSCOPY, GASTROSCOPY, DUODENOSCOPY (EGD);  Surgeon: Naveen Santo MD;  Location:  GI     HEART CATH CORONARY ANGIOGRAM W/LV GRAM  10/2018    moderate nonocclusive CAD     HYSTERECTOMY TOTAL ABDOMINAL  1999    Fibroids     LAPAROSCOPY DIAGNOSTIC (GENERAL) N/A 4/15/2018    Procedure: LAPAROSCOPY DIAGNOSTIC (GENERAL);;  Surgeon: Jm Craig MD;  Location:  OR     LAPAROTOMY EXPLORATORY N/A 4/15/2018    Procedure: LAPAROTOMY EXPLORATORY;;  Surgeon: Jm Craig MD;  Location:  OR     OPEN REDUCTION INTERNAL FIXATION HIP NAILING  7/2/2012    Procedure: OPEN REDUCTION INTERNAL FIXATION HIP NAILING;  Intramedullary Fixation Right Intertrochanteric Hip Fracture;  Surgeon: Chalino Covarrubias MD;  Location:  OR     OPEN REDUCTION INTERNAL FIXATION TIBIAL PLATEAU  3/31/2013    Procedure: OPEN REDUCTION INTERNAL FIXATION TIBIAL PLATEAU;  RIGHT LATERAL TIBIAL PLATEAU FRACTURE - SYNTHES Medial Meniscus Repair, Lateral Compartment Release;  Surgeon: Alfred Cardenas MD;  Location:  OR     OPEN REDUCTION INTERNAL FIXATION WRIST  1988    left wrist ORIF, hardware removed onemonth later     Family History   Problem Relation Age of Onset     Breast Cancer Other      Circulatory Daughter 53        mengioma     C.A.D. Father      Diabetes Sister      Breast Cancer Maternal Aunt 60     Other - See Comments Mother         arterial sclerosis     Family History Negative Son             Allergies   Atenolol; Beta adrenergic blockers; Brimonidine; Cephalexin; Germall plus; Latex; Morphine; No clinical screening - see comments; Penicillins; Sulfa drugs; Tramadol; Tylenol; Vicodin  [hydrocodone-acetaminophen]; and Fentanyl        JAMIN Corrales [facility]  Pager: [phone]

## 2019-04-03 NOTE — PROGRESS NOTES
Received message from Lexi at [facility], stating they think pt needs to be discharged to an assisted living or long term care unit. Pt and her daughter think pt will be fine at home and will be better once she gets her pacemaker. Pt has OV with Alice this afternoon to discuss further. Althea MORFIN April 3, 2019, 9:48 AM

## 2019-04-04 NOTE — PROGRESS NOTES
MINA Jenkins wanted to make sure pt has BMP orders for 4/9 at TCU. I called TCU and spoke to pt's RN. They do have BMP order for 4/9. Althea MORFIN April 4, 2019, 2:14 PM

## 2019-04-05 NOTE — PROGRESS NOTES
[facility] plan:  1. Further increase vasodilation with addition of isosorbide mononitrate 30mg daily.   2. Continue hydralazine, metoprolol and furosemide.  3. Will likely further titrate metoprolol at f/u appt.   4. Keep f/u appt for 4/10/19  Nurse states understanding of plan and orders.  Called to family with plan -  Left message on daughter line.   Lis Fontana RN 04/05/19 10:52 AM    Call back from daughter Lulu -reviewed plan from MINA - she states understanding and agreement. Daughter states concerns with patient upset stomach - they have scheduled return visit with GI specialist to discuss esophageal issues /yeast on Thursday of next week..  Lis Fontana RN 04/05/19 4:21 PM

## 2019-04-05 NOTE — PROGRESS NOTES
**Currently at [facility] TCU since discharge. Prior to that lived in Independent living.  **[facility] ph: [phone]; fax: [phone]    Call to TCU for update for Alice Cheng NP post visit 4/3/2019 plan w/ - Diuretic regimen : START furosemide 20mg daily. (Torsemide on hold from 3/27/19 - 4/3/19) and Betablocker: INCREASE Metoprolol XL 25mg daily today. -  REMAIN OFF diltiazem, given HFrEF; CORE RN to call facility Friday, check on weight, heart rates and blood pressure.     Spoke with Shaylee MORFIN 127/80 99 pulse this am and yesterday 132/83 1100 pulse, 115.4 lbs stable for them (was 116 at facility am of visit 4/3).  Slept better last two nights, no shortness of breath during the night like previous complaints, in AM utilizes albuteral inaler which patient reports helps am symptoms when awaking, ambulating without difficulty, coughing intermittently and has nausea with cough, no lightheaded and dizziness, does have GERD symptoms with relief with Tumms. They have order for BMP to be drawn on Tuesday 4/9/2019.   Next [facility] visit 4/10  Update forwarded to [facility]  Lis Fontana RN 04/05/19 10:28 AM

## 2019-04-05 NOTE — PROGRESS NOTES
Blood pressure remains stable, heart rate improving with increase in metoprolol.     1. Further increase vasodilation with addition of isosorbide mononitrate 30mg daily.   2. Continue hydralazine, metoprolol and furosemide.  3. Will likely further titrate metoprolol at f/u appt.   4. Keep f/u appt for 4/10/19    Please call facility and notify of above medication change. Please also try to reach patient or daughter to review. I did discuss the possibility of adding this medication at the end of the week to further optimize her heart failure medications, allowing her heart to pump easier.

## 2019-04-08 NOTE — PROGRESS NOTES
Called [facility] and spoke w/ Mine MORFIN and gave order to increase lasix to 20mg BID. She requested I fax the order to [phone], which I did.     Mira Cedeno RN BSN   5:18 PM 04/08/19

## 2019-04-08 NOTE — PROGRESS NOTES
"Rec'd VM from No w/ [facility] ([phone]) noting that pt's O2 sat dropped to 83-84 w/ therapy today, then recovered to 93 at rest. She also reported that LEs more swollen +2, slight wheezing, but no cough. Note she also has a hx of COPD, anemia. SNF provider ordered vistaril for anxiety today.     Called [facility] and spoke w/ Mien MORFIN who reported that pt took one dose of imdur and c/o \"multiple side effects\" and upon further questioning, was nervous and shaking and no further doses have been administered. Mine requested order to discontinue this med. Mine reported that SNF provider discussed hospice w/ pt's dtr today, but outcome of that conversation is unknown.    Mine, again noted that sats dropped w/ PT on RA, then recovered w/ rest on RA. She reported wts:  4/8 116.8#  4/7 116.0#  4/6 115.0#  SNF admit wt 3/21 per chart review was 109#.  Hospital wts:      Last OV 4/3 w/ Alice May CNP at [facility] wt of 116# and hypervolemic w/ elevated JVP and LE edema, and lasix 20mg daily restarted after being on hold from 3/27-4/3. She was tachycardic and toprol xl increased to 25mg daily. Forward to Alice WYNN for review, who has follow-up w/ pt on 4/10.    Mira Cedeno RN BSN   4:10 PM 04/08/19              "

## 2019-04-08 NOTE — LETTER
"    4/8/2019        RE: Jamilah Rodriguez  [address]  [facility] [address]        [place] [facility]  [place] Medical Record Number:  [id number]  Place of Service where encounter took place:  [facility] ([facility] [[id number]]  Chief Complaint   Patient presents with     RECHECK       HPI:    Jamilah Rodriguez  is a 87 year old (4/5/1931), who is being seen today for an episodic care visit.  HPI information obtained from: facility chart records, facility staff, patient report and [facility] chart review. Today's concern is:  CHF/cardiomyopathy: on exam today states she is feeling a little more SOB, in the late afternoon she is feeling anxious and like \"my skin is crawling\" SaO2 on room air is 91%, she does have LE edema, admission weight 109lbs and today's weight is 116.8lbs steadily increasing  Anxiety: acute see above, fatigue and increased anxiety in later afternoon and when laying in bed at night  GARIMA/CKD: creat is 1.45 with BUN 26 on 4/2, patient has cardiology appt on wed  HTN: BP  As follows: 115/70, 122/70, 132/83 , HR  range  Anemia: Hgb 8.3    Past Medical and Surgical History reviewed in Epic today.    MEDICATIONS:  Current Outpatient Medications   Medication Sig Dispense Refill     albuterol (PROAIR HFA/PROVENTIL HFA/VENTOLIN HFA) 108 (90 Base) MCG/ACT Inhaler Inhale 2 puffs into the lungs every 4 hours as needed for shortness of breath / dyspnea or wheezing       albuterol (PROVENTIL) (2.5 MG/3ML) 0.083% neb solution Take 2.5 mg by nebulization every 4 hours as needed for shortness of breath / dyspnea or wheezing       ANORO ELLIPTA 62.5-25 MCG/INH oral inhaler Inhale 1 puff into the lungs daily 1 Inhaler 8     bimatoprost (LUMIGAN) 0.01 % SOLN Place 1 drop into both eyes At Bedtime.         dorzolamide (TRUSOPT) 2 % ophthalmic solution Place 1 drop into both eyes 2 times daily       furosemide (LASIX) 20 MG tablet Take 1 tablet (20 mg) by mouth daily 90 tablet " 1     hydrALAZINE (APRESOLINE) 10 MG tablet Take 1 tablet (10 mg) by mouth 3 times daily 90 tablet 1     isosorbide mononitrate (IMDUR) 30 MG 24 hr tablet Take 1 tablet (30 mg) by mouth daily 30 tablet 1     metoprolol succinate ER (TOPROL-XL) 25 MG 24 hr tablet Take 1 tablet (25 mg) by mouth daily 30 tablet 1     miconazole with skin protectant (SHANNAN ANTIFUNGAL) 2 % CREA cream Apply topically 2 times daily       Multiple Vitamins-Minerals (PRESERVISION AREDS 2 PO) Take 1 tablet by mouth 2 times daily       nystatin (MYCOSTATIN) 865382 UNIT/ML suspension Take 5 mLs by mouth 4 times daily       pantoprazole (PROTONIX) 20 MG EC tablet Take 2 tablets (40 mg) by mouth 2 times daily       polyethylene glycol 400 (BLINK TEARS) 0.25 % SOLN ophthalmic solution Place 1 drop into both eyes every 3 hours as needed for dry eyes       sennosides (SENOKOT) 8.6 MG tablet Take 1 tablet by mouth daily as needed for constipation       Throat Lozenges (LOZENGES MT) Give 1 lozenge by mouth four times a day for yeast infection in mouth         REVIEW OF SYSTEMS:  10 point ROS of systems including Constitutional, Eyes, Respiratory, Cardiovascular, Gastroenterology, Genitourinary, Integumentary, Musculoskeletal, Psychiatric were all negative except for pertinent positives noted in my HPI.    Objective:  /70   Pulse 105   Temp 97.1  F (36.2  C)   Resp 18   Wt 52.6 kg (116 lb)   SpO2 93%   BMI 19.30 kg/m     Exam:  GENERAL APPEARANCE:  Alert, in no distress, thin  ENT:  Mouth and posterior oropharynx normal, moist mucous membranes, [place]  EYES:  EOM, conjunctivae, lids, pupils and irises normal, PERRL  RESP:  respiratory effort and palpation of chest normal, lungs clear to auscultation , no respiratory distress, diminished breath sounds bases bilaterally  CV:  Palpation and auscultation of heart done , regular rate and rhythm, no murmur, rub, or gallop, peripheral edema 1+ in LE bilaterally L>R  ABDOMEN:  normal bowel sounds,  soft, nontender, no hepatosplenomegaly or other masses  M/S:   Examination of:   right upper extremity, left upper extremity, right lower extremity and left lower extremity  Inspection, ROM, stability and muscle strength normal and generalized weakness noted  SKIN:  Inspection of skin and subcutaneous tissue baseline  NEURO:   Cranial nerves 2-12 are normal tested and grossly at patient's baseline, speech WNL  PSYCH:  affect and mood normal    Labs:   Recent labs in [facility] reviewed by me today.     ASSESSMENT/PLAN:     Acute on chronic systolic heart failure (H)  Cardiomyopathy, idiopathic (H)  Acute/ongoing: EF 10-20%, daily weights, vitals daily and prn, BMP  weekly, lasix 20mg QD, hydralaine 10mg TID, toprol xl 25mg QD   Baseline creat 1.0, current creat 1.45  Fluid restriction 2000cc day     GARIMA/CKD 3  Acute/ongoing: BMP  weekly, creat baseline 1.0, avoid nephrotoxic agents     Benign essential hypertension  Atrial fibrillation with RVR (H)  Ongoing: vitals daily and prn, BMP  weekly, metoprolol xl 25mg QD, holding xarelto due to Hgb drop from 13 to 7.5,   Cardiology f/u as directed     Thrush  Acute/ongoing: GI consult and EGD during hospitalization, f/u GI on 4/2/19 started on pantoprazole 40mg QD and clotrimazole 4X daily      Anemia due to blood loss, acute  Acute: Hgb baseline 13 was 7.1 on admission, EGD as above, holding xarelto  Hgb stable, continue weekly hgb     Chronic obstructive pulmonary disease, unspecified COPD type (H)  Ongoing: monitor SaO2 at rest and with activity, continue anoro ellipta 62.5-25mcg inhaled once day,  Albuterol MDI 2 puff q 4 hours prn, albuterol nebs q 4 hours prn     Orders written by provider at facility  Vistaril 25mg q 6 hours prn for anxiety    Total time spent with patient visit at the skilled nursing facility was 45 min including patient visit, review of past records and talked to daughter at bedside discussed POC, medication changes, labs and vitals. Greater than 50% of  total time spent with counseling and coordinating care due to changes in orders  Electronically signed by:  Tonya Lynn Haase, APRN CNP         Sincerely,        Tonya Lynn Haase, APRN CNP

## 2019-04-08 NOTE — PROGRESS NOTES
"[facility]  [place] Medical Record Number:  [id number]  Place of Service where encounter took place:  [facility] ([facility] [[id number]]  Chief Complaint   Patient presents with     RECHECK       HPI:    Jamilah Rodriguez  is a 87 year old (4/5/1931), who is being seen today for an episodic care visit.  HPI information obtained from: facility chart records, facility staff, patient report and [facility] chart review. Today's concern is:  CHF/cardiomyopathy: on exam today states she is feeling a little more SOB, in the late afternoon she is feeling anxious and like \"my skin is crawling\" SaO2 on room air is 91%, she does have LE edema, admission weight 109lbs and today's weight is 116.8lbs steadily increasing  Anxiety: acute see above, fatigue and increased anxiety in later afternoon and when laying in bed at night  GARIMA/CKD: creat is 1.45 with BUN 26 on 4/2, patient has cardiology appt on wed  HTN: BP  As follows: 115/70, 122/70, 132/83 , HR  range  Anemia: Hgb 8.3    Past Medical and Surgical History reviewed in Epic today.    MEDICATIONS:  Current Outpatient Medications   Medication Sig Dispense Refill     albuterol (PROAIR HFA/PROVENTIL HFA/VENTOLIN HFA) 108 (90 Base) MCG/ACT Inhaler Inhale 2 puffs into the lungs every 4 hours as needed for shortness of breath / dyspnea or wheezing       albuterol (PROVENTIL) (2.5 MG/3ML) 0.083% neb solution Take 2.5 mg by nebulization every 4 hours as needed for shortness of breath / dyspnea or wheezing       ANORO ELLIPTA 62.5-25 MCG/INH oral inhaler Inhale 1 puff into the lungs daily 1 Inhaler 8     bimatoprost (LUMIGAN) 0.01 % SOLN Place 1 drop into both eyes At Bedtime.         dorzolamide (TRUSOPT) 2 % ophthalmic solution Place 1 drop into both eyes 2 times daily       furosemide (LASIX) 20 MG tablet Take 1 tablet (20 mg) by mouth daily 90 tablet 1     hydrALAZINE (APRESOLINE) 10 MG tablet Take 1 tablet (10 mg) by mouth 3 times daily 90 tablet 1 "     isosorbide mononitrate (IMDUR) 30 MG 24 hr tablet Take 1 tablet (30 mg) by mouth daily 30 tablet 1     metoprolol succinate ER (TOPROL-XL) 25 MG 24 hr tablet Take 1 tablet (25 mg) by mouth daily 30 tablet 1     miconazole with skin protectant (SHANNAN ANTIFUNGAL) 2 % CREA cream Apply topically 2 times daily       Multiple Vitamins-Minerals (PRESERVISION AREDS 2 PO) Take 1 tablet by mouth 2 times daily       nystatin (MYCOSTATIN) 965447 UNIT/ML suspension Take 5 mLs by mouth 4 times daily       pantoprazole (PROTONIX) 20 MG EC tablet Take 2 tablets (40 mg) by mouth 2 times daily       polyethylene glycol 400 (BLINK TEARS) 0.25 % SOLN ophthalmic solution Place 1 drop into both eyes every 3 hours as needed for dry eyes       sennosides (SENOKOT) 8.6 MG tablet Take 1 tablet by mouth daily as needed for constipation       Throat Lozenges (LOZENGES MT) Give 1 lozenge by mouth four times a day for yeast infection in mouth         REVIEW OF SYSTEMS:  10 point ROS of systems including Constitutional, Eyes, Respiratory, Cardiovascular, Gastroenterology, Genitourinary, Integumentary, Musculoskeletal, Psychiatric were all negative except for pertinent positives noted in my HPI.    Objective:  /70   Pulse 105   Temp 97.1  F (36.2  C)   Resp 18   Wt 52.6 kg (116 lb)   SpO2 93%   BMI 19.30 kg/m    Exam:  GENERAL APPEARANCE:  Alert, in no distress, thin  ENT:  Mouth and posterior oropharynx normal, moist mucous membranes, [place]  EYES:  EOM, conjunctivae, lids, pupils and irises normal, PERRL  RESP:  respiratory effort and palpation of chest normal, lungs clear to auscultation , no respiratory distress, diminished breath sounds bases bilaterally  CV:  Palpation and auscultation of heart done , regular rate and rhythm, no murmur, rub, or gallop, peripheral edema 1+ in LE bilaterally L>R  ABDOMEN:  normal bowel sounds, soft, nontender, no hepatosplenomegaly or other masses  M/S:   Examination of:   right upper extremity,  left upper extremity, right lower extremity and left lower extremity  Inspection, ROM, stability and muscle strength normal and generalized weakness noted  SKIN:  Inspection of skin and subcutaneous tissue baseline  NEURO:   Cranial nerves 2-12 are normal tested and grossly at patient's baseline, speech WNL  PSYCH:  affect and mood normal    Labs:   Recent labs in [facility] reviewed by me today.     ASSESSMENT/PLAN:     Acute on chronic systolic heart failure (H)  Cardiomyopathy, idiopathic (H)  Acute/ongoing: EF 10-20%, daily weights, vitals daily and prn, BMP  weekly, lasix 20mg QD, hydralaine 10mg TID, toprol xl 25mg QD   Baseline creat 1.0, current creat 1.45  Fluid restriction 2000cc day     GARIMA/CKD 3  Acute/ongoing: BMP  weekly, creat baseline 1.0, avoid nephrotoxic agents     Benign essential hypertension  Atrial fibrillation with RVR (H)  Ongoing: vitals daily and prn, BMP  weekly, metoprolol xl 25mg QD, holding xarelto due to Hgb drop from 13 to 7.5,   Cardiology f/u as directed     Thrush  Acute/ongoing: GI consult and EGD during hospitalization, f/u GI on 4/2/19 started on pantoprazole 40mg QD and clotrimazole 4X daily      Anemia due to blood loss, acute  Acute: Hgb baseline 13 was 7.1 on admission, EGD as above, holding xarelto  Hgb stable, continue weekly hgb     Chronic obstructive pulmonary disease, unspecified COPD type (H)  Ongoing: monitor SaO2 at rest and with activity, continue anoro ellipta 62.5-25mcg inhaled once day,  Albuterol MDI 2 puff q 4 hours prn, albuterol nebs q 4 hours prn     Orders written by provider at facility  Vistaril 25mg q 6 hours prn for anxiety    Total time spent with patient visit at the skilled nursing facility was 45 min including patient visit, review of past records and talked to daughter at bedside discussed POC, medication changes, labs and vitals. Greater than 50% of total time spent with counseling and coordinating care due to changes in orders  Electronically signed  by:  Tonya Lynn Haase, APRN CNP

## 2019-04-09 NOTE — PROGRESS NOTES
Call to [facility] and spoke with nurse- labs were drawn today 4/9/2019 and the will fax results when available.  Lis Fontana RN 04/09/19 8:36 AM

## 2019-04-09 NOTE — NURSING NOTE
Last 3 BPs: 120/78, 115/70, 122/70 mmHg  HR Ranges:  bpm  Admission Weight: 3/20/19: 109.3 lbs  Current Weights: 4/1/19: 113.4 lbs - 4/9/19: 116 lbs

## 2019-04-09 NOTE — PROGRESS NOTES
[place] [facility] DISCHARGE SUMMARY  PATIENT'S NAME: Jamilah Rodriguez  YOB: 1931  MEDICAL RECORD NUMBER:  [id number]  Place of Service where encounter took place:  [facility] ([facility]) [[id number]]    PRIMARY CARE PROVIDER AND CLINIC RESPONSIBLE AFTER TRANSFER:   Sharon Hurtado MD, [address] [place] [address] / [facility] [id number] ***   G Provider     Transferring providers: Dr. Sanaz Nichols MD  Recent Hospitalization/ED:  [facility] Hospital stay 3/17/19 to 3/20/19.  Date of SNF Admission:   Date of SNF (anticipated) Discharge: {DATE/MONTH/YEAR:8469788}  Discharged to: {fgsdischargeto1:591922}  Cognitive Scores: BIMS: 15/15, SBT: 0  Physical Function: {fgsphysicalfunction:777146}  DME: {fgsdmedc:853458}    CODE STATUS/ADVANCE DIRECTIVES DISCUSSION:  DNR / DNI {Provider, verify code status is accurate as an order in Epic}  ALLERGIES: Atenolol; Beta adrenergic blockers; Brimonidine; Cephalexin; Germall plus; Latex; Morphine; No clinical screening - see comments; Penicillins; Sulfa drugs; Tramadol; Tylenol; Vicodin [hydrocodone-acetaminophen]; and Fentanyl    DISCHARGE DIAGNOSIS/NURSING FACILITY COURSE:   {fgsdia}    Past Medical History:  has a past medical history of AAA (abdominal aortic aneurysm) (H), Cardiomyopathy, idiopathic (H) (10/2018), Cervical cancer (H) (), Chronic dermatitis, Glaucoma, Glaucoma, History of COPD, HTN (hypertension), LBBB (left bundle branch block) (10/2018), Macular degeneration, Osteoporosis (8/3/11), PAD (peripheral artery disease) (H), and Vertebral compression fracture (H) (11).    Discharge Medications:  Current Outpatient Medications   Medication Sig Dispense Refill     albuterol (PROAIR HFA/PROVENTIL HFA/VENTOLIN HFA) 108 (90 Base) MCG/ACT Inhaler Inhale 2 puffs into the lungs every 4 hours as needed for shortness of breath / dyspnea or wheezing       albuterol (PROVENTIL) (2.5 MG/3ML) 0.083% neb  solution Take 2.5 mg by nebulization every 4 hours as needed for shortness of breath / dyspnea or wheezing       ANORO ELLIPTA 62.5-25 MCG/INH oral inhaler Inhale 1 puff into the lungs daily 1 Inhaler 8     bimatoprost (LUMIGAN) 0.01 % SOLN Place 1 drop into both eyes At Bedtime.         dorzolamide (TRUSOPT) 2 % ophthalmic solution Place 1 drop into both eyes 2 times daily       furosemide (LASIX) 20 MG tablet Take 20 mg by mouth 2 times daily       hydrALAZINE (APRESOLINE) 10 MG tablet Take 1 tablet (10 mg) by mouth 3 times daily 90 tablet 1     hydrOXYzine (VISTARIL) 25 MG capsule Take 25 mg by mouth every 6 hours as needed for itching       metoprolol succinate ER (TOPROL-XL) 25 MG 24 hr tablet Take 1 tablet (25 mg) by mouth daily 30 tablet 1     miconazole with skin protectant (SHANNAN ANTIFUNGAL) 2 % CREA cream Apply topically 2 times daily       Multiple Vitamins-Minerals (PRESERVISION AREDS 2 PO) Take 1 tablet by mouth 2 times daily       nystatin (MYCOSTATIN) 077652 UNIT/ML suspension Take 5 mLs by mouth 4 times daily       pantoprazole (PROTONIX) 20 MG EC tablet Take 2 tablets (40 mg) by mouth 2 times daily       polyethylene glycol 400 (BLINK TEARS) 0.25 % SOLN ophthalmic solution Place 1 drop into both eyes every 3 hours as needed for dry eyes       sennosides (SENOKOT) 8.6 MG tablet Take 1 tablet by mouth daily as needed for constipation       furosemide (LASIX) 20 MG tablet Take 1 tablet (20 mg) by mouth daily (Patient not taking: Reported on 4/9/2019) 90 tablet 1     isosorbide mononitrate (IMDUR) 30 MG 24 hr tablet Take 1 tablet (30 mg) by mouth daily (Patient not taking: Reported on 4/9/2019) 30 tablet 1     Throat Lozenges (LOZENGES MT) Give 1 lozenge by mouth four times a day for yeast infection in mouth       ***  Medication Changes/Rationale:     ***    Controlled medications sent with patient:   {fgscontrolledmeds1:252935}     ROS:   {ROS FGS:643515:::0}    Physical Exam:   Vitals: /78    Pulse 88   Temp 98.4  F (36.9  C)   Resp 20   Wt 52.6 kg (116 lb)   SpO2 96%   BMI 19.30 kg/m    BMI= Body mass index is 19.3 kg/m .  {NURSING HOME PHYSICAL EXAM:773528}     SNF labs: {fgslabdischarge:814927}  {fgsinrtable:206945:x}    DISCHARGE PLAN:    Follow up labs: {fgsfuturelabs1:258455}    Medical Follow Up:      {fgsdischargefollowup:442615}    MTM referral needed and placed by this provider: {YES (EXPLAIN)/NO:082254}    Current [place] scheduled appointments:  Next 5 appointments (look out 90 days)    Apr 18, 2019 11:00 AM CDT  SHORT with Sharon Hurtado MD  [facility] ([facility]) [address] [place] [address] [phone]       ***    Discharge Services: {fgsdcservices1:650204}    Discharge Instructions Verbalized to Patient at Discharge:     {fgsdischargeinstructions1:623590}      TOTAL DISCHARGE TIME:   {TIME:521128}  Electronically signed by:  Sigrid Iniguez ***    {shomecare F2F:292580}  {providers Please double check the med list (in the plan section >> meds & orders tab) and Discontinue any of the meds flagged by the TC to be discontinued}

## 2019-04-10 NOTE — PROGRESS NOTES
Received BMP and Hemoglobin labs from [facility], ordered by Tonya Haase, CNP, result date 4/9/19. Entered in Epic, sent to HIM to scan.     Pt has CORE OV today. Mary Worthington RN on 4/10/2019 at 9:08 AM

## 2019-04-10 NOTE — PATIENT INSTRUCTIONS
1. Start potassium powder 20meq daily  2. Check labs Thursday   3. Increase furosemide to 40mg qam and 20mg qpm.   4. Take hydralazine daily   5. Followup with Dr. Fairbanks 4/12/19

## 2019-04-10 NOTE — PROGRESS NOTES
Cardiology Clinic Progress Note  Jamilah Rodriguez MRN# [id number]   YOB: 1931 Age: 88 year old   Primary Cardiologist: Dr. Bond, establishing care with CORE MD, Dr. Fairbanks  Reason for visit: CORE followup            Assessment and Plan:   Jamilah Rodriguez is a very pleasant 87 year old female with a history of HFrEF, nonischemic cardiomyopathy (diagnosed 10/2018), LVEF 10-20%, LV dimension 5.8cm per echocardiogram 2/6/19, chronic LBBB, atrial fibrillation, AAA (followed by Dr. Boogie) and COPD. Patient with 2 recent hospitalizations 3/17/2019-3/20/2019 presenting with complaints of shortness of breath found to be hypoxic 70s, thought to be secondary to volume overload. Patient was diuresed from 123# to 108# at discharge. Diuretics held at discharge and restarted on torsemide 10mg at TCU. It was recommended that patient start metoprolol XL 25mg daily but appears patient was restarted on diltiazem at time of discharge. Patient creatinine was noted to be rising while in the hospital. 2/3/2019-2/10/2019 hospitalized related to acute respiratory distress due to influenza with COPD exacerbation. Patient here today for CORE follow up. Patient is having depression and anxiety which is confounding the situation. She was refusing her vasodilators this past week (hydralazine, took one dose of imdur stated it made her nervous and hasn't taken since).     Since I saw her for CORE enrollment on 3/27/19 I have been working on optimizing HR control on betablockers (not diltiazem) and increase vasodilator therapy. Concerns for low output state given low ejection fraction and no vasodilator therapy. Patient also likely over diuresed when she first presented, torsemide was on hold from 3/27/19-4/3/19. Furosemide was started during last clinic visit 4/3/19 given some signs of volume overload (lower extremity edema and elevated JVD).       1.  Chronic systolic heart failure/HFrEF, nonischemic cardiomyopathy : LVEF 10-20%,  LV dimension 5.8cm per echocardiogram 2/6/19. Patient appears slightly volume up on exam, weight stable since last week but at that time her weight was up 4# in past 5 days (torsemide has been on hold since 3/27/19), she was started on furosemide 20mg daily which was further increased to 20mg BID via phone followup.  Persistent dyspnea which is likely multifactorial (COPD, deconditioning, anemia, HFrEF and anxiety), patients multiple chronic co-morbidities make it difficult to differentiate what is causing dyspnea. Will continue following very closely with patient to optimize her heart failure medications/status.  Extensively reviewed medications today, as patient has been refusing her vasodilator medications (hydralazine) off and on for the past week. She took one dose of Imdur which she states made her feel nervous and hasn't taken since, this feeling was likely secondary to her anxiety. Will explore reconsideration for Imdur at f/u appointment.    - NYHA class III, stage C   - Etiology : nonischemic, idopathic?    - Fluid status : slightly hypervolemic   - Diuretic regimen :  INCREASE furosemide 40mg qam and 20mg qpm. (Torsemide on hold from 3/27/19 - 4/3/19) Patient has not required diuretics prior to hospitalization. HF dx in October 2018.    - Last RHC : none, will consider if continued difficulties with managing volume status.   - Will consider cardiomems implantation in the future if difficulties with managing volume status.    - Ischemic evaluation : C completed 10/12/2018 showed moderate nonocclusive coronary artery disease   - Guideline directed medical therapy    - Betablocker: Metoprolol XL 25mg daily today.     ** Atenolol allergy noted in chart (hives) and concerns for betablocker therapy in the past given COPD. Patient has been tolerating metoprolol with no signs or symptoms of adverse reaction.     -  Continue hydralazine 10mg TID - reinforced the importance of compliance.     - Consider  reinitiation of isosorbide mononitrate at f/u next week    - ACEI/ARB/ARNI: none, was on losartan in the past, appears at time of admission patient states it was stopped? Patient will benefit from restarting if kidney function normalizes.     - Aldactone antagonist: was on spironolactone 12.5mg, this was held at discharge related to rash/GARIMA. Will consider in future if kidney function improves.    - Sudden cardiac death prophylaxis : patient was evaluated for BiV ICD consideration, pt declined. Further reviewed with patient in past appointments, as she noted she is now considering, wishes to further discuss once she is feeling better.    - Patient will require close core followup, scheduled for weekly appts x 3 weeks.    - Order placed to establish care with CORE MD, appointment moved up to Friday   - Reinforced heart failure education given, continue daily weights and low sodium diet.     2. Acute kidney injury - Baseline creatinine 0.7-0.9, kidney function has improved to 1.4 and has remained stable at this level.  Was been noted to improve with vasodilation and initial hold of diuretics (1.97->1.4)    3. Atrial fibrillation - chronic, stable, rate controlled, asymptomatic. [id number] score at least 5 (HTN, age, HF, female).    -  Xarelto held during hospitalization and at discharge related to anemia, GI consulted, s/p EGD which showed esophageal candidiasis, no active bleeding found.    - Recommend continuing to hold xarelto at this times.    -  REMAIN OFF diltiazem, given HFrEF   - Continue metoprolol XL 25mg daily, see above, atenolol allergy noted, reviewed extensively with patient and family and agree with plan to trial beta blocker therapy, tolerating BB with no adverse effects.     4. Anemia - noted after starting anticoagulation for atrial fibrillation, no bleeding source found. Hemoglobin 4/10 is 8.6, down from 13.3 on 2/9/19, as low as 7.1 3/18/19.   - Anticoagulation has remained on hold, continue to  hold for now.    - Recommended consideration for blood transfusion, patient remains resistant to this    - Patient will benefit from hematology evaluation as anemia is likely exacerbating HF and worsening kidney function. Patient wishes to further discuss at follow up appointments.     5. Advance care planning - need to further explore with patient and family. She has noted that she doesn't wish to undergo invasive procedures. Patients depression/anxiety has been increasingly symptomatic. ashlie notes she is unsatisfied with her current quality of life. Prior to her hospitalization in February with influenza she was living independently. Will continue exploring goals of care with patient and family.        Changes today: INCREASE furosemide 20mg qam and 40mg qpm, potassium started today at TCU.     Follow up plan:     CORE followup with me  4/19/19    Follow up with Dr. Fairbanks on Friday 4/12/19 to establish care with CORE MD.     CORE RN to call patient Monday, check on weight and HF symptoms.         History of Presenting Illness:    Jamilah Rodriguez is a very pleasant 87 year old female with a history of HFrEF, nonischemic cardiomyopathy (diagnosed 10/2018), LVEF 10-20%, LV dimension 5.8cm per echocardiogram 2/6/19, chronic LBBB, atrial fibrillation, AAA (followed by Dr. Boogie) and COPD.     Patient with 2 recent hospitalizations 3/17/2019-3/20/2019 presenting with complaints of shortness of breath felt to be secondary to volume overload. Reports that her face and neck were swelling up and needing to prop herself up at night to breath. Worsening lower extremity edema. EMS noted patient was 70% on room air. BNP 10,760. Troponin normal 0.026. Hgb 8.2 (13.3 2/9/29, as low as 7.1 3/18/19). Admission weight 123#. Discharge weight 108#. Spironolactone held at discharge related to rash/GARIMA. No betablockers as caused worsening COPD in the past. It was recommended that patients diltiazem be discontinued and metoprolol XL  started, it appears patient started this but wasn't carried over at discharge. Diuresed with IV furosemide and transitioned to PO torsemide. Diuretics held at discharge and restarted on torsemide 10mg at TCU. Xarelto held during hospitalization and at discharge related to anemia, GI consulted, s/p EGD which showed esophageal candidiasis, no active bleeding found. Patient was on losartan at one point but at time of admission she notes it was discontinued. 2/3/2019-2/10/2019 hospitalized related to acute respiratory distress due to influenza with COPD exacerbation. Discharged to TCU on 1-2L oxygen, stating when she returned home she didn't need oxygen.       Patient has followed with Dr. Bond and Dr. Arana in outpatient cardiology. Dr. Bond saw patient on 10/5/2018 due to new systolic cardiomyopathy after which she underwent a complete workup. Normal LVEF in 2012 and nuclear study noted normal LVEF in 2015. Cardiac MRI 10/3/2018 demonstrated moderate LV dilation with severely reduced systolic function. Late gadolinium enhancement imaging demonstrates a small area of midwall enhancement involving the mid and basal inferoseptal wall. This is nonspecific pattern consistent with idiopathic nonischemic cardiomyopathy. Cardiac catheterization completed 10/12/2018 showed moderate nonocclusive coronary artery disease. LAD 30% stenosis, circumflex 30-40%, and RCA 30-40%. At this time she was on losartan and aldactone was started. Consideration for CRT was also recommended given chronic LBBB. Patient was seen by Dr. Arana 11/5/2018 to be evaluated for BiV ICD consideration. At this time patient was not interested in invasive procedures and given advanced age Dr. Arana saw little benefit. Noting that reconsideration in the future could be made if HF decompensation.    Patient has been following closely in CORE clinic, medications being optimized to increase vasodilation. During last CORE visit on 4/3/19 patients metoprolol was increased to  "25mg daily given tachycardia and started on furosemide 20mg daily given signs of fluid retention (JVD and edema). Prior to most recent hospitalization patient has not required diuretics, HFrEF was diagnosed in October. Torsemide was been on hold since 3/27/19-4/3/19.     Patient is here today for CORE followup. Patient remains at TCU. Plans to discharge home on Friday. Weight today in clinic is 120#, which is up from 119# last week. Patients biggest complaints today are related to anxieties. Plan for discharge home, daughter very worried about patient discharging home. Daughter notes they have explored assisted living, but patient thought it wouldn't come down to that. Patient continues to be frustrated that she is not improving. \"I have no desire to walk anymore\". Denies shortness of breath at rest. Any activity results in dyspnea, \"even putting on lotion\". Notes slight worsening of lower extremity edema.     Patient has been refusing to take her hydralazine and took one dose of imdur with complaints of \"multiple side effects\" \"nervous/shaking\" and has refused to take since. Working with PT/OT daily at TCU, tomorrow is the last day. Patients daughter is present during clinic visit today. Patient denies chest pain or chest tightness. Denies dizziness, lightheadedness or other presyncopal symptoms. Still complaints of excessive thirst. Complaints of abdominal pain, \"upset stomach\", tums helps.     States that her anxiety hits her out of the blue, \"calming patch helps\". States during this time her breathing is worse, this also improves with calming patch. Saw pulmonary provider last week, no medication changes.     Labs from yesterday show stable kidney function, kidney function has improved to 1.4 and has remained stable at this level, potassium low at 3.2 otherwise electrolytes stable, Hemoglobin 8.6. Blood pressure 98/60 and HR 76 in clinic today.    Appetite poor, noting she is not liking the food at the TCU. Not " adding additional salt to foods. Drinking occasional ensure, trying to drink one daily. Following 2L fluid restriction. Working with therapy, doing leg and arm exercises and walking, able to walk 87 feet. Denies alcohol use. Denies tobacco use.         Recent Hospitalizations   3/17/2019-3/20/2019 presenting with complaints of shortness of breath. Reports that her face and neck were swelling up and needing to prop herself up at night to breath. Worsening lower extremity edema. EMS noted patient was 70% on room air. BNP 10,760. Troponin normal 0.026. Hgb 8.2 (13.3 29, as low as 7.1 3/18/19). Admission weight 123#. Discharge weight 108#. Spironolactone held at discharge related to rash/GARIMA. No betablockers as caused worsening COPD in the past. Diuresed with IV furosemide and transitioned to PO torsemide. Diuretics held at discharge and restarted on torsemide 10mg at TCU  2/3/2019-2/10/2019 related to acute respiratory distress due to influenza with COPD exacerbation.         Social History    2 children   Social History     Socioeconomic History     Marital status:      Spouse name: Not on file     Number of children: 2     Years of education: Not on file     Highest education level: Not on file   Occupational History     Not on file   Social Needs     Financial resource strain: Not on file     Food insecurity:     Worry: Not on file     Inability: Not on file     Transportation needs:     Medical: Not on file     Non-medical: Not on file   Tobacco Use     Smoking status: Former Smoker     Packs/day: 1.00     Years: 60.00     Pack years: 60.00     Last attempt to quit: 2009     Years since quittin.7     Smokeless tobacco: Never Used     Tobacco comment: former 1/2-1 ppd since age [age]   Substance and Sexual Activity     Alcohol use: No     Alcohol/week: 0.0 oz     Comment: NA     Drug use: No     Sexual activity: Never   Lifestyle     Physical activity:     Days per week: Not on file     Minutes  "per session: Not on file     Stress: Not on file   Relationships     Social connections:     Talks on phone: Not on file     Gets together: Not on file     Attends Faith service: Not on file     Active member of club or organization: Not on file     Attends meetings of clubs or organizations: Not on file     Relationship status: Not on file     Intimate partner violence:     Fear of current or ex partner: Not on file     Emotionally abused: Not on file     Physically abused: Not on file     Forced sexual activity: Not on file   Other Topics Concern     Parent/sibling w/ CABG, MI or angioplasty before 65F 55M? Not Asked   Social History Narrative     Not on file            Review of Systems:   Skin:  Negative     Eyes:  Negative    ENT:  Negative    Respiratory:  Positive for shortness of breath;dyspnea on exertion  Cardiovascular:    Positive for;edema;lightheadedness;dizziness  Gastroenterology: Positive for nausea;heartburn  Genitourinary:  Negative    Musculoskeletal:  Negative    Neurologic:  Positive for numbness or tingling of hands  Psychiatric:  Negative    Heme/Lymph/Imm:  Positive for allergies  Endocrine:  Negative           Physical Exam:   Vitals: BP 98/60   Pulse 76   Ht 1.651 m (5' 5\")   Wt 54.5 kg (120 lb 1.6 oz)   SpO2 100%   BMI 19.99 kg/m     Wt Readings from Last 4 Encounters:   04/10/19 54.5 kg (120 lb 1.6 oz)   04/08/19 52.6 kg (116 lb)   04/03/19 54 kg (119 lb)   04/01/19 51.4 kg (113 lb 6.4 oz)     GEN: well nourished, in no acute distress.  HEENT:  Pupils equal, round. Sclerae nonicteric.   NECK: Supple, no masses appreciated. JVD elevated at 12cm at 90 degrees.   C/V:  Irregularly irregular rate and rhythm, no murmur  RESP: Respirations are unlabored. Clear to auscultation bilaterally without wheezing  GI: Abdomen soft, nontender.  EXTREM: +1 LE edema to bilateral ankles/feet  NEURO: Alert and oriented, cooperative.  SKIN: Warm and dry, skin to bilateral lower extremities scaly. "        Data:   ECHO 2/6/2019  The left ventricle is severely dilated. The visual ejection fraction is  estimated at 10-20%.  Right ventricular function cannot be assessed due to poor image quality.  Probably mildly reduced? TDI velocities suggest normal function, but this  cannot be fully assessed.  Trivial pericardial effusion    Cardiac catheterization 10/12/2018  Summary  1. Moderate nonocclusive coronary artery disease  2. Normal left ventricular end-diastolic pressure    Cardiac MRI 1010/2018  1. The LV is moderately dilated. The global systolic function is severely reduced. The LVEF is 19%. There  is severe global hypokinesis of the left ventricle with minor regional variation.   2. The RV is normal in cavity size. The global systolic function is mildly to moderately reduced. The RVEF  is 41%.   3. There is moderate to severe biatrial enlargement.   4. There is no evidence of myocardial iron overload.   5. Late gadolinium enhancement imagingdemonstrates a small area of midwall enhancement involving the mid to  basal inferoseptal wall. This is a non-specific pattern that has been described in idiopathic nonischemic  cardiomyopathies.   6.  A small circumferential pericardial effusion is noted.         CONCLUSIONS:  Moderate left ventricular dilatation with severely reduced systolic function. Late gadolinium  enhancement imagingdemonstrates a small area of midwall enhancement involving the mid to basal inferoseptal  wall. This is a non-specific pattern that has been described in idiopathic nonischemic cardiomyopathies.     LIPID RESULTS:  Lab Results   Component Value Date    CHOL 141 09/28/2018    HDL 70 09/28/2018    LDL 59 09/28/2018    TRIG 62 09/28/2018    CHOLHDLRATIO 2.2 04/08/2015     LIVER ENZYME RESULTS:  Lab Results   Component Value Date    AST 15 04/02/2019    ALT 14 04/02/2019     CBC RESULTS:  Lab Results   Component Value Date    WBC 12.6 (H) 03/18/2019    RBC 2.43 (L) 03/18/2019    HGB 8.6 (A)  04/09/2019    HGB 8.6 (A) 04/09/2019    HCT 22.3 (L) 03/18/2019    MCV 92 03/18/2019    MCH 30.9 03/18/2019    MCHC 33.6 03/18/2019    RDW 15.4 (H) 03/18/2019     03/18/2019     BMP RESULTS:  Lab Results   Component Value Date     04/09/2019     04/09/2019    POTASSIUM 3.2 (A) 04/09/2019    POTASSIUM 3.2 (A) 04/09/2019    CHLORIDE 99 04/09/2019    CHLORIDE 99 04/09/2019    CO2 24 04/09/2019    CO2 24 04/09/2019    ANIONGAP 13 04/09/2019    ANIONGAP 13 04/09/2019     (A) 04/09/2019     (A) 04/09/2019    BUN 35 (A) 04/09/2019    BUN 35 (A) 04/09/2019    CR 1.44 (A) 04/09/2019    CR 1.44 (A) 04/09/2019    GFRESTIMATED 32 (L) 04/09/2019    GFRESTIMATED 32 (A) 04/09/2019    GFRESTBLACK 37 (L) 04/09/2019    GFRESTBLACK 37 (A) 04/09/2019    NAKIA 8.2 (A) 04/09/2019    NAKIA 8.2 (A) 04/09/2019      INR RESULTS:  Lab Results   Component Value Date    INR 1.13 10/12/2018    INR 1.47 (H) 04/03/2013            Medications     Current Outpatient Medications   Medication Sig Dispense Refill     albuterol (PROAIR HFA/PROVENTIL HFA/VENTOLIN HFA) 108 (90 Base) MCG/ACT Inhaler Inhale 2 puffs into the lungs every 4 hours as needed for shortness of breath / dyspnea or wheezing       albuterol (PROVENTIL) (2.5 MG/3ML) 0.083% neb solution Take 2.5 mg by nebulization every 4 hours as needed for shortness of breath / dyspnea or wheezing       ANORO ELLIPTA 62.5-25 MCG/INH oral inhaler Inhale 1 puff into the lungs daily 1 Inhaler 8     bimatoprost (LUMIGAN) 0.01 % SOLN Place 1 drop into both eyes At Bedtime.         dorzolamide (TRUSOPT) 2 % ophthalmic solution Place 1 drop into both eyes 2 times daily       furosemide (LASIX) 20 MG tablet Take 20 mg by mouth 2 times daily       hydrALAZINE (APRESOLINE) 10 MG tablet Take 1 tablet (10 mg) by mouth 3 times daily 90 tablet 1     hydrOXYzine (VISTARIL) 25 MG capsule Take 25 mg by mouth every 6 hours as needed for itching       metoprolol succinate ER (TOPROL-XL) 25  MG 24 hr tablet Take 1 tablet (25 mg) by mouth daily 30 tablet 1     miconazole with skin protectant (SHANNAN ANTIFUNGAL) 2 % CREA cream Apply topically 2 times daily       Multiple Vitamins-Minerals (PRESERVISION AREDS 2 PO) Take 1 tablet by mouth 2 times daily       nystatin (MYCOSTATIN) 992910 UNIT/ML suspension Take 5 mLs by mouth 4 times daily       pantoprazole (PROTONIX) 20 MG EC tablet Take 2 tablets (40 mg) by mouth 2 times daily       polyethylene glycol 400 (BLINK TEARS) 0.25 % SOLN ophthalmic solution Place 1 drop into both eyes every 3 hours as needed for dry eyes       potassium chloride (KLOR-CON) 20 MEQ packet Take 20 mEq by mouth 2 times daily 30 packet 1     sennosides (SENOKOT) 8.6 MG tablet Take 1 tablet by mouth daily as needed for constipation       Throat Lozenges (LOZENGES MT) Give 1 lozenge by mouth four times a day for yeast infection in mouth            Past Medical History     Past Medical History:   Diagnosis Date     AAA (abdominal aortic aneurysm) (H)     Followed with yearly ultrasound     Cardiomyopathy, idiopathic (H) 10/2018     Cervical cancer (H) 1969     Chronic dermatitis     extensive allergy testing revealed allergy/sensitivity to carba mix ( rubber) and Imidazolidinyl Urea (common in beauty products)     Glaucoma      Glaucoma      History of COPD     very mild abnormal spirometry in 2007 (in [place]), has not been active since quitting smoking in 2009     HTN (hypertension)      LBBB (left bundle branch block) 10/2018     Macular degeneration      Osteoporosis 8/3/11    femoral T score -3.4 & -3.7     PAD (peripheral artery disease) (H)      Vertebral compression fracture (H) 5/17/11    L1 compression fracture, presumed osteoporotic compression fracture     Past Surgical History:   Procedure Laterality Date     CATARACT IOL, RT/LT  2/8/12    left eye cataract removal     ESOPHAGOSCOPY, GASTROSCOPY, DUODENOSCOPY (EGD), COMBINED N/A 3/19/2019    Procedure: COMBINED ESOPHAGOSCOPY,  GASTROSCOPY, DUODENOSCOPY (EGD);  Surgeon: Naveen Santo MD;  Location:  GI     HEART CATH CORONARY ANGIOGRAM W/LV GRAM  10/2018    moderate nonocclusive CAD     HYSTERECTOMY TOTAL ABDOMINAL  1999    Fibroids     LAPAROSCOPY DIAGNOSTIC (GENERAL) N/A 4/15/2018    Procedure: LAPAROSCOPY DIAGNOSTIC (GENERAL);;  Surgeon: Jm Craig MD;  Location:  OR     LAPAROTOMY EXPLORATORY N/A 4/15/2018    Procedure: LAPAROTOMY EXPLORATORY;;  Surgeon: Jm Craig MD;  Location:  OR     OPEN REDUCTION INTERNAL FIXATION HIP NAILING  7/2/2012    Procedure: OPEN REDUCTION INTERNAL FIXATION HIP NAILING;  Intramedullary Fixation Right Intertrochanteric Hip Fracture;  Surgeon: Chalino Covarrubias MD;  Location:  OR     OPEN REDUCTION INTERNAL FIXATION TIBIAL PLATEAU  3/31/2013    Procedure: OPEN REDUCTION INTERNAL FIXATION TIBIAL PLATEAU;  RIGHT LATERAL TIBIAL PLATEAU FRACTURE - SYNTHES Medial Meniscus Repair, Lateral Compartment Release;  Surgeon: Alfred Cardenas MD;  Location:  OR     OPEN REDUCTION INTERNAL FIXATION WRIST  1988    left wrist ORIF, hardware removed onemonth later     Family History   Problem Relation Age of Onset     Breast Cancer Other      Circulatory Daughter 53        [email or web address]     C.A.D. Father      Diabetes Sister      Breast Cancer Maternal Aunt 60     Other - See Comments Mother         arterial sclerosis     Family History Negative Son             Allergies   Atenolol; Beta adrenergic blockers; Brimonidine; Cephalexin; Germall plus; Latex; Morphine; No clinical screening - see comments; Penicillins; Sulfa drugs; Tramadol; Tylenol; Vicodin [hydrocodone-acetaminophen]; and Fentanyl        JAMIN Corrales [facility]  Pager: [phone]

## 2019-04-10 NOTE — LETTER
4/10/2019    Sharon Hurtado MD  [address] [facility] [address]    RE: Jamilah Rodriguez       Dear Colleague,    I had the pleasure of seeing Jamilah Rodriguez in the [facility].    Cardiology Clinic Progress Note  Jamilah Rodriguez MRN# [id number]   YOB: 1931 Age: 88 year old   Primary Cardiologist: Dr. Bond, establishing care with CORE MD, Dr. Fairbanks  Reason for visit: CORE followup            Assessment and Plan:   Jamilah Rodriguez is a very pleasant 87 year old female with a history of HFrEF, nonischemic cardiomyopathy (diagnosed 10/2018), LVEF 10-20%, LV dimension 5.8cm per echocardiogram 2/6/19, chronic LBBB, atrial fibrillation, AAA (followed by Dr. Boogie) and COPD. Patient with 2 recent hospitalizations 3/17/2019-3/20/2019 presenting with complaints of shortness of breath found to be hypoxic 70s, thought to be secondary to volume overload. Patient was diuresed from 123# to 108# at discharge. Diuretics held at discharge and restarted on torsemide 10mg at TCU. It was recommended that patient start metoprolol XL 25mg daily but appears patient was restarted on diltiazem at time of discharge. Patient creatinine was noted to be rising while in the hospital. 2/3/2019-2/10/2019 hospitalized related to acute respiratory distress due to influenza with COPD exacerbation. Patient here today for CORE follow up. Patient is having depression and anxiety which is confounding the situation. She was refusing her vasodilators this past week (hydralazine, took one dose of imdur stated it made her nervous and hasn't taken since).     Since I saw her for CORE enrollment on 3/27/19 I have been working on optimizing HR control on betablockers (not diltiazem) and increase vasodilator therapy. Concerns for low output state given low ejection fraction and no vasodilator therapy. Patient also likely over diuresed when she first presented, torsemide was on hold from 3/27/19-4/3/19.  Furosemide was started during last clinic visit 4/3/19 given some signs of volume overload (lower extremity edema and elevated JVD).       1.  Chronic systolic heart failure/HFrEF, nonischemic cardiomyopathy : LVEF 10-20%, LV dimension 5.8cm per echocardiogram 2/6/19. Patient appears slightly volume up on exam, weight stable since last week but at that time her weight was up 4# in past 5 days (torsemide has been on hold since 3/27/19), she was started on furosemide 20mg daily which was further increased to 20mg BID via phone followup.  Persistent dyspnea which is likely multifactorial (COPD, deconditioning, anemia, HFrEF and anxiety), patients multiple chronic co-morbidities make it difficult to differentiate what is causing dyspnea. Will continue following very closely with patient to optimize her heart failure medications/status.  Extensively reviewed medications today, as patient has been refusing her vasodilator medications (hydralazine) off and on for the past week. She took one dose of Imdur which she states made her feel nervous and hasn't taken since, this feeling was likely secondary to her anxiety. Will explore reconsideration for Imdur at f/u appointment.    - NYHA class III, stage C   - Etiology : nonischemic, idopathic?    - Fluid status : slightly hypervolemic   - Diuretic regimen :  INCREASE furosemide 40mg qam and 20mg qpm. (Torsemide on hold from 3/27/19 - 4/3/19) Patient has not required diuretics prior to hospitalization. HF dx in October 2018.    - Last RHC : none, will consider if continued difficulties with managing volume status.   - Will consider cardiomems implantation in the future if difficulties with managing volume status.    - Ischemic evaluation : C completed 10/12/2018 showed moderate nonocclusive coronary artery disease   - Guideline directed medical therapy    - Betablocker: Metoprolol XL 25mg daily today.     ** Atenolol allergy noted in chart (hives) and concerns for betablocker  therapy in the past given COPD. Patient has been tolerating metoprolol with no signs or symptoms of adverse reaction.     -  Continue hydralazine 10mg TID - reinforced the importance of compliance.     - Consider reinitiation of isosorbide mononitrate at f/u next week    - ACEI/ARB/ARNI: none, was on losartan in the past, appears at time of admission patient states it was stopped? Patient will benefit from restarting if kidney function normalizes.     - Aldactone antagonist: was on spironolactone 12.5mg, this was held at discharge related to rash/GARIMA. Will consider in future if kidney function improves.    - Sudden cardiac death prophylaxis : patient was evaluated for BiV ICD consideration, pt declined. Further reviewed with patient in past appointments, as she noted she is now considering, wishes to further discuss once she is feeling better.    - Patient will require close core followup, scheduled for weekly appts x 3 weeks.    - Order placed to establish care with CORE MD, appointment moved up to Friday   - Reinforced heart failure education given, continue daily weights and low sodium diet.     2. Acute kidney injury - Baseline creatinine 0.7-0.9, kidney function has improved to 1.4 and has remained stable at this level.  Was been noted to improve with vasodilation and initial hold of diuretics (1.97->1.4)    3. Atrial fibrillation - chronic, stable, rate controlled, asymptomatic. [id number] score at least 5 (HTN, age, HF, female).    -  Xarelto held during hospitalization and at discharge related to anemia, GI consulted, s/p EGD which showed esophageal candidiasis, no active bleeding found.    - Recommend continuing to hold xarelto at this times.    -  REMAIN OFF diltiazem, given HFrEF   - Continue metoprolol XL 25mg daily, see above, atenolol allergy noted, reviewed extensively with patient and family and agree with plan to trial beta blocker therapy, tolerating BB with no adverse effects.     4. Anemia -  noted after starting anticoagulation for atrial fibrillation, no bleeding source found. Hemoglobin 4/10 is 8.6, down from 13.3 on 2/9/19, as low as 7.1 3/18/19.   - Anticoagulation has remained on hold, continue to hold for now.    - Recommended consideration for blood transfusion, patient remains resistant to this    - Patient will benefit from hematology evaluation as anemia is likely exacerbating HF and worsening kidney function. Patient wishes to further discuss at follow up appointments.     5. Advance care planning - need to further explore with patient and family. She has noted that she doesn't wish to undergo invasive procedures. Patients depression/anxiety has been increasingly symptomatic. ashlie notes she is unsatisfied with her current quality of life. Prior to her hospitalization in February with influenza she was living independently. Will continue exploring goals of care with patient and family.        Changes today: INCREASE furosemide 20mg qam and 40mg qpm, potassium started today at TCU.     Follow up plan:     CORE followup with me  4/19/19    Follow up with Dr. Fairbanks on Friday 4/12/19 to establish care with CORE MD.     CORE RN to call patient Monday, check on weight and HF symptoms.         History of Presenting Illness:    Jamilah Rodriguez is a very pleasant 87 year old female with a history of HFrEF, nonischemic cardiomyopathy (diagnosed 10/2018), LVEF 10-20%, LV dimension 5.8cm per echocardiogram 2/6/19, chronic LBBB, atrial fibrillation, AAA (followed by Dr. Boogie) and COPD.     Patient with 2 recent hospitalizations 3/17/2019-3/20/2019 presenting with complaints of shortness of breath felt to be secondary to volume overload. Reports that her face and neck were swelling up and needing to prop herself up at night to breath. Worsening lower extremity edema. EMS noted patient was 70% on room air. BNP 10,760. Troponin normal 0.026. Hgb 8.2 (13.3 2/9/29, as low as 7.1 3/18/19). Admission weight  123#. Discharge weight 108#. Spironolactone held at discharge related to rash/GARIMA. No betablockers as caused worsening COPD in the past. It was recommended that patients diltiazem be discontinued and metoprolol XL started, it appears patient started this but wasn't carried over at discharge. Diuresed with IV furosemide and transitioned to PO torsemide. Diuretics held at discharge and restarted on torsemide 10mg at TCU. Xarelto held during hospitalization and at discharge related to anemia, GI consulted, s/p EGD which showed esophageal candidiasis, no active bleeding found. Patient was on losartan at one point but at time of admission she notes it was discontinued. 2/3/2019-2/10/2019 hospitalized related to acute respiratory distress due to influenza with COPD exacerbation. Discharged to TCU on 1-2L oxygen, stating when she returned home she didn't need oxygen.       Patient has followed with Dr. Bond and Dr. Arana in outpatient cardiology. Dr. Bond saw patient on 10/5/2018 due to new systolic cardiomyopathy after which she underwent a complete workup. Normal LVEF in 2012 and nuclear study noted normal LVEF in 2015. Cardiac MRI 10/3/2018 demonstrated moderate LV dilation with severely reduced systolic function. Late gadolinium enhancement imaging demonstrates a small area of midwall enhancement involving the mid and basal inferoseptal wall. This is nonspecific pattern consistent with idiopathic nonischemic cardiomyopathy. Cardiac catheterization completed 10/12/2018 showed moderate nonocclusive coronary artery disease. LAD 30% stenosis, circumflex 30-40%, and RCA 30-40%. At this time she was on losartan and aldactone was started. Consideration for CRT was also recommended given chronic LBBB. Patient was seen by Dr. Arana 11/5/2018 to be evaluated for BiV ICD consideration. At this time patient was not interested in invasive procedures and given advanced age Dr. Arana saw little benefit. Noting that reconsideration in the future  "could be made if HF decompensation.    Patient has been following closely in CORE clinic, medications being optimized to increase vasodilation. During last CORE visit on 4/3/19 patients metoprolol was increased to 25mg daily given tachycardia and started on furosemide 20mg daily given signs of fluid retention (JVD and edema). Prior to most recent hospitalization patient has not required diuretics, HFrEF was diagnosed in October. Torsemide was been on hold since 3/27/19-4/3/19.     Patient is here today for CORE followup. Patient remains at TCU. Plans to discharge home on Friday. Weight today in clinic is 120#, which is up from 119# last week. Patients biggest complaints today are related to anxieties. Plan for discharge home, daughter very worried about patient discharging home. Daughter notes they have explored assisted living, but patient thought it wouldn't come down to that. Patient continues to be frustrated that she is not improving. \"I have no desire to walk anymore\". Denies shortness of breath at rest. Any activity results in dyspnea, \"even putting on lotion\". Notes slight worsening of lower extremity edema.     Patient has been refusing to take her hydralazine and took one dose of imdur with complaints of \"multiple side effects\" \"nervous/shaking\" and has refused to take since. Working with PT/OT daily at [facility], tomorrow is the last day. Patients daughter is present during clinic visit today. Patient denies chest pain or chest tightness. Denies dizziness, lightheadedness or other presyncopal symptoms. Still complaints of excessive thirst. Complaints of abdominal pain, \"upset stomach\", tums helps.     States that her anxiety hits her out of the blue, \"calming patch helps\". States during this time her breathing is worse, this also improves with calming patch. Saw pulmonary provider last week, no medication changes.     Labs from yesterday show stable kidney function, kidney function has improved to 1.4 and has " remained stable at this level, potassium low at 3.2 otherwise electrolytes stable, Hemoglobin 8.6. Blood pressure 98/60 and HR 76 in clinic today.    Appetite poor, noting she is not liking the food at the TCU. Not adding additional salt to foods. Drinking occasional ensure, trying to drink one daily. Following 2L fluid restriction. Working with therapy, doing leg and arm exercises and walking, able to walk 87 feet. Denies alcohol use. Denies tobacco use.         Recent Hospitalizations   3/17/2019-3/20/2019 presenting with complaints of shortness of breath. Reports that her face and neck were swelling up and needing to prop herself up at night to breath. Worsening lower extremity edema. EMS noted patient was 70% on room air. BNP 10,760. Troponin normal 0.026. Hgb 8.2 (13.3 29, as low as 7.1 3/18/19). Admission weight 123#. Discharge weight 108#. Spironolactone held at discharge related to rash/GARIMA. No betablockers as caused worsening COPD in the past. Diuresed with IV furosemide and transitioned to PO torsemide. Diuretics held at discharge and restarted on torsemide 10mg at TCU  2/3/2019-2/10/2019 related to acute respiratory distress due to influenza with COPD exacerbation.         Social History    2 children   Social History     Socioeconomic History     Marital status:      Spouse name: Not on file     Number of children: 2     Years of education: Not on file     Highest education level: Not on file   Occupational History     Not on file   Social Needs     Financial resource strain: Not on file     Food insecurity:     Worry: Not on file     Inability: Not on file     Transportation needs:     Medical: Not on file     Non-medical: Not on file   Tobacco Use     Smoking status: Former Smoker     Packs/day: 1.00     Years: 60.00     Pack years: 60.00     Last attempt to quit: 2009     Years since quittin.7     Smokeless tobacco: Never Used     Tobacco comment: former 1/2-1 ppd since age [age]  "  Substance and Sexual Activity     Alcohol use: No     Alcohol/week: 0.0 oz     Comment: NA     Drug use: No     Sexual activity: Never   Lifestyle     Physical activity:     Days per week: Not on file     Minutes per session: Not on file     Stress: Not on file   Relationships     Social connections:     Talks on phone: Not on file     Gets together: Not on file     Attends Islam service: Not on file     Active member of club or organization: Not on file     Attends meetings of clubs or organizations: Not on file     Relationship status: Not on file     Intimate partner violence:     Fear of current or ex partner: Not on file     Emotionally abused: Not on file     Physically abused: Not on file     Forced sexual activity: Not on file   Other Topics Concern     Parent/sibling w/ CABG, MI or angioplasty before 65F 55M? Not Asked   Social History Narrative     Not on file            Review of Systems:   Skin:  Negative     Eyes:  Negative    ENT:  Negative    Respiratory:  Positive for shortness of breath;dyspnea on exertion  Cardiovascular:    Positive for;edema;lightheadedness;dizziness  Gastroenterology: Positive for nausea;heartburn  Genitourinary:  Negative    Musculoskeletal:  Negative    Neurologic:  Positive for numbness or tingling of hands  Psychiatric:  Negative    Heme/Lymph/Imm:  Positive for allergies  Endocrine:  Negative           Physical Exam:   Vitals: BP 98/60   Pulse 76   Ht 1.651 m (5' 5\")   Wt 54.5 kg (120 lb 1.6 oz)   SpO2 100%   BMI 19.99 kg/m      Wt Readings from Last 4 Encounters:   04/10/19 54.5 kg (120 lb 1.6 oz)   04/08/19 52.6 kg (116 lb)   04/03/19 54 kg (119 lb)   04/01/19 51.4 kg (113 lb 6.4 oz)     GEN: well nourished, in no acute distress.  HEENT:  Pupils equal, round. Sclerae nonicteric.   NECK: Supple, no masses appreciated. JVD elevated at 12cm at 90 degrees.   C/V:  Irregularly irregular rate and rhythm, no murmur  RESP: Respirations are unlabored. Clear to " auscultation bilaterally without wheezing  GI: Abdomen soft, nontender.  EXTREM: +1 LE edema to bilateral ankles/feet  NEURO: Alert and oriented, cooperative.  SKIN: Warm and dry, skin to bilateral lower extremities scaly.        Data:   ECHO 2/6/2019  The left ventricle is severely dilated. The visual ejection fraction is  estimated at 10-20%.  Right ventricular function cannot be assessed due to poor image quality.  Probably mildly reduced? TDI velocities suggest normal function, but this  cannot be fully assessed.  Trivial pericardial effusion    Cardiac catheterization 10/12/2018  Summary  1. Moderate nonocclusive coronary artery disease  2. Normal left ventricular end-diastolic pressure    Cardiac MRI 1010/2018  1. The LV is moderately dilated. The global systolic function is severely reduced. The LVEF is 19%. There  is severe global hypokinesis of the left ventricle with minor regional variation.   2. The RV is normal in cavity size. The global systolic function is mildly to moderately reduced. The RVEF  is 41%.   3. There is moderate to severe biatrial enlargement.   4. There is no evidence of myocardial iron overload.   5. Late gadolinium enhancement imagingdemonstrates a small area of midwall enhancement involving the mid to  basal inferoseptal wall. This is a non-specific pattern that has been described in idiopathic nonischemic  cardiomyopathies.   6.  A small circumferential pericardial effusion is noted.         CONCLUSIONS:  Moderate left ventricular dilatation with severely reduced systolic function. Late gadolinium  enhancement imagingdemonstrates a small area of midwall enhancement involving the mid to basal inferoseptal  wall. This is a non-specific pattern that has been described in idiopathic nonischemic cardiomyopathies.     LIPID RESULTS:  Lab Results   Component Value Date    CHOL 141 09/28/2018    HDL 70 09/28/2018    LDL 59 09/28/2018    TRIG 62 09/28/2018    CHOLHDLRATIO 2.2 04/08/2015      LIVER ENZYME RESULTS:  Lab Results   Component Value Date    AST 15 04/02/2019    ALT 14 04/02/2019     CBC RESULTS:  Lab Results   Component Value Date    WBC 12.6 (H) 03/18/2019    RBC 2.43 (L) 03/18/2019    HGB 8.6 (A) 04/09/2019    HGB 8.6 (A) 04/09/2019    HCT 22.3 (L) 03/18/2019    MCV 92 03/18/2019    MCH 30.9 03/18/2019    MCHC 33.6 03/18/2019    RDW 15.4 (H) 03/18/2019     03/18/2019     BMP RESULTS:  Lab Results   Component Value Date     04/09/2019     04/09/2019    POTASSIUM 3.2 (A) 04/09/2019    POTASSIUM 3.2 (A) 04/09/2019    CHLORIDE 99 04/09/2019    CHLORIDE 99 04/09/2019    CO2 24 04/09/2019    CO2 24 04/09/2019    ANIONGAP 13 04/09/2019    ANIONGAP 13 04/09/2019     (A) 04/09/2019     (A) 04/09/2019    BUN 35 (A) 04/09/2019    BUN 35 (A) 04/09/2019    CR 1.44 (A) 04/09/2019    CR 1.44 (A) 04/09/2019    GFRESTIMATED 32 (L) 04/09/2019    GFRESTIMATED 32 (A) 04/09/2019    GFRESTBLACK 37 (L) 04/09/2019    GFRESTBLACK 37 (A) 04/09/2019    NAKIA 8.2 (A) 04/09/2019    NAKIA 8.2 (A) 04/09/2019      INR RESULTS:  Lab Results   Component Value Date    INR 1.13 10/12/2018    INR 1.47 (H) 04/03/2013            Medications     Current Outpatient Medications   Medication Sig Dispense Refill     albuterol (PROAIR HFA/PROVENTIL HFA/VENTOLIN HFA) 108 (90 Base) MCG/ACT Inhaler Inhale 2 puffs into the lungs every 4 hours as needed for shortness of breath / dyspnea or wheezing       albuterol (PROVENTIL) (2.5 MG/3ML) 0.083% neb solution Take 2.5 mg by nebulization every 4 hours as needed for shortness of breath / dyspnea or wheezing       ANORO ELLIPTA 62.5-25 MCG/INH oral inhaler Inhale 1 puff into the lungs daily 1 Inhaler 8     bimatoprost (LUMIGAN) 0.01 % SOLN Place 1 drop into both eyes At Bedtime.         dorzolamide (TRUSOPT) 2 % ophthalmic solution Place 1 drop into both eyes 2 times daily       furosemide (LASIX) 20 MG tablet Take 20 mg by mouth 2 times daily       hydrALAZINE  (APRESOLINE) 10 MG tablet Take 1 tablet (10 mg) by mouth 3 times daily 90 tablet 1     hydrOXYzine (VISTARIL) 25 MG capsule Take 25 mg by mouth every 6 hours as needed for itching       metoprolol succinate ER (TOPROL-XL) 25 MG 24 hr tablet Take 1 tablet (25 mg) by mouth daily 30 tablet 1     miconazole with skin protectant (SHANNAN ANTIFUNGAL) 2 % CREA cream Apply topically 2 times daily       Multiple Vitamins-Minerals (PRESERVISION AREDS 2 PO) Take 1 tablet by mouth 2 times daily       nystatin (MYCOSTATIN) 183704 UNIT/ML suspension Take 5 mLs by mouth 4 times daily       pantoprazole (PROTONIX) 20 MG EC tablet Take 2 tablets (40 mg) by mouth 2 times daily       polyethylene glycol 400 (BLINK TEARS) 0.25 % SOLN ophthalmic solution Place 1 drop into both eyes every 3 hours as needed for dry eyes       potassium chloride (KLOR-CON) 20 MEQ packet Take 20 mEq by mouth 2 times daily 30 packet 1     sennosides (SENOKOT) 8.6 MG tablet Take 1 tablet by mouth daily as needed for constipation       Throat Lozenges (LOZENGES MT) Give 1 lozenge by mouth four times a day for yeast infection in mouth            Past Medical History     Past Medical History:   Diagnosis Date     AAA (abdominal aortic aneurysm) (H)     Followed with yearly ultrasound     Cardiomyopathy, idiopathic (H) 10/2018     Cervical cancer (H) 1969     Chronic dermatitis     extensive allergy testing revealed allergy/sensitivity to carba mix ( rubber) and Imidazolidinyl Urea (common in beauty products)     Glaucoma      Glaucoma      History of COPD     very mild abnormal spirometry in 2007 (in [place]), has not been active since quitting smoking in 2009     HTN (hypertension)      LBBB (left bundle branch block) 10/2018     Macular degeneration      Osteoporosis 8/3/11    femoral T score -3.4 & -3.7     PAD (peripheral artery disease) (H)      Vertebral compression fracture (H) 5/17/11    L1 compression fracture, presumed osteoporotic compression fracture      Past Surgical History:   Procedure Laterality Date     CATARACT IOL, RT/LT  2/8/12    left eye cataract removal     ESOPHAGOSCOPY, GASTROSCOPY, DUODENOSCOPY (EGD), COMBINED N/A 3/19/2019    Procedure: COMBINED ESOPHAGOSCOPY, GASTROSCOPY, DUODENOSCOPY (EGD);  Surgeon: Naveen Santo MD;  Location:  GI     HEART CATH CORONARY ANGIOGRAM W/LV GRAM  10/2018    moderate nonocclusive CAD     HYSTERECTOMY TOTAL ABDOMINAL  1999    Fibroids     LAPAROSCOPY DIAGNOSTIC (GENERAL) N/A 4/15/2018    Procedure: LAPAROSCOPY DIAGNOSTIC (GENERAL);;  Surgeon: Jm Craig MD;  Location:  OR     LAPAROTOMY EXPLORATORY N/A 4/15/2018    Procedure: LAPAROTOMY EXPLORATORY;;  Surgeon: Jm Craig MD;  Location:  OR     OPEN REDUCTION INTERNAL FIXATION HIP NAILING  7/2/2012    Procedure: OPEN REDUCTION INTERNAL FIXATION HIP NAILING;  Intramedullary Fixation Right Intertrochanteric Hip Fracture;  Surgeon: Chalino Covarrubias MD;  Location:  OR     OPEN REDUCTION INTERNAL FIXATION TIBIAL PLATEAU  3/31/2013    Procedure: OPEN REDUCTION INTERNAL FIXATION TIBIAL PLATEAU;  RIGHT LATERAL TIBIAL PLATEAU FRACTURE - SYNTHES Medial Meniscus Repair, Lateral Compartment Release;  Surgeon: Alfred Cardenas MD;  Location:  OR     OPEN REDUCTION INTERNAL FIXATION WRIST  1988    left wrist ORIF, hardware removed onemonth later     Family History   Problem Relation Age of Onset     Breast Cancer Other      Circulatory Daughter 53        mengioma     C.A.D. Father      Diabetes Sister      Breast Cancer Maternal Aunt 60     Other - See Comments Mother         arterial sclerosis     Family History Negative Son             Allergies   Atenolol; Beta adrenergic blockers; Brimonidine; Cephalexin; Germall plus; Latex; Morphine; No clinical screening - see comments; Penicillins; Sulfa drugs; Tramadol; Tylenol; Vicodin [hydrocodone-acetaminophen]; and Fentanyl        JAMIN Corrales [facility]  Pager: [phone]      Thank you  for allowing me to participate in the care of your patient.    Sincerely,     JAMIN Corrales [facility]

## 2019-04-11 NOTE — PROGRESS NOTES
Sent outside labs to [facility] for review. appt 4/12/19 with Dr. Fairbanks. Ksenia ALBARADO([facility]) 04/11/19  10:51 AM

## 2019-04-11 NOTE — PROGRESS NOTES
Call from No at [facility] wanting clarification on something on pt. (See earlier calls in chart from today - previous requests have been clarified with Ila at [facility] today).     In the meantime, abnormal K+ level on BMP today. KCl needs to be increased to 20 mEq TID.     Called No back. On reports she is pt's nurse today. Gave VORB: Increase potassium chloride powder to 20 mEq three times daily. Keep OV tomorrow with Dr. Fairbanks as planned. No denies any sx r/t hypokalemia. States pt has some LE weeping today but no other sx to report. Pt's Lasix was also increased yesterday, No confirmed receipt of that order. Mary Worthington RN on 4/11/2019 at 11:55 AM      Result Notes for Basic metabolic panel     Notes recorded by Alice May APRN CNP on 4/11/2019 at 11:45 AM CDT  Please call facility to review lab results.     Kidney function improving. Low potassium 2.6.     Please fax order to facility for potassium chloride powder to be increased to 20meq TID.

## 2019-04-11 NOTE — LETTER
4/11/2019        RE: Jamilah Rodriguez  [address]  [facility] [address]        [facility]  [place] Medical Record Number:  [id number]  Place of Service where encounter took place:  [facility] ([facility]) [[id number]]  Chief Complaint   Patient presents with     RECHECK       HPI:    Jamilah Rodriguez  is a 87 year old (4/5/1931), who is being seen today for an episodic care visit.  HPI information obtained from: facility chart records, facility staff, patient report and [facility] chart review. Today's concern is:  CHF/cardiomyopathy: on exam today states she continues to have MCCULLOUGH, SOB at rest throughout the day, weight continues to trend up on admit weight was 109.3lbs and 4/9/19 weight was 116lbs, [facility] clinic is managing weight and CHF. Patient has appt with core on 4/12/19  Hypokalemia: K+ 2.6 today, patient denies palpitations, CP  Anxiety: offers no c/o anxiety today on exam  GARIMA/CKD: see labs  HTN: BP  As follows: 120/78, 115/70, 122/70 , HR  range  Anemia: see labs  Thrush: patient has appt with GI today, denies pain with swallowing but states she has a lot of reflux. She is eating regular texture diet with thin liquids    Past Medical and Surgical History reviewed in Epic today.    MEDICATIONS:  Current Outpatient Medications   Medication Sig Dispense Refill     albuterol (PROAIR HFA/PROVENTIL HFA/VENTOLIN HFA) 108 (90 Base) MCG/ACT Inhaler Inhale 2 puffs into the lungs every 4 hours as needed for shortness of breath / dyspnea or wheezing       albuterol (PROVENTIL) (2.5 MG/3ML) 0.083% neb solution Take 2.5 mg by nebulization every 4 hours as needed for shortness of breath / dyspnea or wheezing       ANORO ELLIPTA 62.5-25 MCG/INH oral inhaler Inhale 1 puff into the lungs daily 1 Inhaler 8     bimatoprost (LUMIGAN) 0.01 % SOLN Place 1 drop into both eyes At Bedtime.         dorzolamide (TRUSOPT) 2 % ophthalmic solution Place 1 drop into both eyes 2 times  daily       furosemide (LASIX) 20 MG tablet Take 20 mg by mouth 2 times daily       hydrALAZINE (APRESOLINE) 10 MG tablet Take 1 tablet (10 mg) by mouth 3 times daily 90 tablet 1     hydrOXYzine (VISTARIL) 25 MG capsule Take 25 mg by mouth every 6 hours as needed for itching       metoprolol succinate ER (TOPROL-XL) 25 MG 24 hr tablet Take 1 tablet (25 mg) by mouth daily 30 tablet 1     miconazole with skin protectant (SHANNAN ANTIFUNGAL) 2 % CREA cream Apply topically 2 times daily       Multiple Vitamins-Minerals (PRESERVISION AREDS 2 PO) Take 1 tablet by mouth 2 times daily       nystatin (MYCOSTATIN) 142260 UNIT/ML suspension Take 5 mLs by mouth 4 times daily       pantoprazole (PROTONIX) 20 MG EC tablet Take 2 tablets (40 mg) by mouth 2 times daily       polyethylene glycol 400 (BLINK TEARS) 0.25 % SOLN ophthalmic solution Place 1 drop into both eyes every 3 hours as needed for dry eyes       sennosides (SENOKOT) 8.6 MG tablet Take 1 tablet by mouth daily as needed for constipation       potassium chloride (KLOR-CON) 20 MEQ packet Take 20 mEq by mouth 3 times daily 30 packet 1     Throat Lozenges (LOZENGES MT) Give 1 lozenge by mouth four times a day for yeast infection in mouth         REVIEW OF SYSTEMS:  10 point ROS of systems including Constitutional, Eyes, Respiratory, Cardiovascular, Gastroenterology, Genitourinary, Integumentary, Musculoskeletal, Psychiatric were all negative except for pertinent positives noted in my HPI.    Objective:  /78   Pulse 88   Temp 98.4  F (36.9  C)   Resp 20   Wt 52.6 kg (116 lb)   SpO2 96%   BMI 19.30 kg/m     Exam:  GENERAL APPEARANCE:  Alert, in no distress, thin  ENT:  Mouth and posterior oropharynx normal, moist mucous membranes, [place]  EYES:  EOM, conjunctivae, lids, pupils and irises normal, PERRL  RESP:  respiratory effort and palpation of chest normal, lungs clear to auscultation , no respiratory distress, diminished breath sounds bases bilaterally  CV:   Palpation and auscultation of heart done , regular rate and rhythm, no murmur, rub, or gallop, peripheral edema 1+ in LE bilaterally L>R  ABDOMEN:  normal bowel sounds, soft, nontender, no hepatosplenomegaly or other masses  M/S:   Examination of:   right upper extremity, left upper extremity, right lower extremity and left lower extremity  Inspection, ROM, stability and muscle strength normal and generalized weakness noted  SKIN:  Inspection of skin and subcutaneous tissue baseline  NEURO:   Cranial nerves 2-12 are normal tested and grossly at patient's baseline, speech WNL  PSYCH:  affect and mood normal    Labs:   Recent labs in [facility] reviewed by me today.     ASSESSMENT/PLAN:     Acute on chronic systolic heart failure (H)  Cardiomyopathy, idiopathic (H)  Acute/ongoing: EF 10-20%, daily weights, vitals daily and prn, BMP  weekly, lasix 20mg BID, hydralaine 10mg TID, toprol xl 25mg QD   Baseline creat 1.0, current creat 1.45  Fluid restriction 2000cc day    Hypokalemia:   Acute: Kdur 40meq now, increase K+ to Kdur 40meq QAM and 20meq in pm, BMP In AM     GARIMA/CKD 3  Acute/ongoing: BMP  weekly, creat baseline 1.0, avoid nephrotoxic agents     Benign essential hypertension  Atrial fibrillation with RVR (H)  Ongoing: vitals daily and prn, BMP  weekly, metoprolol xl 25mg QD, holding xarelto due to Hgb drop from 13 to 7.5,   Cardiology appt tomorrow     Thrush  Acute/ongoing: GI consult and EGD during hospitalization, f/u GI on 4/2/19 started on pantoprazole 40mg QD and clotrimazole 4X daily   F/u GI today     Anemia due to blood loss, acute  Acute: Hgb baseline 13 was 7.1 on admission, EGD as above, holding xarelto  Hgb stable, continue weekly hgb     Chronic obstructive pulmonary disease, unspecified COPD type (H)  Ongoing: monitor SaO2 at rest and with activity, continue anoro ellipta 62.5-25mcg inhaled once day,  Albuterol MDI 2 puff q 4 hours prn, albuterol nebs q 4 hours prn     Orders written by provider at  facility  Kdur 40meq now  KDur 40meq QAM and 20meq qpm  BMP in AM    Electronically signed by:  Tonya Lynn Haase, APRN CNP       Sincerely,        Tonya Lynn Haase, APRN CNP

## 2019-04-11 NOTE — PROGRESS NOTES
[facility]  [place] Medical Record Number:  [id number]  Place of Service where encounter took place:  [facility] ([facility]) [[id number]]  Chief Complaint   Patient presents with     RECHECK       HPI:    Jamilah Rodriguez  is a 87 year old (4/5/1931), who is being seen today for an episodic care visit.  HPI information obtained from: facility chart records, facility staff, patient report and [facility] chart review. Today's concern is:  CHF/cardiomyopathy: on exam today states she continues to have MCCULLOUGH, SOB at rest throughout the day, weight continues to trend up on admit weight was 109.3lbs and 4/9/19 weight was 116lbs, [facility] clinic is managing weight and CHF. Patient has appt with core on 4/12/19  Function: patient is only able to walk up to 145 feet without rest due to CHF and EF of 10-20%, very dyspneic on exertion and low energy  Hypokalemia: K+ 2.6 today, patient denies palpitations, CP  Anxiety: offers no c/o anxiety today on exam  GARIMA/CKD: see labs  HTN: BP  As follows: 120/78, 115/70, 122/70 , HR  range  Anemia: see labs  Thrush: patient has appt with GI today, denies pain with swallowing but states she has a lot of reflux. She is eating regular texture diet with thin liquids    Past Medical and Surgical History reviewed in Epic today.    MEDICATIONS:  Current Outpatient Medications   Medication Sig Dispense Refill     albuterol (PROAIR HFA/PROVENTIL HFA/VENTOLIN HFA) 108 (90 Base) MCG/ACT Inhaler Inhale 2 puffs into the lungs every 4 hours as needed for shortness of breath / dyspnea or wheezing       albuterol (PROVENTIL) (2.5 MG/3ML) 0.083% neb solution Take 2.5 mg by nebulization every 4 hours as needed for shortness of breath / dyspnea or wheezing       ANORO ELLIPTA 62.5-25 MCG/INH oral inhaler Inhale 1 puff into the lungs daily 1 Inhaler 8     bimatoprost (LUMIGAN) 0.01 % SOLN Place 1 drop into both eyes At Bedtime.         dorzolamide (TRUSOPT) 2 % ophthalmic solution  Place 1 drop into both eyes 2 times daily       furosemide (LASIX) 20 MG tablet Take 20 mg by mouth 2 times daily       hydrALAZINE (APRESOLINE) 10 MG tablet Take 1 tablet (10 mg) by mouth 3 times daily 90 tablet 1     hydrOXYzine (VISTARIL) 25 MG capsule Take 25 mg by mouth every 6 hours as needed for itching       metoprolol succinate ER (TOPROL-XL) 25 MG 24 hr tablet Take 1 tablet (25 mg) by mouth daily 30 tablet 1     miconazole with skin protectant (SHANNAN ANTIFUNGAL) 2 % CREA cream Apply topically 2 times daily       Multiple Vitamins-Minerals (PRESERVISION AREDS 2 PO) Take 1 tablet by mouth 2 times daily       nystatin (MYCOSTATIN) 441887 UNIT/ML suspension Take 5 mLs by mouth 4 times daily       pantoprazole (PROTONIX) 20 MG EC tablet Take 2 tablets (40 mg) by mouth 2 times daily       polyethylene glycol 400 (BLINK TEARS) 0.25 % SOLN ophthalmic solution Place 1 drop into both eyes every 3 hours as needed for dry eyes       sennosides (SENOKOT) 8.6 MG tablet Take 1 tablet by mouth daily as needed for constipation       potassium chloride (KLOR-CON) 20 MEQ packet Take 20 mEq by mouth 3 times daily 30 packet 1     Throat Lozenges (LOZENGES MT) Give 1 lozenge by mouth four times a day for yeast infection in mouth         REVIEW OF SYSTEMS:  10 point ROS of systems including Constitutional, Eyes, Respiratory, Cardiovascular, Gastroenterology, Genitourinary, Integumentary, Musculoskeletal, Psychiatric were all negative except for pertinent positives noted in my HPI.    Objective:  /78   Pulse 88   Temp 98.4  F (36.9  C)   Resp 20   Wt 52.6 kg (116 lb)   SpO2 96%   BMI 19.30 kg/m    Exam:  GENERAL APPEARANCE:  Alert, in no distress, thin  ENT:  Mouth and posterior oropharynx normal, moist mucous membranes, [place]  EYES:  EOM, conjunctivae, lids, pupils and irises normal, PERRL  RESP:  respiratory effort and palpation of chest normal, lungs clear to auscultation , no respiratory distress, diminished breath  sounds bases bilaterally  CV:  Palpation and auscultation of heart done , regular rate and rhythm, no murmur, rub, or gallop, peripheral edema 1+ in LE bilaterally L>R  ABDOMEN:  normal bowel sounds, soft, nontender, no hepatosplenomegaly or other masses  M/S:   Examination of:   right upper extremity, left upper extremity, right lower extremity and left lower extremity  Inspection, ROM, stability and muscle strength normal and generalized weakness noted  SKIN:  Inspection of skin and subcutaneous tissue baseline  NEURO:   Cranial nerves 2-12 are normal tested and grossly at patient's baseline, speech WNL  PSYCH:  affect and mood normal    Labs:   Recent labs in [facility] reviewed by me today.     ASSESSMENT/PLAN:     Acute on chronic systolic heart failure (H)  Cardiomyopathy, idiopathic (H)  Acute/ongoing: EF 10-20%, daily weights, vitals daily and prn, BMP  weekly, lasix 20mg BID, hydralaine 10mg TID, toprol xl 25mg QD   Baseline creat 1.0, current creat 1.45  Fluid restriction 2000cc day    Hypokalemia:   Acute: Kdur 40meq now, increase K+ to Kdur 40meq QAM and 20meq in pm, BMP In AM     GARIMA/CKD 3  Acute/ongoing: BMP  weekly, creat baseline 1.0, avoid nephrotoxic agents     Benign essential hypertension  Atrial fibrillation with RVR (H)  Ongoing: vitals daily and prn, BMP  weekly, metoprolol xl 25mg QD, holding xarelto due to Hgb drop from 13 to 7.5,   Cardiology appt tomorrow     Thrush  Acute/ongoing: GI consult and EGD during hospitalization, f/u GI on 4/2/19 started on pantoprazole 40mg QD and clotrimazole 4X daily   F/u GI today     Anemia due to blood loss, acute  Acute: Hgb baseline 13 was 7.1 on admission, EGD as above, holding xarelto  Hgb stable, continue weekly hgb     Chronic obstructive pulmonary disease, unspecified COPD type (H)  Ongoing: monitor SaO2 at rest and with activity, continue anoro ellipta 62.5-25mcg inhaled once day,  Albuterol MDI 2 puff q 4 hours prn, albuterol nebs q 4 hours prn      Orders written by provider at facility  Kdur 40meq now  KDur 40meq QAM and 20meq qpm  BMP in AM    Mechanical Wheelchair  Size: 18 x 16  Corresponding cushion: Yes: comfort curve  Standard foot rests: Yes  Elevating leg rests: No  Arm rests: Yes: full length  Lap tray: No  Dose patient use oxygen? No   Able to propel w/c? Yes If no why not? n/a And is there someone who can?no  Mobility related ADL that are affected in the home:  Decreased endurance, MCCULLOUGH,   The wheelchair is suitable and necessary for use in the patient's home.  Reason why a cane or walker will not meet the patient's needs. (ie: balance, tolerance, level of assistance) fatigue, dyspnea on exertion  The patient has expressed willingness to use the wheelchair in the home and does have the physical and cognitive ability to maneuver the equipment or has a caregiver who is available, willing, and able to provide assistance in the home with the wheelchair.   Patients functional mobility deficit can be sufficiently resolved by the use of the above wheelchair      Electronically signed by:  Tonya Lynn Haase, APRN CNP

## 2019-04-11 NOTE — PROGRESS NOTES
Call from Ila at [facility], wanting clarification of f/u appt on 4/12. Reviewed med changes and f/u per KINGSLEY Pedraza at yesterday's OV. Ila was confused who Dr. Fairbanks is and what clinic he is with. Explained he is with [facility] at [facility], they should call us for any questions/concerns of anything cardiac related. Verified med changes and lab to be done today. Mary Worthington RN on 4/11/2019 at 10:33 AM

## 2019-04-12 NOTE — PROGRESS NOTES
Per VORB by Dr. Fairbanks in clinic today:    1. Start isosorbide dinitrate (Isordil) 10 mg TID, notify us if hypotension  2. Follow-up with Alice May CNP on 4/19/19 at 10:10am  3. Follow-up with Dr. Los gonzalez 4/30/19 at 1:15pm  4. Palliative Care Referral placed, appt TBD  5. Evaluate for Long Term Care Facility, refer to PCP to address  6. Follow-up with primary Cardiologist Dr. Bond in the future, appt TBD  7.  Call [phone] for questions    Faxed Physician Order Sheet to [facility] at [phone]. Called [phone] to notify that orders faxed and confirmed receipt, spoke w/Kiara. Pt to discharge home from TCU in near future, per family report.    Reminder sent to Team RN board to call pt/family on 4/16 for update after initiating Isordil, per Dr. Fairbanks. Mary Worthington, RN on 4/12/2019 at 3:35 PM

## 2019-04-12 NOTE — PROGRESS NOTES
CARDIOLOGY CLINIC CONSULTATION    REASON FOR CONSULT: Severe nonischemic cardia myopathy    PRIMARY CARE PHYSICIAN:  Sharon Hurtado    HISTORY OF PRESENT ILLNESS:  This is an 88-year-old female who has a diagnosis of severe nonischemic cardio myopathy with an LV ejection fraction of 19% on MRI as of October 2018. Patient of Dr. Bond. She has been referred to me for HF evaluation. She has a chronic left bundle branch block and atrial fibrillation which is paroxysmal.  Coronary angiography has revealed moderate nonobstructive coronary disease.  As such the working diagnosis is that this is either idiopathic versus atrial fibrillation versus left bundle induced cardiomyopathy.  She was subsequently seen by electrophysiology and not deemed to be a suitable candidate for a CRT-D implant back in 2018 mainly because of the fact that she was not overtly symptomatic and did not have significant heart failure hospitalization burden.  However over the last couple of months she has had a rough time and has had 2 back-to-back admissions 1 with influenza and the other complicated by fast atrial fibrillation and volume overload.  She was subsequently discharged and enrolled in core clinic and was seen by [facility] advanced practitioner multiple times in the last couple of weeks.  She has been in a transitional care unit since her discharge from the hospital at [facility].  Prior to that the patient used to live in independent living.  She tells me that they are going to discharge her from the TCU on Monday depending on how she is doing but otherwise she says she continues to feel miserable from a cardiac standpoint and continues to have repeated attacks of anxiety and feels extremely fatigued and tired.    From a heart failure standpoint she is on hydralazine 10 mg 3 times daily and metoprolol succinate 25 mg daily.  She is on Lasix 40 mg in the morning and 20 mg in the afternoon.  She denies chest pain but says that she has  chronic orthopnea for the last 2 years.    PAST MEDICAL HISTORY:  Past Medical History:   Diagnosis Date     AAA (abdominal aortic aneurysm) (H)     Followed with yearly ultrasound     Cardiomyopathy, idiopathic (H) 10/2018     Cervical cancer (H) 1969     Chronic dermatitis     extensive allergy testing revealed allergy/sensitivity to carba mix ( rubber) and Imidazolidinyl Urea (common in beauty products)     Glaucoma      Glaucoma      History of COPD     very mild abnormal spirometry in 2007 (in [place]), has not been active since quitting smoking in 2009     HTN (hypertension)      LBBB (left bundle branch block) 10/2018     Macular degeneration      Osteoporosis 8/3/11    femoral T score -3.4 & -3.7     PAD (peripheral artery disease) (H)      Vertebral compression fracture (H) 5/17/11    L1 compression fracture, presumed osteoporotic compression fracture       MEDICATIONS:  Current Outpatient Medications   Medication     albuterol (PROAIR HFA/PROVENTIL HFA/VENTOLIN HFA) 108 (90 Base) MCG/ACT Inhaler     albuterol (PROVENTIL) (2.5 MG/3ML) 0.083% neb solution     ANORO ELLIPTA 62.5-25 MCG/INH oral inhaler     bimatoprost (LUMIGAN) 0.01 % SOLN     dorzolamide (TRUSOPT) 2 % ophthalmic solution     furosemide (LASIX) 20 MG tablet     hydrALAZINE (APRESOLINE) 10 MG tablet     hydrOXYzine (VISTARIL) 25 MG capsule     isosorbide dinitrate (ISORDIL) 10 MG tablet     metoprolol succinate ER (TOPROL-XL) 25 MG 24 hr tablet     miconazole with skin protectant (SHANNAN ANTIFUNGAL) 2 % CREA cream     Multiple Vitamins-Minerals (PRESERVISION AREDS 2 PO)     pantoprazole (PROTONIX) 20 MG EC tablet     polyethylene glycol 400 (BLINK TEARS) 0.25 % SOLN ophthalmic solution     potassium chloride (KLOR-CON) 20 MEQ packet     sennosides (SENOKOT) 8.6 MG tablet     nystatin (MYCOSTATIN) 432195 UNIT/ML suspension     Throat Lozenges (LOZENGES MT)     No current facility-administered medications for this visit.        ALLERGIES:  Allergies    Allergen Reactions     Atenolol Hives     Beta Adrenergic Blockers      Brimonidine      Cephalexin Hives     Germall Plus Itching     Imidazolidinyl Urea found in many topical creams and house hold products     Latex      Itching     Morphine GI Disturbance     No Clinical Screening - See Comments Itching     Carba Mix; IMIDAZOLIDINYL UREA - Allergic to this ingredient, which is found in many common topical moisturizers and household products.     Penicillins Hives     Sulfa Drugs GI Disturbance     Tramadol Nausea and Vomiting     Tylenol GI Disturbance     Vicodin [Hydrocodone-Acetaminophen]      Fentanyl Nausea       SOCIAL HISTORY:  I have reviewed this patient's social history and updated it with pertinent information if needed. Jamilah Rodriguez  reports that she quit smoking about 9 years ago. She has a 60.00 pack-year smoking history. She has never used smokeless tobacco. She reports that she does not drink alcohol or use drugs.    FAMILY HISTORY:  I have reviewed this patient's family history and updated it with pertinent information if needed.   Family History   Problem Relation Age of Onset     Breast Cancer Other      Circulatory Daughter 53        mengioma     C.A.D. Father      Diabetes Sister      Breast Cancer Maternal Aunt 60     Other - See Comments Mother         arterial sclerosis     Family History Negative Son        REVIEW OF SYSTEMS:  Skin:  Negative     Eyes:  Negative    ENT:  Negative    Respiratory:  Positive for shortness of breath;dyspnea on exertion  Cardiovascular:  Negative Positive for;edema;lightheadedness;dizziness  Gastroenterology: Positive for nausea;heartburn  Genitourinary:  Negative    Musculoskeletal:  Negative    Neurologic:  Positive for numbness or tingling of hands  Psychiatric:  Negative    Heme/Lymph/Imm:  Positive for allergies  Endocrine:  Negative        PHYSICAL EXAM:      BP: 116/77 Pulse: 94     SpO2: 90 %      Vital Signs with Ranges  Pulse:  [94] 94  BP:  (116)/(77) 116/77  SpO2:  [90 %] 90 %  118 lbs 8 oz    Constitutional: awake, alert, mild distress, very frail  Respiratory: CTAB  Cardiovascular: Normal S1 S2, PSM, no gallops  GI: nondistended, nontender, bowel sounds present  Skin: dry, no rash  Neuropsychiatric: very anxious    DATA:  Labs: Pertinent cardiac labs reviewed    EKG: NSR today    ASSESSMENT:  This is an 88-year-old female with severe nonischemic cardiomyopathy in the setting of paroxysmal atrial fibrillation, now in sinus rhythm and chronic long-standing left bundle branch block.  Coronary angiography has revealed moderate disease.  Her heart rate today is 94 and her blood pressure is pretty reasonable 116/77.  She complains of [place] Heart Association functional class IIIb heart failure symptoms.    RECOMMENDATIONS:  1. At this time I will add isosorbide dinitrate 10 mg 3 times daily to her regimen to see if some degree of increased afterload reduction helps her.  2. Continue current dose of diuretics but if edema or shortness of breath get worse this can be increased to 40 mg twice a day with as needed metolazone as needed.  3. She has been seen by electrophysiology in the past but we will rerefer her to cardiac EP for discussion regarding cardiac resynchronization therapy, potentially a pacemaker. She is NSR now.  She is not sure at this point whether she would want one or not. She will think about it.   4. I will have her follow-up with the [facility] and with her primary cardiologist.  Patient obviously is not a candidate for advanced heart failure therapies at this time and given her frailty and overall comorbidities she would be a suboptimal candidate for a cardio mems implantation as well.  5. I discussed with her and her daughter.  Per patient the primary concern is her anxiety attacks.  They have not met the primary care physician in a while success.  I think that is important because this patient has multiple comorbidities. They are not  sure if they are strong enough to be discharged from the TCU on Monday, I agree. They need a formal Social work evaluation as well and potential placement, I doubt she can take care of her self at home by herself. I will have my team get in touch with her TCU and PCP.  6. We have also made a palliative care referral to evaluate goals of care.  7. I will have her follow-up with her primary cardiologist and see her as needed.    SORAYA Turner, [facility]  Cardiology - [facility]  April 12, 2019

## 2019-04-12 NOTE — LETTER
4/12/2019    Sharon Hurtado MD  [address] [facility] [address]    RE: Jamilah Rodriguez       Dear Colleague,    I had the pleasure of seeing Jamilah Rodriguez in the [facility].    CARDIOLOGY CLINIC CONSULTATION    REASON FOR CONSULT: Severe nonischemic cardia myopathy    PRIMARY CARE PHYSICIAN:  Sharon Hurtado    HISTORY OF PRESENT ILLNESS:  This is an 88-year-old female who has a diagnosis of severe nonischemic cardio myopathy with an LV ejection fraction of 19% on MRI as of October 2018. Patient of Dr. Bond. She has been referred to me for HF evaluation. She has a chronic left bundle branch block and atrial fibrillation which is paroxysmal.  Coronary angiography has revealed moderate nonobstructive coronary disease.  As such the working diagnosis is that this is either idiopathic versus atrial fibrillation versus left bundle induced cardiomyopathy.  She was subsequently seen by electrophysiology and not deemed to be a suitable candidate for a CRT-D implant back in 2018 mainly because of the fact that she was not overtly symptomatic and did not have significant heart failure hospitalization burden.  However over the last couple of months she has had a rough time and has had 2 back-to-back admissions 1 with influenza and the other complicated by fast atrial fibrillation and volume overload.  She was subsequently discharged and enrolled in core clinic and was seen by core advanced practitioner multiple times in the last couple of weeks.  She has been in a transitional care unit since her discharge from the hospital at [facility].  Prior to that the patient used to live in independent living.  She tells me that they are going to discharge her from the TCU on Monday depending on how she is doing but otherwise she says she continues to feel miserable from a cardiac standpoint and continues to have repeated attacks of anxiety and feels extremely fatigued and  tired.    From a heart failure standpoint she is on hydralazine 10 mg 3 times daily and metoprolol succinate 25 mg daily.  She is on Lasix 40 mg in the morning and 20 mg in the afternoon.  She denies chest pain but says that she has chronic orthopnea for the last 2 years.    PAST MEDICAL HISTORY:  Past Medical History:   Diagnosis Date     AAA (abdominal aortic aneurysm) (H)     Followed with yearly ultrasound     Cardiomyopathy, idiopathic (H) 10/2018     Cervical cancer (H) 1969     Chronic dermatitis     extensive allergy testing revealed allergy/sensitivity to carba mix ( rubber) and Imidazolidinyl Urea (common in beauty products)     Glaucoma      Glaucoma      History of COPD     very mild abnormal spirometry in 2007 (in [place]), has not been active since quitting smoking in 2009     HTN (hypertension)      LBBB (left bundle branch block) 10/2018     Macular degeneration      Osteoporosis 8/3/11    femoral T score -3.4 & -3.7     PAD (peripheral artery disease) (H)      Vertebral compression fracture (H) 5/17/11    L1 compression fracture, presumed osteoporotic compression fracture       MEDICATIONS:  Current Outpatient Medications   Medication     albuterol (PROAIR HFA/PROVENTIL HFA/VENTOLIN HFA) 108 (90 Base) MCG/ACT Inhaler     albuterol (PROVENTIL) (2.5 MG/3ML) 0.083% neb solution     ANORO ELLIPTA 62.5-25 MCG/INH oral inhaler     bimatoprost (LUMIGAN) 0.01 % SOLN     dorzolamide (TRUSOPT) 2 % ophthalmic solution     furosemide (LASIX) 20 MG tablet     hydrALAZINE (APRESOLINE) 10 MG tablet     hydrOXYzine (VISTARIL) 25 MG capsule     isosorbide dinitrate (ISORDIL) 10 MG tablet     metoprolol succinate ER (TOPROL-XL) 25 MG 24 hr tablet     miconazole with skin protectant (SHANNAN ANTIFUNGAL) 2 % CREA cream     Multiple Vitamins-Minerals (PRESERVISION AREDS 2 PO)     pantoprazole (PROTONIX) 20 MG EC tablet     polyethylene glycol 400 (BLINK TEARS) 0.25 % SOLN ophthalmic solution     potassium chloride (KLOR-CON) 20  MEQ packet     sennosides (SENOKOT) 8.6 MG tablet     nystatin (MYCOSTATIN) 862033 UNIT/ML suspension     Throat Lozenges (LOZENGES MT)     No current facility-administered medications for this visit.        ALLERGIES:  Allergies   Allergen Reactions     Atenolol Hives     Beta Adrenergic Blockers      Brimonidine      Cephalexin Hives     Germall Plus Itching     Imidazolidinyl Urea found in many topical creams and house hold products     Latex      Itching     Morphine GI Disturbance     No Clinical Screening - See Comments Itching     Carba Mix; IMIDAZOLIDINYL UREA - Allergic to this ingredient, which is found in many common topical moisturizers and household products.     Penicillins Hives     Sulfa Drugs GI Disturbance     Tramadol Nausea and Vomiting     Tylenol GI Disturbance     Vicodin [Hydrocodone-Acetaminophen]      Fentanyl Nausea       SOCIAL HISTORY:  I have reviewed this patient's social history and updated it with pertinent information if needed. Jamilah Rodriguez  reports that she quit smoking about 9 years ago. She has a 60.00 pack-year smoking history. She has never used smokeless tobacco. She reports that she does not drink alcohol or use drugs.    FAMILY HISTORY:  I have reviewed this patient's family history and updated it with pertinent information if needed.   Family History   Problem Relation Age of Onset     Breast Cancer Other      Circulatory Daughter 53        mengioma     C.A.D. Father      Diabetes Sister      Breast Cancer Maternal Aunt 60     Other - See Comments Mother         arterial sclerosis     Family History Negative Son        REVIEW OF SYSTEMS:  Skin:  Negative     Eyes:  Negative    ENT:  Negative    Respiratory:  Positive for shortness of breath;dyspnea on exertion  Cardiovascular:  Negative Positive for;edema;lightheadedness;dizziness  Gastroenterology: Positive for nausea;heartburn  Genitourinary:  Negative    Musculoskeletal:  Negative    Neurologic:  Positive for  numbness or tingling of hands  Psychiatric:  Negative    Heme/Lymph/Imm:  Positive for allergies  Endocrine:  Negative        PHYSICAL EXAM:      BP: 116/77 Pulse: 94     SpO2: 90 %      Vital Signs with Ranges  Pulse:  [94] 94  BP: (116)/(77) 116/77  SpO2:  [90 %] 90 %  118 lbs 8 oz    Constitutional: awake, alert, mild distress, very frail  Respiratory: CTAB  Cardiovascular: Normal S1 S2, PSM, no gallops  GI: nondistended, nontender, bowel sounds present  Skin: dry, no rash  Neuropsychiatric: very anxious    DATA:  Labs: Pertinent cardiac labs reviewed    EKG: NSR today    ASSESSMENT:  This is an 88-year-old female with severe nonischemic cardiomyopathy in the setting of paroxysmal atrial fibrillation, now in sinus rhythm and chronic long-standing left bundle branch block.  Coronary angiography has revealed moderate disease.  Her heart rate today is 94 and her blood pressure is pretty reasonable 116/77.  She complains of New [place] Heart Association functional class IIIb heart failure symptoms.    RECOMMENDATIONS:  1. At this time I will add isosorbide dinitrate 10 mg 3 times daily to her regimen to see if some degree of increased afterload reduction helps her.  2. Continue current dose of diuretics but if edema or shortness of breath get worse this can be increased to 40 mg twice a day with as needed metolazone as needed.  3. She has been seen by electrophysiology in the past but we will rerefer her to cardiac EP for discussion regarding cardiac resynchronization therapy, potentially a pacemaker. She is NSR now.  She is not sure at this point whether she would want one or not. She will think about it.   4. I will have her follow-up with the core [facility] and with her primary cardiologist.  Patient obviously is not a candidate for advanced heart failure therapies at this time and given her frailty and overall comorbidities she would be a suboptimal candidate for a cardio mems implantation as well.  5. I discussed with  her and her daughter.  Per patient the primary concern is her anxiety attacks.  They have not met the primary care physician in a while success.  I think that is important because this patient has multiple comorbidities. They are not sure if they are strong enough to be discharged from the TCU on Monday, I agree. They need a formal Social work evaluation as well and potential placement, I doubt she can take care of her self at home by herself. I will have my team get in touch with her TCU and PCP.  6. We have also made a palliative care referral to evaluate goals of care.  7. I will have her follow-up with her primary cardiologist and see her as needed.    SORAYA Turner, [facility]  Cardiology - [facility]  April 12, 2019          Thank you for allowing me to participate in the care of your patient.    Sincerely,     Zhane Fairbanks MD     [facility]

## 2019-04-12 NOTE — TELEPHONE ENCOUNTER
PA Initiation    Medication: Pantoprazole 20 mg DR tablets  Insurance Company: [employer] - Phone [phone] Fax [phone]  Pharmacy Filling the Rx: [id number] [address] [facility]  Filling Pharmacy Phone: [phone]  Filling Pharmacy Fax:    Start Date: 4/12/2019    Central Prior Authorization Team   Phone: [phone]

## 2019-04-12 NOTE — TELEPHONE ENCOUNTER
Prior Authorization Retail Medication Request    Medication/Dose: Pantoprazole 20 mg DR tablets  ICD code (if different than what is on RX):    Previously Tried and Failed:    Rationale:      Insurance Name:  Medicare  Insurance ID:  [id number]       Pharmacy Information (if different than what is on RX)  Name:  [facility]  Phone:  [phone]

## 2019-04-15 NOTE — PROGRESS NOTES
Letter to [facility] was faxed, stating per Dr. Fairbanks pt does not seem ready to discharge to home today, would benefit from seeing her PCP to address other comorbidities and anxiety.     Faxed to [phone].    Mary Worthington RN on 4/15/2019 8:43AM

## 2019-04-15 NOTE — TELEPHONE ENCOUNTER
Prior Authorization Approval    Authorization Effective Date: 1/1/2019  Authorization Expiration Date: 12/31/2019  Medication: Pantoprazole 20 mg DR tablets  Approved Dose/Quantity: 120  Reference #: n/a   Insurance Company: [employer] - Phone [phone] Fax [phone]  Which Pharmacy is filling the prescription (Not needed for infusion/clinic administered): [facility][id number] - [facility] [address] [facility]  Pharmacy Notified: Yes **Pharmacy will notify patient when script is ready for .**  Patient Notified: Yes       palpitations

## 2019-04-16 NOTE — NURSING NOTE
Last 3 BPs: 111/72, 114/68, 118/63 mmHg  HR Ranges:  bpm  Admission Weight: 3/20/19: 109.3 lbs  Current Weights: 4/1/19: 113.4 lbs - 4/9/19: 116 lbs - 4/15/19: 116 lbs

## 2019-04-16 NOTE — LETTER
4/16/2019        RE: Jamilah Rodriguez  [address]  [facility] [address]        [place] [facility]  [place] Medical Record Number:  [id number]  Place of Service where encounter took place:  [facility] [[id number]]  Chief Complaint   Patient presents with     RECHECK       HPI:    Jamilah Rodriguez  is a 87 year old (4/5/1931), who is being seen today for an episodic care visit.  HPI information obtained from: facility chart records, facility staff, patient report and [facility] chart review. Today's concern is:  CHF/cardiomyopathy: on exam today states she continues to have MCCULLOUGH, SOB at rest throughout the day,she has had f/u with cardiology appt and does understand CHF is end stage,  have been working on discharge disposition, recommend LTC but daughter is not sure about LTC, does want her to return home, currently patient and daughter working with IDT team for appropriate disccharge, patient states she has appt with Dr. Hurtado cardiology to discuss her options for end stage CHF, states hospice has been mentioned, I did discuss patient diagnosis and prognosis with her briefly at bedside, she states she would like to talk with Dr. Hurtado on 4/18/19, did discuss getting consult only for hospice so she understands what that entails and patient is agreeable to that.   Advanced directives: patient is currently a DNR/DNI: discussed hospice consult and patient agrees to consult see above  Hypokalemia: K+ 3.1, patient denies palpitations, CP  Anxiety: offers no c/o anxiety today on exam  GARIMA/CKD: see labs  Anemia: see labs  Thrush: patient had appt with GI today 4/11 DC nystatin, denies pain with swallowing but states she has a lot of reflux. She is eating regular texture diet with thin liquids  Last 3 BPs: 111/72, 114/68, 118/63 mmHg  HR Ranges:  bpm  Admission Weight: 3/20/19: 109.3 lbs  Current Weights: 4/1/19: 113.4 lbs - 4/9/19: 116 lbs - 4/15/19: 116  lbs                     Past Medical and Surgical History reviewed in Epic today.    MEDICATIONS:  Current Outpatient Medications   Medication Sig Dispense Refill     albuterol (PROAIR HFA/PROVENTIL HFA/VENTOLIN HFA) 108 (90 Base) MCG/ACT Inhaler Inhale 2 puffs into the lungs every 4 hours as needed for shortness of breath / dyspnea or wheezing       albuterol (PROVENTIL) (2.5 MG/3ML) 0.083% neb solution Take 2.5 mg by nebulization every 4 hours as needed for shortness of breath / dyspnea or wheezing       ANORO ELLIPTA 62.5-25 MCG/INH oral inhaler Inhale 1 puff into the lungs daily 1 Inhaler 8     bimatoprost (LUMIGAN) 0.01 % SOLN Place 1 drop into both eyes At Bedtime.         dorzolamide (TRUSOPT) 2 % ophthalmic solution Place 1 drop into both eyes 2 times daily       furosemide (LASIX) 20 MG tablet 20 mg in the afternoon, 40 mg in the am       hydrALAZINE (APRESOLINE) 10 MG tablet Take 1 tablet (10 mg) by mouth 3 times daily 90 tablet 1     hydrOXYzine (VISTARIL) 25 MG capsule Take 25 mg by mouth every 6 hours as needed for itching       isosorbide dinitrate (ISORDIL) 10 MG tablet Take 1 tablet (10 mg) by mouth 3 times daily 60 tablet 3     metoprolol succinate ER (TOPROL-XL) 25 MG 24 hr tablet Take 1 tablet (25 mg) by mouth daily 30 tablet 1     miconazole with skin protectant (SHANNAN ANTIFUNGAL) 2 % CREA cream Apply topically 2 times daily       Multiple Vitamins-Minerals (PRESERVISION AREDS 2 PO) Take 1 tablet by mouth 2 times daily       pantoprazole (PROTONIX) 20 MG EC tablet Take 2 tablets (40 mg) by mouth 2 times daily       polyethylene glycol 400 (BLINK TEARS) 0.25 % SOLN ophthalmic solution Place 1 drop into both eyes every 3 hours as needed for dry eyes       sennosides (SENOKOT) 8.6 MG tablet Take 1 tablet by mouth daily as needed for constipation       nystatin (MYCOSTATIN) [id number] UNIT/ML suspension Take 5 mLs by mouth 4 times daily       potassium chloride (KLOR-CON) 20 MEQ packet Take 20 mEq by  mouth 3 times daily (Patient not taking: Reported on 4/16/2019) 30 packet 1     Throat Lozenges (LOZENGES MT) Give 1 lozenge by mouth four times a day for yeast infection in mouth         REVIEW OF SYSTEMS:  10 point ROS of systems including Constitutional, Eyes, Respiratory, Cardiovascular, Gastroenterology, Genitourinary, Integumentary, Musculoskeletal, Psychiatric were all negative except for pertinent positives noted in my HPI.    Objective:  /72   Pulse 80   Temp 97.6  F (36.4  C)   Resp 20   Wt 52.6 kg (116 lb)   SpO2 97%   BMI 19.30 kg/m     Exam:  GENERAL APPEARANCE:  Alert, in no distress, thin  ENT:  Mouth and posterior oropharynx normal, moist mucous membranes, [place]  EYES:  EOM, conjunctivae, lids, pupils and irises normal, PERRL  RESP:  respiratory effort and palpation of chest normal, lungs clear to auscultation , no respiratory distress, diminished breath sounds bases bilaterally  CV:  Palpation and auscultation of heart done , regular rate and rhythm, no murmur, rub, or gallop, peripheral edema 1+ in LE bilaterally L>R  ABDOMEN:  normal bowel sounds, soft, nontender, no hepatosplenomegaly or other masses  M/S:   Examination of:   right upper extremity, left upper extremity, right lower extremity and left lower extremity  Inspection, ROM, stability and muscle strength normal and generalized weakness noted  SKIN:  Inspection of skin and subcutaneous tissue baseline  NEURO:   Cranial nerves 2-12 are normal tested and grossly at patient's baseline, speech WNL  PSYCH:  affect and mood normal    Labs:   Recent labs in [facility] reviewed by me today.     ASSESSMENT/PLAN:     Acute on chronic systolic heart failure (H)  Cardiomyopathy, idiopathic (H)  Acute/end stage/ongoing: EF 10-20%, daily weights, vitals daily and prn, BMP  weekly, lasix 40mg QAM and 20mg at noon, hydralaine 10mg TID, toprol xl 25mg QD   Baseline creat 1.0, current creat 1.45  Fluid restriction 2000cc day    ADvanced directives:    Acute/ongoing: discussed hospice consult, patient agreed, hospice consult set up with patient and daughter      Hypokalemia:   Acute: KCL 20meq TID, BMP In AM     GARIMA/CKD 3  Acute/ongoing: BMP  weekly, creat baseline 1.0, avoid nephrotoxic agents     Benign essential hypertension  Atrial fibrillation with RVR (H)  Ongoing: vitals daily and prn, BMP  weekly, metoprolol xl 25mg QD, holding xarelto due to Hgb drop from 13 to 7.5,   Cardiology appt tomorrow     Thrush  Acute/ongoing: GI consult and EGD during hospitalization, f/u GI on 4/2/19 started on pantoprazole 40mg QD   DC nystatin on 4/11  F/u GI as directed     Anemia due to blood loss, acute  Acute: Hgb baseline 13 was 7.1 on admission, EGD as above, holding xarelto  Hgb stable, continue weekly hgb     Chronic obstructive pulmonary disease, unspecified COPD type (H)  Ongoing: monitor SaO2 at rest and with activity, continue anoro ellipta 62.5-25mcg inhaled once day,  Albuterol MDI 2 puff q 4 hours prn, albuterol nebs q 4 hours prn     Orders written by provider at facility  BMP in aM  Hospice consult with hospice of choice, coordinate with patient and daughter        Electronically signed by:  Tonya Lynn Haase, APRN CNP       Sincerely,        Tonya Lynn Haase, APRN CNP

## 2019-04-16 NOTE — PROGRESS NOTES
[facility]  [place] Medical Record Number:  [id number]  Place of Service where encounter took place:  [facility] ([facility] [[id number]]  Chief Complaint   Patient presents with     RECHECK       HPI:    Jamilah Rodriguez  is a 87 year old (4/5/1931), who is being seen today for an episodic care visit.  HPI information obtained from: facility chart records, facility staff, patient report and [facility] chart review. Today's concern is:  CHF/cardiomyopathy: on exam today states she continues to have MCCULLOUGH, SOB at rest throughout the day,she has had f/u with cardiology appt and does understand CHF is end stage,  have been working on discharge disposition, recommend LTC but daughter is not sure about LTC, does want her to return home, currently patient and daughter working with IDT team for appropriate disccharge, patient states she has appt with Dr. Hurtado cardiology to discuss her options for end stage CHF, states hospice has been mentioned, I did discuss patient diagnosis and prognosis with her briefly at bedside, she states she would like to talk with Dr. Hurtado on 4/18/19, did discuss getting consult only for hospice so she understands what that entails and patient is agreeable to that.   Advanced directives: patient is currently a DNR/DNI: discussed hospice consult and patient agrees to consult see above  Hypokalemia: K+ 3.1, patient denies palpitations, CP  Anxiety: offers no c/o anxiety today on exam  GARIMA/CKD: see labs  Anemia: see labs  Thrush: patient had appt with GI today 4/11 DC nystatin, denies pain with swallowing but states she has a lot of reflux. She is eating regular texture diet with thin liquids  Last 3 BPs: 111/72, 114/68, 118/63 mmHg  HR Ranges:  bpm  Admission Weight: 3/20/19: 109.3 lbs  Current Weights: 4/1/19: 113.4 lbs - 4/9/19: 116 lbs - 4/15/19: 116 lbs                     Past Medical and Surgical History reviewed in Epic  today.    MEDICATIONS:  Current Outpatient Medications   Medication Sig Dispense Refill     albuterol (PROAIR HFA/PROVENTIL HFA/VENTOLIN HFA) 108 (90 Base) MCG/ACT Inhaler Inhale 2 puffs into the lungs every 4 hours as needed for shortness of breath / dyspnea or wheezing       albuterol (PROVENTIL) (2.5 MG/3ML) 0.083% neb solution Take 2.5 mg by nebulization every 4 hours as needed for shortness of breath / dyspnea or wheezing       ANORO ELLIPTA 62.5-25 MCG/INH oral inhaler Inhale 1 puff into the lungs daily 1 Inhaler 8     bimatoprost (LUMIGAN) 0.01 % SOLN Place 1 drop into both eyes At Bedtime.         dorzolamide (TRUSOPT) 2 % ophthalmic solution Place 1 drop into both eyes 2 times daily       furosemide (LASIX) 20 MG tablet 20 mg in the afternoon, 40 mg in the am       hydrALAZINE (APRESOLINE) 10 MG tablet Take 1 tablet (10 mg) by mouth 3 times daily 90 tablet 1     hydrOXYzine (VISTARIL) 25 MG capsule Take 25 mg by mouth every 6 hours as needed for itching       isosorbide dinitrate (ISORDIL) 10 MG tablet Take 1 tablet (10 mg) by mouth 3 times daily 60 tablet 3     metoprolol succinate ER (TOPROL-XL) 25 MG 24 hr tablet Take 1 tablet (25 mg) by mouth daily 30 tablet 1     miconazole with skin protectant (SHANNAN ANTIFUNGAL) 2 % CREA cream Apply topically 2 times daily       Multiple Vitamins-Minerals (PRESERVISION AREDS 2 PO) Take 1 tablet by mouth 2 times daily       pantoprazole (PROTONIX) 20 MG EC tablet Take 2 tablets (40 mg) by mouth 2 times daily       polyethylene glycol 400 (BLINK TEARS) 0.25 % SOLN ophthalmic solution Place 1 drop into both eyes every 3 hours as needed for dry eyes       sennosides (SENOKOT) 8.6 MG tablet Take 1 tablet by mouth daily as needed for constipation       nystatin (MYCOSTATIN) 161418 UNIT/ML suspension Take 5 mLs by mouth 4 times daily       potassium chloride (KLOR-CON) 20 MEQ packet Take 20 mEq by mouth 3 times daily (Patient not taking: Reported on 4/16/2019) 30 packet 1      Throat Lozenges (LOZENGES MT) Give 1 lozenge by mouth four times a day for yeast infection in mouth         REVIEW OF SYSTEMS:  10 point ROS of systems including Constitutional, Eyes, Respiratory, Cardiovascular, Gastroenterology, Genitourinary, Integumentary, Musculoskeletal, Psychiatric were all negative except for pertinent positives noted in my HPI.    Objective:  /72   Pulse 80   Temp 97.6  F (36.4  C)   Resp 20   Wt 52.6 kg (116 lb)   SpO2 97%   BMI 19.30 kg/m    Exam:  GENERAL APPEARANCE:  Alert, in no distress, thin  ENT:  Mouth and posterior oropharynx normal, moist mucous membranes, [place]  EYES:  EOM, conjunctivae, lids, pupils and irises normal, PERRL  RESP:  respiratory effort and palpation of chest normal, lungs clear to auscultation , no respiratory distress, diminished breath sounds bases bilaterally  CV:  Palpation and auscultation of heart done , regular rate and rhythm, no murmur, rub, or gallop, peripheral edema 1+ in LE bilaterally L>R  ABDOMEN:  normal bowel sounds, soft, nontender, no hepatosplenomegaly or other masses  M/S:   Examination of:   right upper extremity, left upper extremity, right lower extremity and left lower extremity  Inspection, ROM, stability and muscle strength normal and generalized weakness noted  SKIN:  Inspection of skin and subcutaneous tissue baseline  NEURO:   Cranial nerves 2-12 are normal tested and grossly at patient's baseline, speech WNL  PSYCH:  affect and mood normal    Labs:   Recent labs in [facility] reviewed by me today.     ASSESSMENT/PLAN:     Acute on chronic systolic heart failure (H)  Cardiomyopathy, idiopathic (H)  Acute/end stage/ongoing: EF 10-20%, daily weights, vitals daily and prn, BMP  weekly, lasix 40mg QAM and 20mg at noon, hydralaine 10mg TID, toprol xl 25mg QD   Baseline creat 1.0, current creat 1.45  Fluid restriction 2000cc day    ADvanced directives:   Acute/ongoing: discussed hospice consult, patient agreed, hospice consult set up  with patient and daughter      Hypokalemia:   Acute: KCL 20meq TID, BMP In AM     GARIMA/CKD 3  Acute/ongoing: BMP  weekly, creat baseline 1.0, avoid nephrotoxic agents     Benign essential hypertension  Atrial fibrillation with RVR (H)  Ongoing: vitals daily and prn, BMP  weekly, metoprolol xl 25mg QD, holding xarelto due to Hgb drop from 13 to 7.5,   Cardiology appt tomorrow     Thrush  Acute/ongoing: GI consult and EGD during hospitalization, f/u GI on 4/2/19 started on pantoprazole 40mg QD   DC nystatin on 4/11  F/u GI as directed     Anemia due to blood loss, acute  Acute: Hgb baseline 13 was 7.1 on admission, EGD as above, holding xarelto  Hgb stable, continue weekly hgb     Chronic obstructive pulmonary disease, unspecified COPD type (H)  Ongoing: monitor SaO2 at rest and with activity, continue anoro ellipta 62.5-25mcg inhaled once day,  Albuterol MDI 2 puff q 4 hours prn, albuterol nebs q 4 hours prn     Orders written by provider at facility  BMP in aM  Hospice consult with hospice of choice, coordinate with patient and daughter        Electronically signed by:  Tonya Lynn Haase, APRN CNP

## 2019-04-17 NOTE — LETTER
4/17/2019        RE: Jamilah Rodriguez  [address]  [place] [facility] [address]        [place] [facility] DISCHARGE SUMMARY  PATIENT'S NAME: Jamilah Rodriguez  YOB: 1931  MEDICAL RECORD NUMBER:  [id number]  Place of Service where encounter took place:  [facility] ([facility] [[id number]]    PRIMARY CARE PROVIDER AND CLINIC RESPONSIBLE AFTER TRANSFER:   Sharon Hurtado MD, [address]  / [facility] [address]    [facility] Provider     Transferring providers: Tonya Lynn Haase, JAMIN WYNN, Dr. Sanaz MD  Recent Hospitalization/ED:  [facility] stay 3/17/19 to 3/20/19.  Date of SNF Admission: March / 20 / 2019  Date of SNF (anticipated) Discharge: April / 17 / 2019  Discharged to:Home  Cognitive Scores: BIMS: 15/15, SBT: 0/28  Physical Function: Ambulating 200 ft with RW  DME: Walker    CODE STATUS/ADVANCE DIRECTIVES DISCUSSION:  DNR / DNI     ALLERGIES: Atenolol; Beta adrenergic blockers; Brimonidine; Cephalexin; Germall plus; Latex; Morphine; No clinical screening - see comments; Penicillins; Sulfa drugs; Tramadol; Tylenol; Vicodin [hydrocodone-acetaminophen]; and Fentanyl    DISCHARGE DIAGNOSIS/NURSING FACILITY COURSE:   Patient progressed to walking up to 200 feet using a RW indep, indep with ADL's, has poor activity tolerance, MCCULLOUGH and SOB at rest, End stage CHF with EF 10-20%, followed by [facility], ordered hospice consult, patient will return to home to live alone with home PT, OT, RN, HHA and , will also make vulnerable adult report.  here in TCU has been working with patient and daughter for appropriate safe discharge accomodation, LTC encouraged but patient unable to pay for LTC at this time, she will need to apply for MA.     Past Medical History:  has a past medical history of AAA (abdominal aortic aneurysm) (H), Cardiomyopathy, idiopathic (H) (10/2018), Cervical cancer (H) (1969), Chronic dermatitis, Glaucoma,  Glaucoma, History of COPD, HTN (hypertension), LBBB (left bundle branch block) (10/2018), Macular degeneration, Osteoporosis (8/3/11), PAD (peripheral artery disease) (H), and Vertebral compression fracture (H) (5/17/11).    Discharge Medications:  Current Outpatient Medications   Medication Sig Dispense Refill     albuterol (PROAIR HFA/PROVENTIL HFA/VENTOLIN HFA) 108 (90 Base) MCG/ACT Inhaler Inhale 2 puffs into the lungs every 4 hours as needed for shortness of breath / dyspnea or wheezing       albuterol (PROVENTIL) (2.5 MG/3ML) 0.083% neb solution Take 2.5 mg by nebulization every 4 hours as needed for shortness of breath / dyspnea or wheezing       ANORO ELLIPTA 62.5-25 MCG/INH oral inhaler Inhale 1 puff into the lungs daily 1 Inhaler 8     bimatoprost (LUMIGAN) 0.01 % SOLN Place 1 drop into both eyes At Bedtime.         dorzolamide (TRUSOPT) 2 % ophthalmic solution Place 1 drop into both eyes 2 times daily       furosemide (LASIX) 20 MG tablet 20 mg in the afternoon, 40 mg in the am       hydrALAZINE (APRESOLINE) 10 MG tablet Take 1 tablet (10 mg) by mouth 3 times daily 90 tablet 1     hydrOXYzine (VISTARIL) 25 MG capsule Take 25 mg by mouth every 6 hours as needed for itching       isosorbide dinitrate (ISORDIL) 10 MG tablet Take 1 tablet (10 mg) by mouth 3 times daily 60 tablet 3     metoprolol succinate ER (TOPROL-XL) 25 MG 24 hr tablet Take 1 tablet (25 mg) by mouth daily 30 tablet 1     miconazole with skin protectant (SHANNAN ANTIFUNGAL) 2 % CREA cream Apply topically 2 times daily       Multiple Vitamins-Minerals (PRESERVISION AREDS 2 PO) Take 1 tablet by mouth 2 times daily       nystatin (MYCOSTATIN) 547665 UNIT/ML suspension Take 5 mLs by mouth 4 times daily       pantoprazole (PROTONIX) 20 MG EC tablet Take 2 tablets (40 mg) by mouth 2 times daily       polyethylene glycol 400 (BLINK TEARS) 0.25 % SOLN ophthalmic solution Place 1 drop into both eyes every 3 hours as needed for dry eyes       sennosides  (SENOKOT) 8.6 MG tablet Take 1 tablet by mouth daily as needed for constipation       potassium chloride (KLOR-CON) 20 MEQ packet Take 20 mEq by mouth 3 times daily (Patient not taking: Reported on 4/17/2019) 30 packet 1     Throat Lozenges (LOZENGES MT) Give 1 lozenge by mouth four times a day for yeast infection in mouth         Medication Changes/Rationale:     Torsemide changed to lasix and lasix titrated up to 40mg QAM and 20mg at noon by [facility]    Patient also started on hydralazine, metoprolol and imdur per [facility].     Last appt with GI on 4/11/19 DC nystatin for thrush    Controlled medications sent with patient:   not applicable/none     ROS:   10 point ROS of systems including Constitutional, Eyes, Respiratory, Cardiovascular, Gastroenterology, Genitourinary, Integumentary, Musculoskeletal, Psychiatric were all negative except for pertinent positives noted in my HPI.    Physical Exam:   Vitals: /72   Pulse 94   Temp 97  F (36.1  C)   Resp 18   Wt 52.3 kg (115 lb 4.8 oz)   SpO2 93%   BMI 19.19 kg/m     BMI= Body mass index is 19.19 kg/m .  Exam:  GENERAL APPEARANCE:  Alert, in no distress, thin  ENT:  Mouth and posterior oropharynx normal, moist mucous membranes, [place]  EYES:  EOM, conjunctivae, lids, pupils and irises normal, PERRL  RESP:  respiratory effort and palpation of chest normal, lungs clear to auscultation , no respiratory distress, diminished breath sounds bases bilaterally  CV:  Palpation and auscultation of heart done , regular rate and rhythm, no murmur, rub, or gallop, peripheral edema 1+ in LE bilaterally L>R  ABDOMEN:  normal bowel sounds, soft, nontender, no hepatosplenomegaly or other masses  M/S:   Examination of:   right upper extremity, left upper extremity, right lower extremity and left lower extremity  Inspection, ROM, stability and muscle strength normal and generalized weakness noted  SKIN:  Inspection of skin and subcutaneous tissue baseline  NEURO:   Cranial  nerves 2-12 are normal tested and grossly at patient's baseline, speech WNL  PSYCH:  affect and mood normal    SNF labs: Recent labs in [facility] reviewed by me today.     ASSESSMENT/PLAN:     Acute on chronic systolic heart failure (H)  Cardiomyopathy, idiopathic (H)  Acute/end stage/ongoing: EF 10-20%, continue daily weights,  DC home with home RN, HHA, PT, OT and , continue lasix 40mg QAM and 20mg at noon, hydralaine 10mg TID, toprol xl 25mg QD   Baseline creat 1.0, current creat 1.28 improved  Fluid restriction 2000cc day  F/u with [facility], patient states she has appt with Dr. Hurtado tomorrow 4/19/19      Hypokalemia:   Acute: increase KCL to 40meq BID, BMP on Friday per home RN     GARIMA/CKD 3  Acute/ongoing: creat stable 1.28, creat baseline 1.0, avoid nephrotoxic agents     Benign essential hypertension  Atrial fibrillation with RVR (H)  Ongoing: continue metoprolol xl 25mg QD, holding xarelto due to Hgb drop from 13 to 7.5,   Cardiology appt tomorrow     Thrush  Acute/ongoing: GI consult and EGD during hospitalization, f/u GI on 4/2/19 started on pantoprazole 40mg QD   DC nystatin on 4/11  F/u GI as directed     Anemia due to blood loss, acute  Acute: Hgb baseline 13 was 7.1 on admission, EGD as above, holding xarelto  Hgb stable 8.6, f/u with PCP     Chronic obstructive pulmonary disease, unspecified COPD type (H)  Ongoing:continue anoro ellipta 62.5-25mcg inhaled once day,  Albuterol MDI 2 puff q 4 hours prn, albuterol nebs q 4 hours prn      DISCHARGE PLAN:    Follow up labs: BMP  due 4/19/19 to be drawn by home care with results to [facility]    Medical Follow Up:      Follow up with PCP within 5-7 days    MTM referral needed and placed by this provider: No    Current [place] scheduled appointments:  Next 5 appointments (look out 90 days)    Apr 18, 2019 11:00 AM CDT  [facility] with Sharon H[facility]ado MD  [facility] [address] [place] [address]  [facility] [address]  [phone]   May 08, 2019  9:30 AM CDT  Return Visit with Lv Boogie MD  [facility] ([facility]) [address]  [facility] [address]  [phone]           Discharge Services: Home Care:  Occupational Therapy, Physical Therapy, Registered Nurse, Home Health Aide and From:  [facility]    Discharge Instructions Verbalized to Patient at Discharge:     None      TOTAL DISCHARGE TIME:   Greater than 30 minutes  Electronically signed by:  Tonya Lynn Haase, APRN CNP                       Sincerely,        Tonya Lynn Haase, APRN CNP

## 2019-04-17 NOTE — NURSING NOTE
Last 3 BPs: 114/72, 120/80, 111/72 mmHg  HR Ranges:  bpm  Admission Weight: 3/20/19: 109.3 lbs  Current Weights: 4/1/19: 113.4 lbs - 4/9/19: 116 lbs - 4/15/19: 116 lbs - 4/16/19: 115.3 lbs

## 2019-04-17 NOTE — PROGRESS NOTES
[facility] DISCHARGE SUMMARY  PATIENT'S NAME: Jamilah Rodriguez  YOB: 1931  MEDICAL RECORD NUMBER:  [id number]  Place of Service where encounter took place:  [facility] [[id number]]    PRIMARY CARE PROVIDER AND CLINIC RESPONSIBLE AFTER TRANSFER:   Sharon Hurtado MD, [address] [place] [address] / [facility] [id number]    G Provider     Transferring providers: Tonya Lynn Haase, APRN CNP, Dr. Sanaz MD  Recent Hospitalization/ED:  [facility] Hospital stay 3/17/19 to 3/20/19.  Date of SNF Admission: March / 20 / 2019  Date of SNF (anticipated) Discharge: April / 17 / 2019  Discharged to:Home  Cognitive Scores: BIMS: 15/15, SBT: 0/28  Physical Function: Ambulating 200 ft with RW  DME: Walker    CODE STATUS/ADVANCE DIRECTIVES DISCUSSION:  DNR / DNI     ALLERGIES: Atenolol; Beta adrenergic blockers; Brimonidine; Cephalexin; Germall plus; Latex; Morphine; No clinical screening - see comments; Penicillins; Sulfa drugs; Tramadol; Tylenol; Vicodin [hydrocodone-acetaminophen]; and Fentanyl    DISCHARGE DIAGNOSIS/NURSING FACILITY COURSE:   Patient progressed to walking up to 200 feet using a RW indep, indep with ADL's, has poor activity tolerance, MCCULLOUGH and SOB at rest, End stage CHF with EF 10-20%, followed by [facility] clinic, ordered hospice consult, patient will return to home to live alone with home PT, OT, RN, HHA and , will also make vulnerable adult report.  here in TCU has been working with patient and daughter for appropriate safe discharge accomodation, LTC encouraged but patient unable to pay for LTC at this time, she will need to apply for MA.     Past Medical History:  has a past medical history of AAA (abdominal aortic aneurysm) (H), Cardiomyopathy, idiopathic (H) (10/2018), Cervical cancer (H) (1969), Chronic dermatitis, Glaucoma, Glaucoma, History of COPD, HTN (hypertension), LBBB (left bundle branch block) (10/2018), Macular  degeneration, Osteoporosis (8/3/11), PAD (peripheral artery disease) (H), and Vertebral compression fracture (H) (5/17/11).    Discharge Medications:  Current Outpatient Medications   Medication Sig Dispense Refill     albuterol (PROAIR HFA/PROVENTIL HFA/VENTOLIN HFA) 108 (90 Base) MCG/ACT Inhaler Inhale 2 puffs into the lungs every 4 hours as needed for shortness of breath / dyspnea or wheezing       albuterol (PROVENTIL) (2.5 MG/3ML) 0.083% neb solution Take 2.5 mg by nebulization every 4 hours as needed for shortness of breath / dyspnea or wheezing       ANORO ELLIPTA 62.5-25 MCG/INH oral inhaler Inhale 1 puff into the lungs daily 1 Inhaler 8     bimatoprost (LUMIGAN) 0.01 % SOLN Place 1 drop into both eyes At Bedtime.         dorzolamide (TRUSOPT) 2 % ophthalmic solution Place 1 drop into both eyes 2 times daily       furosemide (LASIX) 20 MG tablet 20 mg in the afternoon, 40 mg in the am       hydrALAZINE (APRESOLINE) 10 MG tablet Take 1 tablet (10 mg) by mouth 3 times daily 90 tablet 1     hydrOXYzine (VISTARIL) 25 MG capsule Take 25 mg by mouth every 6 hours as needed for itching       isosorbide dinitrate (ISORDIL) 10 MG tablet Take 1 tablet (10 mg) by mouth 3 times daily 60 tablet 3     metoprolol succinate ER (TOPROL-XL) 25 MG 24 hr tablet Take 1 tablet (25 mg) by mouth daily 30 tablet 1     miconazole with skin protectant (SHANNAN ANTIFUNGAL) 2 % CREA cream Apply topically 2 times daily       Multiple Vitamins-Minerals (PRESERVISION AREDS 2 PO) Take 1 tablet by mouth 2 times daily       nystatin (MYCOSTATIN) 409462 UNIT/ML suspension Take 5 mLs by mouth 4 times daily       pantoprazole (PROTONIX) 20 MG EC tablet Take 2 tablets (40 mg) by mouth 2 times daily       polyethylene glycol 400 (BLINK TEARS) 0.25 % SOLN ophthalmic solution Place 1 drop into both eyes every 3 hours as needed for dry eyes       sennosides (SENOKOT) 8.6 MG tablet Take 1 tablet by mouth daily as needed for constipation       potassium  chloride (KLOR-CON) 20 MEQ packet Take 20 mEq by mouth 3 times daily (Patient not taking: Reported on 4/17/2019) 30 packet 1     Throat Lozenges (LOZENGES MT) Give 1 lozenge by mouth four times a day for yeast infection in mouth         Medication Changes/Rationale:     Torsemide changed to lasix and lasix titrated up to 40mg QAM and 20mg at noon by [facility] clinic    Patient also started on hydralazine, metoprolol and imdur per [facility] clinic.     Last appt with GI on 4/11/19 DC nystatin for thrush    Controlled medications sent with patient:   not applicable/none     ROS:   10 point ROS of systems including Constitutional, Eyes, Respiratory, Cardiovascular, Gastroenterology, Genitourinary, Integumentary, Musculoskeletal, Psychiatric were all negative except for pertinent positives noted in my HPI.    Physical Exam:   Vitals: /72   Pulse 94   Temp 97  F (36.1  C)   Resp 18   Wt 52.3 kg (115 lb 4.8 oz)   SpO2 93%   BMI 19.19 kg/m    BMI= Body mass index is 19.19 kg/m .  Exam:  GENERAL APPEARANCE:  Alert, in no distress, thin  ENT:  Mouth and posterior oropharynx normal, moist mucous membranes, [place]  EYES:  EOM, conjunctivae, lids, pupils and irises normal, PERRL  RESP:  respiratory effort and palpation of chest normal, lungs clear to auscultation , no respiratory distress, diminished breath sounds bases bilaterally  CV:  Palpation and auscultation of heart done , regular rate and rhythm, no murmur, rub, or gallop, peripheral edema 1+ in LE bilaterally L>R  ABDOMEN:  normal bowel sounds, soft, nontender, no hepatosplenomegaly or other masses  M/S:   Examination of:   right upper extremity, left upper extremity, right lower extremity and left lower extremity  Inspection, ROM, stability and muscle strength normal and generalized weakness noted  SKIN:  Inspection of skin and subcutaneous tissue baseline  NEURO:   Cranial nerves 2-12 are normal tested and grossly at patient's baseline, speech WNL  PSYCH:  affect and  mood normal    SNF labs: Recent labs in [facility] reviewed by me today.     ASSESSMENT/PLAN:     Acute on chronic systolic heart failure (H)  Cardiomyopathy, idiopathic (H)  Acute/end stage/ongoing: EF 10-20%, continue daily weights,  DC home with home RN, HHA, PT, OT and , continue lasix 40mg QAM and 20mg at noon, hydralaine 10mg TID, toprol xl 25mg QD   Baseline creat 1.0, current creat 1.28 improved  Fluid restriction 2000cc day  F/u with [facility] clinic, patient states she has appt with Dr. Hurtado tomorrow 4/19/19      Hypokalemia:   Acute: increase KCL to 40meq BID, BMP on Friday per home RN     GARIMA/CKD 3  Acute/ongoing: creat stable 1.28, creat baseline 1.0, avoid nephrotoxic agents     Benign essential hypertension  Atrial fibrillation with RVR (H)  Ongoing: continue metoprolol xl 25mg QD, holding xarelto due to Hgb drop from 13 to 7.5,   Cardiology appt tomorrow     Thrush  Acute/ongoing: GI consult and EGD during hospitalization, f/u GI on 4/2/19 started on pantoprazole 40mg QD   DC nystatin on 4/11  F/u GI as directed     Anemia due to blood loss, acute  Acute: Hgb baseline 13 was 7.1 on admission, EGD as above, holding xarelto  Hgb stable 8.6, f/u with PCP     Chronic obstructive pulmonary disease, unspecified COPD type (H)  Ongoing:continue anoro ellipta 62.5-25mcg inhaled once day,  Albuterol MDI 2 puff q 4 hours prn, albuterol nebs q 4 hours prn      DISCHARGE PLAN:    Follow up labs: BMP  due 4/19/19 to be drawn by home care with results to [facility] clinic    Medical Follow Up:      Follow up with PCP within 5-7 days    MTM referral needed and placed by this provider: No    Current [place] scheduled appointments:  Next 5 appointments (look out 90 days)    Apr 18, 2019 11:00 AM CDT  [facility]ado [facility]  [facility] [address] [place] [id number] [phone]   May 08, 2019  9:30 AM CDT  Return Visit with  Lv Boogie MD  [facility] ([facility]) [address]  [place] [id number] [phone]           Discharge Services: Home Care:  Occupational Therapy, Physical Therapy, Registered Nurse, Home Health Aide and From:  [facility]    Discharge Instructions Verbalized to Patient at Discharge:     None      TOTAL DISCHARGE TIME:   Greater than 30 minutes  Electronically signed by:  Tonya Lynn Haase, APRN CNP

## 2019-04-18 NOTE — PROGRESS NOTES
"  SUBJECTIVE:                                                      HPI: Jamilah Rodriguez is a pleasant 88 year old female who presents for TCU follow-up:    Accompanied by daughter, who assists with history.    Patient was hospitalized at [facility] 3/7/2019-3/20/2019. She presented with progressive shortness of breath and was hypoxemic, requiring supplemental oxygen. Her acute hypoxic respiratory failure was thought to be due to an acute exacerbation of her systolic heart failure (EF of 10-20%). Patient was treated with IV diuretics, clinically improved, and was able to be weaned off of supplemental oxygen. Patient was also noted to have acute anemia on admission. GI consulted. EGD demonstrated candidal esophagitis and congestive gastropathy. Started on clotrimazole and Protonix and Xarelto was held. Patient's kidney function worsened with diuresis (baseline creatinine 0.9, up trended to 1.62 on the day of discharge). Diuretics (Lasix and spironolactone) held at discharge.    Patient was discharged to [facility] 3/20/2019 where she stayed until 4/17/2019 (yesterday). Patient is now back at home with home PT, OT, and RN services to start shortly. Daughter is staying with the patient until this weekend.     Patient has been following with [facility] clinic while at [facility] and was restarted on Lasix - currently on Lasix 40mg qam, 20mg qpm. She was also started on hydralazine, metoprolol, and Imdur to optimize her heart failure management. She will be meeting with EP to discuss CRT and, potentially, a pacemaker. Per her cardiologist's most recent note, if patient's edema or shortness of breath get worse her Lasix can be increased to 40 mg twice a day with as needed metolazone.    Patient reports that she is \"not doing very well.\" She continues to feel short of breath all of the time. She does not have enough energy or strength to get around her home - stays seated much of the time. She frequently " feels anxious and is having trouble sleeping. And she is having difficulty adjusting to being back home. She was prescribed hydroxyzine for anxiety but has only tried it once and is not sure it helped.     Had haroon discussion with patient and daughter re: palliative care/hospice. She has has end-stage heart disease and is almost always short of breath. Her quality of life could certainly be improved with more focus on comfort and goals of care. She can still actively pursue treatment (like CRT) and participate in therapy and medical programs without restriction.     The medication, allergy, and problem lists have been reviewed and updated as appropriate.     OBJECTIVE:                                                      /60   Pulse 90   Resp 22   Wt 53.6 kg (118 lb 1.6 oz)   SpO2 98%   BMI 19.65 kg/m    Constitutional: tired and frail appearing  Respiratory: normal respiratory effort; clear to auscultation bilaterally  Cardiovascular: regular rate and rhythm; moderate, bilateral, symmetric, pitting edema  Musculoskeletal: seated in a wheelchair  Psych: normal judgment and insight; depressed mood and flattened affect; recent and remote memory intact    Labs today: stable, decreased kidney function (Cr 1.38, GFR 34); stable hemoglobin (8.4); and decreased albumin (2.9). Normal ferritin (52), but low iron (12) and iron saturation (4). Elevated BNP (32K).    ASSESSMENT/PLAN:                                                      (Z09) Hospital discharge follow-up  (primary encounter diagnosis)  (I50.23) Acute on chronic systolic congestive heart failure (H)  Comment: moderate edema on exam; dyspnea with minimal exertion; elevated BNP - signs/symptoms c/w fluid overload.   Plan:    - CONTINUE Lasix 40mg qam, 20mg qpm.   - START metolazone 5mg every other day x 3 doses.   - continue to follow with [facility] clinic as directed.      (D62) Acute blood loss anemia  (D50.9) Iron deficiency anemia, unspecified iron  deficiency type  Comment: stable but low hemoglobin - likely contributing to shortness of breath and lack of energy.   Plan: START daily oral iron supplement (Rx provided).     (N28.9) Renal insufficiency  Comment: decreased from baseline but stable over the last several weeks.   Plan: continue to monitor.     (R53.81) Physical deconditioning  (R62.7) Adult failure to thrive  Comment: patient may benefit from an assisted living facility or more care services at home; patient and daughter are also agreeable to hospice consultation to help improve her quality of life in the face of end-stage CHF.   Plan: care coordination and hospice referrals placed.     (F41.9) Anxiety  Comment: may, in part, be due to iron deficiency anemia and/or fluid overload.   Plan:    - see plans above re: iron deficiency anemia and CHF.   - may continue to use hydroxyzine as needed but if it's not helping...   - recommend Seroquel 12.5mg as needed instead (Rx provided).    - follow-up in 1 month (earlier as needed).     The instructions on the AVS were discussed and explained to the patient. Patient expressed understanding of instructions.    A total of 42 minutes were spent face-to-face with this patient during this encounter and over half of that time was spent on counseling and coordination of care re: above diagnoses and plans of care.     (Chart documentation was completed, in part, with [employer] voice-recognition software. Even though reviewed, some grammatical, spelling, and word errors may remain.)    Sharon Hurtado MD   [address] [address]  T: [phone], F: [phone]

## 2019-04-18 NOTE — PROGRESS NOTES
Clinic Care Coordination Contact  [facility]/Voicemail    Referral Source: IP Report  Clinical Data: Care Coordinator Outreach    Outreach attempted x 1.  Left message on voicemail with call back information and requested return call.    Plan: Care Coordinator will try to reach patient again in 1-2 business days.    GDOWIN Cespedes, [facility]  Clinic Care Coordinator  [facility]  [phone]  [email or web address]

## 2019-04-19 PROBLEM — E87.6 HYPOKALEMIA: Status: ACTIVE | Noted: 2019-01-01

## 2019-04-19 PROBLEM — I50.9 CHF (CONGESTIVE HEART FAILURE) (H): Status: RESOLVED | Noted: 2019-01-01 | Resolved: 2019-01-01

## 2019-04-19 PROBLEM — J44.1 COPD EXACERBATION (H): Status: RESOLVED | Noted: 2019-01-01 | Resolved: 2019-01-01

## 2019-04-19 PROBLEM — I50.22 CHRONIC SYSTOLIC HEART FAILURE (H): Status: ACTIVE | Noted: 2019-01-01

## 2019-04-19 NOTE — ED AVS SNAPSHOT
Emergency Department  [address] [facility] [place] [address]  Phone:  [phone]  Fax:  [phone]                                    Jamilah Rodriguez   MRN: [id number]    Department:   Emergency Department   Date of Visit:  4/19/2019           After Visit Summary Signature Page    I have received my discharge instructions, and my questions have been answered. I have discussed any challenges I see with this plan with the nurse or doctor.    ..........................................................................................................................................  Patient/Patient Representative Signature      ..........................................................................................................................................  Patient Representative Print Name and Relationship to Patient    ..................................................               ................................................  Date                                   Time    ..........................................................................................................................................  Reviewed by Signature/Title    ...................................................              ..............................................  Date                                               Time          22EPIC Rev 08/18

## 2019-04-19 NOTE — PATIENT INSTRUCTIONS
Call CORE nurse for any questions or concerns Mon-Fri 8am-4pm:                                                 #[phone]                                       For concerns after hours:                                               #[phone]     1: Medication changes: INCREASE furosemide to 40mg (2 tablets) 2 x a day. NO metolazone for now. Will determine in future based on weight/symtpoms.   INCREASE potassium to 1 tablet 3 x a day.   2: Plan from today: Call primary care office to reach  or  to help with placement options. Follow up with Alice in 3 weeks on 5/10/19  3: Lab results: see attached; kidney function stable, potassium low.   Component      Latest Ref Rng & Units 4/12/2019 4/18/2019   Sodium      133 - 144 mmol/L 134 (A) 136   Potassium      3.4 - 5.3 mmol/L 3.1 (A) 3.3 (L)   Chloride      94 - 109 mmol/L 97 (A) 98   Carbon Dioxide      20 - 32 mmol/L 26 29   Anion Gap      3 - 14 mmol/L 11 9   Glucose      70 - 99 mg/dL 84 133 (H)   Urea Nitrogen      7 - 30 mg/dL 21 25   Creatinine      0.52 - 1.04 mg/dL 1.32 (A) 1.38 (H)   GFR Estimate      >60 mL/min/1.73:m2 36 (A) 34 (L)   GFR Estimate If Black      >60 mL/min/1.73:m2 42 (A) 39 (L)   Calcium      8.5 - 10.1 mg/dL 8.4 8.4 (L)   Bilirubin Total      0.2 - 1.3 mg/dL  0.5   Albumin      3.4 - 5.0 g/dL  2.9 (L)   Protein Total      6.8 - 8.8 g/dL  6.4 (L)   Alkaline Phosphatase      40 - 150 U/L  130   ALT      0 - 50 U/L  16   AST      0 - 45 U/L  15   Iron      35 - 180 ug/dL  12 (L)   Iron Binding Cap      240 - 430 ug/dL  334   Iron Saturation Index      15 - 46 %  4 (L)   N-Terminal Pro Bnp      0 - 450 pg/mL  32,766 (H)

## 2019-04-19 NOTE — PROGRESS NOTES
DOTTIE  D/I: DOTTIE received page from Dr. Presley regarding patient needing placement. Physician stated that patient has no medical needs to be in the hospital at this time. Physician stated that patient was discharged from [facility] on Wed.  Physician stated that patient's daughter has been staying with her since Wed., but that they came to the ED today because daughter is no longer able to care for her mother.  Physician stated that patient has congestive heart failure, and she gets out of breathe combing her hair.  Social work met with patient and patient's daughter, Lulu. Patient reported that they were on their way to an appointment with  today to turn in her paperwork for MA.  Patient and daughter both stated that the patient needs long term care.  SW called [facility], and [facility] has no LTC beds.  SW called Dorina at the [facility] who stated that they can take patient tonight and can provide patient with a LTC bed.  Dorina stated that she needs patient's face sheet, H&P, medication list, and discharge orders (that include PT/OT orders) faxed to her at [phone].  Dorina told SW that patient's medicare will continue  the new facilities days used as long as patient has a skilled need.  Dorina also informed SW that she can assist with a LTC bed and that the patient should submit the MA application.  DOTTIE met with patient and patient's daughter to update them on the current discharge plan.  Both patient, and patient's daughter were in agreement with the plan. DOTTIE also spoke to physician and ER [facility].  ER [facility] agreed to fax the required materials to Dorina once the orders are completed.  P: DOTTIE will continue to monitor and follow as needed.

## 2019-04-19 NOTE — ED TRIAGE NOTES
Pt brought in by family, states they need to see a SW. Currently pt lives in an assisted living but is ua to care for self now.

## 2019-04-19 NOTE — PROGRESS NOTES
Clinic Care Coordination Contact  [facility]/Voicemail       Clinical Data: Care Coordinator Outreach    Outreach attempted x 2.  Left message on voicemail with call back information and requested return call.    Plan: Care Coordinator will mail out care coordination introduction letter with care coordinator contact information and explanation of care coordination services. Care Coordinator will try to reach patient again in 1-2 business days.    GODWIN Cespedes, [facility]  Clinic Care Coordinator  [facility]  [phone]  [email or web address]

## 2019-04-19 NOTE — ED PROVIDER NOTES
History     Chief Complaint:  Social Work Services    HPI   Jamilah Rodriguez is a 88 year old female with a history of atrial fibrillation, CHF, COPD, and cardiomyopathy who presents with her daughter for evaluation of increased need for care. In the past 2 months, she has been hospitalized twice for complications with CHF; at the second one, she reports having 11 pounds of fluid drained and was ultimately discharged to [facility] for rehab, but was discharged 2 days ago. Since being discharged, her daughter has been staying with the resident in her independent apartment and reports the patient's ability to care for herself is severely limited due the shortness of breath. Per her daughter, the patient cannot comb her hair without needing a break. They have been working with Svitlana from [facility] as well as a  at [facility]. Today, they had an appointment with a home care nurse in [facility] who said there was nothing they could do until the patient was in a nursing home with 24 hour care, so they decided to present to the ED hoping to speak with a  to expedite this plan. Here, she denies any new symptoms and states the only problem is the shortness of breath. Prior to her recent stay at [facility], she had been living in an independent apartment without any cares. They are currently looking for homes covered by Medicare.     Allergies:  Atenolol  Beta adrenergic blockers  Brimonidine  Cephalexin  Germall Plus  Latex  Morphine  Penicillins  Sulfa  Tramadol  Tylenol  Vicodin  Fentanyl      Medications:    Albuterol inhaler & nebulizer  Anoro ellipta inhaler  Lasix  Hydralazine  Hydroxyzine  Isordil  Metoprolol  Protonix  Seroquel    Past Medical History:    AAA  Idiopathic cardiomyopathy  Cervical cancer  Chronic dermatitis  Glaucoma  COPD  A. Fib.   CHF  HTN  Macular degeneration  PAD  Compression fracture    Past Surgical History:    Left cataract removal  EGD   Coronary  "angiogram  Total abdominal hysterectomy  Exploratory laparotomy  Orthopedic surgery x 3    Family History:    CAD    Social History:  Marital Status:   [4]  Daughter at bedside.   Former smoker  Negative for alcohol use.     Review of Systems   Neurological: Positive for weakness.   All other systems reviewed and are negative.    Physical Exam     Patient Vitals for the past 24 hrs:   BP Temp Temp src Pulse Resp SpO2 Height Weight   04/19/19 1436 106/65 98.6  F (37  C) Temporal 96 20 99 % 1.651 m (5' 5\") 52.2 kg (115 lb)      Physical Exam  SKIN:  Warm, dry.  HEMATOLOGIC/IMMUNOLOGIC/LYMPHATIC:  No pallor.  BLE edema.  HENT:  No JVD.  EYES:  Conjunctivae normal.  CARDIOVASCULAR:  Regular rate and rhythm.  RESPIRATORY:  No respiratory distress, breath sounds equal and normal.  GASTROINTESTINAL:  Soft, nontender abdomen.  No distension.  MUSCULOSKELETAL:  Thin body habitus.  NEUROLOGIC:  Alert, conversant.  PSYCHIATRIC:  Normal mood.    Emergency Department Course   Laboratory:   CBC: WBC: 7.7, HGB: 8.9 (L), PLT: 434  BMP: Glucose 166 (H), K: 2.8 (L), GFR: 32 (L), Ca: 8.3 (L), o/w WNL (Creatinine: 1.47 (H))    BNP: 34,181 (H)    Interventions:   1922 Protonix, 40 mg, PO    Emergency Department Course:  Nursing notes and vitals reviewed.   (1506) I performed an exam of the patient as documented above.      (1515) I consulted with the on-call  in regards to the patient's history and presentation in the emergency department. They agree to see the patient here.     (1605) I spoke with the  after they assessed the patient in the emergency department.     (1616) I spoke with a hospitalist in regards to PT/OT orders as recommended by social work for the patient's future cares.      (1655) I rechecked the patient and discussed the results of her workup thus far.      (1925) I spoke with the nursing staff and rechecked the patient. At this time, we are waiting for confirmation from the nursing " "facility, [facility]     Medication administered in the emergency department, as above.     (2020) The RN [facility] and neither of them are aware that the patient may be coming to their facility which means the patient's disposition is still pending.      (2030) I rechecked the patient and spoke with the family about the difficulties we are having in the placement of the patient. At this point, the daughter is uncomfortable with the price of observation, so if the [facility] does not have a bed for the patient then family will take her home.     (2100) I rechecked the patient as the RN informed me that there are no beds available per any of the nursing homes she has called. It is unclear who the  spoke with earlier and said a bed was available. At this time, another ED  will speak with the patient.     (2110) I rechecked the patient and, after much discussion, the family decided admitting her to observation would be better than taking her home.     (2115) I consulted with Dr. Sanchez of the hospitalist services. He is in agreement to accept the patient for admission. He did request basic labs be drawn prior to admission.     Blood drawn. This was sent to the lab, findings above.     Findings and plan explained to the Patient and daughter who consents to admission. Discussed the patient with Dr. Sanchez, who will admit the patient to an observation bed for further monitoring, evaluation, and treatment.    I personally answered all related questions prior to admission.        Impression & Plan      Medical Decision Making:  Jamilah Rodriguez is a 88 year old female who presents with concern that she requires a higher level of care and not to be living independently. It sounds like her quality of life is impaired due to her congestive heart failure and dyspnea with minimal exertion. Vital signs reassuring as was the examination. I consulted social work and " she was arranging a location where the patient may be transferred for long term care. During the patient's time in the ED, we were under the impression an admission had been accepted at [facility] however when the nurse contacted them for transfer report they knew nothing of the patient and stated there was no bed for her. After further consideration family decided to have the patient admitted under observation for placement as they cannot take care of her. Prior to admission, the hospitalist requested a CBC, BMP, and BNP be drawn, as noted above.     Diagnosis:    ICD-10-CM    1. Adult failure to thrive R62.7    2. Chronic congestive heart failure, unspecified heart failure type (H) I50.9        Disposition:  Admitted to obs under the care of Dr. Daniel Romero Disclosure:  Leyla VANEGAS, am serving as a scribe on 4/19/2019 at 3:06 PM to personally document services performed by Wali Presley MD based on my observations and the provider's statements to me.       Leyla Walker  4/19/2019    EMERGENCY DEPARTMENT       Wali Presley MD  04/20/19 4895

## 2019-04-19 NOTE — TELEPHONE ENCOUNTER
Patient's homecare nurse calling she received orders from TCU to check labs- BMP- but patient got labs yesterday at office visit with Dr. Hurtado. She also went to [facility] today and they changed potassium and lasix dosage. Homecare wondering if should push labs to Monday? Please advise and place orders. Per homecare nurse, lasix was changed to 2 tabs (40 mg) BID today.     Also has question about iron tabs- says they were ordered but patient wanting to make sure she is to start these? Has not picked them up yet.      Svitlana homecare nurse [phone]. With pt so looking for call back within 10 minutes

## 2019-04-19 NOTE — LETTER
[place] CARE COORDINATION    April 19, 2019    Jamilah Rodriguez  [address]   [place] [id number]      Dear Jamilah,    I am a clinic care coordinator who works with Sharon Hurtado MD at [facility]. I wanted to introduce myself and provide you with my contact information so that you can call me with questions or concerns about your health care. Below is a description of clinic care coordination and how I can further assist you.     The clinic care coordinator is a registered nurse and/or  who understand the health care system. The goal of clinic care coordination is to help you manage your health and improve access to the [facility] system in the most efficient manner. The registered nurse can assist you in meeting your health care goals by providing education, coordinating services, and strengthening the communication among your providers. The  can assist you with financial, behavioral, psychosocial, chemical dependency, counseling, and/or psychiatric resources.    Please feel free to contact me at [phone], with any questions or concerns. We at [facility] are focused on providing you with the highest-quality healthcare experience possible and that all starts with you.     Sincerely,     Corin Byers

## 2019-04-19 NOTE — PHARMACY-ADMISSION MEDICATION HISTORY
Admission medication history interview status for the 4/19/2019  admission is complete. See EPIC admission navigator for prior to admission medications     Medication history source reliability:Good    Actions taken by pharmacist (provider contacted, etc):None     Additional medication history information not noted on PTA med list :None    Medication reconciliation/reorder completed by provider prior to medication history? No    Time spent in this activity: 15 min    Prior to Admission medications    Medication Sig Last Dose Taking? Auth Provider   albuterol (PROAIR HFA/PROVENTIL HFA/VENTOLIN HFA) 108 (90 Base) MCG/ACT Inhaler Inhale 2 puffs into the lungs every 4 hours as needed for shortness of breath / dyspnea or wheezing prn Yes Reported, Patient   albuterol (PROVENTIL) (2.5 MG/3ML) 0.083% neb solution Take 2.5 mg by nebulization every 4 hours as needed for shortness of breath / dyspnea or wheezing prn Yes Reported, Patient   ANORO ELLIPTA 62.5-25 MCG/INH oral inhaler Inhale 1 puff into the lungs daily 4/19/2019 at Unknown time Yes Madi Willoughby MD   bimatoprost (LUMIGAN) 0.01 % SOLN Place 1 drop into both eyes At Bedtime.   4/18/2019 at Unknown time Yes Reported, Patient   dorzolamide (TRUSOPT) 2 % ophthalmic solution Place 1 drop into both eyes 2 times daily 4/19/2019 at am Yes Reported, Patient   furosemide (LASIX) 20 MG tablet Take 40 mg by mouth every morning 4/19/2019 at am Yes Unknown, Entered By History   furosemide (LASIX) 20 MG tablet Take 20 mg by mouth daily (before lunch) 4/19/2019 at Unknown time Yes Unknown, Entered By History   hydrALAZINE (APRESOLINE) 10 MG tablet Take 1 tablet (10 mg) by mouth 3 times daily 4/19/2019 at x2 Yes Alice May APRN CNP   hydrOXYzine (VISTARIL) 25 MG capsule Take 25 mg by mouth every 6 hours as needed for itching prn Yes Reported, Patient   isosorbide dinitrate (ISORDIL) 10 MG tablet Take 1 tablet (10 mg) by mouth 3 times daily 4/19/2019 at x2 Yes Tiki  MD Zhane   metoprolol succinate ER (TOPROL-XL) 25 MG 24 hr tablet Take 1 tablet (25 mg) by mouth daily 4/19/2019 at Unknown time Yes Alice aMy APRN CNP   miconazole with skin protectant (SHANNAN ANTIFUNGAL) 2 % CREA cream Apply topically 2 times daily 4/19/2019 at Unknown time Yes Jimy Ferguson PA-C   pantoprazole (PROTONIX) 20 MG EC tablet Take 2 tablets (40 mg) by mouth 2 times daily 4/19/2019 at am Yes Jimy Ferguson PA-C   polyethylene glycol 400 (BLINK TEARS) 0.25 % SOLN ophthalmic solution Place 1 drop into both eyes every 3 hours as needed for dry eyes prn Yes Unknown, Entered By History   potassium chloride ER (K-DUR/KLOR-CON M) 20 MEQ CR tablet Take 1 tablet (20 mEq) by mouth 3 times daily 4/19/2019 at x2 Yes Alice May APRN CNP   QUEtiapine (SEROQUEL) 25 MG tablet Nightly as needed; during the day as needed for anxiety. prn Yes Sharon Hurtado MD   ferrous sulfate ER (SLO-FE) 142 (45 Fe) MG CR tablet Take 1 tablet (142 mg) by mouth daily  Patient taking differently: Take 142 mg by mouth daily New rx; not picked up yet.   Sharon Hurtado MD

## 2019-04-19 NOTE — TELEPHONE ENCOUNTER
Homecare RN informed but now she has update that patient is going to ER. Needs safe disposition, being at home is not viable option for her. Does not have 24 hour care giving. Also is SOB and uncomfortable. Thinks ER/hospital will take care of labs. Also had update previously that it sounds like a pt's family member had picked up her iron tabs for her.

## 2019-04-19 NOTE — LETTER
4/19/2019    Sharon Hurtado MD  [address] [facility] [place] [address]    RE: Jamilah Rodriguez       Dear Colleague,    I had the pleasure of seeing Jamilah Rodriguez in the [facility].    Cardiology Clinic Progress Note  Jamilah Rodriguez MRN# [id number]   YOB: 1931 Age: 88 year old   Primary Cardiologist: Dr. Bond, establishing care with CORE MD, Dr. Fairbanks  Reason for visit: CORE followup            Assessment and Plan:   Jamilah Rodriguez is a very pleasant 87 year old female with a history of HFrEF, nonischemic cardiomyopathy (diagnosed 10/2018), LVEF 10-20%, LV dimension 5.8cm per echocardiogram 2/6/19, chronic LBBB, atrial fibrillation, AAA (followed by Dr. Boogie) and COPD. Patient with 2 recent hospitalizations 3/17/2019-3/20/2019 presenting with complaints of shortness of breath found to be hypoxic 70s, thought to be secondary to volume overload. Patient was diuresed from 123# to 108# at discharge. Diuretics held at discharge and restarted on torsemide 10mg at TCU. It was recommended that patient start metoprolol XL 25mg daily but appears patient was restarted on diltiazem at time of discharge. Patient creatinine was noted to be rising while in the hospital. 2/3/2019-2/10/2019 hospitalized related to acute respiratory distress due to influenza with COPD exacerbation. Patient here today for CORE follow up. Patient is having depression and anxiety which is confounding the situation. She was refusing her vasodilators this past week (hydralazine, took one dose of imdur stated it made her nervous and hasn't taken since).     Since I saw her for CORE enrollment on 3/27/19 I have been working on optimizing HR control on betablockers (not diltiazem) and increase vasodilator therapy. Concerns for low output state given low ejection fraction and no vasodilator therapy. Patient also likely over diuresed when she first presented, torsemide was on hold from 3/27/19-4/3/19.  Furosemide was started during CORE clinic visit 4/3/19 given some signs of volume overload (lower extremity edema and elevated JVD). Patient continues to follow closely in CORE clinic, persistent NYHA class IV symptoms.     1.  Chronic systolic heart failure/HFrEF, nonischemic cardiomyopathy : LVEF 10-20%, LV dimension 5.8cm per echocardiogram 2/6/19. Patient continues to appear slightly volume up on exam, weight stable for past weel at 118#, during her last CORE visit with me her furosemide was increased to 40mg qam and 20mg qpm, patient notes no significant changes in symptoms. Patient reports dyspnea with any activity likely secondary to end stage heart failure and her anxiety.    - NYHA class IV, stage C   - Etiology : nonischemic, idopathic?    - Fluid status : slightly hypervolemic   - Diuretic regimen :  INCREASE furosemide 40mg BID. (Torsemide on hold from 3/27/19 - 4/3/19)    - No PRN metolazone at this time given low potassium and no worsening symptoms, will first adjust furosemide, advised patient to not take metolazone prescribed by PCP at this time will monitor potassium and symptoms, will consider PRN low dose metolazone in the future if needed.    - Last RHC : none   - Ischemic evaluation : LHC completed 10/12/2018 showed moderate nonocclusive coronary artery disease   - Guideline directed medical therapy    - Betablocker: Metoprolol XL 25mg daily today.     ** Atenolol allergy noted in chart (hives) and concerns for betablocker therapy in the past given COPD. Patient has been tolerating metoprolol with no signs or symptoms of adverse reaction.     -  Continue hydralazine 10mg TID - reinforced the importance of compliance.     -  Continue isosorbide dinitrate 10mg TID    - ACEI/ARB/ARNI: none, was on losartan in the past, appears at time of admission patient states it was stopped? Patient will benefit from restarting since kidney function normalizing, will review at follow-up appointment.     -  Aldactone antagonist: was on spironolactone 12.5mg, this was held at discharge related to rash/GARIMA. Will consider in future if kidney function improves.    - Sudden cardiac death prophylaxis : patient was evaluated for BiV ICD consideration, pt declined. =   - EP referral placed for CRT consideration, patient scheduled to see Dr. Madison 4/30/19   - Reinforced heart failure education given, continue daily weights and low sodium diet.     2. Chronic kidney injury - Baseline creatinine was 0.7-0.9, kidney function has remained stable at 1.3.     3. Atrial fibrillation - chronic, stable, rate controlled, asymptomatic. [id number] score at least 5 (HTN, age, HF, female).    -  Xarelto held during hospitalization and at discharge related to anemia, GI consulted, s/p EGD which showed esophageal candidiasis, no active bleeding found.    - Recommend continuing to hold xarelto at this times.    - REMAIN OFF diltiazem, given HFrEF   - Continue metoprolol XL 25mg daily, see above, atenolol allergy noted, reviewed extensively with patient and family and agree with plan to trial beta blocker therapy, tolerating BB with no adverse effects.     4. Anemia - noted after starting anticoagulation for atrial fibrillation, no bleeding source found. Hemoglobin 4/18 is 8.4, down from 13.3 on 2/9/19, as low as 7.1 3/18/19.   - Anticoagulation has remained on hold, continue to hold for now.    - Recommended consideration for blood transfusion, patient remains resistant to this    - Patient will benefit from hematology evaluation as anemia is likely exacerbating HF and worsening kidney function. Patient wishes to further discuss at follow up appointments.    - PCP started iron supplement yesterday    5. Advance care planning - need to continue to explore with patient and family. Patient and daughter appear to have a reasonable understanding of patients health conditions, understanding that patients life is limited by her advanced heart  failure. Their biggest concern right now is getting placement at a nursing home facility noting that caring for her at home is too difficulty. Daughter is extremely overwhelmed, tearful in clinic.     Reviewed palliative care and hospice with patient and daughter, hard to further explore goals of care as today the main concern is on caregiver services/nursing home placement. Hospice referral placed yesterday by PCP. Patient has noted that she doesn't wish to undergo invasive procedures. Patients depression/anxiety has been increasingly symptomatic, PCP has started seroquel PRN. Paulo continues to note she is unsatisfied with her current quality of life. Prior to her hospitalization in February with influenza she was living independently. Will continue exploring goals of care with patient and family.         Changes today: INCREASE furosemide 40mg BID, INCREASE potassium 20meq TID.     Given low potassium and no significantly worse symptoms, will first adjust furosemide, advised patient to not take metolazone at this time will monitor potassium and symptoms, will consider PRN low dose metolazone in the future if needed.     Follow up plan:     C RN to draw BMP next week    CORE RN to call patient Monday to check on weight/symptoms.    CORE followup with me 5/10/19, sooner as needed, patient and daughter note stressful to get to appointments and prefer trying to utilizing home health care to drawn labs and do phone follow ups.     EP followup with Dr. Madison     Palliative care referral pending - daughter notes she is unable to make appts on Tuesdays which is the day when Dr. High is in clinic.     Advised to call PCP office to see if  or  can help navigate increased care needs/nursing home placement.         History of Presenting Illness:    Jamilah Rodriguez is a very pleasant 87 year old female with a history of HFrEF, nonischemic cardiomyopathy (diagnosed 10/2018), LVEF 10-20%, LV  dimension 5.8cm per echocardiogram 2/6/19, chronic LBBB, atrial fibrillation, AAA (followed by Dr. Boogie) and COPD.     Patient with 2 recent hospitalizations 3/17/2019-3/20/2019 presenting with complaints of shortness of breath felt to be secondary to volume overload. Reports that her face and neck were swelling up and needing to prop herself up at night to breath. Worsening lower extremity edema. EMS noted patient was 70% on room air. BNP 10,760. Troponin normal 0.026. Hgb 8.2 (13.3 2/9/29, as low as 7.1 3/18/19). Admission weight 123#. Discharge weight 108#. Spironolactone held at discharge related to rash/GARIMA. No betablockers as caused worsening COPD in the past. It was recommended that patients diltiazem be discontinued and metoprolol XL started, it appears patient started this but wasn't carried over at discharge. Diuresed with IV furosemide and transitioned to PO torsemide. Diuretics held at discharge and restarted on torsemide 10mg at TCU. Xarelto held during hospitalization and at discharge related to anemia, GI consulted, s/p EGD which showed esophageal candidiasis, no active bleeding found. Patient was on losartan at one point but at time of admission she notes it was discontinued. 2/3/2019-2/10/2019 hospitalized related to acute respiratory distress due to influenza with COPD exacerbation. Discharged to TCU on 1-2L oxygen, stating when she returned home she didn't need oxygen.       Patient has followed with Dr. Bond and Dr. Arana in outpatient cardiology. Dr. Bond saw patient on 10/5/2018 due to new systolic cardiomyopathy after which she underwent a complete workup. Normal LVEF in 2012 and nuclear study noted normal LVEF in 2015. Cardiac MRI 10/3/2018 demonstrated moderate LV dilation with severely reduced systolic function. Late gadolinium enhancement imaging demonstrates a small area of midwall enhancement involving the mid and basal inferoseptal wall. This is nonspecific pattern consistent with  idiopathic nonischemic cardiomyopathy. Cardiac catheterization completed 10/12/2018 showed moderate nonocclusive coronary artery disease. LAD 30% stenosis, circumflex 30-40%, and RCA 30-40%. At this time she was on losartan and aldactone was started. Consideration for CRT was also recommended given chronic LBBB. Patient was seen by Dr. Arana 11/5/2018 to be evaluated for BiV ICD consideration. At this time patient was not interested in invasive procedures and given advanced age Dr. Arana saw little benefit. Noting that reconsideration in the future could be made if HF decompensation.    Since I saw her for CORE enrollment on 3/27/19 I have been working on optimizing HR control on betablockers (not diltiazem) and increase vasodilator therapy. Concerns for low output state given low ejection fraction and no vasodilator therapy. Patient also likely over diuresed when she first presented, torsemide was on hold from 3/27/19-4/3/19. Furosemide was started during CORE clinic visit 4/3/19 given some signs of volume overload (lower extremity edema and elevated JVD), this has gradually been increased. Patient continues to follow closely in CORE clinic, persistent NYHA class IV symptoms.     Patient most recently seen by Dr. Fairbanks 4/12/19 where isosorbide dinitrate was added, EP referral placed for CRT consideration and palliative care was recommended.     Patient saw her PCP Dr. Hurtado yesterday, where it was recommended to start metolazone 5mg every other day x 3 doses and to start daily iron supplement. Hospice referral placed.     Patient discharged on Wednesday home. Noting a lot of difficulty at home. Daughter states she has been staying with the patient for the past 3 days. Patients biggest concerns is her inability to do anything at home, states any activity results in dyspnea. [facility] coming out today for evaluation.     Patient is here today for CORE followup. Patients daughter is present during clinic visit today. Patient notes  "continuing lower extremity edema which is \"about the same\". Denies abdominal distention/bloating. Occasional shortness of breath at rest. Exertional dyspnea with any activity, NYHA class IV, \"even putting in eye drops results in dyspnea\". Slept good last night, sleeping with HOB elevated, notes some orthopnea/PND. Patient notes last night she slept all night with no difficulty. Patient and daughter frustrated about how things have been going at home. Taking medications daily, notes she has been tolerating the isosorbide dinitrate. Patient notes her anxiety is still very bothersome, noting constant feelings of anxiety. Using calming patches. PCP started seroquel yesterday as needed. Patient denies chest pain or chest tightness. Denies dizziness, lightheadedness or other presyncopal symptoms.     Daughter is tearful and very stressed about her employment, which is just adding to the challenges.     Labs from yesterday show stable kidney function with a creatinine of 1.38, potassium low at 3.3, BNP 32K which is down from 35K a couple weeks ago. Hemoglobin 8.4. Blood pressure 108/62 and HR 92 in clinic today.    Appetite better since being home. Notes she is enjoying the food more. Not adding additional salt to foods. Following 2L fluid restriction. Functionally she is significantly struggling at home due to dyspnea. Denies alcohol use. Denies tobacco use.         Recent Hospitalizations   3/17/2019-3/20/2019 presenting with complaints of shortness of breath. Reports that her face and neck were swelling up and needing to prop herself up at night to breath. Worsening lower extremity edema. EMS noted patient was 70% on room air. BNP 10,760. Troponin normal 0.026. Hgb 8.2 (13.3 2/9/29, as low as 7.1 3/18/19). Admission weight 123#. Discharge weight 108#. Spironolactone held at discharge related to rash/GARIMA. No betablockers as caused worsening COPD in the past. Diuresed with IV furosemide and transitioned to PO torsemide. " Diuretics held at discharge and restarted on torsemide 10mg at TCU  2/3/2019-2/10/2019 related to acute respiratory distress due to influenza with COPD exacerbation.         Social History    2 children   Social History     Socioeconomic History     Marital status:      Spouse name: Not on file     Number of children: 2     Years of education: Not on file     Highest education level: Not on file   Occupational History     Not on file   Social Needs     Financial resource strain: Not on file     Food insecurity:     Worry: Not on file     Inability: Not on file     Transportation needs:     Medical: Not on file     Non-medical: Not on file   Tobacco Use     Smoking status: Former Smoker     Packs/day: 1.00     Years: 60.00     Pack years: 60.00     Last attempt to quit: 2009     Years since quittin.8     Smokeless tobacco: Never Used     Tobacco comment: former 2-1 ppd since age [age]   Substance and Sexual Activity     Alcohol use: No     Alcohol/week: 0.0 oz     Comment: NA     Drug use: No     Sexual activity: Never   Lifestyle     Physical activity:     Days per week: Not on file     Minutes per session: Not on file     Stress: Not on file   Relationships     Social connections:     Talks on phone: Not on file     Gets together: Not on file     Attends Judaism service: Not on file     Active member of club or organization: Not on file     Attends meetings of clubs or organizations: Not on file     Relationship status: Not on file     Intimate partner violence:     Fear of current or ex partner: Not on file     Emotionally abused: Not on file     Physically abused: Not on file     Forced sexual activity: Not on file   Other Topics Concern     Parent/sibling w/ CABG, MI or angioplasty before 65F 55M? Not Asked   Social History Narrative     Not on file            Review of Systems:   Skin:  Negative     Eyes:  Negative    ENT:  Negative    Respiratory:  Positive for shortness of breath;dyspnea  "on exertion  Cardiovascular:    Positive for;edema;lightheadedness;dizziness  Gastroenterology: Positive for nausea;heartburn  Genitourinary:  Negative    Musculoskeletal:  Negative    Neurologic:  Positive for numbness or tingling of hands  Psychiatric:  Negative    Heme/Lymph/Imm:  Positive for allergies  Endocrine:  Negative           Physical Exam:   Vitals: /62   Pulse 92   Ht 1.651 m (5' 5\")   Wt 53.9 kg (118 lb 12.8 oz)   SpO2 98%   BMI 19.77 kg/m      Wt Readings from Last 4 Encounters:   04/19/19 53.9 kg (118 lb 12.8 oz)   04/18/19 53.6 kg (118 lb 1.6 oz)   04/17/19 52.3 kg (115 lb 4.8 oz)   04/16/19 52.6 kg (116 lb)     GEN: well nourished, in no acute distress.  HEENT:  Pupils equal, round. Sclerae nonicteric.   NECK: Supple, no masses appreciated. JVD elevated at 10cm at 90 degrees.   C/V:  Irregularly irregular rate and rhythm, no murmur  RESP: Respirations are unlabored. Clear to auscultation bilaterally without wheezing  GI: Abdomen soft, nontender.  EXTREM: +1-2 LE edema to bilateral ankles/feet  NEURO: Alert and oriented, cooperative.  SKIN: Warm and dry, skin to bilateral lower extremities scaly.        Data:   ECHO 2/6/2019  The left ventricle is severely dilated. The visual ejection fraction is  estimated at 10-20%.  Right ventricular function cannot be assessed due to poor image quality.  Probably mildly reduced? TDI velocities suggest normal function, but this  cannot be fully assessed.  Trivial pericardial effusion    Cardiac catheterization 10/12/2018  Summary  1. Moderate nonocclusive coronary artery disease  2. Normal left ventricular end-diastolic pressure    Cardiac MRI 1010/2018  1. The LV is moderately dilated. The global systolic function is severely reduced. The LVEF is 19%. There  is severe global hypokinesis of the left ventricle with minor regional variation.   2. The RV is normal in cavity size. The global systolic function is mildly to moderately reduced. The RVEF  is " 41%.   3. There is moderate to severe biatrial enlargement.   4. There is no evidence of myocardial iron overload.   5. Late gadolinium enhancement imagingdemonstrates a small area of midwall enhancement involving the mid to  basal inferoseptal wall. This is a non-specific pattern that has been described in idiopathic nonischemic  cardiomyopathies.   6.  A small circumferential pericardial effusion is noted.         CONCLUSIONS:  Moderate left ventricular dilatation with severely reduced systolic function. Late gadolinium  enhancement imagingdemonstrates a small area of midwall enhancement involving the mid to basal inferoseptal  wall. This is a non-specific pattern that has been described in idiopathic nonischemic cardiomyopathies.     LIPID RESULTS:  Lab Results   Component Value Date    CHOL 141 09/28/2018    HDL 70 09/28/2018    LDL 59 09/28/2018    TRIG 62 09/28/2018    CHOLHDLRATIO 2.2 04/08/2015     LIVER ENZYME RESULTS:  Lab Results   Component Value Date    AST 15 04/18/2019    ALT 16 04/18/2019     CBC RESULTS:  Lab Results   Component Value Date    WBC 7.4 04/18/2019    RBC 3.38 (L) 04/18/2019    HGB 8.4 (L) 04/18/2019    HCT 28.4 (L) 04/18/2019    MCV 84 04/18/2019    MCH 24.9 (L) 04/18/2019    MCHC 29.6 (L) 04/18/2019    RDW 19.4 (H) 04/18/2019     04/18/2019     BMP RESULTS:  Lab Results   Component Value Date     04/18/2019    POTASSIUM 3.3 (L) 04/18/2019    CHLORIDE 98 04/18/2019    CO2 29 04/18/2019    ANIONGAP 9 04/18/2019     (H) 04/18/2019    BUN 25 04/18/2019    CR 1.38 (H) 04/18/2019    GFRESTIMATED 34 (L) 04/18/2019    GFRESTBLACK 39 (L) 04/18/2019    NAKIA 8.4 (L) 04/18/2019      INR RESULTS:  Lab Results   Component Value Date    INR 1.13 10/12/2018    INR 1.47 (H) 04/03/2013            Medications     Current Outpatient Medications   Medication Sig Dispense Refill     albuterol (PROAIR HFA/PROVENTIL HFA/VENTOLIN HFA) 108 (90 Base) MCG/ACT Inhaler Inhale 2 puffs into the  lungs every 4 hours as needed for shortness of breath / dyspnea or wheezing       albuterol (PROVENTIL) (2.5 MG/3ML) 0.083% neb solution Take 2.5 mg by nebulization every 4 hours as needed for shortness of breath / dyspnea or wheezing       ANORO ELLIPTA 62.5-25 MCG/INH oral inhaler Inhale 1 puff into the lungs daily 1 Inhaler 8     bimatoprost (LUMIGAN) 0.01 % SOLN Place 1 drop into both eyes At Bedtime.         dorzolamide (TRUSOPT) 2 % ophthalmic solution Place 1 drop into both eyes 2 times daily       furosemide (LASIX) 20 MG tablet 20 mg in the afternoon, 40 mg in the am       hydrALAZINE (APRESOLINE) 10 MG tablet Take 1 tablet (10 mg) by mouth 3 times daily 90 tablet 1     hydrOXYzine (VISTARIL) 25 MG capsule Take 25 mg by mouth every 6 hours as needed for itching       isosorbide dinitrate (ISORDIL) 10 MG tablet Take 1 tablet (10 mg) by mouth 3 times daily 90 tablet 0     metoprolol succinate ER (TOPROL-XL) 25 MG 24 hr tablet Take 1 tablet (25 mg) by mouth daily 30 tablet 1     miconazole with skin protectant (SHANNAN ANTIFUNGAL) 2 % CREA cream Apply topically 2 times daily       pantoprazole (PROTONIX) 20 MG EC tablet Take 2 tablets (40 mg) by mouth 2 times daily       polyethylene glycol 400 (BLINK TEARS) 0.25 % SOLN ophthalmic solution Place 1 drop into both eyes every 3 hours as needed for dry eyes       potassium chloride (KLOR-CON) 20 MEQ packet Take 20 mEq by mouth 3 times daily (Patient taking differently: Take 20 mEq by mouth 2 times daily ) 30 packet 1     QUEtiapine (SEROQUEL) 25 MG tablet Nightly as needed; during the day as needed for anxiety. 45 tablet 3     ferrous sulfate ER (SLO-FE) 142 (45 Fe) MG CR tablet Take 1 tablet (142 mg) by mouth daily (Patient not taking: Reported on 4/19/2019) 90 tablet 3          Past Medical History     Past Medical History:   Diagnosis Date     AAA (abdominal aortic aneurysm) (H)     Followed with yearly ultrasound     Cardiomyopathy, idiopathic (H) 10/2018      Cervical cancer (H) 1969     Chronic dermatitis     extensive allergy testing revealed allergy/sensitivity to carba mix ( rubber) and Imidazolidinyl Urea (common in beauty products)     Glaucoma      Glaucoma      History of COPD     very mild abnormal spirometry in 2007 (in [place]), has not been active since quitting smoking in 2009     HTN (hypertension)      LBBB (left bundle branch block) 10/2018     Macular degeneration      Osteoporosis 8/3/11    femoral T score -3.4 & -3.7     PAD (peripheral artery disease) (H)      Vertebral compression fracture (H) 5/17/11    L1 compression fracture, presumed osteoporotic compression fracture     Past Surgical History:   Procedure Laterality Date     CATARACT IOL, RT/LT  2/8/12    left eye cataract removal     ESOPHAGOSCOPY, GASTROSCOPY, DUODENOSCOPY (EGD), COMBINED N/A 3/19/2019    Procedure: COMBINED ESOPHAGOSCOPY, GASTROSCOPY, DUODENOSCOPY (EGD);  Surgeon: Naveen Santo MD;  Location:  GI     HEART CATH CORONARY ANGIOGRAM W/LV GRAM  10/2018    moderate nonocclusive CAD     HYSTERECTOMY TOTAL ABDOMINAL  1999    Fibroids     LAPAROSCOPY DIAGNOSTIC (GENERAL) N/A 4/15/2018    Procedure: LAPAROSCOPY DIAGNOSTIC (GENERAL);;  Surgeon: Jm Craig MD;  Location:  OR     LAPAROTOMY EXPLORATORY N/A 4/15/2018    Procedure: LAPAROTOMY EXPLORATORY;;  Surgeon: Jm Craig MD;  Location:  OR     OPEN REDUCTION INTERNAL FIXATION HIP NAILING  7/2/2012    Procedure: OPEN REDUCTION INTERNAL FIXATION HIP NAILING;  Intramedullary Fixation Right Intertrochanteric Hip Fracture;  Surgeon: Chalino Covarrubias MD;  Location:  OR     OPEN REDUCTION INTERNAL FIXATION TIBIAL PLATEAU  3/31/2013    Procedure: OPEN REDUCTION INTERNAL FIXATION TIBIAL PLATEAU;  RIGHT LATERAL TIBIAL PLATEAU FRACTURE - SYNTHES Medial Meniscus Repair, Lateral Compartment Release;  Surgeon: Alfred Cardenas MD;  Location:  OR     OPEN REDUCTION INTERNAL FIXATION WRIST  1988    left wrist ORIF, hardware  removed onemonth later     Family History   Problem Relation Age of Onset     Breast Cancer Other      Circulatory Daughter 53        [email or web address]     C.A.D. Father      Diabetes Sister      Breast Cancer Maternal Aunt 60     Other - See Comments Mother         arterial sclerosis     Family History Negative Son             Allergies   Atenolol; Beta adrenergic blockers; Brimonidine; Cephalexin; Germall plus; Latex; Morphine; No clinical screening - see comments; Penicillins; Sulfa drugs; Tramadol; Tylenol; Vicodin [hydrocodone-acetaminophen]; and Fentanyl        JAMIN Corrales CNP  [facility]  Pager: [phone]      Thank you for allowing me to participate in the care of your patient.    Sincerely,     JAMIN Corrales CNP     [facility]

## 2019-04-19 NOTE — PROGRESS NOTES
SWS PROGRESS NOTE:       DOTTIE paged by ED RN asking to assist with D C planning. Pt had been seen by daytime SWS and a SNF was found ([facility]). Pt was scheduled to transfer to [facility] this evening. Pt's daughter (Lulu) and dtr's fiance (Trent) asked RN to speak with SWS to discuss the facility of choice. Trent asked to transfer pt to a facility closer to them, in [facility]. Trent explained that his mother had stayed at [facility] ([facility]) and they preferred that location. SWS called [facility] along with [facility], [facility] and [facility]; no admissions staff were able to accept a LTC admission at this time. SW then called Trent to discuss above; he understood and thanked writer for assistance. Trent was encouraged to work with SW at [facility] re: transferring pt to a location closer to her family. SW called [facility] and left a message for the admissions office with the above information.   Pt will be transferred to [facility] this evening. Bed side RN updated.     GODWIN Ochoa, [facility]  On-call SWS [facility]  Pager: [phone]    UPDATE 2100:  DOTTIE paged again by bed side RNMarcela, who reports that [facility] is stating that they do not have a bed for pt and have not even heard of pt. DOTTIE called the on-call admissions staff person for [facility], Nohemi (P: [phone]) who states that she has been in contact with the facility staff and they were not notified of pt's admission and they do not have staff to accommodate pt tonight. Nohemi reportedly called Dorina from the admissions dept, who approved the admission earlier today (per DOTTIE note at 1615 today) and Nohemi has not yet received a return call from Dorina. At this time, [facility] is stating that they will not be able to accommodate pt this evening and someone will have to call in the morning to confirm if a bed is even available for this pt. Nohemi reports they do not have any open beds at the  other [facility] locations for this evening.   DOTTIE spoke with Marcela, bed side RN. Marcela states that pt and her family are frustrated and contemplating bring pt home from the ED this evening.   DOTTIE spoke with Trent, pt's dtr's yaneth, who claims he does not feel safe taking pt home but he feels he has no other choice. DOTTIE suggested having pt remain in the hospital for this evening while a safe discharge plan could be arranged; he agreed. Per Trent, pt has limited to no income and she is not able to afford any hospital bills. DOTTIE explained that, per daytime SW note, pt's [place] application has been submitted and pt can apply to have hospital bill covered, if funds are not available. Trent will update Lulu and have her inform the county that pt might have outstanding medical bills to add to the MA application filed today.   DOTTIE will send refs to 1. [facility], 2. [facility], 3. [facility], 4. [facility] for review, per request of Trent. Pt's family would prefer pt reside in a facility close to [place].   Message sent to daytime SWS staff for f/u in the morning.

## 2019-04-19 NOTE — PROGRESS NOTES
Cardiology Clinic Progress Note  Jamilah Rodriguez MRN# [id number]   YOB: 1931 Age: 88 year old   Primary Cardiologist: Dr. Bond, establishing care with CORE MD, Dr. Fairbanks  Reason for visit: CORE followup            Assessment and Plan:   Jamilah Rodriguez is a very pleasant 87 year old female with a history of HFrEF, nonischemic cardiomyopathy (diagnosed 10/2018), LVEF 10-20%, LV dimension 5.8cm per echocardiogram 2/6/19, chronic LBBB, atrial fibrillation, AAA (followed by Dr. Boogie) and COPD. Patient with 2 recent hospitalizations 3/17/2019-3/20/2019 presenting with complaints of shortness of breath found to be hypoxic 70s, thought to be secondary to volume overload. Patient was diuresed from 123# to 108# at discharge. Diuretics held at discharge and restarted on torsemide 10mg at TCU. It was recommended that patient start metoprolol XL 25mg daily but appears patient was restarted on diltiazem at time of discharge. Patient creatinine was noted to be rising while in the hospital. 2/3/2019-2/10/2019 hospitalized related to acute respiratory distress due to influenza with COPD exacerbation. Patient here today for CORE follow up. Patient is having depression and anxiety which is confounding the situation. She was refusing her vasodilators this past week (hydralazine, took one dose of imdur stated it made her nervous and hasn't taken since).     Since I saw her for CORE enrollment on 3/27/19 I have been working on optimizing HR control on betablockers (not diltiazem) and increase vasodilator therapy. Concerns for low output state given low ejection fraction and no vasodilator therapy. Patient also likely over diuresed when she first presented, torsemide was on hold from 3/27/19-4/3/19. Furosemide was started during CORE clinic visit 4/3/19 given some signs of volume overload (lower extremity edema and elevated JVD). Patient continues to follow closely in CORE clinic, persistent NYHA class IV symptoms.      1.  Chronic systolic heart failure/HFrEF, nonischemic cardiomyopathy : LVEF 10-20%, LV dimension 5.8cm per echocardiogram 2/6/19. Patient continues to appear slightly volume up on exam, weight stable for past weel at 118#, during her last CORE visit with me her furosemide was increased to 40mg qam and 20mg qpm, patient notes no significant changes in symptoms. Patient reports dyspnea with any activity likely secondary to end stage heart failure and her anxiety.    - NYHA class IV, stage C   - Etiology : nonischemic, idopathic?    - Fluid status : slightly hypervolemic   - Diuretic regimen :  INCREASE furosemide 40mg BID. (Torsemide on hold from 3/27/19 - 4/3/19)    - No PRN metolazone at this time given low potassium and no worsening symptoms, will first adjust furosemide, advised patient to not take metolazone prescribed by PCP at this time will monitor potassium and symptoms, will consider PRN low dose metolazone in the future if needed.    - Last RHC : none   - Ischemic evaluation : LHC completed 10/12/2018 showed moderate nonocclusive coronary artery disease   - Guideline directed medical therapy    - Betablocker: Metoprolol XL 25mg daily today.     ** Atenolol allergy noted in chart (hives) and concerns for betablocker therapy in the past given COPD. Patient has been tolerating metoprolol with no signs or symptoms of adverse reaction.     -  Continue hydralazine 10mg TID - reinforced the importance of compliance.     -  Continue isosorbide dinitrate 10mg TID    - ACEI/ARB/ARNI: none, was on losartan in the past, appears at time of admission patient states it was stopped? Patient will benefit from restarting since kidney function normalizing, will review at follow-up appointment.     - Aldactone antagonist: was on spironolactone 12.5mg, this was held at discharge related to rash/GARIMA. Will consider in future if kidney function improves.    - Sudden cardiac death prophylaxis : patient was evaluated for BiV ICD  consideration, pt declined. =   - EP referral placed for CRT consideration, patient scheduled to see Dr. Madison 4/30/19   - Reinforced heart failure education given, continue daily weights and low sodium diet.     2. Chronic kidney injury - Baseline creatinine was 0.7-0.9, kidney function has remained stable at 1.3.     3. Atrial fibrillation - chronic, stable, rate controlled, asymptomatic. [id number] score at least 5 (HTN, age, HF, female).    -  Xarelto held during hospitalization and at discharge related to anemia, GI consulted, s/p EGD which showed esophageal candidiasis, no active bleeding found.    - Recommend continuing to hold xarelto at this times.    - REMAIN OFF diltiazem, given HFrEF   - Continue metoprolol XL 25mg daily, see above, atenolol allergy noted, reviewed extensively with patient and family and agree with plan to trial beta blocker therapy, tolerating BB with no adverse effects.     4. Anemia - noted after starting anticoagulation for atrial fibrillation, no bleeding source found. Hemoglobin 4/18 is 8.4, down from 13.3 on 2/9/19, as low as 7.1 3/18/19.   - Anticoagulation has remained on hold, continue to hold for now.    - Recommended consideration for blood transfusion, patient remains resistant to this    - Patient will benefit from hematology evaluation as anemia is likely exacerbating HF and worsening kidney function. Patient wishes to further discuss at follow up appointments.    - PCP started iron supplement yesterday    5. Advance care planning - need to continue to explore with patient and family. Patient and daughter appear to have a reasonable understanding of patients health conditions, understanding that patients life is limited by her advanced heart failure. Their biggest concern right now is getting placement at a nursing home facility noting that caring for her at home is too difficulty. Daughter is extremely overwhelmed, tearful in clinic.     Reviewed palliative care and  hospice with patient and daughter, hard to further explore goals of care as today the main concern is on caregiver services/nursing home placement. Hospice referral placed yesterday by PCP. Patient has noted that she doesn't wish to undergo invasive procedures. Patients depression/anxiety has been increasingly symptomatic, PCP has started seroquel PRN. Paulo continues to note she is unsatisfied with her current quality of life. Prior to her hospitalization in February with influenza she was living independently. Will continue exploring goals of care with patient and family.         Changes today: INCREASE furosemide 40mg BID, INCREASE potassium 20meq TID.     Given low potassium and no significantly worse symptoms, will first adjust furosemide, advised patient to not take metolazone at this time will monitor potassium and symptoms, will consider PRN low dose metolazone in the future if needed.     Follow up plan:     C RN to draw BMP next week    CORE RN to call patient Monday to check on weight/symptoms.    CORE followup with me 5/10/19, sooner as needed, patient and daughter note stressful to get to appointments and prefer trying to utilizing home health care to drawn labs and do phone follow ups.     EP followup with Dr. Madison     Palliative care referral pending - daughter notes she is unable to make appts on Tuesdays which is the day when Dr. High is in clinic.     Advised to call PCP office to see if  or  can help navigate increased care needs/nursing home placement.         History of Presenting Illness:    Jamilah Rodriguez is a very pleasant 87 year old female with a history of HFrEF, nonischemic cardiomyopathy (diagnosed 10/2018), LVEF 10-20%, LV dimension 5.8cm per echocardiogram 2/6/19, chronic LBBB, atrial fibrillation, AAA (followed by Dr. Boogie) and COPD.     Patient with 2 recent hospitalizations 3/17/2019-3/20/2019 presenting with complaints of shortness of breath felt  to be secondary to volume overload. Reports that her face and neck were swelling up and needing to prop herself up at night to breath. Worsening lower extremity edema. EMS noted patient was 70% on room air. BNP 10,760. Troponin normal 0.026. Hgb 8.2 (13.3 2/9/29, as low as 7.1 3/18/19). Admission weight 123#. Discharge weight 108#. Spironolactone held at discharge related to rash/GARIMA. No betablockers as caused worsening COPD in the past. It was recommended that patients diltiazem be discontinued and metoprolol XL started, it appears patient started this but wasn't carried over at discharge. Diuresed with IV furosemide and transitioned to PO torsemide. Diuretics held at discharge and restarted on torsemide 10mg at TCU. Xarelto held during hospitalization and at discharge related to anemia, GI consulted, s/p EGD which showed esophageal candidiasis, no active bleeding found. Patient was on losartan at one point but at time of admission she notes it was discontinued. 2/3/2019-2/10/2019 hospitalized related to acute respiratory distress due to influenza with COPD exacerbation. Discharged to TCU on 1-2L oxygen, stating when she returned home she didn't need oxygen.       Patient has followed with Dr. Bond and Dr. Arana in outpatient cardiology. Dr. Bond saw patient on 10/5/2018 due to new systolic cardiomyopathy after which she underwent a complete workup. Normal LVEF in 2012 and nuclear study noted normal LVEF in 2015. Cardiac MRI 10/3/2018 demonstrated moderate LV dilation with severely reduced systolic function. Late gadolinium enhancement imaging demonstrates a small area of midwall enhancement involving the mid and basal inferoseptal wall. This is nonspecific pattern consistent with idiopathic nonischemic cardiomyopathy. Cardiac catheterization completed 10/12/2018 showed moderate nonocclusive coronary artery disease. LAD 30% stenosis, circumflex 30-40%, and RCA 30-40%. At this time she was on losartan and aldactone was  "started. Consideration for CRT was also recommended given chronic LBBB. Patient was seen by Dr. Arana 11/5/2018 to be evaluated for BiV ICD consideration. At this time patient was not interested in invasive procedures and given advanced age Dr. Arana saw little benefit. Noting that reconsideration in the future could be made if HF decompensation.    Since I saw her for CORE enrollment on 3/27/19 I have been working on optimizing HR control on betablockers (not diltiazem) and increase vasodilator therapy. Concerns for low output state given low ejection fraction and no vasodilator therapy. Patient also likely over diuresed when she first presented, torsemide was on hold from 3/27/19-4/3/19. Furosemide was started during CORE clinic visit 4/3/19 given some signs of volume overload (lower extremity edema and elevated JVD), this has gradually been increased. Patient continues to follow closely in CORE clinic, persistent NYHA class IV symptoms.     Patient most recently seen by Dr. Fairbanks 4/12/19 where isosorbide dinitrate was added, EP referral placed for CRT consideration and palliative care was recommended.     Patient saw her PCP Dr. Hurtado yesterday, where it was recommended to start metolazone 5mg every other day x 3 doses and to start daily iron supplement. Hospice referral placed.     Patient discharged on Wednesday home. Noting a lot of difficulty at home. Daughter states she has been staying with the patient for the past 3 days. Patients biggest concerns is her inability to do anything at home, states any activity results in dyspnea. [facility] coming out today for evaluation.     Patient is here today for CORE followup. Patients daughter is present during clinic visit today. Patient notes continuing lower extremity edema which is \"about the same\". Denies abdominal distention/bloating. Occasional shortness of breath at rest. Exertional dyspnea with any activity, NYHA class IV, \"even putting in eye drops results in dyspnea\". " Slept good last night, sleeping with HOB elevated, notes some orthopnea/PND. Patient notes last night she slept all night with no difficulty. Patient and daughter frustrated about how things have been going at home. Taking medications daily, notes she has been tolerating the isosorbide dinitrate. Patient notes her anxiety is still very bothersome, noting constant feelings of anxiety. Using calming patches. PCP started seroquel yesterday as needed. Patient denies chest pain or chest tightness. Denies dizziness, lightheadedness or other presyncopal symptoms.     Daughter is tearful and very stressed about her employment, which is just adding to the challenges.     Labs from yesterday show stable kidney function with a creatinine of 1.38, potassium low at 3.3, BNP 32K which is down from 35K a couple weeks ago. Hemoglobin 8.4. Blood pressure 108/62 and HR 92 in clinic today.    Appetite better since being home. Notes she is enjoying the food more. Not adding additional salt to foods. Following 2L fluid restriction. Functionally she is significantly struggling at home due to dyspnea. Denies alcohol use. Denies tobacco use.         Recent Hospitalizations   3/17/2019-3/20/2019 presenting with complaints of shortness of breath. Reports that her face and neck were swelling up and needing to prop herself up at night to breath. Worsening lower extremity edema. EMS noted patient was 70% on room air. BNP 10,760. Troponin normal 0.026. Hgb 8.2 (13.3 2/9/29, as low as 7.1 3/18/19). Admission weight 123#. Discharge weight 108#. Spironolactone held at discharge related to rash/GARIMA. No betablockers as caused worsening COPD in the past. Diuresed with IV furosemide and transitioned to PO torsemide. Diuretics held at discharge and restarted on torsemide 10mg at TCU  2/3/2019-2/10/2019 related to acute respiratory distress due to influenza with COPD exacerbation.         Social History    2 children   Social History     Socioeconomic  History     Marital status:      Spouse name: Not on file     Number of children: 2     Years of education: Not on file     Highest education level: Not on file   Occupational History     Not on file   Social Needs     Financial resource strain: Not on file     Food insecurity:     Worry: Not on file     Inability: Not on file     Transportation needs:     Medical: Not on file     Non-medical: Not on file   Tobacco Use     Smoking status: Former Smoker     Packs/day: 1.00     Years: 60.00     Pack years: 60.00     Last attempt to quit: 2009     Years since quittin.8     Smokeless tobacco: Never Used     Tobacco comment: former 2-1 ppd since age [age]   Substance and Sexual Activity     Alcohol use: No     Alcohol/week: 0.0 oz     Comment: NA     Drug use: No     Sexual activity: Never   Lifestyle     Physical activity:     Days per week: Not on file     Minutes per session: Not on file     Stress: Not on file   Relationships     Social connections:     Talks on phone: Not on file     Gets together: Not on file     Attends Yazdanism service: Not on file     Active member of club or organization: Not on file     Attends meetings of clubs or organizations: Not on file     Relationship status: Not on file     Intimate partner violence:     Fear of current or ex partner: Not on file     Emotionally abused: Not on file     Physically abused: Not on file     Forced sexual activity: Not on file   Other Topics Concern     Parent/sibling w/ CABG, MI or angioplasty before 65F 55M? Not Asked   Social History Narrative     Not on file            Review of Systems:   Skin:  Negative     Eyes:  Negative    ENT:  Negative    Respiratory:  Positive for shortness of breath;dyspnea on exertion  Cardiovascular:    Positive for;edema;lightheadedness;dizziness  Gastroenterology: Positive for nausea;heartburn  Genitourinary:  Negative    Musculoskeletal:  Negative    Neurologic:  Positive for numbness or tingling of  "hands  Psychiatric:  Negative    Heme/Lymph/Imm:  Positive for allergies  Endocrine:  Negative           Physical Exam:   Vitals: /62   Pulse 92   Ht 1.651 m (5' 5\")   Wt 53.9 kg (118 lb 12.8 oz)   SpO2 98%   BMI 19.77 kg/m     Wt Readings from Last 4 Encounters:   04/19/19 53.9 kg (118 lb 12.8 oz)   04/18/19 53.6 kg (118 lb 1.6 oz)   04/17/19 52.3 kg (115 lb 4.8 oz)   04/16/19 52.6 kg (116 lb)     GEN: well nourished, in no acute distress.  HEENT:  Pupils equal, round. Sclerae nonicteric.   NECK: Supple, no masses appreciated. JVD elevated at 10cm at 90 degrees.   C/V:  Irregularly irregular rate and rhythm, no murmur  RESP: Respirations are unlabored. Clear to auscultation bilaterally without wheezing  GI: Abdomen soft, nontender.  EXTREM: +1-2 LE edema to bilateral ankles/feet  NEURO: Alert and oriented, cooperative.  SKIN: Warm and dry, skin to bilateral lower extremities scaly.        Data:   ECHO 2/6/2019  The left ventricle is severely dilated. The visual ejection fraction is  estimated at 10-20%.  Right ventricular function cannot be assessed due to poor image quality.  Probably mildly reduced? TDI velocities suggest normal function, but this  cannot be fully assessed.  Trivial pericardial effusion    Cardiac catheterization 10/12/2018  Summary  1. Moderate nonocclusive coronary artery disease  2. Normal left ventricular end-diastolic pressure    Cardiac MRI 1010/2018  1. The LV is moderately dilated. The global systolic function is severely reduced. The LVEF is 19%. There  is severe global hypokinesis of the left ventricle with minor regional variation.   2. The RV is normal in cavity size. The global systolic function is mildly to moderately reduced. The RVEF  is 41%.   3. There is moderate to severe biatrial enlargement.   4. There is no evidence of myocardial iron overload.   5. Late gadolinium enhancement imagingdemonstrates a small area of midwall enhancement involving the mid to  basal " inferoseptal wall. This is a non-specific pattern that has been described in idiopathic nonischemic  cardiomyopathies.   6.  A small circumferential pericardial effusion is noted.         CONCLUSIONS:  Moderate left ventricular dilatation with severely reduced systolic function. Late gadolinium  enhancement imagingdemonstrates a small area of midwall enhancement involving the mid to basal inferoseptal  wall. This is a non-specific pattern that has been described in idiopathic nonischemic cardiomyopathies.     LIPID RESULTS:  Lab Results   Component Value Date    CHOL 141 09/28/2018    HDL 70 09/28/2018    LDL 59 09/28/2018    TRIG 62 09/28/2018    CHOLHDLRATIO 2.2 04/08/2015     LIVER ENZYME RESULTS:  Lab Results   Component Value Date    AST 15 04/18/2019    ALT 16 04/18/2019     CBC RESULTS:  Lab Results   Component Value Date    WBC 7.4 04/18/2019    RBC 3.38 (L) 04/18/2019    HGB 8.4 (L) 04/18/2019    HCT 28.4 (L) 04/18/2019    MCV 84 04/18/2019    MCH 24.9 (L) 04/18/2019    MCHC 29.6 (L) 04/18/2019    RDW 19.4 (H) 04/18/2019     04/18/2019     BMP RESULTS:  Lab Results   Component Value Date     04/18/2019    POTASSIUM 3.3 (L) 04/18/2019    CHLORIDE 98 04/18/2019    CO2 29 04/18/2019    ANIONGAP 9 04/18/2019     (H) 04/18/2019    BUN 25 04/18/2019    CR 1.38 (H) 04/18/2019    GFRESTIMATED 34 (L) 04/18/2019    GFRESTBLACK 39 (L) 04/18/2019    NAKIA 8.4 (L) 04/18/2019      INR RESULTS:  Lab Results   Component Value Date    INR 1.13 10/12/2018    INR 1.47 (H) 04/03/2013            Medications     Current Outpatient Medications   Medication Sig Dispense Refill     albuterol (PROAIR HFA/PROVENTIL HFA/VENTOLIN HFA) 108 (90 Base) MCG/ACT Inhaler Inhale 2 puffs into the lungs every 4 hours as needed for shortness of breath / dyspnea or wheezing       albuterol (PROVENTIL) (2.5 MG/3ML) 0.083% neb solution Take 2.5 mg by nebulization every 4 hours as needed for shortness of breath / dyspnea or wheezing        ANORO ELLIPTA 62.5-25 MCG/INH oral inhaler Inhale 1 puff into the lungs daily 1 Inhaler 8     bimatoprost (LUMIGAN) 0.01 % SOLN Place 1 drop into both eyes At Bedtime.         dorzolamide (TRUSOPT) 2 % ophthalmic solution Place 1 drop into both eyes 2 times daily       furosemide (LASIX) 20 MG tablet 20 mg in the afternoon, 40 mg in the am       hydrALAZINE (APRESOLINE) 10 MG tablet Take 1 tablet (10 mg) by mouth 3 times daily 90 tablet 1     hydrOXYzine (VISTARIL) 25 MG capsule Take 25 mg by mouth every 6 hours as needed for itching       isosorbide dinitrate (ISORDIL) 10 MG tablet Take 1 tablet (10 mg) by mouth 3 times daily 90 tablet 0     metoprolol succinate ER (TOPROL-XL) 25 MG 24 hr tablet Take 1 tablet (25 mg) by mouth daily 30 tablet 1     miconazole with skin protectant (SHANNAN ANTIFUNGAL) 2 % CREA cream Apply topically 2 times daily       pantoprazole (PROTONIX) 20 MG EC tablet Take 2 tablets (40 mg) by mouth 2 times daily       polyethylene glycol 400 (BLINK TEARS) 0.25 % SOLN ophthalmic solution Place 1 drop into both eyes every 3 hours as needed for dry eyes       potassium chloride (KLOR-CON) 20 MEQ packet Take 20 mEq by mouth 3 times daily (Patient taking differently: Take 20 mEq by mouth 2 times daily ) 30 packet 1     QUEtiapine (SEROQUEL) 25 MG tablet Nightly as needed; during the day as needed for anxiety. 45 tablet 3     ferrous sulfate ER (SLO-FE) 142 (45 Fe) MG CR tablet Take 1 tablet (142 mg) by mouth daily (Patient not taking: Reported on 4/19/2019) 90 tablet 3          Past Medical History     Past Medical History:   Diagnosis Date     AAA (abdominal aortic aneurysm) (H)     Followed with yearly ultrasound     Cardiomyopathy, idiopathic (H) 10/2018     Cervical cancer (H) 1969     Chronic dermatitis     extensive allergy testing revealed allergy/sensitivity to carba mix ( rubber) and Imidazolidinyl Urea (common in beauty products)     Glaucoma      Glaucoma      History of COPD     very  mild abnormal spirometry in 2007 (in [place]), has not been active since quitting smoking in 2009     HTN (hypertension)      LBBB (left bundle branch block) 10/2018     Macular degeneration      Osteoporosis 8/3/11    femoral T score -3.4 & -3.7     PAD (peripheral artery disease) (H)      Vertebral compression fracture (H) 5/17/11    L1 compression fracture, presumed osteoporotic compression fracture     Past Surgical History:   Procedure Laterality Date     CATARACT IOL, RT/LT  2/8/12    left eye cataract removal     ESOPHAGOSCOPY, GASTROSCOPY, DUODENOSCOPY (EGD), COMBINED N/A 3/19/2019    Procedure: COMBINED ESOPHAGOSCOPY, GASTROSCOPY, DUODENOSCOPY (EGD);  Surgeon: Naveen Santo MD;  Location:  GI     HEART CATH CORONARY ANGIOGRAM W/LV GRAM  10/2018    moderate nonocclusive CAD     HYSTERECTOMY TOTAL ABDOMINAL  1999    Fibroids     LAPAROSCOPY DIAGNOSTIC (GENERAL) N/A 4/15/2018    Procedure: LAPAROSCOPY DIAGNOSTIC (GENERAL);;  Surgeon: Jm Craig MD;  Location:  OR     LAPAROTOMY EXPLORATORY N/A 4/15/2018    Procedure: LAPAROTOMY EXPLORATORY;;  Surgeon: Jm Craig MD;  Location:  OR     OPEN REDUCTION INTERNAL FIXATION HIP NAILING  7/2/2012    Procedure: OPEN REDUCTION INTERNAL FIXATION HIP NAILING;  Intramedullary Fixation Right Intertrochanteric Hip Fracture;  Surgeon: Chalino Covarrubias MD;  Location:  OR     OPEN REDUCTION INTERNAL FIXATION TIBIAL PLATEAU  3/31/2013    Procedure: OPEN REDUCTION INTERNAL FIXATION TIBIAL PLATEAU;  RIGHT LATERAL TIBIAL PLATEAU FRACTURE - SYNTHES Medial Meniscus Repair, Lateral Compartment Release;  Surgeon: Alfred Cardenas MD;  Location:  OR     OPEN REDUCTION INTERNAL FIXATION WRIST  1988    left wrist ORIF, hardware removed onemonth later     Family History   Problem Relation Age of Onset     Breast Cancer Other      Circulatory Daughter 53        [email or web address]     C.A.D. Father      Diabetes Sister      Breast Cancer Maternal Aunt 60     Other - See  Comments Mother         arterial sclerosis     Family History Negative Son             Allergies   Atenolol; Beta adrenergic blockers; Brimonidine; Cephalexin; Germall plus; Latex; Morphine; No clinical screening - see comments; Penicillins; Sulfa drugs; Tramadol; Tylenol; Vicodin [hydrocodone-acetaminophen]; and Fentanyl        JAMIN Corrales [facility], [facility]  Pager: [phone]

## 2019-04-20 NOTE — PROGRESS NOTES
Observation goals PRIOR TO DISCHARGE     Comments: List all goals to be met before discharge home:     - Return to baseline functional status : not met  - Dyspnea improved and oxygen saturations greater than 88% on room air or prior home oxygen levels: Partially met, occasionally desats to mid 80's briefly  - ECHO or other diagnostic tests and consults completed and resulted : NA  - Vital signs normal or at patient baseline: met   - Safe disposition plan has been identified: not met     - Nurse to notify provider when observation goals have been met and patient is ready for discharge

## 2019-04-20 NOTE — PROGRESS NOTES
Pt arrived from ED 2230, with family. Denies any pain. VSS/RA. States dyspnea on exertion. Breath sounds diminished. Denies chest pain or SOB. LE ankles pitting +2 edema. LLE ankle laceration and Tegaderm dressing applied. Will be A1-2 walker/ with bedside commode, pivot with transfers. Two eyedrops sent to pharmacy for labelling. K-2.8-replaced. On tele, care coordinator and nutrition consult. No PIV-to be inserted. Will continue to monitor.

## 2019-04-20 NOTE — PLAN OF CARE
A&Ox4, VSS on RA. Tele Sinus arrhythmia with 1st degree BBB and occasionally PAC's. Dyspnea on exertion, Nebs provided, some relief. HEATH fine crackles, LLL fine crackles, diminshed. LS diminished. Pt can be anxious at times, prn vistaril available. IV site SL. Tolerating 2 gm Na+ diet, needs help ordering meals. Ax1 to BSC and chair, baseline uses w/c. BLE ankles +2, LLE wound on shin, foam in place. Will continue to monitor. Discharge is pending, waiting for placement.

## 2019-04-20 NOTE — PROGRESS NOTES
Observation goals PRIOR TO DISCHARGE     Comments: List all goals to be met before discharge home:     - Return to baseline functional status : not met  - Dyspnea improved and oxygen saturations greater than 88% on room air or prior home oxygen levels: Partially met  - ECHO or other diagnostic tests and consults completed and resulted : NA  - Vital signs normal or at patient baseline: met   - Safe disposition plan has been identified: not met     - Nurse to notify provider when observation goals have been met and patient is ready for discharge

## 2019-04-20 NOTE — PLAN OF CARE
A&Ox4. VSS on RA. Tele SR. Dyspnea on exertion, declined nebs this shift. Anxious @ times, vistaril given. Saline locked. 2gm Na+ diet. A+1 to BSC and chair, baseline uses w/c. HEATH fine crackles and expiratory wheezes, LS diminished. BLE +1, LLE wound on shin, mepilex on CDI. Skin very fragile and thin, very sensitive to touch. CC/SW following for discharge, MA pending. Family updated. Continue to monitor.

## 2019-04-20 NOTE — PROGRESS NOTES
RECEIVING UNIT ED HANDOFF REVIEW    ED Nurse Handoff Report was reviewed by: Loi Melgar on April 19, 2019 at 10:15 PM

## 2019-04-20 NOTE — ED NOTES
"[facility]  ED Nurse Handoff Report    ED Chief complaint: Social Work Services      ED Diagnosis:   Final diagnoses:   Adult failure to thrive   Chronic congestive heart failure, unspecified heart failure type (H)       Code Status: to be assessed by admitting provider     Allergies:   Allergies   Allergen Reactions     Atenolol Hives     Beta Adrenergic Blockers      Brimonidine      Cephalexin Hives     Germall Plus Itching     Imidazolidinyl Urea found in many topical creams and house hold products     Latex      Itching     Morphine GI Disturbance     No Clinical Screening - See Comments Itching     Carba Mix; IMIDAZOLIDINYL UREA - Allergic to this ingredient, which is found in many common topical moisturizers and household products.     Penicillins Hives     Sulfa Drugs GI Disturbance     Tramadol Nausea and Vomiting     Tylenol GI Disturbance     Vicodin [Hydrocodone-Acetaminophen]      Fentanyl Nausea       Activity level - Baseline/Home:  Stand with Assist    Activity Level - Current:   Stand with Assist     Needed?: No    Isolation: No  Infection: Not Applicable  Bariatric?: No    Vital Signs:   Vitals:    04/19/19 1436 04/19/19 1922   BP: 106/65 (!) 114/94   Pulse: 96 114   Resp: 20    Temp: 98.6  F (37  C)    TempSrc: Temporal    SpO2: 99%    Weight: 52.2 kg (115 lb)    Height: 1.651 m (5' 5\")        Cardiac Rhythm: ,        Pain level:      Is this patient confused?: No   Does this patient have a guardian?  No         If yes, is there guardianship documents in the Epic \"Code/ACP\" activity?  N/A         Guardian Notified?  N/A  Tad - Suicide Severity Rating Scale Completed?  Yes  If yes, what color did the patient score?  White    Patient Report: Initial Complaint: Patient arrived to the ED today with the intention of getting a social work consult. On call  was paged and attempted to get the patient lined up with a long term care facility.  " stated that she found placement at the [facility]. Patients family stated that they did not want her to go there because of the poor ratings. Two social workers attempted to find placement at another facility but there was no avalbility. Papers were faxed to the [facility] after family agreed to send her there however when the patient was ready for transfer nurse called the [facility] and they stated they have never heard of the patient and were unaware that she was coming. They stated that they do not have any beds at this time.   Focused Assessment: Respiratory: patient is extremely short of breath at baseline and with any exertion. Family states she becomes apneic with any movement stating that she is unable to brush her hair without having to stop to catch her breath   Cardiac: patient has congestive heart failure and has numerous complications with her CHF   Neuro: Alert and oriented X4   : WDL  GI: patient has severe GERD and requires medications prior to eating   Tests Performed:labs  Abnormal Results: None   Treatments provided: None    Family Comments: Daughter and son in law are very upset with the situation    OBS brochure/video discussed/provided to patient/family: Yes              Name of person given brochure if not patient: n/a              Relationship to patient: n/a    ED Medications:   Medications   pantoprazole (PROTONIX) EC tablet 40 mg (40 mg Oral Given 4/19/19 1922)       Drips infusing?:  No    For the majority of the shift this patient was Green.   Interventions performed were none.    Severe Sepsis OR Septic Shock Diagnosis Present: No    To be done/followed up on inpatient unit:  social work services     ED NURSE PHONE NUMBER: *[phone]

## 2019-04-20 NOTE — PROGRESS NOTES
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 Discharged to LTC recommended but unable to afford so discharged home with home care. Represented 2 days after discharge with failure to thrive  - SW consulted for d/c planning. MA pending. Given holiday weekend and MA pending may be difficult to place over the weekend    Chronic systolic heart failure (EF 10-20% on Echo 2/5/19)  -- continue current lasix dose, and Hydralazine, and Isordil   --  At most recently PCP appointment they had discussed hospice referral. Will defer to LTC    Chronic obstructive pulmonary disease, unspecified COPD type (H)  -- PTA inhalers and nebs ordered     Chronic Anemia: Underwent EGD in March with findings of esophageal candidiasis. Cardiology recommended holding anticoagulation.  -- Hgb stable in 8 range  -- Vit B12 was 282 in 2016 -- will check Vit B12 in AM  -- Iron 12 with TIBC 334, 4% sat on 4/18/19  -- Continue BID PPI  -- Resume oral iron on discharge      Paroxysmal atrial fibrillation  -- Continue rate control with PTA Metoprolol  -- Not on anticoagulation due to anemia    Hypokalemia  -- replace, and continue home maintenance dose.     Anxiety  Insomnia  -- Continue PTA Seroquel         Diet: 2 Gram Sodium Diet    DVT Prophylaxis: Pneumatic Compression Devices  Morrow Catheter: not present  Code Status: DNR/DNI      Disposition Plan   Expected discharge:Pending placement  Entered: Jeannie Jain PA-C 04/20/2019, 10:18 AM       The patient's care was discussed with the Bedside Nurse, Care Coordinator/, Patient and  Patient's Family.    Jeannie Jain PA-C  Hospitalist Service  [facility]    ______________________________________________________________________    Interval History   Discussed placement plans with patient and son. Given holiday weekend and MA pending may be difficult over the weekend. Patient concerned about obs status and reassurance provided    Data reviewed today: I reviewed all medications, new labs and imaging results over the last 24 hours. I personally reviewed no images or EKG's today.    Physical Exam   Vital Signs: Temp: 98.5  F (36.9  C) Temp src: Oral BP: 123/70 Pulse: 99 Heart Rate: 95 Resp: 18 SpO2: 98 % O2 Device: Nasal cannula Oxygen Delivery: 2 LPM  Weight: 118 lbs 1.6 oz  Constitutional: Alert, resting comfortably in NAD  Respiratory: Normal effort, symmetric expansion, no crackles or wheezing  Cardiovascular: RRR no murmurs   GI: Non distended, normal bowels sounds, no tenderness or guarding  MSK: LE without edema. Dorsalis pedis pulse palpated bilaterally.   Skin/Integumen: Clear  Neuro: CN II-XII grossly intact  Psych:  Alert and oriented x 3. Anxious      Data

## 2019-04-20 NOTE — H&P
[facility]    History and Physical  Hospitalist       Date of Admission:  4/19/2019    Assessment & Plan   Jamilah Rodriguez is a 88 year old female with COPD and severe idiopathic dilated cardiomyopathy (EF 10-20%, non-sichemic) who was discharged from TCU 2 days ago and can not care for herself and comes to ER for treatment of SOB and placement:    Summary:    Principal Problem:    Chronic systolic heart failure (EF 10-20% on Echo 2/5/19)   -- continue current lasix dose, and Hydralazine, and Isordil     Active Problems:    AAA, 4.6 cm by abd CT 4/15/18   -- no treatment      Macular degeneration      Chronic obstructive pulmonary disease, unspecified COPD type (H)   -- inhalers and nebs ordered   -- SOB at rest, on O2 her sat is 10%      Paroxysmal atrial fibrillation -- no on Anticoagulation   -- on Metoprolol       Hypokalemia   -- replace, and continue home maintenance dose.       Insomnia   -- got Seroquel 25 mg last night and was able to sleep   (daughter wants to stay with Seroquel)       Anemia -- probably 2nd to chronic disease, and Iron deficiency   -- cardiology chose to not anticoagulate because of this   -- Vit B12 was 282 in 2016 -- will check Vit B12 in AM   -- Iron 12 with TIBC 334, 4% sat on 4/18/19   -- continue oral iron on discharge       Plan: Admit to observation,  to see in AM in hopes of long term care placement, family hoping in the [place] area ([place] or [facility]).  She is DNR/DNI as discussed with patient and daughter.     DVT Prophylaxis: leg compressions   Code Status: DNR / DNI    Disposition: Expected discharge in 1 day to long term care facility.     Augustus Markham MD  Pager: [phone]  Cell Phone:  [phone]       Primary Care Physician   Sharon Hurtado    Chief Complaint   SBO, weak    History is obtained from Patient and daughter    History of Present Illness    88 year old female with COPD and severe idiopathic  dilated cardiomyopathy (EF 10-20%, non-sichemic) who was discharged from TCU 2 days ago and can not care for herself and comes to ER for essentially placement.  She was admitted to hospital on 3/17/19 to 3/20/19, [facility] and was there until Wed, 4/17/19 (two days ago) and was discharged to home with home care.   Her daughter has been with her continuously because she is not able to care for herself, and a home care nurse came in today and told her she is not safe to be at home and should go the the ER for placement.  She continues on the same medications, and her weight remains stable at 115 lbs.  Labs today -- potassium 3.3 and creat 1.38 (creatinine was 1.45 on 4/2/19), and Pro BNP 32,800 (which is down from 35,500 on 3/29/19.       Echo from 2/5/19 shows:  The left ventricle is severely dilated. The visual ejection fraction is  estimated at 10-20%.  Right ventricular function cannot be assessed due to poor image quality.  Probably mildly reduced? TDI velocities suggest normal function, but this  cannot be fully assessed.  Trivial pericardial effusion    Past Medical History    I have reviewed this patient's medical history and updated it with pertinent information if needed.   Past Medical History:   Diagnosis Date     AAA (abdominal aortic aneurysm) (H)     Followed with yearly ultrasound     Cardiomyopathy, idiopathic (H) 10/2018     Cervical cancer (H) 1969     Chronic dermatitis     extensive allergy testing revealed allergy/sensitivity to carba mix ( rubber) and Imidazolidinyl Urea (common in beauty products)     Glaucoma      History of COPD     very mild abnormal spirometry in 2007 (in [place]), has not been active since quitting smoking in 2009     HTN (hypertension)      LBBB (left bundle branch block) 10/2018     Macular degeneration      Osteoporosis 8/3/11    femoral T score -3.4 & -3.7     PAD (peripheral artery disease) (H)      Vertebral compression fracture (H) 5/17/11    L1  compression fracture, presumed osteoporotic compression fracture       Past Surgical History   I have reviewed this patient's surgical history and updated it with pertinent information if needed.  Past Surgical History:   Procedure Laterality Date     CATARACT IOL, RT/LT  2/8/12    left eye cataract removal     ESOPHAGOSCOPY, GASTROSCOPY, DUODENOSCOPY (EGD), COMBINED N/A 3/19/2019    Procedure: COMBINED ESOPHAGOSCOPY, GASTROSCOPY, DUODENOSCOPY (EGD);  Surgeon: Naveen Santo MD;  Location:  GI     HEART CATH CORONARY ANGIOGRAM W/LV GRAM  10/2018    moderate nonocclusive CAD     HYSTERECTOMY TOTAL ABDOMINAL  1999    Fibroids     LAPAROSCOPY DIAGNOSTIC (GENERAL) N/A 4/15/2018    Procedure: LAPAROSCOPY DIAGNOSTIC (GENERAL);;  Surgeon: Jm Craig MD;  Location:  OR     LAPAROTOMY EXPLORATORY N/A 4/15/2018    Procedure: LAPAROTOMY EXPLORATORY;;  Surgeon: Jm Craig MD;  Location:  OR     OPEN REDUCTION INTERNAL FIXATION HIP NAILING  7/2/2012    Procedure: OPEN REDUCTION INTERNAL FIXATION HIP NAILING;  Intramedullary Fixation Right Intertrochanteric Hip Fracture;  Surgeon: Chalino Covarrubias MD;  Location:  OR     OPEN REDUCTION INTERNAL FIXATION TIBIAL PLATEAU  3/31/2013    Procedure: OPEN REDUCTION INTERNAL FIXATION TIBIAL PLATEAU;  RIGHT LATERAL TIBIAL PLATEAU FRACTURE - SYNTHES Medial Meniscus Repair, Lateral Compartment Release;  Surgeon: Alfred Cardenas MD;  Location:  OR     OPEN REDUCTION INTERNAL FIXATION WRIST  1988    left wrist ORIF, hardware removed onemonth later       Prior to Admission Medications   Prior to Admission Medications   Prescriptions Last Dose Informant Patient Reported? Taking?   ANORO ELLIPTA 62.5-25 MCG/INH oral inhaler 4/19/2019 at Unknown time Daughter No Yes   Sig: Inhale 1 puff into the lungs daily   QUEtiapine (SEROQUEL) 25 MG tablet prn Daughter No Yes   Sig: Nightly as needed; during the day as needed for anxiety.   albuterol (PROAIR HFA/PROVENTIL  HFA/VENTOLIN HFA) 108 (90 Base) MCG/ACT Inhaler prn Daughter Yes Yes   Sig: Inhale 2 puffs into the lungs every 4 hours as needed for shortness of breath / dyspnea or wheezing   albuterol (PROVENTIL) (2.5 MG/3ML) 0.083% neb solution prn Daughter Yes Yes   Sig: Take 2.5 mg by nebulization every 4 hours as needed for shortness of breath / dyspnea or wheezing   bimatoprost (LUMIGAN) 0.01 % SOLN 4/18/2019 at Unknown time Daughter Yes Yes   Sig: Place 1 drop into both eyes At Bedtime.     dorzolamide (TRUSOPT) 2 % ophthalmic solution 4/19/2019 at am Daughter Yes Yes   Sig: Place 1 drop into both eyes 2 times daily   ferrous sulfate ER (SLO-FE) 142 (45 Fe) MG CR tablet  Daughter No No   Sig: Take 1 tablet (142 mg) by mouth daily   Patient taking differently: Take 142 mg by mouth daily New rx; not picked up yet.   furosemide (LASIX) 20 MG tablet 4/19/2019 at am Daughter Yes Yes   Sig: Take 40 mg by mouth every morning   furosemide (LASIX) 20 MG tablet 4/19/2019 at Unknown time Daughter Yes Yes   Sig: Take 20 mg by mouth daily (before lunch)   hydrALAZINE (APRESOLINE) 10 MG tablet 4/19/2019 at x2 Daughter No Yes   Sig: Take 1 tablet (10 mg) by mouth 3 times daily   hydrOXYzine (VISTARIL) 25 MG capsule prn Daughter Yes Yes   Sig: Take 25 mg by mouth every 6 hours as needed for itching   isosorbide dinitrate (ISORDIL) 10 MG tablet 4/19/2019 at x2 Daughter No Yes   Sig: Take 1 tablet (10 mg) by mouth 3 times daily   metoprolol succinate ER (TOPROL-XL) 25 MG 24 hr tablet 4/19/2019 at Unknown time Daughter No Yes   Sig: Take 1 tablet (25 mg) by mouth daily   miconazole with skin protectant (SHANNAN ANTIFUNGAL) 2 % CREA cream 4/19/2019 at Unknown time Daughter No Yes   Sig: Apply topically 2 times daily   pantoprazole (PROTONIX) 20 MG EC tablet 4/19/2019 at am Daughter No Yes   Sig: Take 2 tablets (40 mg) by mouth 2 times daily   polyethylene glycol 400 (BLINK TEARS) 0.25 % SOLN ophthalmic solution prn Daughter Yes Yes   Sig: Place  1 drop into both eyes every 3 hours as needed for dry eyes   potassium chloride ER (K-DUR/KLOR-CON M) 20 MEQ CR tablet 4/19/2019 at x2 Daughter No Yes   Sig: Take 1 tablet (20 mEq) by mouth 3 times daily      Facility-Administered Medications: None     Allergies   Allergies   Allergen Reactions     Atenolol Hives     Beta Adrenergic Blockers      Brimonidine      Cephalexin Hives     Germall Plus Itching     Imidazolidinyl Urea found in many topical creams and house hold products     Latex      Itching     Morphine GI Disturbance     No Clinical Screening - See Comments Itching     Carba Mix; IMIDAZOLIDINYL UREA - Allergic to this ingredient, which is found in many common topical moisturizers and household products.     Penicillins Hives     Sulfa Drugs GI Disturbance     Tramadol Nausea and Vomiting     Tylenol GI Disturbance     Vicodin [Hydrocodone-Acetaminophen]      Fentanyl Nausea       Social History   I have reviewed this patient's social history and updated it with pertinent information if needed. Jamilah Rodriguez  reports that she quit smoking about 9 years ago. She has a 60.00 pack-year smoking history. She has never used smokeless tobacco. She reports that she does not drink alcohol or use drugs.    Family History   I have reviewed this patient's family history and updated it with pertinent information if needed.   Family History   Problem Relation Age of Onset     Breast Cancer Other      Circulatory Daughter 53        mengioma     C.A.D. Father      Diabetes Sister      Breast Cancer Maternal Aunt 60     Other - See Comments Mother         arterial sclerosis     Family History Negative Son        Review of Systems   The 10 point Review of Systems is negative other than noted in the HPI or here.  She does have allergies to elastic so can not wear support hose.     # Pain Assessment:  Current Pain Score 3/20/2019   Patient currently in pain? sleeping: patient not able to self report   Pain score (0-10)  -   Pain location -   Pain descriptors -   Jamilah s pain level was assessed and she currently denies pain.        Physical Exam   Temp: 98.6  F (37  C) Temp src: Temporal BP: (!) 114/94 Pulse: 114   Resp: 20 SpO2: 99 % O2 Device: None (Room air)    Vital Signs with Ranges  Temp:  [98.6  F (37  C)] 98.6  F (37  C)  Pulse:  [] 114  Resp:  [20] 20  BP: (106-114)/(62-94) 114/94  SpO2:  [98 %-99 %] 99 %  115 lbs 0 oz    Constitutional: Awake, alert, cooperative, no apparent distress.  Eyes: Conjunctiva and pupils examined and normal.  HEENT: Moist mucous membranes, normal dentition.  Respiratory: Clear to auscultation bilaterally, no crackles or wheezing.  Cardiovascular: Regular rate and rhythm, normal S1 and S2, and no murmur noted,   No carotid bruits.  1+ bilateral ankle edema.   GI: Soft, non-distended, non-tender, normal bowel sounds.  Lymph/Hematologic: No anterior cervical, supraclavicular or axillary adenopathy.  Skin: No rashes, no cyanosis.  Does have dressing on lower left leg ulcer.    Musculoskeletal: No joint swelling, erythema or tenderness.  Neurologic: Cranial nerves 2-12 intact, no focal weakness or numbness, but generalized weakness.   Psychiatric: Alert, Ox3, no obvious anxiety or depression.    Data   Data reviewed today:  I personally reviewed no images or EKG's today.  Recent Labs   Lab 04/19/19  2143 04/18/19  1150   WBC 7.7 7.4   HGB 8.9* 8.4*   MCV 81 84    411   NA  --  136   POTASSIUM  --  3.3*   CHLORIDE  --  98   CO2  --  29   BUN  --  25   CR  --  1.38*   ANIONGAP  --  9   NAKIA  --  8.4*   GLC  --  133*   ALBUMIN  --  2.9*   PROTTOTAL  --  6.4*   BILITOTAL  --  0.5   ALKPHOS  --  130   ALT  --  16   AST  --  15       Imaging:  No results found for this or any previous visit (from the past 24 hour(s)).

## 2019-04-20 NOTE — PROGRESS NOTES
Observation goals PRIOR TO DISCHARGE     Comments: List all goals to be met before discharge home:     - Return to baseline functional status : not met  - Dyspnea improved and oxygen saturations greater than 88% on room air or prior home oxygen levels: partially met, SOB with talking  - ECHO or other diagnostic tests and consults completed and resulted : NA  - Vital signs normal or at patient baseline: met   - Safe disposition plan has been identified: not met     - Nurse to notify provider when observation goals have been met and patient is ready for discharge

## 2019-04-20 NOTE — PLAN OF CARE
A&Ox4. VSS on RA exc tachy at times. Tele SR with BBB. [place], but denies SOB, occasionally desats to mid 80's when up to BSC. A1 to BSC, w/c bound at baseline. IV SL. LS dim, but clear with fine crackles in HEATH/LLL. BLE +1 edema. LLE abrasion on shin with mepilex CDI. Skin very thin and extremely sensitive to touch. SW and CC to consult and form plan for discharge. Continue to monitor.

## 2019-04-20 NOTE — CONSULTS
Received standard consult to instruct patient on 2 gram sodium diet guidelines (CHF).  Patient was admitted for LTC placement due to weakness and SOB with inability to care for self at home.  CHF education not planned at this time as she will be receiving meals at her LTC facility --> Recommend she discharge on a 2 gram sodium diet once LTC facility identified as able to take patient.    Clemencia Brown RD, LD, [facility]   Clinical Dietitian - [facility]

## 2019-04-21 NOTE — PROGRESS NOTES
[facility]    Medicine Progress Note - Hospitalist Service       Date of Admission:  4/19/2019  Assessment & Plan         Jamilah Rodriguez is a 88 year old female with COPD and severe idiopathic dilated cardiomyopathy (EF 10-20%, non-sichemic) who was discharged from TCU 2 days ago and can not care for herself and comes to ER for treatment of SOB and placement:      Adult Failure to Thrive: Recently at [facility] from 3/20-4/16. Discharged to LTC recommended but unable to afford so discharged home with home care. Represented 2 days after discharge with failure to thrive  - SW consulted for d/c planning. MA pending. Given holiday weekend and MA pending may be difficult to place over the weekend    Chronic systolic heart failure (EF 10-20% on Echo 2/5/19)  -- Appears Lasix was recently increased to 40 mg bid will adjust to reflect that. Continue PTA Hydralazine and Isordil   --  At most recently PCP appointment they had discussed hospice referral. Will defer to LTC    Chronic obstructive pulmonary disease, unspecified COPD type (H)  -- PTA inhalers and nebs ordered     Chronic Anemia: Underwent EGD in March with findings of esophageal candidiasis. Cardiology recommended holding anticoagulation.  -- Hgb stable in 8 range  -- Vit B12 was 282 in 2016 -- will check Vit B12 in AM  -- Iron 12 with TIBC 334, 4% sat on 4/18/19  -- Continue BID PPI  -- Resume oral iron on discharge      Paroxysmal atrial fibrillation  -- Continue rate control with PTA Metoprolol  -- Not on anticoagulation due to anemia    Hypokalemia  -- replace, and continue home maintenance dose.     Anxiety  Insomnia  -- Continue PTA Seroquel       Diet: 2 Gram Sodium Diet    DVT Prophylaxis: Pneumatic Compression Devices  Morrow Catheter: not present  Code Status: DNR/DNI      Disposition Plan   Expected discharge: Pending placement  Entered: Jeannie Jain PA-C 04/21/2019, 9:44 AM       The patient's care was discussed with the  Bedside Nurse and Patient.    Jeannie Jain PA-C  Hospitalist Service  [facility]    ______________________________________________________________________    Interval History   Feels SOB which is baseline. No swelling.     Data reviewed today: I reviewed all medications, new labs and imaging results over the last 24 hours. I personally reviewed no images or EKG's today.    Physical Exam   Vital Signs: Temp: 97.6  F (36.4  C) Temp src: Oral BP: 132/88 Pulse: 102 Heart Rate: 87 Resp: 20 SpO2: 96 % O2 Device: Nasal cannula Oxygen Delivery: 1 LPM  Weight: 118 lbs 1.6 oz  Constitutional: Alert, resting comfortably in NAD  Respiratory: Normal effort, symmetric expansion, crackles at bases  Cardiovascular: RRR no murmurs   GI: Non distended, normal bowels sounds, no tenderness or guarding  MSK: LE without edema. Dorsalis pedis pulse palpated bilaterally.   Skin/Integumen: Clear  Neuro: CN II-XII grossly intact  Psych:  Alert and oriented x 3. Anxious      Data   Recent Labs   Lab 04/20/19  0619 04/19/19  2143 04/18/19  1150   WBC  --  7.7 7.4   HGB  --  8.9* 8.4*   MCV  --  81 84   PLT  --  434 411    136 136   POTASSIUM 3.3* 2.8* 3.3*   CHLORIDE 100 98 98   CO2 31 30 29   BUN 25 25 25   CR 1.35* 1.47* 1.38*   ANIONGAP 6 8 9   NAKIA 8.7 8.3* 8.4*   * 166* 133*   ALBUMIN  --   --  2.9*   PROTTOTAL  --   --  6.4*   BILITOTAL  --   --  0.5   ALKPHOS  --   --  130   ALT  --   --  16   AST  --   --  15

## 2019-04-21 NOTE — PLAN OF CARE
Observation goals PRIOR TO DISCHARGE     Comments: List all goals to be met before discharge home:     - Return to baseline functional status : not met.  - Dyspnea improved and oxygen saturations greater than 88% on room air or prior home oxygen levels: partially met, on 1LPM per NC at 96% SPO2, weaned down from 1.5LPM, continuing to wean.   - ECHO or other diagnostic tests and consults completed and resulted : NA  - Vital signs normal or at patient baseline: met   - Safe disposition plan has been identified: not met, needs LTC placement.      - Nurse to notify provider when observation goals have been met and patient is ready for discharge

## 2019-04-21 NOTE — PLAN OF CARE
Observation goals PRIOR TO DISCHARGE     Comments: List all goals to be met before discharge home:     - Return to baseline functional status : not met  - Dyspnea improved and oxygen saturations greater than 88% on room air or prior home oxygen levels: partially met, on 1LPM per NC at 96% SPO2, weaned down from 1.5LPM, continuing to wean.   - ECHO or other diagnostic tests and consults completed and resulted : NA  - Vital signs normal or at patient baseline: met   - Safe disposition plan has been identified: not met, needs LTC placement.      - Nurse to notify provider when observation goals have been met and patient is ready for discharge

## 2019-04-21 NOTE — PLAN OF CARE
Up A1 pivot BSC. A/ox4. VSS RA. LS crackles in bases. SOB & MCUCLLOUGH. BLE edema +2. Tele: NS w/BBB. K+ redraw this am. Awaiting placement to AL.

## 2019-04-21 NOTE — PLAN OF CARE
A&Ox4. VSS on RA. Tele SR w/ BBB. Dyspnea on exertion, received PRN Albuterol inhaler and nebulizer. Anxious. Saline locked. 2gm Na+ diet. A+1 to BSC and chair, baseline uses w/c. LLL and LUE have fine crackles.  BLE +1, LLE wound on shin, mepilex on CDI. SIDNEY legs elevated on pillows. Skin very fragile and thin, very sensitive to touch. CC/SW following for discharge, MA pending. Family updated. Continue to monitor.

## 2019-04-22 NOTE — PROGRESS NOTES
Patient has been assessed for Home Oxygen needs. Oxygen readings:    *Pulse oximetry (SpO2) = 90% on room air at rest while awake.    *SpO2 improved to 98% on 2liters/minute at rest.    *SpO2 = 85% on room air during activity/with exercise.    *SpO2 improved to 97% on 2liters/minute during activity/with exercise.

## 2019-04-22 NOTE — PLAN OF CARE
Observation goals PRIOR TO DISCHARGE     Comments: List all goals to be met before discharge home:     - Return to baseline functional status : not met.  - Dyspnea improved and oxygen saturations greater than 88% on room air or prior home oxygen levels: partially met, on 2LPM per NC at 96% SPO2.   - ECHO or other diagnostic tests and consults completed and resulted : NA  - Vital signs normal or at patient baseline: met   - Safe disposition plan has been identified: not met, needs LTC placement.      - Nurse to notify provider when observation goals have been met and patient is ready for discharge

## 2019-04-22 NOTE — PLAN OF CARE
PRIMARY DIAGNOSIS: CONGESTIVE HEART FAILURE  OUTPATIENT/OBSERVATION GOALS TO BE MET BEFORE DISCHARGE:  Dyspnea improved and O2 sats >88% at RA or at prior home O2 therapy level: No        SpO2: 96 %, O2 Device: Nasal cannula  Vitals:    04/19/19 2228 04/20/19 0600 04/22/19 0543   Weight: 53.5 kg (118 lb) 53.6 kg (118 lb 1.6 oz) 53.2 kg (117 lb 3.2 oz)            ECHO and other diagnostic testing complete (if applicable): Yes    Return to near baseline physical activity: No    Discharge Planner Nurse   Safe discharge environment identified: No  Barriers to discharge: Yes, needs TCU placement, possible discharge with O2 questionable       Entered by: Lianet Barrios 04/22/2019 9:32 AM     Please review provider order for any additional goals.   Nurse to notify provider when observation goals have been met and patient is ready for discharge.

## 2019-04-22 NOTE — PLAN OF CARE
A&O x4, VSS on RA. HEATH and LLL fine crackles, some relief with neb. SOB and MCCULLOUGH. Pt states to feel anxious, scheduled Seroquel given. IV site SL. Tolerates 2gm Na+ diet, ambulates assist x1 to BSC and chair. BLE edema +1. Coccyx has foam in place for protection. LLE wound on shin, mepilex in place cdi. Skin is fragile and thin, sensitive to touch. SW/CC are following for discharge. MA pending. Tele is SR with BBB. Will continue to monitor.

## 2019-04-22 NOTE — PROGRESS NOTES
"[facility]    Medicine Progress Note - Hospitalist Service       Date of Admission:  4/19/2019  Assessment & Plan   Jamilah Rodriguez is a 88 year old female with COPD and severe idiopathic dilated cardiomyopathy (EF 10-20%, non-sichemic) who was discharged from TCU 2 days ago and can not care for herself and comes to ER for treatment of SOB and placement:    Adult failure to thrive: Recently at [facility] from 3/20-4/16. LTC recommended but unable to afford so discharged home with home care. Re-presented 2 days after discharge with failure to thrive.   - SW consulted for d/c planning. MA pending. Pt has been accepted clinically at [facility], awaiting financial approval    Acute hypoxic respiratory failure  Chronic systolic heart failure (EF 10-20% on Echo 2/5/19)  Idiopathic cardiomyopathy: Hospitalized at [facility] 3/7/19-3/20/19 after presenting with progressive SOB, treated with IV diuresis and ultimately able to be weened to room air. Followed by [facility]. Per most recent PCP note, \"plan to meet with EP to discuss CRT and potential pacemaker\"--per further chart review, pt has declined this in the past. Reports dry weight of ~115. Not hypoxia today to 85% when pt was up and moving; 90% on room air at rest, high 90s on supplemental oxygen via NC at 2 LPM; pt notes comfort with oxygen on.   -- PTA had been on 40 mg qam and 20 mg qpm, however plan was to increase to 40 mg BID if ongoing issues with SOB; pt did get metolazone 5 mg every other day X3 doses following PCP appointment  -- Lasix recently increased to 40 mg bid, attempted to give pt IV Lasix 40 mg X1 on 4/22 afternoon, however she refused as she was concerned about possible discharge to LTC and inability to be near a bathroom   -- Continue PTA Hydralazine 10 mg TID and Isordil 10 mg po TID  --  At most recently PCP appointment (4/18, Dr. Hurtado), they had discussed hospice referral which appears to have been placed following that " appointment; discussed this again, however patient and daughter seem quite overwhelmed with coordination of placement; will need ongoing discussion at LTC.   -- Ween oxygen as able, pt will likely need PRN oxygen at discharge   -- I&Os, daily weights   -- Ongoing management and recommendations per outpatient Cardiology     Intake/Output Summary (Last 24 hours) at 4/22/2019 1743  Last data filed at 4/22/2019 1643  Gross per 24 hour   Intake 360 ml   Output 1750 ml   Net -1390 ml     Chronic obstructive pulmonary disease, non-oxygen dependent, not in acute exacerbation:   -- PTA Anoro Ellipta, albuterol inhaler 2 puffs q4 hrs PRN vs neb     Iron deficiency anemia: Underwent EGD in March with findings of esophageal candidiasis and congestive gastropathy. Cardiology recommended holding anticoagulation (previously on Xarelto). Prior to last hospitalization, Hgb ranged from 11-13. Since hospitalization, baseline Hgb ~8. B12 WNL. Iron 12 with TIBC 334, 4% sat on 4/18/19  -- Continue Protonix 40 mEq BID and Ferrous sulfate 142 mg po every day   -- Hgb in the AM     Recent Labs   Lab 04/19/19  2143 04/18/19  1150   HGB 8.9* 8.4*     Hypokalemia, resolved: Mg WNL. Potassium 2.8 on admission, repleted per protocol.   -- Continue home maintenance dose of potassium 20 mEq TID     Paroxysmal atrial fibrillation, rate controlled  Hx of LBBB:   -- Continue rate control with PTA Metoprolol 25 mg po qd  -- Not on anticoagulation due to anemia above    Macular degeneration  Cataracts: Continue PTA Lumigan, Trusopt, and preservision; pt can use her own supply.     Anxiety  Insomnia  -- Continue PTA Vistaril 25 mg q5 hrs PRN and Seroquel 25 mg at bedtime    CKD III: Baseline creatinine 1.3-1.4  -- Creatinine in the AM to assess response to increased diuretic     Diet: 2 Gram Sodium Diet    DVT Prophylaxis: Pneumatic Compression Devices and Ambulate every shift  Morrow Catheter: not present  Code Status: DNR/DNI      Disposition Plan    Expected discharge: Tomorrow, recommended to LTC once safe disposition plan/ TCU bed available.  Entered: Karolina Medina PA-C 04/22/2019, 5:20 PM     The patient's care was discussed with the Attending Physician, Dr. Simon, Bedside Nurse, Patient and Patient's Family.    Karolina Medina PA-C  Hospitalist Service  [facility]  ______________________________________________________________________    Interval History   No acute events overnight. Pt sitting upright in chair. Daughter accompanies pt at bedside. Daughter appears very overwhelmed with recent events leading to hospitalization (discharge from TCU to home with mother now back right away to hospital). Pt herself denies SOB. Note supplemental oxygen at 2 LPM which patient did not use PTA however seems to be using more for comfort as she is saturating at 90% on room air at rest, dropped to 85% on room air with activity.     Data reviewed today: I reviewed all medications, new labs and imaging results over the last 24 hours.     Physical Exam   Vital Signs: Temp: 98.2  F (36.8  C) Temp src: Oral BP: 106/64   Heart Rate: 87 Resp: 18 SpO2: 98 % O2 Device: Nasal cannula Oxygen Delivery: 2 LPM  Weight: 117 lbs 3.2 oz    CONSTITUTIONAL: Pt sitting upright in chair, dressed in hospital garb. Appears comfortable. Cooperative with interview. Accompanied by daughter at bedside.   HEENT: Normocephalic, atraumatic. Negative for conjunctival redness or scleral icterus.  Oral mucosa pink and moist; negative for ulcerations, erythema, or exudates.  Dentition in good repair.   CARDIOVASCULAR: RRR, no murmurs, rubs, or extra heart sounds appreciated. Pulses +2/4 and regular in upper and lower extremities, bilaterally.   RESPIRATORY: No increased work of breathing.  Supplemental oxygenation via NC at 2 LPM. Crackles appreciated as lung bases, bilat.   GASTROINTESTINAL:  Abdomen soft, non-distended. BS auscultated in all  four quadrants. Negative for tenderness to palpation.  No masses or organomegaly noted.  MUSCULOSKELETAL: No gross deformities noted. Normal muscle tone.   HEMATOLOGIC/LYMPHATIC/IMMUNOLOGIC: Negative for lower extremity edema, bilaterally.  NEUROLOGIC: Alert and oriented to person, place, and time.  strength intact. No focal neuro deficits.   SKIN: Warm, dry, intact. No jaundice noted. Negative for suspicious lesions, rashes, bruising, open sores or abrasions.     Data   Recent Labs   Lab 04/22/19  0822 04/21/19  1515 04/20/19  0619 04/19/19  2143 04/18/19  1150   WBC  --   --   --  7.7 7.4   HGB  --   --   --  8.9* 8.4*   MCV  --   --   --  81 84   PLT  --   --   --  434 411   NA  --   --  137 136 136   POTASSIUM 3.7 3.5 3.3* 2.8* 3.3*   CHLORIDE  --   --  100 98 98   CO2  --   --  31 30 29   BUN  --   --  25 25 25   CR  --   --  1.35* 1.47* 1.38*   ANIONGAP  --   --  6 8 9   NAKIA  --   --  8.7 8.3* 8.4*   GLC  --   --  101* 166* 133*   ALBUMIN  --   --   --   --  2.9*   PROTTOTAL  --   --   --   --  6.4*   BILITOTAL  --   --   --   --  0.5   ALKPHOS  --   --   --   --  130   ALT  --   --   --   --  16   AST  --   --   --   --  15     No results found for this or any previous visit (from the past 24 hour(s)).  Medications       bimatoprost  1 drop Both Eyes At Bedtime     dorzolamide  1 drop Both Eyes BID     furosemide  40 mg Intravenous Once     furosemide  40 mg Oral Daily before lunch     furosemide  40 mg Oral QAM     hydrALAZINE  10 mg Oral TID     isosorbide dinitrate  10 mg Oral TID     metoprolol succinate ER  25 mg Oral Daily     pantoprazole  40 mg Oral BID     potassium chloride ER  20 mEq Oral TID     QUEtiapine  25 mg Oral At Bedtime     sodium chloride (PF)  3 mL Intracatheter Q8H     umeclidinium-vilanterol  1 puff Inhalation Daily

## 2019-04-22 NOTE — PLAN OF CARE
Observation goals PRIOR TO DISCHARGE       Comments: List all goals to be met before discharge home:     - Return to baseline functional status : not met.  - Dyspnea improved and oxygen saturations greater than 88% on room air or prior home oxygen levels: partially met, on 1LPM per NC at 96% SPO2.   - ECHO or other diagnostic tests and consults completed and resulted : NA  - Vital signs normal or at patient baseline: met   - Safe disposition plan has been identified: not met, [facility] is accepted to [facility] pending     - Nurse to notify provider when observation goals have been met and patient is ready for discharge

## 2019-04-22 NOTE — PLAN OF CARE
Pt is A/O x4. VSS. IV SL. Ax1 stand and pivot to chair/commode. 2gm Na diet, tolerates well. Tele SR with BBB and 1st degree AVB. Pt plans to discharge to [facility] facility later this evening, given all goes well with insurance. Facility has accepted her.

## 2019-04-22 NOTE — PLAN OF CARE
PRIMARY DIAGNOSIS: CONGESTIVE HEART FAILURE  OUTPATIENT/OBSERVATION GOALS TO BE MET BEFORE DISCHARGE:  Dyspnea improved and O2 sats >88% at RA or at prior home O2 therapy level: No        SpO2: 94 %, O2 Device: Nasal cannula  Vitals:    04/19/19 2228 04/20/19 0600 04/22/19 0543   Weight: 53.5 kg (118 lb) 53.6 kg (118 lb 1.6 oz) 53.2 kg (117 lb 3.2 oz)            ECHO and other diagnostic testing complete (if applicable): Yes    Return to near baseline physical activity: No    Discharge Planner Nurse   Safe discharge environment identified: No  Barriers to discharge: Yes, waiting for LTC placement       Entered by: Lianet Barrios 04/22/2019 12:06 PM     Please review provider order for any additional goals.   Nurse to notify provider when observation goals have been met and patient is ready for discharge.

## 2019-04-22 NOTE — PLAN OF CARE
Observation goals PRIOR TO DISCHARGE     Comments: List all goals to be met before discharge home:     - Return to baseline functional status : not met.  - Dyspnea improved and oxygen saturations greater than 88% on room air or prior home oxygen levels: partially met, on 2LPM per NC at 96% SPO2.   - ECHO or other diagnostic tests and consults completed and resulted : NA  - Vital signs normal or at patient baseline: met   - Safe disposition plan has been identified: not met, needs LTC placement.      - Nurse to notify provider when observation goals have been met and patient is ready for discharge     Pt A&Ox4.  VSS on 2L oxygen via NC.  Tele SR w/BBB and AV block.  Denies pain.  +1 BLE edema.  Up w/A1 to BSC.  PIV SL.  MCCULLOUGH.  Anxious at times.  2g sodium diet.  Lung sounds w/fine crackles in LLL.  LLE wound w/Mepilex CDI.  Mepilex on coccyx CDI.  Refused PCDs.  Discharge pending placement.  Nursing to continue to monitor.

## 2019-04-22 NOTE — CONSULTS
Care Transition Initial Assessment - SW     Met with: Patient and dtr Lulu     Principal Problem:    Chronic systolic heart failure (EF 10-20% on Echo 2/5/19)  Active Problems:    AAA, 4.6 cm by abd CT 4/15/18    Macular degeneration    Chronic obstructive pulmonary disease, unspecified COPD type (H)    Paroxysmal atrial fibrillation (H)    Hypokalemia       DATA  Lives With: alone   Who is your support system?: Children  Identified issues/concerns regarding health management: In need of LTC     ASSESSMENT  Cognitive Status:  awake, alert and oriented  Concerns to be addressed: LTC placement.  SW consulted to assist with discharge planning, emotional support and transportation arrangements.  Pt is an 88 yr old female who transferred to Observation unit from the ED on 4/19/19 for heart failure. Patient previously lived alone in subsidized apt at [address] in [place]. Patient and her dtr are indicating that patient can no longer live alone and would like assist with finding LTC in [place] or [place]. Previous referrals have been sent, SW following up with facilities, as well as additional referrals sent to increase chances of finding LTC bed for today.   PLAN  Financial costs for the patient includes: Nohemi from  Financial Counseling checked with Hugo Alcazar and confirmed that an M.A. applic has not been submitted. Nohemi to meet with patient today to take applic  Patient given options and choices for discharge : SNF list offered  Patient/family is agreeable to the plan?  Yes: Patient states, 'I am no longer able to take care of myself.'  Patient Goals and Preferences: LTC with dtr transporting  Patient anticipates discharging to:  LTC  LTC referrals sent through DOD and fax. Awaiting return calls.  Patient will need portable oxygen at discharge.    1105: DOTTIE updated patient and dtr that financial counselor to meet with them today to take applic for M.A.  1203: Per dtr's comment that she looked up [facility] and thought it looked nice, DOTTIE called facility to see if they have LTC openings. DOTTIE informed that they do have openings and patient is being screened.  1300: DOTTIE informed pt and dtr that [facility] accepted clinically, pending review of M.A. Application. Joseluis Counselor currently meeting with pt and dtr and will provide this writer with completed application.    1530: DOTTIE received VM from [facility] admissions, requesting copies of bank stmt, etc. DOTTIE had already faxed although entire fax must not have sent properly. DOTTIE refaxed bank statements and awaiting return call.    1555: DOTTIE called to confirm that adm's received fax of bank statements. DOTTIE verified that facility uses [facility] as oxygen provider. SW to order portable oxygen once confirmation of acceptance is obtained.    DOTTIE to continue to follow and assist with discharge planning.      DC orders:  Scripts:  PAS:  Oxygen: [facility]  Trans: daughter Lulu

## 2019-04-22 NOTE — PROVIDER NOTIFICATION
Web page to TASHIA Medina. Pt did not want IV Lasix due to possible discharge in a couple hours to LTC facility and anxious about getting to restroom and needing to go during transportation. Said okay to give PO, if she doesn't discharge page about IV dose later today.

## 2019-04-23 PROBLEM — I42.9 CARDIOMYOPATHY (H): Status: ACTIVE | Noted: 2019-01-01

## 2019-04-23 NOTE — PLAN OF CARE
A&O x4, pt is anxious if being accepted to LTC. VSS on O2- 2L. Trying to wean off, to 1 L saturation 96%. Assist x1 to pivot to BSC or chair. Tolerating 2gm Na diet. Tele is SR with 1st degree AV block. Possible discharge tomorrow. Will continue to monitor.

## 2019-04-23 NOTE — PLAN OF CARE
Obs goals:    - Return to baseline functional status : not met.  - Dyspnea improved and oxygen saturations greater than 88% on room air or prior home oxygen levels: partially met, needs O2 when ambulating, desats to 80s on RA @ rest  - ECHO or other diagnostic tests and consults completed and resulted : not met, CXR ordered  - Vital signs normal or at patient baseline: not met, BP soft, T 100.2F  - Safe disposition plan has been identified: met, pt is accepted to [facility]

## 2019-04-23 NOTE — PLAN OF CARE
Pt a/o, VSS, MCCULLOUGH.  O2 sat 94% 2 LPM, drops to 89% with pivot to BSC.  C/o back pain 4/10, but declined medication.  Tele SR w/BBB.  Waiting for TCU placement.

## 2019-04-23 NOTE — PROGRESS NOTES
SW: Pt plan is to go to [facility] for LTC, awaiting approval from LTC business office. Confirmation pending. SW to order portable oxygen tank for transport.    1041: SW spoke to admissions at [facility], inquiring about business office decision on referral. Adm's to check with bus office and get back to this writer. Return call pending.    1107: SW received VM back from [facility], informing that patient is accepted into their LTC with M.A. Pending status.     1215: SW ordered portable oxygen tank from [facility] for transport to LTC.    1530: SW met with patient and dtr Lulu, per their request, to answer questions about Medical Assistance, spend downs, Elderly Waiver program, Hospice services. SW printed out documents from [employer] web site and explained eligibility, income limits and spend down rules. SW answered dtr's questions about what it would look like if patient signed on to Hospice both in the LTC setting and home setting in the case they decide to bring patient home. SW explained the Elderly Waiver program which would pay for home services and equipment, managed by a care coordinator.     Hospice Consult: Patient and dtr considering Hospice now due to patient changes. Dtr spoke to her brother and indicated that he will be available to meet with Hospice tomorrow morning at 1000. SW spoke to Lora RN from Hospice who confirms that she can meet with patient and family at 1000 on Wednesday 4/24.     SW confirmed that portable oxygen tank was delivered to patient room this afternoon, ready for discharge.    SW to continue to follow and assist with discharge planning.

## 2019-04-23 NOTE — PLAN OF CARE
Obs goals:    - Return to baseline functional status : not met.  - Dyspnea improved and oxygen saturations greater than 88% on room air or prior home oxygen levels: partially met, on 2LPM per NC at 96% SPO2, dropped to 89% when up to BSC  - ECHO or other diagnostic tests and consults completed and resulted : NA  - Vital signs normal or at patient baseline: met   - Safe disposition plan has been identified: not met, pt is accepted to [facility] pending

## 2019-04-23 NOTE — PROGRESS NOTES
"[facility]    Hospitalist Progress Note      Assessment & Plan   Jamilah Rodriguez is a 88 year old female with a past medical history significant for COPD, severe idiopathic dilated cardiomyopathy (EF 10-20%), iron deficiency anemia, paroxysmal atrial fibrillation, LBBB, anxiety, CKD, and macular degeneration who was admitted on 4/19/2019 due to concerns for home safety two days after TCU discharge.      Acute hypoxic respiratory failure  Chronic systolic heart failure (EF 10-20% on Echo 2/5/19)  Idiopathic cardiomyopathy: Hospitalized at [facility] 3/7/19-3/20/19 after presenting with progressive SOB, treated with IV diuresis and ultimately able to be weened to room air. Followed by [facility] clinic. Per most recent PCP note, \"plan to meet with EP to discuss CRT and potential pacemaker\"--per further chart review, pt has declined this in the past. Reports dry weight of ~115. She is not on oxygen PTA. Intermittent hypoxic requiring 1-2L oxygen, desats significantly with activity. pt notes comfort with oxygen on.   -- PTA on 40mg lasix in am and 20mg in pm, plan had been to increase to 40bid if needed. Lasix was increased to 40mg bid this admission.  pt did get metolazone 5 mg every other day X3 doses following recent PCP appointment  --  attempted to give pt IV Lasix 40 mg X1 on 4/22 afternoon, however she refused as she was concerned about possible discharge to LTC and inability to be near a bathroom. She is increasingly SOB today with increased effort and intermittent resting hypoxia. Will admit for IV diuresis starting at 40mg IV tid.   -- Continue PTA Hydralazine 10 mg TID and Isordil 10 mg po TID  --  At most recently PCP appointment (4/18, Dr. Hurtado), they had discussed hospice referral which appears to have been placed following that appointment; discussed this again, however patient and daughter seem quite overwhelmed with coordination of placement; will need ongoing discussion at LTC.   -- Ween oxygen " as able, pt will likely need PRN oxygen at discharge at least with activity   -- I&Os, daily weights   -- Ongoing management and recommendations per outpatient Cardiology    Adult failure to thrive: Recently at [facility] from 3/20-4/16. LTC recommended but unable to afford so discharged home with home care. Re-presented 2 days after discharge with failure to thrive.   - SW consulted for d/c planning. MA pending. Pt has been accepted clinically at [facility] once medically stable    Left lower extremity wound. Minimal surrounding erythema. No drainage noted. No warmth. Daughter reports redness increased a bit today and patient says this is tender. WBC normal, patient afebrile.  --monitor for fever  --follow WBC  --WOC consult     Chronic obstructive pulmonary disease, non-oxygen dependent, not in acute exacerbation:   -- PTA Anoro Ellipta, albuterol inhaler 2 puffs q4 hrs PRN vs neb                 Iron deficiency anemia: Underwent EGD in March with findings of esophageal candidiasis and congestive gastropathy. Cardiology recommended holding anticoagulation (previously on Xarelto). Prior to last hospitalization, Hgb ranged from 11-13. Since hospitalization, baseline Hgb ~8. B12 WNL. Iron 12 with TIBC 334, 4% sat on 4/18/19  -- Continue Protonix 40 mEq BID and Ferrous sulfate 142 mg po every day  -- Hgb in the AM      Hypokalemia: Mg WNL. Potassium 2.8 on admission, repleted per protocol then again down to 2.6 on 4/23. This is in the setting of lasix use.   --change po potassium from 20meq tid to 40meq bid  --protocol in place      Paroxysmal atrial fibrillation, rate controlled  Hx of LBBB:   -- Continue rate control with PTA Metoprolol 25 mg po qd  -- Not on anticoagulation due to anemia above     Macular degeneration  Cataracts: Continue PTA Lumigan, Trusopt, and preservision; pt can use her own supply.      Anxiety  Insomnia  -- Continue PTA Vistaril 25 mg q5 hrs PRN and Seroquel 25 mg at bedtime     CKD  III: Baseline creatinine 1.3-1.4  -- Creatinine stable at baseline   --follow BMP    DVT Prophylaxis: Pneumatic Compression Devices  Code Status: DNR/DNI    Disposition: Expected discharge in 2 days pending adequate diuresis. Discharge to LTC    This patient was seen and examined with Dr. Simon who agrees with the above plan.    Amanda Paulino PA-C    Interval History   Notes discomfort in bilateral lower extremities, particularly around LLE shin wound. Notes labored breathing, is not sure whether worse from baseline. Denies CP, nausea. Daughter at bedside, feels breathing a little more labored today.     -Data reviewed today: I reviewed all new labs and imaging results over the last 24 hours. I personally reviewed no images or EKG's today.    Physical Exam   Temp: 97.5  F (36.4  C) Temp src: Axillary BP: (!) 129/114 Pulse: 112 Heart Rate: 105 Resp: 22 SpO2: (!) 89 % O2 Device: None (Room air) Oxygen Delivery: 1 LPM  Vitals:    04/19/19 2228 04/20/19 0600 04/22/19 0543   Weight: 53.5 kg (118 lb) 53.6 kg (118 lb 1.6 oz) 53.2 kg (117 lb 3.2 oz)     Vital Signs with Ranges  Temp:  [97.4  F (36.3  C)-98.5  F (36.9  C)] 97.5  F (36.4  C)  Pulse:  [112] 112  Heart Rate:  [] 105  Resp:  [16-22] 22  BP: (103-129)/() 129/114  SpO2:  [89 %-99 %] 89 %  I/O last 3 completed shifts:  In: 640 [P.O.:640]  Out: 1450 [Urine:1450]    CONSTITUTIONAL: alert and oriented, sitting up in bed. Increased work of breathing, anxious appearing. Appropriately conversant though becomes dyspneic with talking   HEENT: Normocephalic, atraumatic. Mucous membranes are moist   CARDIOVASCULAR: RRR, no murmurs, rubs, or extra heart sounds appreciated. Pulses +2/4 and regular in upper and lower extremities, bilaterally.   RESPIRATORY: crackles in bilateral bases, uses accessory muscles. dyspneic with talking at times. No wheezing   GASTROINTESTINAL:  Abdomen soft, non-distended. BS auscultated in all four quadrants. Negative for  tenderness to palpation.  No masses or organomegaly noted.  MUSCULOSKELETAL: No gross deformities noted. Normal muscle tone.   HEMATOLOGIC/LYMPHATIC/IMMUNOLOGIC: trace lower extremity edema, bilaterally.  NEUROLOGIC: Alert and oriented to person, place, and time.  strength intact. No focal neuro deficits.   SKIN: Warm, dry, intact. No jaundice noted. Superficial wound on left skin without significant surrounding erythema or warmth. No drainage.      Medications       bimatoprost  1 drop Both Eyes At Bedtime     dorzolamide  1 drop Both Eyes BID     ferrous sulfate  140 mg Oral Daily     furosemide  40 mg Intravenous Q8H     furosemide  40 mg Intravenous Once     hydrALAZINE  10 mg Oral TID     isosorbide dinitrate  10 mg Oral TID     metoprolol succinate ER  25 mg Oral Daily     pantoprazole  40 mg Oral BID     potassium chloride ER  40 mEq Oral BID     QUEtiapine  25 mg Oral At Bedtime     sodium chloride (PF)  3 mL Intracatheter Q8H     umeclidinium-vilanterol  1 puff Inhalation Daily       Data   Recent Labs   Lab 04/23/19  1522 04/23/19  0630 04/22/19  0822  04/20/19  0619 04/19/19  2143 04/18/19  1150   WBC  --   --   --   --   --  7.7 7.4   HGB  --  8.2*  --   --   --  8.9* 8.4*   MCV  --   --   --   --   --  81 84   PLT  --   --   --   --   --  434 411   NA  --   --   --   --  137 136 136   POTASSIUM 3.5 2.6* 3.7   < > 3.3* 2.8* 3.3*   CHLORIDE  --   --   --   --  100 98 98   CO2  --   --   --   --  31 30 29   BUN  --   --   --   --  25 25 25   CR  --  1.25*  --   --  1.35* 1.47* 1.38*   ANIONGAP  --   --   --   --  6 8 9   NAKIA  --   --   --   --  8.7 8.3* 8.4*   GLC  --   --   --   --  101* 166* 133*   ALBUMIN  --   --   --   --   --   --  2.9*   PROTTOTAL  --   --   --   --   --   --  6.4*   BILITOTAL  --   --   --   --   --   --  0.5   ALKPHOS  --   --   --   --   --   --  130   ALT  --   --   --   --   --   --  16   AST  --   --   --   --   --   --  15    < > = values in this interval not displayed.        No results found for this or any previous visit (from the past 24 hour(s)).

## 2019-04-23 NOTE — PLAN OF CARE
Obs goals:    - Return to baseline functional status : not met.  - Dyspnea improved and oxygen saturations greater than 88% on room air or prior home oxygen levels: partially met, needs O2 when ambulating  - ECHO or other diagnostic tests and consults completed and resulted : NA  - Vital signs normal or at patient baseline: met   - Safe disposition plan has been identified: met, pt is accepted to [facility]

## 2019-04-23 NOTE — PROGRESS NOTES
"Care Coordination:    Per interdisciplinary rounds, PA indicated pt's [facility] clinic follow up should be \"sooner\" than presently scheduled--     May 10, 2019 12:30 PM CDT     Core Return with Alice May    I was able to call and have the appointment rescheduled to 1 week earlier--     May 03, 2019  1:10 PM CDT     Core Return with JAMIN Wylie CNP     [facility] - Lisy Concepcion RN, BSN  [facility] Care Coordinator   Mobile Phone: [phone]    "

## 2019-04-23 NOTE — PROVIDER NOTIFICATION
"  MD Notification    Notified Person Name: KAREL Justice    Notification Date/Time: 4/23/19, 16:53    Notification Interaction:    Purpose of Notification: \"Pt reported 3 \"jolts\" in chest. Not painful, but enough to get pt's attention. No changes seen on tele.\"    Orders Received: Notify on-call in pt experiences more episodes, monitor tele for changes in rhythm.          "

## 2019-04-23 NOTE — PLAN OF CARE
Obs goals:    - Return to baseline functional status : not met.  - Dyspnea improved and oxygen saturations greater than 88% on room air or prior home oxygen levels: partially met  - ECHO or other diagnostic tests and consults completed and resulted : NA  - Vital signs normal or at patient baseline: met   - Safe disposition plan has been identified: not met, [facility] is [facility] pending

## 2019-04-23 NOTE — PLAN OF CARE
Observation goals PRIOR TO DISCHARGE       Comments: List all goals to be met before discharge home:     - Return to baseline functional status : not met.  - Dyspnea improved and oxygen saturations greater than 88% on room air or prior home oxygen levels: partially met, on 1LPM per NC at 96% SPO2. DOA  - ECHO or other diagnostic tests and consults completed and resulted : NA  - Vital signs normal or at patient baseline: met   - Safe disposition plan has been identified: not met, [facility] is [facility] pending     - Nurse to notify provider when observation goals have been met and patient is ready for discharge

## 2019-04-24 NOTE — PROGRESS NOTES
SW:  D:  Call placed and message left for [facility] to update them as to patient's status and to inquire about a private room.  Awaiting a return call.  P:  Will continue to follow.

## 2019-04-24 NOTE — PLAN OF CARE
A&O. VSS on 1-2L O2 @ rest, new today, and w/ ambulation, desats to upper 80s on RA when resting. Tele SR w/ BBB and occ PVCs. 2gm Na+. A+1 to BS, up in chair today during part of shift. K+ 2.6, replaced, recheck 3.5. SOB/labored breathing worse today, neb treatment given, pt still c/o SOB, using accessory muscles when breathing. LLE wound covered w/ vaseline, per pt request dry gauze placed over wound and wrapped w/ krilex, WOC RN recommending wound open to air. Non-blanchable pressure wound on saccrum/coccyx area, mepilex in place. CMS intact. Inpatient orders placed, report given to oncoming RN. Pt to transfer to [facility] when room available.

## 2019-04-24 NOTE — PROVIDER NOTIFICATION
MD Notification    Notified Person: MD    Notified Person Name: Dr. Silverman     Notification Date/Time: 4/23 2001    Notification Interaction: text page     Purpose of Notification: need scheduled potassium switched to powder form. Thanks.    Orders Received: verbal order to change oral potassium to powder.     Comments:

## 2019-04-24 NOTE — PROGRESS NOTES
"[facility]    Medicine Progress Note - Hospitalist Service       Date of Admission:  4/19/2019  Assessment & Plan      Jamilah Rodriguez is a 88 year old female with a past medical history significant for COPD, severe idiopathic dilated cardiomyopathy (EF 10-20%), iron deficiency anemia, paroxysmal atrial fibrillation, LBBB, anxiety, CKD, and macular degeneration who was admitted on 4/19/2019 due to concerns for home safety two days after TCU discharge.      Acute hypoxic respiratory failure  Chronic systolic heart failure (EF 10-20% on Echo 2/5/19) due to an idiopathic cardiomyopathy  Intermittent atrial fibrillation    Left bundle branch block   Chronic obstructive lung disease   Hospitalized at [facility] 3/7/19-3/20/19 after presenting with progressive SOB, treated with IV diuresis and ultimately able to be weened to room air. Followed by [facility] clinic. Per most recent PCP note, \"plan to meet with EP to discuss CRT and potential pacemaker\"--per further chart review, pt has declined this in the past. Reports dry weight of ~115. She is not on oxygen PTA.  The patient does not appear to be particularly fluid overloaded and does not feel subjectively better after diuresis.  Her oxygen saturation is 100% on 2 L nasal cannula.  We will obtain a noncontrast CT scan to look for pneumonia and also check a procalcitonin level.  Due to her renal function, we will not obtain a PE study but we will check a d-dimer and consider further evaluation for pulmonary embolism this is positive.  Continue other care for now with inhaled bronchodilators and Lasix although frequency of the Lasix has been changed from every 8 to every 12 hours.    Stage III chronic kidney disease  Hypokalemia   Replace potassium per protocol  Creatinine is stable.  Continue to monitor.    Adult failure to thrive  Recently at [facility] from 3/20-4/16. LTC recommended but unable to afford so discharged home with home care. Re-presented 2 days " after discharge with failure to thrive.   SW consulted for d/c planning. MA pending. Pt has been accepted clinically at [facility] once medically stable    Left lower extremity wound  Minimal surrounding erythema. No drainage noted. No warmth. Continue with dressing changes.           Iron deficiency anemia  Underwent EGD in March with findings of esophageal candidiasis and congestive gastropathy. Cardiology recommended holding anticoagulation (previously on Xarelto). Prior to last hospitalization, Hgb ranged from 11-13. Since hospitalization, baseline Hgb ~8. B12 WNL. Iron 12 with TIBC 334, 4% sat on 4/18/19  Continue Protonix 40 mEq BID and Ferrous sulfate 142 mg po every day    Macular degeneration  Cataracts  Continue PTA Lumigan, Trusopt, and preservision; pt can use her own supply.      Anxiety  Insomnia  Continue PTA Vistaril 25 mg q5 hrs PRN and Seroquel 25 mg at bedtime    Goals of care  I discussed the patient's overall care with her family today at the bedside.  If we do not find a treatable infection, the family would like to consider palliative care.  However they told me they did not want me to order a palliative care consult at this time.     Diet: 2 Gram Sodium Diet  Diet    DVT Prophylaxis: pneumatic compression devices  Morrow Catheter: not present  Code Status: DNR/DNI      Disposition Plan   Expected discharge: 2 - 3 days, recommended to transitional care unit once Respiratory status is stable.  Entered: Vitaliy Lin MD 04/24/2019, 12:36 PM       The patient's care was discussed with the Bedside Nurse, Patient and Patient's Family.    Vitaliy Lin MD  Hospitalist Service  [facility]    ______________________________________________________________________    Interval History   Feeling a little short of breath.  No chest pain.  Minimal cough.    Data reviewed today: I reviewed all medications, new labs and imaging results over the last 24 hours. I  personally reviewed the chest x-ray image(s) showing Small bilateral effusions and possibly a left lower lobe infiltrate.    Physical Exam   Vital Signs: Temp: 97.5  F (36.4  C) Temp src: Oral BP: 121/80 Pulse: 98 Heart Rate: 101 Resp: 20 SpO2: 100 % O2 Device: Nasal cannula Oxygen Delivery: 2 LPM  Weight: 112 lbs 3.2 oz  Constitutional: awake, alert, cooperative, no apparent distress, and appears stated age  Respiratory: Mild tachypnea without use of accessory muscles.  Mild bilateral wheezing present no rales or rhonchi heard.  Cardiovascular: regular rate and rhythm, normal S1 and S2, no S3 or S4, and no murmur noted  GI:  normal bowel sounds, soft, non-distended, non-tender  Neurologic: Awake, alert, oriented to name, place and time.  Cranial nerves II-XII are grossly intact.  Motor is 5 out of 5 bilaterally  Neuropsychiatric: General: normal, calm and normal eye contact    Data   Recent Labs   Lab 04/24/19  0544 04/23/19  1522 04/23/19  0630  04/20/19  0619 04/19/19  2143 04/18/19  1150   WBC 6.6  --   --   --   --  7.7 7.4   HGB 8.0*  --  8.2*  --   --  8.9* 8.4*   MCV  --   --   --   --   --  81 84   PLT  --   --   --   --   --  434 411     --   --   --  137 136 136   POTASSIUM 2.9* 3.5 2.6*   < > 3.3* 2.8* 3.3*   CHLORIDE 98  --   --   --  100 98 98   CO2 30  --   --   --  31 30 29   BUN 22  --   --   --  25 25 25   CR 1.24*  --  1.25*  --  1.35* 1.47* 1.38*   ANIONGAP 7  --   --   --  6 8 9   NAKIA 8.3*  --   --   --  8.7 8.3* 8.4*   GLC 90  --   --   --  101* 166* 133*   ALBUMIN  --   --   --   --   --   --  2.9*   PROTTOTAL  --   --   --   --   --   --  6.4*   BILITOTAL  --   --   --   --   --   --  0.5   ALKPHOS  --   --   --   --   --   --  130   ALT  --   --   --   --   --   --  16   AST  --   --   --   --   --   --  15    < > = values in this interval not displayed.     Recent Results (from the past 24 hour(s))   XR Chest Port 1 View    Narrative    CHEST ONE VIEW UPRIGHT 4/23/2019 8:18 PM      HISTORY: Shortness of breath.    COMPARISON: March 17, 2019      Impression    IMPRESSION: Small bilateral pleural effusions and associated  atelectasis and/or infiltrates which are new since comparison study  left worse than right. Some pulmonary vascular congestion and  cardiomegaly also present.    NIRALI RUBALCAVA MD

## 2019-04-24 NOTE — PLAN OF CARE
"A&Ox4. VSS on 1L O2. Complaints of SOB, pt refuses all position changes except sitting up 90 degree's. Low grade fever 99.3, pt refuses tylenol. Denies Pain. Up w/ SBA. Tele is SR w/ BBB. Blanchable redness to coccyx, new mepilex placed. Low grade fever, 99.3, pt refused tylenol. K+ 2.9 this AM, replacement protocol initiated. Plan is for family and pt to meet with hospice team to discuss goals of care. Son and daughter still seem unsure in regards to \"hospice care\" and would like to further speak with either a internal medicine doctor or cardiologist.   "

## 2019-04-24 NOTE — PROGRESS NOTES
SW: SW spoke to Hospice nurse Lora, to inform that patient transferred from [facility] to [facility] [place]. SW to continue to follow and assist with discharge planning.

## 2019-04-24 NOTE — PLAN OF CARE
A/O, anxious at times, given PRN hydroxyzine. VSS, O2sats 99% on 2 L O2, intermittent SOB, given PRN neb with improvement. LS coarse crackles in bases. Tele SR c BBB. Denies pain. SBA to BSC. LLE wound scabbing, dressing changed. Chest CT, BLE US completed, daughter updated. Plan to collect UA, possible discharge tomorrow.

## 2019-04-24 NOTE — PROGRESS NOTES
D: Jamilah Rodriguez is a 88 year old female with a past medical history significant for COPD, severe idiopathic dilated cardiomyopathy (EF 10-20%), iron deficiency anemia, paroxysmal atrial fibrillation, LBBB, anxiety, CKD, and macular degeneration who was admitted on 4/19/2019 due to concerns for home safety two days after TCU discharge    WOC consulted for L leg wound and sacrum      A: Pt has mepilex in place with blanchable pink erythema. Pt currently having respiratory decompensation and needing to sit up high. Educated pt/dtr/nurse on importance of frequent repositioning side to side and to lower head of bed as she tolerates    Bandar Risk Assessment    Sensory Perception: 3-->slightly limited    Moisture: 4-->rarely moist   Activity: 3-->walks occasionally     Mobility: 3-->slightly limited   Nutrition: 3-->adequate   Friction and Shear: 2-->potential problem  Bandar Score: 18     L shin: Pt unaware of what she did, skin is extremely tender to even the lightest touch. Pt has small abrasion that is red/brown scabbing without fluctuance or drainage, pink blanchable erythema to periwound. Pt has very thin fragile skin and most likely she bumped this area. R shin is also tender but L>R.     P: continue with application of mepilex to coccyx/sacrum and follow PIP.   Pressure Injury Prevention (PIP) MEASURES:  REPOSITIONING   Fully off load every 1-2 hours (stand x 5 minutes or back to bed)   Shift side to side every 15 minutes  Chair cushion ([id number]) when up to chair (pillow does not actually off load)  NOTE: the Z-Flow Pad is NOT a cushion, pt should NOT sit on the Z-Flow pads  4.  BED:    POSTIONING  ? No direct supine positioning, position only side to side  ? Try to keep HOB below 30 degrees  ? Reposition every 1-2 hours  ? Consider Z-Gianfranco Pillows to help keep pt on side (#[id number] or #[id number]- large). *Z-Flows are for positioning, DO NOT SIT ON!  ? Keep heels elevated- one pillow under each leg from knee to  heels, checking heels are free  ? Evaluate for specialty mattress- check bed algorithm    L shin: Ok to apply vaseline and leave open to air or cover with dressing for comfort. Avoid all tape on skin. If area needs to be covered use Kerlix instead of tape.     WOC nurse signing off, please re consult with further needs.

## 2019-04-24 NOTE — PROGRESS NOTES
Hospice: Met with patient, her son, daughter and son in law to explain the hospice program.   I provided the hospice information sheet and brochure for them to refer to after.  The patient has been in the obs unit and was moved to [facility] last evening. Per the family she will need long term care as both the kids work.  I explained the hospice philosophy of care. I asked the patient what her goals are and or what she would like her remaining days to look like. She told me she is leaning towards being comfortable.   I reinforced to all that with the hospice philosophy of care we no longer have the patients come back to the hospital for any aggressive care, no labs, scans etc. The focus is comfort and quality of life. She still has questions especially about prognosis and would like to speak to the cardiologist.  I explained the hospice services that are provided and how we would collaborate with the facility staff she is discharged to to ensure the poc for hospice is implemented. I also explained the equipment coverage and med coverage with the hospice benefit.   The family asked a lot of good questions and hopefully I was able to answer them.   They have looked at [facility] in [facility] as a potential discharge facility  No consents were signed at this visit  If discharged with hospice and to a facility please order these hospice comfort meds at discharge:  1. Hydromorphone 10mg/ml, 1 mg or 0.1 ML PO/SL every 2 hours PRN pain/dyspnea  2. Atropine 1% 2 drops PO/SL every 2 hours PRN terminal congestion  3. Bisacodyl supp 10mg OH daily PRN constipation  4. Acetaminophen supp 650mg OH every 4 hours PRN fever/mild pain.  I provided my phone number if the family should have further questions.  Lora Jasso RN, BSN   Hospice Admission nurse  [phone]

## 2019-04-24 NOTE — CONSULTS
"CLINICAL NUTRITION SERVICES  -  ASSESSMENT NOTE      Recommendations Ordered by Registered Dietitian (RD): Boost BID between meals    Malnutrition:   % Weight Loss:  > 5% in 1 month (severe malnutrition)  % Intake:  </= 50% for >/= 5 days (severe malnutrition)  Subcutaneous Fat Loss:  Unable to observe - going down for a test  Muscle Loss:  Unable to observe - going down for a test   Fluid Retention:  None noted    Malnutrition Diagnosis: Severe malnutrition  In Context of:  Acute illness or injury  Chronic illness or disease  Environmental or social circumstances        REASON FOR ASSESSMENT  Jamilah Rodriguez is a 88 year old female seen by Registered Dietitian for Admission Nutrition Risk Screen for reduced oral intake over the last month      NUTRITION HISTORY  - Information obtained from daughter.  She states that patient was recently at the [facility] and was not eating very well there due to poor appetite.  Prior to this she was at home and a typical day for her included:  Good sized breakfast, half a sandwich for lunch, and a TV dinner in the evening.  She has been eating about half of this amount for the last 6-8 weeks.  She has also been struggling with taste of foods due to needing to be on a low sodium diet with her CHF.  Patient has used Boost in the past but daughter says it hasn't been mentioned here (was using at [facility] as well).        CURRENT NUTRITION ORDERS  Diet Order:     2000 mg Sodium     Current Intake/Tolerance:  Per daughter, patient ate 1/4 of the pizza, 3/4 of the ice tea, and a raspberry fruit ice for lunch.  Her appetite remains poor.       NUTRITION FOCUSED PHYSICAL ASSESSMENT (NFPA)  Completed:  No Patient not available - Spoke with daughter outside or room as patient was getting ready to be transported down to ultrasound          Observed:    N/A    Obtained from Chart/Interdisciplinary Team:  None     ANTHROPOMETRICS  Height: 5' 5\"  Weight: 50.9 kg (112#)(4/24)  Body mass index " is 18.67 kg/m   Weight Status:  Normal BMI  IBW: 56.8 kg   % IBW: 90%  Weight History: Per daughter, patient was 125# 6-8 weeks ago (13# loss or 10% weight loss over the last 6-8 weeks)    LABS  Labs reviewed    MEDICATIONS  Medications reviewed      ASSESSED NUTRITION NEEDS PER APPROVED PRACTICE GUIDELINES:    Dosing Weight 50.9 kg   Estimated Energy Needs: 8472-3317 kcals (30-35 Kcal/Kg)  Justification: repletion  Estimated Protein Needs: 60-75 grams protein (1.2-1.5 g pro/Kg)  Justification: hypercatabolism with acute illness  Estimated Fluid Needs: 9711-8278 mL (1 mL/Kcal)  Justification: maintenance    MALNUTRITION:  % Weight Loss:  > 5% in 1 month (severe malnutrition)  % Intake:  </= 50% for >/= 5 days (severe malnutrition)  Subcutaneous Fat Loss:  Unable to observe - going down for a test  Muscle Loss:  Unable to observe - going down for a test   Fluid Retention:  None noted    Malnutrition Diagnosis: Severe malnutrition  In Context of:  Acute illness or injury  Chronic illness or disease  Environmental or social circumstances    NUTRITION DIAGNOSIS:  Inadequate oral intake related to decreased appetite and diet restrictions as evidenced by consuming < 50% of meals over the last 2 months       NUTRITION INTERVENTIONS  Recommendations / Nutrition Prescription  2 gram Na diet as tolerated  Boost BID between meals   MVI with minerals daily -- unable to order due to contraindication in Epic    Implementation  Nutrition education: Not appropriate at this time due to patient condition (plan is for LTC at discharge)  Medical Food Supplement:  Ordered as above     Nutrition Goals  Intake will improve to 50-75% of meals and supplements     MONITORING AND EVALUATION:  Progress towards goals will be monitored and evaluated per protocol and Practice Guidelines    Clemencia Brown RD, LD, CNSC   Clinical Dietitian - [facility]

## 2019-04-25 NOTE — PLAN OF CARE
VSS. No compaints of pain. Pt tacynepnic through out shift, very SOB w/ activity. Pt up to BSC w/ Ax1. Slept intermittently through out shift.

## 2019-04-25 NOTE — PROGRESS NOTES
SW:  D:  Received discharge orders for patient.  Spoke with [facility] and they can accept patient today on hospice.  They do not have a private room.  They state this is the room that they discussed with patient's daughter.  Spoke with patient, son, and daughter regarding discharge plans.  Explained what [facility] said regarding a private room.  Explained that they can accept patient today if they are ready to have patient discharge.  Patient's family are wanting to proceed with signing on to hospice.  They would like to have patient discharge today.  They are asking for transport to be arranged.  Call placed to Lora from [facility] to inquire as to when she can follow up with patient's family to sign consents.  Lora states she will down around 13:00.  Call placed to [facility] to arrange for stretcher transport at 16:00 today as patient is on oxygen that she can not manage herself.  She is hospice and needs closer monitoring in the back of the rig.  Faxed the facesheet and the PCS to [facility].  Call placed to [facility] to update them as to patient's status, the time of the transport, and faxed the orders and the PAS.  Call placed to  Respiratory to request they come and  the oxygen tank that they initially delivered for the original discharge.      PAS-RR    D: Per DHS regulation, DOTTIE completed and submitted PAS-RR to [place] [employer] on [employer] Direct Connect via the Senior LinkAge Line.  PAS-RR confirmation # is : [id number].    I: DOTTIE spoke with patient, son, and daughter and they are aware a PAS-RR has been submitted.  DOTTIE reviewed with patient, son, and daughter  that they may be contacted for a follow up appointment within 10 days of hospital discharge if their SNF stay is < 30 days.  Contact information for [facility] LinkAge Line was also provided.    A: Patient, son, and daughter verbalized understanding.    P: Further questions may be directed to [facility] Line at #[phone],  option #4 for PAS-RR staff.

## 2019-04-25 NOTE — PROGRESS NOTES
Hospice: Met with patient and children again. Daniel signed her own hospice consent forms.   The plan is for the patient to be discharged to [facility] today with a 4p stretcher pickup with HE.   Meds will be filled at the discharge pharmacy and sent with the patient.  POLST completed and put on front of chart for signature.  The  Hospice admission team will complete the admission tomorrow at 9:30am  The family has been given their copies of the signed hospice consent forms along with the hospice handbook with the 24 hr number.   Thank you. Lora Jasso RN, BSN   Hospice Admission nurse  [phone]

## 2019-04-25 NOTE — DISCHARGE INSTRUCTIONS
Patient will discharge to [facility] today with [facility] via [facility] stretcher at 16:00.  [facility] phone number is [phone].  [facility]'s phone number is [phone].

## 2019-04-25 NOTE — PLAN OF CARE
A/O, anxious at times, given PRN hydroxyzine. VSS, O2sats 99% on 2 L O2, intermittent SOB, given PRN neb with improvement. LS diminished in bases. Tele SR c BBB. Denies pain. SBA to BSC. LLE wound scabbing, dressing changed. Denies pain. Hospice Rx given with packet to . Discharging at 1600 via ambulance stretcher.

## 2019-04-25 NOTE — DISCHARGE SUMMARY
[facility]  Hospitalist Discharge Summary       Date of Admission:  4/19/2019  Date of Discharge:  4/25/2019  Discharging Provider: Vitaliy Lin MD      Discharge Diagnoses   1. Acute exacerbation of systolic heart failure   2. Acute hypoxic respiratory failure  3. Hypokalemia   4. Left lower extremity wound present on admission   5. Severe malnutrition     History of     Chronic systolic heart failure (EF 10-20% on Echo 2/5/19) due to an idiopathic cardiomyopathy    Intermittent atrial fibrillation      Left bundle branch block     Chronic obstructive lung disease     Stage III chronic kidney disease      Iron deficiency anemia    Macular degeneration    Cataracts    Anxiety    Insomnia    Follow-ups Needed After Discharge   Follow-up Appointments     Follow Up and recommended labs and tests      Follow up with transitional care unit physician           Unresulted Labs Ordered in the Past 30 Days of this Admission     Date and Time Order Name Status Description    4/24/2019 2118 Urine Culture Aerobic Bacterial Preliminary     3/27/2019 1122 T4 FREE In process       These results will be followed up by primary care provider     Discharge Disposition   Discharged to a long-term care facility for hospice  Condition at discharge: Terminal    Hospital Course   Jamilah Rodriguez is a 88 year old female with a history of a severe idiopathic cardiomyopathy.  Her ejection fraction is estimated to be between 10 and 20%.  She also has COPD and chronic kidney disease.  She has a left bundle branch block and paroxysmal atrial fibrillation.  She is not anticoagulated anymore.  She has had frequent hospitalizations lately for heart failure and once again she was admitted through the emergency room with worsening respiratory status.  She was admitted and started on intravenous Lasix.  A CT of the chest without contrast was performed which showed small bilateral pleural effusions and atelectasis but no  definite infiltrate.  A PE study was not performed due to her chronic kidney disease.  A lower extremity ultrasound was negative for DVT.  Despite intravenous Lasix, the patient remains dyspneic.  After discussion with the patient, her family and Lora Jasso from [facility], the patient would like to discharge to a care facility on hospice care.  She is adamant that she does not want to return to the hospital.  She does not want any more testing.  She would like to continue her current cardiac medications and inhaled bronchodilators.    Consultations This Hospital Stay   CARE COORDINATOR IP CONSULT  NUTRITION SERVICES ADULT IP CONSULT  WOUND OSTOMY CONTINENCE NURSE  IP CONSULT  WOUND OSTOMY CONTINENCE NURSE  IP CONSULT  PHYSICAL THERAPY ADULT IP CONSULT  OCCUPATIONAL THERAPY ADULT IP CONSULT    Code Status   DNR/DNI    Time Spent on this Encounter   I, Vitaliy Lin, personally saw the patient today and spent greater than 30 minutes discharging this patient.       Vitaliy Lin MD  [facility]  ______________________________________________________________________    Physical Exam   Vital Signs: Temp: 98.8  F (37.1  C) Temp src: Oral BP: 116/75   Heart Rate: 102 Resp: 20 SpO2: 98 % O2 Device: Nasal cannula Oxygen Delivery: 2 LPM  Weight: 112 lbs 1.6 oz  Constitutional: awake, alert, cooperative, mild tachypnea  Respiratory: Mild tachypnea.  No wheezing today.  No rales.  Cardiovascular: Regular S1-S2 audible  Neurologic: Awake, alert, oriented to name, place and time.   Cerebellar finger to nose, heel to shin intact.  Sensory is intact.  Babinski down going, Romberg negative, and gait is normal.       Primary Care Physician   Sharon Hurtado    Discharge Orders      Mantoux instructions (Administer two-step Mantoux unless history of TB or positive PPD)    Give two-step Mantoux (PPD) Per Facility Policy: Yes       If history of positive PPD and/or Mantoux is contraindicated,  "obtain (per facility protocol) one of the following tests and notify provider only if there are abnormal results:     1. Past or current chest X-ray (CXR) for \"active TB negative\" status. The CXR needs to have been done within three months of the patient's admission date. Obtain a copy of the CXR report to document the patient's status. If no such report available, obtain a CXR within 72 hours of admission.    2. QuantiFERON-TB Gold (QFT-G) blood test     Reason for your hospital stay    You were hospitalized for need for higher level of care.     General info for SNF    Admitting Provider(s): Wali Presley MD    Length of Stay Estimate: Long Term Care  Condition: Declining  Level of care:board and care  Rehabilitation Potential: poor  Admission H&P completed: Yes, by PCP/Other provider - Wali Presley MD  Recent Chemotherapy: No  Use Nursing Home Standing Orders: Yes    For questions about these admission orders, please contact:  [address] [address]  Phone: [phone]  Fax: [phone]     Follow Up and recommended labs and tests    Follow up with transitional care unit physician     Activity    Activity Level: up as tolerated with assistance     DNR/DNI     Oxygen - Nasal cannula    1-2 Lpm by nasal cannula to keep O2 sats 90% or greater.     Fall precautions     Hip precautions     Diet    Follow this diet upon discharge: Orders Placed This Encounter      2 Gram Sodium Diet       Significant Results and Procedures   Most Recent 3 CBC's:  Recent Labs   Lab Test 04/25/19  0609 04/24/19  0544 04/23/19  0630 04/19/19  2143 04/18/19  1150  03/18/19  1100   WBC  --  6.6  --  7.7 7.4  --  12.6*   HGB 8.3* 8.0* 8.2* 8.9* 8.4*   < > 7.5*   MCV  --   --   --  81 84  --  92   PLT  --   --   --  434 411  --  284    < > = values in this interval not displayed.     Most Recent 3 BMP's:  Recent Labs   Lab Test 04/25/19  0609 04/24/19  1306 04/24/19  0544  04/23/19  0630  " 04/20/19  0619     --  135  --   --   --  137   POTASSIUM 3.7 4.0 2.9*   < > 2.6*   < > 3.3*   CHLORIDE 97  --  98  --   --   --  100   CO2 30  --  30  --   --   --  31   BUN 24  --  22  --   --   --  25   CR 1.41*  --  1.24*  --  1.25*  --  1.35*   ANIONGAP 9  --  7  --   --   --  6   NAKIA 8.8  --  8.3*  --   --   --  8.7   GLC 88  --  90  --   --   --  101*    < > = values in this interval not displayed.     Most Recent 3 Troponin's:  Recent Labs   Lab Test 03/17/19  1605 02/04/19  0005 02/03/19  1817   TROPI 0.026 0.043 0.059*     Most Recent 3 BNP's:  Recent Labs   Lab Test 04/19/19  2143 04/18/19  1150 03/29/19  0915 03/27/19  1122 03/17/19  1605   NTBNPI 34,181*  --   --   --  10,760*   NTBNP  --  32,766* 35,524* 34,767*  --      Most Recent 6 Bacteria Isolates From Any Culture (See EPIC Reports for Culture Details):  Recent Labs   Lab Test 04/24/19  2118 02/07/19  0153 02/07/19  0130 02/07/19  0125 12/21/16  1009   CULT PENDING <10,000 colonies/mL  urogenital alfredo  Susceptibility testing not routinely done   No growth No growth >100,000 colonies/mL Enterobacter aerogenes  10,000 to 50,000 colonies/mL mixed urogenital alfredo  *   ,   Results for orders placed or performed during the hospital encounter of 04/19/19   XR Chest Port 1 View    Narrative    CHEST ONE VIEW UPRIGHT 4/23/2019 8:18 PM     HISTORY: Shortness of breath.    COMPARISON: March 17, 2019      Impression    IMPRESSION: Small bilateral pleural effusions and associated  atelectasis and/or infiltrates which are new since comparison study  left worse than right. Some pulmonary vascular congestion and  cardiomegaly also present.    NIRALI RUBALCAVA MD   CT Chest w/o Contrast    Narrative    CT CHEST WITHOUT CONTRAST April 24, 2019 at 1244 hours    HISTORY: 88-year-old woman with acute respiratory illness.    COMPARISON: None. Patient had a chest x-ray on April 23, 2019.    TECHNIQUE: Axial and coronal noncontrast CT images obtained on the  lung  apices through the lung bases. Radiation dose for this scan was  reduced using automated exposure control, adjustment of the mA and/or  kV according to patient size, or iterative reconstruction technique.    FINDINGS: No obvious mediastinal lymphadenopathy, though assessment  limited by noncontrast exam. Majority of the esophagus is filled with  fluid. Visible solid organs are grossly unremarkable, although limited  in assessment. Moderate atherosclerotic calcification throughout the  thoracic aorta. Bilateral pleural effusions, small, right greater than  left. Moderate to severe compression deformity of T7 vertebral body  patella present on previous chest x-ray March 14, 2019 indicating  chronicity. Scattered bibasilar opacities and small focal areas of  consolidation in both lower lobes, though this may relate to  atelectasis. Distinguishing atelectasis from pneumonia is difficult.  Calcified nodules in the right upper lobe corresponding to granulomas.  Emphysematous changes to mild degree in the upper lobes.      Impression    IMPRESSION: Bilateral pleural effusions, both small, though right  greater than left. Overlying basilar opacities focal areas of  consolidation, though these may relate to atelectasis. Difficult to  distinguish atelectasis from pneumonia.    SAMANTA MILLER MD   US Lower Extremity Venous Duplex Bilateral    Narrative    US LOWER EXTREMITY VENOUS DUPLEX BILATERAL 4/24/2019 3:45 PM    CLINICAL HISTORY/INDICATION: Rule out DVT.    COMPARISON: 4/20/2018    TECHNIQUE:   Grayscale, color-flow, and spectral waveform analysis were performed  of the deep veins of the bilateral lower extremities    FINDINGS:   The right common femoral vein, femoral vein, popliteal vein and tibial  veins demonstrate normal compressibility, spectral waveform, color  flow and augmentation.    The left common femoral vein, femoral vein, popliteal vein and tibial  veins demonstrate normal compressibility, spectral  waveform, color  flow and augmentation.      Impression    IMPRESSION: No deep vein thrombosis in either lower extremity.    LUIS CURTIS, DO       Discharge Medications   Current Discharge Medication List      START taking these medications    Details   acetaminophen (TYLENOL) 650 MG suppository Place 1 suppository (650 mg) rectally every 4 hours as needed for fever  Qty: 12 suppository, Refills: 0    Associated Diagnoses: Hospice care patient      atropine 1 % ophthalmic solution Place 2 drops under the tongue every 2 hours as needed for secretions  Qty: 2 mL, Refills: 0    Associated Diagnoses: Hospice care patient      bisacodyl (DULCOLAX) 10 MG suppository Place 1 suppository (10 mg) rectally daily as needed for constipation  Qty: 6 suppository, Refills: 0    Associated Diagnoses: Hospice care patient      HYDROmorphone (DILAUDID) 10 mg/mL (HIGH CONC) oral solution Place 0.1 mLs (1 mg) under the tongue every 2 hours as needed for moderate to severe pain (dyspnea)  Qty: 5 mL, Refills: 0    Associated Diagnoses: Hospice care patient      ondansetron (ZOFRAN-ODT) 4 MG ODT tab Take 1 tablet (4 mg) by mouth every 8 hours as needed for nausea  Qty: 12 tablet, Refills: 0    Associated Diagnoses: Hospice care patient         CONTINUE these medications which have CHANGED    Details   furosemide (LASIX) 20 MG tablet Take 2 tablets (40 mg) by mouth 2 times daily    Associated Diagnoses: Chronic congestive heart failure, unspecified heart failure type (H)         CONTINUE these medications which have NOT CHANGED    Details   albuterol (PROAIR HFA/PROVENTIL HFA/VENTOLIN HFA) 108 (90 Base) MCG/ACT Inhaler Inhale 2 puffs into the lungs every 4 hours as needed for shortness of breath / dyspnea or wheezing      albuterol (PROVENTIL) (2.5 MG/3ML) 0.083% neb solution Take 2.5 mg by nebulization every 4 hours as needed for shortness of breath / dyspnea or wheezing      ANORO ELLIPTA 62.5-25 MCG/INH oral inhaler Inhale 1 puff  into the lungs daily  Qty: 1 Inhaler, Refills: 8    Associated Diagnoses: Chronic obstructive pulmonary disease, unspecified COPD type (H)      bimatoprost (LUMIGAN) 0.01 % SOLN Place 1 drop into both eyes At Bedtime.        dorzolamide (TRUSOPT) 2 % ophthalmic solution Place 1 drop into both eyes 2 times daily      hydrALAZINE (APRESOLINE) 10 MG tablet Take 1 tablet (10 mg) by mouth 3 times daily  Qty: 90 tablet, Refills: 1    Associated Diagnoses: Cardiomyopathy, idiopathic (H); Chronic systolic congestive heart failure (H)      hydrOXYzine (VISTARIL) 25 MG capsule Take 25 mg by mouth every 6 hours as needed for itching      isosorbide dinitrate (ISORDIL) 10 MG tablet Take 1 tablet (10 mg) by mouth 3 times daily  Qty: 90 tablet, Refills: 0    Associated Diagnoses: Paroxysmal atrial fibrillation (H)      metoprolol succinate ER (TOPROL-XL) 25 MG 24 hr tablet Take 1 tablet (25 mg) by mouth daily  Qty: 30 tablet, Refills: 1    Associated Diagnoses: Cardiomyopathy, idiopathic (H); Chronic systolic congestive heart failure (H)      miconazole with skin protectant (SHANNAN ANTIFUNGAL) 2 % CREA cream Apply topically 2 times daily    Associated Diagnoses: Chronic dermatitis      pantoprazole (PROTONIX) 20 MG EC tablet Take 2 tablets (40 mg) by mouth 2 times daily    Associated Diagnoses: Candida esophagitis (H)      polyethylene glycol 400 (BLINK TEARS) 0.25 % SOLN ophthalmic solution Place 1 drop into both eyes every 3 hours as needed for dry eyes      potassium chloride ER (K-DUR/KLOR-CON M) 20 MEQ CR tablet Take 1 tablet (20 mEq) by mouth 3 times daily  Qty: 180 tablet, Refills: 1    Associated Diagnoses: Cardiomyopathy, idiopathic (H); Chronic systolic congestive heart failure (H)      QUEtiapine (SEROQUEL) 25 MG tablet Nightly as needed; during the day as needed for anxiety.  Qty: 45 tablet, Refills: 3    Associated Diagnoses: Anxiety         STOP taking these medications       ferrous sulfate ER (SLO-FE) 142 (45 Fe) MG  CR tablet Comments:   Reason for Stopping:         Multiple Vitamins-Minerals (ICAPS AREDS 2) CAPS Comments:   Reason for Stopping:             Allergies   Allergies   Allergen Reactions     Atenolol Hives     Beta Adrenergic Blockers      Brimonidine      Cephalexin Hives     Germall Plus Itching     Imidazolidinyl Urea found in many topical creams and house hold products     Latex      Itching     Morphine GI Disturbance     No Clinical Screening - See Comments Itching     Carba Mix; IMIDAZOLIDINYL UREA - Allergic to this ingredient, which is found in many common topical moisturizers and household products.     Penicillins Hives     Sulfa Drugs GI Disturbance     Tramadol Nausea and Vomiting     Tylenol GI Disturbance     Vicodin [Hydrocodone-Acetaminophen]      Fentanyl Nausea

## 2019-04-26 NOTE — PROGRESS NOTES
Clinic Care Coordination Contact    Situation: Patient chart reviewed by care coordinator.    Background: Pt recent in hospital for Acute exacerbation of systolic heart failure, acute hypoxic respiratory failure, and hypokalemia.    Assessment: Pt was discharged on 4/25/2019 to [facility] with  Hospice Care.    Plan/Recommendations: No outreach will be made due to pt's involvement with [facility].    GODWIN Cespedes, [facility]  Clinic Care Coordinator  [facility]  [phone]  [email or web address]

## 2020-01-08 RX ORDER — DILTIAZEM HYDROCHLORIDE 120 MG/1
CAPSULE, EXTENDED RELEASE ORAL
Qty: 30 CAPSULE | Refills: 0 | OUTPATIENT
Start: 2020-01-08

## 2021-05-28 NOTE — PROGRESS NOTES
[facility]      Facility:    [facility] [[id number]]               Code Status: DNR/DNI   [facility] 4/19 through 4/24/2019      Chief Complaint/Reason for Visit:  Chief Complaint   Patient presents with     H & P     Hospice: end stage HFrEF       HPI:   Jamilah is a 88 y.o. female who has had a long history of severe idiopathic cardiomyopathy.  Her ejection fraction is estimated between 10 and 20%.  She has paroxysmal atrial fibrillation, but is no longer anticoagulated.  She has had numerous frequent hospitalizations for worsening heart failure, worsening respiratory status.    Current admission she was admitted for worsening respiratory status.  She had been living alone.  She was admitted and started on IV Lasix.  CT of the chest showed small bilateral pleural effusions, atelectasis, but no definitive active infiltrate.  A pulmonary embolism study was not done due to chronic kidney disease.  Lower extremity ultrasound was negative for DVT.  Despite IV Lasix, she remained dyspneic.  Patient and her family had a discussion about hospice services.  Daniel chose to discharge on hospice and was transferred to [facility].  She is adamant she does not want to return to the hospital, does not want more workup or testing.          Past Medical History:  Past Medical History:   Diagnosis Date     AAA (abdominal aortic aneurysm) (H)     Followed with yearly ultrasound     Anemia      Anxiety      Cervical cancer (H) 1969     Chronic dermatitis     extensive allergy testing revealed allergy/sensitivity to carba mix ( rubber) and Imidazolidinyl Urea (common in beauty products)     CKD (chronic kidney disease), stage III (H)      COPD (chronic obstructive pulmonary disease) (H)     very mild abnormal spirometry in 2007 (in [place]), has not been active since quitting smoking in 2009     Glaucoma      HTN (hypertension)      Hypokalemia      Idiopathic cardiomegaly  10/2018     Insomnia      LBBB (left bundle branch block) 10/2018     Macular degeneration      Osteoporosis 08/03/2011    femoral T score -3.4 & -3.7     PAD (peripheral artery disease) (H)      Paroxysmal atrial fibrillation (H)      Systolic heart failure (H)      Vertebral compression fracture (H) 05/11/2017    L1 compression fracture, presumed osteoporotic compression fracture           Surgical History:  Past Surgical History:   Procedure Laterality Date     CATARACT EXTRACTION W/  INTRAOCULAR LENS IMPLANT Left 02/08/2012     ESOPHAGOGASTRODUODENOSCOPY  03/19/2019     HEART CATH CORONARY ANGIOGRAM W/LV GRAM  10/2018    moderate nonocclusive CAD       LAPAROSCOPY DIAGNOSTIC (GENERAL)  04/15/2018     LAPAROTOMY EXPLORATORY  04/15/2018     OPEN REDUCTION INTERNAL FIXATION HIP NAILING Right 07/02/2012     OPEN REDUCTION INTERNAL FIXATION TIBIAL PLATEAU Right 03/31/2013     ORIF WRIST FRACTURE Left 1988    left wrist ORIF, hardware removed onemonth later       TOTAL ABDOMINAL HYSTERECTOMY  1999    fibroids       Family History:   Family History   Problem Relation Age of Onset     Other Mother         arterial sclerosis     Coronary artery disease Father      Diabetes Sister      Other Daughter         mengioma     Breast cancer Maternal Aunt      Breast cancer Paternal cousin    + HTN in many family members  + Lung Cancer in her daughter ( smoker)    Social History:    Social History     Socioeconomic History     Marital status:      Spouse name: Not on file     Number of children: One dtr, one son     Years of education: Not on file     Highest education level: Not on file   Occupational History     Not on file   Social Needs     Financial resource strain: Not on file     Food insecurity:     Worry: Not on file     Inability: Not on file     Transportation needs:     Medical: Not on file     Non-medical: Not on file   Tobacco Use     Smoking status: Former Smoker     Packs/day: 1.00     Years: 60.00      Pack years: 60.00     Last attempt to quit: 2009     Years since quittin.8     Smokeless tobacco: Never Used   Substance and Sexual Activity     Alcohol use: Not Currently     Drug use: Not on file     Sexual activity: Not on file   Lifestyle     Physical activity:     Days per week: Not on file     Minutes per session: Not on file     Stress: Not on file   Relationships     Social connections:     Talks on phone: Not on file     Gets together: Not on file     Attends [employer] service: Not on file     Active member of club or organization: Not on file     Attends meetings of clubs or organizations: Not on file     Relationship status: Not on file     Intimate partner violence:     Fear of current or ex partner: Not on file     Emotionally abused: Not on file     Physically abused: Not on file     Forced sexual activity: Not on file          Review of Systems   She hates a low-sodium cardiac diet ('s liberalize to regular diet)  She is lost significant weight she does not know the exact pounds  Her daughter feels her abdomen is more distended, patient has no pain  At home she has occasional constipation for which she will use Senokot and prune juice.  She feels constipated at this time.  She is very dyspneic, even at rest.  She finds it hard to get comfortable.  Vision is somewhat decreased, denies nausea GI upset  She does not have any symptomatic joints, no skin issues.  The remainder of the comprehensive review of systems is negative    Vitals:    19 1400   BP: 109/67   Pulse: 99   Resp: 24   Temp: 97.8  F (36.6  C)   SpO2: 97%       Physical Exam   Constitutional: She is oriented to person, place, and time.   Clearly dyspneic 88-year-old  female, cachectic.   HENT:   Nose: Nose normal.   Mouth/Throat: Oropharynx is clear and moist.   Eyes: Conjunctivae and EOM are normal.   Cardiovascular: Regular rhythm and normal heart sounds.   No murmur heard.  Pulmonary/Chest: She has wheezes. She has  rales.   Respiratory wheezes on the right, left inferior  rales   Abdominal: Soft. She exhibits distension. There is no tenderness. There is no rebound.   Some hollowness to her bowel sounds, nontender to palpation   Musculoskeletal: She exhibits no edema.   Thoracic scoliosis concave to the right, lower thoracic kyphosis, extruding ribs and clavicles, vertebrae  Arthritic changes of her fingers  Upper and lower extremity strength testing 4+/5, fatigues quickly   Lymphadenopathy:     She has no cervical adenopathy.   Neurological: She is alert and oriented to person, place, and time. Coordination normal.   Skin: Skin is warm and dry.   Medial buttock cheek with stage I pressure changes  Scrape over left pretibial area   Psychiatric: Her behavior is normal. Thought content normal.     Allergies   Allergen Reactions     Atenolol      Beta-Blockers (Beta-Adrenergic Blocking Agts)      Brimonidine      Cephalexin Hives     Fentanyl Nausea Only     Germall Plus [Diazolidinyl-Iodopropyn (Bulk)] Itching     Imidazolidinyl Urea found in many topical creams and house hold products       Hydrocodone-Acetaminophen      Latex Itching     Morphine      GI Disturbance     Other Drug Allergy (See Comments) Itching     Carba Mix; IMIDAZOLIDINYL UREA - Allergic to this ingredient, which is found in many common topical moisturizers and household products.       Penicillins Hives     Sulfa (Sulfonamide Antibiotics)      GI Disturbance     Tramadol Nausea And Vomiting     Tylenol [Acetaminophen]      GI Disturbance       Medication List:  Current Outpatient Medications   Medication Sig     acetaminophen (TYLENOL) 650 MG suppository Insert 650 mg into the rectum every 4 (four) hours as needed for fever.     albuterol (PROAIR HFA;PROVENTIL HFA;VENTOLIN HFA) 90 mcg/actuation inhaler Inhale 2 puffs every 4 (four) hours as needed for wheezing.     albuterol (PROVENTIL) 2.5 mg /3 mL (0.083 %) nebulizer solution Take 2.5 mg by nebulization  every 4 (four) hours as needed for wheezing.     atropine 1 % ophthalmic solution Place 2 drops under the tongue every 2 (two) hours as needed.     bimatoprost (LUMIGAN) 0.01 % Drop Administer 1 drop to both eyes at bedtime.     bisacodyl (DULCOLAX) 10 mg suppository Insert 10 mg into the rectum daily as needed.     carboxymethylcellulose sodium 0.25 % Drop Apply 1 drop to eye every 3 (three) hours as needed.     dorzolamide (TRUSOPT) 2 % ophthalmic solution Administer 1 drop to both eyes 2 (two) times a day.     furosemide (LASIX) 20 MG tablet Take 40 mg by mouth 2 (two) times a day at 9am and 6pm.     hydrALAZINE (APRESOLINE) 10 MG tablet Take 10 mg by mouth 3 (three) times a day.     HYDROmorphone (DILAUDID) 1 mg/mL Liqd solution Take 1 mg by mouth every 2 (two) hours as needed.     hydrOXYzine pamoate (VISTARIL) 25 MG capsule Take 25 mg by mouth every 6 (six) hours as needed for itching.     isosorbide dinitrate (ISORDIL) 10 MG tablet Take 10 mg by mouth 3 (three) times a day.     metoprolol succinate (TOPROL-XL) 25 MG Take 25 mg by mouth daily.     miconazole (SHANNAN ANTIFUNGAL) 2 % cream Apply 1 application topically 2 (two) times a day.     ondansetron (ZOFRAN-ODT) 4 MG disintegrating tablet Take 4 mg by mouth every 8 (eight) hours as needed for nausea.     pantoprazole (PROTONIX) 20 MG tablet Take 40 mg by mouth 2 (two) times a day.     potassium chloride (K-DUR,KLOR-CON) 20 MEQ tablet Take 20 mEq by mouth 3 (three) times a day.     QUEtiapine (SEROQUEL) 25 MG tablet Take 25 mg by mouth 2 (two) times a day as needed.     umeclidinium-vilanterol (ANORO ELLIPTA) 62.5-25 mcg/actuation inhaler Inhale 1 puff daily.       Labs: Hospital  Hb = 8.3, WBC C = 6.6, PLT = 434  Na =136, K = 3.7, CL = 97, CO2 = 30, BUN = 24, CR = 1.41, calcium = 8.8  Brain natruretic protein = 32, 766      Assessment / Plan:    ICD-10-CM    1. Hospice care Z51.5  will cancel follow-up appointments with heart failure clinic, cardiology,  cancel abdominal aortic ultrasound.  She wants no further testing, does not want to return to the hospital.  She is not tolerating cardiac 2 g sodium diet, diet is liberalized per her request to a regular diet.   2. Idiopathic cardiomegaly/cardiomyopathy I51.7 End stage   3. Chronic systolic heart failure (H) I50.22  very dyspneic at rest, [place] heart classification stage IV.  On chronic O2   4. Chronic obstructive pulmonary disease, unspecified COPD type (H) J44.9 there are no recent spirometry studies. On several inhalers/nebs   5. PAD (peripheral artery disease) (H) I73.9 Non ambulatory, too weak to walk   6. Abdominal aortic aneurysm (AAA) without rupture (H) I71.4  no further monitoring of its size   7. Paroxysmal atrial fibrillation (H) I48.0  heartbeat is regular at this time   8. Anxiety F41.9  she is used to Seroquel at bedtime, this is been ordered   9. Essential hypertension I10    10. Age-related osteoporosis without current pathological fracture M81.0    11. Vertebral compression fracture (H) M48.50XA    12. Macular degeneration of both eyes, unspecified type H35.30    13. Chronic dermatitis L30.9    14. Anemia, unspecified type D64.9      Discussed with Daniel and daughter Harriett  about canceling all of her outpatient appointments, they are both in agreement with this .    Electronically signed by: Raegan López MD

## 2021-05-29 NOTE — PROGRESS NOTES
[facility]      Facility:    [facility] [[id number]]               Code Status: DNR/DNI   [facility] 4/19 through 4/24/2019      Chief Complaint/Reason for Visit:  Chief Complaint   Patient presents with     Review Of Multiple Medical Conditions       HPI:   Jamilah is a 88 y.o. female who has had a long history of severe idiopathic cardiomyopathy.  Her ejection fraction is estimated between 10 and 20%.  She has paroxysmal atrial fibrillation, but is no longer anticoagulated.  She has had numerous frequent hospitalizations for worsening heart failure, worsening respiratory status.    Current admission she was admitted for worsening respiratory status.  She had been living alone.  She was admitted and started on IV Lasix.  CT of the chest showed small bilateral pleural effusions, atelectasis, but no definitive active infiltrate.  A pulmonary embolism study was not done due to chronic kidney disease.  Lower extremity ultrasound was negative for DVT.  Despite IV Lasix, she remained dyspneic.  Patient and her family had a discussion about hospice services.  Daniel chose to discharge on hospice and was [facility].  She is adamant she does not want to return to the hospital, does not want more workup or testing.      UPDATE:  She is quite a bit of nausea even with Zofran.  It is hard for her to take fluids down.  Her nausea at this time is a little better  She has both legs with blisters, somewhat tender.  No appetite, not exactly in pain but not too comfortable        Past Medical History:  Past Medical History:   Diagnosis Date     AAA (abdominal aortic aneurysm) (H)     Followed with yearly ultrasound     Anemia      Anxiety      Cervical cancer (H) 1969     Chronic dermatitis     extensive allergy testing revealed allergy/sensitivity to carba mix ( rubber) and Imidazolidinyl Urea (common in beauty products)     CKD (chronic kidney  disease), stage III (H)      COPD (chronic obstructive pulmonary disease) (H)     very mild abnormal spirometry in 2007 (in [place]), has not been active since quitting smoking in 2009     Glaucoma      HTN (hypertension)      Hypokalemia      Idiopathic cardiomegaly 10/2018     Insomnia      LBBB (left bundle branch block) 10/2018     Macular degeneration      Osteoporosis 08/03/2011    femoral T score -3.4 & -3.7     PAD (peripheral artery disease) (H)      Paroxysmal atrial fibrillation (H)      Systolic heart failure (H)      Vertebral compression fracture (H) 05/11/2017    L1 compression fracture, presumed osteoporotic compression fracture           Surgical History:  Past Surgical History:   Procedure Laterality Date     CATARACT EXTRACTION W/  INTRAOCULAR LENS IMPLANT Left 02/08/2012     ESOPHAGOGASTRODUODENOSCOPY  03/19/2019     HEART CATH CORONARY ANGIOGRAM W/LV GRAM  10/2018    moderate nonocclusive CAD       LAPAROSCOPY DIAGNOSTIC (GENERAL)  04/15/2018     LAPAROTOMY EXPLORATORY  04/15/2018     OPEN REDUCTION INTERNAL FIXATION HIP NAILING Right 07/02/2012     OPEN REDUCTION INTERNAL FIXATION TIBIAL PLATEAU Right 03/31/2013     ORIF WRIST FRACTURE Left 1988    left wrist ORIF, hardware removed onemonth later       TOTAL ABDOMINAL HYSTERECTOMY  1999    fibroids            Review of Systems   Chronically short of breath    Vitals:    06/06/19 1100   BP: 91/50   Pulse: 87   Resp: 18   Temp: 96.7  F (35.9  C)   SpO2: 98%       Physical Exam        Constitutional: . 88-year-old  female, very cachectic.    Cardiovascular: Regular rhythm and normal heart sounds.   No murmur heard.  Pulmonary/Chest:  Respiratory wheezes on the right, left inferior  rales   Abdominal: Soft. She exhibits mild distension. There is no tenderness. Paucity of bowel sounds   Musculoskeletal: Quite weak, repositions in bed with great difficulty  Thoracic scoliosis concave to the right, lower thoracic kyphosi    Neurological: She is  alert and oriented to person, place, and time. Coordination normal.   Skin: Both calves with huge bullae contiguous, left lateral, right medial from knee to ankle, underlying skin purple-pink, also but also multiple smaller blisters in the right medial calf   buttocks not viewed  Psychiatric: Her behavior is normal. Thought content normal.         Allergies   Allergen Reactions     Atenolol      Beta-Blockers (Beta-Adrenergic Blocking Agts)      Brimonidine      Cephalexin Hives     Fentanyl Nausea Only     Germall Plus [Diazolidinyl-Iodopropyn (Bulk)] Itching     Imidazolidinyl Urea found in many topical creams and house hold products       Hydrocodone-Acetaminophen      Latex Itching     Morphine      GI Disturbance     Other Drug Allergy (See Comments) Itching     Carba Mix; IMIDAZOLIDINYL UREA - Allergic to this ingredient, which is found in many common topical moisturizers and household products.       Penicillins Hives     Sulfa (Sulfonamide Antibiotics)      GI Disturbance     Tramadol Nausea And Vomiting     Tylenol [Acetaminophen]      GI Disturbance       Medication List:  Current Outpatient Medications   Medication Sig     acetaminophen (TYLENOL) 650 MG suppository Insert 650 mg into the rectum every 4 (four) hours as needed for fever.     albuterol (PROAIR HFA;PROVENTIL HFA;VENTOLIN HFA) 90 mcg/actuation inhaler Inhale 2 puffs every 4 (four) hours as needed for wheezing.     albuterol (PROVENTIL) 2.5 mg /3 mL (0.083 %) nebulizer solution Take 2.5 mg by nebulization 4 (four) times a day. And two times a day prn           atropine 1 % ophthalmic solution Place 2 drops under the tongue every 2 (two) hours as needed.     bimatoprost (LUMIGAN) 0.01 % Drop Administer 1 drop to both eyes at bedtime.     bisacodyl (DULCOLAX) 10 mg suppository Insert 10 mg into the rectum daily as needed.     carboxymethylcellulose sodium 0.25 % Drop Apply 1 drop to eye every 3 (three) hours as needed.     dorzolamide (TRUSOPT) 2  % ophthalmic solution Administer 1 drop to both eyes 2 (two) times a day.     furosemide (LASIX) 20 MG tablet Take 40 mg by mouth 2 (two) times a day at 9am and 6pm.     hydrALAZINE (APRESOLINE) 10 MG tablet Take 10 mg by mouth 3 (three) times a day.     HYDROmorphone (DILAUDID) 1 mg/mL Liqd solution Take 1 mg by mouth every 2 (two) hours as needed.     isosorbide dinitrate (ISORDIL) 10 MG tablet Take 10 mg by mouth 3 (three) times a day.     metoprolol succinate (TOPROL-XL) 25 MG Take 25 mg by mouth daily.     miconazole (SHANNAN ANTIFUNGAL) 2 % cream Apply 1 application topically 2 (two) times a day.     ondansetron (ZOFRAN) 4 MG tablet Take 4 mg by mouth every 8 (eight) hours as needed for nausea.     ondansetron (ZOFRAN-ODT) 4 MG disintegrating tablet Take 4 mg by mouth every 8 (eight) hours as needed for nausea.     oxyCODONE (ROXICODONE) 20 mg/mL concentrated solution Take 5 mg by mouth every 2 (two) hours as needed for pain.     pantoprazole (PROTONIX) 20 MG tablet Take 40 mg by mouth 2 (two) times a day.     potassium chloride (K-DUR,KLOR-CON) 20 MEQ tablet Take 20 mEq by mouth 3 (three) times a day.     prochlorperazine (COMPAZINE) 5 MG tablet Take 5 mg by mouth every 8 (eight) hours as needed for nausea.     QUEtiapine (SEROQUEL) 25 MG tablet Take 25 mg by mouth at bedtime.            senna-docusate (SENNOSIDES-DOCUSATE SODIUM) 8.6-50 mg tablet Take 1 tablet by mouth 2 (two) times a day.     umeclidinium-vilanterol (ANORO ELLIPTA) 62.5-25 mcg/actuation inhaler Inhale 1 puff daily.             Assessment / Plan:    ICD-10-CM    1. Hospice care Z51.5 Falrly comfortable..   2. Idiopathic cardiomegaly/cardiomyopathy I51.7 End stage   3. Chronic systolic heart failure (H) I50.22  very dyspneic at rest, [place] heart classification stage IV.  On chronic O2   4. Chronic obstructive pulmonary disease, unspecified COPD type (H) J44.9    5. bullae R23.8 ABD pads to help protect skin, loosely wrapped with Kerlix   8.  Anxiety F41.9 Seroquel         Electronically signed by: Raegan López MD

## 2021-05-29 NOTE — PROGRESS NOTES
[facility]    Facility:   [facility] [[id number]]   Code Status: DNR/DNI and POLST AVAILABLE      CHIEF COMPLAINT/REASON FOR VISIT:  Chief Complaint   Patient presents with     Review Of Multiple Medical Conditions     COPD, HF, HTN, hospice       HISTORY:      HPI: Jamilah is a 88 y.o. female who  has a past medical history of AAA (abdominal aortic aneurysm) (H), Anemia, Anxiety, Cervical cancer (H) (1969), Chronic dermatitis, CKD (chronic kidney disease), stage III (H), COPD (chronic obstructive pulmonary disease) (H), Glaucoma, HTN (hypertension), Hypokalemia, Idiopathic cardiomegaly (10/2018), Insomnia, LBBB (left bundle branch block) (10/2018), Macular degeneration, Osteoporosis (08/03/2011), PAD (peripheral artery disease) (H), Paroxysmal atrial fibrillation (H), Systolic heart failure (H), and Vertebral compression fracture (H) (05/11/2017). Daniel was recently admitted to hospice and to [facility]. Prior to admission she was hospitalized and chose that she no longer wanted investigation or diagnostic testing and chose hospice for her plan of care.     Today Daniel is being evaluated for a routine review of multiple medical problems while in LTC. Family is at bedside and shares there are a few issues that Daniel has been experiencing. These issues include ongoing discomfort, left arm swelling, constipation and insomnia. The family is requesting increase of lasix, arm wraps, something for constipation and trazodone for sleep. Daniel is currently on hospice and goals are for care include comfort. There are no other issues or concerns to report. Daniel states she is otherwise mostly comfortable and does not want to have any changes in medication. Daniel has been otherwise stable. She denies any other concerns including fevers/chills, cough or cold symptoms, headaches, vision changes, chest pain/pressure, difficulty breathing, SOB, abdominal pain, nausea, vomiting, diarrhea, dysuria,  increasing weakness, increasing pain.       Past Medical History:   Diagnosis Date     AAA (abdominal aortic aneurysm) (H)     Followed with yearly ultrasound     Anemia      Anxiety      Cervical cancer (H) 1969     Chronic dermatitis     extensive allergy testing revealed allergy/sensitivity to carba mix ( rubber) and Imidazolidinyl Urea (common in beauty products)     CKD (chronic kidney disease), stage III (H)      COPD (chronic obstructive pulmonary disease) (H)     very mild abnormal spirometry in  (in [place]), has not been active since quitting smoking in      Glaucoma      HTN (hypertension)      Hypokalemia      Idiopathic cardiomegaly 10/2018     Insomnia      LBBB (left bundle branch block) 10/2018     Macular degeneration      Osteoporosis 2011    femoral T score -3.4 & -3.7     PAD (peripheral artery disease) (H)      Paroxysmal atrial fibrillation (H)      Systolic heart failure (H)      Vertebral compression fracture (H) 2017    L1 compression fracture, presumed osteoporotic compression fracture             Family History   Problem Relation Age of Onset     Other Mother         arterial sclerosis     Coronary artery disease Father      Diabetes Sister      Other Daughter         mengioma     Breast cancer Maternal Aunt      Breast cancer Paternal cousin      Social History     Socioeconomic History     Marital status:      Spouse name: None     Number of children: None     Years of education: None     Highest education level: None   Occupational History     None   Social Needs     Financial resource strain: None     Food insecurity:     Worry: None     Inability: None     Transportation needs:     Medical: None     Non-medical: None   Tobacco Use     Smoking status: Former Smoker     Packs/day: 1.00     Years: 60.00     Pack years: 60.00     Last attempt to quit: 2009     Years since quittin.9     Smokeless tobacco: Never Used   Substance and Sexual Activity     Alcohol  use: Not Currently     Drug use: None     Sexual activity: None   Lifestyle     Physical activity:     Days per week: None     Minutes per session: None     Stress: None   Relationships     Social connections:     Talks on phone: None     Gets together: None     Attends Druze service: None     Active member of club or organization: None     Attends meetings of clubs or organizations: None     Relationship status: None     Intimate partner violence:     Fear of current or ex partner: None     Emotionally abused: None     Physically abused: None     Forced sexual activity: None   Other Topics Concern     None   Social History Narrative     None       REVIEW OF SYSTEM:  Per HPI    PHYSICAL EXAM:   General appearance: alert, appears stated age and cooperative  HEENT: Head is normocephalic with normal hair distribution. No evidence of trauma. Ears: Without lesions or deformity. No acute purulent discharge. Eyes: Conjunctivae pink with no scleral icterus or erythema. Nose: Normal mucosa and septum. Oropharnyx: mmm, no lesions present.  Lungs: scattered rhonchi throughout, poor inspiratory effort, respirations unlabored  Heart: regular rate and rhythm, S1, S2 normal, no murmur, click, rub or gallop  Abdomen: soft, non-tender; bowel sounds normal; no masses,  no organomegaly  Extremities: extremities normal, atraumatic, no cyanosis. Left arm with 2+ swelling  Pulses: 2+ and symmetric  Skin: Skin color, texture, turgor normal. No rashes or lesions  Neurologic: Grossly normal   Psych: interacts well with caregivers, exhibits logical thought processes and connections, pleasant      LABS:   None.     ASSESSMENT:      ICD-10-CM    1. Chronic systolic heart failure (H) I50.22    2. Chronic obstructive pulmonary disease, unspecified COPD type (H) J44.9    3. Essential hypertension I10    4. Hospice care Z51.5        PLAN:    HF/COPD  -Increase Lasix to 60 mg by mouth twice daily.   -Oxycodone 2.5 mg by mouth every 4 hours and  every 2 hours as needed for pain.   -Ace wrap to LUE.   -Duonebs scheduled and as needed.   -Albuterol 2 puffs by inhalation every 4 hours as needed.     Constipation  -Miralax 17g mixed in 8 ounces of juice or water daily as needed.  -Senna 2 tablets by mouth twice daily.   -Bisacodyl suppository 10 mg per rectum daily as needed.   -Offer Prune juice 8 ounces daily.    -Encouraged patient to increase fiber in diet, eat a lot of fruits and vegetables, increase water intake.  -Exercise as much as possible.   -Follow up as needed.     Insomnia  -Trazodone 25 mg by mouth daily at bedtime.   -Seroquel 25 mg by mouth at bedtime.   -Discussed sleep hygiene in depth-- including alternative methods for sleep including lavender essential oils, screen time and shutting screens off at least an hour before bed, limiting day time napping and meditation.   -Follow-up as needed.    Hypertension  -Encouraged cardiac diet, low sodium diet, exercise and stress reduction techniques.   -Emphasized importance of blood pressure control.   -Continue current medications as prescribed.   -Follow up per routine schedule or sooner with any complaints or concerns.    Otherwise continue current care plan for all other chronic medical conditions, as they are stable. Encouraged patient to engage in behaviors that align with goals for end of life care. Follow up for routine check-up, or sooner if needed. Will continue to monitor patient and work with nursing staff collaboratively to work toward positive patient outcomes.    Total unit/floor time of 35 minutes time consisted of the following: time spent with patient, examination of patient, reviewing the record including pertinent labs and completing documentation. More than 50% of this time was spent in coordination of care time with nursing staff and other healthcare providers, this time was spent on discussion/counseling on current care plan including medical management of chronic health  problems and acute health problems, education pertaining to plan, and discussion of the goals of care pertaining to the current outlined plan with nursing staff, daughter Harriett and patient.    Electronically signed by: Dorita Burns CNP

## 2021-05-29 NOTE — PROGRESS NOTES
[facility]    Facility:   [facility] [[id number]]   Code Status: DNR/DNI and POLST AVAILABLE      CHIEF COMPLAINT/REASON FOR VISIT:  Chief Complaint   Patient presents with     Problem Visit     hospice       HISTORY:      HPI: Jamilah is a 88 y.o. female who  has a past medical history of AAA (abdominal aortic aneurysm) (H), Anemia, Anxiety, Cervical cancer (H) (1969), Chronic dermatitis, CKD (chronic kidney disease), stage III (H), COPD (chronic obstructive pulmonary disease) (H), Glaucoma, HTN (hypertension), Hypokalemia, Idiopathic cardiomegaly (10/2018), Insomnia, LBBB (left bundle branch block) (10/2018), Macular degeneration, Osteoporosis (08/03/2011), PAD (peripheral artery disease) (H), Paroxysmal atrial fibrillation (H), Systolic heart failure (H), and Vertebral compression fracture (H) (05/11/2017). Daniel was recently admitted to hospice and to [facility]. Prior to admission she was hospitalized and chose that she no longer wanted investigation or diagnostic testing and chose hospice for her plan of care.     Today Daniel is being evaluated for a follow-up to our recent visit. She states she is sleeping better, at least a few straight hours at the beginning of the night but she does continue to wake in the middle of the night. She also shares she continues to have difficulties breathing, but she does not want to take any medication. Daniel feels that the medications are making her worse. Hospice RN shares that hospice provider is planning on coming out to discuss alternative care plan to address these issues. Daniel states that her most pressing issue is nausea. She just can't take anything because the second she puts anything in her mouth she feels so nauseated she cannot swallow. She denies any other concerns including fevers/chills, cough or cold symptoms, headaches, vision changes, chest pain/pressure, abdominal pain, diarrhea, dysuria, increasing weakness, increasing  pain.       Past Medical History:   Diagnosis Date     AAA (abdominal aortic aneurysm) (H)     Followed with yearly ultrasound     Anemia      Anxiety      Cervical cancer (H) 1969     Chronic dermatitis     extensive allergy testing revealed allergy/sensitivity to carba mix ( rubber) and Imidazolidinyl Urea (common in beauty products)     CKD (chronic kidney disease), stage III (H)      COPD (chronic obstructive pulmonary disease) (H)     very mild abnormal spirometry in  (in [place]), has not been active since quitting smoking in      Glaucoma      HTN (hypertension)      Hypokalemia      Idiopathic cardiomegaly 10/2018     Insomnia      LBBB (left bundle branch block) 10/2018     Macular degeneration      Osteoporosis 2011    femoral T score -3.4 & -3.7     PAD (peripheral artery disease) (H)      Paroxysmal atrial fibrillation (H)      Systolic heart failure (H)      Vertebral compression fracture (H) 2017    L1 compression fracture, presumed osteoporotic compression fracture             Family History   Problem Relation Age of Onset     Other Mother         arterial sclerosis     Coronary artery disease Father      Diabetes Sister      Other Daughter         mengioma     Breast cancer Maternal Aunt      Breast cancer Paternal cousin      Social History     Socioeconomic History     Marital status:      Spouse name: None     Number of children: None     Years of education: None     Highest education level: None   Occupational History     None   Social Needs     Financial resource strain: None     Food insecurity:     Worry: None     Inability: None     Transportation needs:     Medical: None     Non-medical: None   Tobacco Use     Smoking status: Former Smoker     Packs/day: 1.00     Years: 60.00     Pack years: 60.00     Last attempt to quit: 2009     Years since quittin.9     Smokeless tobacco: Never Used   Substance and Sexual Activity     Alcohol use: Not Currently     Drug  use: None     Sexual activity: None   Lifestyle     Physical activity:     Days per week: None     Minutes per session: None     Stress: None   Relationships     Social connections:     Talks on phone: None     Gets together: None     Attends Confucianist service: None     Active member of club or organization: None     Attends meetings of clubs or organizations: None     Relationship status: None     Intimate partner violence:     Fear of current or ex partner: None     Emotionally abused: None     Physically abused: None     Forced sexual activity: None   Other Topics Concern     None   Social History Narrative     None       REVIEW OF SYSTEM:  Per HPI    PHYSICAL EXAM:   General appearance: alert, appears stated age and cooperative  HEENT: Head is normocephalic with normal hair distribution. No evidence of trauma. Ears: Without lesions or deformity. No acute purulent discharge. Eyes: Conjunctivae pink with no scleral icterus or erythema. Nose: Normal mucosa and septum. Oropharnyx: mmm, no lesions present.  Lungs: short of breath, slightly labored respirations.   Extremities: extremities normal, atraumatic, no cyanosis. Left arm with 2+ swelling  Pulses: 2+ and symmetric  Skin: Skin color, texture, turgor normal. No rashes or lesions  Neurologic: Grossly normal   Psych: interacts well with caregivers, exhibits logical thought processes and connections, pleasant      LABS:   None.     ASSESSMENT:      ICD-10-CM    1. Chronic obstructive pulmonary disease, unspecified COPD type (H) J44.9    2. Chronic systolic heart failure (H) I50.22    3. Essential hypertension I10        PLAN:  Hospice staff to come out and evaluate given patient's hesitancy to take medications. Suspect increased consumption of morphine would improve breathing and shortness of breath.       HF/COPD  -Increase Lasix to 60 mg by mouth twice daily.   -Oxycodone 2.5 mg by mouth every 4 hours and every 2 hours as needed for pain.   -Ace wrap to LUE.    -Duonebs scheduled and as needed.   -Albuterol 2 puffs by inhalation every 4 hours as needed.     Hypertension  -Encouraged cardiac diet, low sodium diet, exercise and stress reduction techniques.   -Emphasized importance of blood pressure control.   -Continue current medications as prescribed.   -Follow up per routine schedule or sooner with any complaints or concerns.    Otherwise continue current care plan for all other chronic medical conditions, as they are stable. Encouraged patient to engage in behaviors that align with goals for end of life care. Follow up for routine check-up, or sooner if needed. Will continue to monitor patient and work with nursing staff collaboratively to work toward positive patient outcomes.    Electronically signed by: Dorita Burns CNP

## 2021-06-03 VITALS — BODY MASS INDEX: 15.84 KG/M2 | WEIGHT: 95.2 LBS

## 2021-06-03 VITALS — WEIGHT: 119.2 LBS | BODY MASS INDEX: 19.84 KG/M2

## 2021-06-19 NOTE — LETTER
Letter by Raegan López MD at      Author: Raegan López MD Service: -- Author Type: --    Filed:  Encounter Date: 4/29/2019 Status: (Other)         Patient: Jamilah Rodriguez   MR Number: [id number]   YOB: 1931   Date of Visit: 4/29/2019     [facility]      Facility:    [facility] [[id number]]               Code Status: DNR/DNI   [facility] 4/19 through 4/24/2019      Chief Complaint/Reason for Visit:  Chief Complaint   Patient presents with   ? H & P     Hospice: end stage HFrEF       HPI:   Jamilah is a 88 y.o. female who has had a long history of severe idiopathic cardiomyopathy.  Her ejection fraction is estimated between 10 and 20%.  She has paroxysmal atrial fibrillation, but is no longer anticoagulated.  She has had numerous frequent hospitalizations for worsening heart failure, worsening respiratory status.    Current admission she was admitted for worsening respiratory status.  She had been living alone.  She was admitted and started on IV Lasix.  CT of the chest showed small bilateral pleural effusions, atelectasis, but no definitive active infiltrate.  A pulmonary embolism study was not done due to chronic kidney disease.  Lower extremity ultrasound was negative for DVT.  Despite IV Lasix, she remained dyspneic.  Patient and her family had a discussion about hospice services.  Del chose to discharge on hospice and was transferred to [facility].  She is adamant she does not want to return to the hospital, does not want more workup or testing.          Past Medical History:  Past Medical History:   Diagnosis Date   ? AAA (abdominal aortic aneurysm) (H)     Followed with yearly ultrasound   ? Anemia    ? Anxiety    ? Cervical cancer (H) 1969   ? Chronic dermatitis     extensive allergy testing revealed allergy/sensitivity to carba mix ( rubber) and Imidazolidinyl Urea (common in beauty products)   ? CKD  (chronic kidney disease), stage III (H)    ? COPD (chronic obstructive pulmonary disease) (H)     very mild abnormal spirometry in 2007 (in [place]), has not been active since quitting smoking in 2009   ? Glaucoma    ? HTN (hypertension)    ? Hypokalemia    ? Idiopathic cardiomegaly 10/2018   ? Insomnia    ? LBBB (left bundle branch block) 10/2018   ? Macular degeneration    ? Osteoporosis 08/03/2011    femoral T score -3.4 & -3.7   ? PAD (peripheral artery disease) (H)    ? Paroxysmal atrial fibrillation (H)    ? Systolic heart failure (H)    ? Vertebral compression fracture (H) 05/11/2017    L1 compression fracture, presumed osteoporotic compression fracture           Surgical History:  Past Surgical History:   Procedure Laterality Date   ? CATARACT EXTRACTION W/  INTRAOCULAR LENS IMPLANT Left 02/08/2012   ? ESOPHAGOGASTRODUODENOSCOPY  03/19/2019   ? HEART CATH CORONARY ANGIOGRAM W/LV GRAM  10/2018    moderate nonocclusive CAD     ? LAPAROSCOPY DIAGNOSTIC (GENERAL)  04/15/2018   ? LAPAROTOMY EXPLORATORY  04/15/2018   ? OPEN REDUCTION INTERNAL FIXATION HIP NAILING Right 07/02/2012   ? OPEN REDUCTION INTERNAL FIXATION TIBIAL PLATEAU Right 03/31/2013   ? ORIF WRIST FRACTURE Left 1988    left wrist ORIF, hardware removed onemonth later     ? TOTAL ABDOMINAL HYSTERECTOMY  1999    fibroids       Family History:   Family History   Problem Relation Age of Onset   ? Other Mother         arterial sclerosis   ? Coronary artery disease Father    ? Diabetes Sister    ? Other Daughter         mengioma   ? Breast cancer Maternal Aunt    ? Breast cancer Paternal cousin    + HTN in many family members  + Lung Cancer in her daughter ( smoker)    Social History:    Social History     Socioeconomic History   ? Marital status:      Spouse name: Not on file   ? Number of children: One dtr, one son   ? Years of education: Not on file   ? Highest education level: Not on file   Occupational History   ? Not on file   Social Needs   ?  Financial resource strain: Not on file   ? Food insecurity:     Worry: Not on file     Inability: Not on file   ? Transportation needs:     Medical: Not on file     Non-medical: Not on file   Tobacco Use   ? Smoking status: Former Smoker     Packs/day: 1.00     Years: 60.00     Pack years: 60.00     Last attempt to quit: 2009     Years since quittin.8   ? Smokeless tobacco: Never Used   Substance and Sexual Activity   ? Alcohol use: Not Currently   ? Drug use: Not on file   ? Sexual activity: Not on file   Lifestyle   ? Physical activity:     Days per week: Not on file     Minutes per session: Not on file   ? Stress: Not on file   Relationships   ? Social connections:     Talks on phone: Not on file     Gets together: Not on file     Attends Congregational service: Not on file     Active member of club or organization: Not on file     Attends meetings of clubs or organizations: Not on file     Relationship status: Not on file   ? Intimate partner violence:     Fear of current or ex partner: Not on file     Emotionally abused: Not on file     Physically abused: Not on file     Forced sexual activity: Not on file          Review of Systems   She hates a low-sodium cardiac diet ('s liberalize to regular diet)  She is lost significant weight she does not know the exact pounds  Her daughter feels her abdomen is more distended, patient has no pain  At home she has occasional constipation for which she will use Senokot and prune juice.  She feels constipated at this time.  She is very dyspneic, even at rest.  She finds it hard to get comfortable.  Vision is somewhat decreased, denies nausea GI upset  She does not have any symptomatic joints, no skin issues.  The remainder of the comprehensive review of systems is negative    Vitals:    19 1400   BP: 109/67   Pulse: 99   Resp: 24   Temp: 97.8  F (36.6  C)   SpO2: 97%       Physical Exam   Constitutional: She is oriented to person, place, and time.   Clearly  dyspneic 88-year-old  female, cachectic.   HENT:   Nose: Nose normal.   Mouth/Throat: Oropharynx is clear and moist.   Eyes: Conjunctivae and EOM are normal.   Cardiovascular: Regular rhythm and normal heart sounds.   No murmur heard.  Pulmonary/Chest: She has wheezes. She has rales.   Respiratory wheezes on the right, left inferior  rales   Abdominal: Soft. She exhibits distension. There is no tenderness. There is no rebound.   Some hollowness to her bowel sounds, nontender to palpation   Musculoskeletal: She exhibits no edema.   Thoracic scoliosis concave to the right, lower thoracic kyphosis, extruding ribs and clavicles, vertebrae  Arthritic changes of her fingers  Upper and lower extremity strength testing 4+/5, fatigues quickly   Lymphadenopathy:     She has no cervical adenopathy.   Neurological: She is alert and oriented to person, place, and time. Coordination normal.   Skin: Skin is warm and dry.   Medial buttock cheek with stage I pressure changes  Scrape over left pretibial area   Psychiatric: Her behavior is normal. Thought content normal.     Allergies   Allergen Reactions   ? Atenolol    ? Beta-Blockers (Beta-Adrenergic Blocking Agts)    ? Brimonidine    ? Cephalexin Hives   ? Fentanyl Nausea Only   ? Germall Plus [Diazolidinyl-Iodopropyn (Bulk)] Itching     Imidazolidinyl Urea found in many topical creams and house hold products     ? Hydrocodone-Acetaminophen    ? Latex Itching   ? Morphine      GI Disturbance   ? Other Drug Allergy (See Comments) Itching     Carba Mix; IMIDAZOLIDINYL UREA - Allergic to this ingredient, which is found in many common topical moisturizers and household products.     ? Penicillins Hives   ? Sulfa (Sulfonamide Antibiotics)      GI Disturbance   ? Tramadol Nausea And Vomiting   ? Tylenol [Acetaminophen]      GI Disturbance       Medication List:  Current Outpatient Medications   Medication Sig   ? acetaminophen (TYLENOL) 650 MG suppository Insert 650 mg into the  rectum every 4 (four) hours as needed for fever.   ? albuterol (PROAIR HFA;PROVENTIL HFA;VENTOLIN HFA) 90 mcg/actuation inhaler Inhale 2 puffs every 4 (four) hours as needed for wheezing.   ? albuterol (PROVENTIL) 2.5 mg /3 mL (0.083 %) nebulizer solution Take 2.5 mg by nebulization every 4 (four) hours as needed for wheezing.   ? atropine 1 % ophthalmic solution Place 2 drops under the tongue every 2 (two) hours as needed.   ? bimatoprost (LUMIGAN) 0.01 % Drop Administer 1 drop to both eyes at bedtime.   ? bisacodyl (DULCOLAX) 10 mg suppository Insert 10 mg into the rectum daily as needed.   ? carboxymethylcellulose sodium 0.25 % Drop Apply 1 drop to eye every 3 (three) hours as needed.   ? dorzolamide (TRUSOPT) 2 % ophthalmic solution Administer 1 drop to both eyes 2 (two) times a day.   ? furosemide (LASIX) 20 MG tablet Take 40 mg by mouth 2 (two) times a day at 9am and 6pm.   ? hydrALAZINE (APRESOLINE) 10 MG tablet Take 10 mg by mouth 3 (three) times a day.   ? HYDROmorphone (DILAUDID) 1 mg/mL Liqd solution Take 1 mg by mouth every 2 (two) hours as needed.   ? hydrOXYzine pamoate (VISTARIL) 25 MG capsule Take 25 mg by mouth every 6 (six) hours as needed for itching.   ? isosorbide dinitrate (ISORDIL) 10 MG tablet Take 10 mg by mouth 3 (three) times a day.   ? metoprolol succinate (TOPROL-XL) 25 MG Take 25 mg by mouth daily.   ? miconazole (SHANNAN ANTIFUNGAL) 2 % cream Apply 1 application topically 2 (two) times a day.   ? ondansetron (ZOFRAN-ODT) 4 MG disintegrating tablet Take 4 mg by mouth every 8 (eight) hours as needed for nausea.   ? pantoprazole (PROTONIX) 20 MG tablet Take 40 mg by mouth 2 (two) times a day.   ? potassium chloride (K-DUR,KLOR-CON) 20 MEQ tablet Take 20 mEq by mouth 3 (three) times a day.   ? QUEtiapine (SEROQUEL) 25 MG tablet Take 25 mg by mouth 2 (two) times a day as needed.   ? umeclidinium-vilanterol (ANORO ELLIPTA) 62.5-25 mcg/actuation inhaler Inhale 1 puff daily.       Labs:  Hospital  Hb = 8.3, WBC C = 6.6, PLT = 434  Na =136, K = 3.7, CL = 97, CO2 = 30, BUN = 24, CR = 1.41, calcium = 8.8  Brain natruretic protein = 32, 766      Assessment / Plan:    ICD-10-CM    1. Hospice care Z51.5  will cancel follow-up appointments with heart failure clinic, cardiology, cancel abdominal aortic ultrasound.  She wants no further testing, does not want to return to the hospital.  She is not tolerating cardiac 2 g sodium diet, diet is liberalized per her request to a regular diet.   2. Idiopathic cardiomegaly/cardiomyopathy I51.7 End stage   3. Chronic systolic heart failure (H) I50.22  very dyspneic at rest, [place] heart classification stage IV.  On chronic O2   4. Chronic obstructive pulmonary disease, unspecified COPD type (H) J44.9 there are no recent spirometry studies. On several inhalers/nebs   5. PAD (peripheral artery disease) (H) I73.9 Non ambulatory, too weak to walk   6. Abdominal aortic aneurysm (AAA) without rupture (H) I71.4  no further monitoring of its size   7. Paroxysmal atrial fibrillation (H) I48.0  heartbeat is regular at this time   8. Anxiety F41.9  she is used to Seroquel at bedtime, this is been ordered   9. Essential hypertension I10    10. Age-related osteoporosis without current pathological fracture M81.0    11. Vertebral compression fracture (H) M48.50XA    12. Macular degeneration of both eyes, unspecified type H35.30    13. Chronic dermatitis L30.9    14. Anemia, unspecified type D64.9      Discussed with Daniel and daughter Harriett  about canceling all of her outpatient appointments, they are both in agreement with this .    Electronically signed by: Raegan López MD

## 2021-06-19 NOTE — LETTER
Letter by Dorita Burns CNP at      Author: Dorita Burns CNP Service: -- Author Type: --    Filed:  Encounter Date: 5/31/2019 Status: (Other)         Patient: Jamilah Rodriguez   MR Number: [id number]   YOB: 1931   Date of Visit: 5/31/2019     [facility]    Facility:   [facility] [[id number]]   Code Status: DNR/DNI and POLST AVAILABLE      CHIEF COMPLAINT/REASON FOR VISIT:  Chief Complaint   Patient presents with   ? Review Of Multiple Medical Conditions     COPD, HF, HTN, hospice       HISTORY:      HPI: Jamilah is a 88 y.o. female who  has a past medical history of AAA (abdominal aortic aneurysm) (H), Anemia, Anxiety, Cervical cancer (H) (1969), Chronic dermatitis, CKD (chronic kidney disease), stage III (H), COPD (chronic obstructive pulmonary disease) (H), Glaucoma, HTN (hypertension), Hypokalemia, Idiopathic cardiomegaly (10/2018), Insomnia, LBBB (left bundle branch block) (10/2018), Macular degeneration, Osteoporosis (08/03/2011), PAD (peripheral artery disease) (H), Paroxysmal atrial fibrillation (H), Systolic heart failure (H), and Vertebral compression fracture (H) (05/11/2017). Daniel was recently admitted to hospice and to [facility]. Prior to admission she was hospitalized and chose that she no longer wanted investigation or diagnostic testing and chose hospice for her plan of care.     Today Daniel is being evaluated for a routine review of multiple medical problems while in LTC. Family is at bedside and shares there are a few issues that Daniel has been experiencing. These issues include ongoing discomfort, left arm swelling, constipation and insomnia. The family is requesting increase of lasix, arm wraps, something for constipation and trazodone for sleep. Daniel is currently on hospice and goals are for care include comfort. There are no other issues or concerns to report. Daniel states she is otherwise mostly comfortable and does not want to have any  changes in medication. Del has been otherwise stable. She denies any other concerns including fevers/chills, cough or cold symptoms, headaches, vision changes, chest pain/pressure, difficulty breathing, SOB, abdominal pain, nausea, vomiting, diarrhea, dysuria, increasing weakness, increasing pain.       Past Medical History:   Diagnosis Date   ? AAA (abdominal aortic aneurysm) (H)     Followed with yearly ultrasound   ? Anemia    ? Anxiety    ? Cervical cancer (H) 1969   ? Chronic dermatitis     extensive allergy testing revealed allergy/sensitivity to carba mix ( rubber) and Imidazolidinyl Urea (common in beauty products)   ? CKD (chronic kidney disease), stage III (H)    ? COPD (chronic obstructive pulmonary disease) (H)     very mild abnormal spirometry in 2007 (in [place]), has not been active since quitting smoking in 2009   ? Glaucoma    ? HTN (hypertension)    ? Hypokalemia    ? Idiopathic cardiomegaly 10/2018   ? Insomnia    ? LBBB (left bundle branch block) 10/2018   ? Macular degeneration    ? Osteoporosis 08/03/2011    femoral T score -3.4 & -3.7   ? PAD (peripheral artery disease) (H)    ? Paroxysmal atrial fibrillation (H)    ? Systolic heart failure (H)    ? Vertebral compression fracture (H) 05/11/2017    L1 compression fracture, presumed osteoporotic compression fracture             Family History   Problem Relation Age of Onset   ? Other Mother         arterial sclerosis   ? Coronary artery disease Father    ? Diabetes Sister    ? Other Daughter         mengioma   ? Breast cancer Maternal Aunt    ? Breast cancer Paternal cousin      Social History     Socioeconomic History   ? Marital status:      Spouse name: None   ? Number of children: None   ? Years of education: None   ? Highest education level: None   Occupational History   ? None   Social Needs   ? Financial resource strain: None   ? Food insecurity:     Worry: None     Inability: None   ? Transportation needs:     Medical: None      Non-medical: None   Tobacco Use   ? Smoking status: Former Smoker     Packs/day: 1.00     Years: 60.00     Pack years: 60.00     Last attempt to quit: 2009     Years since quittin.9   ? Smokeless tobacco: Never Used   Substance and Sexual Activity   ? Alcohol use: Not Currently   ? Drug use: None   ? Sexual activity: None   Lifestyle   ? Physical activity:     Days per week: None     Minutes per session: None   ? Stress: None   Relationships   ? Social connections:     Talks on phone: None     Gets together: None     Attends Sabianism service: None     Active member of club or organization: None     Attends meetings of clubs or organizations: None     Relationship status: None   ? Intimate partner violence:     Fear of current or ex partner: None     Emotionally abused: None     Physically abused: None     Forced sexual activity: None   Other Topics Concern   ? None   Social History Narrative   ? None       REVIEW OF SYSTEM:  Per HPI    PHYSICAL EXAM:   General appearance: alert, appears stated age and cooperative  HEENT: Head is normocephalic with normal hair distribution. No evidence of trauma. Ears: Without lesions or deformity. No acute purulent discharge. Eyes: Conjunctivae pink with no scleral icterus or erythema. Nose: Normal mucosa and septum. Oropharnyx: mmm, no lesions present.  Lungs: scattered rhonchi throughout, poor inspiratory effort, respirations unlabored  Heart: regular rate and rhythm, S1, S2 normal, no murmur, click, rub or gallop  Abdomen: soft, non-tender; bowel sounds normal; no masses,  no organomegaly  Extremities: extremities normal, atraumatic, no cyanosis. Left arm with 2+ swelling  Pulses: 2+ and symmetric  Skin: Skin color, texture, turgor normal. No rashes or lesions  Neurologic: Grossly normal   Psych: interacts well with caregivers, exhibits logical thought processes and connections, pleasant      LABS:   None.     ASSESSMENT:      ICD-10-CM    1. Chronic systolic heart  failure (H) I50.22    2. Chronic obstructive pulmonary disease, unspecified COPD type (H) J44.9    3. Essential hypertension I10    4. Hospice care Z51.5        PLAN:    HF/COPD  -Increase Lasix to 60 mg by mouth twice daily.   -Oxycodone 2.5 mg by mouth every 4 hours and every 2 hours as needed for pain.   -Ace wrap to LUE.   -Duonebs scheduled and as needed.   -Albuterol 2 puffs by inhalation every 4 hours as needed.     Constipation  -Miralax 17g mixed in 8 ounces of juice or water daily as needed.  -Senna 2 tablets by mouth twice daily.   -Bisacodyl suppository 10 mg per rectum daily as needed.   -Offer Prune juice 8 ounces daily.    -Encouraged patient to increase fiber in diet, eat a lot of fruits and vegetables, increase water intake.  -Exercise as much as possible.   -Follow up as needed.     Insomnia  -Trazodone 25 mg by mouth daily at bedtime.   -Seroquel 25 mg by mouth at bedtime.   -Discussed sleep hygiene in depth-- including alternative methods for sleep including lavender essential oils, screen time and shutting screens off at least an hour before bed, limiting day time napping and meditation.   -Follow-up as needed.    Hypertension  -Encouraged cardiac diet, low sodium diet, exercise and stress reduction techniques.   -Emphasized importance of blood pressure control.   -Continue current medications as prescribed.   -Follow up per routine schedule or sooner with any complaints or concerns.    Otherwise continue current care plan for all other chronic medical conditions, as they are stable. Encouraged patient to engage in behaviors that align with goals for end of life care. Follow up for routine check-up, or sooner if needed. Will continue to monitor patient and work with nursing staff collaboratively to work toward positive patient outcomes.    Total unit/floor time of 35 minutes time consisted of the following: time spent with patient, examination of patient, reviewing the record including pertinent  labs and completing documentation. More than 50% of this time was spent in coordination of care time with nursing staff and other healthcare providers, this time was spent on discussion/counseling on current care plan including medical management of chronic health problems and acute health problems, education pertaining to plan, and discussion of the goals of care pertaining to the current outlined plan with nursing staff, daughter Harriett and patient.    Electronically signed by: Dorita Burns CNP

## 2021-06-19 NOTE — LETTER
Letter by Dorita Burns CNP at      Author: Dorita Burns CNP Service: -- Author Type: --    Filed:  Encounter Date: 6/5/2019 Status: (Other)         Patient: Jamilah Rodriguez   MR Number: [id number]   YOB: 1931   Date of Visit: 6/5/2019     [facility]    Facility:   [facility] [[id number]]   Code Status: DNR/DNI and POLST AVAILABLE      CHIEF COMPLAINT/REASON FOR VISIT:  Chief Complaint   Patient presents with   ? Problem Visit     hospice       HISTORY:      HPI: Jamilah is a 88 y.o. female who  has a past medical history of AAA (abdominal aortic aneurysm) (H), Anemia, Anxiety, Cervical cancer (H) (1969), Chronic dermatitis, CKD (chronic kidney disease), stage III (H), COPD (chronic obstructive pulmonary disease) (H), Glaucoma, HTN (hypertension), Hypokalemia, Idiopathic cardiomegaly (10/2018), Insomnia, LBBB (left bundle branch block) (10/2018), Macular degeneration, Osteoporosis (08/03/2011), PAD (peripheral artery disease) (H), Paroxysmal atrial fibrillation (H), Systolic heart failure (H), and Vertebral compression fracture (H) (05/11/2017). Daniel was recently admitted to hospice and to [facility]. Prior to admission she was hospitalized and chose that she no longer wanted investigation or diagnostic testing and chose hospice for her plan of care.     Today Daniel is being evaluated for a follow-up to our recent visit. She states she is sleeping better, at least a few straight hours at the beginning of the night but she does continue to wake in the middle of the night. She also shares she continues to have difficulties breathing, but she does not want to take any medication. Daniel feels that the medications are making her worse. Hospice RN shares that hospice provider is planning on coming out to discuss alternative care plan to address these issues. Daniel states that her most pressing issue is nausea. She just can't take anything because the second she  puts anything in her mouth she feels so nauseated she cannot swallow. She denies any other concerns including fevers/chills, cough or cold symptoms, headaches, vision changes, chest pain/pressure, abdominal pain, diarrhea, dysuria, increasing weakness, increasing pain.       Past Medical History:   Diagnosis Date   ? AAA (abdominal aortic aneurysm) (H)     Followed with yearly ultrasound   ? Anemia    ? Anxiety    ? Cervical cancer (H) 1969   ? Chronic dermatitis     extensive allergy testing revealed allergy/sensitivity to carba mix ( rubber) and Imidazolidinyl Urea (common in beauty products)   ? CKD (chronic kidney disease), stage III (H)    ? COPD (chronic obstructive pulmonary disease) (H)     very mild abnormal spirometry in 2007 (in [place]), has not been active since quitting smoking in 2009   ? Glaucoma    ? HTN (hypertension)    ? Hypokalemia    ? Idiopathic cardiomegaly 10/2018   ? Insomnia    ? LBBB (left bundle branch block) 10/2018   ? Macular degeneration    ? Osteoporosis 08/03/2011    femoral T score -3.4 & -3.7   ? PAD (peripheral artery disease) (H)    ? Paroxysmal atrial fibrillation (H)    ? Systolic heart failure (H)    ? Vertebral compression fracture (H) 05/11/2017    L1 compression fracture, presumed osteoporotic compression fracture             Family History   Problem Relation Age of Onset   ? Other Mother         arterial sclerosis   ? Coronary artery disease Father    ? Diabetes Sister    ? Other Daughter         mengioma   ? Breast cancer Maternal Aunt    ? Breast cancer Paternal cousin      Social History     Socioeconomic History   ? Marital status:      Spouse name: None   ? Number of children: None   ? Years of education: None   ? Highest education level: None   Occupational History   ? None   Social Needs   ? Financial resource strain: None   ? Food insecurity:     Worry: None     Inability: None   ? Transportation needs:     Medical: None     Non-medical: None   Tobacco Use   ?  Smoking status: Former Smoker     Packs/day: 1.00     Years: 60.00     Pack years: 60.00     Last attempt to quit: 2009     Years since quittin.9   ? Smokeless tobacco: Never Used   Substance and Sexual Activity   ? Alcohol use: Not Currently   ? Drug use: None   ? Sexual activity: None   Lifestyle   ? Physical activity:     Days per week: None     Minutes per session: None   ? Stress: None   Relationships   ? Social connections:     Talks on phone: None     Gets together: None     Attends Jehovah's witness service: None     Active member of club or organization: None     Attends meetings of clubs or organizations: None     Relationship status: None   ? Intimate partner violence:     Fear of current or ex partner: None     Emotionally abused: None     Physically abused: None     Forced sexual activity: None   Other Topics Concern   ? None   Social History Narrative   ? None       REVIEW OF SYSTEM:  Per HPI    PHYSICAL EXAM:   General appearance: alert, appears stated age and cooperative  HEENT: Head is normocephalic with normal hair distribution. No evidence of trauma. Ears: Without lesions or deformity. No acute purulent discharge. Eyes: Conjunctivae pink with no scleral icterus or erythema. Nose: Normal mucosa and septum. Oropharnyx: mmm, no lesions present.  Lungs: short of breath, slightly labored respirations.   Extremities: extremities normal, atraumatic, no cyanosis. Left arm with 2+ swelling  Pulses: 2+ and symmetric  Skin: Skin color, texture, turgor normal. No rashes or lesions  Neurologic: Grossly normal   Psych: interacts well with caregivers, exhibits logical thought processes and connections, pleasant      LABS:   None.     ASSESSMENT:      ICD-10-CM    1. Chronic obstructive pulmonary disease, unspecified COPD type (H) J44.9    2. Chronic systolic heart failure (H) I50.22    3. Essential hypertension I10        PLAN:  Hospice staff to come out and evaluate given patient's hesitancy to take  medications. Suspect increased consumption of morphine would improve breathing and shortness of breath.       HF/COPD  -Increase Lasix to 60 mg by mouth twice daily.   -Oxycodone 2.5 mg by mouth every 4 hours and every 2 hours as needed for pain.   -Ace wrap to LUE.   -Duonebs scheduled and as needed.   -Albuterol 2 puffs by inhalation every 4 hours as needed.     Hypertension  -Encouraged cardiac diet, low sodium diet, exercise and stress reduction techniques.   -Emphasized importance of blood pressure control.   -Continue current medications as prescribed.   -Follow up per routine schedule or sooner with any complaints or concerns.    Otherwise continue current care plan for all other chronic medical conditions, as they are stable. Encouraged patient to engage in behaviors that align with goals for end of life care. Follow up for routine check-up, or sooner if needed. Will continue to monitor patient and work with nursing staff collaboratively to work toward positive patient outcomes.    Electronically signed by: Dorita Burns CNP

## 2021-11-15 NOTE — PROGRESS NOTES
"Up in chair for the evening. Breathing unlabored. Pleasant. SBA. Denies pain. Slight MCCULLOUGH. Denies CP. Still on 2L oxygen. SPO2 around 94%. No fever. AVSS. Latest Vitals: Blood pressure 115/57, pulse 77, temperature 97.7  F (36.5  C), temperature source Oral, resp. rate 18, height 1.651 m (5' 5\"), weight 54.7 kg (120 lb 9.6 oz), SpO2 94 %, not currently breastfeeding.  Will monitor.       " [NL] : no acute distress, alert

## 2022-04-18 NOTE — MR AVS SNAPSHOT
After Visit Summary   5/30/2018    Jamilah Rodriguez    MRN: [id number]           Patient Information     Date Of Birth          4/5/1931        Visit Information        Provider Department      5/30/2018 9:30 AM Sharon Hurtado MD [facility]        Today's Diagnoses     Hospital discharge follow-up    -  1    Free intraperitoneal air        Chronic obstructive pulmonary disease, unspecified COPD type (H)        Gait instability           Follow-ups after your visit        Your next 10 appointments already scheduled     Jun 04, 2018  2:00 PM CDT   Pulmonary Treatment with [facility] 1   [facility]    [address]  [facility] [id number] [phone]            Jun 18, 2018  2:00 PM CDT   Pulmonary Treatment with [facility] 1   [facility]    [address]  [facility] [id number] [phone]              Who to contact     If you have questions or need follow up information about today's clinic visit or your schedule please contact [facility] directly at [phone].  Normal or non-critical lab and imaging results will be communicated to you by MyChart, letter or phone within 4 business days after the clinic has received the results. If you do not hear from us within 7 days, please contact the clinic through MyChart or phone. If you have a critical or abnormal lab result, we will notify you by phone as soon as possible.  Submit refill requests through [employer] or call your pharmacy and they will forward the refill request to us. Please allow 3 business days for your refill to be completed.          Additional Information About Your Visit        MyChart Information     [employer] gives you secure access to your electronic health record. If you see a primary care provider, you can also send messages to your  care team and make appointments. If you have questions, please call your primary care clinic.  If you do not have a primary care provider, please call [phone] and they will assist you.        Care EveryWhere ID     This is your Care EveryWhere ID. This could be used by other organizations to access your [place] medical records  [id number]        Your Vitals Were     Pulse Temperature Respirations Pulse Oximetry BMI (Body Mass Index)       80 97.9  F (36.6  C) (Oral) 22 98% 20.29 kg/m2        Blood Pressure from Last 3 Encounters:   05/30/18 90/60   05/02/18 116/63   05/02/18 126/75    Weight from Last 3 Encounters:   05/30/18 121 lb 14.4 oz (55.3 kg)   05/02/18 133 lb 11.2 oz (60.6 kg)   04/22/18 137 lb 4.8 oz (62.3 kg)              Today, you had the following     No orders found for display       Primary Care Provider Office Phone # Fax #    Sharon Hurtado [phone]       [address] [place] [id number]        Equal Access to Services     JEANNETTE FORREST : Hadii aad ku hadasho Soomaali, waaxda luqadaha, qaybta kaalmada adeegyada, kimberly collins hayshamika drake . So [facility] [phone].    ATENCIÓN: Si habla español, tiene a smith disposición servicios gratuitos de asistencia lingüística. Llame al [phone].    We comply with applicable federal civil rights laws and [place] laws. We do not discriminate on the basis of race, color, national origin, age, disability, sex, sexual orientation, or gender identity.            Thank you!     Thank you for choosing [facility]  for your care. Our goal is always to provide you with excellent care. Hearing back from our patients is one way we can continue to improve our services. Please take a few minutes to complete the written survey that you may receive in the mail after your visit with us. Thank you!             Your Updated Medication List - Protect others around you: Learn how to safely use, store and  throw away your medicines at www.disposemymeds.org.          This list is accurate as of 5/30/18  2:10 PM.  Always use your most recent med list.                   Brand Name Dispense Instructions for use Diagnosis    albuterol 108 (90 Base) MCG/ACT Inhaler    PROAIR HFA/PROVENTIL HFA/VENTOLIN HFA     Inhale 2 puffs into the lungs every 4 hours as needed for shortness of breath / dyspnea or wheezing        ascorbic acid 500 MG Tabs      Take 500 mg by mouth daily        bimatoprost 0.01 % Soln    LUMIGAN     Place 1 drop into both eyes At Bedtime.        calcium + D 600-200 MG-UNIT Tabs   Generic drug:  calcium carbonate-vitamin D     100 tablet    Take 1 tablet by mouth every 12 hours.        dorzolamide 2 % ophthalmic solution    TRUSOPT     Place 1 drop into both eyes 2 times daily        losartan 50 MG tablet    COZAAR    90 tablet    TAKE ONE TABLET BY MOUTH ONE TIME DAILY    Benign essential hypertension       PRESERVISION AREDS 2 PO      Take 1 tablet by mouth 2 times daily        TYLENOL PO      Take 1,000 mg by mouth every 6 hours as needed for mild pain or fever        umeclidinium-vilanterol 62.5-25 MCG/INH oral inhaler    ANORO ELLIPTA    1 Inhaler    Inhale 1 puff into the lungs daily    Chronic obstructive pulmonary disease, unspecified COPD type (H)       vitamin D 2000 units tablet      Take 1 tablet by mouth daily.    Osteoporosis          4 weeks

## 2023-10-19 NOTE — ADDENDUM NOTE
Addended by: LEVON LEE on: 3/28/2019 12:16 PM     Modules accepted: Orders     Imiquimod Counseling:  I discussed with the patient the risks of imiquimod including but not limited to erythema, scaling, itching, weeping, crusting, and pain.  Patient understands that the inflammatory response to imiquimod is variable from person to person and was educated regarded proper titration schedule.  If flu-like symptoms develop, patient knows to discontinue the medication and contact us.

## 2023-10-22 NOTE — PLAN OF CARE
A&OX4, MCCULLOUGH, sating in the low 80's with activity, on 1 L nasal cannula this night.Tele- SD,BBB and PAC's. NPO, up with SBA to BSC. Crackles in the lung sounds. Plan for EGD today.   
A/O x 4, vss, a-febrile, MCCULLOUGH, up with one assist and gait belt to BSC, on room air saturating 94-95%, denies pain, plan to discharge to TCU this afternoon transported by daughter.  
A/Ox4. VSS. Up to BSC with assist x1. Denies pain and SOB. Lung sounds have fine crackles. +1/+2 edema to LE. Blanchable redness to spine. Tele SR, BBB, PACs. Plan for cardiology to consult today.  Continue to monitor  
AOx4. VSS. Regular/2gm Na diet. SBA + walker. Tele: SD+BBB+PAC. R/L PIV SL. Continent, up to BSC. Cardiology consult. Discharge pending.   
Discharge Planner PT     PT: Orders received, eval completed, tx initiated.  Pt lives in an ILF alone. At baseline ambulates mod-I with FWW and is IND with ADLs. Daughter helps with community errands.    Patient plan for discharge: Prefers home  Current status: Educated on PT role/poc. Pt now on RA. O2 monitor not providing reliable reading, pt states this has been occurring throughout LOS due to cold fingers. Performs supine<>sit SBA, becomes SOB with transfer and sits EOB x 3 minutes. SBA sit<>stand to FWW. Ambulates 10' within room with multiple pauses to SOB, shuffling gait, but steady. Becomes increasingly dizzy with activity and returns sitting, dizziness not improved so pt lies supine, BP 89/67.  Barriers to return to prior living situation: Current activity tolerance and mobility status  Recommendations for discharge: TCU  Rationale for recommendations: Will need ongoing skilled PT intervention to improve independence and safety with functional mobility skills prior to returning home.       Entered by: Nehemiah James 03/20/2019 12:36 PM       
Heart Failure Care Pathway  GOALS TO BE MET BEFORE DISCHARGE:    1. Decrease congestion and/or edema with diuretic therapy to achieve near      optimal volume status.            Dyspnea improved:  No, please explain: 2L NC, MCCULLOUGH             Edema improved:     Yes        Net I/O and Weights since admission:          02/18 0700 - 03/20 0659  In: 960 [P.O.:960]  Out: 8500 [Urine:8500]  Net: -7540            Vitals:    03/17/19 1603 03/18/19 0438 03/19/19 0234   Weight: 55.8 kg (123 lb 0.3 oz) 52.2 kg (115 lb) 50.4 kg (111 lb 3.2 oz)       2.  O2 sats > 92% on RA or at prior home O2 therapy level.          Current oxygenation status:       SpO2: 96 %         O2 Device: Nasal cannula,  Oxygen Delivery: 2 LPM         Able to wean O2 this shift to keep sats > 92%:  No, please explain: 2L NC to keep >92%        Does patient use Home O2?     3.  Tolerates ambulation and mobility near baseline: Yes        How many times did the patient ambulate with nursing staff this shift? 2 to commode     Pt denied pain. Vitals stable 2L NC, MCCULLOUGH. Up w/ 1 assist and walker to commode. Tele SD w/BBB PVC's.  GI following. Will Continue to monitor.     Please review the Heart Failure Care Pathway for additional HF goal outcomes.    Mary Rojo RN  3/20/2019         
PT: Attempted PT evaluation, pt stating she is too fatigued at this time, NPO for EGD later today. Will continue to follow.  
PT: Orders received, chart reviewed, eval attempted. First AM attempt pt just receiving breakfast, second attempt pt working with another provider.    
Physical Therapy Discharge Summary    Reason for therapy discharge:    Discharged to transitional care facility.    Progress towards therapy goal(s). See goals on Care Plan in [facility] electronic health record for goal details.  Goals not met.  Barriers to achieving goals:   discharge on same date as initial evaluation.    Therapy recommendation(s):    Continued therapy is recommended.  Rationale/Recommendations:  Continued PT to progress IND with mobility.      
Pt arrived to floor @ 1850. Had to void immediately arriving to floor. Continent.   Pt settled, VSS, BPs soft, on RA. Requested to eat. Turkey sandwich meal given to pt with milk. Denied pain. Up SBA.  Daughter arrived with pt. Report given to on-coming RN.  
Pulse to 101, OVSS.  Pt denies pain/nausea.  Lung sounds have crackles at the bases.  Apical pulse irregular, tele: sinus dysrhythmia with bundle branch block and PVCs.  R PIV WDL, infusing.  Pt down for EGD this afternoon.  Up with SBA and walker to [facility].  Pt on 2 LPM O2.  Potassium low, continuous infusion started.  Plan of care: continue to monitor.    
VSS.  Pt denies any c/o of pain.  Tele is afib w/ bbb and cvr.  Up with assist of 1 to bathroom.  Mepilex placed on mid spine and coccyx for blanchable areas.  Diuresing with lasix, LE edema +1 ankles.  MCCULLOUGH.  LS- clear, dim at bases with LL crackles.  Plan for EGD tomorrow, xarelto stopped.  Daughter updated via phone.    
VSS. Monitor remains Atrial fib with RVR. Pt. Denies pain. Pt. Is MCCULLOUGH. Stable O2 sats on room air. Daughter at bedside. Continue to monitor.  
General
Tamiko Reagan (spouse)

## 2023-12-17 NOTE — PROGRESS NOTES
Observation goals PRIOR TO DISCHARGE     Comments: List all goals to be met before discharge home:     - Return to baseline functional status : not met  - Dyspnea improved and oxygen saturations greater than 88% on room air or prior home oxygen levels: met  - ECHO or other diagnostic tests and consults completed and resulted : NA  - Vital signs normal or at patient baseline: met   - Safe disposition plan has been identified: not met     - Nurse to notify provider when observation goals have been met and patient is ready for discharge            English

## 2025-02-10 NOTE — TELEPHONE ENCOUNTER
----- Message from Lou Martinez RN sent at 11/8/2017  8:02 AM CST -----      ----- Message -----     From: Dixon Silva MD     Sent: 11/8/2017   7:19 AM       To: [facility]    Your DIF test did not show any evidence of auotimmune disease of the skin.        There is no evidence of bullous pemphigoid.     I have placed a referral to the [facility] for a second opinion, please let us know if you have this appt otherwise I would have you come back to discuss your options.    
Sent result via [employer].    Awilda DUCKWORTH RN  [facility]  [phone]  [facility]   [phone]    
Female

## 2025-07-14 NOTE — TELEPHONE ENCOUNTER
Patient states she should be receiving the insulin pens-Glargine.  Patient is not able to draw the solution.  Please advise.   Pt informed.

## (undated) DEVICE — SU VICRYL 3-0 SH CR 8X18" J774

## (undated) DEVICE — LINEN TOWEL PACK X5 5464

## (undated) DEVICE — STPL ENDO HANDLE GIA ULTRA UNIVERSAL STD EGIAUSTND

## (undated) DEVICE — DRAIN JACKSON PRATT RESERVOIR 100ML SU130-1305

## (undated) DEVICE — SU VICRYL 1 CTX CR 8X18" J765D

## (undated) DEVICE — Device

## (undated) DEVICE — ESU GROUND PAD UNIVERSAL W/O CORD

## (undated) DEVICE — SUCTION TIP POOLE K770

## (undated) DEVICE — PREP CHLORAPREP 26ML TINTED ORANGE  260815

## (undated) DEVICE — ESU CORD MONOPOLAR 10'  E0510

## (undated) DEVICE — ESU HOLDER LAP INST DISP PURPLE LONG 330MM H-PRO-330

## (undated) DEVICE — SOL NACL 0.9% IRRIG 3000ML BAG 2B7477

## (undated) DEVICE — DEVICE SUTURE GRASPER TROCAR CLOSURE 14GA PMITCSG

## (undated) DEVICE — MANIFOLD NEPTUNE 4 PORT 700-20

## (undated) DEVICE — SU SILK 2-0 FS-1 18" 685G

## (undated) DEVICE — GLOVE PROTEXIS BLUE W/NEU-THERA 7.5  2D73EB75

## (undated) DEVICE — SU VICRYL 2-0 TIE 12X18" J905T

## (undated) DEVICE — SU MONOCRYL 4-0 PS-2 18" UND Y496G

## (undated) DEVICE — BLADE KNIFE SURG 10 371110

## (undated) DEVICE — SOL NACL 0.9% IRRIG 1000ML BOTTLE 07138-09

## (undated) DEVICE — SUCTION IRR STRYKERFLOW II W/TIP 250-070-520

## (undated) DEVICE — SU VICRYL 0 UR-6 27" J603H

## (undated) DEVICE — CATH FOLEY 16FR 5ML SIL LUBRISIL IC 1758SI16

## (undated) DEVICE — DRSG TELFA ISLAND 4X8" 7541

## (undated) DEVICE — GLOVE PROTEXIS MICRO 7.5  2D73PM75

## (undated) DEVICE — DRAIN CHANNEL ROUND 15FR FLUTED 072188

## (undated) DEVICE — ESU PENCIL W/HOLSTER E2350H

## (undated) DEVICE — PACK LAP CHOLE SLC15LCFSD

## (undated) DEVICE — ENDO TROCAR BLUNT 100MM W/THRD ANCHOR BLUNTPORT BPT12STS

## (undated) RX ORDER — FENTANYL CITRATE 50 UG/ML
INJECTION, SOLUTION INTRAMUSCULAR; INTRAVENOUS
Status: DISPENSED
Start: 2018-04-15

## (undated) RX ORDER — FENTANYL CITRATE 50 UG/ML
INJECTION, SOLUTION INTRAMUSCULAR; INTRAVENOUS
Status: DISPENSED
Start: 2019-01-01

## (undated) RX ORDER — NEOSTIGMINE METHYLSULFATE 1 MG/ML
VIAL (ML) INJECTION
Status: DISPENSED
Start: 2018-04-15

## (undated) RX ORDER — ALBUMIN, HUMAN INJ 5% 5 %
SOLUTION INTRAVENOUS
Status: DISPENSED
Start: 2018-04-15

## (undated) RX ORDER — ALBUTEROL SULFATE 90 UG/1
AEROSOL, METERED RESPIRATORY (INHALATION)
Status: DISPENSED
Start: 2018-04-15

## (undated) RX ORDER — NITROGLYCERIN 5 MG/ML
VIAL (ML) INTRAVENOUS
Status: DISPENSED
Start: 2018-01-01

## (undated) RX ORDER — HYDROMORPHONE HYDROCHLORIDE 1 MG/ML
INJECTION, SOLUTION INTRAMUSCULAR; INTRAVENOUS; SUBCUTANEOUS
Status: DISPENSED
Start: 2018-04-15

## (undated) RX ORDER — GLYCOPYRROLATE 0.2 MG/ML
INJECTION, SOLUTION INTRAMUSCULAR; INTRAVENOUS
Status: DISPENSED
Start: 2018-04-15

## (undated) RX ORDER — PROPOFOL 10 MG/ML
INJECTION, EMULSION INTRAVENOUS
Status: DISPENSED
Start: 2018-04-15

## (undated) RX ORDER — ONDANSETRON 2 MG/ML
INJECTION INTRAMUSCULAR; INTRAVENOUS
Status: DISPENSED
Start: 2018-04-15

## (undated) RX ORDER — LIDOCAINE HYDROCHLORIDE 20 MG/ML
INJECTION, SOLUTION EPIDURAL; INFILTRATION; INTRACAUDAL; PERINEURAL
Status: DISPENSED
Start: 2018-04-15

## (undated) RX ORDER — VERAPAMIL HYDROCHLORIDE 2.5 MG/ML
INJECTION, SOLUTION INTRAVENOUS
Status: DISPENSED
Start: 2018-01-01

## (undated) RX ORDER — LIDOCAINE HYDROCHLORIDE 10 MG/ML
INJECTION, SOLUTION EPIDURAL; INFILTRATION; INTRACAUDAL; PERINEURAL
Status: DISPENSED
Start: 2018-01-01

## (undated) RX ORDER — VECURONIUM BROMIDE 1 MG/ML
INJECTION, POWDER, LYOPHILIZED, FOR SOLUTION INTRAVENOUS
Status: DISPENSED
Start: 2018-04-15

## (undated) RX ORDER — HEPARIN SODIUM 1000 [USP'U]/ML
INJECTION, SOLUTION INTRAVENOUS; SUBCUTANEOUS
Status: DISPENSED
Start: 2018-01-01

## (undated) RX ORDER — BUPIVACAINE HYDROCHLORIDE 5 MG/ML
INJECTION, SOLUTION EPIDURAL; INTRACAUDAL
Status: DISPENSED
Start: 2018-04-15

## (undated) RX ORDER — DIPHENHYDRAMINE HYDROCHLORIDE 50 MG/ML
INJECTION INTRAMUSCULAR; INTRAVENOUS
Status: DISPENSED
Start: 2018-04-15